# Patient Record
Sex: MALE | Race: WHITE | NOT HISPANIC OR LATINO | Employment: OTHER | URBAN - METROPOLITAN AREA
[De-identification: names, ages, dates, MRNs, and addresses within clinical notes are randomized per-mention and may not be internally consistent; named-entity substitution may affect disease eponyms.]

---

## 2017-01-03 ENCOUNTER — TELEPHONE (OUTPATIENT)
Dept: FAMILY MEDICINE | Facility: CLINIC | Age: 62
End: 2017-01-03

## 2017-01-03 NOTE — TELEPHONE ENCOUNTER
He hasn't received novolog yet, we send to to [facility] and it was suppose to go to [facility], they are working on it but he's not happy.  Shyann Courtney, CMA

## 2017-01-03 NOTE — TELEPHONE ENCOUNTER
----- Message from Bart Willis MD sent at 12/19/2016 11:05 AM CST -----  Please call pt to see how his glucoses are doing and send me glucose readings.  Thanks.

## 2017-01-16 ENCOUNTER — TELEPHONE (OUTPATIENT)
Dept: FAMILY MEDICINE | Facility: CLINIC | Age: 62
End: 2017-01-16

## 2017-01-16 DIAGNOSIS — Z79.4 TYPE 2 DIABETES MELLITUS WITH HYPEROSMOLAR COMA, WITH LONG-TERM CURRENT USE OF INSULIN (H): Primary | ICD-10-CM

## 2017-01-16 DIAGNOSIS — E11.01 TYPE 2 DIABETES MELLITUS WITH HYPEROSMOLAR COMA, WITH LONG-TERM CURRENT USE OF INSULIN (H): Primary | ICD-10-CM

## 2017-01-16 NOTE — TELEPHONE ENCOUNTER
Spoke with patient. States never received Novolog prescribed in Dec 2016. Patient looking to start Novolog sent to [facility]. Patient states checks Blood sugar occasionaly and results are around 160's-170's. Also needing refill of test strips      Dr. Willis, Please advise  PHARMACY/PATIENT NEEDS CLEAR SIG        insulin aspart (NOVOLOG FLEXPEN) 100 UNIT/ML injection  ; blood glucose monitoring (JOHN CONTOUR NEXT) test strip       Last Written Prescription Date: 12/11/2016  Last Fill Quantity: 3, # refills: 3  Last Office Visit with G, P or [facility] prescribing provider:  12/19/2016        BP Readings from Last 3 Encounters:   12/19/16 125/74   12/05/16 138/87   04/21/16 130/80     MICROL      130   12/5/2016  No results found for this basename: microalbumin  CREATININE   Date Value Ref Range Status   12/05/2016 0.97 0.66 - 1.25 mg/dL Final   ]  GFR ESTIMATE   Date Value Ref Range Status   12/05/2016 78 >60 mL/min/1.7m2 Final     Comment:     Non  GFR Calc   04/21/2016 84 >60 mL/min/1.7m2 Final     Comment:     Non  GFR Calc   09/09/2015 63 >60 mL/min/1.7m2 Final     Comment:     Non  GFR Calc     GFR ESTIMATE IF BLACK   Date Value Ref Range Status   12/05/2016 >90   GFR Calc   >60 mL/min/1.7m2 Final   04/21/2016 >90   GFR Calc   >60 mL/min/1.7m2 Final   09/09/2015 77 >60 mL/min/1.7m2 Final     Comment:      GFR Calc     CHOL      161   12/5/2016  HDL       44   12/5/2016  LDL       72   12/5/2016  TRIG      225   12/5/2016  CHOLHDLRATIO      3.2   8/20/2015  AST       15   12/5/2016  ALT       30   12/5/2016  A1C      8.8   12/5/2016  A1C     10.8   4/21/2016  A1C      7.6   8/20/2015  A1C      8.5   1/14/2015  A1C      9.5   7/25/2014  POTASSIUM   Date Value Ref Range Status   12/05/2016 5.1 3.4 - 5.3 mmol/L Final

## 2017-01-16 NOTE — TELEPHONE ENCOUNTER
Reason for Call:  Medication or medication refill:    Do you use a [facility]?  Name of the pharmacy and phone number for the current request:         [facility] [place] [place] [facility] [address] [facility] [address]      Name of the medication requested: Novolog    Other request: pt wants to start this medciation    Can we leave a detailed message on this number? YES    Phone number patient can be reached at: Home number on file [phone] (home)    Best Time: dary      Call taken on 1/16/2017 at 9:19 AM by Vanesa Hernandez

## 2017-01-25 ENCOUNTER — TELEPHONE (OUTPATIENT)
Dept: FAMILY MEDICINE | Facility: CLINIC | Age: 62
End: 2017-01-25

## 2017-01-25 NOTE — TELEPHONE ENCOUNTER
Patient called back for Triage, Patient reports he does not have his Novalog yet, [facility], will discuss blood sugars with Triage Nurse when they call him .

## 2017-01-25 NOTE — TELEPHONE ENCOUNTER
The script was send 9 days ago for novolog to his mail order pharmacy.  Please clarify they are getting the insulin to him.  Thanks

## 2017-01-25 NOTE — TELEPHONE ENCOUNTER
Call to pharmacy.  They had been trying to get ahold of patient with regards to cost of the insulin.  Patient did not answer or call them back, they are holding the Rx until hearing from patient.  Called patient, left detailed message informing to call pharmacy to discuss Rx.  FYI to provider.  Renuka Kumar RN

## 2017-01-25 NOTE — TELEPHONE ENCOUNTER
Call back to patient.  No answer, left message to call back.  Forwarding message to provider that patient does NOT have Novolog yet.  Renuka Kumar RN

## 2017-01-25 NOTE — TELEPHONE ENCOUNTER
----- Message from Bart Willis MD sent at 1/6/2017  9:12 AM CST -----  Please call pt to see how his blood sugars are doing and task me back thanks.

## 2017-02-15 ENCOUNTER — OFFICE VISIT (OUTPATIENT)
Dept: FAMILY MEDICINE | Facility: CLINIC | Age: 62
End: 2017-02-15
Payer: COMMERCIAL

## 2017-02-15 ENCOUNTER — TELEPHONE (OUTPATIENT)
Dept: FAMILY MEDICINE | Facility: CLINIC | Age: 62
End: 2017-02-15

## 2017-02-15 ENCOUNTER — RADIANT APPOINTMENT (OUTPATIENT)
Dept: GENERAL RADIOLOGY | Facility: CLINIC | Age: 62
End: 2017-02-15
Attending: NURSE PRACTITIONER
Payer: COMMERCIAL

## 2017-02-15 VITALS
HEART RATE: 87 BPM | OXYGEN SATURATION: 98 % | HEIGHT: 70 IN | WEIGHT: 256 LBS | DIASTOLIC BLOOD PRESSURE: 83 MMHG | TEMPERATURE: 98 F | RESPIRATION RATE: 20 BRPM | SYSTOLIC BLOOD PRESSURE: 132 MMHG | BODY MASS INDEX: 36.65 KG/M2

## 2017-02-15 DIAGNOSIS — R07.81 RIB PAIN ON LEFT SIDE: Primary | ICD-10-CM

## 2017-02-15 DIAGNOSIS — S22.42XA CLOSED FRACTURE OF MULTIPLE RIBS OF LEFT SIDE, INITIAL ENCOUNTER: ICD-10-CM

## 2017-02-15 DIAGNOSIS — R07.81 RIB PAIN ON LEFT SIDE: ICD-10-CM

## 2017-02-15 PROCEDURE — 71101 X-RAY EXAM UNILAT RIBS/CHEST: CPT | Mod: LT

## 2017-02-15 PROCEDURE — 99213 OFFICE O/P EST LOW 20 MIN: CPT | Performed by: NURSE PRACTITIONER

## 2017-02-15 RX ORDER — HYDROCODONE BITARTRATE AND ACETAMINOPHEN 5; 325 MG/1; MG/1
1-2 TABLET ORAL EVERY 6 HOURS PRN
Qty: 20 TABLET | Refills: 0 | Status: SHIPPED | OUTPATIENT
Start: 2017-02-15 | End: 2017-07-28

## 2017-02-15 NOTE — MR AVS SNAPSHOT
"              After Visit Summary   2/15/2017    [address]    MRN: [id number]           Patient Information     Date Of Birth          1955        Visit Information        Provider Department      2/15/2017 9:00 AM Alejandro Ponce APRN CNP [facility]        Today's Diagnoses     Rib pain on left side    -  1    Closed fracture of multiple ribs of left side, initial encounter           Follow-ups after your visit        Who to contact     If you have questions or need follow up information about today's clinic visit or your schedule please contact [facility] directly at [phone].  Normal or non-critical lab and imaging results will be communicated to you by [employer], letter or phone within 4 business days after the clinic has received the results. If you do not hear from us within 7 days, please contact the clinic through [employer] or phone. If you have a critical or abnormal lab result, we will notify you by phone as soon as possible.  Submit refill requests through [employer] or call your pharmacy and they will forward the refill request to us. Please allow 3 business days for your refill to be completed.          Additional Information About Your Visit        MyChart Information     [employer] lets you send messages to your doctor, view your test results, renew your prescriptions, schedule appointments and more. To sign up, go to [email or web address] . Click on \"Log in\" on the left side of the screen, which will take you to the Welcome page. Then click on \"Sign up Now\" on the right side of the page.     You will be asked to enter the access code listed below, as well as some personal information. Please follow the directions to create your username and password.     Your access code is: [email or web address]  Expires: 3/5/2017 10:04 AM     Your access code will  in 90 days. If you need help or a new code, please call your [facility] or [phone].        Care EveryWhere ID " "    This is your Care EveryWhere ID. This could be used by other organizations to access your [facility] medical records  [id number]        Your Vitals Were     Pulse Temperature Respirations Height Pulse Oximetry BMI (Body Mass Index)    87 98  F (36.7  C) (Tympanic) 20 5' 10\" (1.778 m) 98% 36.73 kg/m2       Blood Pressure from Last 3 Encounters:   02/15/17 132/83   12/19/16 125/74   12/05/16 138/87    Weight from Last 3 Encounters:   02/15/17 256 lb (116.1 kg)   12/19/16 261 lb 6.4 oz (118.6 kg)   12/05/16 261 lb (118.4 kg)                 Today's Medication Changes          These changes are accurate as of: 2/15/17 12:38 PM.  If you have any questions, ask your nurse or doctor.               Start taking these medicines.        Dose/Directions    HYDROcodone-acetaminophen 5-325 MG per tablet   Commonly known as:  NORCO   Used for:  Closed fracture of multiple ribs of left side, initial encounter   Started by:  Alejandro Ponce APRN CNP        Dose:  1-2 tablet   Take 1-2 tablets by mouth every 6 hours as needed for moderate to severe pain maximum 6 tablet(s) per day   Quantity:  20 tablet   Refills:  0            Where to get your medicines      Some of these will need a paper prescription and others can be bought over the counter.  Ask your nurse if you have questions.     Bring a paper prescription for each of these medications     HYDROcodone-acetaminophen 5-325 MG per tablet                Primary Care Provider Office Phone # Fax #    Arriaga Tom Willis [phone]       [facility] [address] [facility] [address]  [facility] [address]        Thank you!     Thank you for choosing [facility]  for your care. Our goal is always to provide you with excellent care. Hearing back from our patients is one way we can continue to improve our services. Please take a few minutes to complete the written survey that you may receive in the mail after your visit with us. " Thank you!             Your Updated Medication List - Protect others around you: Learn how to safely use, store and throw away your medicines at www.disposemymeds.org.          This list is accurate as of: 2/15/17 12:38 PM.  Always use your most recent med list.                   Brand Name Dispense Instructions for use    aspirin 81 MG tablet     100    1 tab po QD (Once per day)       blood glucose monitoring lancets     100 Box    Use to test blood sugars daily as directed.       blood glucose monitoring test strip    JOHN CONTOUR NEXT    3 Box    Use to test blood sugar 1 times daily or as directed.       diltiazem 240 MG 24 hr capsule    CARDIZEM CD    90 capsule    Take 1 capsule (240 mg) by mouth daily       escitalopram 20 MG tablet    LEXAPRO    90 tablet    Take 1 tablet (20 mg) by mouth daily       FISH OIL CONCENTRATE 1000 MG capsule   Generic drug:  fish oil-omega-3 fatty acids     30 capsule    Take 3 capsules by mouth daily.       hydrochlorothiazide 12.5 MG capsule    MICROZIDE    90 capsule    Take 1 capsule (12.5 mg) by mouth daily       HYDROcodone-acetaminophen 5-325 MG per tablet    NORCO    20 tablet    Take 1-2 tablets by mouth every 6 hours as needed for moderate to severe pain maximum 6 tablet(s) per day       insulin aspart 100 UNIT/ML injection    NovoLOG FLEXPEN    21 mL    8 units plus sliding scale before breakfast, 8 units plus sliding scale before lunch, 8 units plus sliding scale before dinner Sliding scale - Blood Glucose (BG) 150-200 = +3 units, 201-250 = +6 units, 251-300 = +9 units, 301-350 = +12 units, 351-400 = +15 units, >400 = +18 units and call PCP       insulin glargine 100 UNIT/ML injection    MUNIR SILVER    3 Month    53 units at bedtime       insulin pen needle 32G X 4 MM    BD MARIE U/F    100 each    Use twice daily as directed       lisinopril 40 MG tablet    PRINIVIL/ZESTRIL    90 tablet    Take 1 tablet (40 mg) by mouth daily       metFORMIN 500 MG 24 hr tablet     GLUCOPHAGE-XR    360 tablet    Take 4 tablets (2,000 mg) by mouth daily (with breakfast)       order for DME     1 Device    Auto-CPAP: Max 12 cm H2O Min 10 cm H2O Continuous Lifetime need and heated humidity.       rosuvastatin 40 MG tablet    CRESTOR    90 tablet    Take 1 tablet (40 mg) by mouth daily

## 2017-02-15 NOTE — TELEPHONE ENCOUNTER
Reason for call:  Patient reporting a symptom    Symptom or request: fell on the ice a week and half ago.  Ribs hurt on the left side.    Duration (how long have symptoms been present): 1.5 weeks    Have you been treated for this before? No    Additional comments: would like to see someone today.    Phone Number patient can be reached at:  Home number on file [phone] (home)    Best Time:  anytime    Can we leave a detailed message on this number:  YES    Call taken on 2/15/2017 at 7:29 AM by Vanesa Hernandez
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
## 2017-02-15 NOTE — NURSING NOTE
"Chief Complaint   Patient presents with     Fall       Initial /83 (BP Location: Right arm, Patient Position: Right side, Cuff Size: Adult Large)  Pulse 87  Temp 98  F (36.7  C) (Tympanic)  Resp 20  Ht 5' 10\" (1.778 m)  Wt 256 lb (116.1 kg)  SpO2 98%  BMI 36.73 kg/m2 Estimated body mass index is 36.73 kg/(m^2) as calculated from the following:    Height as of this encounter: 5' 10\" (1.778 m).    Weight as of this encounter: 256 lb (116.1 kg).  Medication Reconciliation: complete   Torrie Hutton CMA (AAMA)      "

## 2017-02-24 ENCOUNTER — TELEPHONE (OUTPATIENT)
Dept: FAMILY MEDICINE | Facility: CLINIC | Age: 62
End: 2017-02-24
Payer: COMMERCIAL

## 2017-02-24 DIAGNOSIS — Z79.4 TYPE 2 DIABETES MELLITUS WITH HYPEROSMOLAR COMA, WITH LONG-TERM CURRENT USE OF INSULIN (H): ICD-10-CM

## 2017-02-24 DIAGNOSIS — E11.01 TYPE 2 DIABETES MELLITUS WITH HYPEROSMOLAR COMA, WITH LONG-TERM CURRENT USE OF INSULIN (H): ICD-10-CM

## 2017-02-24 NOTE — TELEPHONE ENCOUNTER
Reason for Call:  Medication or medication refill:    Do you use a [facility]?  Name of the pharmacy and phone number for the current request:     [facility]  [facility] [place] - [address]  [facility] - [place], [place] - [address] [facility] [address] - [place], [place] - [address] AT [facility] [address]      Name of the medication requested: NOVOLOG     Other request: pt has feedback regarding NOVOLOG and it's effectiveness . Please advise     Can we leave a detailed message on this number? YES    Phone number patient can be reached at: Home number on file [phone] (home)    Best Time: any    Call taken on 2/24/2017 at 1:13 PM by Herman Mercado

## 2017-03-07 NOTE — TELEPHONE ENCOUNTER
Routing to MTM coordinators to call to schedule.    Tania Gagnon PharmD, [facility]  Medication Therapy Management Provider  Pager: [phone]

## 2017-03-07 NOTE — TELEPHONE ENCOUNTER
Please let pt know I advise he work with Tania Gagnon, our clinic pharmacist on insulin doses- I have sent this message to her.    Tania- can you help with this? Thanks a lot. Kale

## 2017-03-08 ENCOUNTER — TELEPHONE (OUTPATIENT)
Dept: PHARMACY | Facility: OTHER | Age: 62
End: 2017-03-08

## 2017-03-08 NOTE — TELEPHONE ENCOUNTER
MTM referral from: Follow up apt request    MTM referral outreach attempt #1 on March 8, 2017 at 10:13 AM      Outcome: Left Message    Mei Trent, MTM Coordinator

## 2017-03-08 NOTE — LETTER
[facility] PHARM D PROJECT  [address] [facility] [address] [facility] [address]          March 13, 2017    [address]                                                                                                                     [address] [facility] [address]            Dear Huseyin,    Our records show that you are due for a Medication Therapy Management (MTM) appointment. This is an appointment to meet with a pharmacist in the clinic to make sure you get the most out of your medications.  Your medications play an important role in your health.  We would like to meet with you to review how your medications are working for you and to answer any questions you may have.       We are available in the clinic on Mondays, Wednesdays and Fridays from 8am to 4pm.  To make an appointment, please call the clinic at [phone].    We hope to see you soon!       Sincerely,        Tania Gagnon PharmD, [facility]  Medication Therapy Management Pharmacist  Pager: [phone]     Gena Dickson PharmD  Medication Therapy Management Pharmacy Resident  Pager: [phone]

## 2017-03-09 NOTE — TELEPHONE ENCOUNTER
Called patient and he asked that we call him back Monday    Mei Trent [facility] Coordinator

## 2017-03-13 NOTE — TELEPHONE ENCOUNTER
Sent letter to ptHasmukh AvilezD, [facility]  Medication Therapy Management Provider  Pager: [phone]

## 2017-03-13 NOTE — TELEPHONE ENCOUNTER
MTM referral from: Follow up apt     MTM referral outreach attempt #3 on March 13, 2017 at 4:24 PM      Outcome: Patient not reachable after several attempts, will route to MTM Pharmacist    Mei Trent, MTM Coordinator

## 2017-03-24 ENCOUNTER — OFFICE VISIT (OUTPATIENT)
Dept: PHARMACY | Facility: CLINIC | Age: 62
End: 2017-03-24
Payer: COMMERCIAL

## 2017-03-24 VITALS — WEIGHT: 250.7 LBS | BODY MASS INDEX: 35.97 KG/M2 | SYSTOLIC BLOOD PRESSURE: 122 MMHG | DIASTOLIC BLOOD PRESSURE: 81 MMHG

## 2017-03-24 DIAGNOSIS — Z79.4 TYPE 2 DIABETES MELLITUS WITH DIABETIC NEPHROPATHY, WITH LONG-TERM CURRENT USE OF INSULIN (H): ICD-10-CM

## 2017-03-24 DIAGNOSIS — E11.00 TYPE 2 DIABETES MELLITUS WITH HYPEROSMOLARITY WITHOUT COMA, WITHOUT LONG-TERM CURRENT USE OF INSULIN (H): ICD-10-CM

## 2017-03-24 DIAGNOSIS — E78.5 HYPERLIPIDEMIA LDL GOAL <100: ICD-10-CM

## 2017-03-24 DIAGNOSIS — E11.9 TYPE 2 DIABETES MELLITUS WITHOUT COMPLICATION, WITHOUT LONG-TERM CURRENT USE OF INSULIN (H): Primary | ICD-10-CM

## 2017-03-24 DIAGNOSIS — I10 ESSENTIAL HYPERTENSION, BENIGN: ICD-10-CM

## 2017-03-24 DIAGNOSIS — E11.21 TYPE 2 DIABETES MELLITUS WITH DIABETIC NEPHROPATHY, WITH LONG-TERM CURRENT USE OF INSULIN (H): ICD-10-CM

## 2017-03-24 DIAGNOSIS — F41.8 MIXED ANXIETY DEPRESSIVE DISORDER: ICD-10-CM

## 2017-03-24 DIAGNOSIS — R52 PAIN: ICD-10-CM

## 2017-03-24 LAB — HBA1C MFR BLD: 9.5 % (ref 4.3–6)

## 2017-03-24 PROCEDURE — 99607 MTMS BY PHARM ADDL 15 MIN: CPT | Performed by: PHARMACIST

## 2017-03-24 PROCEDURE — 99605 MTMS BY PHARM NP 15 MIN: CPT | Performed by: PHARMACIST

## 2017-03-24 PROCEDURE — 36415 COLL VENOUS BLD VENIPUNCTURE: CPT | Performed by: PREVENTIVE MEDICINE

## 2017-03-24 PROCEDURE — 83036 HEMOGLOBIN GLYCOSYLATED A1C: CPT | Performed by: PREVENTIVE MEDICINE

## 2017-03-24 RX ORDER — METFORMIN HCL 500 MG
2000 TABLET, EXTENDED RELEASE 24 HR ORAL
Qty: 360 TABLET | Refills: 3 | Status: SHIPPED | OUTPATIENT
Start: 2017-03-24 | End: 2017-07-28

## 2017-03-24 NOTE — PROGRESS NOTES
"SUBJECTIVE/OBJECTIVE:                                                    Huseyin St is a 61 year old male coming in for a follow-up visit for Medication Therapy Management.  He was referred to me from Lazaro Alonzo.     Chief Complaint: Follow up from visit with Tania Gagnon PharmD on 2/4/15.  Patient is still painful from fall and broken ribs. Requests metformin refill.     Personal Healthcare Goals: Feel well like he used to    Tobacco: No tobacco use   Alcohol: 1-3 beverages / week  Social History: patient is retired. Works part time at [employer].    Medication Adherence: no issues reported    Diabetes:  Pt currently taking metformin XR 2,000mg daily, Lantus 55 units QHS (prescribed 53 units, he increased the dose), Novolog 8 units plus sliding scale with meals - typically 11 to 14 units/meal   Pt is not experiencing side effects.  Patient is frustrated with insulin therapy complaining it is not working as well as glipizide. Now his SBMG  mg/dL above \"normal\". Normal means 100-120mg/dL for him.   SMB-3 times daily.   Ranges (patient reported): 200s; this  mg/dL. Patient did not bring glucometer today.  Symptoms of low blood sugar? none. Frequency of hypoglycemia? never.  Recent symptoms of high blood sugar? fatigue  Eye exam: up to date  Foot exam: up to date  Microalbumin is not < 30 mg/g. Pt is taking an ACEi/ARB.  Aspirin: Taking 81mg daily and denies side effects  Diet/Exercise: Lots of walking Tues, Wed, Thurs at work. Not much activity otherwise. He and his wife are in the habit of having an 8pm snack nightly. Reports feeling too tired for exercise on work days. Patient has taken Invokana previously and tolerated this well. He is willing to re-start this. Patient uses CPAP every night and takes it with him is he will be away from home more than 2 nights.     Hypertension: Current medications include HCTZ 12.5 mg daily, diltiazem 240 mg daily, lisinopril 40mg daily.  Patient does not " self-monitor BP.  Patient reports no current medication side effects.    Hyperlipidemia: Current therapy includes rosuvastatin 40mg once daily.  Pt reports no significant myalgias or other side effects.     Depression:  Current medications include: Escitalopram 20mg once daily. Pt reports that depression symptoms are well controlled.  PHQ-9 SCORE 11/9/2015 4/21/2016 12/5/2016   Total Score - - -   Total Score 0 0 1     Pain: Current therapy ibuprofen 400mg PRN. Rarely uses. Patient reports this works well when needed.    Current labs include:  BP Readings from Last 3 Encounters:   02/15/17 132/83   12/19/16 125/74   12/05/16 138/87     Today's Vitals: /81  Wt 250 lb 11.2 oz (113.7 kg)  BMI 35.97 kg/m2     Lab Results   Component Value Date    A1C 8.8 12/05/2016   .  Lab Results   Component Value Date    CHOL 161 12/05/2016     Lab Results   Component Value Date    TRIG 225 12/05/2016     Lab Results   Component Value Date    HDL 44 12/05/2016     Lab Results   Component Value Date    LDL 72 12/05/2016       Liver Function Studies -   Recent Labs   Lab Test  12/05/16   1058   PROTTOTAL  8.1   ALBUMIN  3.9   BILITOTAL  0.4   ALKPHOS  81   AST  15   ALT  30       Lab Results   Component Value Date    UCRR 78 12/05/2016    MICROL 130 12/05/2016    UMALCR 167.74 (H) 12/05/2016       Last Basic Metabolic Panel:  Lab Results   Component Value Date     12/05/2016      Lab Results   Component Value Date    POTASSIUM 5.1 12/05/2016     Lab Results   Component Value Date    CHLORIDE 103 12/05/2016     Lab Results   Component Value Date    BUN 14 12/05/2016     Lab Results   Component Value Date    CR 0.97 12/05/2016     GFR Estimate   Date Value Ref Range Status   12/05/2016 78 >60 mL/min/1.7m2 Final     Comment:     Non  GFR Calc   04/21/2016 84 >60 mL/min/1.7m2 Final     Comment:     Non  GFR Calc   09/09/2015 63 >60 mL/min/1.7m2 Final     Comment:     Non  GFR  Calc       TSH   Date Value Ref Range Status   12/05/2016 1.94 0.40 - 4.00 mU/L Final   ]  Most Recent Immunizations   Administered Date(s) Administered     Hepatitis B 08/20/2015     Influenza (IIV3) 09/24/2012     Influenza Vaccine IM 3yrs+ 4 Valent IIV4 12/05/2016     Pneumococcal 23 valent 09/24/2012     TD (ADULT, 7+) 07/14/1998     TDAP Vaccine (Adacel) 07/17/2013     Zoster vaccine, live 11/09/2015   Deferred Date(s) Deferred     Zoster vaccine, live 08/20/2015     ASSESSMENT:                                                    Current medications were reviewed today as discussed above.      Medication Adherence: no issues identified    Diabetes: Needs Improvement. Patient is not meeting A1c goal of < 7%. Need updated A1c. Pt would benefit from restarting Invokana or increasing insulin dose. Prefer to start Invokana because patient tolerated this well previously and can get prescription at no cost with  co-pay card over increased insulin cost and likely increased weight gain. Will plan a follow up BMP in 4 weeks and dose titration if starting dose is well tolerated. I typically prefer a repeat BMP in 2 weeks, however, he has had no kidney problems with past use.    Hypertension: Stable. Patient is meeting BP goal of < 140/90mmHg.     Hyperlipidemia: Stable. Pt is on high intensity statin which is indicated based on 2013 ACC/AHA guidelines for lipid management.      Depression: Stable    Pain: Stable      PLAN:                                                      1. Labs today: A1c  2. Start Invokana 100mg daily.  3. PharmD sent prescriptions for metformin and Invokana 100mg daily.   4. Encouraged patient to increase exercise and reduce carbs.     I spent 30 minutes with this patient today.  All changes were made via verbal approval with Bart Willis. A copy of the visit note was provided to the patient's primary care provider.     Will follow up in one month to assess Invokana  start and BMP.    The patient was given a summary of these recommendations as an after visit summary.    Gena Dickson PharmD  [facility] Resident  [phone]    The patient was seen independently by Dr. Dickson.  I have read the note and agree with the assessment and plan.  Tania Gagnon PharmD, [facility]  Medication Therapy Management Provider  Pager: [phone]

## 2017-03-24 NOTE — PATIENT INSTRUCTIONS
Recommendations from today's MTM visit:                                                    MTM (medication therapy management) is a service provided by a clinical pharmacist designed to help you get the most of out of your medicines.   Today we reviewed what your medicines are for, how to know if they are working, that your medicines are safe and how to make your medicine regimen as easy as possible.      1. Labs today: A1c    2. Start Invokana 100mg daily for 30 days. Then, return to clinic for labs.     3. Go to [email or web address] for the co-pay card.    Next MTM visit: 30 days    To schedule another MTM appointment, please call the clinic directly or you may call the MTM scheduling line at [phone] or toll-free at [phone].     My Clinical Pharmacist's contact information:                                                      It was a pleasure seeing you today!  Please feel free to contact me with any questions or concerns you have.      Gena Dickson, PharmD  [facility] MTM Resident  [phone]      You may receive a survey about the MTM services you received.  I would appreciate your feedback to help me serve you better in the future. Please fill it out and return it when you can. Your comments will be anonymous.

## 2017-03-24 NOTE — MR AVS SNAPSHOT
After Visit Summary   3/24/2017    Huseyin St    MRN: [id number]           Patient Information     Date Of Birth          1955        Visit Information        Provider Department      3/24/2017 11:00 AM Tania Gagnon, [facility] MTM        Today's Diagnoses     Type 2 diabetes mellitus with hyperosmolarity without coma, without long-term current use of insulin (H)    -  1    Type 2 diabetes mellitus without complication, without long-term current use of insulin (H)          Care Instructions    Recommendations from today's MTM visit:                                                    MTM (medication therapy management) is a service provided by a clinical pharmacist designed to help you get the most of out of your medicines.   Today we reviewed what your medicines are for, how to know if they are working, that your medicines are safe and how to make your medicine regimen as easy as possible.      1. Labs today: A1c    2. Start Invokana 100mg daily for 30 days. Then, return to clinic for labs.     3. Go to [email or web address] for the co-pay card.    Next MTM visit: **    To schedule another MTM appointment, please call the clinic directly or you may call the MTM scheduling line at [phone] or toll-free at [phone].     My Clinical Pharmacist's contact information:                                                      It was a pleasure seeing you today!  Please feel free to contact me with any questions or concerns you have.      Gena Dickson, PharmD  [facility] Resident  [phone]      You may receive a survey about the MTM services you received.  I would appreciate your feedback to help me serve you better in the future. Please fill it out and return it when you can. Your comments will be anonymous.                  Follow-ups after your visit        Future tests that were ordered for you today     Open Future Orders        Priority Expected Expires Ordered     "Hemoglobin A1c Routine  3/24/2018 3/24/2017            Who to contact     If you have questions or need follow up information about today's clinic visit or your schedule please contact [facility] directly at [phone].  Normal or non-critical lab and imaging results will be communicated to you by MyChart, letter or phone within 4 business days after the clinic has received the results. If you do not hear from us within 7 days, please contact the clinic through MyChart or phone. If you have a critical or abnormal lab result, we will notify you by phone as soon as possible.  Submit refill requests through [employer] or call your pharmacy and they will forward the refill request to us. Please allow 3 business days for your refill to be completed.          Additional Information About Your Visit        MyChart Information     [employer] lets you send messages to your doctor, view your test results, renew your prescriptions, schedule appointments and more. To sign up, go to [email or web address] . Click on \"Log in\" on the left side of the screen, which will take you to the Welcome page. Then click on \"Sign up Now\" on the right side of the page.     You will be asked to enter the access code listed below, as well as some personal information. Please follow the directions to create your username and password.     Your access code is: [email or web address]  Expires: 2017 11:30 AM     Your access code will  in 90 days. If you need help or a new code, please call your [place] clinic or [phone].        Care EveryWhere ID     This is your Care EveryWhere ID. This could be used by other organizations to access your [place] medical records  [id number]         Blood Pressure from Last 3 Encounters:   02/15/17 132/83   16 125/74   16 138/87    Weight from Last 3 Encounters:   02/15/17 256 lb (116.1 kg)   16 261 lb 6.4 oz (118.6 kg)   16 261 lb (118.4 kg)                 Today's " Medication Changes          These changes are accurate as of: 3/24/17 11:32 AM.  If you have any questions, ask your nurse or doctor.               Start taking these medicines.        Dose/Directions    canagliflozin 100 MG tablet   Commonly known as:  INVOKANA   Used for:  Type 2 diabetes mellitus with hyperosmolarity without coma, without long-term current use of insulin (H)   Started by:  Tania Gagnon [facility]        Dose:  100 mg   Take 1 tablet (100 mg) by mouth every morning (before breakfast)   Quantity:  30 tablet   Refills:  0            Where to get your medicines      These medications were sent to [facility] - [place], [place] - [address]  [address], [place] [address]     Phone:  [phone]     metFORMIN 500 MG 24 hr tablet         These medications were sent to [id number] - [place], [place] - [address] AT [facility] [address] [place] [address]  [address], [place] [address]    Hours:  24-hours Phone:  [phone]     canagliflozin 100 MG tablet                Primary Care Provider Office Phone # Fax #    Arriaga Tom Willis [phone]       [facility] [address] [facility] [address]  [facility] [address]        Thank you!     Thank you for choosing [facility]  for your care. Our goal is always to provide you with excellent care. Hearing back from our patients is one way we can continue to improve our services. Please take a few minutes to complete the written survey that you may receive in the mail after your visit with us. Thank you!             Your Updated Medication List - Protect others around you: Learn how to safely use, store and throw away your medicines at www.disposemymeds.org.          This list is accurate as of: 3/24/17 11:32 AM.  Always use your most recent med list.                   Brand Name Dispense Instructions for use    aspirin 81 MG tablet     100    1 tab po QD (Once  per day)       blood glucose monitoring lancets     100 Box    Use to test blood sugars daily as directed.       blood glucose monitoring test strip    JOHN CONTOUR NEXT    3 Box    Use to test blood sugar 1 times daily or as directed.       canagliflozin 100 MG tablet    INVOKANA    30 tablet    Take 1 tablet (100 mg) by mouth every morning (before breakfast)       diltiazem 240 MG 24 hr capsule    CARDIZEM CD    90 capsule    Take 1 capsule (240 mg) by mouth daily       escitalopram 20 MG tablet    LEXAPRO    90 tablet    Take 1 tablet (20 mg) by mouth daily       hydrochlorothiazide 12.5 MG capsule    MICROZIDE    90 capsule    Take 1 capsule (12.5 mg) by mouth daily       HYDROcodone-acetaminophen 5-325 MG per tablet    NORCO    20 tablet    Take 1-2 tablets by mouth every 6 hours as needed for moderate to severe pain maximum 6 tablet(s) per day       insulin aspart 100 UNIT/ML injection    NovoLOG FLEXPEN    21 mL    8 units plus sliding scale before breakfast, 8 units plus sliding scale before lunch, 8 units plus sliding scale before dinner Sliding scale - Blood Glucose (BG) 150-200 = +3 units, 201-250 = +6 units, 251-300 = +9 units, 301-350 = +12 units, 351-400 = +15 units, >400 = +18 units and call PCP       insulin glargine 100 UNIT/ML injection    LANASIAUS SOLOSTAR    3 Month    53 units at bedtime       insulin pen needle 32G X 4 MM    BD MARIE U/F    100 each    Use twice daily as directed       lisinopril 40 MG tablet    PRINIVIL/ZESTRIL    90 tablet    Take 1 tablet (40 mg) by mouth daily       metFORMIN 500 MG 24 hr tablet    GLUCOPHAGE-XR    360 tablet    Take 4 tablets (2,000 mg) by mouth daily (with breakfast)       order for DME     1 Device    Auto-CPAP: Max 12 cm H2O Min 10 cm H2O Continuous Lifetime need and heated humidity.       rosuvastatin 40 MG tablet    CRESTOR    90 tablet    Take 1 tablet (40 mg) by mouth daily

## 2017-04-07 ENCOUNTER — TRANSFERRED RECORDS (OUTPATIENT)
Dept: HEALTH INFORMATION MANAGEMENT | Facility: CLINIC | Age: 62
End: 2017-04-07

## 2017-04-07 ENCOUNTER — TELEPHONE (OUTPATIENT)
Dept: FAMILY MEDICINE | Facility: CLINIC | Age: 62
End: 2017-04-07

## 2017-04-07 NOTE — TELEPHONE ENCOUNTER
Reason for Call:  Other [employer] to discuss gaps in care     Detailed comments: Patient last office visit, med list, and clinical note discussion with nurse.     Phone Number Patient can be reached at: Xuan Springer RN Case Manager [facility]  [phone]    Best Time: anytime     Can we leave a detailed message on this number? YES    Call taken on 4/7/2017 at 10:53 AM by Kale Worthy

## 2017-04-10 ENCOUNTER — TELEPHONE (OUTPATIENT)
Dept: FAMILY MEDICINE | Facility: CLINIC | Age: 62
End: 2017-04-10

## 2017-04-10 NOTE — TELEPHONE ENCOUNTER
Dr. Willis can you fit this Pt in on 4/17? He has the day off and prefers not to  Miss work on Thursday

## 2017-04-10 NOTE — TELEPHONE ENCOUNTER
Reason for Call:  Other appointment    Detailed comments: pre op tubes in ear 4/18/17 no appt available with Np please fit in to schedule    Phone Number Patient can be reached at: Home number on file [phone] (home)    Best Time: any    Can we leave a detailed message on this number? YES    Call taken on 4/10/2017 at 10:25 AM by Betty Carrion

## 2017-04-17 ENCOUNTER — OFFICE VISIT (OUTPATIENT)
Dept: FAMILY MEDICINE | Facility: CLINIC | Age: 62
End: 2017-04-17
Payer: COMMERCIAL

## 2017-04-17 VITALS
OXYGEN SATURATION: 97 % | HEIGHT: 70 IN | TEMPERATURE: 97.4 F | BODY MASS INDEX: 35.93 KG/M2 | WEIGHT: 251 LBS | SYSTOLIC BLOOD PRESSURE: 142 MMHG | DIASTOLIC BLOOD PRESSURE: 90 MMHG | HEART RATE: 80 BPM

## 2017-04-17 DIAGNOSIS — Z01.818 PREOP GENERAL PHYSICAL EXAM: Primary | ICD-10-CM

## 2017-04-17 DIAGNOSIS — H93.91 EAR PROBLEM, RIGHT: ICD-10-CM

## 2017-04-17 PROBLEM — I51.7 LVH (LEFT VENTRICULAR HYPERTROPHY): Status: ACTIVE | Noted: 2017-04-17

## 2017-04-17 LAB
ANION GAP SERPL CALCULATED.3IONS-SCNC: 7 MMOL/L (ref 3–14)
BASOPHILS # BLD AUTO: 0 10E9/L (ref 0–0.2)
BASOPHILS NFR BLD AUTO: 0.2 %
BUN SERPL-MCNC: 12 MG/DL (ref 7–30)
CALCIUM SERPL-MCNC: 9.2 MG/DL (ref 8.5–10.1)
CHLORIDE SERPL-SCNC: 103 MMOL/L (ref 94–109)
CO2 SERPL-SCNC: 28 MMOL/L (ref 20–32)
CREAT SERPL-MCNC: 0.94 MG/DL (ref 0.66–1.25)
DIFFERENTIAL METHOD BLD: NORMAL
EOSINOPHIL # BLD AUTO: 0.1 10E9/L (ref 0–0.7)
EOSINOPHIL NFR BLD AUTO: 1.4 %
ERYTHROCYTE [DISTWIDTH] IN BLOOD BY AUTOMATED COUNT: 12.8 % (ref 10–15)
GFR SERPL CREATININE-BSD FRML MDRD: 81 ML/MIN/1.7M2
GLUCOSE SERPL-MCNC: 224 MG/DL (ref 70–99)
HCT VFR BLD AUTO: 43.7 % (ref 40–53)
HGB BLD-MCNC: 14.4 G/DL (ref 13.3–17.7)
LYMPHOCYTES # BLD AUTO: 2.2 10E9/L (ref 0.8–5.3)
LYMPHOCYTES NFR BLD AUTO: 24.2 %
MCH RBC QN AUTO: 29 PG (ref 26.5–33)
MCHC RBC AUTO-ENTMCNC: 33 G/DL (ref 31.5–36.5)
MCV RBC AUTO: 88 FL (ref 78–100)
MONOCYTES # BLD AUTO: 1 10E9/L (ref 0–1.3)
MONOCYTES NFR BLD AUTO: 11.1 %
NEUTROPHILS # BLD AUTO: 5.8 10E9/L (ref 1.6–8.3)
NEUTROPHILS NFR BLD AUTO: 63.1 %
PLATELET # BLD AUTO: 366 10E9/L (ref 150–450)
POTASSIUM SERPL-SCNC: 4.7 MMOL/L (ref 3.4–5.3)
RBC # BLD AUTO: 4.97 10E12/L (ref 4.4–5.9)
SODIUM SERPL-SCNC: 138 MMOL/L (ref 133–144)
WBC # BLD AUTO: 9.2 10E9/L (ref 4–11)

## 2017-04-17 PROCEDURE — 85025 COMPLETE CBC W/AUTO DIFF WBC: CPT | Performed by: PREVENTIVE MEDICINE

## 2017-04-17 PROCEDURE — 36415 COLL VENOUS BLD VENIPUNCTURE: CPT | Performed by: PREVENTIVE MEDICINE

## 2017-04-17 PROCEDURE — 80048 BASIC METABOLIC PNL TOTAL CA: CPT | Performed by: PREVENTIVE MEDICINE

## 2017-04-17 PROCEDURE — 93000 ELECTROCARDIOGRAM COMPLETE: CPT | Performed by: PREVENTIVE MEDICINE

## 2017-04-17 PROCEDURE — 99214 OFFICE O/P EST MOD 30 MIN: CPT | Performed by: PREVENTIVE MEDICINE

## 2017-04-17 NOTE — PROGRESS NOTES
[place] [address] [facility] [address]  [phone]  Dept: [phone]    PRE-OP EVALUATION:  Today's date: 2017    Huseyin [address] (: 1955) presents for pre-operative evaluation assessment as requested by Dr. Krishnamurthy.  He requires evaluation and anesthesia risk assessment prior to undergoing surgery/procedure for treatment of ears .  Proposed procedure: Tube in R ear    Date of Surgery/ Procedure: 17  Time of Surgery/ Procedure: 7:30 a.m.  Hospital/Surgical Facility: [facility]  Fax number for surgical facility:   Primary Physician: Bart Willis  Type of Anesthesia Anticipated: General    Patient has a Health Care Directive or Living Will:  NO    1. NO - Do you have a history of heart attack, stroke, stent, bypass or surgery on an artery in the head, neck, heart or legs?  2. YES - DO YOU EVER HAVE ANY PAIN OR DISCOMFORT IN YOUR CHEST?   3. NO - Do you have a history of  Heart Failure?  4. YES - ARE YOUR TROUBLED BY SHORTNESS OF BREATH WHEN WALKING ON THE LEVEL, UP A SLIGHT HILL OR AT NIGHT?   5. YES - DO YOU CURRENTLY HAVE A COLD, BRONCHITIS OR OTHER RESPIRATORY INFECTION?   6. NO - Do you have a cough, shortness of breath or wheezing?  7. NO - Do you sometimes get pains in the calves of your legs when you walk?  8. NO - Do you or anyone in your family have previous history of blood clots?  9. NO - Do you or does anyone in your family have a serious bleeding problem such as prolonged bleeding following surgeries or cuts?  10. NO - Have you ever had problems with anemia or been told to take iron pills?  11. NO - Have you had any abnormal blood loss such as black, tarry or bloody stools, or abnormal vaginal bleeding?  12. NO - Have you ever had a blood transfusion?  13. NO - Have you or any of your relatives ever had problems with anesthesia?  14. NO - Do you have sleep apnea, excessive snoring or daytime drowsiness?  15. YES - DO YOU HAVE  ANY PROSTHETIC HEART VALVES?   16. NO - Do you have prosthetic joints?  17. NO - Is there any chance that you may be pregnant?      HPI:                                                      Brief HPI related to upcoming procedure: need for r ear tube      See problem list for active medical problems.  Problems all longstanding and stable, except as noted/documented.  See ROS for pertinent symptoms related to these conditions.                                                                                                  .    MEDICAL HISTORY:                                                      Patient Active Problem List    Diagnosis Date Noted     Type 2 diabetes mellitus with hyperosmolarity without coma, without long-term current use of insulin (H) 12/05/2016     Priority: Medium     Overweight BMI 35-40 10/12/2015     Priority: Medium     Type 2 diabetes mellitus with diabetic nephropathy (H) 08/31/2015     Priority: Medium     NA (obstructive sleep apnea) 07/17/2013     Priority: Medium     On cpap       Recurrent cold sores 01/16/2013     Priority: Medium     Microalbuminuria 09/26/2012     Priority: Medium     Advanced directives, counseling/discussion 08/07/2012     Priority: Medium     Discussed advance care planning with patient; information given to patient to review. 8/7/2012   Christy Ray, CMA           Mixed anxiety depressive disorder 04/26/2012     Priority: Medium     (Problem list name updated by automated process. Provider to review and confirm.)       HYPERLIPIDEMIA LDL GOAL <100 10/31/2010     Priority: Medium     Rhinitis, allergic seasonal 06/19/2008     Priority: Medium     Hypertrophy of prostate without urinary obstruction 04/30/2006     Priority: Medium     Problem list name updated by automated process. Provider to review       Tietze's disease 04/30/2006     Priority: Medium     Essential hypertension, benign 04/26/2003     Priority: Medium      Past Medical History:   Diagnosis  Date     Essential hypertension, benign      No past surgical history on file.  Current Outpatient Prescriptions   Medication Sig Dispense Refill     insulin glargine (LANTUS SOLOSTAR) 100 UNIT/ML injection 55 units at bedtime       metFORMIN (GLUCOPHAGE-XR) 500 MG 24 hr tablet Take 4 tablets (2,000 mg) by mouth daily (with breakfast) 360 tablet 3     canagliflozin (INVOKANA) 100 MG tablet Take 1 tablet (100 mg) by mouth every morning (before breakfast) 30 tablet 0     HYDROcodone-acetaminophen (NORCO) 5-325 MG per tablet Take 1-2 tablets by mouth every 6 hours as needed for moderate to severe pain maximum 6 tablet(s) per day 20 tablet 0     insulin aspart (NOVOLOG FLEXPEN) 100 UNIT/ML injection 8 units plus sliding scale before breakfast, 8 units plus sliding scale before lunch, 8 units plus sliding scale before dinner  Sliding scale - Blood Glucose (BG) 150-200 = +3 units, 201-250 = +6 units, 251-300 = +9 units, 301-350 = +12 units, 351-400 = +15 units, >400 = +18 units and call PCP 21 mL 3     blood glucose monitoring (JOHN CONTOUR NEXT) test strip Use to test blood sugar 1 times daily or as directed. 3 Box 3     insulin pen needle (BD MARIE U/F) 32G X 4 MM Use twice daily as directed 100 each prn     diltiazem (CARDIZEM CD) 240 MG 24 hr CD capsule Take 1 capsule (240 mg) by mouth daily 90 capsule 2     rosuvastatin (CRESTOR) 40 MG tablet Take 1 tablet (40 mg) by mouth daily 90 tablet 3     escitalopram (LEXAPRO) 20 MG tablet Take 1 tablet (20 mg) by mouth daily 90 tablet 3     hydrochlorothiazide (MICROZIDE) 12.5 MG capsule Take 1 capsule (12.5 mg) by mouth daily 90 capsule 3     lisinopril (PRINIVIL,ZESTRIL) 40 MG tablet Take 1 tablet (40 mg) by mouth daily 90 tablet 3     blood glucose monitoring (MICROLET) lancets Use to test blood sugars daily as directed. 100 Box prn     ORDER FOR DME Auto-CPAP: Max 12 cm H2O Min 10 cm H2O  Continuous Lifetime need and heated humidity.    1 Device 0     ASPIRIN 81 MG OR  TABS 1 tab po QD (Once per day) 100 3     OTC products: None, except as noted above    Allergies   Allergen Reactions     Augmentin Diarrhea     Sulfa Drugs      Unknown - reaction occurred as a child     Victoza      Skin rash     Xanax      Difficult to wean off      Latex Allergy: NO    Social History   Substance Use Topics     Smoking status: Former Smoker     Types: Cigars     Smokeless tobacco: Never Used      Comment: used to smoke occ cigar, never cigarettes     Alcohol use 0.0 oz/week     0 Standard drinks or equivalent per week      Comment: 3-6 drinks on occ weekend night, occ drink on weekday     History   Drug Use No       REVIEW OF SYSTEMS:                                                    Constitutional, neuro, ENT, endocrine, pulmonary, cardiac, gastrointestinal, genitourinary, musculoskeletal, integument and psychiatric systems are negative, except as otherwise noted.    EXAM:                                                    There were no vitals taken for this visit.    GENERAL APPEARANCE: healthy, alert and no distress     EYES: EOMI,  PERRL     HENT: ear canals and TM's normal and nose and mouth without ulcers or lesions     NECK: no adenopathy, no asymmetry, masses, or scars and thyroid normal to palpation     RESP: lungs clear to auscultation - no rales, rhonchi or wheezes     CV: regular rates and rhythm, normal S1 S2, no S3 or S4 and no murmur, click or rub     ABDOMEN:  soft, nontender, no HSM or masses and bowel sounds normal     MS: extremities normal- no gross deformities noted, no evidence of inflammation in joints, FROM in all extremities.     SKIN: no suspicious lesions or rashes     NEURO: Normal strength and tone, sensory exam grossly normal, mentation intact and speech normal     PSYCH: mentation appears normal. and affect normal/bright     LYMPHATICS: No axillary, cervical, or supraclavicular nodes    DIAGNOSTICS:                                                    EKG, CBC, BMP  pending    Recent Labs   Lab Test  03/24/17   1143  12/05/16   1058  04/21/16   0906   HGB   --   14.9  15.2   PLT   --   357  367   NA   --   138  137   POTASSIUM   --   5.1  5.2   CR   --   0.97  0.92   A1C  9.5*  8.8*  10.8*        IMPRESSION:                                                    Diagnosis/reason for consult: Medical Preop    The proposed surgical procedure is considered INTERMEDIATE risk.    REVISED CARDIAC RISK INDEX  The patient has the following serious cardiovascular risks for perioperative complications such as (MI, PE, VFib and 3  AV Block):  Diabetes Mellitus (on Insulin)  INTERPRETATION: 1 risks: Class II (low risk - 0.9% complication rate)    The patient has the following additional risks for perioperative complications:  No identified additional risks    No diagnosis found.    RECOMMENDATIONS:                                                          --Patient is to take all scheduled medications on the day of surgery EXCEPT for modifications listed below.    Diabetes Medication Use    -----Hold usual  oral diabetic meds (e.g. Metformin, Actos, Glipizide) while NPO.   -----Take 80% of long acting insulin (e.g. Lantus, NPH) while NPO (fasting)      Anticoagulant or Antiplatelet Medication Use  ASPIRIN: Discontinue ASA 7-10 days prior to procedure to reduce bleeding risk.  It should be resumed post-operatively.        ACE Inhibitor or Angiotensin Receptor Blocker (ARB) Use  Ace inhibitor or Angiotensin Receptor Blocker (ARB) and should HOLD this medication for the 24 hours prior to surgery.  Hold hctz morning of procedure as well    APPROVAL GIVEN to proceed with proposed procedure, without further diagnostic evaluation       Signed Electronically by: Bart Willis MD, MD    Copy of this evaluation report is provided to requesting physician.    [place] Preop Guidelines

## 2017-04-17 NOTE — NURSING NOTE
"Chief Complaint   Patient presents with     Pre-Op Exam       Initial /90 (BP Location: Right arm, Cuff Size: Adult Large)  Pulse 80  Temp 97.4  F (36.3  C) (Tympanic)  Ht 5' 10\" (1.778 m)  Wt 251 lb (113.9 kg)  SpO2 97%  BMI 36.01 kg/m2 Estimated body mass index is 36.01 kg/(m^2) as calculated from the following:    Height as of this encounter: 5' 10\" (1.778 m).    Weight as of this encounter: 251 lb (113.9 kg).  Medication Reconciliation: complete     Janna Galvan MA    "

## 2017-04-17 NOTE — LETTER
[address]  [place] [address]  [phone]                                                                                               Date: 4/17/2017    [address]                                                                               [address] [facility] [address]              Dear Huseyin,    Your labs look fine.   Enclosed is a copy of your results.      It was a pleasure to see you at your last appointment. If you have any questions, please feel free to call myself or my nurse at [phone].          Sincerely,    Kale Willis MD/ Ema COWAN CMA  Results for orders placed or performed in visit on 04/17/17   CBC with platelets and differential   Result Value Ref Range    WBC 9.2 4.0 - 11.0 10e9/L    RBC Count 4.97 4.4 - 5.9 10e12/L    Hemoglobin 14.4 13.3 - 17.7 g/dL    Hematocrit 43.7 40.0 - 53.0 %    MCV 88 78 - 100 fl    MCH 29.0 26.5 - 33.0 pg    MCHC 33.0 31.5 - 36.5 g/dL    RDW 12.8 10.0 - 15.0 %    Platelet Count 366 150 - 450 10e9/L    Diff Method Automated Method     % Neutrophils 63.1 %    % Lymphocytes 24.2 %    % Monocytes 11.1 %    % Eosinophils 1.4 %    % Basophils 0.2 %    Absolute Neutrophil 5.8 1.6 - 8.3 10e9/L    Absolute Lymphocytes 2.2 0.8 - 5.3 10e9/L    Absolute Monocytes 1.0 0.0 - 1.3 10e9/L    Absolute Eosinophils 0.1 0.0 - 0.7 10e9/L    Absolute Basophils 0.0 0.0 - 0.2 10e9/L   Basic metabolic panel   Result Value Ref Range    Sodium 138 133 - 144 mmol/L    Potassium 4.7 3.4 - 5.3 mmol/L    Chloride 103 94 - 109 mmol/L    Carbon Dioxide 28 20 - 32 mmol/L    Anion Gap 7 3 - 14 mmol/L    Glucose 224 (H) 70 - 99 mg/dL    Urea Nitrogen 12 7 - 30 mg/dL    Creatinine 0.94 0.66 - 1.25 mg/dL    GFR Estimate 81 >60 mL/min/1.7m2    GFR Estimate If Black >90   GFR Calc   >60 mL/min/1.7m2    Calcium 9.2 8.5 - 10.1 mg/dL

## 2017-04-17 NOTE — MR AVS SNAPSHOT
After Visit Summary   4/17/2017    [address]    MRN: [id number]           Patient Information     Date Of Birth          1955        Visit Information        Provider Department      4/17/2017 8:30 AM Bart Willis MD [facility]        Today's Diagnoses     Preop general physical exam    -  1      Care Instructions      Before Your Surgery      Call your surgeon if there is any change in your health. This includes signs of a cold or flu (such as a sore throat, runny nose, cough, rash or fever).    Do not smoke, drink alcohol or take over the counter medicine (unless your surgeon or primary care doctor tells you to) for the 24 hours before and after surgery.    If you take prescribed drugs: Follow your doctor s orders about which medicines to take and which to stop until after surgery.    Eating and drinking prior to surgery: follow the instructions from your surgeon    Take a shower or bath the night before surgery. Use the soap your surgeon gave you to gently clean your skin. If you do not have soap from your surgeon, use your regular soap. Do not shave or scrub the surgery site.  Wear clean pajamas and have clean sheets on your bed.         Follow-ups after your visit        Who to contact     If you have questions or need follow up information about today's clinic visit or your schedule please contact [facility] directly at [phone].  Normal or non-critical lab and imaging results will be communicated to you by MyChart, letter or phone within 4 business days after the clinic has received the results. If you do not hear from us within 7 days, please contact the clinic through MyChart or phone. If you have a critical or abnormal lab result, we will notify you by phone as soon as possible.  Submit refill requests through [employer] or call your pharmacy and they will forward the refill request to us. Please allow 3 business days for your refill to  "be completed.          Additional Information About Your Visit        [employer] Information     [employer] lets you send messages to your doctor, view your test results, renew your prescriptions, schedule appointments and more. To sign up, go to [email or web address] . Click on \"Log in\" on the left side of the screen, which will take you to the Welcome page. Then click on \"Sign up Now\" on the right side of the page.     You will be asked to enter the access code listed below, as well as some personal information. Please follow the directions to create your username and password.     Your access code is: [email or web address]  Expires: 2017 11:30 AM     Your access code will  in 90 days. If you need help or a new code, please call your [facility] clinic or [phone].        Care EveryWhere ID     This is your Care EveryWhere ID. This could be used by other organizations to access your [facility] medical records  [id number]        Your Vitals Were     Pulse Temperature Height Pulse Oximetry BMI (Body Mass Index)       80 97.4  F (36.3  C) (Tympanic) 5' 10\" (1.778 m) 97% 36.01 kg/m2        Blood Pressure from Last 3 Encounters:   17 142/90   17 122/81   02/15/17 132/83    Weight from Last 3 Encounters:   17 251 lb (113.9 kg)   17 250 lb 11.2 oz (113.7 kg)   02/15/17 256 lb (116.1 kg)              Today, you had the following     No orders found for display       Primary Care Provider Office Phone # Fax #    Batr Tom Willis [phone]       [facility] [address]  [place] [address]        Thank you!     Thank you for choosing [facility]  for your care. Our goal is always to provide you with excellent care. Hearing back from our patients is one way we can continue to improve our services. Please take a few minutes to complete the written survey that you may receive in the mail after your visit with us. Thank you!           "   Your Updated Medication List - Protect others around you: Learn how to safely use, store and throw away your medicines at www.disposemymeds.org.          This list is accurate as of: 4/17/17  8:31 AM.  Always use your most recent med list.                   Brand Name Dispense Instructions for use    aspirin 81 MG tablet     100    1 tab po QD (Once per day)       blood glucose monitoring lancets     100 Box    Use to test blood sugars daily as directed.       blood glucose monitoring test strip    JOHN CONTOUR NEXT    3 Box    Use to test blood sugar 1 times daily or as directed.       canagliflozin 100 MG tablet    INVOKANA    30 tablet    Take 1 tablet (100 mg) by mouth every morning (before breakfast)       diltiazem 240 MG 24 hr capsule    CARDIZEM CD    90 capsule    Take 1 capsule (240 mg) by mouth daily       escitalopram 20 MG tablet    LEXAPRO    90 tablet    Take 1 tablet (20 mg) by mouth daily       hydrochlorothiazide 12.5 MG capsule    MICROZIDE    90 capsule    Take 1 capsule (12.5 mg) by mouth daily       HYDROcodone-acetaminophen 5-325 MG per tablet    NORCO    20 tablet    Take 1-2 tablets by mouth every 6 hours as needed for moderate to severe pain maximum 6 tablet(s) per day       insulin aspart 100 UNIT/ML injection    NovoLOG FLEXPEN    21 mL    8 units plus sliding scale before breakfast, 8 units plus sliding scale before lunch, 8 units plus sliding scale before dinner Sliding scale - Blood Glucose (BG) 150-200 = +3 units, 201-250 = +6 units, 251-300 = +9 units, 301-350 = +12 units, 351-400 = +15 units, >400 = +18 units and call PCP       insulin glargine 100 UNIT/ML injection    LANTUS SOLOSTAR     55 units at bedtime       insulin pen needle 32G X 4 MM    BD MARIE U/F    100 each    Use twice daily as directed       lisinopril 40 MG tablet    PRINIVIL/ZESTRIL    90 tablet    Take 1 tablet (40 mg) by mouth daily       metFORMIN 500 MG 24 hr tablet    GLUCOPHAGE-XR    360 tablet    Take 4  tablets (2,000 mg) by mouth daily (with breakfast)       order for DME     1 Device    Auto-CPAP: Max 12 cm H2O Min 10 cm H2O Continuous Lifetime need and heated humidity.       rosuvastatin 40 MG tablet    CRESTOR    90 tablet    Take 1 tablet (40 mg) by mouth daily

## 2017-04-18 ENCOUNTER — HOSPITAL PATHOLOGY (OUTPATIENT)
Dept: OTHER | Facility: CLINIC | Age: 62
End: 2017-04-18

## 2017-04-19 LAB — COPATH REPORT: NORMAL

## 2017-04-20 LAB — COPATH REPORT: NORMAL

## 2017-04-21 DIAGNOSIS — H10.45 CHRONIC ALLERGIC CONJUNCTIVITIS: ICD-10-CM

## 2017-04-21 LAB
COPATH REPORT: NORMAL
COPATH REPORT: NORMAL

## 2017-04-21 NOTE — TELEPHONE ENCOUNTER
Med requested is not on current med list.  Please send Rx, if appropriate, to [facility]    Flonase      Last Written Prescription Date: unk  Last Fill Quantity: 0,  # refills: 0   Last Office Visit with [facility], [facility] or [facility] prescribing provider: 04/17/17                                                 Janna Galvan MA

## 2017-04-21 NOTE — TELEPHONE ENCOUNTER
Reason for Call:  Medication or medication refill:    Do you use a [facility]?  Name of the pharmacy and phone number for the current request:      [id number] [place] [facility] [place] - [address] AT [facility] [address]      Name of the medication requested: Fluticasone Nasal Spray    Other request: Please call once this sent to the Pharmacy     Can we leave a detailed message on this number? YES    Phone number patient can be reached at: Home number on file [phone] (home)    Best Time: anytime    Call taken on 4/21/2017 at 3:10 PM by Momo Anguiano

## 2017-04-22 RX ORDER — FLUTICASONE PROPIONATE 50 MCG
2 SPRAY, SUSPENSION (ML) NASAL DAILY
Qty: 1 BOTTLE | Refills: 3 | Status: SHIPPED | OUTPATIENT
Start: 2017-04-22 | End: 2018-03-12

## 2017-04-24 ENCOUNTER — TRANSFERRED RECORDS (OUTPATIENT)
Dept: HEALTH INFORMATION MANAGEMENT | Facility: CLINIC | Age: 62
End: 2017-04-24

## 2017-05-08 ENCOUNTER — TELEPHONE (OUTPATIENT)
Dept: FAMILY MEDICINE | Facility: CLINIC | Age: 62
End: 2017-05-08

## 2017-05-08 DIAGNOSIS — E78.5 HYPERLIPIDEMIA LDL GOAL <100: ICD-10-CM

## 2017-05-08 DIAGNOSIS — R80.9 MICROALBUMINURIA: ICD-10-CM

## 2017-05-08 DIAGNOSIS — F41.8 MIXED ANXIETY DEPRESSIVE DISORDER: ICD-10-CM

## 2017-05-08 DIAGNOSIS — E11.21 TYPE 2 DIABETES MELLITUS WITH DIABETIC NEPHROPATHY (H): ICD-10-CM

## 2017-05-08 NOTE — TELEPHONE ENCOUNTER
Reason for Call:  Medication or medication refill:    Do you use a [facility]?  Name of the pharmacy and phone number for the current request:       [facility] [place] [address] - [address] [facility]    Name of the medication requested: 1.  lisinopril (PRINIVIL,ZESTRIL) 40 MG tablet  2.  rosuvastatin (CRESTOR) 40 MG tablet   3. escitalopram (LEXAPRO) 20 MG tablet  4.  hydrochlorothiazide (MICROZIDE) 12.5 MG capsule   5.  diltiazem (CARDIZEM CD) 240 MG 24 hr CD capsule       Can we leave a detailed message on this number? YES    Phone number patient can be reached at: Home number on file [phone] (home)    Best Time: any time    Call taken on 5/8/2017 at 12:50 PM by Britany Ye  .

## 2017-05-08 NOTE — TELEPHONE ENCOUNTER
Pending Prescriptions:                       Disp   Refills    lisinopril (PRINIVIL/ZESTRIL) 40 MG [id number] tab*3            Sig: Take 1 tablet (40 mg) by mouth daily    hydrochlorothiazide (MICROZIDE) 12.5 MG c*90 cap*3            Sig: Take 1 capsule (12.5 mg) by mouth daily          Last Written Prescription Date: 4/21/16  Last Fill Quantity: 90, # refills: 3  Last Office Visit with [facility], [facility] or [facility] prescribing provider: 4/17/17       Potassium   Date Value Ref Range Status   04/17/2017 4.7 3.4 - 5.3 mmol/L Final     Creatinine   Date Value Ref Range Status   04/17/2017 0.94 0.66 - 1.25 mg/dL Final     BP Readings from Last 3 Encounters:   04/17/17 142/90   03/24/17 122/81   02/15/17 132/83     Pending Prescriptions:                       Disp   Refills    lisinopril (PRINIVIL/ZESTRIL) 40 MG [id number] tab*3            Sig: Take 1 tablet (40 mg) by mouth daily    hydrochlorothiazide (MICROZIDE) 12.5 MG c*90 cap*3            Sig: Take 1 capsule (12.5 mg) by mouth daily    rosuvastatin (CRESTOR) 40 MG tablet       90 tab*3            Sig: Take 1 tablet (40 mg) by mouth daily         Last Written Prescription Date: 5/6/16  Last Fill Quantity: 90, # refills: 3  Last Office Visit with [facility], [facility] or [facility] prescribing provider: 4/17/17       Lab Results   Component Value Date    CHOL 161 12/05/2016     Lab Results   Component Value Date    HDL 44 12/05/2016     Lab Results   Component Value Date    LDL 72 12/05/2016     Lab Results   Component Value Date    TRIG 225 12/05/2016     Lab Results   Component Value Date    CHOLHDLRATIO 3.2 08/20/2015     Pending Prescriptions:                       Disp   Refills    lisinopril (PRINIVIL/ZESTRIL) 40 MG [id number] tab*3            Sig: Take 1 tablet (40 mg) by mouth daily    hydrochlorothiazide (MICROZIDE) 12.5 MG c*90 cap*3            Sig: Take 1 capsule (12.5 mg) by mouth daily    rosuvastatin (CRESTOR) 40 MG tablet       90 tab*3            Sig: Take 1 tablet (40 mg) by  mouth daily    escitalopram (LEXAPRO) 20 MG tablet       90 tab*3            Sig: Take 1 tablet (20 mg) by mouth daily         Last Written Prescription Date: 5/6/16  Last Fill Quantity: 90, # refills: 3  Last Office Visit with [facility] primary care provider:  4/17/17        Last PHQ-9 score on record=   PHQ-9 SCORE 12/5/2016   Total Score -   Total Score 1               Pending Prescriptions:                       Disp   Refills    lisinopril (PRINIVIL/ZESTRIL) 40 MG [id number] tab*3            Sig: Take 1 tablet (40 mg) by mouth daily    hydrochlorothiazide (MICROZIDE) 12.5 MG c*90 cap*3            Sig: Take 1 capsule (12.5 mg) by mouth daily    rosuvastatin (CRESTOR) 40 MG tablet       90 tab*3            Sig: Take 1 tablet (40 mg) by mouth daily    escitalopram (LEXAPRO) 20 MG tablet       90 tab*3            Sig: Take 1 tablet (20 mg) by mouth daily    diltiazem (CARDIZEM CD) 240 MG 24 hr caps*90 cap*2            Sig: Take 1 capsule (240 mg) by mouth daily             Last Written Prescription Date: 8/9/16  Last Fill Quantity: 90, # refills: 2    Last Office Visit with [facility], [facility] or [facility] prescribing provider:  4/17/17   Future Office Visit:      BP Readings from Last 3 Encounters:   04/17/17 142/90   03/24/17 122/81   02/15/17 132/83     Lab Results   Component Value Date    ALT 30 12/05/2016     Lab Results   Component Value Date    CHOL 161 12/05/2016     Lab Results   Component Value Date    HDL 44 12/05/2016     Lab Results   Component Value Date    LDL 72 12/05/2016     Lab Results   Component Value Date    TRIG 225 12/05/2016     Lab Results   Component Value Date    CHOLHDLRATIO 3.2 08/20/2015

## 2017-05-08 NOTE — TELEPHONE ENCOUNTER
Reason for Call:  Patient is requesting that Dr. Willis give him an idea which doctor would be the best fit for him.  That would be knowledgeable with is diabetes....and health history.      Phone Number Patient can be reached at: Home number on file [phone] (home)    Best Time: any time    Can we leave a detailed message on this number? YES    Call taken on 5/8/2017 at 12:56 PM by Britany Ye  .

## 2017-05-09 RX ORDER — HYDROCHLOROTHIAZIDE 12.5 MG/1
12.5 CAPSULE ORAL DAILY
Qty: 90 CAPSULE | Refills: 1 | Status: SHIPPED | OUTPATIENT
Start: 2017-05-09 | End: 2017-07-28

## 2017-05-09 RX ORDER — LISINOPRIL 40 MG/1
40 TABLET ORAL DAILY
Qty: 90 TABLET | Refills: 1 | Status: SHIPPED | OUTPATIENT
Start: 2017-05-09 | End: 2017-07-28

## 2017-05-09 RX ORDER — DILTIAZEM HYDROCHLORIDE 240 MG/1
240 CAPSULE, COATED, EXTENDED RELEASE ORAL DAILY
Qty: 90 CAPSULE | Refills: 1 | Status: SHIPPED | OUTPATIENT
Start: 2017-05-09 | End: 2017-08-25

## 2017-05-09 RX ORDER — ESCITALOPRAM OXALATE 20 MG/1
20 TABLET ORAL DAILY
Qty: 90 TABLET | Refills: 1 | Status: SHIPPED | OUTPATIENT
Start: 2017-05-09 | End: 2017-08-25

## 2017-05-09 RX ORDER — ROSUVASTATIN CALCIUM 40 MG/1
40 TABLET, COATED ORAL DAILY
Qty: 90 TABLET | Refills: 1 | Status: SHIPPED | OUTPATIENT
Start: 2017-05-09 | End: 2017-07-28

## 2017-05-16 DIAGNOSIS — E11.00 TYPE 2 DIABETES MELLITUS WITH HYPEROSMOLARITY WITHOUT COMA, WITHOUT LONG-TERM CURRENT USE OF INSULIN (H): Primary | ICD-10-CM

## 2017-05-17 RX ORDER — CANAGLIFLOZIN 100 MG/1
TABLET, FILM COATED ORAL
Qty: 30 TABLET | Refills: 0 | Status: SHIPPED | OUTPATIENT
Start: 2017-05-17 | End: 2017-06-21

## 2017-05-17 NOTE — TELEPHONE ENCOUNTER
Pending Prescriptions:                       Disp   Refills    INVOKANA 100 MG tablet [Pharmacy Med Name*30 tab*0            Sig: TAKE 1 TABLET(100 MG) BY MOUTH EVERY MORNING           BEFORE BREAKFAST             Last Written Prescription Date: 3/24/17  Last Fill Quantity: 30, # refills: 0  Last Office Visit with CARLEY, [facility] or [facility] prescribing provider:  4/17/17 Lazaro        BP Readings from Last 3 Encounters:   04/17/17 142/90   03/24/17 122/81   02/15/17 132/83     Lab Results   Component Value Date    MICROL 130 12/05/2016     Lab Results   Component Value Date    UMALCR 167.74 12/05/2016     Creatinine   Date Value Ref Range Status   04/17/2017 0.94 0.66 - 1.25 mg/dL Final   ]  GFR Estimate   Date Value Ref Range Status   04/17/2017 81 >60 mL/min/1.7m2 Final     Comment:     Non  GFR Calc   12/05/2016 78 >60 mL/min/1.7m2 Final     Comment:     Non  GFR Calc   04/21/2016 84 >60 mL/min/1.7m2 Final     Comment:     Non  GFR Calc     GFR Estimate If Black   Date Value Ref Range Status   04/17/2017 >90   GFR Calc   >60 mL/min/1.7m2 Final   12/05/2016 >90   GFR Calc   >60 mL/min/1.7m2 Final   04/21/2016 >90   GFR Calc   >60 mL/min/1.7m2 Final     Lab Results   Component Value Date    CHOL 161 12/05/2016     Lab Results   Component Value Date    HDL 44 12/05/2016     Lab Results   Component Value Date    LDL 72 12/05/2016     Lab Results   Component Value Date    TRIG 225 12/05/2016     Lab Results   Component Value Date    CHOLHDLRATIO 3.2 08/20/2015     Lab Results   Component Value Date    AST 15 12/05/2016     Lab Results   Component Value Date    ALT 30 12/05/2016     Lab Results   Component Value Date    A1C 9.5 03/24/2017    A1C 8.8 12/05/2016    A1C 10.8 04/21/2016    A1C 7.6 08/20/2015    A1C 8.5 01/14/2015     Potassium   Date Value Ref Range Status   04/17/2017 4.7 3.4 - 5.3 mmol/L Final     Shanda  Cindy, RT(R)

## 2017-05-17 NOTE — TELEPHONE ENCOUNTER
Medication is being filled for 1 time refill only due to:  due for med check (diabetes) and A1c recheck in June 2017   Falguni LACEY RN

## 2017-06-05 DIAGNOSIS — G47.33 OSA (OBSTRUCTIVE SLEEP APNEA): Primary | ICD-10-CM

## 2017-06-16 ENCOUNTER — TELEPHONE (OUTPATIENT)
Dept: FAMILY MEDICINE | Facility: CLINIC | Age: 62
End: 2017-06-16

## 2017-06-16 NOTE — TELEPHONE ENCOUNTER
Reason for Call:  Form, our goal is to have forms completed with 72 hours, however, some forms may require a visit or additional information.    Type of letter, form or note:  Insulin Treated Diabetes Mellitus Report    Who is the form from?: Patient    Where did the form come from: Patient or family brought in       What clinic location was the form placed at?: [facility]    Where the form was placed: 's Box    What number is listed as a contact on the form?: [phone]       Additional comments: pt would like form mailed back to him when done    Call taken on 6/16/2017 at 9:51 AM by Yeny Garcia

## 2017-06-21 DIAGNOSIS — E11.00 TYPE 2 DIABETES MELLITUS WITH HYPEROSMOLARITY WITHOUT COMA, WITHOUT LONG-TERM CURRENT USE OF INSULIN (H): ICD-10-CM

## 2017-06-21 NOTE — TELEPHONE ENCOUNTER
Pending Prescriptions:                       Disp   Refills    INVOKANA 100 MG tablet [Pharmacy Med Name*30 tab*0            Sig: TAKE ONE TABLET BY MOUTH EVERY MORNING BEFORE           BREAKFAST             Last Written Prescription Date: 5/17/17  Last Fill Quantity: 30, # refills: 0  Last Office Visit with CARLEY, [facility] or [facility] prescribing provider:  4/17/17 Lazaro        BP Readings from Last 3 Encounters:   04/17/17 142/90   03/24/17 122/81   02/15/17 132/83     Lab Results   Component Value Date    MICROL 130 12/05/2016     Lab Results   Component Value Date    UMALCR 167.74 12/05/2016     Creatinine   Date Value Ref Range Status   04/17/2017 0.94 0.66 - 1.25 mg/dL Final   ]  GFR Estimate   Date Value Ref Range Status   04/17/2017 81 >60 mL/min/1.7m2 Final     Comment:     Non  GFR Calc   12/05/2016 78 >60 mL/min/1.7m2 Final     Comment:     Non  GFR Calc   04/21/2016 84 >60 mL/min/1.7m2 Final     Comment:     Non  GFR Calc     GFR Estimate If Black   Date Value Ref Range Status   04/17/2017 >90   GFR Calc   >60 mL/min/1.7m2 Final   12/05/2016 >90   GFR Calc   >60 mL/min/1.7m2 Final   04/21/2016 >90   GFR Calc   >60 mL/min/1.7m2 Final     Lab Results   Component Value Date    CHOL 161 12/05/2016     Lab Results   Component Value Date    HDL 44 12/05/2016     Lab Results   Component Value Date    LDL 72 12/05/2016     Lab Results   Component Value Date    TRIG 225 12/05/2016     Lab Results   Component Value Date    CHOLHDLRATIO 3.2 08/20/2015     Lab Results   Component Value Date    AST 15 12/05/2016     Lab Results   Component Value Date    ALT 30 12/05/2016     Lab Results   Component Value Date    A1C 9.5 03/24/2017    A1C 8.8 12/05/2016    A1C 10.8 04/21/2016    A1C 7.6 08/20/2015    A1C 8.5 01/14/2015     Potassium   Date Value Ref Range Status   04/17/2017 4.7 3.4 - 5.3 mmol/L Final     Shanda White  RT(R)

## 2017-06-22 RX ORDER — CANAGLIFLOZIN 100 MG/1
TABLET, FILM COATED ORAL
Qty: 30 TABLET | Refills: 2 | Status: SHIPPED | OUTPATIENT
Start: 2017-06-22 | End: 2017-07-28

## 2017-06-22 NOTE — TELEPHONE ENCOUNTER
Prescription approved per [facility] Refill Protocol.  Pharm D visit 3/2017  Minerva Moore RN- Triage [employer]

## 2017-06-23 ENCOUNTER — TELEPHONE (OUTPATIENT)
Dept: FAMILY MEDICINE | Facility: CLINIC | Age: 62
End: 2017-06-23

## 2017-06-23 NOTE — TELEPHONE ENCOUNTER
Reason for call: Forms   Patient called regarding (reason for call): Patient is calling back regarding the forms he had filled out by Dr. Willis for Insulin Treated Diabetes Mellitus Report to be able to drive. He received them back by mail however they also needed to be faxed to the [place] [employer],  and [employer].   Additional comments: Please FAX to # [phone]    Phone number to reach patient:  Home number on file [phone] (home)    Best Time:  To confirm that it was faxed so patient is aware.     Can we leave a detailed message on this number?  YES

## 2017-06-23 NOTE — TELEPHONE ENCOUNTER
Spoke with patient and confirmed form has been faxed, and I re-faxed it today just incase they didn't receive.  Dotty Bender- CMA

## 2017-07-13 DIAGNOSIS — Z79.4 TYPE 2 DIABETES MELLITUS WITH DIABETIC NEPHROPATHY, WITH LONG-TERM CURRENT USE OF INSULIN (H): ICD-10-CM

## 2017-07-13 DIAGNOSIS — E11.21 TYPE 2 DIABETES MELLITUS WITH DIABETIC NEPHROPATHY, WITH LONG-TERM CURRENT USE OF INSULIN (H): ICD-10-CM

## 2017-07-14 NOTE — TELEPHONE ENCOUNTER
Pending Prescriptions:                       Disp   Refills    insulin glargine (LANTUS SOLOSTAR) 100 UN*                    Si units at bedtime             Last Written Prescription Date: 3/27/17  Last Fill Quantity: -, # refills: -  Last Office Visit with G, P or [facility] prescribing provider:  17 Lazaro        BP Readings from Last 3 Encounters:   17 142/90   17 122/81   02/15/17 132/83     Lab Results   Component Value Date    MICROL 130 2016     Lab Results   Component Value Date    UMALCR 167.74 2016     Creatinine   Date Value Ref Range Status   2017 0.94 0.66 - 1.25 mg/dL Final   ]  GFR Estimate   Date Value Ref Range Status   2017 81 >60 mL/min/1.7m2 Final     Comment:     Non  GFR Calc   2016 78 >60 mL/min/1.7m2 Final     Comment:     Non  GFR Calc   2016 84 >60 mL/min/1.7m2 Final     Comment:     Non  GFR Calc     GFR Estimate If Black   Date Value Ref Range Status   2017 >90   GFR Calc   >60 mL/min/1.7m2 Final   2016 >90   GFR Calc   >60 mL/min/1.7m2 Final   2016 >90   GFR Calc   >60 mL/min/1.7m2 Final     Lab Results   Component Value Date    CHOL 161 2016     Lab Results   Component Value Date    HDL 44 2016     Lab Results   Component Value Date    LDL 72 2016     Lab Results   Component Value Date    TRIG 225 2016     Lab Results   Component Value Date    CHOLHDLRATIO 3.2 2015     Lab Results   Component Value Date    AST 15 2016     Lab Results   Component Value Date    ALT 30 2016     Lab Results   Component Value Date    A1C 9.5 2017    A1C 8.8 2016    A1C 10.8 2016    A1C 7.6 2015    A1C 8.5 2015     Potassium   Date Value Ref Range Status   2017 4.7 3.4 - 5.3 mmol/L Final     Shanda White, RT(R)

## 2017-07-14 NOTE — TELEPHONE ENCOUNTER
Filled script, however Pt need to schedule appointment to establish care with a new provider before any future refills are issued. Thanks.     Camila Augustine RN

## 2017-07-26 NOTE — TELEPHONE ENCOUNTER
[facility] called [facility] left him a VM, they have not received RX and have been calling the clinic but have not heard back, asked patient to call clinic advised patient RX sent and received receipt,   Noted on E-Prescribing Status: Receipt confirmed by pharmacy (7/14/2017  2:56 PM CDT, patient is down to last Pen and is concerned . Please clarify with [facility]

## 2017-07-26 NOTE — TELEPHONE ENCOUNTER
Patient called, [facility] left him a VM, they have been calling the clinic because they have not received RX and asked him to contact clinic, advised patient

## 2017-07-28 ENCOUNTER — OFFICE VISIT (OUTPATIENT)
Dept: FAMILY MEDICINE | Facility: CLINIC | Age: 62
End: 2017-07-28
Payer: COMMERCIAL

## 2017-07-28 VITALS
HEIGHT: 70 IN | TEMPERATURE: 98.1 F | BODY MASS INDEX: 35.5 KG/M2 | WEIGHT: 248 LBS | HEART RATE: 68 BPM | OXYGEN SATURATION: 97 % | SYSTOLIC BLOOD PRESSURE: 115 MMHG | DIASTOLIC BLOOD PRESSURE: 76 MMHG

## 2017-07-28 DIAGNOSIS — E66.09 OBESITY DUE TO EXCESS CALORIES, UNSPECIFIED OBESITY SEVERITY: ICD-10-CM

## 2017-07-28 DIAGNOSIS — R80.9 MICROALBUMINURIA: ICD-10-CM

## 2017-07-28 DIAGNOSIS — I10 ESSENTIAL HYPERTENSION, BENIGN: ICD-10-CM

## 2017-07-28 DIAGNOSIS — E11.21 TYPE 2 DIABETES MELLITUS WITH DIABETIC NEPHROPATHY, WITH LONG-TERM CURRENT USE OF INSULIN (H): Primary | ICD-10-CM

## 2017-07-28 DIAGNOSIS — E78.5 HYPERLIPIDEMIA LDL GOAL <100: ICD-10-CM

## 2017-07-28 DIAGNOSIS — Z79.4 TYPE 2 DIABETES MELLITUS WITH DIABETIC NEPHROPATHY, WITH LONG-TERM CURRENT USE OF INSULIN (H): Primary | ICD-10-CM

## 2017-07-28 DIAGNOSIS — E11.9 TYPE 2 DIABETES MELLITUS WITHOUT COMPLICATION, WITHOUT LONG-TERM CURRENT USE OF INSULIN (H): ICD-10-CM

## 2017-07-28 PROCEDURE — 99214 OFFICE O/P EST MOD 30 MIN: CPT | Performed by: INTERNAL MEDICINE

## 2017-07-28 RX ORDER — METFORMIN HCL 500 MG
2000 TABLET, EXTENDED RELEASE 24 HR ORAL
Qty: 360 TABLET | Refills: 3 | Status: SHIPPED | OUTPATIENT
Start: 2017-07-28 | End: 2018-08-06

## 2017-07-28 RX ORDER — ROSUVASTATIN CALCIUM 40 MG/1
40 TABLET, COATED ORAL DAILY
Qty: 90 TABLET | Refills: 1 | Status: SHIPPED | OUTPATIENT
Start: 2017-07-28 | End: 2018-02-09

## 2017-07-28 RX ORDER — HYDROCHLOROTHIAZIDE 12.5 MG/1
12.5 CAPSULE ORAL DAILY
Qty: 90 CAPSULE | Refills: 3 | Status: SHIPPED | OUTPATIENT
Start: 2017-07-28 | End: 2018-08-06

## 2017-07-28 RX ORDER — LISINOPRIL 40 MG/1
40 TABLET ORAL DAILY
Qty: 90 TABLET | Refills: 3 | Status: SHIPPED | OUTPATIENT
Start: 2017-07-28 | End: 2018-08-06

## 2017-07-28 NOTE — NURSING NOTE
"Chief Complaint   Patient presents with     Diabetes       Initial /76 (BP Location: Right arm, Cuff Size: Adult Large)  Pulse 68  Temp 98.1  F (36.7  C) (Oral)  Ht 5' 10\" (1.778 m)  Wt 248 lb (112.5 kg)  SpO2 97%  BMI 35.58 kg/m2 Estimated body mass index is 35.58 kg/(m^2) as calculated from the following:    Height as of this encounter: 5' 10\" (1.778 m).    Weight as of this encounter: 248 lb (112.5 kg).  Medication Reconciliation: complete     Janna Galvan MA    "

## 2017-07-28 NOTE — MR AVS SNAPSHOT
After Visit Summary   7/28/2017    [address]    MRN: [id number]           Patient Information     Date Of Birth          1955        Visit Information        Provider Department      7/28/2017 8:30 AM Kendal Wong MD [facility]        Today's Diagnoses     Type 2 diabetes mellitus with diabetic nephropathy, with long-term current use of insulin (H)    -  1    Hyperlipidemia LDL goal <100        Type 2 diabetes mellitus without complication, without long-term current use of insulin (H)        Microalbuminuria        Essential hypertension, benign        Obesity due to excess calories, unspecified obesity severity           Follow-ups after your visit        Follow-up notes from your care team     Return in about 2 months (around 9/28/2017).      Future tests that were ordered for you today     Open Future Orders        Priority Expected Expires Ordered    **ALT FUTURE 2mo Routine 9/26/2017 11/25/2017 7/28/2017    **Lipid panel reflex to direct LDL FUTURE anytime Routine 9/28/2017 7/28/2018 7/28/2017            Who to contact     If you have questions or need follow up information about today's clinic visit or your schedule please contact [facility] directly at [phone].  Normal or non-critical lab and imaging results will be communicated to you by MyChart, letter or phone within 4 business days after the clinic has received the results. If you do not hear from us within 7 days, please contact the clinic through [employer] or phone. If you have a critical or abnormal lab result, we will notify you by phone as soon as possible.  Submit refill requests through [employer] or call your pharmacy and they will forward the refill request to us. Please allow 3 business days for your refill to be completed.          Additional Information About Your Visit        MyChart Information     [employer] lets you send messages to your doctor, view your test results, renew your  "prescriptions, schedule appointments and more. To sign up, go to [email or web address] . Click on \"Log in\" on the left side of the screen, which will take you to the Welcome page. Then click on \"Sign up Now\" on the right side of the page.     You will be asked to enter the access code listed below, as well as some personal information. Please follow the directions to create your username and password.     Your access code is: [email or web address]  Expires: 10/26/2017  9:18 AM     Your access code will  in 90 days. If you need help or a new code, please call your [facility] clinic or [phone].        Care EveryWhere ID     This is your Care EveryWhere ID. This could be used by other organizations to access your [facility] medical records  [id number]        Your Vitals Were     Pulse Temperature Height Pulse Oximetry BMI (Body Mass Index)       68 98.1  F (36.7  C) (Oral) 5' 10\" (1.778 m) 97% 35.58 kg/m2        Blood Pressure from Last 3 Encounters:   17 115/76   17 142/90   17 122/81    Weight from Last 3 Encounters:   17 248 lb (112.5 kg)   17 251 lb (113.9 kg)   17 250 lb 11.2 oz (113.7 kg)              We Performed the Following     Hemoglobin A1c          Today's Medication Changes          These changes are accurate as of: 17  9:18 AM.  If you have any questions, ask your nurse or doctor.               Stop taking these medicines if you haven't already. Please contact your care team if you have questions.     HYDROcodone-acetaminophen 5-325 MG per tablet   Commonly known as:  NORCO   Stopped by:  Kendal Wong MD           INVOKANA 100 MG tablet   Generic drug:  canagliflozin   Stopped by:  Kendal Wong MD                Where to get your medicines      These medications were sent to [email or web address] [place] - [address] AT [facility]  [address] [place] [address]     Phone:  [phone]     " hydrochlorothiazide 12.5 MG capsule    insulin glargine 100 UNIT/ML injection    lisinopril 40 MG tablet    metFORMIN 500 MG 24 hr tablet    rosuvastatin 40 MG tablet                Primary Care Provider Office Phone # Fax #    Kendal Charlie Wong [phone]       [facility] [address]  [facility] [address]        Equal Access to Services     PREET WOOTEN : Hadii aad ku hadasho Soomaali, waaxda luqadaha, qaybta kaalmada adeegyada, waxay idiin hayaan adeeg kharash la'aan ah. So [facility] [phone].    ATENCIÓN: Si habla espsalina, tiene a bucio disposición servicios gratuitos de asistencia lingüística. Llame al [phone].    We comply with applicable federal civil rights laws and [place] laws. We do not discriminate on the basis of race, color, national origin, age, disability sex, sexual orientation or gender identity.            Thank you!     Thank you for choosing [facility]  for your care. Our goal is always to provide you with excellent care. Hearing back from our patients is one way we can continue to improve our services. Please take a few minutes to complete the written survey that you may receive in the mail after your visit with us. Thank you!             Your Updated Medication List - Protect others around you: Learn how to safely use, store and throw away your medicines at www.disposemymeds.org.          This list is accurate as of: 7/28/17  9:18 AM.  Always use your most recent med list.                   Brand Name Dispense Instructions for use Diagnosis    aspirin 81 MG tablet     100    1 tab po QD (Once per day)        blood glucose monitoring lancets     100 Box    Use to test blood sugars daily as directed.    Type 2 diabetes, HbA1c goal < 7% (H)       blood glucose monitoring test strip    JOHN CONTOUR NEXT    3 Box    Use to test blood sugar 1 times daily or as directed.    Type 2 diabetes mellitus with hyperosmolar coma, with long-term current use of  insulin (H)       diltiazem 240 MG 24 hr capsule    CARDIZEM CD    90 capsule    Take 1 capsule (240 mg) by mouth daily    Type 2 diabetes mellitus with diabetic nephropathy (H)       escitalopram 20 MG tablet    LEXAPRO    90 tablet    Take 1 tablet (20 mg) by mouth daily    Mixed anxiety depressive disorder       fluticasone 50 MCG/ACT spray    FLONASE    1 Bottle    Spray 2 sprays into both nostrils daily    Chronic allergic conjunctivitis       hydrochlorothiazide 12.5 MG capsule    MICROZIDE    90 capsule    Take 1 capsule (12.5 mg) by mouth daily    Type 2 diabetes mellitus with diabetic nephropathy, with long-term current use of insulin (H)       insulin aspart 100 UNIT/ML injection    NovoLOG FLEXPEN    21 mL    10 units plus sliding scale before breakfast, 10 units plus sliding scale before lunch, 10 units plus sliding scale before dinner Sliding scale - Blood Glucose (BG) 150-200 = +3 units, 201-250 = +6 units, 251-300 = +9 units, 301-350 = +12 units, 351-400 = +15 units, >400 = +18 units and call PCP    Type 2 diabetes mellitus with hyperosmolar coma, with long-term current use of insulin (H)       insulin glargine 100 UNIT/ML injection    LANTUS SOLOSTAR    12 mL    55 units at bedtime    Type 2 diabetes mellitus with diabetic nephropathy, with long-term current use of insulin (H)       insulin pen needle 32G X 4 MM    BD MARIE U/F    100 each    Use twice daily as directed    Type 2 diabetes mellitus without complication, without long-term current use of insulin (H)       lisinopril 40 MG tablet    PRINIVIL/ZESTRIL    90 tablet    Take 1 tablet (40 mg) by mouth daily    Microalbuminuria       metFORMIN 500 MG 24 hr tablet    GLUCOPHAGE-XR    360 tablet    Take 4 tablets (2,000 mg) by mouth daily (with breakfast)    Type 2 diabetes mellitus without complication, without long-term current use of insulin (H)       order for DME     1 Device    Auto-CPAP: Max 12 cm H2O Min 10 cm H2O Continuous Lifetime need  and heated humidity.    Obstructive sleep apnea (adult) (pediatric)       rosuvastatin 40 MG tablet    CRESTOR    90 tablet    Take 1 tablet (40 mg) by mouth daily    Hyperlipidemia LDL goal <100

## 2017-07-29 ASSESSMENT — PATIENT HEALTH QUESTIONNAIRE - PHQ9: SUM OF ALL RESPONSES TO PHQ QUESTIONS 1-9: 0

## 2017-07-31 ENCOUNTER — TELEPHONE (OUTPATIENT)
Dept: FAMILY MEDICINE | Facility: CLINIC | Age: 62
End: 2017-07-31

## 2017-07-31 NOTE — TELEPHONE ENCOUNTER
Reason for Call:  Medication or medication refill:    Do you use a [facility]?  Name of the pharmacy and phone number for the current request:  [facility]  [phone]    Name of the medication requested: insulin pens.     Other request: pt calling wondering what the status of this prescription is. He is on his ;last pen     Can we leave a detailed message on this number? YES    Phone number patient can be reached at: Home number on file [phone] (home)    Best Time: anytime     Call taken on 7/31/2017 at 1:09 PM by Lori Courtney

## 2017-08-25 ENCOUNTER — TELEPHONE (OUTPATIENT)
Dept: FAMILY MEDICINE | Facility: CLINIC | Age: 62
End: 2017-08-25

## 2017-08-25 DIAGNOSIS — E11.21 TYPE 2 DIABETES MELLITUS WITH DIABETIC NEPHROPATHY (H): ICD-10-CM

## 2017-08-25 DIAGNOSIS — F41.8 MIXED ANXIETY DEPRESSIVE DISORDER: ICD-10-CM

## 2017-08-25 RX ORDER — DILTIAZEM HYDROCHLORIDE 240 MG/1
240 CAPSULE, COATED, EXTENDED RELEASE ORAL DAILY
Qty: 90 CAPSULE | Refills: 1 | Status: SHIPPED | OUTPATIENT
Start: 2017-08-25 | End: 2018-02-09

## 2017-08-25 RX ORDER — ESCITALOPRAM OXALATE 20 MG/1
20 TABLET ORAL DAILY
Qty: 90 TABLET | Refills: 1 | Status: SHIPPED | OUTPATIENT
Start: 2017-08-25 | End: 2018-02-09

## 2017-08-25 NOTE — TELEPHONE ENCOUNTER
Patient now uses [facility] service for medications.  Lexapro and Cardizem refills sent to new pharmacy per [facility] protocol.  Amelie Avila RN

## 2017-08-25 NOTE — TELEPHONE ENCOUNTER
Patient should have a refill available.  I called him and he will check and call back if that isn't the case.   Janna Galvan MA

## 2017-08-25 NOTE — TELEPHONE ENCOUNTER
Reason for Call:  Other prescription    Detailed comments: Pt calling and reports that after visit with Dr. Wong on 7/28 many of his prescriptions were refilled except for 2 of them,  escitalopram (LEXAPRO) 20 MG tablet 90 tablet        diltiazem (CARDIZEM CD) 240 MG 24 hr capsule 90 capsule     I did advise him to contact the pharmacy. He said he only had 7 of each left. Please call to advise.     Phone Number Patient can be reached at: Cell number on file:    No relevant phone numbers on file.       Best Time: any    Can we leave a detailed message on this number? Not Applicable    Call taken on 8/25/2017 at 9:25 AM by Wendi Carvalho

## 2017-08-31 ENCOUNTER — TRANSFERRED RECORDS (OUTPATIENT)
Dept: HEALTH INFORMATION MANAGEMENT | Facility: CLINIC | Age: 62
End: 2017-08-31

## 2017-09-20 DIAGNOSIS — E11.01 TYPE 2 DIABETES MELLITUS WITH HYPEROSMOLAR COMA, WITH LONG-TERM CURRENT USE OF INSULIN (H): ICD-10-CM

## 2017-09-20 DIAGNOSIS — Z79.4 TYPE 2 DIABETES MELLITUS WITH HYPEROSMOLAR COMA, WITH LONG-TERM CURRENT USE OF INSULIN (H): ICD-10-CM

## 2017-09-20 NOTE — TELEPHONE ENCOUNTER
Pending Prescriptions:                       Disp   Refills    insulin aspart (NOVOLOG FLEXPEN) 100 UNIT*21 mL  3            Sig: 10 units plus sliding scale before breakfast, 10           units plus sliding scale before lunch, 10 units           plus sliding scale before dinner  Sliding scale -           Blood Glucose (BG) 150-200 = +3 units, 201-250 =           +6 units, 251-300 = +9 units, 301-350 = +12           units, 351-400 = +15 units, >400 = +18 units and           call PCP             Last Written Prescription Date: 4-17-17  Last Fill Quantity: 21mL, # refills: 3  Last Office Visit with [facility], [facility] or [facility] prescribing provider:  7-28-17 Marvin        BP Readings from Last 3 Encounters:   07/28/17 115/76   04/17/17 142/90   03/24/17 122/81     Lab Results   Component Value Date    MICROL 130 12/05/2016     Lab Results   Component Value Date    UMALCR 167.74 12/05/2016     Creatinine   Date Value Ref Range Status   04/17/2017 0.94 0.66 - 1.25 mg/dL Final   ]  GFR Estimate   Date Value Ref Range Status   04/17/2017 81 >60 mL/min/1.7m2 Final     Comment:     Non  GFR Calc   12/05/2016 78 >60 mL/min/1.7m2 Final     Comment:     Non  GFR Calc   04/21/2016 84 >60 mL/min/1.7m2 Final     Comment:     Non  GFR Calc     GFR Estimate If Black   Date Value Ref Range Status   04/17/2017 >90   GFR Calc   >60 mL/min/1.7m2 Final   12/05/2016 >90   GFR Calc   >60 mL/min/1.7m2 Final   04/21/2016 >90   GFR Calc   >60 mL/min/1.7m2 Final     Lab Results   Component Value Date    CHOL 161 12/05/2016     Lab Results   Component Value Date    HDL 44 12/05/2016     Lab Results   Component Value Date    LDL 72 12/05/2016     Lab Results   Component Value Date    TRIG 225 12/05/2016     Lab Results   Component Value Date    CHOLHDLRATIO 3.2 08/20/2015     Lab Results   Component Value Date    AST 15 12/05/2016     Lab Results   Component  Value Date    ALT 30 12/05/2016     Lab Results   Component Value Date    A1C 9.5 03/24/2017    A1C 8.8 12/05/2016    A1C 10.8 04/21/2016    A1C 7.6 08/20/2015    A1C 8.5 01/14/2015     Potassium   Date Value Ref Range Status   04/17/2017 4.7 3.4 - 5.3 mmol/L Final     Sandrita Gallardo RT (R)

## 2017-09-25 NOTE — TELEPHONE ENCOUNTER
The patient called about his Rx and he said that this process is ridiculous, he has been having problems with his scripts for 3 or more months now   He said it is always our faulhe wants us to get this together he is very upset that he has to call us all the time  He said he does have a lot of scripts   Please send this today

## 2017-09-29 DIAGNOSIS — E11.21 TYPE 2 DIABETES MELLITUS WITH DIABETIC NEPHROPATHY, WITH LONG-TERM CURRENT USE OF INSULIN (H): ICD-10-CM

## 2017-09-29 DIAGNOSIS — E78.5 HYPERLIPIDEMIA LDL GOAL <100: ICD-10-CM

## 2017-09-29 DIAGNOSIS — Z79.4 TYPE 2 DIABETES MELLITUS WITH DIABETIC NEPHROPATHY, WITH LONG-TERM CURRENT USE OF INSULIN (H): ICD-10-CM

## 2017-09-29 LAB
ALT SERPL W P-5'-P-CCNC: 36 U/L (ref 0–70)
CHOLEST SERPL-MCNC: 124 MG/DL
HBA1C MFR BLD: 10 % (ref 4.3–6)
HDLC SERPL-MCNC: 37 MG/DL
LDLC SERPL CALC-MCNC: 47 MG/DL
NONHDLC SERPL-MCNC: 87 MG/DL
TRIGL SERPL-MCNC: 200 MG/DL

## 2017-09-29 PROCEDURE — 84460 ALANINE AMINO (ALT) (SGPT): CPT | Performed by: INTERNAL MEDICINE

## 2017-09-29 PROCEDURE — 83036 HEMOGLOBIN GLYCOSYLATED A1C: CPT | Performed by: INTERNAL MEDICINE

## 2017-09-29 PROCEDURE — 36415 COLL VENOUS BLD VENIPUNCTURE: CPT | Performed by: INTERNAL MEDICINE

## 2017-09-29 PROCEDURE — 80061 LIPID PANEL: CPT | Performed by: INTERNAL MEDICINE

## 2017-10-02 DIAGNOSIS — E11.9 TYPE 2 DIABETES MELLITUS WITHOUT COMPLICATION, WITHOUT LONG-TERM CURRENT USE OF INSULIN (H): ICD-10-CM

## 2017-10-02 NOTE — TELEPHONE ENCOUNTER
BD Inez needles      Last Written Prescription Date: 12/05/16  Last Fill Quantity: 100,  # refills: prn   Last Office Visit with G, [facility] or [facility] prescribing provider: 07/28/17    Janna Galvan MA

## 2017-10-02 NOTE — TELEPHONE ENCOUNTER
Pt requesting refill on BD Inez needles.  Send to [facility], [place], [place]  He can be reached [phone]

## 2017-10-13 NOTE — TELEPHONE ENCOUNTER
Spoke to pt today as RX did not go through to pharmacy. Pt was pleasant and calm. Pt has 1 pen left, which will last about 2 weeks at approx 20 units a day.    Pulled up previously faxed rx, verified pharmay with pt. [facility]. Unfortunately very little history working with this particular mailorder.    Did advise due to large companies, sometimes pt's have more issues with mail order pharmacies, but choose to go with them due to cost/insurance Refaxed RX to [facility] mail order with pt on phone.  Verified with pt it was faxed. Gave pt pharmacy phone number.    Plan made with pt: Pt will check with pharmacy early next week.  If they still don't have rx, pt will call back here. Clinic to send RX to local pharmacy to fill.  Jeanne Trujillo RN

## 2017-12-11 DIAGNOSIS — Z79.4 TYPE 2 DIABETES MELLITUS WITH HYPEROSMOLAR COMA, WITH LONG-TERM CURRENT USE OF INSULIN (H): ICD-10-CM

## 2017-12-11 DIAGNOSIS — E11.01 TYPE 2 DIABETES MELLITUS WITH HYPEROSMOLAR COMA, WITH LONG-TERM CURRENT USE OF INSULIN (H): ICD-10-CM

## 2017-12-11 DIAGNOSIS — E11.9 TYPE 2 DIABETES MELLITUS WITHOUT COMPLICATION, WITHOUT LONG-TERM CURRENT USE OF INSULIN (H): ICD-10-CM

## 2017-12-18 NOTE — TELEPHONE ENCOUNTER
To PA:  Possible PA?    Please see below message.  Script for Pen Needles already sent on 12/13  Thank you.  Amelie Avila RN

## 2017-12-18 NOTE — TELEPHONE ENCOUNTER
Reason for Call:  Other prescription    Detailed comments: patient received a letter from [facility] dated 12/5. Stating they were unable to fill following rx:  Contour next test strips.  And that they had attempted to contact the clinic, and would keep the rx on file.   The patient needs rx for Contour Next test strips in addition to rx for pen needles.     [facility] # on letter is [phone]  And order # for test strips is [id number]  Patient's Member ID # [id number]    Phone Number Patient can be reached at: Home number on file [phone] (home)    Best Time: any    Can we leave a detailed message on this number? YES    Call taken on 12/18/2017 at 2:54 PM by Ana Jauregui

## 2017-12-19 NOTE — TELEPHONE ENCOUNTER
I submitted a PA to the patient's insurance for the blood glucose testing supplies.    Gabriel Moreno, CMA

## 2017-12-20 NOTE — TELEPHONE ENCOUNTER
I got PA response from insurance. [facility] Contour Next testing supplies are covered by the insurance and do not require a PA.     I think that when Ana was saying below that [facility] was not able to fill the Contour Next test strips it might be they were referring to the fact that they do not have any refills remaining. Looks like the last refill auth we sent in was on 1/16/17.    So please send Rx for Contour next test strips please.    Gabriel Moreno, CMA

## 2017-12-20 NOTE — TELEPHONE ENCOUNTER
Prescription approved per [facility] [place].  Jeanne Trujillo RN

## 2017-12-29 ENCOUNTER — TELEPHONE (OUTPATIENT)
Dept: PHARMACY | Facility: CLINIC | Age: 62
End: 2017-12-29

## 2017-12-30 NOTE — TELEPHONE ENCOUNTER
This patient is due for MTM follow-up. I called the patient to schedule an appointment. He would like to see MTM again, but isn't sure if it is covered by insurance anymore. Routing to MTM coordinators to verify coverage for MTM.    Cammy Al, PharmD  Pharmaceutical Care Resident  Pager: [phone]

## 2018-01-11 NOTE — TELEPHONE ENCOUNTER
Patient would be covered for MTM, but would be subject to a deductible (see MTM guarantor note). I called the patient to schedule an appointment and left a message for the patient to call back for more information and/or to schedule.    Cammy Al, PharmD  Pharmaceutical Care Resident  Pager: [phone]

## 2018-02-08 ENCOUNTER — TELEPHONE (OUTPATIENT)
Dept: PHARMACY | Facility: CLINIC | Age: 63
End: 2018-02-08

## 2018-02-08 NOTE — TELEPHONE ENCOUNTER
This patient no longer wishes to continue following-up with MTM. We will stop reaching out to the patient at this time. Please let us know if we can assist in this patient's care in the future.    Routing to PCP as GARCIA.    Cammy Al, PharmD  Pharmaceutical Care Resident  Pager: [phone]

## 2018-02-09 DIAGNOSIS — E11.9 TYPE 2 DIABETES MELLITUS WITHOUT COMPLICATION, WITHOUT LONG-TERM CURRENT USE OF INSULIN (H): ICD-10-CM

## 2018-02-09 DIAGNOSIS — I10 ESSENTIAL HYPERTENSION, BENIGN: Primary | ICD-10-CM

## 2018-02-09 DIAGNOSIS — F41.8 MIXED ANXIETY DEPRESSIVE DISORDER: ICD-10-CM

## 2018-02-09 DIAGNOSIS — E78.5 HYPERLIPIDEMIA LDL GOAL <100: ICD-10-CM

## 2018-02-10 NOTE — TELEPHONE ENCOUNTER
"Requested Prescriptions   Pending Prescriptions Disp Refills     diltiazem (CARDIZEM CD) 240 MG 24 hr capsule  Last Written Prescription Date:  8/25/17  Last Fill Quantity: 90 capsule,  # refills: 1   Last office visit: 7/28/2017 with prescribing provider:  Marvin   Future Office Visit:   90 capsule 1     Sig: Take 1 capsule (240 mg) by mouth daily    Calcium Channel Blockers Protocol  Passed    2/9/2018  7:44 PM       Passed - Blood pressure under 140/90    BP Readings from Last 3 Encounters:   07/28/17 115/76   04/17/17 142/90   03/24/17 122/81          Passed - Normal ALT in past 12 months    Recent Labs   Lab Test  09/29/17   0859   ALT  36          Passed - Recent or future visit with authorizing provider    Patient had office visit in the last year or has a visit in the next 30 days with authorizing provider.  See \"Patient Info\" tab in inbasket, or \"Choose Columns\" in Meds & Orders section of the refill encounter.          Passed - Patient is age 18 or older       Passed - Normal serum creatinine on file in past 12 months    Recent Labs   Lab Test  04/17/17   0901   CR  0.94                   escitalopram (LEXAPRO) 20 MG tablet  Last Written Prescription Date:  8/25/17  Last Fill Quantity: 90 tablet,  # refills: 1   Last office visit: 7/28/2017 with prescribing provider:  Marvin   Future Office Visit:   90 tablet 1     Sig: Take 1 tablet (20 mg) by mouth daily    SSRIs Protocol Passed    2/9/2018  7:44 PM       Passed - Recent or future visit with authorizing provider    Patient had office visit in the last year or has a visit in the next 30 days with authorizing provider.  See \"Patient Info\" tab in inbasket, or \"Choose Columns\" in Meds & Orders section of the refill encounter.        Passed - Patient is age 18 or older                insulin pen needle (BD MARIE U/F) 32G X 4 MM  Last Written Prescription Date:  12/13/17  Last Fill Quantity: 100 each,  # refills: 1   Last office visit: 7/28/2017 with prescribing " "provider:  Marvin   Future Office Visit:   100 each 1     Sig: Use twice daily as directed    Diabetic Supplies Protocol Failed    2/9/2018  7:44 PM       Failed - Patient has had appt within past 6 mos    Patient had office visit in the last 6 months or has a visit in the next 30 days with authorizing provider.  See \"Patient Info\" tab in inbasket, or \"Choose Columns\" in Meds & Orders section of the refill encounter.         Passed - Patient is 18 years of age or older                rosuvastatin (CRESTOR) 40 MG tablet  Last Written Prescription Date:  7/28/17  Last Fill Quantity: 90 tablet,  # refills: 1   Last office visit: 7/28/2017 with prescribing provider:  Marvin   Future Office Visit:   90 tablet 1     Sig: Take 1 tablet (40 mg) by mouth daily    Statins Protocol Passed    2/9/2018  7:44 PM       Passed - LDL on file in past 12 months    Recent Labs   Lab Test  09/29/17   0859   LDL  47          Passed - No abnormal creatine kinase in past 12 months    No lab results found.       Passed - Recent or future visit with authorizing provider    Patient had office visit in the last year or has a visit in the next 30 days with authorizing provider.  See \"Patient Info\" tab in inbasket, or \"Choose Columns\" in Meds & Orders section of the refill encounter.        Passed - Patient is age 18 or older          "

## 2018-02-12 RX ORDER — ROSUVASTATIN CALCIUM 40 MG/1
40 TABLET, COATED ORAL DAILY
Qty: 90 TABLET | Refills: 1 | Status: SHIPPED | OUTPATIENT
Start: 2018-02-12 | End: 2018-08-06

## 2018-02-12 RX ORDER — ESCITALOPRAM OXALATE 20 MG/1
20 TABLET ORAL DAILY
Qty: 90 TABLET | Refills: 0 | Status: SHIPPED | OUTPATIENT
Start: 2018-02-12 | End: 2018-05-25

## 2018-02-12 RX ORDER — DILTIAZEM HYDROCHLORIDE 240 MG/1
240 CAPSULE, COATED, EXTENDED RELEASE ORAL DAILY
Qty: 90 CAPSULE | Refills: 1 | Status: SHIPPED | OUTPATIENT
Start: 2018-02-12 | End: 2018-08-07

## 2018-03-12 ENCOUNTER — OFFICE VISIT (OUTPATIENT)
Dept: FAMILY MEDICINE | Facility: CLINIC | Age: 63
End: 2018-03-12
Payer: COMMERCIAL

## 2018-03-12 VITALS
BODY MASS INDEX: 36.16 KG/M2 | OXYGEN SATURATION: 98 % | WEIGHT: 252.6 LBS | SYSTOLIC BLOOD PRESSURE: 158 MMHG | HEART RATE: 67 BPM | DIASTOLIC BLOOD PRESSURE: 91 MMHG | HEIGHT: 70 IN

## 2018-03-12 DIAGNOSIS — I10 ESSENTIAL HYPERTENSION, BENIGN: ICD-10-CM

## 2018-03-12 DIAGNOSIS — E11.9 TYPE 2 DIABETES MELLITUS WITHOUT COMPLICATION, WITHOUT LONG-TERM CURRENT USE OF INSULIN (H): ICD-10-CM

## 2018-03-12 DIAGNOSIS — Z79.4 TYPE 2 DIABETES MELLITUS WITH DIABETIC NEPHROPATHY, WITH LONG-TERM CURRENT USE OF INSULIN (H): Primary | ICD-10-CM

## 2018-03-12 DIAGNOSIS — R80.9 MICROALBUMINURIA: ICD-10-CM

## 2018-03-12 DIAGNOSIS — E11.21 TYPE 2 DIABETES MELLITUS WITH DIABETIC NEPHROPATHY, WITH LONG-TERM CURRENT USE OF INSULIN (H): Primary | ICD-10-CM

## 2018-03-12 DIAGNOSIS — E78.5 HYPERLIPIDEMIA LDL GOAL <100: ICD-10-CM

## 2018-03-12 DIAGNOSIS — Z12.5 SCREENING FOR PROSTATE CANCER: ICD-10-CM

## 2018-03-12 DIAGNOSIS — E66.01 MORBID OBESITY (H): ICD-10-CM

## 2018-03-12 LAB
ANION GAP SERPL CALCULATED.3IONS-SCNC: 7 MMOL/L (ref 3–14)
BUN SERPL-MCNC: 13 MG/DL (ref 7–30)
CALCIUM SERPL-MCNC: 9.6 MG/DL (ref 8.5–10.1)
CHLORIDE SERPL-SCNC: 102 MMOL/L (ref 94–109)
CO2 SERPL-SCNC: 26 MMOL/L (ref 20–32)
CREAT SERPL-MCNC: 0.82 MG/DL (ref 0.66–1.25)
CREAT UR-MCNC: 71 MG/DL
GFR SERPL CREATININE-BSD FRML MDRD: >90 ML/MIN/1.7M2
GLUCOSE SERPL-MCNC: 220 MG/DL (ref 70–99)
HBA1C MFR BLD: 10.4 % (ref 4.3–6)
MICROALBUMIN UR-MCNC: 301 MG/L
MICROALBUMIN/CREAT UR: 425.14 MG/G CR (ref 0–17)
POTASSIUM SERPL-SCNC: 4.3 MMOL/L (ref 3.4–5.3)
PSA SERPL-ACNC: 1.6 UG/L (ref 0–4)
SODIUM SERPL-SCNC: 135 MMOL/L (ref 133–144)

## 2018-03-12 PROCEDURE — 82043 UR ALBUMIN QUANTITATIVE: CPT | Performed by: INTERNAL MEDICINE

## 2018-03-12 PROCEDURE — 99214 OFFICE O/P EST MOD 30 MIN: CPT | Performed by: INTERNAL MEDICINE

## 2018-03-12 PROCEDURE — 80048 BASIC METABOLIC PNL TOTAL CA: CPT | Performed by: PHARMACIST

## 2018-03-12 PROCEDURE — G0103 PSA SCREENING: HCPCS | Performed by: PHARMACIST

## 2018-03-12 PROCEDURE — 36415 COLL VENOUS BLD VENIPUNCTURE: CPT | Performed by: INTERNAL MEDICINE

## 2018-03-12 PROCEDURE — 83036 HEMOGLOBIN GLYCOSYLATED A1C: CPT | Performed by: INTERNAL MEDICINE

## 2018-03-12 NOTE — MR AVS SNAPSHOT
After Visit Summary   3/12/2018    [address]    MRN: [id number]           Patient Information     Date Of Birth          1955        Visit Information        Provider Department      3/12/2018 9:00 AM Kendal Wong MD [facility]        Today's Diagnoses     Type 2 diabetes mellitus with diabetic nephropathy, with long-term current use of insulin (H)    -  1    Hyperlipidemia LDL goal <100        Overweight BMI 35-40        Microalbuminuria        Essential hypertension, benign        Type 2 diabetes mellitus without complication, without long-term current use of insulin (H)        Screening for prostate cancer           Follow-ups after your visit        Additional Services     ENDOCRINOLOGY ADULT REFERRAL       Your provider [place] [facility]  [facility]  [phone]  [email or web address]      Please be aware that coverage of these services is subject to the terms and limitations of your health insurance plan.  Call member services at your health plan with any benefit or coverage questions.      Please bring the following to your appointment:    >>   Any x-rays, CTs or MRIs which have been performed.  Contact the facility where they were done to arrange for  prior to your scheduled appointment.    >>   List of current medications   >>   This referral request   >>   Any documents/labs given to you for this referral                  Follow-up notes from your care team     Return in about 3 months (around 6/12/2018).      Your next 10 appointments already scheduled     May 21, 2018  9:00 AM CDT   [facility] Visit with [facility] MD   [facility]    [address] [place] [address]   [phone]            Jun 11, 2018  9:00 AM CDT   Office Visit with Kendal Wong MD   [facility]     "[address] [facility] [id number] [phone]           Bring a current list of meds and any records pertaining to this visit. For Physicals, please bring immunization records and any forms needing to be filled out. Please arrive 10 minutes early to complete paperwork.              Who to contact     If you have questions or need follow up information about today's clinic visit or your schedule please contact [facility] directly at [phone].  Normal or non-critical lab and imaging results will be communicated to you by MyChart, letter or phone within 4 business days after the clinic has received the results. If you do not hear from us within 7 days, please contact the clinic through [employer] or phone. If you have a critical or abnormal lab result, we will notify you by phone as soon as possible.  Submit refill requests through [employer] or call your pharmacy and they will forward the refill request to us. Please allow 3 business days for your refill to be completed.          Additional Information About Your Visit        [facility] Information     [employer] lets you send messages to your doctor, view your test results, renew your prescriptions, schedule appointments and more. To sign up, go to [email or web address] . Click on \"Log in\" on the left side of the screen, which will take you to the Welcome page. Then click on \"Sign up Now\" on the right side of the page.     You will be asked to enter the access code listed below, as well as some personal information. Please follow the directions to create your username and password.     Your access code is: [email or web address]  Expires: 6/10/2018  1:03 PM     Your access code will  in 90 days. If you need help or a new code, please call your [facility] or [phone].        Care EveryWhere ID     This is your Care EveryWhere ID. This could be used by other organizations to access your [facility] medical records  [id number]        Your Vitals " "Were     Pulse Height Pulse Oximetry BMI (Body Mass Index)          67 5' 10\" (1.778 m) 98% 36.24 kg/m2         Blood Pressure from Last 3 Encounters:   03/12/18 (!) 158/91   07/28/17 115/76   04/17/17 142/90    Weight from Last 3 Encounters:   03/12/18 252 lb 9.6 oz (114.6 kg)   07/28/17 248 lb (112.5 kg)   04/17/17 251 lb (113.9 kg)              We Performed the Following     Albumin Random Urine Quantitative with Creat Ratio     Basic metabolic panel     ENDOCRINOLOGY ADULT REFERRAL     Hemoglobin A1c     PSA, screen          Today's Medication Changes          These changes are accurate as of 3/12/18  1:03 PM.  If you have any questions, ask your nurse or doctor.               Start taking these medicines.        Dose/Directions    sitagliptin 50 MG tablet   Commonly known as:  JANUVIA   Used for:  Type 2 diabetes mellitus with diabetic nephropathy, with long-term current use of insulin (H)   Started by:  Kendal Wong MD        Dose:  50 mg   Take 1 tablet (50 mg) by mouth daily   Quantity:  90 tablet   Refills:  3            Where to get your medicines      These medications were sent to [facility] - [place], [place] - [address] AT [facility]  [address], [facility] [address]     Phone:  [phone]     sitagliptin 50 MG tablet                Primary Care Provider Office Phone # Fax #    Kendal Wong [phone] [address] [address]        Equal Access to Services     [facility]: Hadii bryn wesleyo Sotoni, waaxda luqadaha, qaybta kaalmada heather, jj idiin hayaan adeeg kharash la'aan . So [facility] [phone].    ATENCIÓN: Si habla español, tiene a bucio disposición servicios gratuitos de asistencia lingüística. Llame al [phone].    We comply with applicable federal civil rights laws and [place] laws. We do not discriminate on the basis of race, color, national origin, age, disability, sex, sexual " orientation, or gender identity.            Thank you!     Thank you for choosing [facility]  for your care. Our goal is always to provide you with excellent care. Hearing back from our patients is one way we can continue to improve our services. Please take a few minutes to complete the written survey that you may receive in the mail after your visit with us. Thank you!             Your Updated Medication List - Protect others around you: Learn how to safely use, store and throw away your medicines at www.disposemymeds.org.          This list is accurate as of 3/12/18  1:03 PM.  Always use your most recent med list.                   Brand Name Dispense Instructions for use Diagnosis    aspirin 81 MG tablet     100    1 tab po QD (Once per day)        blood glucose monitoring lancets     100 Box    Use to test blood sugars daily as directed.    Type 2 diabetes, HbA1c goal < 7% (H)       blood glucose monitoring test strip    JOHN CONTOUR NEXT    100 each    Use to test blood sugar 1 times daily or as directed.    Type 2 diabetes mellitus with hyperosmolar coma, with long-term current use of insulin (H)       diltiazem 240 MG 24 hr capsule    CARDIZEM CD    90 capsule    Take 1 capsule (240 mg) by mouth daily    Essential hypertension, benign       escitalopram 20 MG tablet    LEXAPRO    90 tablet    Take 1 tablet (20 mg) by mouth daily    Mixed anxiety depressive disorder       hydrochlorothiazide 12.5 MG capsule    MICROZIDE    90 capsule    Take 1 capsule (12.5 mg) by mouth daily    Type 2 diabetes mellitus with diabetic nephropathy, with long-term current use of insulin (H)       insulin aspart 100 UNIT/ML injection    NovoLOG FLEXPEN    21 mL    10 units plus sliding scale before breakfast, 10 units plus sliding scale before lunch, 10 units plus sliding scale before dinner Sliding scale - Blood Glucose (BG) 150-200 = +3 units, 201-250 = +6 units, 251-300 = +9 units, 301-350 = +12 units, 351-400 =  +15 units, >400 = +18 units and call PCP    Type 2 diabetes mellitus with hyperosmolar coma, with long-term current use of insulin (H)       insulin glargine 100 UNIT/ML injection    LANTUS SOLOSTAR    12 mL    55 units at bedtime    Type 2 diabetes mellitus with diabetic nephropathy, with long-term current use of insulin (H)       insulin pen needle 32G X 4 MM    BD MARIE U/F    100 each    Use twice daily as directed    Type 2 diabetes mellitus without complication, without long-term current use of insulin (H)       lisinopril 40 MG tablet    PRINIVIL/ZESTRIL    90 tablet    Take 1 tablet (40 mg) by mouth daily    Microalbuminuria       metFORMIN 500 MG 24 hr tablet    GLUCOPHAGE-XR    360 tablet    Take 4 tablets (2,000 mg) by mouth daily (with breakfast)    Type 2 diabetes mellitus without complication, without long-term current use of insulin (H)       order for DME     1 Device    Auto-CPAP: Max 12 cm H2O Min 10 cm H2O Continuous Lifetime need and heated humidity.    Obstructive sleep apnea (adult) (pediatric)       rosuvastatin 40 MG tablet    CRESTOR    90 tablet    Take 1 tablet (40 mg) by mouth daily    Hyperlipidemia LDL goal <100       sitagliptin 50 MG tablet    JANUVIA    90 tablet    Take 1 tablet (50 mg) by mouth daily    Type 2 diabetes mellitus with diabetic nephropathy, with long-term current use of insulin (H)

## 2018-03-12 NOTE — ASSESSMENT & PLAN NOTE
Advance Care Planning 3/12/2018: ACP Review of Chart / Resources Provided:  Reviewed chart for advance care plan.  [address] has no plan or code status on file. Discussed available resources and provided with information.   Added by Radha Hurst

## 2018-03-12 NOTE — NURSING NOTE
"Chief Complaint   Patient presents with     RECHECK       Initial BP (!) 158/91 (BP Location: Right arm, Patient Position: Chair, Cuff Size: Adult Large)  Pulse 67  Ht 5' 10\" (1.778 m)  Wt 252 lb 9.6 oz (114.6 kg)  SpO2 98%  BMI 36.24 kg/m2 Estimated body mass index is 36.24 kg/(m^2) as calculated from the following:    Height as of this encounter: 5' 10\" (1.778 m).    Weight as of this encounter: 252 lb 9.6 oz (114.6 kg).  Medication Reconciliation: complete     Radha Hurst MA     "

## 2018-03-12 NOTE — PROGRESS NOTES
SUBJECTIVE:   [address] is a 62 year old male who presents to clinic today for the following health issues:    Patient concern about high blood sugar noticed more so in the morning     Diabetes Follow-up      Patient is checking blood sugars: 2-3 times daily     Diabetic concerns: None     Symptoms of hypoglycemia (low blood sugar): none     Paresthesias (numbness or burning in feet) or sores: No     Date of last diabetic eye exam: Last 2017 results were normal     BP Readings from Last 2 Encounters:   07/28/17 115/76   04/17/17 142/90     Hemoglobin A1C (%)   Date Value   09/29/2017 10.0 (H)   03/24/2017 9.5 (H)     LDL Cholesterol Calculated (mg/dL)   Date Value   09/29/2017 47   12/05/2016 72       Amount of exercise or physical activity: 4-5 days/week for an average of greater than 60 minutes    Problems taking medications regularly: No    Medication side effects: none    Diet: regular (no restrictions)      Current Medications:     Current Outpatient Prescriptions   Medication Sig Dispense Refill     sitagliptin (JANUVIA) 50 MG tablet Take 1 tablet (50 mg) by mouth daily 90 tablet 3     diltiazem (CARDIZEM CD) 240 MG 24 hr capsule Take 1 capsule (240 mg) by mouth daily 90 capsule 1     escitalopram (LEXAPRO) 20 MG tablet Take 1 tablet (20 mg) by mouth daily 90 tablet 0     insulin pen needle (BD MARIE U/F) 32G X 4 MM Use twice daily as directed 100 each 1     rosuvastatin (CRESTOR) 40 MG tablet Take 1 tablet (40 mg) by mouth daily 90 tablet 1     blood glucose monitoring (JOHN CONTOUR NEXT) test strip Use to test blood sugar 1 times daily or as directed. 100 each 3     insulin aspart (NOVOLOG FLEXPEN) 100 UNIT/ML injection 10 units plus sliding scale before breakfast, 10 units plus sliding scale before lunch, 10 units plus sliding scale before dinner  Sliding scale - Blood Glucose (BG) 150-200 = +3 units, 201-250 = +6 units, 251-300 = +9 units, 301-350 = +12 units, 351-400 = +15 units, >400 = +18 units  and call PCP 21 mL 3     insulin glargine (LANTUS SOLOSTAR) 100 UNIT/ML injection 55 units at bedtime 12 mL 3     metFORMIN (GLUCOPHAGE-XR) 500 MG 24 hr tablet Take 4 tablets (2,000 mg) by mouth daily (with breakfast) 360 tablet 3     lisinopril (PRINIVIL/ZESTRIL) 40 MG tablet Take 1 tablet (40 mg) by mouth daily 90 tablet 3     hydrochlorothiazide (MICROZIDE) 12.5 MG capsule Take 1 capsule (12.5 mg) by mouth daily 90 capsule 3     blood glucose monitoring (MICROLET) lancets Use to test blood sugars daily as directed. 100 Box prn     ORDER FOR DME Auto-CPAP: Max 12 cm H2O Min 10 cm H2O  Continuous Lifetime need and heated humidity.    1 Device 0     ASPIRIN 81 MG OR TABS 1 tab po QD (Once per day) 100 3         Allergies:      Allergies   Allergen Reactions     Augmentin Diarrhea     Sulfa Drugs      Unknown - reaction occurred as a child     Victoza      Skin rash     Xanax      Difficult to wean off            Past Medical History:     Past Medical History:   Diagnosis Date     Essential hypertension, benign      T2DM (type 2 diabetes mellitus) (H)          Past Surgical History:   History reviewed. No pertinent surgical history.      Family Medical History:     Family History   Problem Relation Age of Onset     Adopted: Yes     DIABETES Mother      Respiratory Mother      asthma     Lipids Mother      Arthritis Mother      knee replacement     Alzheimer Disease Mother      CEREBROVASCULAR DISEASE Brother      C.A.D. No family hx of      Cancer - colorectal No family hx of      Prostate Cancer No family hx of          Social History:     Social History     Social History     Marital status:      Spouse name: Estefania Laura     Number of children: 1     Years of education: N/A     Occupational History     Not on file.     Social History Main Topics     Smoking status: Former Smoker     Types: Cigars     Smokeless tobacco: Never Used      Comment: used to smoke occ cigar, never cigarettes     Alcohol use 0.0  "oz/week     0 Standard drinks or equivalent per week      Comment: 3-6 drinks on occ weekend night, occ drink on weekday     Drug use: No     Sexual activity: Yes     Partners: Female     Other Topics Concern      Service No     Blood Transfusions No     Social History Narrative           Review of System:     Constitutional: Negative for fever or chills. Positive for chronic obesity  Skin: Negative for rashes  Ears/Nose/Throat: Negative for nasal congestion, sore throat  Respiratory: No shortness of breath, dyspnea on exertion, cough, or hemoptysis  Cardiovascular: Negative for chest pain  Gastrointestinal: Negative for nausea, vomiting  Genitourinary: Negative for dysuria, hematuria  Musculoskeletal: Negative for myalgias  Neurologic: Negative for headaches  Psychiatric: Negative for depression, anxiety  Hematologic/Lymphatic/Immunologic: Negative  Endocrine: Negative for recent hypoglycemia events  Behavioral: Negative for tobacco use       Physical Exam:   BP (!) 158/91 (BP Location: Right arm, Patient Position: Chair, Cuff Size: Adult Large)  Pulse 67  Ht 5' 10\" (1.778 m)  Wt 252 lb 9.6 oz (114.6 kg)  SpO2 98%  BMI 36.24 kg/m2    GENERAL: obese, alert and no distress  EYES: eyes grossly normal to inspection, and conjunctivae and sclerae normal  HENT: Normocephalic atraumatic. Nose and mouth without ulcers or lesions  NECK: supple  RESP: lungs clear to auscultation   CV: regular rate and rhythm, normal S1 S2  LYMPH: no peripheral edema   ABDOMEN: obese  MS: no gross musculoskeletal defects noted  SKIN: no suspicious lesions or rashes  NEURO: Alert & Oriented x 3.   PSYCH: mentation appears normal, affect normal        Diagnostic Test Results:     Hgb a1c, BMP, Urine microalbumin, PSA labs are ordered and pending at this time.    ASSESSMENT/PLAN:   (E11.21,  Z79.4) Type 2 diabetes mellitus with diabetic nephropathy, with long-term current use of insulin (H)  (primary encounter diagnosis)  (R80.9) " Microalbuminuria  Comment: patient's type 2 diabetes is not well controlled.  Plan: I have prescribed new diabetes medication with sitagliptin (JANUVIA) 50 MG tablet, and ordered ENDOCRINOLOGY ADULT REFERRAL. I have also ordered labs for Hemoglobin A1c, Albumin Random Urine Quantitative with Creat Ratio for monitoring of diabetes and microalbuminuria control.      (E78.5) Hyperlipidemia LDL goal <100    Comment: Stable, patient is compliant with his Lipitor medication  Plan: I have advised the patient to continue his Lipitor medication for treatment of hyperlipidemia.      (E66.01) Overweight BMI 35-40  Comment: Patient is still snacking late at night causing weight gain  Plan: I have advised the patient to start a new weight loss program through diet and exercise going forward and especially try to eliminate the late night snacks.      (I10) Essential hypertension, benign  Comment: Patient is compliant with his BP medication.  Plan: Continue current BP medication regimen. Patient is advised to lose weight through diet and exercise going forward which should also improve his BP control.      (Z12.5) Screening for prostate cancer  Comment: Patient is due for PSA lab for prostate cancer screening.  Plan: I have ordered lab for PSA, screen        Follow Up Plan:     Patient is instructed to return to Internal Medicine clinic for follow-up visit in 3 months.        Kendal Wong MD  Internal Medicine  [facility]

## 2018-03-13 ASSESSMENT — PATIENT HEALTH QUESTIONNAIRE - PHQ9: SUM OF ALL RESPONSES TO PHQ QUESTIONS 1-9: 0

## 2018-05-25 DIAGNOSIS — F41.8 MIXED ANXIETY DEPRESSIVE DISORDER: ICD-10-CM

## 2018-05-25 RX ORDER — ESCITALOPRAM OXALATE 20 MG/1
20 TABLET ORAL DAILY
Qty: 90 TABLET | Refills: 0 | Status: SHIPPED | OUTPATIENT
Start: 2018-05-25 | End: 2018-08-06

## 2018-05-25 NOTE — TELEPHONE ENCOUNTER
Prescription approved per [facility] Refill Protocol.  Rosenda COWAN RN

## 2018-05-25 NOTE — TELEPHONE ENCOUNTER
Requested Prescriptions   Pending Prescriptions Disp Refills     escitalopram (LEXAPRO) 20 MG tablet  Last Written Prescription Date:  02/12/2018  Last Fill Quantity: 90 tablet,  # refills: 0   Last Office Visit: 3/12/2018   Future Office Visit:    Next 5 appointments (look out 90 days)     Jun 11, 2018  9:00 AM CDT   [facility]    [address] [facility] [address]   [phone]                  90 tablet 0     Sig: Take 1 tablet (20 mg) by mouth daily    There is no refill protocol information for this order

## 2018-05-25 NOTE — TELEPHONE ENCOUNTER
"Requested Prescriptions   Pending Prescriptions Disp Refills     escitalopram (LEXAPRO) 20 MG tablet 90 tablet 0     Sig: Take 1 tablet (20 mg) by mouth daily    SSRIs Protocol Passed    5/25/2018 12:32 PM       Passed - Recent (12 mo) or future (30 days) visit within the authorizing provider's specialty    Patient had office visit in the last 12 months or has a visit in the next 30 days with authorizing provider or within the authorizing provider's specialty.  See \"Patient Info\" tab in inbasket, or \"Choose Columns\" in Meds & Orders section of the refill encounter.           Passed - Patient is age 18 or older          "

## 2018-06-05 DIAGNOSIS — E11.9 TYPE 2 DIABETES MELLITUS WITHOUT COMPLICATION, WITHOUT LONG-TERM CURRENT USE OF INSULIN (H): ICD-10-CM

## 2018-06-06 NOTE — TELEPHONE ENCOUNTER
"insulin pen needle (BD MARIE U/F) 32G X 4  each 1 2/12/2018         Last Written Prescription Date:  02/12/2018  Last Fill Quantity: 100,  # refills: 1   Last office visit: 3/12/2018 with prescribing provider:     Future Office Visit: 06/18/2018  Next 5 appointments (look out 90 days)     Jun 18, 2018  8:40 AM CDT   Office Visit with Kendal Wong MD   [facility] ([facility]    [address] [facility] [address]   [phone]                 Requested Prescriptions   Pending Prescriptions Disp Refills     insulin pen needle (BD MARIE U/F) 32G X 4  each 1     Sig: Use twice daily as directed    Diabetic Supplies Protocol Passed    6/5/2018  4:09 PM       Passed - Patient is 18 years of age or older       Passed - Recent (6 mo) or future (30 days) visit within the authorizing provider's specialty    Patient had office visit in the last 6 months or has a visit in the next 30 days with authorizing provider.  See \"Patient Info\" tab in inbasket, or \"Choose Columns\" in Meds & Orders section of the refill encounter.              "

## 2018-07-19 NOTE — PROGRESS NOTES
SUBJECTIVE:   Huseyin St is a 62 year old male who presents to clinic today for the following health issues:      Diabetes Follow-up      Patient is checking blood sugars: regularly    Diabetic concerns: None and blood sugar frequently over 200     Symptoms of hypoglycemia (low blood sugar): none     Paresthesias (numbness or burning in feet) or sores: No      BP Readings from Last 2 Encounters:   03/12/18 (!) 158/91   07/28/17 115/76     Hemoglobin A1C (%)   Date Value   03/12/2018 10.4 (H)   09/29/2017 10.0 (H)     LDL Cholesterol Calculated (mg/dL)   Date Value   09/29/2017 47   12/05/2016 72       Diabetes Management Resources    Amount of exercise or physical activity: occasional    Problems taking medications regularly: No    Medication side effects: none    Diet: diabetic        Current Medications:     Current Outpatient Prescriptions   Medication Sig Dispense Refill     ASPIRIN 81 MG OR TABS 1 tab po QD (Once per day) 100 3     blood glucose monitoring (JOHN CONTOUR NEXT) test strip Use to test blood sugar 1 times daily or as directed. 100 each 3     blood glucose monitoring (MICROLET) lancets Use to test blood sugars daily as directed. 100 Box prn     diltiazem (CARDIZEM CD) 240 MG 24 hr capsule Take 1 capsule (240 mg) by mouth daily 90 capsule 1     escitalopram (LEXAPRO) 20 MG tablet Take 1 tablet (20 mg) by mouth daily 90 tablet 0     hydrochlorothiazide (MICROZIDE) 12.5 MG capsule Take 1 capsule (12.5 mg) by mouth daily 90 capsule 3     insulin aspart (NOVOLOG FLEXPEN) 100 UNIT/ML injection 10 units plus sliding scale before breakfast, 10 units plus sliding scale before lunch, 10 units plus sliding scale before dinner  Sliding scale - Blood Glucose (BG) 150-200 = +3 units, 201-250 = +6 units, 251-300 = +9 units, 301-350 = +12 units, 351-400 = +15 units, >400 = +18 units and call PCP 21 mL 3     insulin glargine (LANTUS SOLOSTAR) 100 UNIT/ML injection 55 units at bedtime 12 mL 3     insulin pen  needle (BD MARIE U/F) 32G X 4 MM Use twice daily as directed 200 each 0     lisinopril (PRINIVIL/ZESTRIL) 40 MG tablet Take 1 tablet (40 mg) by mouth daily 90 tablet 3     metFORMIN (GLUCOPHAGE-XR) 500 MG 24 hr tablet Take 4 tablets (2,000 mg) by mouth daily (with breakfast) 360 tablet 3     ORDER FOR DME Auto-CPAP: Max 12 cm H2O Min 10 cm H2O  Continuous Lifetime need and heated humidity.    1 Device 0     rosuvastatin (CRESTOR) 40 MG tablet Take 1 tablet (40 mg) by mouth daily 90 tablet 1     sitagliptin (JANUVIA) 50 MG tablet Take 1 tablet (50 mg) by mouth daily 90 tablet 3         Allergies:      Allergies   Allergen Reactions     Augmentin Diarrhea     Sulfa Drugs      Unknown - reaction occurred as a child     Victoza      Skin rash     Xanax      Difficult to wean off            Past Medical History:     Past Medical History:   Diagnosis Date     Essential hypertension, benign      T2DM (type 2 diabetes mellitus) (H)          Past Surgical History:   No past surgical history on file.      Family Medical History:     Family History   Problem Relation Age of Onset     Adopted: Yes     Diabetes Mother      Respiratory Mother      asthma     Lipids Mother      Arthritis Mother      knee replacement     Alzheimer Disease Mother      Cerebrovascular Disease Brother      C.A.D. No family hx of      Cancer - colorectal No family hx of      Prostate Cancer No family hx of          Social History:     Social History     Social History     Marital status:      Spouse name: Estefania Laura     Number of children: 1     Years of education: N/A     Occupational History     Not on file.     Social History Main Topics     Smoking status: Former Smoker     Types: Cigars     Smokeless tobacco: Never Used      Comment: used to smoke occ cigar, never cigarettes     Alcohol use 0.0 oz/week     0 Standard drinks or equivalent per week      Comment: 3-6 drinks on occ weekend night, occ drink on weekday     Drug use: No      "Sexual activity: Yes     Partners: Female     Other Topics Concern      Service No     Blood Transfusions No     Social History Narrative           Review of System:     Constitutional: Negative for fever or chills, positive for chronic obesity with difficulties losing weight  Skin: Negative for rashes  Ears/Nose/Throat: Negative for nasal congestion, sore throat  Respiratory: No shortness of breath, dyspnea on exertion, cough, or hemoptysis  Cardiovascular: Negative for chest pain  Gastrointestinal: Negative for nausea, vomiting  Genitourinary: Negative for dysuria, hematuria  Musculoskeletal: Negative for myalgias  Neurologic: Negative for headaches  Psychiatric: Negative for depression, anxiety  Hematologic/Lymphatic/Immunologic: Negative  Endocrine: Negative for recent hypoglycemia events  Behavioral: Negative for tobacco use       Physical Exam:   /86 (Cuff Size: Adult Large)  Pulse 67  Temp 97.4  F (36.3  C) (Oral)  Ht 5' 10\" (1.778 m)  Wt 250 lb (113.4 kg)  SpO2 95%  BMI 35.87 kg/m2    GENERAL: obese, alert and no distress  EYES: eyes grossly normal to inspection, and conjunctivae and sclerae normal  HENT: Normocephalic atraumatic. Nose and mouth without ulcers or lesions  NECK: supple  RESP: lungs clear to auscultation   CV: regular rate and rhythm, normal S1 S2  LYMPH: no peripheral edema   ABDOMEN: obese  MS: no gross musculoskeletal defects noted  SKIN: no suspicious lesions or rashes  NEURO: Alert & Oriented x 3.   PSYCH: mentation appears normal, affect normal  Diabetic foot exam: intact sensations to monofilament neurological exam, no signs of diabetic foot ulcers/wounds        Diagnostic Test Results:     Diagnostic Test Results:  Results for orders placed or performed in visit on 03/12/18   Hemoglobin A1c   Result Value Ref Range    Hemoglobin A1C 10.4 (H) 4.3 - 6.0 %   Albumin Random Urine Quantitative with Creat Ratio   Result Value Ref Range    Creatinine Urine 71 mg/dL    " Albumin Urine mg/L 301 mg/L    Albumin Urine mg/g Cr 425.14 (H) 0 - 17 mg/g Cr   Basic metabolic panel   Result Value Ref Range    Sodium 135 133 - 144 mmol/L    Potassium 4.3 3.4 - 5.3 mmol/L    Chloride 102 94 - 109 mmol/L    Carbon Dioxide 26 20 - 32 mmol/L    Anion Gap 7 3 - 14 mmol/L    Glucose 220 (H) 70 - 99 mg/dL    Urea Nitrogen 13 7 - 30 mg/dL    Creatinine 0.82 0.66 - 1.25 mg/dL    GFR Estimate >90 >60 mL/min/1.7m2    GFR Estimate If Black >90 >60 mL/min/1.7m2    Calcium 9.6 8.5 - 10.1 mg/dL   PSA, screen   Result Value Ref Range    PSA 1.60 0 - 4 ug/L       ASSESSMENT/PLAN:       (E11.21,  Z79.4) Type 2 diabetes mellitus with diabetic nephropathy, with long-term current use of insulin (H)  (primary encounter diagnosis)  (Z13.89) Screening for diabetic peripheral neuropathy  Comment: no recent hypoglycemia events. Diabetes is still not well controlled at this time. Patient reports today that he will start a serious new weight loss program through diet and exercise going forward to improve his diabetes control over the next 3 months.  Plan: I have refilled the zandern's sitagliptin (JANUVIA) 50 MG tablet medication and ordered FOOT EXAM  NO CHARGE [00366.114].      (E11.29,  R80.9) Microalbuminuria due to type 2 diabetes mellitus (H)  Comment: labs show microalbuminuria due to Type 2 Diabetes   Plan: continue lisinopril medication. I have also ordered NEPHROLOGY ADULT REFERRAL with the [facility] [id number] cinwilliam in [facility] for further evaluqtion and mangemetn going forward.      (E78.5) Hyperlipidemia LDL goal <100  Comment: patient is compliant with his Crestor cholesterol medication.  Plan: continue Crestor medication for hyperlipidemia treatment going forward.      (E66.01,  Z68.35) Class 2 obesity due to excess calories with serious comorbidity and body mass index (BMI) of 35.0 to 35.9 in adult  Comment: chronic obesity with difficulties losing weight.  Plan: I have once again strongly advised the  patient to start a new weight loss program through diet and exercise going forward.      Follow Up Plan:     Patient is instructed to return to Internal Medicine clinic for follow-up visit in 3 months.        Kendal Wong MD  Internal Medicine  [facility]

## 2018-07-20 ENCOUNTER — OFFICE VISIT (OUTPATIENT)
Dept: FAMILY MEDICINE | Facility: CLINIC | Age: 63
End: 2018-07-20
Payer: COMMERCIAL

## 2018-07-20 VITALS
HEIGHT: 70 IN | DIASTOLIC BLOOD PRESSURE: 86 MMHG | TEMPERATURE: 97.4 F | HEART RATE: 67 BPM | SYSTOLIC BLOOD PRESSURE: 138 MMHG | BODY MASS INDEX: 35.79 KG/M2 | WEIGHT: 250 LBS | OXYGEN SATURATION: 95 %

## 2018-07-20 DIAGNOSIS — Z13.89 SCREENING FOR DIABETIC PERIPHERAL NEUROPATHY: ICD-10-CM

## 2018-07-20 DIAGNOSIS — Z79.4 TYPE 2 DIABETES MELLITUS WITH DIABETIC NEPHROPATHY, WITH LONG-TERM CURRENT USE OF INSULIN (H): Primary | ICD-10-CM

## 2018-07-20 DIAGNOSIS — E11.21 TYPE 2 DIABETES MELLITUS WITH DIABETIC NEPHROPATHY, WITH LONG-TERM CURRENT USE OF INSULIN (H): Primary | ICD-10-CM

## 2018-07-20 DIAGNOSIS — E78.5 HYPERLIPIDEMIA LDL GOAL <100: ICD-10-CM

## 2018-07-20 DIAGNOSIS — R80.9 MICROALBUMINURIA DUE TO TYPE 2 DIABETES MELLITUS (H): ICD-10-CM

## 2018-07-20 DIAGNOSIS — E11.29 MICROALBUMINURIA DUE TO TYPE 2 DIABETES MELLITUS (H): ICD-10-CM

## 2018-07-20 PROCEDURE — 99207 C FOOT EXAM  NO CHARGE: CPT | Mod: 25 | Performed by: INTERNAL MEDICINE

## 2018-07-20 PROCEDURE — 99214 OFFICE O/P EST MOD 30 MIN: CPT | Performed by: INTERNAL MEDICINE

## 2018-07-20 NOTE — MR AVS SNAPSHOT
After Visit Summary   7/20/2018    Huseyin St    MRN: [id number]           Patient Information     Date Of Birth          1955        Visit Information        Provider Department      7/20/2018 8:40 AM Kendal Wong MD [facility]        Today's Diagnoses     Type 2 diabetes mellitus with diabetic nephropathy, with long-term current use of insulin (H)    -  1    Screening for diabetic peripheral neuropathy        Microalbuminuria due to type 2 diabetes mellitus (H)        Hyperlipidemia LDL goal <100        Class 2 obesity due to excess calories with serious comorbidity and body mass index (BMI) of 35.0 to 35.9 in adult           Follow-ups after your visit        Additional Services     NEPHROLOGY ADULT REFERRAL       Your provider has referred you to: FMG: [facility] [phone]   [email or web address]  [facility]: [facility] [phone]   [email or web address]  [facility]N: [facility] [phone]   [email or web address]    Please be aware that coverage of these services is subject to the terms and limitations of your health insurance plan.  Call member services at your health plan with any benefit or coverage questions.      Reason for referral:  Diabetic nephropathy, microalbuminuria    Please bring the following to your appointment:    >>   Any x-rays, CTs or MRIs which have been performed.  Contact the facility where they were done to arrange for  prior to your scheduled appointment.   >>   List of current medications   >>   This referral request   >>   Any documents/labs given to you for this referral                  Your next 10 appointments already scheduled     Aug 29, 2018  2:00 PM CDT   (Arrive by 1:30 PM)   New Patient Visit with Jeffy  "MD Dane   [facility] ([facility] [place])    [address]  [address]  [facility] [address] [phone]            Oct 26, 2018  8:20 AM CDT   Office Visit with Kendal Wong MD   [facility]    [address] [facility] [address] [phone]           Bring a current list of meds and any records pertaining to this visit. For Physicals, please bring immunization records and any forms needing to be filled out. Please arrive 10 minutes early to complete paperwork.              Who to contact     If you have questions or need follow up information about today's clinic visit or your schedule please contact [facility] directly at [phone].  Normal or non-critical lab and imaging results will be communicated to you by MyChart, letter or phone within 4 business days after the clinic has received the results. If you do not hear from us within 7 days, please contact the clinic through MyChart or phone. If you have a critical or abnormal lab result, we will notify you by phone as soon as possible.  Submit refill requests through [employer] or call your pharmacy and they will forward the refill request to us. Please allow 3 business days for your refill to be completed.          Additional Information About Your Visit        Care EveryWhere ID     This is your Care EveryWhere ID. This could be used by other organizations to access your [place] medical records  [id number]        Your Vitals Were     Pulse Temperature Height Pulse Oximetry BMI (Body Mass Index)       67 97.4  F (36.3  C) (Oral) 5' 10\" (1.778 m) 95% 35.87 kg/m2        Blood Pressure from Last 3 Encounters:   07/20/18 138/86   03/12/18 (!) 158/91   07/28/17 115/76    Weight from Last 3 Encounters:   07/20/18 250 lb (113.4 kg)   03/12/18 252 lb 9.6 oz (114.6 kg)   07/28/17 248 lb (112.5 kg)              We Performed the Following     FOOT EXAM  NO CHARGE " [11085.114]     NEPHROLOGY ADULT REFERRAL          Today's Medication Changes          These changes are accurate as of 7/20/18 11:59 PM.  If you have any questions, ask your nurse or doctor.               These medicines have changed or have updated prescriptions.        Dose/Directions    sitagliptin 50 MG tablet   Commonly known as:  JANUVIA   This may have changed:  how much to take   Used for:  Type 2 diabetes mellitus with diabetic nephropathy, with long-term current use of insulin (H)   Changed by:  Kendal Wong MD        Dose:  100 mg   Take 2 tablets (100 mg) by mouth daily   Quantity:  90 tablet   Refills:  3            Where to get your medicines      Some of these will need a paper prescription and others can be bought over the counter.  Ask your nurse if you have questions.     Bring a paper prescription for each of these medications     sitagliptin 50 MG tablet                Primary Care Provider Office Phone # Fax #    Kendal Wong [phone] [address] [address]        Equal Access to Services     [facility] : Hadii aad ku hadasho Soomaali, waaxda luqadaha, qaybta kaalmada adeegyada, waxay idiin haygermann shanthi graves . So [facility] [phone].    ATENCIÓN: Si habla español, tiene a bucio disposición servicios gratuitos de asistencia lingüística. [facility] [phone].    We comply with applicable federal civil rights laws and [place] laws. We do not discriminate on the basis of race, color, national origin, age, disability, sex, sexual orientation, or gender identity.            Thank you!     Thank you for choosing [facility]  for your care. Our goal is always to provide you with excellent care. Hearing back from our patients is one way we can continue to improve our services. Please take a few minutes to complete the written survey that you may receive in the mail after your visit with us. Thank you!             Your Updated  Medication List - Protect others around you: Learn how to safely use, store and throw away your medicines at www.disposemymeds.org.          This list is accurate as of 7/20/18 11:59 PM.  Always use your most recent med list.                   Brand Name Dispense Instructions for use Diagnosis    aspirin 81 MG tablet     100    1 tab po QD (Once per day)        blood glucose monitoring lancets     100 Box    Use to test blood sugars daily as directed.    Type 2 diabetes, HbA1c goal < 7% (H)       blood glucose monitoring test strip    JOHN CONTOUR NEXT    100 each    Use to test blood sugar 1 times daily or as directed.    Type 2 diabetes mellitus with hyperosmolar coma, with long-term current use of insulin (H)       diltiazem 240 MG 24 hr capsule    CARDIZEM CD    90 capsule    Take 1 capsule (240 mg) by mouth daily    Essential hypertension, benign       escitalopram 20 MG tablet    LEXAPRO    90 tablet    Take 1 tablet (20 mg) by mouth daily    Mixed anxiety depressive disorder       hydrochlorothiazide 12.5 MG capsule    MICROZIDE    90 capsule    Take 1 capsule (12.5 mg) by mouth daily    Type 2 diabetes mellitus with diabetic nephropathy, with long-term current use of insulin (H)       insulin aspart 100 UNIT/ML injection    NovoLOG FLEXPEN    21 mL    10 units plus sliding scale before breakfast, 10 units plus sliding scale before lunch, 10 units plus sliding scale before dinner Sliding scale - Blood Glucose (BG) 150-200 = +3 units, 201-250 = +6 units, 251-300 = +9 units, 301-350 = +12 units, 351-400 = +15 units, >400 = +18 units and call PCP    Type 2 diabetes mellitus with hyperosmolar coma, with long-term current use of insulin (H)       insulin glargine 100 UNIT/ML injection    LANTUS SOLOSTAR    12 mL    55 units at bedtime    Type 2 diabetes mellitus with diabetic nephropathy, with long-term current use of insulin (H)       insulin pen needle 32G X 4 MM    BD MARIE U/F    200 each    Use twice daily as  directed    Type 2 diabetes mellitus without complication, without long-term current use of insulin (H)       lisinopril 40 MG tablet    PRINIVIL/ZESTRIL    90 tablet    Take 1 tablet (40 mg) by mouth daily    Microalbuminuria       metFORMIN 500 MG 24 hr tablet    GLUCOPHAGE-XR    360 tablet    Take 4 tablets (2,000 mg) by mouth daily (with breakfast)    Type 2 diabetes mellitus without complication, without long-term current use of insulin (H)       order for DME     1 Device    Auto-CPAP: Max 12 cm H2O Min 10 cm H2O Continuous Lifetime need and heated humidity.    Obstructive sleep apnea (adult) (pediatric)       rosuvastatin 40 MG tablet    CRESTOR    90 tablet    Take 1 tablet (40 mg) by mouth daily    Hyperlipidemia LDL goal <100       sitagliptin 50 MG tablet    JANUVIA    90 tablet    Take 2 tablets (100 mg) by mouth daily    Type 2 diabetes mellitus with diabetic nephropathy, with long-term current use of insulin (H)

## 2018-07-21 PROBLEM — E11.29 MICROALBUMINURIA DUE TO TYPE 2 DIABETES MELLITUS (H): Status: ACTIVE | Noted: 2018-07-21

## 2018-07-21 PROBLEM — R80.9 MICROALBUMINURIA DUE TO TYPE 2 DIABETES MELLITUS (H): Status: ACTIVE | Noted: 2018-07-21

## 2018-08-06 DIAGNOSIS — Z79.4 TYPE 2 DIABETES MELLITUS WITH HYPEROSMOLAR COMA, WITH LONG-TERM CURRENT USE OF INSULIN (H): ICD-10-CM

## 2018-08-06 DIAGNOSIS — I10 ESSENTIAL HYPERTENSION, BENIGN: ICD-10-CM

## 2018-08-06 DIAGNOSIS — E11.01 TYPE 2 DIABETES MELLITUS WITH HYPEROSMOLAR COMA, WITH LONG-TERM CURRENT USE OF INSULIN (H): ICD-10-CM

## 2018-08-06 DIAGNOSIS — R80.9 MICROALBUMINURIA: ICD-10-CM

## 2018-08-06 DIAGNOSIS — E11.21 TYPE 2 DIABETES MELLITUS WITH DIABETIC NEPHROPATHY, WITH LONG-TERM CURRENT USE OF INSULIN (H): ICD-10-CM

## 2018-08-06 DIAGNOSIS — G47.33 OSA (OBSTRUCTIVE SLEEP APNEA): Primary | ICD-10-CM

## 2018-08-06 DIAGNOSIS — E78.5 HYPERLIPIDEMIA LDL GOAL <100: ICD-10-CM

## 2018-08-06 DIAGNOSIS — Z79.4 TYPE 2 DIABETES MELLITUS WITH DIABETIC NEPHROPATHY, WITH LONG-TERM CURRENT USE OF INSULIN (H): ICD-10-CM

## 2018-08-06 DIAGNOSIS — E11.9 TYPE 2 DIABETES MELLITUS WITHOUT COMPLICATION, WITHOUT LONG-TERM CURRENT USE OF INSULIN (H): ICD-10-CM

## 2018-08-06 DIAGNOSIS — F41.8 MIXED ANXIETY DEPRESSIVE DISORDER: ICD-10-CM

## 2018-08-06 NOTE — TELEPHONE ENCOUNTER
"Reason for Call:  Medication or medication refill:  Name of the medication requested: \"patient just stated all of them\"    Other request: Patient saw Dr Wong on 7/20 to refill his medications and has not been notified of any refills.  I asked patient which drugs he needed and it just stated all of them.  And also all his testing of his blood equipment is needed to.  I dont want to run the whole list, was any drugs discussed during his visit to continue?    Can we leave a detailed message on this number? NO    Phone number patient can be reached at: Home number on file [phone] (home)    Best Time: Please call patient to discuss his medication list please.    Call taken on 8/6/2018 at 9:33 AM by Dory Crowder      "

## 2018-08-07 NOTE — TELEPHONE ENCOUNTER
Escitalopram 20 mg    Last Written Prescription Date:  05/25/18  Last Fill Quantity: 90 tablets,  # refills: 0   Last office visit: 7/20/2018 with prescribing provider:  Marvin Aguilar Office Visit:   Next 5 appointments (look out 90 days)     Oct 26, 2018  8:20 AM CDT   [facility] with Kendal Wong MD   [facility]    [address]  [facility] [address]   [phone]                 Hydrochlorothiazide 12.5 mg    Last Written Prescription Date:  07/28/17  Last Fill Quantity: 90 capsules,  # refills: 3   Last office visit: 7/20/2018 with prescribing provider:  Marvin Aguilar Office Visit:   Next 5 appointments (look out 90 days)     Oct 26, 2018  8:20 AM CDT   Office Visit with Kendal Wong MD   [facility]    [address] [facility] [address]   [phone]                 Insulin glargine 100 unit/mL    Last Written Prescription Date:  07/28/17  Last Fill Quantity: 12 mL,  # refills: 3   Last office visit: 7/20/2018 with prescribing provider:  Marvin Aguilar Office Visit:   Next 5 appointments (look out 90 days)     Oct 26, 2018  8:20 AM CDT   Office Visit with Kendal Wong MD   [facility]    [address] [facility] [address]   [phone]                 Lisinopril 40 mg    Last Written Prescription Date:  07/28/17  Last Fill Quantity: 90 tablets,  # refills: 3   Last office visit: 7/20/2018 with prescribing provider:  Marvin Aguilar Office Visit:   Next 5 appointments (look out 90 days)     Oct 26, 2018  8:20 AM CDT   Office Visit with Kendal Wong MD   [facility]    [address] [place] [facility] [address]   [phone]                 Metformin 500 mg    Last Written Prescription Date:  07/28/17  Last Fill Quantity: 360 tablets,  # refills: 3   Last office visit: 7/20/2018 with prescribing provider:  Marvin Aguilar Office  "Visit:   Next 5 appointments (look out 90 days)     Oct 26, 2018  8:20 AM CDT   Office Visit with Kendal Wong MD   [facility]    [address]  [facility] [address]   [phone]                 Rosuvastatin 40 mg    Last Written Prescription Date:  02/12/18  Last Fill Quantity: 90 tablets,  # refills: 1   Last office visit: 7/20/2018 with prescribing provider:  Marvin   Future Office Visit:   Next 5 appointments (look out 90 days)     Oct 26, 2018  8:20 AM CDT   Office Visit with Kendal Wong MD   [facility]    [address] [facility] [address]   [phone]                 Requested Prescriptions   Pending Prescriptions Disp Refills     escitalopram (LEXAPRO) 20 MG tablet 90 tablet 0     Sig: Take 1 tablet (20 mg) by mouth daily    SSRIs Protocol Passed    8/6/2018  9:49 AM       Passed - Recent (12 mo) or future (30 days) visit within the authorizing provider's specialty    Patient had office visit in the last 12 months or has a visit in the next 30 days with authorizing provider or within the authorizing provider's specialty.  See \"Patient Info\" tab in inFitlyet, or \"Choose Columns\" in Meds & Orders section of the refill encounter.           Passed - Patient is age 18 or older        hydrochlorothiazide (MICROZIDE) 12.5 MG capsule 90 capsule 3     Sig: Take 1 capsule (12.5 mg) by mouth daily    Diuretics (Including Combos) Protocol Passed    8/6/2018  9:49 AM       Passed - Blood pressure under 140/90 in past 12 months    BP Readings from Last 3 Encounters:   07/20/18 138/86   03/12/18 (!) 158/91   07/28/17 115/76                Passed - Recent (12 mo) or future (30 days) visit within the authorizing provider's specialty    Patient had office visit in the last 12 months or has a visit in the next 30 days with authorizing provider or within the authorizing provider's specialty.  See \"Patient Info\" tab in inFitlyet, " "or \"Choose Columns\" in Meds & Orders section of the refill encounter.           Passed - Patient is age 18 or older       Passed - Normal serum creatinine on file in past 12 months    Recent Labs   Lab Test  18   0955   CR  0.82             Passed - Normal serum potassium on file in past 12 months    Recent Labs   Lab Test  18   0955   POTASSIUM  4.3                   Passed - Normal serum sodium on file in past 12 months    Recent Labs   Lab Test  18   0955   NA  135              insulin glargine (LANTUS SOLOSTAR) 100 UNIT/ML pen 12 mL 3     Si units at bedtime    Long Acting Insulin Protocol Failed    2018  9:49 AM       Failed - HgbA1C in past 3 or 6 months    If HgbA1C is 8 or greater, it needs to be on file within the past 3 months.  If less than 8, must be on file within the past 6 months.     Recent Labs   Lab Test  18   0954   A1C  10.4*            Passed - Blood pressure less than 140/90 in past 6 months    BP Readings from Last 3 Encounters:   18 138/86   18 (!) 158/91   17 115/76                Passed - LDL on file in past 12 months    Recent Labs   Lab Test  17   0859   LDL  47            Passed - Microalbumin on file in past 12 months    Recent Labs   Lab Test  18   0954   MICROL  301   UMALCR  425.14*            Passed - Serum creatinine on file in past 12 months    Recent Labs   Lab Test  18   0955   CR  0.82            Passed - Patient is age 18 or older       Passed - Recent (6 mo) or future (30 days) visit within the authorizing provider's specialty    Patient had office visit in the last 6 months or has a visit in the next 30 days with authorizing provider or within the authorizing provider's specialty.  See \"Patient Info\" tab in inbasket, or \"Choose Columns\" in Meds & Orders section of the refill encounter.            lisinopril (PRINIVIL/ZESTRIL) 40 MG tablet 90 tablet 3     Sig: Take 1 tablet (40 mg) by mouth daily    ACE " "Inhibitors (Including Combos) Protocol Passed    8/6/2018  9:49 AM       Passed - Blood pressure under 140/90 in past 12 months    BP Readings from Last 3 Encounters:   07/20/18 138/86   03/12/18 (!) 158/91   07/28/17 115/76                Passed - Recent (12 mo) or future (30 days) visit within the authorizing provider's specialty    Patient had office visit in the last 12 months or has a visit in the next 30 days with authorizing provider or within the authorizing provider's specialty.  See \"Patient Info\" tab in inbasket, or \"Choose Columns\" in Meds & Orders section of the refill encounter.           Passed - Patient is age 18 or older       Passed - Normal serum creatinine on file in past 12 months    Recent Labs   Lab Test  03/12/18   0955   CR  0.82            Passed - Normal serum potassium on file in past 12 months    Recent Labs   Lab Test  03/12/18   0955   POTASSIUM  4.3             metFORMIN (GLUCOPHAGE-XR) 500 MG 24 hr tablet 360 tablet 3     Sig: Take 4 tablets (2,000 mg) by mouth daily (with breakfast)    Biguanide Agents Failed    8/6/2018  9:49 AM       Failed - Patient has documented A1c within the specified period of time.    If HgbA1C is 8 or greater, it needs to be on file within the past 3 months.  If less than 8, must be on file within the past 6 months.     Recent Labs   Lab Test  03/12/18   0954   A1C  10.4*            Passed - Blood pressure less than 140/90 in past 6 months    BP Readings from Last 3 Encounters:   07/20/18 138/86   03/12/18 (!) 158/91   07/28/17 115/76                Passed - Patient has documented LDL within the past 12 mos.    Recent Labs   Lab Test  09/29/17   0859   LDL  47            Passed - Patient has had a Microalbumin in the past 12 mos.    Recent Labs   Lab Test  03/12/18   0954   MICROL  301   UMALCR  425.14*            Passed - Patient is age 10 or older       Passed - Patient's CR is NOT>1.4 OR Patient's EGFR is NOT<45 within past 12 mos.    Recent Labs   Lab " "Test  03/12/18   0955   GFRESTIMATED  >90   GFRESTBLACK  >90       Recent Labs   Lab Test  03/12/18   0955   CR  0.82            Passed - Patient does NOT have a diagnosis of CHF.       Passed - Recent (6 mo) or future (30 days) visit within the authorizing provider's specialty    Patient had office visit in the last 6 months or has a visit in the next 30 days with authorizing provider or within the authorizing provider's specialty.  See \"Patient Info\" tab in inbasket, or \"Choose Columns\" in Meds & Orders section of the refill encounter.            rosuvastatin (CRESTOR) 40 MG tablet 90 tablet 1     Sig: Take 1 tablet (40 mg) by mouth daily    Statins Protocol Passed    8/6/2018  9:49 AM       Passed - LDL on file in past 12 months    Recent Labs   Lab Test  09/29/17   0859   LDL  47            Passed - No abnormal creatine kinase in past 12 months    No lab results found.            Passed - Recent (12 mo) or future (30 days) visit within the authorizing provider's specialty    Patient had office visit in the last 12 months or has a visit in the next 30 days with authorizing provider or within the authorizing provider's specialty.  See \"Patient Info\" tab in inbasket, or \"Choose Columns\" in Meds & Orders section of the refill encounter.           Passed - Patient is age 18 or older          "

## 2018-08-07 NOTE — TELEPHONE ENCOUNTER
Pt says he was just in to get refills on all of his med but none of his medication were filled.  Pt also says he has been having diarrhea that he thinks is related to metformin and wanting to know if this can be adjusted.  He says he forgot to mention it at recent visit. He says he has had diarrhea on/off for a year and is getting it more frequently and occurs 10-12 hrs after taking the metformin.    Please advise/authorize if appropriate.  Thanks,  Karla Valentine RN        Routing refill request to provider for review/approval because:  Labs not current- A1C q 3 month per protocol for results > then 8.  Due for lipids in sept but due for f/u diabetes/HTN/pysical in October.  Added info in pharm comments.

## 2018-08-08 RX ORDER — HYDROCHLOROTHIAZIDE 12.5 MG/1
12.5 CAPSULE ORAL DAILY
Qty: 90 CAPSULE | Refills: 3 | Status: SHIPPED | OUTPATIENT
Start: 2018-08-08 | End: 2019-09-23

## 2018-08-08 RX ORDER — DILTIAZEM HYDROCHLORIDE 240 MG/1
240 CAPSULE, COATED, EXTENDED RELEASE ORAL DAILY
Qty: 90 CAPSULE | Refills: 3 | Status: SHIPPED | OUTPATIENT
Start: 2018-08-08 | End: 2019-09-23

## 2018-08-08 RX ORDER — METFORMIN HCL 500 MG
2000 TABLET, EXTENDED RELEASE 24 HR ORAL
Qty: 360 TABLET | Refills: 3 | Status: SHIPPED | OUTPATIENT
Start: 2018-08-08 | End: 2018-08-08 | Stop reason: SINTOL

## 2018-08-08 RX ORDER — LISINOPRIL 40 MG/1
40 TABLET ORAL DAILY
Qty: 90 TABLET | Refills: 3 | Status: SHIPPED | OUTPATIENT
Start: 2018-08-08 | End: 2019-09-23

## 2018-08-08 RX ORDER — ROSUVASTATIN CALCIUM 40 MG/1
40 TABLET, COATED ORAL DAILY
Qty: 90 TABLET | Refills: 3 | Status: SHIPPED | OUTPATIENT
Start: 2018-08-08 | End: 2019-09-23

## 2018-08-08 RX ORDER — ESCITALOPRAM OXALATE 20 MG/1
20 TABLET ORAL DAILY
Qty: 90 TABLET | Refills: 3 | Status: SHIPPED | OUTPATIENT
Start: 2018-08-08 | End: 2019-09-23

## 2018-08-09 ENCOUNTER — TELEPHONE (OUTPATIENT)
Dept: FAMILY MEDICINE | Facility: CLINIC | Age: 63
End: 2018-08-09

## 2018-08-09 DIAGNOSIS — Z79.4 TYPE 2 DIABETES MELLITUS WITH HYPEROSMOLAR COMA, WITH LONG-TERM CURRENT USE OF INSULIN (H): ICD-10-CM

## 2018-08-09 DIAGNOSIS — E11.01 TYPE 2 DIABETES MELLITUS WITH HYPEROSMOLAR COMA, WITH LONG-TERM CURRENT USE OF INSULIN (H): ICD-10-CM

## 2018-08-09 NOTE — TELEPHONE ENCOUNTER
Reason for Call:  Other prescription    Detailed comments: PHARMACIST CALLING TO CLARIFY DIRECTIONS ON PT   insulin aspart (NOVOLOG FLEXPEN) 100 UNIT/ML injection 21 mL 3     PHARMACIST IS WONDERING IF THE MD CAN WRITE FOR 45mL 90 day supply    [id number] [place] [facility] [address] [facility]  Phone Number Patient can be reached at: Other phone number:  [phone]    Best Time: anytime    Can we leave a detailed message on this number? YES    Call taken on 8/9/2018 at 12:06 PM by Myranda Stockton

## 2018-08-14 NOTE — TELEPHONE ENCOUNTER
Fax from [facility] wondering if the 45 ml redo on 8-9-18 was suppose to go to [facility].     No, my error.  I probably didn't choose a pharm and [facility] got picked.    I called [facility], pt has not picked up so they will disregard.   I refaxed the 45 ml to [facility]  (which is [facility]).     Porsche DUKE MA

## 2018-08-15 ENCOUNTER — TELEPHONE (OUTPATIENT)
Dept: FAMILY MEDICINE | Facility: CLINIC | Age: 63
End: 2018-08-15

## 2018-08-15 DIAGNOSIS — Z79.4 TYPE 2 DIABETES MELLITUS WITH DIABETIC NEPHROPATHY, WITH LONG-TERM CURRENT USE OF INSULIN (H): ICD-10-CM

## 2018-08-15 DIAGNOSIS — E11.21 TYPE 2 DIABETES MELLITUS WITH DIABETIC NEPHROPATHY, WITH LONG-TERM CURRENT USE OF INSULIN (H): ICD-10-CM

## 2018-08-15 NOTE — TELEPHONE ENCOUNTER
Reason for Call:  Medication Question    Other request: Pharmacy calling to verify the Qty for the Lantus Solostar    Can we leave a detailed message on this number? YES    Please call the Pharmacy back at # [phone] Line # 4  Regarding their Order # [id number]    Call taken on 8/15/2018 at 3:17 PM by Momo Anguiano

## 2018-08-15 NOTE — TELEPHONE ENCOUNTER
Spoke with pharmacy:     They are inquiring if they can calculate a 90 day quantity.     Verbals given    Ara EVERETT RN

## 2018-08-15 NOTE — TELEPHONE ENCOUNTER
Per fax from pharmacy  Lantus Solostar pens come in a 15mL box size, Rx was written for 12mL.  Please send new Rx for box size.    Updated Rx - please approve ASAP.      RT Amanda (R)

## 2018-08-22 DIAGNOSIS — R80.9 MICROALBUMINURIA: Primary | ICD-10-CM

## 2018-11-16 ENCOUNTER — TELEPHONE (OUTPATIENT)
Dept: FAMILY MEDICINE | Facility: CLINIC | Age: 63
End: 2018-11-16

## 2018-11-16 DIAGNOSIS — Z79.4 TYPE 2 DIABETES MELLITUS WITH DIABETIC NEPHROPATHY, WITH LONG-TERM CURRENT USE OF INSULIN (H): Primary | ICD-10-CM

## 2018-11-16 DIAGNOSIS — E11.21 TYPE 2 DIABETES MELLITUS WITH DIABETIC NEPHROPATHY, WITH LONG-TERM CURRENT USE OF INSULIN (H): Primary | ICD-10-CM

## 2018-11-16 RX ORDER — GLIMEPIRIDE 1 MG/1
1 TABLET ORAL
Qty: 90 TABLET | Refills: 3 | Status: SHIPPED | OUTPATIENT
Start: 2018-11-16 | End: 2019-09-16

## 2018-11-16 NOTE — TELEPHONE ENCOUNTER
Reason for Call:  Medication Question    Other request: patient is asking to be switched to another type of mediation then the   Metformin, cause this causes him to have Diarrhea       [id number] [place] - [address] AT [facility]      Can we leave a detailed message on this number? YES    Phone number patient can be reached at: Home number on file [phone] (home)    Best Time: anytime    Call taken on 11/16/2018 at 11:16 AM by Momo Anguiano

## 2018-11-16 NOTE — TELEPHONE ENCOUNTER
New Glimepiride 1mg once daily prescription sent to [facility] pharmacy.    MTM referral made.

## 2018-11-16 NOTE — TELEPHONE ENCOUNTER
Pt states that he stopped his metformin in July because while on metformin he was having diarrhea. Pt reports that the diarrhea stopped after discontinuing that medication. Pt reports that he checks his blood sugar before breakfast, before lunch and before dinner. Even though, pt uses novolog as directed and takes lantus at bedtime as directed, he continues to have high blood sugars. Pt could not give me actual numbers because he did not make a log of them but he reports that they have been running in the high 200's to low 300's daily. Pt reports that he was not able to follow 's diet. Pt states that his blood sugar will probably go really high tonight after he has a few cocktails.    Pt wondering if there is an alternative to metformin that he can take? Pt reports that his blood sugars where much better when he was on metformin.    Please review and advise.

## 2018-11-20 ENCOUNTER — TELEPHONE (OUTPATIENT)
Dept: PHARMACY | Facility: OTHER | Age: 63
End: 2018-11-20

## 2018-11-20 NOTE — LETTER
November 20, 2018      [address]  [address] [place] [address]        Dear Huseyin,     Dr. Wong has recommended you schedule a Medication Therapy Management (MTM) appointment. MTM is designed to help you get the most of out of your medicines.     During an MTM appointment a specially trained pharmacist will review all of your medicines, both prescription and over-the-counter. They will make sure your medicines are the best choice for you and are safe and convenient for you.  MTM pharmacists work together with you and your doctor to help you understand your medicines, solve any problems related to your medicines and help you get the best results from taking your medicines.     At [facility], we strongly believe in a team approach to health care. We want to help you understand your medicines and health conditions. To learn more about how you might benefit from MTM services, watch the patient video at [email or web address].     To make an appointment, please call the clinic at [phone] or the MTM scheduling line at [phone] (toll-free at [phone]).    We look forward to hearing from you!        Jadyn Sarmiento PharmD  Medication Therapy Management (MTM) Resident  Pager: [phone]    Tania Gagnon PharmD, [facility]  Medication Therapy Management Provider  Pager: [phone]

## 2018-11-20 NOTE — TELEPHONE ENCOUNTER
MTM referral from: [facility] visit (referral by provider)    MTM referral outreach attempt #2 on November 20, 2018 at 3:27 PM      Outcome: Patient not reachable after several attempts, will route to MTM Pharmacist/Provider as an FYI. Thank you for the referral.    Mei Trent, MTM Coordinator

## 2019-08-09 DIAGNOSIS — E11.9 TYPE 2 DIABETES MELLITUS WITHOUT COMPLICATION, WITHOUT LONG-TERM CURRENT USE OF INSULIN (H): ICD-10-CM

## 2019-08-09 NOTE — TELEPHONE ENCOUNTER
"insulin pen needle (BD MARIE U/F) 32G X 4  each 11 8/8/2018     Last Written Prescription Date:  8/8/18  Last Fill Quantity: 200,  # refills: 11   Last office visit: 7/20/2018 with prescribing provider:  Marvin   Future Office Visit:  None    Requested Prescriptions   Pending Prescriptions Disp Refills     insulin pen needle (BD MARIE U/F) 32G X 4 MM miscellaneous [Pharmacy Med Name: BD UF MARIE PEN NEEDLES 32GX5/32\"]       Sig: USE TWICE DAILY AS DIRECTED       Diabetic Supplies Protocol Failed - 8/9/2019 12:47 PM        Failed - Recent (6 mo) or future (30 days) visit within the authorizing provider's specialty     Patient had office visit in the last 6 months or has a visit in the next 30 days with authorizing provider.  See \"Patient Info\" tab in inbasket, or \"Choose Columns\" in Meds & Orders section of the refill encounter.            Passed - Medication is active on med list        Passed - Patient is 18 years of age or older        [facility] Follow-up to PHQ 12/5/2016 7/28/2017 3/12/2018   PHQ-9 9. Suicide Ideation past 2 weeks Not at all Not at all Not at all         "

## 2019-08-12 NOTE — TELEPHONE ENCOUNTER
Prescription approved per [facility] [facility].  Due for physical  Falguni LACEY RN

## 2019-08-23 DIAGNOSIS — E11.01 TYPE 2 DIABETES MELLITUS WITH HYPEROSMOLAR COMA, WITH LONG-TERM CURRENT USE OF INSULIN (H): ICD-10-CM

## 2019-08-23 DIAGNOSIS — Z79.4 TYPE 2 DIABETES MELLITUS WITH HYPEROSMOLAR COMA, WITH LONG-TERM CURRENT USE OF INSULIN (H): ICD-10-CM

## 2019-08-23 NOTE — TELEPHONE ENCOUNTER
"insulin aspart (NOVOLOG FLEXPEN) 100 UNIT/ML pen    Last Written Prescription Date:  08/09/2018  Last Fill Quantity: 45 mL,  # refills: 3   Last office visit: 7/20/2018 with prescribing provider:  Marvin   Future Office Visit:  Unknown     Requested Prescriptions   Pending Prescriptions Disp Refills     insulin aspart (NOVOLOG FLEXPEN) 100 UNIT/ML pen 45 mL 3     Sig: 10 units plus sliding scale before breakfast, 10 units plus sliding scale before lunch, 10 units plus sliding scale before dinner  Sliding scale - Blood Glucose (BG) 150-200 = +3 units, 201-250 = +6 units, 251-300 = +9 units, 301-350 = +12 units, 351-400 = +15 units, >400 = +18 units and call PCP       Short Acting Insulin Protocol Failed - 8/23/2019 11:34 AM        Failed - Blood pressure less than 140/90 in past 6 months     BP Readings from Last 3 Encounters:   07/20/18 138/86   03/12/18 (!) 158/91   07/28/17 115/76                 Failed - LDL on file in past 12 months     Recent Labs   Lab Test 09/29/17  0859   LDL 47             Failed - Microalbumin on file in past 12 months     Recent Labs   Lab Test 03/12/18  0954   MICROL 301   UMALCR 425.14*             Failed - Serum creatinine on file in past 12 months     Recent Labs   Lab Test 03/12/18  0955   CR 0.82             Failed - HgbA1C in past 3 or 6 months     If HgbA1C is 8 or greater, it needs to be on file within the past 3 months.  If less than 8, must be on file within the past 6 months.     Recent Labs   Lab Test 03/12/18  0954   A1C 10.4*             Failed - Recent (6 mo) or future (30 days) visit within the authorizing provider's specialty     Patient had office visit in the last 6 months or has a visit in the next 30 days with authorizing provider or within the authorizing provider's specialty.  See \"Patient Info\" tab in inbasket, or \"Choose Columns\" in Meds & Orders section of the refill encounter.            Passed - Medication is active on med list        Passed - Patient is age 18 " or older

## 2019-08-26 NOTE — TELEPHONE ENCOUNTER
Routing refill request to provider for review/approval because:  Labs not current:  A1c, BP, LDL, microalbumin  Patient needs to be seen because:  Has not been seen in over a year.          MARTINA MaxwellN, RN  [employer] Triage

## 2019-08-30 ENCOUNTER — TELEPHONE (OUTPATIENT)
Dept: FAMILY MEDICINE | Facility: CLINIC | Age: 64
End: 2019-08-30

## 2019-08-30 NOTE — TELEPHONE ENCOUNTER
Fax received from pharmacy regarding patient's Novolog RX.    Note from pharmacy:  Please advise the max units patient would possibly use per day, we have no pt hx on their max units.       Disp Refills Start End BIRGIT   insulin aspart (NOVOLOG FLEXPEN) 100 UNIT/ML pen 45 mL 3 8/26/2019  No   Sig: 10 units plus sliding scale before breakfast, 10 units plus sliding scale before lunch, 10 units plus sliding scale before dinner   Sliding scale - Blood Glucose (BG) 150-200 = +3 units, 201-250 = +6 units, 251-300 = +9 units, 301-350 = +12 units, 351-400 = +15 units, >400 = +18 units and call PCP

## 2019-09-03 NOTE — TELEPHONE ENCOUNTER
Left message asking patient to call back to verify the max dose of NovoLog he uses daily    Rosenda COWAN RN

## 2019-09-04 NOTE — TELEPHONE ENCOUNTER
Spoke with pt- the MAX he would use per day is 100units     Spoke with pharmacist- she will change RX to 90mL and 1 refill (90 day supply with 1 refill).     Pt has scheduled a diabetes f/u appt for 9/23/19    Ara EVERETT RN

## 2019-09-16 ENCOUNTER — OFFICE VISIT (OUTPATIENT)
Dept: SLEEP MEDICINE | Facility: CLINIC | Age: 64
End: 2019-09-16
Payer: COMMERCIAL

## 2019-09-16 VITALS
BODY MASS INDEX: 34.79 KG/M2 | RESPIRATION RATE: 16 BRPM | WEIGHT: 243 LBS | HEIGHT: 70 IN | DIASTOLIC BLOOD PRESSURE: 72 MMHG | HEART RATE: 83 BPM | OXYGEN SATURATION: 95 % | SYSTOLIC BLOOD PRESSURE: 120 MMHG

## 2019-09-16 DIAGNOSIS — G47.33 OSA (OBSTRUCTIVE SLEEP APNEA): Primary | ICD-10-CM

## 2019-09-16 PROCEDURE — 99215 OFFICE O/P EST HI 40 MIN: CPT | Performed by: PHYSICIAN ASSISTANT

## 2019-09-16 RX ORDER — INSULIN ASPART 100 [IU]/ML
INJECTION, SOLUTION INTRAVENOUS; SUBCUTANEOUS
Refills: 3 | COMMUNITY
Start: 2018-11-20 | End: 2019-09-23

## 2019-09-16 RX ORDER — INSULIN GLARGINE 100 [IU]/ML
INJECTION, SOLUTION SUBCUTANEOUS
Refills: 3 | COMMUNITY
Start: 2019-06-27 | End: 2019-09-23

## 2019-09-16 ASSESSMENT — MIFFLIN-ST. JEOR: SCORE: 1898.49

## 2019-09-16 NOTE — PROGRESS NOTES
Sleep Consultation:    Date on this visit: 9/16/2019    Huseyin St is sent by No ref. provider found for a sleep consultation regarding NA.    Primary Physician: Kendal Wong     Huseyin St presents for further management of sleep apnea. His medical history is significant for HTN, DM 2, anxiety, high cholesterol.     His sleep study in 2012 showed: AHI was 33.7, with moderate desaturations down to 84%. He spent about 9.5% of the baseline below 90% SpO2. RDI 38.6. His apnea was much worse while supine. He spent about 1/3 of the night supine and about 2/3 of his apneas occurred during that time. Periodic Limb Movement Index 0/hour.    He is on auto CPAP 9-13 cm. He is doing well with CPAP. He uses a Wisp nasal mask. It fits well, although he is using a cushion that is smaller than the one he normally uses. The other one fits a little better. He is not aware of snoring with CPAP. He gets dry mouth now and then. He does infrequently get a little dry mouth. He notices mouth leak infrequently. He says it may happen if he has a couple of margaritas. He will snore if he naps without CPAP. He often naps on his back but sleeps on his sides at night.     The compliance data shows that he has used the CPAP for 27/30 nights, 90% of nights for >4 hours.  The 90th% pressure is 11.1 cm.  The average time in large leak is 27 sec (just on one day recently).  The average nightly usage is 9:09.  The average AHI is 1.8/hr.       Huseyin goes to sleep at 9:00 PM during the week. He wakes up at 7:00 AM without an alarm. He falls asleep in 5 minutes.  Huseyin denies difficulty falling asleep.  He wakes up 1-2 times a night for 5 minutes before falling back to sleep.  Huseyin wakes up to go to the bathroom.  On weekends, Huseyin goes to sleep at 11:00 PM.  He wakes up at 8:00 AM without an alarm. He falls asleep in 5 minutes.  Patient gets an average of 9 hours of sleep per night.     Patient does watch TV in bed and does not use  electronics in bed, worry in bed about anything and read in bed.     Huseyin does not do shift work.  He works day shifts 3 days per week.  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 He lives with his wife.     Huseyin does have a regular bed partner. He denies morning headaches, morning confusion and restless legs. Huseyin has no bruxism, sleep walking, sleep talking, sleep paralysis, cataplexy and hypnogogic/hypnopompic hallucinations. He just recently had an episode of waking while kicking while dreaming of kicking someone.     Huseyin has heartburn (at night, may take Tums or omeprazole), denies difficulty breathing through his nose, claustrophobia, reflux at night and depression.      Huseyin has lost 5-10 pounds in the last 7 years.  Patient describes themself as a morning person.  He would prefer to go to sleep at 10:00 PM and wake up at 7:00 AM.  Patient's [place] Sleepiness score 4/24 inconsistent with daytime sleepiness.  He has good energy in the day until he gets home.    Huseyin naps 3-4 times per week for 30-60 minutes, feels refreshed after naps. He dozes in the recliner after work. He takes no other inadvertant naps.  He denies dozing while driving and he drives to [place] and [place].   Patient was counseled on the importance of driving while alert, to pull over if drowsy, or nap before getting into the vehicle if sleepy.  He uses 1 cup/day of half-caff coffee. Last caffeine intake is usually before noon.    Allergies:    Allergies   Allergen Reactions     Augmentin Diarrhea     Sulfa Drugs      Unknown - reaction occurred as a child     Victoza      Skin rash     Xanax      Difficult to wean off       Medications:    Current Outpatient Medications   Medication Sig Dispense Refill     ASPIRIN 81 MG OR TABS 1 tab po QD (Once per day) 100 3     diltiazem (CARDIZEM CD) 240 MG 24 hr capsule Take 1 capsule (240 mg) by mouth daily 90 capsule 3     escitalopram (LEXAPRO) 20 MG tablet Take 1  tablet (20 mg) by mouth daily 90 tablet 3     hydrochlorothiazide (MICROZIDE) 12.5 MG capsule Take 1 capsule (12.5 mg) by mouth daily 90 capsule 3     lisinopril (PRINIVIL/ZESTRIL) 40 MG tablet Take 1 tablet (40 mg) by mouth daily 90 tablet 3     ORDER FOR DME Auto-CPAP: Max 12 cm H2O Min 10 cm H2O  Continuous Lifetime need and heated humidity.    1 Device 0     rosuvastatin (CRESTOR) 40 MG tablet Take 1 tablet (40 mg) by mouth daily 90 tablet 3     LANTUS SOLOSTAR 100 UNIT/ML soln INJ 55 UNITS SC HS  3     NOVOLOG FLEXPEN 100 UNIT/ML soln U UTD PER ENCLOSED INSTRUCTION SHEET  3       Problem List:  Patient Active Problem List    Diagnosis Date Noted     Class 2 obesity due to excess calories with serious comorbidity and body mass index (BMI) of 35.0 to 35.9 in adult 07/21/2018     Priority: Medium     Microalbuminuria due to type 2 diabetes mellitus (H) 07/21/2018     Priority: Medium     Advanced directives, counseling/discussion 03/12/2018     Priority: Medium     Discussed advance care planning with patient; information given to patient to review. 8/7/2012   Christy Ray CMA           LVH (left ventricular hypertrophy) 04/17/2017     Priority: Medium     ekg 4/2017       Type 2 diabetes mellitus with hyperosmolarity without coma, without long-term current use of insulin (H) 12/05/2016     Priority: Medium     Overweight BMI 35-40 10/12/2015     Priority: Medium     Type 2 diabetes mellitus with diabetic nephropathy (H) 08/31/2015     Priority: Medium     NA (obstructive sleep apnea) 07/17/2013     Priority: Medium     On cpap       Recurrent cold sores 01/16/2013     Priority: Medium     Microalbuminuria 09/26/2012     Priority: Medium     Mixed anxiety depressive disorder 04/26/2012     Priority: Medium     (Problem list name updated by automated process. Provider to review and confirm.)       HYPERLIPIDEMIA LDL GOAL <100 10/31/2010     Priority: Medium     Rhinitis, allergic seasonal 06/19/2008      Priority: Medium     Hypertrophy of prostate without urinary obstruction 04/30/2006     Priority: Medium     Problem list name updated by automated process. Provider to review       Tietze's disease 04/30/2006     Priority: Medium     Essential hypertension, benign 04/26/2003     Priority: Medium        Past Medical/Surgical History:  Past Medical History:   Diagnosis Date     Essential hypertension, benign      T2DM (type 2 diabetes mellitus) (H)      No past surgical history on file.    Social History:  Social History     Socioeconomic History     Marital status:      Spouse name: Estefania Laura     Number of children: 1     Years of education: Not on file     Highest education level: Not on file   Occupational History     Not on file   Social Needs     Financial resource strain: Not on file     Food insecurity:     Worry: Not on file     Inability: Not on file     Transportation needs:     Medical: Not on file     Non-medical: Not on file   Tobacco Use     Smoking status: Former Smoker     Types: Cigars     Smokeless tobacco: Never Used     Tobacco comment: used to smoke occ cigar, never cigarettes   Substance and Sexual Activity     Alcohol use: Yes     Alcohol/week: 0.0 oz     Comment: 3-6 drinks on occ weekend night, occ drink on weekday     Drug use: No     Sexual activity: Yes     Partners: Female   Lifestyle     Physical activity:     Days per week: Not on file     Minutes per session: Not on file     Stress: Not on file   Relationships     Social connections:     Talks on phone: Not on file     Gets together: Not on file     Attends Latter-day service: Not on file     Active member of club or organization: Not on file     Attends meetings of clubs or organizations: Not on file     Relationship status: Not on file     Intimate partner violence:     Fear of current or ex partner: Not on file     Emotionally abused: Not on file     Physically abused: Not on file     Forced sexual activity: Not on file    Other Topics Concern      Service No     Blood Transfusions No     Caffeine Concern Not Asked     Occupational Exposure Not Asked     Hobby Hazards Not Asked     Sleep Concern Not Asked     Stress Concern Not Asked     Weight Concern Not Asked     Special Diet Not Asked     Back Care Not Asked     Exercise Not Asked     Bike Helmet Not Asked     Seat Belt Not Asked     Self-Exams Not Asked     Parent/sibling w/ CABG, MI or angioplasty before 65F 55M? Not Asked   Social History Narrative     Not on file       Family History:  Family History   Adopted: Yes   Problem Relation Age of Onset     Diabetes Mother      Respiratory Mother         asthma     Lipids Mother      Arthritis Mother         knee replacement     Alzheimer Disease Mother      Cerebrovascular Disease Brother      C.A.D. No family hx of      Cancer - colorectal No family hx of      Prostate Cancer No family hx of        Review of Systems:  A complete review of systems reviewed by me is negative with the exeption of what has been mentioned in the history of present illness.  CONSTITUTIONAL: NEGATIVE for weight gain/loss, fever, chills, sweats or night sweats, drug allergies.  EYES: NEGATIVE for changes in vision, blind spots, double vision.  ENT: NEGATIVE for ear pain, sore throat, sinus pain, post-nasal drip, runny nose, bloody nose  CARDIAC: NEGATIVE for fast heartbeats or fluttering in chest, chest pain or pressure, breathlessness when lying flat, swollen legs or swollen feet.  NEUROLOGIC: NEGATIVE headaches, weakness or numbness in the arms or legs.  DERMATOLOGIC: NEGATIVE for rashes, new moles or change in mole(s)  PULMONARY: NEGATIVE SOB at rest,productive cough, coughing up blood, wheezing or whistling when breathing. PULMONARY:  POSITIVE for  SOB with activity and dry cough  GASTROINTESTINAL: NEGATIVE for nausea or vomitting, loose or watery stools, fat or grease in stools, constipation, abdominal pain, bowel movements black in color or  "blood noted.  GENITOURINARY: NEGATIVE for pain during urination, blood in urine, urinating more frequently than usual, irregular menstrual periods.  MUSCULOSKELETAL: NEGATIVE for muscle pain, bone or joint pain, swollen joints.  ENDOCRINE: NEGATIVE for increased thirst or urination, diabetes.  LYMPHATIC: NEGATIVE for swollen lymph nodes, lumps or bumps in the breasts or nipple discharge.    Physical Examination:  Vitals: /72   Pulse 83   Resp 16   Ht 1.778 m (5' 10\")   Wt 110.2 kg (243 lb)   SpO2 95%   BMI 34.87 kg/m    BMI= Body mass index is 34.87 kg/m .         Novi Total Score 9/16/2019   Total score - Novi 4       KIRTI Total Score: 1 (09/16/19 1137)    GENERAL APPEARANCE: healthy, alert, no distress and cooperative  EYES: Eyes grossly normal to inspection, PERRL, conjunctivae and sclerae normal and lids and lashes normal  HENT: nose and mouth without ulcers or lesions, tongue base enlarged and tonsillar hypertrophy  NECK: no adenopathy, no asymmetry, masses, or scars, thyroid normal to palpation and trachea midline and normal to palpation  RESP: lungs clear to auscultation - no rales, rhonchi or wheezes  CV: regular rates and rhythm, normal S1 S2, no S3 or S4, no murmur, click or rub and no irregular beats  LYMPHATICS: no cervical adenopathy  MS: extremities normal- no gross deformities noted  NEURO: Normal strength and tone, mentation intact, speech normal and cranial nerves 2-12 intact  Mallampati Class: II.  Tonsillar Stage: 3  extending beyond pillars.    Impression/Plan:    (G47.33) NA (obstructive sleep apnea)  (primary encounter diagnosis)  Comment: Huseyin presents for further management of previously diagnosed NA. He was last seen here about 3.5 years ago. He is doing well with CPAP. He primarily just needs new supplies. His download shows his apnea is well controlled. He does occasionally get some mouth leak, but it does not bother him. He is getting 9-10 hours of sleep on many nights. " He appears to be a long sleeper, as it has not lead to increased sleep fragmentation. His weight is down about 8 pounds since his last visit.   Plan: Comprehensive DME        Continue auto CPAP 9-13 cm. A prescription was written for new supplies. We reviewed recommendations for cleaning and replacing supplies. I also answered questions about Inspire.      He will follow up with me in approximately 1 years.       Polysomnography reviewed.  Obstructive sleep apnea reviewed.  Complications of untreated sleep apnea were reviewed.  45 minutes was spent during this visit, over 50% in counseling and coordination of care.   Bennett Goltz, PA-C    CC: No ref. provider found

## 2019-09-16 NOTE — NURSING NOTE
"Chief Complaint   Patient presents with     Sleep Problem     NA, needs Rx for new cpap machine        Initial /72   Pulse 83   Resp 16   Ht 1.778 m (5' 10\")   Wt 110.2 kg (243 lb)   SpO2 95%   BMI 34.87 kg/m   Estimated body mass index is 34.87 kg/m  as calculated from the following:    Height as of this encounter: 1.778 m (5' 10\").    Weight as of this encounter: 110.2 kg (243 lb).    Medication Reconciliation: complete    ESS 4    Britany Carmona MA    "

## 2019-09-23 ENCOUNTER — TELEPHONE (OUTPATIENT)
Dept: FAMILY MEDICINE | Facility: CLINIC | Age: 64
End: 2019-09-23

## 2019-09-23 ENCOUNTER — OFFICE VISIT (OUTPATIENT)
Dept: FAMILY MEDICINE | Facility: CLINIC | Age: 64
End: 2019-09-23
Payer: COMMERCIAL

## 2019-09-23 VITALS
BODY MASS INDEX: 34.63 KG/M2 | HEART RATE: 90 BPM | SYSTOLIC BLOOD PRESSURE: 120 MMHG | HEIGHT: 70 IN | TEMPERATURE: 97.2 F | DIASTOLIC BLOOD PRESSURE: 74 MMHG | OXYGEN SATURATION: 97 % | WEIGHT: 241.9 LBS

## 2019-09-23 DIAGNOSIS — R80.9 MICROALBUMINURIA: ICD-10-CM

## 2019-09-23 DIAGNOSIS — Z12.5 SCREENING FOR PROSTATE CANCER: ICD-10-CM

## 2019-09-23 DIAGNOSIS — Z23 NEED FOR PROPHYLACTIC VACCINATION AND INOCULATION AGAINST INFLUENZA: ICD-10-CM

## 2019-09-23 DIAGNOSIS — E11.29 MICROALBUMINURIA DUE TO TYPE 2 DIABETES MELLITUS (H): Primary | ICD-10-CM

## 2019-09-23 DIAGNOSIS — Z12.11 SPECIAL SCREENING FOR MALIGNANT NEOPLASMS, COLON: ICD-10-CM

## 2019-09-23 DIAGNOSIS — N52.9 ERECTILE DYSFUNCTION, UNSPECIFIED ERECTILE DYSFUNCTION TYPE: ICD-10-CM

## 2019-09-23 DIAGNOSIS — I10 ESSENTIAL HYPERTENSION, BENIGN: ICD-10-CM

## 2019-09-23 DIAGNOSIS — Z79.4 TYPE 2 DIABETES MELLITUS WITH DIABETIC NEPHROPATHY, WITH LONG-TERM CURRENT USE OF INSULIN (H): ICD-10-CM

## 2019-09-23 DIAGNOSIS — F41.8 MIXED ANXIETY DEPRESSIVE DISORDER: ICD-10-CM

## 2019-09-23 DIAGNOSIS — G47.33 OSA (OBSTRUCTIVE SLEEP APNEA): ICD-10-CM

## 2019-09-23 DIAGNOSIS — E11.21 TYPE 2 DIABETES MELLITUS WITH DIABETIC NEPHROPATHY, WITH LONG-TERM CURRENT USE OF INSULIN (H): ICD-10-CM

## 2019-09-23 DIAGNOSIS — R80.9 MICROALBUMINURIA DUE TO TYPE 2 DIABETES MELLITUS (H): Primary | ICD-10-CM

## 2019-09-23 DIAGNOSIS — E78.5 HYPERLIPIDEMIA LDL GOAL <100: ICD-10-CM

## 2019-09-23 LAB
CHOLEST SERPL-MCNC: 223 MG/DL
CREAT SERPL-MCNC: 1.1 MG/DL (ref 0.66–1.25)
CREAT UR-MCNC: 86 MG/DL
GFR SERPL CREATININE-BSD FRML MDRD: 70 ML/MIN/{1.73_M2}
HBA1C MFR BLD: 12.3 % (ref 0–5.6)
HDLC SERPL-MCNC: 37 MG/DL
LDLC SERPL CALC-MCNC: ABNORMAL MG/DL
LDLC SERPL DIRECT ASSAY-MCNC: 127 MG/DL
MICROALBUMIN UR-MCNC: 184 MG/L
MICROALBUMIN/CREAT UR: 214.95 MG/G CR (ref 0–17)
NONHDLC SERPL-MCNC: 186 MG/DL
PSA SERPL-ACNC: 1.9 UG/L (ref 0–4)
TRIGL SERPL-MCNC: 422 MG/DL

## 2019-09-23 PROCEDURE — 90471 IMMUNIZATION ADMIN: CPT | Performed by: INTERNAL MEDICINE

## 2019-09-23 PROCEDURE — 83036 HEMOGLOBIN GLYCOSYLATED A1C: CPT | Performed by: INTERNAL MEDICINE

## 2019-09-23 PROCEDURE — G0103 PSA SCREENING: HCPCS | Performed by: INTERNAL MEDICINE

## 2019-09-23 PROCEDURE — 82043 UR ALBUMIN QUANTITATIVE: CPT | Performed by: INTERNAL MEDICINE

## 2019-09-23 PROCEDURE — 99207 C FOOT EXAM  NO CHARGE: CPT | Mod: 25 | Performed by: INTERNAL MEDICINE

## 2019-09-23 PROCEDURE — 36415 COLL VENOUS BLD VENIPUNCTURE: CPT | Performed by: INTERNAL MEDICINE

## 2019-09-23 PROCEDURE — 83721 ASSAY OF BLOOD LIPOPROTEIN: CPT | Performed by: INTERNAL MEDICINE

## 2019-09-23 PROCEDURE — 80061 LIPID PANEL: CPT | Performed by: INTERNAL MEDICINE

## 2019-09-23 PROCEDURE — 90682 RIV4 VACC RECOMBINANT DNA IM: CPT | Performed by: INTERNAL MEDICINE

## 2019-09-23 PROCEDURE — 99214 OFFICE O/P EST MOD 30 MIN: CPT | Performed by: INTERNAL MEDICINE

## 2019-09-23 PROCEDURE — 82565 ASSAY OF CREATININE: CPT | Performed by: INTERNAL MEDICINE

## 2019-09-23 RX ORDER — HYDROCHLOROTHIAZIDE 12.5 MG/1
12.5 CAPSULE ORAL DAILY
Qty: 90 CAPSULE | Refills: 3 | Status: SHIPPED | OUTPATIENT
Start: 2019-09-23 | End: 2020-09-28

## 2019-09-23 RX ORDER — ESCITALOPRAM OXALATE 20 MG/1
20 TABLET ORAL DAILY
Qty: 90 TABLET | Refills: 3 | Status: SHIPPED | OUTPATIENT
Start: 2019-09-23 | End: 2019-09-23

## 2019-09-23 RX ORDER — INSULIN ASPART 100 [IU]/ML
INJECTION, SOLUTION INTRAVENOUS; SUBCUTANEOUS
Qty: 10 ML | Refills: 11 | Status: SHIPPED | OUTPATIENT
Start: 2019-09-23 | End: 2019-09-25

## 2019-09-23 RX ORDER — INSULIN ASPART 100 [IU]/ML
INJECTION, SOLUTION INTRAVENOUS; SUBCUTANEOUS
Qty: 10 ML | Refills: 11 | Status: SHIPPED | OUTPATIENT
Start: 2019-09-23 | End: 2019-09-23

## 2019-09-23 RX ORDER — SILDENAFIL CITRATE 20 MG/1
20 TABLET ORAL 3 TIMES DAILY
Qty: 30 TABLET | Refills: 11 | Status: SHIPPED | OUTPATIENT
Start: 2019-09-23 | End: 2019-09-23

## 2019-09-23 RX ORDER — DILTIAZEM HYDROCHLORIDE 240 MG/1
240 CAPSULE, COATED, EXTENDED RELEASE ORAL DAILY
Qty: 90 CAPSULE | Refills: 3 | Status: SHIPPED | OUTPATIENT
Start: 2019-09-23 | End: 2020-09-28

## 2019-09-23 RX ORDER — METFORMIN HCL 500 MG
1000 TABLET, EXTENDED RELEASE 24 HR ORAL
Qty: 180 TABLET | Refills: 3 | Status: ON HOLD | OUTPATIENT
Start: 2019-09-23 | End: 2020-10-26

## 2019-09-23 RX ORDER — METFORMIN HCL 500 MG
1000 TABLET, EXTENDED RELEASE 24 HR ORAL
Qty: 180 TABLET | Refills: 3 | Status: SHIPPED | OUTPATIENT
Start: 2019-09-23 | End: 2019-09-23

## 2019-09-23 RX ORDER — ROSUVASTATIN CALCIUM 40 MG/1
40 TABLET, COATED ORAL DAILY
Qty: 90 TABLET | Refills: 3 | Status: SHIPPED | OUTPATIENT
Start: 2019-09-23 | End: 2019-09-23

## 2019-09-23 RX ORDER — LISINOPRIL 40 MG/1
40 TABLET ORAL DAILY
Qty: 90 TABLET | Refills: 3 | Status: SHIPPED | OUTPATIENT
Start: 2019-09-23 | End: 2020-09-28

## 2019-09-23 RX ORDER — ESCITALOPRAM OXALATE 20 MG/1
20 TABLET ORAL DAILY
Qty: 90 TABLET | Refills: 3 | Status: SHIPPED | OUTPATIENT
Start: 2019-09-23 | End: 2020-09-28

## 2019-09-23 RX ORDER — LISINOPRIL 40 MG/1
40 TABLET ORAL DAILY
Qty: 90 TABLET | Refills: 3 | Status: SHIPPED | OUTPATIENT
Start: 2019-09-23 | End: 2019-09-23

## 2019-09-23 RX ORDER — HYDROCHLOROTHIAZIDE 12.5 MG/1
12.5 CAPSULE ORAL DAILY
Qty: 90 CAPSULE | Refills: 3 | Status: SHIPPED | OUTPATIENT
Start: 2019-09-23 | End: 2019-09-23

## 2019-09-23 RX ORDER — ROSUVASTATIN CALCIUM 40 MG/1
40 TABLET, COATED ORAL DAILY
Qty: 90 TABLET | Refills: 3 | Status: SHIPPED | OUTPATIENT
Start: 2019-09-23 | End: 2020-09-28

## 2019-09-23 RX ORDER — SILDENAFIL CITRATE 20 MG/1
20 TABLET ORAL 3 TIMES DAILY
Qty: 30 TABLET | Refills: 11 | Status: SHIPPED | OUTPATIENT
Start: 2019-09-23 | End: 2020-09-28

## 2019-09-23 RX ORDER — DILTIAZEM HYDROCHLORIDE 240 MG/1
240 CAPSULE, COATED, EXTENDED RELEASE ORAL DAILY
Qty: 90 CAPSULE | Refills: 3 | Status: SHIPPED | OUTPATIENT
Start: 2019-09-23 | End: 2019-09-23

## 2019-09-23 ASSESSMENT — PATIENT HEALTH QUESTIONNAIRE - PHQ9: SUM OF ALL RESPONSES TO PHQ QUESTIONS 1-9: 3

## 2019-09-23 ASSESSMENT — MIFFLIN-ST. JEOR: SCORE: 1893.5

## 2019-09-23 NOTE — TELEPHONE ENCOUNTER
Sig on NovoLog: U UTD PER ENCLOSED INSTRUCTION SHEET    Prior Rxs for this were for sliding scale    Called patient to verify if still using same sliding scale, and how many units on average and max does pt use daily    Prior sliding scale:     10 units plus sliding scale before: breakfast, lunch, and dinner  -200: +3 units  201-250: +6 units  251-300: +9 units  301-350: +12 units   351-400: +15 units   >400: + 18 units and call PCP     PCP - is patient using same sliding scale? Pharmacy needs Rx with exact directions, pended but note sig too long to e-scribe and will local print     Rosenda COWAN RN

## 2019-09-23 NOTE — TELEPHONE ENCOUNTER
[facility] mail order needing specific instructions for this medication the current directions are not specific     Direct pharmacist phone number: [phone]

## 2019-09-23 NOTE — TELEPHONE ENCOUNTER
Reason for Call:  Medication or medication refill: NOVOLOG FLEXPEN 100 UNIT/ML soln    Do you use a [facility]?  Name of the pharmacy and phone number for the current request:      [facility] [id number] [place] [address]      Name of the medication requested: NOVOLOG FLEXPEN 100 UNIT/ML soln    Other request: Pt called stating that clinic needs to receive call from Doctors office for NOVOLOG FLEXPEN 100 UNIT/ML soln to be filled. Please call pt to discuss.     Can we leave a detailed message on this number? YES    Phone number patient can be reached at: Home number on file [phone] (home)    Best Time: Anytime     Call taken on 9/23/2019 at 12:43 PM by Aurelio Juarez

## 2019-09-23 NOTE — PROGRESS NOTES
Chief Complaint:       Huseyin St is a 63 year old male who presents to clinic today for the following health issues:    Diabetes Follow-up      How often are you checking your blood sugar? Three times daily    What time of day are you checking your blood sugars (select all that apply)?  Before meals    Have you had any blood sugars above 200?  Yes     Have you had any blood sugars below 70?  No    What symptoms do you notice when your blood sugar is low?  None    What concerns do you have today about your diabetes? None and Blood sugar is often over 200     Do you have any of these symptoms? (Select all that apply)  No numbness or tingling in feet.  No redness, sores or blisters on feet.  No complaints of excessive thirst.  No reports of blurry vision.  No significant changes to weight.     Have you had a diabetic eye exam in the last 12 months? No    BP Readings from Last 2 Encounters:   09/16/19 120/72   07/20/18 138/86     Hemoglobin A1C (%)   Date Value   03/12/2018 10.4 (H)   09/29/2017 10.0 (H)     LDL Cholesterol Calculated (mg/dL)   Date Value   09/29/2017 47   12/05/2016 72       Diabetes Management Resources      How many servings of fruits and vegetables do you eat daily?  2-3    On average, how many sweetened beverages do you drink each day (soda, juice, sweet tea, etc)?   2  How many days per week do you miss taking your medication? 1, occas at lunch    What makes it hard for you to take your medications?  remembering to take        Current Medications:     Current Outpatient Medications   Medication Sig Dispense Refill     ASPIRIN 81 MG OR TABS 1 tab po QD (Once per day) 100 3     diltiazem (CARDIZEM CD) 240 MG 24 hr capsule Take 1 capsule (240 mg) by mouth daily 90 capsule 3     escitalopram (LEXAPRO) 20 MG tablet Take 1 tablet (20 mg) by mouth daily 90 tablet 3     hydrochlorothiazide (MICROZIDE) 12.5 MG capsule Take 1 capsule (12.5 mg) by mouth daily 90 capsule 3     LANTUS SOLOSTAR 100  UNIT/ML soln INJ 55 UNITS SC HS  3     lisinopril (PRINIVIL/ZESTRIL) 40 MG tablet Take 1 tablet (40 mg) by mouth daily 90 tablet 3     NOVOLOG FLEXPEN 100 UNIT/ML soln U UTD PER ENCLOSED INSTRUCTION SHEET  3     ORDER FOR DME Auto-CPAP: Max 12 cm H2O Min 10 cm H2O  Continuous Lifetime need and heated humidity.    1 Device 0     rosuvastatin (CRESTOR) 40 MG tablet Take 1 tablet (40 mg) by mouth daily 90 tablet 3         Allergies:      Allergies   Allergen Reactions     Augmentin Diarrhea     Sulfa Drugs      Unknown - reaction occurred as a child     Victoza      Skin rash     Xanax      Difficult to wean off            Past Medical History:     Past Medical History:   Diagnosis Date     Essential hypertension, benign      T2DM (type 2 diabetes mellitus) (H)          Past Surgical History:   No past surgical history on file.      Family Medical History:     Family History   Adopted: Yes   Problem Relation Age of Onset     Diabetes Mother      Respiratory Mother         asthma     Lipids Mother      Arthritis Mother         knee replacement     Alzheimer Disease Mother      Cerebrovascular Disease Brother      C.A.D. No family hx of      Cancer - colorectal No family hx of      Prostate Cancer No family hx of          Social History:     Social History     Socioeconomic History     Marital status:      Spouse name: Estefania Laura     Number of children: 1     Years of education: Not on file     Highest education level: Not on file   Occupational History     Not on file   Social Needs     Financial resource strain: Not on file     Food insecurity:     Worry: Not on file     Inability: Not on file     Transportation needs:     Medical: Not on file     Non-medical: Not on file   Tobacco Use     Smoking status: Former Smoker     Types: Cigars     Smokeless tobacco: Never Used     Tobacco comment: used to smoke occ cigar, never cigarettes   Substance and Sexual Activity     Alcohol use: Yes     Alcohol/week: 0.0  standard drinks     Comment: 3-6 drinks on occ weekend night, occ drink on weekday     Drug use: No     Sexual activity: Yes     Partners: Female   Lifestyle     Physical activity:     Days per week: Not on file     Minutes per session: Not on file     Stress: Not on file   Relationships     Social connections:     Talks on phone: Not on file     Gets together: Not on file     Attends Taoism service: Not on file     Active member of club or organization: Not on file     Attends meetings of clubs or organizations: Not on file     Relationship status: Not on file     Intimate partner violence:     Fear of current or ex partner: Not on file     Emotionally abused: Not on file     Physically abused: Not on file     Forced sexual activity: Not on file   Other Topics Concern      Service No     Blood Transfusions No     Caffeine Concern Not Asked     Occupational Exposure Not Asked     Hobby Hazards Not Asked     Sleep Concern Not Asked     Stress Concern Not Asked     Weight Concern Not Asked     Special Diet Not Asked     Back Care Not Asked     Exercise Not Asked     Bike Helmet Not Asked     Seat Belt Not Asked     Self-Exams Not Asked     Parent/sibling w/ CABG, MI or angioplasty before 65F 55M? Not Asked   Social History Narrative     Not on file           Review of System:     Constitutional: Negative for fever or chills  Skin: Negative for rashes  Ears/Nose/Throat: Negative for nasal congestion, sore throat  Respiratory: No shortness of breath, dyspnea on exertion, cough, or hemoptysis  Cardiovascular: Negative for chest pain  Gastrointestinal: Negative for nausea, vomiting  Genitourinary: Negative for dysuria, hematuria  Musculoskeletal: Negative for myalgias  Neurologic: Negative for headaches  Psychiatric: Negative for depression, anxiety  Hematologic/Lymphatic/Immunologic: Negative  Endocrine: Negative for recent hypoglycemia events  Behavioral: Negative for tobacco use       Physical Exam:   BP  "120/74 (BP Location: Left arm, Cuff Size: Adult Large)   Pulse 90   Temp 97.2  F (36.2  C) (Oral)   Ht 1.778 m (5' 10\")   Wt 109.7 kg (241 lb 14.4 oz)   SpO2 97%   BMI 34.71 kg/m      GENERAL: alert and no distress  EYES: eyes grossly normal to inspection, and conjunctivae and sclerae normal  HENT: Normocephalic atraumatic. Nose and mouth without ulcers or lesions  NECK: supple  RESP: lungs clear to auscultation   CV: regular rate and rhythm, normal S1 S2  LYMPH: no peripheral edema   ABDOMEN: nondistended  MS: no gross musculoskeletal defects noted  SKIN: no suspicious lesions or rashes  NEURO: Alert & Oriented x 3.   PSYCH: mentation appears normal, affect normal  diabetic foot exam: intact sensations to monofilament neurological exam, with no foot ulcers present.      Diagnostic Test Results:     Diagnostic Test Results:  Results for orders placed or performed in visit on 03/12/18   Hemoglobin A1c   Result Value Ref Range    Hemoglobin A1C 10.4 (H) 4.3 - 6.0 %   Albumin Random Urine Quantitative with Creat Ratio   Result Value Ref Range    Creatinine Urine 71 mg/dL    Albumin Urine mg/L 301 mg/L    Albumin Urine mg/g Cr 425.14 (H) 0 - 17 mg/g Cr   Basic metabolic panel   Result Value Ref Range    Sodium 135 133 - 144 mmol/L    Potassium 4.3 3.4 - 5.3 mmol/L    Chloride 102 94 - 109 mmol/L    Carbon Dioxide 26 20 - 32 mmol/L    Anion Gap 7 3 - 14 mmol/L    Glucose 220 (H) 70 - 99 mg/dL    Urea Nitrogen 13 7 - 30 mg/dL    Creatinine 0.82 0.66 - 1.25 mg/dL    GFR Estimate >90 >60 mL/min/1.7m2    GFR Estimate If Black >90 >60 mL/min/1.7m2    Calcium 9.6 8.5 - 10.1 mg/dL   PSA, screen   Result Value Ref Range    PSA 1.60 0 - 4 ug/L       ASSESSMENT/PLAN:       (E11.29,  R80.9) Microalbuminuria due to type 2 diabetes mellitus (H)  (primary encounter diagnosis)  Comment: no recent hypoglycemia events  Plan: Hemoglobin A1c, Creatinine, Albumin Random         Urine Quantitative with Creat Ratio, metFORMIN         " (GLUCOPHAGE-XR) 500 MG 24 hr tablet      (Z12.5) Screening for prostate cancer  Comment: patient is due for PSA lab  Plan: PSA, screen      (I10) Essential hypertension, benign  Comment: BP is well controlled  Plan: continue current BP medication regimen.      (G47.33) NA (obstructive sleep apnea)  Comment: patient is compliant with and benefiting from his CPAP therapy  Plan: continue current CPAP therapy      (E78.5) Hyperlipidemia LDL goal <100  Comment: patient is due for lipid screening  Plan: Lipid panel reflex to direct LDL Fasting      (Z12.11) Special screening for malignant neoplasms, colon  Comment: patient is due for colonoscopy  Plan: GASTROENTEROLOGY ADULT REF PROCEDURE ONLY         Nilam Gillette [phone]; No Provider        Preference        Follow Up Plan:     Patient is instructed to return to Internal Medicine clinic for follow-up visit in 6 months.        Kendal Wong MD  Internal Medicine  [facility]

## 2019-09-25 RX ORDER — INSULIN ASPART 100 [IU]/ML
INJECTION, SOLUTION INTRAVENOUS; SUBCUTANEOUS
Qty: 10 ML | Refills: 11 | Status: SHIPPED | OUTPATIENT
Start: 2019-09-25 | End: 2019-10-31

## 2019-09-25 NOTE — TELEPHONE ENCOUNTER
Please call Maria Del Carmen to confirm the amount of this rx the patient is to take a day. Maria Del Carmen [phone]

## 2019-09-25 NOTE — TELEPHONE ENCOUNTER
PCP,    Spoke with Pharm- they need to know max amount of units of novolog per day.   Please advise    Thank you,  Thai EDUARDO RN

## 2019-10-25 ENCOUNTER — TELEPHONE (OUTPATIENT)
Dept: FAMILY MEDICINE | Facility: CLINIC | Age: 64
End: 2019-10-25

## 2019-10-25 DIAGNOSIS — Z79.4 TYPE 2 DIABETES MELLITUS WITH DIABETIC NEPHROPATHY, WITH LONG-TERM CURRENT USE OF INSULIN (H): ICD-10-CM

## 2019-10-25 DIAGNOSIS — E11.21 TYPE 2 DIABETES MELLITUS WITH DIABETIC NEPHROPATHY, WITH LONG-TERM CURRENT USE OF INSULIN (H): ICD-10-CM

## 2019-10-25 NOTE — TELEPHONE ENCOUNTER
Fax from [facility] #[id number] asking for clarification of instructions of Novolog Flexpen.    This issue was addressed on 9-23-19 TE.  Both [facility] and  pharmacies were mentioned in that telephone encounter.  Appears Rx was written and faxed to [facility], however again [facility] is asking for clarification.    Need to find out from patient which pharmacy he wants.  If [facility], resend Rx to them.  If it's [facility], then call to [facility] to disregard Rx.    LOV 9-23-19 Marvin Gallardo,  (R)

## 2019-10-29 NOTE — TELEPHONE ENCOUNTER
Recalled:     No answer, left VM to return call to clinic.     Will call  pharmacy when open tomorrow 10/30 to determine if pt picked up Novolog.     Ara EVERETT RN

## 2019-10-30 NOTE — TELEPHONE ENCOUNTER
Called [facility] but they did not answer- went to .  Called pt- went straight to - left  to call back    Thai EDUARDO RN

## 2019-10-31 RX ORDER — INSULIN ASPART 100 [IU]/ML
INJECTION, SOLUTION INTRAVENOUS; SUBCUTANEOUS
Qty: 10 ML | Refills: 11 | Status: SHIPPED | OUTPATIENT
Start: 2019-10-31 | End: 2021-01-11

## 2019-10-31 NOTE — TELEPHONE ENCOUNTER
IF you look at outside med rec you can see pt is getting novolog at [facility], last 9/28/19  Has not gotten through [facility], so should go to [facility] with the additional note of the 70 units madx a day.    Pended for PCP to sign as long sig, needs actual fajoeg  Jeanne Jones RN

## 2020-01-06 DIAGNOSIS — E11.9 TYPE 2 DIABETES MELLITUS WITHOUT COMPLICATION, WITHOUT LONG-TERM CURRENT USE OF INSULIN (H): ICD-10-CM

## 2020-01-06 DIAGNOSIS — E11.21 TYPE 2 DIABETES MELLITUS WITH DIABETIC NEPHROPATHY, WITH LONG-TERM CURRENT USE OF INSULIN (H): Primary | ICD-10-CM

## 2020-01-06 DIAGNOSIS — Z79.4 TYPE 2 DIABETES MELLITUS WITH DIABETIC NEPHROPATHY, WITH LONG-TERM CURRENT USE OF INSULIN (H): Primary | ICD-10-CM

## 2020-01-06 RX ORDER — GLUCOSAMINE HCL/CHONDROITIN SU 500-400 MG
CAPSULE ORAL
Qty: 100 EACH | Refills: 3 | Status: SHIPPED | OUTPATIENT
Start: 2020-01-06

## 2020-01-06 RX ORDER — LANCETS
EACH MISCELLANEOUS
Qty: 200 EACH | Refills: 6 | Status: SHIPPED | OUTPATIENT
Start: 2020-01-06

## 2020-01-06 NOTE — TELEPHONE ENCOUNTER
Patient forgot to have diabetic testing supplies refilled at last appointment on 09/23/19. Panel of testing supplies pended for signature.    Michael Tinajero CMA on 1/6/2020 at 9:32 AM

## 2020-01-06 NOTE — TELEPHONE ENCOUNTER
Reason for Call:  Medication or medication refill:    Do you use a [facility]?  Name of the pharmacy and phone number for the current request:       [facility] [id number] [place] [address]      Name of the medication requested: magdalena needles and blood test strips    Other request: pt needs these sent to his new pharmacy. Pt says they were missed on an earlier refill when he transferred all his Rxs    Can we leave a detailed message on this number? YES    Phone number patient can be reached at: Home number on file [phone] (home)    Best Time: any    Call taken on 1/6/2020 at 9:13 AM by Sudheer Ruiz

## 2020-01-06 NOTE — TELEPHONE ENCOUNTER
Reason for Call:  Medication or medication refill:    Do you use a [facility]?  Name of the pharmacy and phone number for the current request:       [facility] [id number] [place] [address]      Name of the medication requested: BD UF Inez pen needles 90 units     Other request: patient says he needs the needles as soon as possible to continue his medication     Can we leave a detailed message on this number? YES    Phone number patient can be reached at: Cell number on file:    Telephone Information:   Mobile [phone]       Best Time: any    Call taken on 1/6/2020 at 2:40 PM by Radha Elizabeth

## 2020-01-06 NOTE — TELEPHONE ENCOUNTER
Routing refill request to provider for review/approval because:  Not active on patient's medication list    Rosenda COWAN RN

## 2020-04-21 ENCOUNTER — TELEPHONE (OUTPATIENT)
Dept: FAMILY MEDICINE | Facility: CLINIC | Age: 65
End: 2020-04-21

## 2020-04-21 NOTE — TELEPHONE ENCOUNTER
Panel Management Review            Patient has the following on his problem list:     Diabetes    ASA: Passed    Last A1C  Lab Results   Component Value Date    A1C 12.3 09/23/2019    A1C 10.4 03/12/2018    A1C 10.0 09/29/2017    A1C 9.5 03/24/2017    A1C 8.8 12/05/2016     A1C tested: Passed for being done but not within range    Last LDL:    Lab Results   Component Value Date    CHOL 223 09/23/2019     Lab Results   Component Value Date    HDL 37 09/23/2019     Lab Results   Component Value Date    LDL  09/23/2019     Cannot estimate LDL when triglyceride exceeds 400 mg/dL     09/23/2019     Lab Results   Component Value Date    TRIG 422 09/23/2019     Lab Results   Component Value Date    CHOLHDLRATIO 3.2 08/20/2015     Lab Results   Component Value Date    NHDL 186 09/23/2019       Is the patient on a Statin? YES             Is the patient on Aspirin? YES    Medications     HMG CoA Reductase Inhibitors     rosuvastatin (CRESTOR) 40 MG tablet       Salicylates     ASPIRIN 81 MG OR TABS             Last three blood pressure readings:  BP Readings from Last 3 Encounters:   09/23/19 120/74   09/16/19 120/72   07/20/18 138/86       Date of last diabetes office visit: 09/23/2019     Tobacco History:     History   Smoking Status     Former Smoker     Types: Cigars   Smokeless Tobacco     Never Used     Comment: used to smoke occ cigar, never cigarettes         Hypertension   Last three blood pressure readings:  BP Readings from Last 3 Encounters:   09/23/19 120/74   09/16/19 120/72   07/20/18 138/86     Blood pressure: Passed    HTN Guidelines:  Less than 140/90      Composite cancer screening  Chart review shows that this patient is due/due soon for the following None  Summary:    Patient is due/failing the following:   EYE exam    Action needed:   Await to hear form pt     Type of outreach:    Phone, left message for patient to call back.   I called [facility] and the last exam they have on  file is what we have in chart 08/31/2017.  See if pt had elsewhere since.     Questions for provider review:    None                                                                                                                                    Porsche DUKE MA      Chart routed to no one  .

## 2020-05-05 DIAGNOSIS — E11.9 TYPE 2 DIABETES MELLITUS WITHOUT COMPLICATION, WITHOUT LONG-TERM CURRENT USE OF INSULIN (H): ICD-10-CM

## 2020-05-05 NOTE — TELEPHONE ENCOUNTER
Patient requests 90 day supply, only has 30 day supply left on original Rx.  Please send new Rx.    Sandrita Gallardo RT (R)

## 2020-05-06 RX ORDER — PEN NEEDLE, DIABETIC 32GX 5/32"
NEEDLE, DISPOSABLE MISCELLANEOUS
Qty: 100 EACH | Refills: 1 | Status: SHIPPED | OUTPATIENT
Start: 2020-05-06 | End: 2020-06-01

## 2020-06-01 DIAGNOSIS — E11.9 TYPE 2 DIABETES MELLITUS WITHOUT COMPLICATION, WITHOUT LONG-TERM CURRENT USE OF INSULIN (H): ICD-10-CM

## 2020-06-01 RX ORDER — PEN NEEDLE, DIABETIC 32GX 5/32"
NEEDLE, DISPOSABLE MISCELLANEOUS
Qty: 300 EACH | Refills: 1 | Status: SHIPPED | OUTPATIENT
Start: 2020-06-01 | End: 2021-04-22

## 2020-06-01 NOTE — TELEPHONE ENCOUNTER
Routing refill request to provider for review/approval because:  Directions on pharmacy request different from last Rx    Requesting for #300 - 3 pen needles daily     Rx/pharmacy pended     Thank you    Rosenda COWAN RN

## 2020-09-23 ENCOUNTER — TELEPHONE (OUTPATIENT)
Dept: FAMILY MEDICINE | Facility: CLINIC | Age: 65
End: 2020-09-23

## 2020-09-23 ENCOUNTER — TRANSFERRED RECORDS (OUTPATIENT)
Dept: MULTI SPECIALTY CLINIC | Facility: CLINIC | Age: 65
End: 2020-09-23

## 2020-09-23 LAB
RETINOPATHY: NEGATIVE
RETINOPATHY: NORMAL

## 2020-09-23 NOTE — TELEPHONE ENCOUNTER
Please abstract the following data from this visit with this patient into the appropriate field in Epic:    Tests that can be patient reported without a hard copy:    Eye exam with ophthalmology on this date: 09/23/2020 at [facility]    LORRIE Pollock

## 2020-09-28 ENCOUNTER — OFFICE VISIT (OUTPATIENT)
Dept: FAMILY MEDICINE | Facility: CLINIC | Age: 65
End: 2020-09-28
Payer: COMMERCIAL

## 2020-09-28 VITALS
HEIGHT: 70 IN | HEART RATE: 78 BPM | TEMPERATURE: 97.4 F | SYSTOLIC BLOOD PRESSURE: 132 MMHG | WEIGHT: 234 LBS | DIASTOLIC BLOOD PRESSURE: 85 MMHG | BODY MASS INDEX: 33.5 KG/M2 | OXYGEN SATURATION: 97 %

## 2020-09-28 DIAGNOSIS — E78.5 HYPERLIPIDEMIA LDL GOAL <100: ICD-10-CM

## 2020-09-28 DIAGNOSIS — Z76.0 ENCOUNTER FOR MEDICATION REFILL: ICD-10-CM

## 2020-09-28 DIAGNOSIS — R80.9 MICROALBUMINURIA: ICD-10-CM

## 2020-09-28 DIAGNOSIS — F41.8 MIXED ANXIETY DEPRESSIVE DISORDER: ICD-10-CM

## 2020-09-28 DIAGNOSIS — I10 ESSENTIAL HYPERTENSION, BENIGN: ICD-10-CM

## 2020-09-28 DIAGNOSIS — Z79.4 TYPE 2 DIABETES MELLITUS WITH DIABETIC NEPHROPATHY, WITH LONG-TERM CURRENT USE OF INSULIN (H): Primary | ICD-10-CM

## 2020-09-28 DIAGNOSIS — Z12.5 SCREENING FOR PROSTATE CANCER: ICD-10-CM

## 2020-09-28 DIAGNOSIS — E11.21 TYPE 2 DIABETES MELLITUS WITH DIABETIC NEPHROPATHY, WITH LONG-TERM CURRENT USE OF INSULIN (H): Primary | ICD-10-CM

## 2020-09-28 DIAGNOSIS — Z23 NEED FOR PROPHYLACTIC VACCINATION AND INOCULATION AGAINST INFLUENZA: ICD-10-CM

## 2020-09-28 DIAGNOSIS — N52.9 ERECTILE DYSFUNCTION, UNSPECIFIED ERECTILE DYSFUNCTION TYPE: ICD-10-CM

## 2020-09-28 DIAGNOSIS — Z12.11 SPECIAL SCREENING FOR MALIGNANT NEOPLASMS, COLON: ICD-10-CM

## 2020-09-28 LAB
CHOLEST SERPL-MCNC: 214 MG/DL
CREAT SERPL-MCNC: 1.03 MG/DL (ref 0.66–1.25)
CREAT UR-MCNC: 213 MG/DL
GFR SERPL CREATININE-BSD FRML MDRD: 75 ML/MIN/{1.73_M2}
HBA1C MFR BLD: 13.2 % (ref 0–5.6)
HDLC SERPL-MCNC: 42 MG/DL
LDLC SERPL CALC-MCNC: 96 MG/DL
MICROALBUMIN UR-MCNC: 715 MG/L
MICROALBUMIN/CREAT UR: 335.68 MG/G CR (ref 0–17)
NONHDLC SERPL-MCNC: 172 MG/DL
PSA SERPL-ACNC: 2.04 UG/L (ref 0–4)
TRIGL SERPL-MCNC: 380 MG/DL

## 2020-09-28 PROCEDURE — 99397 PER PM REEVAL EST PAT 65+ YR: CPT | Mod: 25 | Performed by: INTERNAL MEDICINE

## 2020-09-28 PROCEDURE — 90471 IMMUNIZATION ADMIN: CPT | Performed by: INTERNAL MEDICINE

## 2020-09-28 PROCEDURE — G0103 PSA SCREENING: HCPCS | Performed by: INTERNAL MEDICINE

## 2020-09-28 PROCEDURE — 36415 COLL VENOUS BLD VENIPUNCTURE: CPT | Performed by: INTERNAL MEDICINE

## 2020-09-28 PROCEDURE — 83036 HEMOGLOBIN GLYCOSYLATED A1C: CPT | Performed by: INTERNAL MEDICINE

## 2020-09-28 PROCEDURE — 82565 ASSAY OF CREATININE: CPT | Performed by: INTERNAL MEDICINE

## 2020-09-28 PROCEDURE — 80061 LIPID PANEL: CPT | Performed by: INTERNAL MEDICINE

## 2020-09-28 PROCEDURE — 82043 UR ALBUMIN QUANTITATIVE: CPT | Performed by: INTERNAL MEDICINE

## 2020-09-28 PROCEDURE — 90662 IIV NO PRSV INCREASED AG IM: CPT | Performed by: INTERNAL MEDICINE

## 2020-09-28 RX ORDER — HYDROCHLOROTHIAZIDE 12.5 MG/1
12.5 CAPSULE ORAL DAILY
Qty: 90 CAPSULE | Refills: 3 | Status: SHIPPED | OUTPATIENT
Start: 2020-09-28 | End: 2021-10-11

## 2020-09-28 RX ORDER — LISINOPRIL 40 MG/1
40 TABLET ORAL DAILY
Qty: 90 TABLET | Refills: 3 | Status: SHIPPED | OUTPATIENT
Start: 2020-09-28 | End: 2021-10-11

## 2020-09-28 RX ORDER — ROSUVASTATIN CALCIUM 40 MG/1
40 TABLET, COATED ORAL DAILY
Qty: 90 TABLET | Refills: 3 | Status: SHIPPED | OUTPATIENT
Start: 2020-09-28 | End: 2021-10-11

## 2020-09-28 RX ORDER — ESCITALOPRAM OXALATE 20 MG/1
20 TABLET ORAL DAILY
Qty: 90 TABLET | Refills: 3 | Status: SHIPPED | OUTPATIENT
Start: 2020-09-28 | End: 2021-10-11

## 2020-09-28 RX ORDER — DILTIAZEM HYDROCHLORIDE 240 MG/1
240 CAPSULE, COATED, EXTENDED RELEASE ORAL DAILY
Qty: 90 CAPSULE | Refills: 3 | Status: SHIPPED | OUTPATIENT
Start: 2020-09-28 | End: 2021-10-11

## 2020-09-28 RX ORDER — SILDENAFIL CITRATE 20 MG/1
20 TABLET ORAL 3 TIMES DAILY
Qty: 30 TABLET | Refills: 11 | Status: SHIPPED | OUTPATIENT
Start: 2020-09-28 | End: 2021-10-11

## 2020-09-28 ASSESSMENT — MIFFLIN-ST. JEOR: SCORE: 1852.67

## 2020-09-28 NOTE — PROGRESS NOTES
Prior to immunization administration, verified patients identity using patient s name and date of birth. Please see Immunization Activity for additional information.     Screening Questionnaire for Adult Immunization    Are you sick today?   No   Do you have allergies to medications, food, a vaccine component or latex?   Yes, augmentin   Have you ever had a serious reaction after receiving a vaccination?   Yes, flu in the past   Do you have a long-term health problem with heart, lung, kidney, or metabolic disease (e.g., diabetes), asthma, a blood disorder, no spleen, complement component deficiency, a cochlear implant, or a spinal fluid leak?  Are you on long-term aspirin therapy?   Yes   Do you have cancer, leukemia, HIV/AIDS, or any other immune system problem?   No   Do you have a parent, brother, or sister with an immune system problem?   No   In the past 3 months, have you taken medications that affect  your immune system, such as prednisone, other steroids, or anticancer drugs; drugs for the treatment of rheumatoid arthritis, Crohn s disease, or psoriasis; or have you had radiation treatments?   No   Have you had a seizure, or a brain or other nervous system problem?   No   During the past year, have you received a transfusion of blood or blood    products, or been given immune (gamma) globulin or antiviral drug?   No   For women: Are you pregnant or is there a chance you could become       pregnant during the next month?   N/A   Have you received any vaccinations in the past 4 weeks?   No     Immunization questionnaire was positive for at least one answer.        Per orders of Dr. Wong, injection of flu vaccine given by Jo Ann Pearce MA. Patient instructed to remain in clinic for 15 minutes afterwards, and to report any adverse reaction to me immediately.       Screening performed by Jo Ann Pearce MA on 9/28/2020 at 9:24 AM.

## 2020-09-28 NOTE — PROGRESS NOTES
Subjective     Huseyin St is a 64 year old male who presents to clinic today for the following health issues:    HPI       Diabetes Follow-up    How often are you checking your blood sugar? Two times daily  Blood sugar testing frequency justification:  Uncontrolled diabetes  What time of day are you checking your blood sugars (select all that apply)?  Before meals  Have you had any blood sugars above 200?  Yes   Have you had any blood sugars below 70?  No    What symptoms do you notice when your blood sugar is low?  None    What concerns do you have today about your diabetes? Elevated BS     Do you have any of these symptoms? (Select all that apply)  Thirsty, weight loss ( 7lbs)      BP Readings from Last 2 Encounters:   09/28/20 132/85   09/23/19 120/74     Hemoglobin A1C (%)   Date Value   09/23/2019 12.3 (H)   03/12/2018 10.4 (H)     LDL Cholesterol Calculated (mg/dL)   Date Value   09/23/2019     Cannot estimate LDL when triglyceride exceeds 400 mg/dL   09/29/2017 47     LDL Cholesterol Direct (mg/dL)   Date Value   09/23/2019 127 (H)                 Current Medications:     Current Outpatient Medications   Medication Sig Dispense Refill     alcohol swab prep pads Use to swab area of injection/ian as directed 100 each 3     ASPIRIN 81 MG OR TABS 1 tab po QD (Once per day) 100 3     blood glucose (NO BRAND SPECIFIED) test strip Use to test blood sugar 3 times daily or as directed. To accompany: Blood Glucose Monitor Brands: per insurance. 200 strip 6     blood glucose calibration (NO BRAND SPECIFIED) solution Use to calibrate blood glucose monitor as needed as directed. To accompany: Blood Glucose Monitor Brands: per insurance. 1 Bottle 3     diltiazem ER COATED BEADS (CARDIZEM CD) 240 MG 24 hr capsule Take 1 capsule (240 mg) by mouth daily 90 capsule 3     escitalopram (LEXAPRO) 20 MG tablet Take 1 tablet (20 mg) by mouth daily 90 tablet 3     hydrochlorothiazide (MICROZIDE) 12.5 MG capsule Take 1 capsule  (12.5 mg) by mouth daily 90 capsule 3     insulin glargine (LANTUS SOLOSTAR) 100 UNIT/ML pen INJ 55 UNITS SC HS 10 mL 11     insulin pen needle (BD MARIE U/F) 32G X 4 MM miscellaneous USE THREE PEN NEEDLES DAILY AS DIRECTED 300 each 1     lisinopril (ZESTRIL) 40 MG tablet Take 1 tablet (40 mg) by mouth daily 90 tablet 3     NOVOLOG FLEXPEN 100 UNIT/ML soln 10 units plus sliding scale before: breakfast, lunch, and dinner. -200: +3 units. 201-250: +6 units. 251-300: +9 units. 301-350: +12 units. 351-400: +15 units. >400: + 18 units and call PCP.  MAX 70 units a day 10 mL 11     ORDER FOR DME Auto-CPAP: Max 12 cm H2O Min 10 cm H2O  Continuous Lifetime need and heated humidity.    1 Device 0     rosuvastatin (CRESTOR) 40 MG tablet Take 1 tablet (40 mg) by mouth daily 90 tablet 3     sildenafil (REVATIO) 20 MG tablet Take 1 tablet (20 mg) by mouth 3 times daily 30 tablet 11     thin (NO BRAND SPECIFIED) lancets Use to test blood sugar 3 times daily or as directed. To accompany: Blood Glucose Monitor Brands: per insurance. 200 each 6     metFORMIN (GLUCOPHAGE-XR) 500 MG 24 hr tablet Take 2 tablets (1,000 mg) by mouth daily (with dinner) (Patient not taking: Reported on 9/28/2020) 180 tablet 3         Allergies:      Allergies   Allergen Reactions     Augmentin Diarrhea     Sulfa Drugs      Unknown - reaction occurred as a child     Victoza      Skin rash     Xanax      Difficult to wean off            Past Medical History:     Past Medical History:   Diagnosis Date     Essential hypertension, benign      T2DM (type 2 diabetes mellitus) (H)          Past Surgical History:   History reviewed. No pertinent surgical history.      Family Medical History:     Family History   Adopted: Yes   Problem Relation Age of Onset     Diabetes Mother      Respiratory Mother         asthma     Lipids Mother      Arthritis Mother         knee replacement     Alzheimer Disease Mother      Cerebrovascular Disease Brother      C.A.D. No  family hx of      Cancer - colorectal No family hx of      Prostate Cancer No family hx of          Social History:     Social History     Socioeconomic History     Marital status:      Spouse name: Estefania Laura     Number of children: 1     Years of education: Not on file     Highest education level: Not on file   Occupational History     Not on file   Social Needs     Financial resource strain: Not on file     Food insecurity     Worry: Not on file     Inability: Not on file     Transportation needs     Medical: Not on file     Non-medical: Not on file   Tobacco Use     Smoking status: Former Smoker     Types: Cigars     Smokeless tobacco: Never Used     Tobacco comment: used to smoke occ cigar, never cigarettes   Substance and Sexual Activity     Alcohol use: Yes     Alcohol/week: 0.0 standard drinks     Comment: 3-6 drinks on occ weekend night, occ drink on weekday     Drug use: No     Sexual activity: Yes     Partners: Female   Lifestyle     Physical activity     Days per week: Not on file     Minutes per session: Not on file     Stress: Not on file   Relationships     Social connections     Talks on phone: Not on file     Gets together: Not on file     Attends Yazdanism service: Not on file     Active member of club or organization: Not on file     Attends meetings of clubs or organizations: Not on file     Relationship status: Not on file     Intimate partner violence     Fear of current or ex partner: Not on file     Emotionally abused: Not on file     Physically abused: Not on file     Forced sexual activity: Not on file   Other Topics Concern      Service No     Blood Transfusions No     Caffeine Concern Not Asked     Occupational Exposure Not Asked     Hobby Hazards Not Asked     Sleep Concern Not Asked     Stress Concern Not Asked     Weight Concern Not Asked     Special Diet Not Asked     Back Care Not Asked     Exercise Not Asked     Bike Helmet Not Asked     Seat Belt Not Asked      "Self-Exams Not Asked     Parent/sibling w/ CABG, MI or angioplasty before 65F 55M? Not Asked   Social History Narrative     Not on file           Review of System:     Constitutional: Negative for fever or chills  Skin: Negative for rashes  Ears/Nose/Throat: Negative for nasal congestion, sore throat  Respiratory: No shortness of breath, dyspnea on exertion, cough, or hemoptysis  Cardiovascular: Negative for chest pain  Gastrointestinal: Negative for nausea, vomiting  Genitourinary: Negative for dysuria, hematuria  Musculoskeletal: Negative for myalgias  Neurologic: Negative for headaches  Psychiatric: Negative for depression, anxiety  Hematologic/Lymphatic/Immunologic: Negative  Endocrine: Negative  Behavioral: Negative for tobacco use       Physical Exam:   /85 (BP Location: Right arm, Patient Position: Sitting, Cuff Size: Adult Large)   Pulse 78   Temp 97.4  F (36.3  C) (Skin)   Ht 1.778 m (5' 10\")   Wt 106.1 kg (234 lb)   SpO2 97%   BMI 33.58 kg/m      GENERAL: alert and no distress  EYES: eyes grossly normal to inspection, and conjunctivae and sclerae normal  HENT: Normocephalic atraumatic. Nose and mouth without ulcers or lesions  NECK: supple  RESP: lungs clear to auscultation   CV: regular rate and rhythm, normal S1 S2  LYMPH: no peripheral edema   ABDOMEN: nondistended  MS: no gross musculoskeletal defects noted  SKIN: no suspicious lesions or rashes  NEURO: Alert & Oriented x 3.   PSYCH: mentation appears normal, affect normal        Diagnostic Test Results:     Diagnostic Test Results:  Labs reviewed in Epic    ASSESSMENT/PLAN:     (E11.21,  Z79.4) Type 2 diabetes mellitus with diabetic nephropathy, with long-term current use of insulin (H) (primary encounter diagnosis)  Comment: no hypoglycemia events recently  Plan: insulin glargine (LANTUS SOLOSTAR) 100 UNIT/ML         pen, hydrochlorothiazide (MICROZIDE) 12.5 MG         capsule      (Z76.0) Encounter for medication refill    (N52.9) " Erectile dysfunction, unspecified erectile dysfunction type  Comment: stable. Patient is due for a refill of Sildenafil  Plan: sildenafil (REVATIO) 20 MG tablet      (E78.5) Hyperlipidemia LDL goal <100  Comment: stable. Patient is due for a refill of Crestor.  Plan: rosuvastatin (CRESTOR) 40 MG tablet      (R80.9) Microalbuminuria  Comment: stable. Patient is due for a refill of Lisinopril.  Plan: lisinopril (ZESTRIL) 40 MG tablet      (F41.8) Mixed anxiety depressive disorder  Comment: stable.  Plan: escitalopram (LEXAPRO) 20 MG tablet      (I10) Essential hypertension, benign  Comment: stable  Plan: diltiazem ER COATED BEADS (CARDIZEM CD) 240 MG         24 hr capsule      Follow Up Plan:     Patient is instructed to return to Internal Medicine clinic for follow-up visit in 1 month.        Kendal Wong MD  Internal Medicine  [facility]

## 2020-10-05 DIAGNOSIS — Z11.59 ENCOUNTER FOR SCREENING FOR OTHER VIRAL DISEASES: Primary | ICD-10-CM

## 2020-10-13 VITALS — WEIGHT: 230 LBS | BODY MASS INDEX: 32.93 KG/M2 | HEIGHT: 70 IN

## 2020-10-13 ASSESSMENT — MIFFLIN-ST. JEOR: SCORE: 1834.52

## 2020-10-13 NOTE — PATIENT INSTRUCTIONS
Your BMI is Body mass index is 33 kg/m .  Weight management is a personal decision.  If you are interested in exploring weight loss strategies, the following discussion covers the approaches that may be successful. Body mass index (BMI) is one way to tell whether you are at a healthy weight, overweight, or obese. It measures your weight in relation to your height.  A BMI of 18.5 to 24.9 is in the healthy range. A person with a BMI of 25 to 29.9 is considered overweight, and someone with a BMI of 30 or greater is considered obese. More than two-thirds of American adults are considered overweight or obese.  Being overweight or obese increases the risk for further weight gain. Excess weight may lead to heart disease and diabetes.  Creating and following plans for healthy eating and physical activity may help you improve your health.  Weight control is part of healthy lifestyle and includes exercise, emotional health, and healthy eating habits. Careful eating habits lifelong are the mainstay of weight control. Though there are significant health benefits from weight loss, long-term weight loss with diet alone may be very difficult to achieve- studies show long-term success with dietary management in less than 10% of people. Attaining a healthy weight may be especially difficult to achieve in those with severe obesity. In some cases, medications, devices and surgical management might be considered.  What can you do?  If you are overweight or obese and are interested in methods for weight loss, you should discuss this with your provider.     Consider reducing daily calorie intake by 500 calories.     Keep a food journal.     Avoiding skipping meals, consider cutting portions instead.    Diet combined with exercise helps maintain muscle while optimizing fat loss. Strength training is particularly important for building and maintaining muscle mass. Exercise helps reduce stress, increase energy, and improves fitness.  Increasing exercise without diet control, however, may not burn enough calories to loose weight.       Start walking three days a week 10-20 minutes at a time    Work towards walking thirty minutes five days a week     Eventually, increase the speed of your walking for 1-2 minutes at time    In addition, we recommend that you review healthy lifestyles and methods for weight loss available through the National Institutes of Health patient information sites:  http://win.niddk.nih.gov/publications/index.htm    And look into health and wellness programs that may be available through your health insurance provider, employer, local community center, or amanda club.    Weight management plan: Patient was referred to their PCP to discuss a diet and exercise plan.

## 2020-10-13 NOTE — PROGRESS NOTES
"Huseyin St is a 64 year old male who is being evaluated via a billable telephone visit.      The patient has been notified of following:     \"This telephone visit will be conducted via a call between you and your physician/provider. We have found that certain health care needs can be provided without the need for a physical exam.  This service lets us provide the care you need with a short phone conversation.  If a prescription is necessary we can send it directly to your pharmacy.  If lab work is needed we can place an order for that and you can then stop by our lab to have the test done at a later time.    Telephone visits are billed at different rates depending on your insurance coverage. During this emergency period, for some insurers they may be billed the same as an in-person visit.  Please reach out to your insurance provider with any questions.    If during the course of the call the physician/provider feels a telephone visit is not appropriate, you will not be charged for this service.\"    Patient has given verbal consent for Telephone visit?  Yes    What phone number would you like to be contacted at? [phone]    How would you like to obtain your AVS? Mail a copy      CPAP Follow-Up Visit:    Date on this visit: 10/15/2020    Huseyin St has a further management of sleep apnea. His medical history is significant for HTN, DM 2, anxiety, high cholesterol.      His sleep study in 2012 showed: AHI was 33.7, with moderate desaturations down to 84%. He spent about 9.5% of the baseline below 90% SpO2. RDI 38.6. His apnea was much worse while supine. He spent about 1/3 of the night supine and about 2/3 of his apneas occurred during that time. Periodic Limb Movement Index 0/hour.    His only concern is that he get his new supplies. He has not used his CPAP in 2 weeks. His mask broke so he can't use it. Prior to that, it was working great. His current machine does not have a heated hose.  He has not really " noticed much difference in hissleep or energy with or without the CPAP.  He can't sleep on his back.     PAP machine: [employer] (obtained on 7/5/2012). Pressure settings: 9-13 cm     Interface:  Mask: Wisp nasal mask  Chin strap: No  Leak: Not prior to the headgear breaking  Using Humidifier: Yes  Condensation in hose or mask: No     Difficulties with therapy:    [-] Snoring with CPAP: he went hunting recently without CPAP and was not told that he snored, even without the mask. He even had 4-5 beers that day. His wife notices less snoring too.   [-] Difficulty tolerating the pressure:  [-] Epistaxis/dry nose:   [-] Nasal congestion:  [-] Dry mouth:  [-] Mouth breathing:   [-] Pain/skin breakdown:      Improvements noted with CPAP:   [-] waking up more refreshed. Not really a problem even without CPAP  [-] sleeping better with less arousals  [-] nocturia improved - 2-3 times with or without CPAP.  [-] improved energy level during the day.     Weight change since sleep study: 230 recently. Was 245 pounds at his study in 2012    Bedtime: 9-10 PM. Wake time: 6-7 AM on T-Th for work. On other days, he sleeps until 8:30-9 AM. He naps 2/3 of days for 1-1.5 hours. Some days he does not fall asleep during the nap. He denies feeling sleepy at times he wants to stay awake. He wakes feeling refreshed. His wife has expressed concern about his sleep duration, asking if he is depressed. He does not feel depressed. His long sleeping does not bother him. He says he has always been a sleeper.    He denies sleepiness while driving.     Past medical/surgical history, family history, social history, medications and allergies were reviewed.      Problem List:  Patient Active Problem List    Diagnosis Date Noted     Class 2 obesity due to excess calories with serious comorbidity and body mass index (BMI) of 35.0 to 35.9 in adult 07/21/2018     Priority: Medium     Microalbuminuria due to type 2 diabetes mellitus (H) 07/21/2018      Priority: Medium     Advanced directives, counseling/discussion 03/12/2018     Priority: Medium     Discussed advance care planning with patient; information given to patient to review. 8/7/2012   Christy Ray CMA           LVH (left ventricular hypertrophy) 04/17/2017     Priority: Medium     ekg 4/2017       Type 2 diabetes mellitus with hyperosmolarity without coma, without long-term current use of insulin (H) 12/05/2016     Priority: Medium     Overweight BMI 35-40 10/12/2015     Priority: Medium     Type 2 diabetes mellitus with diabetic nephropathy (H) 08/31/2015     Priority: Medium     NA (obstructive sleep apnea) 07/17/2013     Priority: Medium     On cpap       Recurrent cold sores 01/16/2013     Priority: Medium     Microalbuminuria 09/26/2012     Priority: Medium     Mixed anxiety depressive disorder 04/26/2012     Priority: Medium     (Problem list name updated by automated process. Provider to review and confirm.)       HYPERLIPIDEMIA LDL GOAL <100 10/31/2010     Priority: Medium     Rhinitis, allergic seasonal 06/19/2008     Priority: Medium     Hypertrophy of prostate without urinary obstruction 04/30/2006     Priority: Medium     Problem list name updated by automated process. Provider to review       Tietze's disease 04/30/2006     Priority: Medium     Essential hypertension, benign 04/26/2003     Priority: Medium        Impression/Plan:    (G47.33) NA (obstructive sleep apnea)  (primary encounter diagnosis)  Comment: Huseyin is doing well with CPAP, although has not used it recently due to his mask headgear being broken. He is not observed to snore much when sleeping laterally, even without CPAP. He has lost about 15 pounds since his study in 2012. He does well with CPAP and has no tolerance issues, despite not noticing much benefit to using it. His prior study showed mild apnea when lateral. He states he can't sleep on his back at home. He seems to have an excessive sleep need. It does not  bother him and he has good energy in the day. I do not think there is anything that can or should be done about that.   Plan: Comprehensive DME        An order was placed for a replacement auto CPAP 9-13 cm and new supplies. He was advised to try the AirFit N20 mask as it has a sturdier headgear. We talked about repeating a sleep study to see if he has any significant apnea when lateral. He plans to lose more weight, so we will defer the test until his weight is closer to target.      He will follow up with me in about 1 year unless he needs to return in 2 months to document compliance with the new machine.     30 minutes (3:58 PM-4:28 PM) spent with patient, all of which were spent face-to-face counseling, consulting, coordinating plan of care.      Bennett Goltz, PA-C    CC: No ref. provider found

## 2020-10-15 ENCOUNTER — VIRTUAL VISIT (OUTPATIENT)
Dept: SLEEP MEDICINE | Facility: CLINIC | Age: 65
End: 2020-10-15
Payer: COMMERCIAL

## 2020-10-15 DIAGNOSIS — G47.33 OSA (OBSTRUCTIVE SLEEP APNEA): Primary | ICD-10-CM

## 2020-10-15 PROCEDURE — 99214 OFFICE O/P EST MOD 30 MIN: CPT | Mod: TEL | Performed by: PHYSICIAN ASSISTANT

## 2020-10-19 ENCOUNTER — TELEPHONE (OUTPATIENT)
Dept: SLEEP MEDICINE | Facility: CLINIC | Age: 65
End: 2020-10-19

## 2020-10-23 DIAGNOSIS — Z11.59 ENCOUNTER FOR SCREENING FOR OTHER VIRAL DISEASES: ICD-10-CM

## 2020-10-23 LAB
SARS-COV-2 RNA SPEC QL NAA+PROBE: NOT DETECTED
SPECIMEN SOURCE: NORMAL

## 2020-10-23 PROCEDURE — U0003 INFECTIOUS AGENT DETECTION BY NUCLEIC ACID (DNA OR RNA); SEVERE ACUTE RESPIRATORY SYNDROME CORONAVIRUS 2 (SARS-COV-2) (CORONAVIRUS DISEASE [COVID-19]), AMPLIFIED PROBE TECHNIQUE, MAKING USE OF HIGH THROUGHPUT TECHNOLOGIES AS DESCRIBED BY CMS-2020-01-R: HCPCS | Performed by: INTERNAL MEDICINE

## 2020-10-26 ENCOUNTER — HOSPITAL ENCOUNTER (OUTPATIENT)
Facility: CLINIC | Age: 65
Discharge: HOME | End: 2020-10-26
Attending: INTERNAL MEDICINE | Admitting: INTERNAL MEDICINE
Payer: COMMERCIAL

## 2020-10-26 VITALS
OXYGEN SATURATION: 93 % | HEART RATE: 84 BPM | SYSTOLIC BLOOD PRESSURE: 134 MMHG | TEMPERATURE: 98.2 F | HEIGHT: 70 IN | WEIGHT: 230 LBS | DIASTOLIC BLOOD PRESSURE: 92 MMHG | RESPIRATION RATE: 14 BRPM | BODY MASS INDEX: 32.93 KG/M2

## 2020-10-26 LAB
COLONOSCOPY: NORMAL
GLUCOSE BLDC GLUCOMTR-MCNC: 347 MG/DL (ref 70–99)

## 2020-10-26 PROCEDURE — 999N001017 HC STATISTIC GLUCOSE BY METER IP

## 2020-10-26 PROCEDURE — 250N000011 HC RX IP 250 OP 636: Performed by: INTERNAL MEDICINE

## 2020-10-26 PROCEDURE — G0500 MOD SEDAT ENDO SERVICE >5YRS: HCPCS | Performed by: INTERNAL MEDICINE

## 2020-10-26 PROCEDURE — G0105 COLORECTAL SCRN; HI RISK IND: HCPCS | Performed by: INTERNAL MEDICINE

## 2020-10-26 PROCEDURE — 45378 DIAGNOSTIC COLONOSCOPY: CPT | Performed by: INTERNAL MEDICINE

## 2020-10-26 PROCEDURE — 258N000003 HC RX IP 258 OP 636: Performed by: INTERNAL MEDICINE

## 2020-10-26 RX ORDER — PROCHLORPERAZINE MALEATE 5 MG
5 TABLET ORAL EVERY 6 HOURS PRN
Status: DISCONTINUED | OUTPATIENT
Start: 2020-10-26 | End: 2020-10-26 | Stop reason: HOSPADM

## 2020-10-26 RX ORDER — LIDOCAINE 40 MG/G
CREAM TOPICAL
Status: DISCONTINUED | OUTPATIENT
Start: 2020-10-26 | End: 2020-10-26 | Stop reason: HOSPADM

## 2020-10-26 RX ORDER — ONDANSETRON 4 MG/1
4 TABLET, ORALLY DISINTEGRATING ORAL EVERY 6 HOURS PRN
Status: DISCONTINUED | OUTPATIENT
Start: 2020-10-26 | End: 2020-10-26 | Stop reason: HOSPADM

## 2020-10-26 RX ORDER — FENTANYL CITRATE 50 UG/ML
INJECTION, SOLUTION INTRAMUSCULAR; INTRAVENOUS PRN
Status: DISCONTINUED | OUTPATIENT
Start: 2020-10-26 | End: 2020-10-26 | Stop reason: HOSPADM

## 2020-10-26 RX ORDER — ONDANSETRON 2 MG/ML
4 INJECTION INTRAMUSCULAR; INTRAVENOUS EVERY 6 HOURS PRN
Status: DISCONTINUED | OUTPATIENT
Start: 2020-10-26 | End: 2020-10-26 | Stop reason: HOSPADM

## 2020-10-26 RX ORDER — NALOXONE HYDROCHLORIDE 0.4 MG/ML
.1-.4 INJECTION, SOLUTION INTRAMUSCULAR; INTRAVENOUS; SUBCUTANEOUS
Status: DISCONTINUED | OUTPATIENT
Start: 2020-10-26 | End: 2020-10-26 | Stop reason: HOSPADM

## 2020-10-26 RX ORDER — ONDANSETRON 2 MG/ML
4 INJECTION INTRAMUSCULAR; INTRAVENOUS
Status: DISCONTINUED | OUTPATIENT
Start: 2020-10-26 | End: 2020-10-26 | Stop reason: HOSPADM

## 2020-10-26 ASSESSMENT — MIFFLIN-ST. JEOR: SCORE: 1834.52

## 2020-11-09 ENCOUNTER — DOCUMENTATION ONLY (OUTPATIENT)
Dept: SLEEP MEDICINE | Facility: CLINIC | Age: 65
End: 2020-11-09

## 2020-11-09 NOTE — PROGRESS NOTES
Patient was offered choice of vendor and chose [facility].  Patient Huseyin RUSSELL St was set up at [facility]  on November 9, 2020. Patient received a [employer] DreamStation Auto. Pressures were set at 9-13 cm H2O.   Patient s ramp is 5 cm H2O for Auto and FLEX/EPR is A Flex.  Patient received a [employer] Mask name: WISP  Nasal mask size X-Large, STANDARD tubing and heated humidifier.  Patient does not need to meet compliance. Patient has a follow up on WILL CALL TO SCHEDULE with Bennett Goltz, PA-C. Andrew Philip Kraemer

## 2020-11-12 ENCOUNTER — DOCUMENTATION ONLY (OUTPATIENT)
Dept: SLEEP MEDICINE | Facility: CLINIC | Age: 65
End: 2020-11-12
Payer: COMMERCIAL

## 2020-11-12 NOTE — PROGRESS NOTES
3 DAY STM VISIT    Diagnostic AHI: 33.7  PSG    Patient contacted for 3 day STM visit.    Confirmed with patient at time of call- Yes Patient is still interested in STM service     Subjective measures:  Patient used his new machine last night for the first time. Patient woke up with a very dry mouth.     Replacement device: Yes  STM ordered by provider: Yes     Device type: Auto-CPAP  PAP settings from order::  CPAP min 9 cm  H20       CPAP max 13 cm  H20  Mask type:    Nasal Mask     Device settings from machine      Min CPAP 9            Max CPAP 13      Assessment: No usage reporting but has a profile in [employer].  Action plan: Patient to have 14 day STM visit. Patient has a follow up visit scheduled:   no.     Total time spent on accessing and  interpreting remote patient PAP therapy data  10 minutes  Total time spent counseling, coaching  and reviewing PAP therapy data with patient  3 minutes  [id number] no

## 2020-11-25 ENCOUNTER — DOCUMENTATION ONLY (OUTPATIENT)
Dept: SLEEP MEDICINE | Facility: CLINIC | Age: 65
End: 2020-11-25

## 2020-11-25 NOTE — PROGRESS NOTES
14 DAY STM VISIT           Data only recheck     Assessment: Pt meeting objective benchmarks.       Action plan: pt to have 30 day STM visit.    Device type: Auto-CPAP    PAP settings: CPAP min 9  cm  H20       CPAP max 13 cm  H20          90th % pressure 11.1  cm  H20    Mask type:  Nasal Mask    Objective measures: 14 day rolling measures         Compliance  100 %     % of night spent in large leak  0 % last  upload      AHI 2.8   last  upload      Average number of minutes 566          Objective measure goal  Compliance   Goal >70%  Leak   Goal < 10%  AHI  Goal < 5  Usage  Goal >240      Total time spent on accessing and interpreting remote patient PAP therapy data  10 minutes      Total time spent counseling, coaching  and reviewing PAP therapy data with patient  0 minutes      [id number]  [id number] no (3 day STM)

## 2020-12-10 ENCOUNTER — DOCUMENTATION ONLY (OUTPATIENT)
Dept: SLEEP MEDICINE | Facility: CLINIC | Age: 65
End: 2020-12-10

## 2020-12-10 NOTE — PROGRESS NOTES
30 DAY [facility] VISIT    D     Data only recheck  Mailbox is full     Assessment: Pt meeting objective benchmarks.     Action plan:   Patient has not scheduled a follow up visit    Device type: Auto-CPAP  PAP settings: CPAP min 9 cm  H20     CPAP max 13 cm  H20       Mask type:  Nasal Mask  Objective measures: 14 day rolling measures data in [place]       Compliance  100 %      Leak  8.8 lpm  last  upload      AHI 3.3   last  upload            Objective measure goal  Compliance   Goal >70%  Leak   Goal < 24 lpm  AHI  Goal < 5  Usage  Goal >240        Total time spent on accessing and interpreting remote patient PAP therapy data  11 minutes    Total time spent counseling, coaching  and reviewing PAP therapy data with patient  0 minutes     [id number] this call  [id number] no  at 3 or 14 day [facility]

## 2020-12-11 ENCOUNTER — OFFICE VISIT (OUTPATIENT)
Dept: FAMILY MEDICINE | Facility: CLINIC | Age: 65
End: 2020-12-11
Payer: COMMERCIAL

## 2020-12-11 VITALS
SYSTOLIC BLOOD PRESSURE: 140 MMHG | HEART RATE: 89 BPM | WEIGHT: 239.2 LBS | BODY MASS INDEX: 34.24 KG/M2 | TEMPERATURE: 95.3 F | OXYGEN SATURATION: 98 % | DIASTOLIC BLOOD PRESSURE: 87 MMHG | HEIGHT: 70 IN

## 2020-12-11 DIAGNOSIS — E78.5 HYPERLIPIDEMIA LDL GOAL <100: ICD-10-CM

## 2020-12-11 DIAGNOSIS — Z79.4 TYPE 2 DIABETES MELLITUS WITH DIABETIC NEPHROPATHY, WITH LONG-TERM CURRENT USE OF INSULIN (H): ICD-10-CM

## 2020-12-11 DIAGNOSIS — B37.42 CANDIDAL BALANITIS: Primary | ICD-10-CM

## 2020-12-11 DIAGNOSIS — E11.21 TYPE 2 DIABETES MELLITUS WITH DIABETIC NEPHROPATHY, WITH LONG-TERM CURRENT USE OF INSULIN (H): ICD-10-CM

## 2020-12-11 PROCEDURE — 99214 OFFICE O/P EST MOD 30 MIN: CPT | Performed by: INTERNAL MEDICINE

## 2020-12-11 RX ORDER — CLOTRIMAZOLE 1 %
CREAM (GRAM) TOPICAL 2 TIMES DAILY
Qty: 30 G | Refills: 11 | Status: SHIPPED | OUTPATIENT
Start: 2020-12-11

## 2020-12-11 ASSESSMENT — MIFFLIN-ST. JEOR: SCORE: 1871.25

## 2020-12-11 NOTE — PROGRESS NOTES
Subjective     Huseyin St is a 65 year old male who presents to clinic today for the following health issues:    HPI         Chief Complaint   Patient presents with     RECHECK     Chief Complaint:     balanitis    HPI:   Patient Huseyin St is a very pleasant 65 year old male with history of Type 2 Diabetes, hyperlipidemia, obesity who presents to Internal Medicine clinic today for evaluation of recent recurrent balanitis symptoms due to candidal balanitis in the setting of chronic poorly controlled Type 2 Diabetes. Regarding the patient's Type 2 Diabetes, the patient denies any recent hypoglycemia events. Patient denies any chest pain, headache, fever or chills at this time.        Current Medications:     Current Outpatient Medications   Medication Sig Dispense Refill     alcohol swab prep pads Use to swab area of injection/ian as directed 100 each 3     ASPIRIN 81 MG OR TABS 1 tab po QD (Once per day) 100 3     blood glucose (NO BRAND SPECIFIED) test strip Use to test blood sugar 3 times daily or as directed. To accompany: Blood Glucose Monitor Brands: per insurance. 200 strip 6     blood glucose calibration (NO BRAND SPECIFIED) solution Use to calibrate blood glucose monitor as needed as directed. To accompany: Blood Glucose Monitor Brands: per insurance. 1 Bottle 3     clotrimazole (LOTRIMIN) 1 % external cream Apply topically 2 times daily 30 g 11     diltiazem ER COATED BEADS (CARDIZEM CD) 240 MG 24 hr capsule Take 1 capsule (240 mg) by mouth daily 90 capsule 3     escitalopram (LEXAPRO) 20 MG tablet Take 1 tablet (20 mg) by mouth daily 90 tablet 3     hydrochlorothiazide (MICROZIDE) 12.5 MG capsule Take 1 capsule (12.5 mg) by mouth daily 90 capsule 3     insulin glargine (LANTUS SOLOSTAR) 100 UNIT/ML pen INJ 55 UNITS SC HS 10 mL 11     insulin pen needle (BD MARIE U/F) 32G X 4 MM miscellaneous USE THREE PEN NEEDLES DAILY AS DIRECTED 300 each 1     lisinopril (ZESTRIL) 40 MG tablet Take 1 tablet (40  "mg) by mouth daily 90 tablet 3     NOVOLOG FLEXPEN 100 UNIT/ML soln 10 units plus sliding scale before: breakfast, lunch, and dinner. -200: +3 units. 201-250: +6 units. 251-300: +9 units. 301-350: +12 units. 351-400: +15 units. >400: + 18 units and call PCP.  MAX 70 units a day 10 mL 11     ORDER FOR DME Auto-CPAP: Max 12 cm H2O Min 10 cm H2O  Continuous Lifetime need and heated humidity.    1 Device 0     rosuvastatin (CRESTOR) 40 MG tablet Take 1 tablet (40 mg) by mouth daily 90 tablet 3     sildenafil (REVATIO) 20 MG tablet Take 1 tablet (20 mg) by mouth 3 times daily 30 tablet 11     thin (NO BRAND SPECIFIED) lancets Use to test blood sugar 3 times daily or as directed. To accompany: Blood Glucose Monitor Brands: per insurance. 200 each 6         Allergies:      Allergies   Allergen Reactions     Augmentin Diarrhea     Sulfa Drugs      Unknown - reaction occurred as a child     Victoza      Skin rash     Xanax      Difficult to wean off            Past Medical History:     Past Medical History:   Diagnosis Date     Anxiety      Essential hypertension, benign      Hypercholesteremia      Sleep apnea      T2DM (type 2 diabetes mellitus) (H)          Past Surgical History:     Past Surgical History:   Procedure Laterality Date     COLONOSCOPY       COLONOSCOPY N/A 10/26/2020    Procedure: COLONOSCOPY;  Surgeon: Randy Gonzalez MD;  Location:  GI     ENT SURGERY      R) ear \"procedure\"         Family Medical History:     Family History   Adopted: Yes   Problem Relation Age of Onset     Diabetes Mother      Respiratory Mother         asthma     Lipids Mother      Arthritis Mother         knee replacement     Alzheimer Disease Mother      Cerebrovascular Disease Brother      C.A.D. No family hx of      Cancer - colorectal No family hx of      Prostate Cancer No family hx of          Social History:     Social History     Socioeconomic History     Marital status:      Spouse name: Estefania Laura "     Number of children: 1     Years of education: Not on file     Highest education level: Not on file   Occupational History     Not on file   Social Needs     Financial resource strain: Not on file     Food insecurity     Worry: Not on file     Inability: Not on file     Transportation needs     Medical: Not on file     Non-medical: Not on file   Tobacco Use     Smoking status: Never Smoker     Smokeless tobacco: Never Used   Substance and Sexual Activity     Alcohol use: Yes     Alcohol/week: 0.0 standard drinks     Comment: 3-6 drinks on occ weekend night, occ drink on weekday     Drug use: No     Sexual activity: Yes     Partners: Female   Lifestyle     Physical activity     Days per week: Not on file     Minutes per session: Not on file     Stress: Not on file   Relationships     Social connections     Talks on phone: Not on file     Gets together: Not on file     Attends Yazdanism service: Not on file     Active member of club or organization: Not on file     Attends meetings of clubs or organizations: Not on file     Relationship status: Not on file     Intimate partner violence     Fear of current or ex partner: Not on file     Emotionally abused: Not on file     Physically abused: Not on file     Forced sexual activity: Not on file   Other Topics Concern      Service No     Blood Transfusions No     Caffeine Concern Not Asked     Occupational Exposure Not Asked     Hobby Hazards Not Asked     Sleep Concern Not Asked     Stress Concern Not Asked     Weight Concern Not Asked     Special Diet Not Asked     Back Care Not Asked     Exercise Not Asked     Bike Helmet Not Asked     Seat Belt Not Asked     Self-Exams Not Asked     Parent/sibling w/ CABG, MI or angioplasty before 65F 55M? Not Asked   Social History Narrative     Not on file           Review of System:     Constitutional: Negative for fever or chills  Skin: Negative for rashes  Ears/Nose/Throat: Negative for nasal congestion, sore  "throat  Respiratory: No shortness of breath, dyspnea on exertion, cough, or hemoptysis  Cardiovascular: Negative for chest pain  Gastrointestinal: Negative for nausea, vomiting  Genitourinary: Negative for dysuria, hematuria, positive for recent candidal balanitis symptoms  Musculoskeletal: Negative for myalgias  Neurologic: Negative for headaches  Psychiatric: Negative for depression, anxiety  Hematologic/Lymphatic/Immunologic: Negative  Endocrine: positive for poorly controlled chronic Type 2 Diabetes   Behavioral: Negative for tobacco use       Physical Exam:   BP (!) 140/87 (BP Location: Right arm, Patient Position: Sitting, Cuff Size: Adult Regular)   Pulse 89   Temp 95.3  F (35.2  C) (Temporal)   Ht 1.778 m (5' 10\")   Wt 108.5 kg (239 lb 3.2 oz)   SpO2 98%   BMI 34.32 kg/m      GENERAL: obese, alert and no distress  EYES: eyes grossly normal to inspection, and conjunctivae and sclerae normal  HENT: Normocephalic atraumatic. Nose and mouth without ulcers or lesions  NECK: supple  RESP: lungs clear to auscultation   CV: regular rate and rhythm, normal S1 S2  LYMPH: no peripheral edema   ABDOMEN: nondistended  : candidal balanitis symptoms noted  MS: no gross musculoskeletal defects noted  SKIN: no suspicious lesions or rashes  NEURO: Alert & Oriented x 3.   PSYCH: mentation appears normal, affect normal        Diagnostic Test Results:     Diagnostic Test Results:  Labs reviewed in Epic  Lab Results   Component Value Date    A1C 13.2 09/28/2020    A1C 12.3 09/23/2019    A1C 10.4 03/12/2018    A1C 10.0 09/29/2017    A1C 9.5 03/24/2017         ASSESSMENT/PLAN:       (B37.42) Candidal balanitis  (primary encounter diagnosis)  Comment: recent candidal balanitis symptoms in the setting of chronic Type 2 Diabetes   Plan: clotrimazole (LOTRIMIN) 1 % external cream,         UROLOGY ADULT REFERRAL      (E11.21,  Z79.4) Type 2 diabetes mellitus with diabetic nephropathy, with long-term current use of insulin " (H)  Comment: no recent hypoglycemia events  Plan: continue current therapy    (E78.5) Hyperlipidemia LDL goal <100  Comment: stable  Plan:continue current therapy      Follow Up Plan:     Patient is instructed to return to Internal Medicine clinic for follow-up visit in 1 week.        Kendal Wong MD  Internal Medicine  [facility]

## 2021-01-07 DIAGNOSIS — E11.21 TYPE 2 DIABETES MELLITUS WITH DIABETIC NEPHROPATHY, WITH LONG-TERM CURRENT USE OF INSULIN (H): ICD-10-CM

## 2021-01-07 DIAGNOSIS — Z79.4 TYPE 2 DIABETES MELLITUS WITH DIABETIC NEPHROPATHY, WITH LONG-TERM CURRENT USE OF INSULIN (H): ICD-10-CM

## 2021-01-08 NOTE — TELEPHONE ENCOUNTER
Routing refill request to provider for review/approval because:  Labs out of range:  A1c    MARTINA MalhotraN, RN  [employer] Triage

## 2021-01-11 RX ORDER — INSULIN ASPART 100 [IU]/ML
INJECTION, SOLUTION INTRAVENOUS; SUBCUTANEOUS
Qty: 10 ML | Refills: 4 | Status: SHIPPED | OUTPATIENT
Start: 2021-01-11 | End: 2021-04-22

## 2021-03-12 ENCOUNTER — IMMUNIZATION (OUTPATIENT)
Dept: NURSING | Facility: CLINIC | Age: 66
End: 2021-03-12
Payer: COMMERCIAL

## 2021-03-12 PROCEDURE — 0001A PR COVID VAC PFIZER DIL RECON 30 MCG/0.3 ML IM: CPT

## 2021-03-12 PROCEDURE — 91300 PR COVID VAC PFIZER DIL RECON 30 MCG/0.3 ML IM: CPT

## 2021-04-02 ENCOUNTER — IMMUNIZATION (OUTPATIENT)
Dept: NURSING | Facility: CLINIC | Age: 66
End: 2021-04-02
Attending: FAMILY MEDICINE
Payer: COMMERCIAL

## 2021-04-02 PROCEDURE — 0002A PR COVID VAC PFIZER DIL RECON 30 MCG/0.3 ML IM: CPT

## 2021-04-02 PROCEDURE — 91300 PR COVID VAC PFIZER DIL RECON 30 MCG/0.3 ML IM: CPT

## 2021-04-21 DIAGNOSIS — Z79.4 TYPE 2 DIABETES MELLITUS WITH DIABETIC NEPHROPATHY, WITH LONG-TERM CURRENT USE OF INSULIN (H): ICD-10-CM

## 2021-04-21 DIAGNOSIS — E11.21 TYPE 2 DIABETES MELLITUS WITH DIABETIC NEPHROPATHY, WITH LONG-TERM CURRENT USE OF INSULIN (H): ICD-10-CM

## 2021-04-22 DIAGNOSIS — E11.9 TYPE 2 DIABETES MELLITUS WITHOUT COMPLICATION, WITHOUT LONG-TERM CURRENT USE OF INSULIN (H): ICD-10-CM

## 2021-04-22 RX ORDER — INSULIN ASPART 100 [IU]/ML
INJECTION, SOLUTION INTRAVENOUS; SUBCUTANEOUS
Qty: 10 ML | Refills: 0 | Status: SHIPPED | OUTPATIENT
Start: 2021-04-22 | End: 2021-07-29

## 2021-04-22 RX ORDER — PEN NEEDLE, DIABETIC 32GX 5/32"
NEEDLE, DISPOSABLE MISCELLANEOUS
Qty: 300 EACH | Refills: 1 | Status: SHIPPED | OUTPATIENT
Start: 2021-04-22 | End: 2021-10-11

## 2021-04-22 NOTE — TELEPHONE ENCOUNTER
Medication is being filled for 1 time refill only due to:  due for f/u DM appt.      Patient notified.     Sandhya MERINO, RN      April 22, 2021  11:47 AM

## 2021-05-25 ENCOUNTER — DOCUMENTATION ONLY (OUTPATIENT)
Dept: SLEEP MEDICINE | Facility: CLINIC | Age: 66
End: 2021-05-25

## 2021-05-25 NOTE — PROGRESS NOTES
6 Month STM visit    Diagnostic AHI: 33.7  PSG (2012)    Data only recheck     Assessment: Pt not meeting objective benchmarks for compliance     Action plan:   pt to follow up per provider request    Device type: Auto-CPAP  PAP settings: CPAP min 9.0 cm  H20     CPAP max 13.0 cm  H20     90th % pressure 10  cm  H20    Objective measures: 14 day rolling measures         Compliance  53.3 %     % of night spent in large leak  2 min 26 sec % last  upload      AHI 4.9   last  upload              Objective measure goal  Compliance   Goal >70%  Leak   Goal < 10%  AHI  Goal < 5  Usage  Goal >240    Total time spent on accessing, reviewing and interpreting remote patient PAP therapy data:   5 minutes    Total time spent with direct patient communication :   0 minutes

## 2021-07-27 DIAGNOSIS — E11.21 TYPE 2 DIABETES MELLITUS WITH DIABETIC NEPHROPATHY, WITH LONG-TERM CURRENT USE OF INSULIN (H): ICD-10-CM

## 2021-07-27 DIAGNOSIS — Z79.4 TYPE 2 DIABETES MELLITUS WITH DIABETIC NEPHROPATHY, WITH LONG-TERM CURRENT USE OF INSULIN (H): ICD-10-CM

## 2021-07-29 NOTE — TELEPHONE ENCOUNTER
Routing refill request to provider for review/approval because:  Labs not current:     Lab Results   Component Value Date    A1C 13.2 09/28/2020    A1C 12.3 09/23/2019    A1C 10.4 03/12/2018    A1C 10.0 09/29/2017    A1C 9.5 03/24/2017         Last office vist: 12/11/2020    Pended 90 day supply & 0 refills. Please Review.    Next office visit:   Notifying pharmacy   Routing to TC to schedule- patient not active mychart user.       Lurdes Corcoran RN  [facility]

## 2021-07-30 NOTE — TELEPHONE ENCOUNTER
Routing refill request to provider for review/approval because:  Labs out of range:    Hemoglobin A1C   Date Value Ref Range Status   09/28/2020 13.2 (H) 0 - 5.6 % Final     Comment:     Normal <5.7% Prediabetes 5.7-6.4%  Diabetes 6.5% or higher - adopted from ADA   consensus guidelines.  Reviewed: OK with previous           Fang Cao RN  [facility] Triage

## 2021-08-02 ENCOUNTER — VIRTUAL VISIT (OUTPATIENT)
Dept: FAMILY MEDICINE | Facility: CLINIC | Age: 66
End: 2021-08-02
Payer: COMMERCIAL

## 2021-08-02 DIAGNOSIS — I10 ESSENTIAL HYPERTENSION: ICD-10-CM

## 2021-08-02 DIAGNOSIS — Z76.0 ENCOUNTER FOR MEDICATION REFILL: ICD-10-CM

## 2021-08-02 DIAGNOSIS — E11.21 TYPE 2 DIABETES MELLITUS WITH DIABETIC NEPHROPATHY, WITH LONG-TERM CURRENT USE OF INSULIN (H): Primary | ICD-10-CM

## 2021-08-02 DIAGNOSIS — N52.9 ERECTILE DYSFUNCTION, UNSPECIFIED ERECTILE DYSFUNCTION TYPE: ICD-10-CM

## 2021-08-02 DIAGNOSIS — R80.9 MICROALBUMINURIA: ICD-10-CM

## 2021-08-02 DIAGNOSIS — E78.5 HYPERLIPIDEMIA LDL GOAL <100: ICD-10-CM

## 2021-08-02 DIAGNOSIS — Z79.4 TYPE 2 DIABETES MELLITUS WITH DIABETIC NEPHROPATHY, WITH LONG-TERM CURRENT USE OF INSULIN (H): Primary | ICD-10-CM

## 2021-08-02 PROCEDURE — 99214 OFFICE O/P EST MOD 30 MIN: CPT | Mod: 95 | Performed by: INTERNAL MEDICINE

## 2021-08-02 RX ORDER — INSULIN ASPART 100 [IU]/ML
INJECTION, SOLUTION INTRAVENOUS; SUBCUTANEOUS
Qty: 15 ML | Refills: 0 | Status: SHIPPED | OUTPATIENT
Start: 2021-08-02 | End: 2021-10-11

## 2021-08-02 RX ORDER — INSULIN ASPART 100 [IU]/ML
INJECTION, SOLUTION INTRAVENOUS; SUBCUTANEOUS
Qty: 15 ML | Refills: 0 | Status: SHIPPED | OUTPATIENT
Start: 2021-08-02 | End: 2021-08-02

## 2021-08-02 NOTE — PROGRESS NOTES
Huseyin is a 65 year old who is being evaluated via a billable telephone visit.      What phone number would you like to be contacted at? [phone]  How would you like to obtain your AVS? Mail a copy    Subjective     Huseyin St is a 64 year old male who presents to clinic today for the following health issues:    HPI       Diabetes Follow-up    How often are you checking your blood sugar? Two times daily  Blood sugar testing frequency justification:  Uncontrolled diabetes  What time of day are you checking your blood sugars (select all that apply)?  Before meals  Have you had any blood sugars above 200?  Yes   Have you had any blood sugars below 70?  No    What symptoms do you notice when your blood sugar is low?  None    What concerns do you have today about your diabetes? Elevated BS     Do you have any of these symptoms? (Select all that apply)  Thirsty, weight loss ( 7lbs)      BP Readings from Last 2 Encounters:   12/11/20 (!) 140/87   10/26/20 (!) 134/92     Hemoglobin A1C (%)   Date Value   09/28/2020 13.2 (H)   09/23/2019 12.3 (H)     LDL Cholesterol Calculated (mg/dL)   Date Value   09/28/2020 96   09/23/2019     Cannot estimate LDL when triglyceride exceeds 400 mg/dL     LDL Cholesterol Direct (mg/dL)   Date Value   09/23/2019 127 (H)         Current Medications:     Current Outpatient Medications   Medication Sig Dispense Refill     alcohol swab prep pads Use to swab area of injection/ian as directed 100 each 3     ASPIRIN 81 MG OR TABS 1 tab po QD (Once per day) 100 3     blood glucose (NO BRAND SPECIFIED) test strip Use to test blood sugar 3 times daily or as directed. To accompany: Blood Glucose Monitor Brands: per insurance. 200 strip 6     blood glucose calibration (NO BRAND SPECIFIED) solution Use to calibrate blood glucose monitor as needed as directed. To accompany: Blood Glucose Monitor Brands: per insurance. 1 Bottle 3     clotrimazole (LOTRIMIN) 1 % external cream Apply topically 2 times  "daily 30 g 11     diltiazem ER COATED BEADS (CARDIZEM CD) 240 MG 24 hr capsule Take 1 capsule (240 mg) by mouth daily 90 capsule 3     escitalopram (LEXAPRO) 20 MG tablet Take 1 tablet (20 mg) by mouth daily 90 tablet 3     hydrochlorothiazide (MICROZIDE) 12.5 MG capsule Take 1 capsule (12.5 mg) by mouth daily 90 capsule 3     insulin aspart (NOVOLOG FLEXPEN) 100 UNIT/ML pen INJECT 10 UNITS BEFORE BREAKFAST LUNCH AND DINNER MAX OF 70 UNITS PER DAY. DUE FOR APPOINTMENT. 15 mL 0     insulin glargine (LANTUS SOLOSTAR) 100 UNIT/ML pen INJ 55 UNITS SC HS 10 mL 11     insulin pen needle (BD MARIE U/F) 32G X 4 MM miscellaneous USE THREE PEN NEEDLES DAILY AS DIRECTED 300 each 1     lisinopril (ZESTRIL) 40 MG tablet Take 1 tablet (40 mg) by mouth daily 90 tablet 3     ORDER FOR DME Auto-CPAP: Max 12 cm H2O Min 10 cm H2O  Continuous Lifetime need and heated humidity.    1 Device 0     rosuvastatin (CRESTOR) 40 MG tablet Take 1 tablet (40 mg) by mouth daily 90 tablet 3     sildenafil (REVATIO) 20 MG tablet Take 1 tablet (20 mg) by mouth 3 times daily 30 tablet 11     thin (NO BRAND SPECIFIED) lancets Use to test blood sugar 3 times daily or as directed. To accompany: Blood Glucose Monitor Brands: per insurance. 200 each 6         Allergies:      Allergies   Allergen Reactions     Augmentin Diarrhea     Sulfa Drugs      Unknown - reaction occurred as a child     Victoza      Skin rash     Xanax      Difficult to wean off            Past Medical History:     Past Medical History:   Diagnosis Date     Anxiety      Essential hypertension, benign      Hypercholesteremia      Sleep apnea      T2DM (type 2 diabetes mellitus) (H)          Past Surgical History:     Past Surgical History:   Procedure Laterality Date     COLONOSCOPY       COLONOSCOPY N/A 10/26/2020    Procedure: COLONOSCOPY;  Surgeon: Randy Gonzalez MD;  Location:  GI     ENT SURGERY      R) ear \"procedure\"         Family Medical History:     Family History "   Adopted: Yes   Problem Relation Age of Onset     Diabetes Mother      Respiratory Mother         asthma     Lipids Mother      Arthritis Mother         knee replacement     Alzheimer Disease Mother      Cerebrovascular Disease Brother      C.A.D. No family hx of      Cancer - colorectal No family hx of      Prostate Cancer No family hx of          Social History:     Social History     Socioeconomic History     Marital status:      Spouse name: Estefania Laura     Number of children: 1     Years of education: Not on file     Highest education level: Not on file   Occupational History     Not on file   Tobacco Use     Smoking status: Never Smoker     Smokeless tobacco: Never Used   Substance and Sexual Activity     Alcohol use: Yes     Alcohol/week: 0.0 standard drinks     Comment: 3-6 drinks on occ weekend night, occ drink on weekday     Drug use: No     Sexual activity: Yes     Partners: Female   Other Topics Concern      Service No     Blood Transfusions No     Caffeine Concern Not Asked     Occupational Exposure Not Asked     Hobby Hazards Not Asked     Sleep Concern Not Asked     Stress Concern Not Asked     Weight Concern Not Asked     Special Diet Not Asked     Back Care Not Asked     Exercise Not Asked     Bike Helmet Not Asked     Seat Belt Not Asked     Self-Exams Not Asked     Parent/sibling w/ CABG, MI or angioplasty before 65F 55M? Not Asked   Social History Narrative     Not on file     Social Determinants of Health     Financial Resource Strain:      Difficulty of Paying Living Expenses:    Food Insecurity:      Worried About Running Out of Food in the Last Year:      Ran Out of Food in the Last Year:    Transportation Needs:      Lack of Transportation (Medical):      Lack of Transportation (Non-Medical):    Physical Activity:      Days of Exercise per Week:      Minutes of Exercise per Session:    Stress:      Feeling of Stress :    Social Connections:      Frequency of Communication  with Friends and Family:      Frequency of Social Gatherings with Friends and Family:      Attends Zoroastrianism Services:      Active Member of Clubs or Organizations:      Attends Club or Organization Meetings:      Marital Status:    Intimate Partner Violence:      Fear of Current or Ex-Partner:      Emotionally Abused:      Physically Abused:      Sexually Abused:            Review of System:     Constitutional: Negative for fever or chills  Skin: Negative for rashes  Ears/Nose/Throat: Negative for nasal congestion, sore throat  Respiratory: No shortness of breath, dyspnea on exertion, cough, or hemoptysis  Cardiovascular: Negative for chest pain  Gastrointestinal: Negative for nausea, vomiting  Genitourinary: Negative for dysuria, hematuria  Musculoskeletal: Negative for myalgias  Neurologic: Negative for headaches  Psychiatric: Negative for depression, anxiety  Hematologic/Lymphatic/Immunologic: Negative  Endocrine: positive for Type 2 Diabetes   Behavioral: Negative for tobacco use       Physical Exam:   There were no vitals taken for this visit.    RESP: no cough over the phone  NEURO: Alert & Oriented x 3.   PSYCH: mentation appears normal, affect normal        Diagnostic Test Results:     Diagnostic Test Results:  Labs reviewed in Epic    ASSESSMENT/PLAN:   (Z76.0) Encounter for medication refill    (E11.21,  Z79.4) Type 2 diabetes mellitus with diabetic nephropathy, with long-term current use of insulin (H) (primary encounter diagnosis)  Comment: no hypoglycemia events recently. Patient is due for a refill of insulin medication  Plan: insulin glargine (LANTUS SOLOSTAR) 100 UNIT/ML         pen      (N52.9) Erectile dysfunction, unspecified erectile dysfunction type  Comment: stable.   Plan: continue sildenafil (REVATIO) 20 MG tablet      (E78.5) Hyperlipidemia LDL goal <100  Comment: stable.  Plan: continue rosuvastatin (CRESTOR) 40 MG tablet      (R80.9) Microalbuminuria  Comment: stable.   Plan: lisinopril  (ZESTRIL) 40 MG tablet      (F41.8) Mixed anxiety depressive disorder  Comment: stable.  Plan: escitalopram (LEXAPRO) 20 MG tablet      (I10) Essential hypertension, benign  Comment: stable  Plan: diltiazem ER COATED BEADS (CARDIZEM CD) 240 MG         24 hr capsule      Follow Up Plan:     Patient is instructed to return to Internal Medicine clinic for follow-up visit in 1 month.    Phone call duration: 30 minutes    Kendal Wong MD  Internal Medicine  [facility]

## 2021-10-11 ENCOUNTER — OFFICE VISIT (OUTPATIENT)
Dept: FAMILY MEDICINE | Facility: CLINIC | Age: 66
End: 2021-10-11
Payer: COMMERCIAL

## 2021-10-11 VITALS
DIASTOLIC BLOOD PRESSURE: 84 MMHG | SYSTOLIC BLOOD PRESSURE: 146 MMHG | TEMPERATURE: 96.9 F | HEART RATE: 74 BPM | WEIGHT: 234 LBS | RESPIRATION RATE: 16 BRPM | OXYGEN SATURATION: 99 % | BODY MASS INDEX: 33.5 KG/M2 | HEIGHT: 70 IN

## 2021-10-11 DIAGNOSIS — Z12.5 SCREENING FOR PROSTATE CANCER: ICD-10-CM

## 2021-10-11 DIAGNOSIS — E11.21 TYPE 2 DIABETES MELLITUS WITH DIABETIC NEPHROPATHY, WITH LONG-TERM CURRENT USE OF INSULIN (H): Primary | ICD-10-CM

## 2021-10-11 DIAGNOSIS — N52.9 ERECTILE DYSFUNCTION, UNSPECIFIED ERECTILE DYSFUNCTION TYPE: ICD-10-CM

## 2021-10-11 DIAGNOSIS — E78.5 HYPERLIPIDEMIA LDL GOAL <100: ICD-10-CM

## 2021-10-11 DIAGNOSIS — I10 ESSENTIAL HYPERTENSION, BENIGN: ICD-10-CM

## 2021-10-11 DIAGNOSIS — R80.9 MICROALBUMINURIA: ICD-10-CM

## 2021-10-11 DIAGNOSIS — Z79.4 TYPE 2 DIABETES MELLITUS WITH DIABETIC NEPHROPATHY, WITH LONG-TERM CURRENT USE OF INSULIN (H): Primary | ICD-10-CM

## 2021-10-11 DIAGNOSIS — Z23 NEED FOR PROPHYLACTIC VACCINATION AND INOCULATION AGAINST INFLUENZA: ICD-10-CM

## 2021-10-11 DIAGNOSIS — I10 ESSENTIAL HYPERTENSION: ICD-10-CM

## 2021-10-11 DIAGNOSIS — F41.8 MIXED ANXIETY DEPRESSIVE DISORDER: ICD-10-CM

## 2021-10-11 LAB
CHOLEST SERPL-MCNC: 228 MG/DL
CREAT SERPL-MCNC: 1.13 MG/DL (ref 0.66–1.25)
CREAT UR-MCNC: 93 MG/DL
FASTING STATUS PATIENT QL REPORTED: YES
GFR SERPL CREATININE-BSD FRML MDRD: 67 ML/MIN/1.73M2
HBA1C MFR BLD: 11.8 % (ref 0–5.6)
HDLC SERPL-MCNC: 37 MG/DL
LDLC SERPL CALC-MCNC: 116 MG/DL
MICROALBUMIN UR-MCNC: 265 MG/L
MICROALBUMIN/CREAT UR: 284.95 MG/G CR (ref 0–17)
NONHDLC SERPL-MCNC: 191 MG/DL
TRIGL SERPL-MCNC: 377 MG/DL

## 2021-10-11 PROCEDURE — 36415 COLL VENOUS BLD VENIPUNCTURE: CPT | Performed by: INTERNAL MEDICINE

## 2021-10-11 PROCEDURE — 99214 OFFICE O/P EST MOD 30 MIN: CPT | Mod: 25 | Performed by: INTERNAL MEDICINE

## 2021-10-11 PROCEDURE — 80061 LIPID PANEL: CPT | Performed by: INTERNAL MEDICINE

## 2021-10-11 PROCEDURE — 82565 ASSAY OF CREATININE: CPT | Performed by: INTERNAL MEDICINE

## 2021-10-11 PROCEDURE — 90662 IIV NO PRSV INCREASED AG IM: CPT | Performed by: INTERNAL MEDICINE

## 2021-10-11 PROCEDURE — 82043 UR ALBUMIN QUANTITATIVE: CPT | Performed by: INTERNAL MEDICINE

## 2021-10-11 PROCEDURE — 90471 IMMUNIZATION ADMIN: CPT | Performed by: INTERNAL MEDICINE

## 2021-10-11 PROCEDURE — 83036 HEMOGLOBIN GLYCOSYLATED A1C: CPT | Performed by: INTERNAL MEDICINE

## 2021-10-11 RX ORDER — HYDROCHLOROTHIAZIDE 12.5 MG/1
12.5 CAPSULE ORAL DAILY
Qty: 90 CAPSULE | Refills: 3 | Status: SHIPPED | OUTPATIENT
Start: 2021-10-11 | End: 2021-10-12

## 2021-10-11 RX ORDER — INSULIN ASPART 100 [IU]/ML
INJECTION, SOLUTION INTRAVENOUS; SUBCUTANEOUS
Qty: 15 ML | Refills: 3 | Status: SHIPPED | OUTPATIENT
Start: 2021-10-11 | End: 2021-10-12

## 2021-10-11 RX ORDER — SILDENAFIL CITRATE 20 MG/1
20 TABLET ORAL 3 TIMES DAILY
Qty: 30 TABLET | Refills: 11 | Status: SHIPPED | OUTPATIENT
Start: 2021-10-11 | End: 2021-10-12

## 2021-10-11 RX ORDER — LISINOPRIL 40 MG/1
40 TABLET ORAL DAILY
Qty: 90 TABLET | Refills: 3 | Status: SHIPPED | OUTPATIENT
Start: 2021-10-11 | End: 2021-10-12

## 2021-10-11 RX ORDER — ROSUVASTATIN CALCIUM 40 MG/1
40 TABLET, COATED ORAL DAILY
Qty: 90 TABLET | Refills: 3 | Status: SHIPPED | OUTPATIENT
Start: 2021-10-11 | End: 2021-10-12

## 2021-10-11 RX ORDER — ESCITALOPRAM OXALATE 20 MG/1
20 TABLET ORAL DAILY
Qty: 90 TABLET | Refills: 3 | Status: SHIPPED | OUTPATIENT
Start: 2021-10-11 | End: 2021-10-12

## 2021-10-11 RX ORDER — DILTIAZEM HYDROCHLORIDE 240 MG/1
240 CAPSULE, COATED, EXTENDED RELEASE ORAL DAILY
Qty: 90 CAPSULE | Refills: 3 | Status: SHIPPED | OUTPATIENT
Start: 2021-10-11 | End: 2021-10-12

## 2021-10-11 RX ORDER — PEN NEEDLE, DIABETIC 32GX 5/32"
NEEDLE, DISPOSABLE MISCELLANEOUS
Qty: 300 EACH | Refills: 1 | Status: SHIPPED | OUTPATIENT
Start: 2021-10-11 | End: 2021-10-12

## 2021-10-11 ASSESSMENT — MIFFLIN-ST. JEOR: SCORE: 1847.67

## 2021-10-12 ENCOUNTER — TELEPHONE (OUTPATIENT)
Dept: FAMILY MEDICINE | Facility: CLINIC | Age: 66
End: 2021-10-12

## 2021-10-12 DIAGNOSIS — F41.8 MIXED ANXIETY DEPRESSIVE DISORDER: ICD-10-CM

## 2021-10-12 DIAGNOSIS — E78.5 HYPERLIPIDEMIA LDL GOAL <100: ICD-10-CM

## 2021-10-12 DIAGNOSIS — E11.21 TYPE 2 DIABETES MELLITUS WITH DIABETIC NEPHROPATHY, WITH LONG-TERM CURRENT USE OF INSULIN (H): ICD-10-CM

## 2021-10-12 DIAGNOSIS — I10 ESSENTIAL HYPERTENSION, BENIGN: ICD-10-CM

## 2021-10-12 DIAGNOSIS — R80.9 MICROALBUMINURIA: ICD-10-CM

## 2021-10-12 DIAGNOSIS — Z79.4 TYPE 2 DIABETES MELLITUS WITH DIABETIC NEPHROPATHY, WITH LONG-TERM CURRENT USE OF INSULIN (H): ICD-10-CM

## 2021-10-12 DIAGNOSIS — N52.9 ERECTILE DYSFUNCTION, UNSPECIFIED ERECTILE DYSFUNCTION TYPE: ICD-10-CM

## 2021-10-12 RX ORDER — SILDENAFIL CITRATE 20 MG/1
20 TABLET ORAL 3 TIMES DAILY
Qty: 30 TABLET | Refills: 11 | Status: ON HOLD | OUTPATIENT
Start: 2021-10-12 | End: 2022-04-08

## 2021-10-12 RX ORDER — INSULIN ASPART 100 [IU]/ML
INJECTION, SOLUTION INTRAVENOUS; SUBCUTANEOUS
Qty: 15 ML | Refills: 3 | Status: ON HOLD | OUTPATIENT
Start: 2021-10-12 | End: 2022-03-27

## 2021-10-12 RX ORDER — ROSUVASTATIN CALCIUM 40 MG/1
40 TABLET, COATED ORAL DAILY
Qty: 90 TABLET | Refills: 3 | Status: ON HOLD | OUTPATIENT
Start: 2021-10-12 | End: 2022-08-01

## 2021-10-12 RX ORDER — DILTIAZEM HYDROCHLORIDE 240 MG/1
240 CAPSULE, COATED, EXTENDED RELEASE ORAL DAILY
Qty: 90 CAPSULE | Refills: 3 | Status: ON HOLD | OUTPATIENT
Start: 2021-10-12 | End: 2022-03-27

## 2021-10-12 RX ORDER — HYDROCHLOROTHIAZIDE 12.5 MG/1
12.5 CAPSULE ORAL DAILY
Qty: 90 CAPSULE | Refills: 3 | Status: ON HOLD | OUTPATIENT
Start: 2021-10-12 | End: 2022-03-18

## 2021-10-12 RX ORDER — PEN NEEDLE, DIABETIC 32GX 5/32"
NEEDLE, DISPOSABLE MISCELLANEOUS
Qty: 300 EACH | Refills: 1 | Status: SHIPPED | OUTPATIENT
Start: 2021-10-12 | End: 2022-08-02

## 2021-10-12 RX ORDER — ESCITALOPRAM OXALATE 20 MG/1
20 TABLET ORAL DAILY
Qty: 90 TABLET | Refills: 3 | Status: SHIPPED | OUTPATIENT
Start: 2021-10-12 | End: 2022-11-23

## 2021-10-12 RX ORDER — LISINOPRIL 40 MG/1
40 TABLET ORAL DAILY
Qty: 90 TABLET | Refills: 3 | Status: ON HOLD | OUTPATIENT
Start: 2021-10-12 | End: 2022-03-27

## 2021-10-12 NOTE — TELEPHONE ENCOUNTER
Sent referral letter.    Hasmukh SanchezD, [facility]  Medication Therapy Management Provider  Pager: [phone]      Other (Free Text): Treated with Efudex bid x 6 weeks. 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 Pt needs mohs. Will schedule Other (Free Text): Treated with Efudex bid x 4- 6 weeks Other (Free Text): Treated with Efudex bid \\nRec re biopsy today- r/o residual bcc Other (Free Text): Treated with ed&c in 4/2021 Other (Free Text): Treated with Mohs in 10/2020 Other (Free Text): tx w efudex bid x 6 weeks, rebx today to rule out residual Other (Free Text): rec pdT

## 2021-10-12 NOTE — PROGRESS NOTES
Subjective     Huseyin St is a 65 year old male who presents to clinic today for the following health issues:    HPI       Diabetes Follow-up    How often are you checking your blood sugar? Two times daily  Blood sugar testing frequency justification:  Uncontrolled diabetes  What time of day are you checking your blood sugars (select all that apply)?  Before meals  Have you had any blood sugars above 200?  Yes   Have you had any blood sugars below 70?  No    What symptoms do you notice when your blood sugar is low?  None    What concerns do you have today about your diabetes? Elevated BS     Do you have any of these symptoms? (Select all that apply)  Thirsty, weight loss ( 7lbs)      BP Readings from Last 2 Encounters:   10/11/21 (!) 146/84   12/11/20 (!) 140/87     Hemoglobin A1C (%)   Date Value   10/11/2021 11.8 (H)   09/28/2020 13.2 (H)   09/23/2019 12.3 (H)     LDL Cholesterol Calculated (mg/dL)   Date Value   10/11/2021 116 (H)   09/28/2020 96   09/23/2019     Cannot estimate LDL when triglyceride exceeds 400 mg/dL     LDL Cholesterol Direct (mg/dL)   Date Value   09/23/2019 127 (H)       Current Medications:     Current Outpatient Medications   Medication Sig Dispense Refill     alcohol swab prep pads Use to swab area of injection/ian as directed 100 each 3     ASPIRIN 81 MG OR TABS 1 tab po QD (Once per day) 100 3     blood glucose (NO BRAND SPECIFIED) test strip Use to test blood sugar 3 times daily or as directed. To accompany: Blood Glucose Monitor Brands: per insurance. 200 strip 6     blood glucose calibration (NO BRAND SPECIFIED) solution Use to calibrate blood glucose monitor as needed as directed. To accompany: Blood Glucose Monitor Brands: per insurance. 1 Bottle 3     clotrimazole (LOTRIMIN) 1 % external cream Apply topically 2 times daily 30 g 11     diltiazem ER COATED BEADS (CARDIZEM CD) 240 MG 24 hr capsule Take 1 capsule (240 mg) by mouth daily 90 capsule 3     escitalopram (LEXAPRO) 20  "MG tablet Take 1 tablet (20 mg) by mouth daily 90 tablet 3     hydrochlorothiazide (MICROZIDE) 12.5 MG capsule Take 1 capsule (12.5 mg) by mouth daily 90 capsule 3     insulin aspart (NOVOLOG FLEXPEN) 100 UNIT/ML pen INJECT 10 UNITS BEFORE BREAKFAST LUNCH AND DINNER MAX OF 70 UNITS PER DAY. DUE FOR APPOINTMENT. 15 mL 3     insulin glargine (LANTUS SOLOSTAR) 100 UNIT/ML pen INJ 55 UNITS SC HS 10 mL 11     insulin pen needle (BD MARIE U/F) 32G X 4 MM miscellaneous USE THREE PEN NEEDLES DAILY AS DIRECTED 300 each 1     lisinopril (ZESTRIL) 40 MG tablet Take 1 tablet (40 mg) by mouth daily 90 tablet 3     ORDER FOR DME Auto-CPAP: Max 12 cm H2O Min 10 cm H2O  Continuous Lifetime need and heated humidity.    1 Device 0     rosuvastatin (CRESTOR) 40 MG tablet Take 1 tablet (40 mg) by mouth daily 90 tablet 3     sildenafil (REVATIO) 20 MG tablet Take 1 tablet (20 mg) by mouth 3 times daily 30 tablet 11     thin (NO BRAND SPECIFIED) lancets Use to test blood sugar 3 times daily or as directed. To accompany: Blood Glucose Monitor Brands: per insurance. 200 each 6         Allergies:      Allergies   Allergen Reactions     Augmentin Diarrhea     Sulfa Drugs      Unknown - reaction occurred as a child     Victoza      Skin rash     Xanax      Difficult to wean off            Past Medical History:     Past Medical History:   Diagnosis Date     Anxiety      Essential hypertension, benign      Hypercholesteremia      Sleep apnea      T2DM (type 2 diabetes mellitus) (H)          Past Surgical History:     Past Surgical History:   Procedure Laterality Date     COLONOSCOPY       COLONOSCOPY N/A 10/26/2020    Procedure: COLONOSCOPY;  Surgeon: Randy Gonzalez MD;  Location:  GI     ENT SURGERY      R) ear \"procedure\"         Family Medical History:     Family History   Adopted: Yes   Problem Relation Age of Onset     Diabetes Mother      Respiratory Mother         asthma     Lipids Mother      Arthritis Mother         knee " replacement     Alzheimer Disease Mother      Cerebrovascular Disease Brother      C.A.D. No family hx of      Cancer - colorectal No family hx of      Prostate Cancer No family hx of          Social History:     Social History     Socioeconomic History     Marital status:      Spouse name: Estefania Laura     Number of children: 1     Years of education: Not on file     Highest education level: Not on file   Occupational History     Not on file   Tobacco Use     Smoking status: Never Smoker     Smokeless tobacco: Never Used   Substance and Sexual Activity     Alcohol use: Yes     Alcohol/week: 0.0 standard drinks     Comment: 3-6 drinks on occ weekend night, occ drink on weekday     Drug use: No     Sexual activity: Yes     Partners: Female   Other Topics Concern      Service No     Blood Transfusions No     Caffeine Concern Not Asked     Occupational Exposure Not Asked     Hobby Hazards Not Asked     Sleep Concern Not Asked     Stress Concern Not Asked     Weight Concern Not Asked     Special Diet Not Asked     Back Care Not Asked     Exercise Not Asked     Bike Helmet Not Asked     Seat Belt Not Asked     Self-Exams Not Asked     Parent/sibling w/ CABG, MI or angioplasty before 65F 55M? Not Asked   Social History Narrative     Not on file     Social Determinants of Health     Financial Resource Strain:      Difficulty of Paying Living Expenses:    Food Insecurity:      Worried About Running Out of Food in the Last Year:      Ran Out of Food in the Last Year:    Transportation Needs:      Lack of Transportation (Medical):      Lack of Transportation (Non-Medical):    Physical Activity:      Days of Exercise per Week:      Minutes of Exercise per Session:    Stress:      Feeling of Stress :    Social Connections:      Frequency of Communication with Friends and Family:      Frequency of Social Gatherings with Friends and Family:      Attends Confucianism Services:      Active Member of Clubs or  "Organizations:      Attends Club or Organization Meetings:      Marital Status:    Intimate Partner Violence:      Fear of Current or Ex-Partner:      Emotionally Abused:      Physically Abused:      Sexually Abused:            Review of System:     Constitutional: Negative for fever or chills  Skin: Negative for rashes  Ears/Nose/Throat: Negative for nasal congestion, sore throat  Respiratory: No shortness of breath, dyspnea on exertion, cough, or hemoptysis  Cardiovascular: Negative for chest pain  Gastrointestinal: Negative for nausea, vomiting  Genitourinary: Negative for dysuria, hematuria  Musculoskeletal: Negative for myalgias  Neurologic: Negative for headaches  Psychiatric: Negative for depression, anxiety  Hematologic/Lymphatic/Immunologic: Negative  Endocrine: Negative  Behavioral: Negative for tobacco use       Physical Exam:   BP (!) 146/84 (BP Location: Right arm, Patient Position: Sitting, Cuff Size: Adult Large)   Pulse 74   Temp 96.9  F (36.1  C) (Temporal)   Resp 16   Ht 1.778 m (5' 10\")   Wt 106.1 kg (234 lb)   SpO2 99%   BMI 33.58 kg/m      GENERAL: alert and no distress  EYES: eyes grossly normal to inspection, and conjunctivae and sclerae normal  HENT: Normocephalic atraumatic. Nose and mouth without ulcers or lesions  NECK: supple  RESP: lungs clear to auscultation   CV: regular rate and rhythm, normal S1 S2  LYMPH: no peripheral edema   ABDOMEN: nondistended  MS: no gross musculoskeletal defects noted  SKIN: no suspicious lesions or rashes  NEURO: Alert & Oriented x 3.   PSYCH: mentation appears normal, affect normal        Diagnostic Test Results:     Diagnostic Test Results:  Labs reviewed in Epic    Hemoglobin A1C   Date Value Ref Range Status   10/11/2021 11.8 (H) 0.0 - 5.6 % Final     Comment:     Normal <5.7%   Prediabetes 5.7-6.4%    Diabetes 6.5% or higher     Note: Adopted from ADA consensus guidelines.   09/28/2020 13.2 (H) 0 - 5.6 % Final     Comment:     Normal <5.7% " Prediabetes 5.7-6.4%  Diabetes 6.5% or higher - adopted from ADA   consensus guidelines.  Reviewed: OK with previous         ASSESSMENT/PLAN:     (E11.21,  Z79.4) Type 2 diabetes mellitus with diabetic nephropathy, with long-term current use of insulin (H) (primary encounter diagnosis)  Comment: no hypoglycemia events recently  Plan: hydrochlorothiazide (MICROZIDE) 12.5 MG         Capsule, Hgb A1c  insulin aspart (NOVOLOG FLEXPEN) 100         UNIT/ML pen      (E78.5) Hyperlipidemia LDL goal <100  Comment: stable.   Plan: rosuvastatin (CRESTOR) 40 MG tablet      (R80.9) Microalbuminuria  Comment: stable.  Plan: lisinopril (ZESTRIL) 40 MG tablet      (I10) Essential hypertension, benign  Comment: stable  Plan: diltiazem ER COATED BEADS (CARDIZEM CD) 240 MG         24 hr capsule      (F41.8) Mixed anxiety depressive disorder  Comment: stable.  Plan: escitalopram (LEXAPRO) 20 MG tablet      (N52.9) Erectile dysfunction, unspecified erectile dysfunction type  Comment: stable  Plan: sildenafil (REVATIO) 20 MG tablet      (Z23) Need for prophylactic vaccination and inoculation against influenza  Comment: patient is due for flu vaccine  Plan: INFLUENZA, QUAD, HIGH DOSE, PF, 65YR + (FLUZONE        HD)      (Z12.5) Screening for prostate cancer  Comment: patient is due for PSA lab  Plan: PSA, screen    Follow Up Plan:     Patient is instructed to return to Internal Medicine clinic for follow-up visit in 3 to 6 months.        Kendal Wong MD  Internal Medicine  [facility]

## 2022-01-14 ENCOUNTER — IMMUNIZATION (OUTPATIENT)
Dept: NURSING | Facility: CLINIC | Age: 67
End: 2022-01-14
Payer: COMMERCIAL

## 2022-01-14 PROCEDURE — 0054A COVID-19,PF,PFIZER (12+ YRS): CPT

## 2022-01-14 PROCEDURE — 91305 COVID-19,PF,PFIZER (12+ YRS): CPT

## 2022-02-17 PROBLEM — Z71.89 ADVANCED DIRECTIVES, COUNSELING/DISCUSSION: Status: ACTIVE | Noted: 2018-03-12

## 2022-03-16 ENCOUNTER — APPOINTMENT (OUTPATIENT)
Dept: MRI IMAGING | Facility: CLINIC | Age: 67
DRG: 065 | End: 2022-03-16
Attending: EMERGENCY MEDICINE
Payer: COMMERCIAL

## 2022-03-16 ENCOUNTER — HOSPITAL ENCOUNTER (INPATIENT)
Facility: CLINIC | Age: 67
LOS: 2 days | Discharge: HOME | DRG: 065 | End: 2022-03-18
Attending: EMERGENCY MEDICINE | Admitting: HOSPITALIST
Payer: COMMERCIAL

## 2022-03-16 DIAGNOSIS — Z79.4 TYPE 2 DIABETES MELLITUS WITH DIABETIC NEPHROPATHY, WITH LONG-TERM CURRENT USE OF INSULIN (H): ICD-10-CM

## 2022-03-16 DIAGNOSIS — E11.21 TYPE 2 DIABETES MELLITUS WITH DIABETIC NEPHROPATHY, WITH LONG-TERM CURRENT USE OF INSULIN (H): ICD-10-CM

## 2022-03-16 DIAGNOSIS — I63.9 CEREBROVASCULAR ACCIDENT (CVA), UNSPECIFIED MECHANISM (H): ICD-10-CM

## 2022-03-16 LAB
ALBUMIN SERPL-MCNC: 3.3 G/DL (ref 3.4–5)
ALP SERPL-CCNC: 70 U/L (ref 40–150)
ALT SERPL W P-5'-P-CCNC: 17 U/L (ref 0–70)
ANION GAP SERPL CALCULATED.3IONS-SCNC: 7 MMOL/L (ref 3–14)
APTT PPP: 27 SECONDS (ref 22–38)
AST SERPL W P-5'-P-CCNC: 11 U/L (ref 0–45)
ATRIAL RATE - MUSE: 85 BPM
BASOPHILS # BLD AUTO: 0.1 10E3/UL (ref 0–0.2)
BASOPHILS NFR BLD AUTO: 1 %
BILIRUB SERPL-MCNC: 0.4 MG/DL (ref 0.2–1.3)
BUN SERPL-MCNC: 27 MG/DL (ref 7–30)
CALCIUM SERPL-MCNC: 9.1 MG/DL (ref 8.5–10.1)
CHLORIDE BLD-SCNC: 97 MMOL/L (ref 94–109)
CO2 SERPL-SCNC: 26 MMOL/L (ref 20–32)
CREAT SERPL-MCNC: 1.36 MG/DL (ref 0.66–1.25)
DIASTOLIC BLOOD PRESSURE - MUSE: NORMAL MMHG
EOSINOPHIL # BLD AUTO: 0.1 10E3/UL (ref 0–0.7)
EOSINOPHIL NFR BLD AUTO: 1 %
ERYTHROCYTE [DISTWIDTH] IN BLOOD BY AUTOMATED COUNT: 11.9 % (ref 10–15)
GFR SERPL CREATININE-BSD FRML MDRD: 57 ML/MIN/1.73M2
GLUCOSE BLD-MCNC: 338 MG/DL (ref 70–99)
GLUCOSE BLDC GLUCOMTR-MCNC: 322 MG/DL (ref 70–99)
HCT VFR BLD AUTO: 44.4 % (ref 40–53)
HGB BLD-MCNC: 14.6 G/DL (ref 13.3–17.7)
HOLD SPECIMEN: NORMAL
HOLD SPECIMEN: NORMAL
IMM GRANULOCYTES # BLD: 0.1 10E3/UL
IMM GRANULOCYTES NFR BLD: 1 %
INR PPP: 1.02 (ref 0.85–1.15)
INTERPRETATION ECG - MUSE: NORMAL
LYMPHOCYTES # BLD AUTO: 3.2 10E3/UL (ref 0.8–5.3)
LYMPHOCYTES NFR BLD AUTO: 29 %
MCH RBC QN AUTO: 28.7 PG (ref 26.5–33)
MCHC RBC AUTO-ENTMCNC: 32.9 G/DL (ref 31.5–36.5)
MCV RBC AUTO: 87 FL (ref 78–100)
MONOCYTES # BLD AUTO: 0.9 10E3/UL (ref 0–1.3)
MONOCYTES NFR BLD AUTO: 8 %
NEUTROPHILS # BLD AUTO: 6.8 10E3/UL (ref 1.6–8.3)
NEUTROPHILS NFR BLD AUTO: 60 %
NRBC # BLD AUTO: 0 10E3/UL
NRBC BLD AUTO-RTO: 0 /100
P AXIS - MUSE: 52 DEGREES
PLATELET # BLD AUTO: 450 10E3/UL (ref 150–450)
POTASSIUM BLD-SCNC: 4.2 MMOL/L (ref 3.4–5.3)
PR INTERVAL - MUSE: 152 MS
PROT SERPL-MCNC: 7.7 G/DL (ref 6.8–8.8)
QRS DURATION - MUSE: 98 MS
QT - MUSE: 368 MS
QTC - MUSE: 437 MS
R AXIS - MUSE: -26 DEGREES
RBC # BLD AUTO: 5.08 10E6/UL (ref 4.4–5.9)
SARS-COV-2 RNA RESP QL NAA+PROBE: NEGATIVE
SODIUM SERPL-SCNC: 130 MMOL/L (ref 133–144)
SYSTOLIC BLOOD PRESSURE - MUSE: NORMAL MMHG
T AXIS - MUSE: 46 DEGREES
VENTRICULAR RATE- MUSE: 85 BPM
WBC # BLD AUTO: 11.1 10E3/UL (ref 4–11)

## 2022-03-16 PROCEDURE — 80053 COMPREHEN METABOLIC PANEL: CPT | Performed by: EMERGENCY MEDICINE

## 2022-03-16 PROCEDURE — 83036 HEMOGLOBIN GLYCOSYLATED A1C: CPT | Performed by: HOSPITALIST

## 2022-03-16 PROCEDURE — 84484 ASSAY OF TROPONIN QUANT: CPT | Performed by: HOSPITALIST

## 2022-03-16 PROCEDURE — U0005 INFEC AGEN DETEC AMPLI PROBE: HCPCS | Performed by: EMERGENCY MEDICINE

## 2022-03-16 PROCEDURE — 99285 EMERGENCY DEPT VISIT HI MDM: CPT | Mod: 25

## 2022-03-16 PROCEDURE — 85610 PROTHROMBIN TIME: CPT | Performed by: EMERGENCY MEDICINE

## 2022-03-16 PROCEDURE — 250N000013 HC RX MED GY IP 250 OP 250 PS 637: Performed by: EMERGENCY MEDICINE

## 2022-03-16 PROCEDURE — 70544 MR ANGIOGRAPHY HEAD W/O DYE: CPT

## 2022-03-16 PROCEDURE — 70547 MR ANGIOGRAPHY NECK W/O DYE: CPT

## 2022-03-16 PROCEDURE — 99223 1ST HOSP IP/OBS HIGH 75: CPT | Mod: AI | Performed by: HOSPITALIST

## 2022-03-16 PROCEDURE — 36415 COLL VENOUS BLD VENIPUNCTURE: CPT | Performed by: EMERGENCY MEDICINE

## 2022-03-16 PROCEDURE — 80061 LIPID PANEL: CPT | Performed by: HOSPITALIST

## 2022-03-16 PROCEDURE — 85730 THROMBOPLASTIN TIME PARTIAL: CPT | Performed by: EMERGENCY MEDICINE

## 2022-03-16 PROCEDURE — 120N000001 HC R&B MED SURG/OB

## 2022-03-16 PROCEDURE — 93005 ELECTROCARDIOGRAM TRACING: CPT

## 2022-03-16 PROCEDURE — C9803 HOPD COVID-19 SPEC COLLECT: HCPCS

## 2022-03-16 PROCEDURE — 82040 ASSAY OF SERUM ALBUMIN: CPT | Performed by: EMERGENCY MEDICINE

## 2022-03-16 PROCEDURE — 85025 COMPLETE CBC W/AUTO DIFF WBC: CPT | Performed by: EMERGENCY MEDICINE

## 2022-03-16 PROCEDURE — 70551 MRI BRAIN STEM W/O DYE: CPT

## 2022-03-16 RX ORDER — CLOPIDOGREL BISULFATE 75 MG/1
300 TABLET ORAL ONCE
Status: DISCONTINUED | OUTPATIENT
Start: 2022-03-16 | End: 2022-03-17

## 2022-03-16 RX ORDER — GADOBUTROL 604.72 MG/ML
10 INJECTION INTRAVENOUS ONCE
Status: DISCONTINUED | OUTPATIENT
Start: 2022-03-16 | End: 2022-03-18 | Stop reason: HOSPADM

## 2022-03-16 RX ORDER — ASPIRIN 300 MG/1
300 SUPPOSITORY RECTAL ONCE
Status: COMPLETED | OUTPATIENT
Start: 2022-03-16 | End: 2022-03-16

## 2022-03-16 RX ORDER — ASPIRIN 81 MG/1
324 TABLET, CHEWABLE ORAL ONCE
Status: DISCONTINUED | OUTPATIENT
Start: 2022-03-16 | End: 2022-03-16

## 2022-03-16 RX ADMIN — ASPIRIN 300 MG: 300 SUPPOSITORY RECTAL at 21:17

## 2022-03-16 ASSESSMENT — ENCOUNTER SYMPTOMS
DIZZINESS: 0
PALPITATIONS: 0
SHORTNESS OF BREATH: 0
SPEECH DIFFICULTY: 1
LIGHT-HEADEDNESS: 0

## 2022-03-16 ASSESSMENT — ACTIVITIES OF DAILY LIVING (ADL)
ADLS_ACUITY_SCORE: 3
ADLS_ACUITY_SCORE: 3

## 2022-03-16 NOTE — ED PROVIDER NOTES
History   Chief Complaint:  Aphasia     HPI   Huseyin St is a 66 year old male with history of hypertension and type 2 diabetes who presents with aphasia. At 1300 yesterday, the patient spoke with his wife who noted he was having word finding difficulty and garbled speech. Since then, his speech difficulties have been waxing and waning, but he didn't want to present to the ER until today. He has been able to walk as normal throughout the day today and was able to drive to [place] and back. Today, his wife was able to convince him to present here due to his continued trouble with word fining. Of note, his wife states he used a large amount of cocaine recently. His most recent use was two days ago. He has not consumed any alcohol in the past week. He has no CP, SOB, palpitations, lightheadedness or dizziness.     Review of Systems   Respiratory: Negative for shortness of breath.    Cardiovascular: Negative for chest pain and palpitations.   Neurological: Positive for speech difficulty. Negative for dizziness and light-headedness.        No abnormal gait   All other systems reviewed and are negative.    Allergies:  Augmentin  Sulfa Drugs  Victoza  Xanax    Medications:  Cardizem  Lexapro  Macrozide  Novolog  Zestril  Crestor  Revatio    Past Medical History:    Anxiety  Essential hypertension, benign  Hyperlipidemia  Type 2 diabetes  Tietze's disease  Hypertrophy of prostate without urinary obstruction   Depressive disorder  Recurrent cold sores  NA  Left ventricular hypertrophy    Past Surgical History:    Colonoscopy  Right ear procedure    Family History:    Mother: Diabetes, Asthma, Lipids, Arthritis, Alzheimer's disease  Brother: Cerebrovascular disease    Social History:  The patient was accompanied to the ED by his wife.  The patient is a current cocaine user.  The patient has not drank alcohol in the past week.    Physical Exam     Patient Vitals for the past 24 hrs:   BP Temp Temp src Pulse Resp SpO2  "Height Weight   03/16/22 2045 (!) 145/86 -- -- 75 -- 97 % -- --   03/16/22 1900 (!) 140/86 -- -- 75 20 -- -- --   03/16/22 1858 -- -- -- 77 18 -- -- --   03/16/22 1857 -- -- -- 76 18 -- -- --   03/16/22 1856 (!) 140/86 -- -- 76 16 -- -- --   03/16/22 1704 -- -- -- 85 12 97 % -- --   03/16/22 1703 (!) 136/90 -- -- 84 17 97 % -- --   03/16/22 1516 (!) 140/75 97  F (36.1  C) Temporal 85 18 98 % 1.778 m (5' 10\") 104.3 kg (230 lb)       Physical Exam  General/Appearance: appears stated age, well-groomed, appears comfortable  Eyes: EOMI, no scleral injection, no icterus  ENT: MMM  Neck: supple, nl ROM, no stiffness  Cardiovascular: RRR, nl S1S2, no m/r/g, 2+ pulses in all 4 extremities, cap refill <2sec  Respiratory: CTAB, good air movement throughout, no wheezes/rhonchi/rales, no increased WOB, no retractions  GI: abd soft, non-distended, nttp,  no HSM, no rebound, no guarding, nl BS  MSK: ZHANG, good tone, no bony abnormality  Skin: warm and well-perfused, no rash, no edema, no ecchymosis, nl turgor  Neuro: alert, receptive aphasia and unable to name multiple common items, limited ability to follow commands (didn't know how to squeeze my hands when asked) but doesn't appear to have focal deficits (ZHANG) but limited exam 2/2 difficulty following commands  Psych: deferred  Heme: no petechia, no purpura, no active bleeding    Emergency Department Course   ECG:  ECG taken at 1710, ECG read at 1731  Normal sinus rhythm  Incomplete right bundle branch block  Anterior infarct, age undetermined  Abnormal ECG  Rate 85 bpm. CO interval 152 ms. QRS duration 98 ms. QT/QTc 368/437 ms. P-R-T axes 52 -26 46.    Imaging:  MR Brain w/o Contrast   Final Result   IMPRESSION:   HEAD MRI:    1.  Moderately sized acute/early subacute infarction at the anterior aspect of left temporal lobe.   2.  No definite associated hemorrhage or significant global mass effect.   3.  Mild chronic small vessel ischemia.   4.  Mild atrophy.      HEAD MRA:    1. "  Mild to moderate narrowing distal left petrous ICA.   2.  Mild to moderate narrowing distal left cavernous and proximal left supraclinoid ICA.   3.  Short segment moderate to marked narrowing distal left M1.   4.  Moderate areas of narrowings throughout smaller branches of MCA distributions bilaterally.   5.  Short segment moderate narrowing distal left P2 and proximal left P3.   6.  Moderate areas of narrowings distal branches of right posterior circulation.   7.  Mildly attenuated distal branches of right posterior circulation.      NECK MRA:   1.  Normal neck MRA.      MRA Angiogram Neck w/o Contrast   Final Result   IMPRESSION:   HEAD MRI:    1.  Moderately sized acute/early subacute infarction at the anterior aspect of left temporal lobe.   2.  No definite associated hemorrhage or significant global mass effect.   3.  Mild chronic small vessel ischemia.   4.  Mild atrophy.      HEAD MRA:    1.  Mild to moderate narrowing distal left petrous ICA.   2.  Mild to moderate narrowing distal left cavernous and proximal left supraclinoid ICA.   3.  Short segment moderate to marked narrowing distal left M1.   4.  Moderate areas of narrowings throughout smaller branches of MCA distributions bilaterally.   5.  Short segment moderate narrowing distal left P2 and proximal left P3.   6.  Moderate areas of narrowings distal branches of right posterior circulation.   7.  Mildly attenuated distal branches of right posterior circulation.      NECK MRA:   1.  Normal neck MRA.      MRA Brain ([place]) wo Contrast   Final Result   IMPRESSION:   HEAD MRI:    1.  Moderately sized acute/early subacute infarction at the anterior aspect of left temporal lobe.   2.  No definite associated hemorrhage or significant global mass effect.   3.  Mild chronic small vessel ischemia.   4.  Mild atrophy.      HEAD MRA:    1.  Mild to moderate narrowing distal left petrous ICA.   2.  Mild to moderate narrowing distal left cavernous and  proximal left supraclinoid ICA.   3.  Short segment moderate to marked narrowing distal left M1.   4.  Moderate areas of narrowings throughout smaller branches of MCA distributions bilaterally.   5.  Short segment moderate narrowing distal left P2 and proximal left P3.   6.  Moderate areas of narrowings distal branches of right posterior circulation.   7.  Mildly attenuated distal branches of right posterior circulation.      NECK MRA:   1.  Normal neck MRA.          Laboratory:  Labs Ordered and Resulted from Time of ED Arrival to Time of ED Departure   GLUCOSE BY METER - Abnormal       Result Value    GLUCOSE BY METER POCT 322 (*)    COMPREHENSIVE METABOLIC PANEL - Abnormal    Sodium 130 (*)     Potassium 4.2      Chloride 97      Carbon Dioxide (CO2) 26      Anion Gap 7      Urea Nitrogen 27      Creatinine 1.36 (*)     Calcium 9.1      Glucose 338 (*)     Alkaline Phosphatase 70      AST 11      ALT 17      Protein Total 7.7      Albumin 3.3 (*)     Bilirubin Total 0.4      GFR Estimate 57 (*)    CBC WITH PLATELETS AND DIFFERENTIAL - Abnormal    WBC Count 11.1 (*)     RBC Count 5.08      Hemoglobin 14.6      Hematocrit 44.4      MCV 87      MCH 28.7      MCHC 32.9      RDW 11.9      Platelet Count 450      % Neutrophils 60      % Lymphocytes 29      % Monocytes 8      % Eosinophils 1      % Basophils 1      % Immature Granulocytes 1      NRBCs per 100 WBC 0      Absolute Neutrophils 6.8      Absolute Lymphocytes 3.2      Absolute Monocytes 0.9      Absolute Eosinophils 0.1      Absolute Basophils 0.1      Absolute Immature Granulocytes 0.1      Absolute NRBCs 0.0     PARTIAL THROMBOPLASTIN TIME - Normal    aPTT 27     INR - Normal    INR 1.02     COVID-19 VIRUS (CORONAVIRUS) BY PCR      Emergency Department Course:     Reviewed:  I reviewed nursing notes, vitals, past medical history and care everywhere    Assessments:  1717 I obtained history and examined the patient as noted above.     2046 I rechecked and  updated the patient regarding the imaging results, laboratory results and the plan for care.    Consults:   2042 I spoke with Stroke/Neurology regarding the patient.    2108 I spoke with Stroke/Neurology who recommended rectal aspirin and waiting to administer Plavix.     2123 I spoke with the hospitalist, Dr. Ashley, regarding placement for the patient.        Disposition:  The patient was admitted to the hospital under the care of Dr. Ashley.     Impression & Plan        Medical Decision Making:  This patient is a 66-year-old male with history of hypertension, type 2 diabetes, cocaine abuse who presents today with aphasia.  For more than 24 hours he has had receptive aphasia with difficulty following commands.  Because of the length of time an MRI was obtained instead of calling a tier 1 or tier 2 stroke.  He unfortunately was unable to get all of the MRIs ordered but he did get enough imaging to show that he does have an acute stroke.  I have touched base with stroke neurology who also noticed multifocal stenosis of his vessels.  They are requesting that we keep his blood pressure permissive hypertension with ideal blood pressures greater than 140 but they do not want me to start a pressor as right now his blood pressure is approximately 140 systolic.  They wish to do Plavix and aspirin load but unfortunately we are unable to get him to follow commands enough to do a bedside swallow study.  I discussed this with them and they are recommending at this point then to hold off on the Plavix but to do rectal aspirin instead.  We will admit him to the hospitalist service for further neurologic work-up.    Diagnosis:  No diagnosis found.    Scribe Disclosure:  I, Michele Lopez, am serving as a scribe at 5:16 PM on 3/16/2022 to document services personally performed by Kaela Ye MD based on my observations and the provider's statements to me.      Kaela Ye MD  03/16/22 [id number]

## 2022-03-16 NOTE — ED TRIAGE NOTES
[facility]  ED Arrival Note    Arrives through triage. ABC's intact. . Pt arrives with c/o confusion, unable to follow certain commands, and having word finding difficulty and intermittent garbled speech. Per wife, the patient was noted to have these symptoms yesterday at 1 pm over the phone. Wife reports he also consumed cocaine on Monday.       Visitors during triage: Spouse      Triage Interventions: N/A    Ambulatory: Yes    Meets Stroke Criteria?: No, outside of the stroke code window    Meets Trauma Criteria?: No    Shock Index: <0.8, for provider reference    Directed to: Triage Lobby    Pronouns: he/him

## 2022-03-16 NOTE — ED NOTES
RN Across the Room Assessment in Triage      Means of arrival: EMS    Airway: Alert in the AVPU scale, Patent airway and No secretions draining from the mouth    Breathing: Spontaneous breathing and symmetrical rise and fall of chest    Circulation: No external bleeding, No chest deformity or penetrating injuries to the chest, neck or abdomen noted and No skin discoloration noted    Disability: Ambulatory, wife reports stroke concern      Across the Room Intervention: Fast triage, ED in surge capacity, triage room occupied, will prioritize this able as able.

## 2022-03-17 ENCOUNTER — APPOINTMENT (OUTPATIENT)
Dept: SPEECH THERAPY | Facility: CLINIC | Age: 67
DRG: 065 | End: 2022-03-17
Payer: COMMERCIAL

## 2022-03-17 ENCOUNTER — APPOINTMENT (OUTPATIENT)
Dept: CT IMAGING | Facility: CLINIC | Age: 67
DRG: 065 | End: 2022-03-17
Payer: COMMERCIAL

## 2022-03-17 ENCOUNTER — APPOINTMENT (OUTPATIENT)
Dept: PHYSICAL THERAPY | Facility: CLINIC | Age: 67
DRG: 065 | End: 2022-03-17
Attending: HOSPITALIST
Payer: COMMERCIAL

## 2022-03-17 ENCOUNTER — APPOINTMENT (OUTPATIENT)
Dept: SPEECH THERAPY | Facility: CLINIC | Age: 67
DRG: 065 | End: 2022-03-17
Attending: HOSPITALIST
Payer: COMMERCIAL

## 2022-03-17 ENCOUNTER — APPOINTMENT (OUTPATIENT)
Dept: OCCUPATIONAL THERAPY | Facility: CLINIC | Age: 67
DRG: 065 | End: 2022-03-17
Attending: HOSPITALIST
Payer: COMMERCIAL

## 2022-03-17 LAB
AMPHETAMINES UR QL SCN: ABNORMAL
ANION GAP SERPL CALCULATED.3IONS-SCNC: 7 MMOL/L (ref 3–14)
BARBITURATES UR QL: ABNORMAL
BENZODIAZ UR QL: ABNORMAL
BUN SERPL-MCNC: 18 MG/DL (ref 7–30)
CALCIUM SERPL-MCNC: 9 MG/DL (ref 8.5–10.1)
CANNABINOIDS UR QL SCN: ABNORMAL
CHLORIDE BLD-SCNC: 99 MMOL/L (ref 94–109)
CHOLEST SERPL-MCNC: 224 MG/DL
CO2 SERPL-SCNC: 25 MMOL/L (ref 20–32)
COCAINE UR QL: ABNORMAL
CREAT SERPL-MCNC: 0.97 MG/DL (ref 0.66–1.25)
ERYTHROCYTE [DISTWIDTH] IN BLOOD BY AUTOMATED COUNT: 11.8 % (ref 10–15)
GFR SERPL CREATININE-BSD FRML MDRD: 86 ML/MIN/1.73M2
GLUCOSE BLD-MCNC: 216 MG/DL (ref 70–99)
GLUCOSE BLDC GLUCOMTR-MCNC: 197 MG/DL (ref 70–99)
GLUCOSE BLDC GLUCOMTR-MCNC: 219 MG/DL (ref 70–99)
GLUCOSE BLDC GLUCOMTR-MCNC: 233 MG/DL (ref 70–99)
GLUCOSE BLDC GLUCOMTR-MCNC: 241 MG/DL (ref 70–99)
GLUCOSE BLDC GLUCOMTR-MCNC: 248 MG/DL (ref 70–99)
GLUCOSE BLDC GLUCOMTR-MCNC: 272 MG/DL (ref 70–99)
HBA1C MFR BLD: 11.8 % (ref 0–5.6)
HCT VFR BLD AUTO: 45 % (ref 40–53)
HDLC SERPL-MCNC: 33 MG/DL
HGB BLD-MCNC: 15.1 G/DL (ref 13.3–17.7)
LDLC SERPL CALC-MCNC: 136 MG/DL
MCH RBC QN AUTO: 28.9 PG (ref 26.5–33)
MCHC RBC AUTO-ENTMCNC: 33.6 G/DL (ref 31.5–36.5)
MCV RBC AUTO: 86 FL (ref 78–100)
NONHDLC SERPL-MCNC: 191 MG/DL
OPIATES UR QL SCN: ABNORMAL
PCP UR QL SCN: ABNORMAL
PLATELET # BLD AUTO: 457 10E3/UL (ref 150–450)
POTASSIUM BLD-SCNC: 3.7 MMOL/L (ref 3.4–5.3)
RBC # BLD AUTO: 5.22 10E6/UL (ref 4.4–5.9)
SODIUM SERPL-SCNC: 131 MMOL/L (ref 133–144)
TRIGL SERPL-MCNC: 275 MG/DL
TROPONIN I SERPL HS-MCNC: 4 NG/L
WBC # BLD AUTO: 10.4 10E3/UL (ref 4–11)

## 2022-03-17 PROCEDURE — 258N000003 HC RX IP 258 OP 636: Performed by: INTERNAL MEDICINE

## 2022-03-17 PROCEDURE — 97535 SELF CARE MNGMENT TRAINING: CPT | Mod: GO

## 2022-03-17 PROCEDURE — 250N000009 HC RX 250: Performed by: HOSPITALIST

## 2022-03-17 PROCEDURE — 99222 1ST HOSP IP/OBS MODERATE 55: CPT | Mod: GC | Performed by: PSYCHIATRY & NEUROLOGY

## 2022-03-17 PROCEDURE — 70496 CT ANGIOGRAPHY HEAD: CPT

## 2022-03-17 PROCEDURE — 250N000013 HC RX MED GY IP 250 OP 250 PS 637

## 2022-03-17 PROCEDURE — 250N000013 HC RX MED GY IP 250 OP 250 PS 637: Performed by: HOSPITALIST

## 2022-03-17 PROCEDURE — 82310 ASSAY OF CALCIUM: CPT | Performed by: HOSPITALIST

## 2022-03-17 PROCEDURE — 97530 THERAPEUTIC ACTIVITIES: CPT | Mod: GO

## 2022-03-17 PROCEDURE — 80307 DRUG TEST PRSMV CHEM ANLYZR: CPT | Performed by: EMERGENCY MEDICINE

## 2022-03-17 PROCEDURE — 92507 TX SP LANG VOICE COMM INDIV: CPT | Mod: GN | Performed by: SPEECH-LANGUAGE PATHOLOGIST

## 2022-03-17 PROCEDURE — 120N000001 HC R&B MED SURG/OB

## 2022-03-17 PROCEDURE — 97161 PT EVAL LOW COMPLEX 20 MIN: CPT | Mod: GP | Performed by: PHYSICAL THERAPIST

## 2022-03-17 PROCEDURE — 250N000013 HC RX MED GY IP 250 OP 250 PS 637: Performed by: STUDENT IN AN ORGANIZED HEALTH CARE EDUCATION/TRAINING PROGRAM

## 2022-03-17 PROCEDURE — 250N000011 HC RX IP 250 OP 636: Performed by: HOSPITALIST

## 2022-03-17 PROCEDURE — 258N000003 HC RX IP 258 OP 636: Performed by: HOSPITALIST

## 2022-03-17 PROCEDURE — 92523 SPEECH SOUND LANG COMPREHEN: CPT | Mod: GN | Performed by: SPEECH-LANGUAGE PATHOLOGIST

## 2022-03-17 PROCEDURE — 85027 COMPLETE CBC AUTOMATED: CPT | Performed by: HOSPITALIST

## 2022-03-17 PROCEDURE — 92526 ORAL FUNCTION THERAPY: CPT | Mod: GN | Performed by: SPEECH-LANGUAGE PATHOLOGIST

## 2022-03-17 PROCEDURE — 250N000012 HC RX MED GY IP 250 OP 636 PS 637: Performed by: HOSPITALIST

## 2022-03-17 PROCEDURE — 36415 COLL VENOUS BLD VENIPUNCTURE: CPT | Performed by: HOSPITALIST

## 2022-03-17 PROCEDURE — 70450 CT HEAD/BRAIN W/O DYE: CPT

## 2022-03-17 PROCEDURE — 99233 SBSQ HOSP IP/OBS HIGH 50: CPT | Performed by: INTERNAL MEDICINE

## 2022-03-17 PROCEDURE — 97165 OT EVAL LOW COMPLEX 30 MIN: CPT | Mod: GO

## 2022-03-17 PROCEDURE — 92610 EVALUATE SWALLOWING FUNCTION: CPT | Mod: GN | Performed by: SPEECH-LANGUAGE PATHOLOGIST

## 2022-03-17 RX ORDER — IOPAMIDOL 755 MG/ML
70 INJECTION, SOLUTION INTRAVASCULAR ONCE
Status: COMPLETED | OUTPATIENT
Start: 2022-03-17 | End: 2022-03-17

## 2022-03-17 RX ORDER — SODIUM CHLORIDE 9 MG/ML
INJECTION, SOLUTION INTRAVENOUS CONTINUOUS
Status: DISCONTINUED | OUTPATIENT
Start: 2022-03-17 | End: 2022-03-17

## 2022-03-17 RX ORDER — ONDANSETRON 4 MG/1
4 TABLET, ORALLY DISINTEGRATING ORAL EVERY 6 HOURS PRN
Status: DISCONTINUED | OUTPATIENT
Start: 2022-03-17 | End: 2022-03-18 | Stop reason: HOSPADM

## 2022-03-17 RX ORDER — ROSUVASTATIN CALCIUM 20 MG/1
40 TABLET, COATED ORAL DAILY
Status: DISCONTINUED | OUTPATIENT
Start: 2022-03-17 | End: 2022-03-18 | Stop reason: HOSPADM

## 2022-03-17 RX ORDER — SODIUM CHLORIDE 9 MG/ML
INJECTION, SOLUTION INTRAVENOUS CONTINUOUS
Status: DISCONTINUED | OUTPATIENT
Start: 2022-03-17 | End: 2022-03-18 | Stop reason: HOSPADM

## 2022-03-17 RX ORDER — ASPIRIN 81 MG/1
81 TABLET ORAL DAILY
Status: DISCONTINUED | OUTPATIENT
Start: 2022-03-17 | End: 2022-03-17

## 2022-03-17 RX ORDER — ESCITALOPRAM OXALATE 20 MG/1
20 TABLET ORAL DAILY
Status: DISCONTINUED | OUTPATIENT
Start: 2022-03-17 | End: 2022-03-18 | Stop reason: HOSPADM

## 2022-03-17 RX ORDER — CLOPIDOGREL BISULFATE 75 MG/1
75 TABLET ORAL DAILY
Status: DISCONTINUED | OUTPATIENT
Start: 2022-03-18 | End: 2022-03-18 | Stop reason: HOSPADM

## 2022-03-17 RX ORDER — ONDANSETRON 2 MG/ML
4 INJECTION INTRAMUSCULAR; INTRAVENOUS EVERY 6 HOURS PRN
Status: DISCONTINUED | OUTPATIENT
Start: 2022-03-17 | End: 2022-03-18 | Stop reason: HOSPADM

## 2022-03-17 RX ORDER — DEXTROSE MONOHYDRATE 25 G/50ML
25-50 INJECTION, SOLUTION INTRAVENOUS
Status: DISCONTINUED | OUTPATIENT
Start: 2022-03-17 | End: 2022-03-18 | Stop reason: HOSPADM

## 2022-03-17 RX ORDER — LIDOCAINE 40 MG/G
CREAM TOPICAL
Status: DISCONTINUED | OUTPATIENT
Start: 2022-03-17 | End: 2022-03-18 | Stop reason: HOSPADM

## 2022-03-17 RX ORDER — ASPIRIN 81 MG/1
81 TABLET, CHEWABLE ORAL DAILY
Status: DISCONTINUED | OUTPATIENT
Start: 2022-03-17 | End: 2022-03-17

## 2022-03-17 RX ORDER — CLOPIDOGREL BISULFATE 75 MG/1
300 TABLET ORAL ONCE
Status: COMPLETED | OUTPATIENT
Start: 2022-03-17 | End: 2022-03-17

## 2022-03-17 RX ORDER — NICOTINE POLACRILEX 4 MG
15-30 LOZENGE BUCCAL
Status: DISCONTINUED | OUTPATIENT
Start: 2022-03-17 | End: 2022-03-18 | Stop reason: HOSPADM

## 2022-03-17 RX ADMIN — INSULIN GLARGINE 25 UNITS: 100 INJECTION, SOLUTION SUBCUTANEOUS at 01:16

## 2022-03-17 RX ADMIN — INSULIN ASPART 2 UNITS: 100 INJECTION, SOLUTION INTRAVENOUS; SUBCUTANEOUS at 05:00

## 2022-03-17 RX ADMIN — ESCITALOPRAM OXALATE 20 MG: 20 TABLET ORAL at 09:58

## 2022-03-17 RX ADMIN — INSULIN ASPART 2 UNITS: 100 INJECTION, SOLUTION INTRAVENOUS; SUBCUTANEOUS at 09:59

## 2022-03-17 RX ADMIN — IOPAMIDOL 70 ML: 755 INJECTION, SOLUTION INTRAVENOUS at 12:33

## 2022-03-17 RX ADMIN — SODIUM CHLORIDE: 9 INJECTION, SOLUTION INTRAVENOUS at 01:15

## 2022-03-17 RX ADMIN — SODIUM CHLORIDE: 9 INJECTION, SOLUTION INTRAVENOUS at 10:57

## 2022-03-17 RX ADMIN — ROSUVASTATIN CALCIUM 40 MG: 20 TABLET, FILM COATED ORAL at 09:57

## 2022-03-17 RX ADMIN — CLOPIDOGREL BISULFATE 300 MG: 75 TABLET ORAL at 18:12

## 2022-03-17 RX ADMIN — ASPIRIN 81 MG: 81 TABLET, COATED ORAL at 09:57

## 2022-03-17 RX ADMIN — SODIUM CHLORIDE 90 ML: 900 INJECTION INTRAVENOUS at 12:33

## 2022-03-17 RX ADMIN — INSULIN ASPART 3 UNITS: 100 INJECTION, SOLUTION INTRAVENOUS; SUBCUTANEOUS at 01:15

## 2022-03-17 ASSESSMENT — ACTIVITIES OF DAILY LIVING (ADL)
ADLS_ACUITY_SCORE: 5
IADL_COMMENTS: (I) IN IADLS
ADLS_ACUITY_SCORE: 5
ADLS_ACUITY_SCORE: 3
ADLS_ACUITY_SCORE: 5
ADLS_ACUITY_SCORE: 3
ADLS_ACUITY_SCORE: 5
ADLS_ACUITY_SCORE: 5
PREVIOUS_RESPONSIBILITIES: MEAL PREP;HOUSEKEEPING;LAUNDRY;SHOPPING;YARDWORK;MEDICATION MANAGEMENT;FINANCES;DRIVING;WORK

## 2022-03-17 NOTE — PROGRESS NOTES
"   03/17/22 1510   General Information   Onset of Illness/Injury or Date of Surgery 03/16/22   Referring Physician Lor Ashley MD   Patient/Family Therapy Goal Statement (SLP) \"home\" repeatedly   Pertinent History of Current Problem Huseyin St is a 66 year old male with a past medical history of htn, Dm2 presents to the hospital with a stroke. Acute ischemic stroke of Left MCA due to undetermined etiology Likely related Cocaine induced Vasospasm vs. Severe intracranial athero. MRI, \"Moderately sized acute/early subacute infarction at the anterior aspect of left temporal lobe.\"   General Observations Pt was asleep but easily awoken. Attempts at verbal output much more effortful this PM; pt became notably frustrated with word-finding difficulties during speech-language evaluation   Type of Evaluation   Type of Evaluation Speech, Language, Cognition   Western Aphasia Battery- Revised Bedside Record From   Spontaneous Speech Content Score (out of 10) 1   Spontaneous Speech Fluency Score (out of 10) 2   Auditory Verbal Comprehension Score (out of 10) 0   Sequential Commands Score (out of 10) 0   Repetition Score (out of 10) 2   Object Naming Score (out of 10) 7   Bedside Aphasia Sum 12   WAB-R Bedside Aphasia Score 20   Reading Score (out of 10) 0   Writing Score (out of 10) 0   Bedside Language Sum 12   Bedside Language Score 15   Apraxia Score - optional (out of 10) 0   Bedside Aphasia Classification Global Aphasia   Aphasia Severity Level Very Severe Aphasia   General Therapy Interventions   Planned Therapy Interventions Language   Language Auditory comprehension;Verbal expression   Clinical Impression   Criteria for Skilled Therapeutic Interventions Met (SLP Eval) Yes, treatment indicated   SLP Diagnosis Severe receptive and expressive aphasia; characteristic of Global Aphasia   Risks & Benefits of therapy have been explained evaluation/treatment results reviewed;care plan/treatment goals " reviewed;risks/benefits reviewed;participants voiced agreement with care plan;current/potential barriers reviewed;participants included   Clinical Impression Comments SLP: Patient presents with severe receptive and expressive aphasia per findings from today's language evaluation. Pt displayed significant comprehension and word-finding difficulties even when provided with maximum cues. Pt unable to answer any biographical or orientation questions correctly, including name, state, and year. Unable to respond to any yes/no questions. Speech fluency characterized by single words, often paraphasias, effortful and hesitant. Pt unable to follow 1-step body commands in imitation. Throughout evaluation pt was observed to demonstrate increasing frustration with word-finding difficulties; often shrugged or shook his head when unable to find words. Plan for speech therapy treatment to target receptive and expressive language difficulties.    SLP Discharge Planning   SLP Discharge Recommendation home with assist;home with outpatient speech therapy  (home with 24/7 supervision and assist)   SLP Rationale for DC Rec Pt is severely below baseline for speech/language function   SLP Brief overview of current status  Severe receptive and expressive aphasia    Total Evaluation Time   Total Evaluation Time (Minutes) 20   SLP Goals   Therapy Frequency (SLP Eval) daily   SLP Predicated Duration/Target Date for Goal Attainment 03/23/22   SLP Goals Language Comprehension;Communication;SLP Goal 1;SLP Goal 2   SLP: Improve language comprehension for interaction with caregivers/environment auditory and written;with visual and/or verbal support;moderate assist   SLP: Communicate basic wants and needs verbal and written;using gestures;minimal assist   Psychosocial Support   Trust Relationship/Rapport care explained;questions answered

## 2022-03-17 NOTE — CONSULTS
"      [facility]    Stroke Consult Note    Reason for Consult:  Stroke    Chief Complaint: Aphasia       HPI  Huseyin St is a 66 year old male  has a past medical history of Anxiety, Essential hypertension, benign, Hypercholesteremia, Sleep apnea, and T2DM (type 2 diabetes mellitus) (H).   Patient presented 3/16/22 complaining of word finding difficulty. last known well was 3/15 at ~1pm and presentation delayed until his wife insisted he present to the ED. In the ED no motor deficits. Wife states he uses cocaine regularly and this weekend he used \"a lot of cocaine.\"    Stroke Evaluation Summarized    MRI/Head CT HEAD MRI:   1.  Moderately sized acute/early subacute infarction at the anterior aspect of left temporal lobe.  2.  No definite associated hemorrhage or significant global mass effect.  3.  Mild chronic small vessel ischemia.  4.  Mild atrophy.   Intracranial Vasculature   HEAD MRA:   1.  Mild to moderate narrowing distal left petrous ICA.  2.  Mild to moderate narrowing distal left cavernous and proximal left supraclinoid ICA.  3.  Short segment moderate to marked narrowing distal left M1.  4.  Moderate areas of narrowings throughout smaller branches of MCA distributions bilaterally.  5.  Short segment moderate narrowing distal left P2 and proximal left P3.  6.  Moderate areas of narrowings distal branches of right posterior circulation.  7.  Mildly attenuated distal branches of right posterior circulation.    CTA head 3/17  1. Intracranial atherosclerotic disease involving the bilateral  intracranial distal internal carotid arteries, left middle cerebral  artery and left posterior cerebral artery as detailed below.  There is calcified atherosclerotic plaque of the  intracranial distal internal carotid arteries on both sides. There is  moderate atherosclerotic narrowing of the supraclinoid segment of the  intracranial distal left internal carotid artery. There is mild  narrowing " of the supraclinoid intracranial distal right internal carotid artery. There is focal moderate narrowing at the distal [id number] of the left posterior cerebral artery. There is moderate narrowing at the junction between the M1 segment and the M2 branches of the left middle cerebral artery.    Cervical Vasculature NECK MRA:  1.  Normal neck MRA    CTA neck 3/17  Otherwise, normal neck and head CTA.     Echocardiogram Pending   EKG/Telemetry Sinus    Other Testing Not Applicable     LDL  3/16/2022: 136 mg/dL   A1C  3/16/2022: 11.8 %   Troponin 3/16/2022: 4 ng/L       Impression  Acute ischemic stroke of L MCA due to other etiology (cocaine vasospasm vs severe ICAD)  In order to assess cocaine vasospasm vs ICAD, will repeat CTA head and neck--if improvement in narrowing, will treat with Aspirin monotherapy. If similar appearance of narrowing, will consider [facility]spirin and [facility]av[facility] for 90 days followed by Aspirin monotherapy). If indeterminate, will likely do Aspirin and plavix and repeat CTA in 2-3 months prior to appointment to assess as long as patient abstains from cocaine.     Given CTA, likely ICAD - should repeat CTA in 2-3 months and follow up in clinic.       Recommendations  - Neurochecks and Vital Signs every 4 hours   -  goal SBP < 160 mmHg    - Repeat CTA head and neck, Completed  - Repeat CTA head and neck in 2-3 months (ordered)  - Daily aspirin 325 mg for secondary stroke prevention    Due to results of CTA:  - Plavix Load with 300mg  - Plavix (clopidogrel) 75 mg PO Daily   - Plavix + Aspirin for 90 days   - After 90 days, Aspirin alone    - Statin: Atorvastatin 80mg    - Telemetry, EKG  - Bedside Glucose Monitoring  - A1c, Lipid Panel, Troponin x 3  - PT/OT/SLP  - Stroke Education  - Euthermia, Euglycemia  - Heart monitor on discharge (ordered)    Patient Follow-up    - in 12 weeks with stroke clinic LUNA (any stroke LUNA) at virtual stroke clinic. Patient will be contacted by [facility]  "stroke clinic team after discharge.    Thank you for this consult. We will follow peripherally for the results of CTA and echocardiogram and update recommendations as indicated. Please page with any further questions and to re-engage.        The Stroke Staff is Dr. Raji Jon MD  Vascular Neurology Fellow  To page me or covering stroke neurology team member, click here: AMCOM   Choose \"On Call\" tab at top, then search dropdown box for \"Neurology Adult\", select location, press Enter, then look for stroke/neuro ICU/telestroke.    _____________________________________________________    Clinically Significant Risk Factors Present on Admission         # Hyponatremia: Na = 130 mmol/L (Ref range: 133 - 144 mmol/L) on admission, will monitor as appropriate      # Platelet Defect: home medication list includes an antiplatelet medication       Past Medical History   Past Medical History:   Diagnosis Date     Anxiety      Essential hypertension, benign      Hypercholesteremia      Sleep apnea      T2DM (type 2 diabetes mellitus) (H)      Past Surgical History   Past Surgical History:   Procedure Laterality Date     COLONOSCOPY       COLONOSCOPY N/A 10/26/2020    Procedure: COLONOSCOPY;  Surgeon: Randy Gonzalez MD;  Location:  GI     ENT SURGERY      R) ear \"procedure\"     Medications   Home Meds  Prior to Admission medications    Medication Sig Start Date End Date Taking? Authorizing Provider   ASPIRIN 81 MG OR TABS 1 tab po QD (Once per day) 6/25/04  Yes    clotrimazole (LOTRIMIN) 1 % external cream Apply topically 2 times daily  Patient taking differently: Apply topically 2 times daily as needed  12/11/20  Yes Kendal Wong MD   diltiazem ER COATED BEADS (CARDIZEM CD) 240 MG 24 hr capsule Take 1 capsule (240 mg) by mouth daily 10/12/21  Yes Kendal Wong MD   escitalopram (LEXAPRO) 20 MG tablet Take 1 tablet (20 mg) by mouth daily 10/12/21  Yes Kendal Wong MD "   hydrochlorothiazide (MICROZIDE) 12.5 MG capsule Take 1 capsule (12.5 mg) by mouth daily 10/12/21  Yes Kendal Wong MD   insulin aspart (NOVOLOG FLEXPEN) 100 UNIT/ML pen INJECT 10 UNITS BEFORE BREAKFAST LUNCH AND DINNER MAX OF 70 UNITS PER DAY. DUE FOR APPOINTMENT.  Patient taking differently: INJECT 10 UNITS BEFORE BREAKFAST LUNCH AND DINNER PLUS SLIDING SCALE, MAX OF 70 UNITS PER DAY. DUE FOR APPOINTMENT. 10/12/21  Yes Kendal Wong MD   insulin glargine (LANTUS SOLOSTAR) 100 UNIT/ML pen INJ 55 UNITS SC HS 10/12/21  Yes Kendal Wong MD   lisinopril (ZESTRIL) 40 MG tablet Take 1 tablet (40 mg) by mouth daily 10/12/21  Yes Kendal Wong MD   alcohol swab prep pads Use to swab area of injection/ian as directed 1/6/20   Kendal Wong MD   blood glucose (NO BRAND SPECIFIED) test strip Use to test blood sugar 3 times daily or as directed. To accompany: Blood Glucose Monitor Brands: per insurance. 10/12/21   Kendal Wong MD   blood glucose calibration (NO BRAND SPECIFIED) solution Use to calibrate blood glucose monitor as needed as directed. To accompany: Blood Glucose Monitor Brands: per insurance. 1/6/20   Kendal Wong MD   insulin pen needle (BD MARIE U/F) 32G X 4 MM miscellaneous USE THREE PEN NEEDLES DAILY AS DIRECTED 10/12/21   Kendal Wong MD   ORDER FOR DME Auto-CPAP: Max 12 cm H2O Min 10 cm H2O  Continuous Lifetime need and heated humidity.    7/5/12   Goltz, Bennett Ezra, PA-C   rosuvastatin (CRESTOR) 40 MG tablet Take 1 tablet (40 mg) by mouth daily  Patient not taking: Reported on 3/16/2022 10/12/21   Kendal Wong MD   sildenafil (REVATIO) 20 MG tablet Take 1 tablet (20 mg) by mouth 3 times daily  Patient not taking: Reported on 3/16/2022 10/12/21   Kendal Wong MD   thin (NO BRAND SPECIFIED) lancets Use to test blood sugar 3 times daily or as directed. To accompany: Blood Glucose Monitor Brands: per insurance. 1/6/20   Kendal Wong,  MD       Scheduled Meds    aspirin  81 mg Oral Daily    Or     aspirin  81 mg Per Feeding Tube Daily     escitalopram  20 mg Oral or Feeding Tube Daily     gadobutrol  10 mL Intravenous Once     insulin aspart  1-6 Units Subcutaneous Q4H     insulin glargine  15 Units Subcutaneous QAM AC     insulin glargine  25 Units Subcutaneous At Bedtime     rosuvastatin  40 mg Oral or Feeding Tube Daily     sodium chloride (PF)  100 mL Intravenous Once     sodium chloride (PF)  3 mL Intracatheter Q8H       Infusion Meds    - MEDICATION INSTRUCTIONS -       - MEDICATION INSTRUCTIONS -       sodium chloride 100 mL/hr at 03/17/22 0115       PRN Meds  glucose **OR** dextrose **OR** glucagon, lidocaine 4%, lidocaine (buffered or not buffered), - MEDICATION INSTRUCTIONS -, - MEDICATION INSTRUCTIONS -, ondansetron **OR** ondansetron, sodium chloride (PF)    Allergies   Allergies   Allergen Reactions     Augmentin Diarrhea     Sulfa Drugs      Unknown - reaction occurred as a child     Victoza      Skin rash     Xanax      Difficult to wean off     Family History   Family History   Adopted: Yes   Problem Relation Age of Onset     Diabetes Mother      Respiratory Mother         asthma     Lipids Mother      Arthritis Mother         knee replacement     Alzheimer Disease Mother      Cerebrovascular Disease Brother      C.A.D. No family hx of      Cancer - colorectal No family hx of      Prostate Cancer No family hx of      Social History   Social History     Tobacco Use     Smoking status: Never Smoker     Smokeless tobacco: Never Used   Substance Use Topics     Alcohol use: Yes     Alcohol/week: 0.0 standard drinks     Comment: 3-6 drinks on occ weekend night, occ drink on weekday     Drug use: No       Review of Systems   The 10 point Review of Systems is negative other than noted in the HPI or here.        PHYSICAL EXAMINATION   Temp:  [97  F (36.1  C)-98.2  F (36.8  C)] 98.2  F (36.8  C)  Pulse:  [70-85] 81  Resp:  [12-20] 18  BP:  (119-145)/(69-92) 139/85  SpO2:  [95 %-98 %] 97 %    General Exam  General:  patient lying in bed without any acute distress    HEENT:  normocephalic/atraumatic  Cardio:  RRR  Pulmonary:  no respiratory distress  Abdomen:  non-distended  Extremities:  no edema  Skin:  intact     Neuro Exam  Mental Status:  Pt not following commands, non fluent statements, anomia cannot repeat  Cranial Nerves:  visual fields intact, PERRL, EOMI with normal smooth pursuit, facial sensation intact and symmetric, facial movements symmetric, hearing not formally tested but intact to conversation, no dysarthria  Motor:  normal muscle tone and bulk, no abnormal movements, able to move all limbs spontaneously, strength 5/5 throughout upper and lower extremities, no pronator drift    Sensory:  difficult to assess given aphasia - winced with pp in L face  Coordination:  normal finger-to-nose and heel-to-shin bilaterally without dysmetria  Station/Gait:  deferred    Dysphagia Screen  Per Nursing    Stroke Scales    NIHSS  1a. Level of Consciousness 0-->Alert, keenly responsive   1b. LOC Questions 2-->Answers neither question correctly   1c. LOC Commands 2-->Performs neither task correctly   2.   Best Gaze 0-->Normal   3.   Visual 0-->No visual loss   4.   Facial Palsy 0-->Normal symmetrical movements   5a. Motor Arm, Left 0-->No drift, limb holds 90 (or 45) degrees for full 10 secs   5b. Motor Arm, Right 0-->No drift, limb holds 90 (or 45) degrees for full 10 secs   6a. Motor Leg, Left 0-->No drift, leg holds 30 degree position for full 5 secs   6b. Motor Leg, right 0-->No drift, leg holds 30 degree position for full 5 secs   7.   Limb Ataxia 0-->Absent   8.   Sensory 1-->Mild-to-moderate sensory loss, patient feels pinprick is less sharp or is dull on the affected side, or there is a loss of superficial pain with pinprick, but patient is aware of being touched   9.   Best Language 2-->Severe aphasia, all communication is through fragmentary  expression, great need for inference, questioning, and guessing by the listener. Range of information that can be exchanged is limited, listener carries burden of. . . (see row details)   10. Dysarthria 0-->Normal   11. Extinction and Inattention  0-->No abnormality   Total 7 (03/17/22 1030)       Modified McKinley Score (Pre-morbid)   0-No deficits     Discharge mRS  3-Moderate disability; requiring some help, but able to walk without assistance     Imaging  I personally reviewed all imaging; relevant findings per HPI.    Labs Data   CBC  Recent Labs   Lab 03/16/22  1704   WBC 11.1*   RBC 5.08   HGB 14.6   HCT 44.4        Basic Metabolic Panel   Recent Labs   Lab 03/17/22  0437 03/17/22  0118 03/16/22  1704   NA  --   --  130*   POTASSIUM  --   --  4.2   CHLORIDE  --   --  97   CO2  --   --  26   BUN  --   --  27   CR  --   --  1.36*   * 272* 338*   EVER  --   --  9.1     Liver Panel  Recent Labs   Lab 03/16/22  1704   PROTTOTAL 7.7   ALBUMIN 3.3*   BILITOTAL 0.4   ALKPHOS 70   AST 11   ALT 17     INR    Recent Labs   Lab Test 03/16/22  1704   INR 1.02      Lipid Profile    Recent Labs   Lab Test 03/16/22  1704 10/11/21  0942 09/28/20  0925 04/21/16  0906 08/20/15  0830 01/14/15  0847 04/21/14  0815   CHOL 224* 228* 214*   < > 136 316* 155   HDL 33* 37* 42   < > 42 41 37*   * 116* 96   < > 59 219* 80   TRIG 275* 377* 380*   < > 176* 282* 189*   CHOLHDLRATIO  --   --   --   --  3.2 7.7* 4.2    < > = values in this interval not displayed.     A1C    Recent Labs   Lab Test 03/16/22  1704 10/11/21  0943 09/28/20  0925   A1C 11.8* 11.8* 13.2*     Troponin I  No results for input(s): TROPONIN, GHTROP in the last 168 hours.       Stroke Consult Data Data   This was a non-emergent, non-telestroke consult.

## 2022-03-17 NOTE — PROGRESS NOTES
"   03/17/22 0932   Quick Adds   Type of Visit Initial PT Evaluation   Living Environment   People in Home spouse   Current Living Arrangements house   Home Accessibility stairs to enter home;stairs within home  (1)   Number of Stairs, Main Entrance 1   Number of Stairs, Within Home, Primary   (flight to basement)   Living Environment Comments Can do main level living. Lives in [facility]. Laundry in basement but his wife can do. 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    Self-Care   Usual Activity Tolerance good   Regular Exercise No   General Information   Onset of Illness/Injury or Date of Surgery 03/16/22   Referring Physician Lor Ashley MD   Patient/Family Therapy Goals Statement (PT) To go home   Pertinent History of Current Problem (include personal factors and/or comorbidities that impact the POC) Huseyin St is a 66 year old male with a past medical history of htn, Dm2 presents to the hospital with a stroke. Sigificant receptive and expressive aphasia.    General Observations Patient initially appears impulsive but this appears just to be due to him normally being independent. Needs manual cues and demonstration to understand most tasks.    Cognition   Cognitive Status Comments Difficult to assess due to receptive aphasia. Able to follow most automatic commands like \"sit\", \"stand\" but needed manual cues and demonstration for more complicated tasks.    Pain Assessment   Patient Currently in Pain No   Integumentary/Edema   Integumentary/Edema no deficits were identifed   Posture    Posture Not impaired   Range of Motion (ROM)   Range of Motion ROM is WNL   Strength (Manual Muscle Testing)   Strength Comments No deficits noted.    Bed Mobility   Comment, (Bed Mobility) Independent   Transfers   Comment, (Transfers) Independent   Gait/Stairs (Locomotion)   Comment, (Gait/Stairs) Independent   Balance   Balance Comments Good sitting balance. Good " standing balance. Rhomberg EO and EC -. Able to retrieve objects from the floor without difficulty. Able to go up and down 4 steps without LOB. Did needed manual cues to get him to use just one rail.    Coordination   Coordination Comments Appears intact based on function performed. Difficult to formally assess due to receptive aphasia.    Muscle Tone   Muscle Tone no deficits were identified   Clinical Impression   Criteria for Skilled Therapeutic Intervention No problems identified which require skilled intervention   Clinical Presentation (PT Evaluation Complexity) Stable/Uncomplicated   Clinical Decision Making (Complexity) low complexity   Risk & Benefits of therapy have been explained evaluation/treatment results reviewed;patient;spouse/significant other   Clinical Impression Comments Spouse and pateint educated no PT indicated and both receptive to this.    PT Discharge Planning   PT Discharge Recommendation (DC Rec) home   PT Rationale for DC Rec Patient able to demonstrate independence with all funtional mobility and good balance noted. No further PT indicated.    PT Brief overview of current status Safe to be up with supervision only.    Total Evaluation Time   Total Evaluation Time (Minutes) 24

## 2022-03-17 NOTE — PHARMACY-ADMISSION MEDICATION HISTORY
Pharmacy Medication History  Admission medication history interview status for the 3/16/2022  admission is complete. See EPIC admission navigator for prior to admission medications     Location of Interview: Phone  Medication history sources: Patient's family/friend (spouse) and Surescripts    Significant changes made to the medication list:  Marked for deletion as spouse doesn't think he takes these anymore: crestor, revatio    In the past week, patient estimated taking medication this percent of the time: 50-90% due to other. Spouse unsure if he takes aspirin every day or maybe misses once in a while. Has prescription for crestor, but she doesn't think he's taking it because it was way in the back of the drawer.    Additional medication history information:   Pt not able to communicate well enough to review list    Medication reconciliation completed by provider prior to medication history? No    Time spent in this activity: 15 minutes    Prior to Admission medications    Medication Sig Last Dose Taking? Auth Provider   ASPIRIN 81 MG OR TABS 1 tab po QD (Once per day) 3/16/2022 at Unknown time Yes    clotrimazole (LOTRIMIN) 1 % external cream Apply topically 2 times daily  Patient taking differently: Apply topically 2 times daily as needed   Yes Kendal Wong MD   diltiazem ER COATED BEADS (CARDIZEM CD) 240 MG 24 hr capsule Take 1 capsule (240 mg) by mouth daily 3/16/2022 at am Yes Kendal Wong MD   escitalopram (LEXAPRO) 20 MG tablet Take 1 tablet (20 mg) by mouth daily 3/16/2022 at am Yes Kendal Wong MD   hydrochlorothiazide (MICROZIDE) 12.5 MG capsule Take 1 capsule (12.5 mg) by mouth daily 3/16/2022 at am Yes Kendal Wong MD   insulin aspart (NOVOLOG FLEXPEN) 100 UNIT/ML pen INJECT 10 UNITS BEFORE BREAKFAST LUNCH AND DINNER MAX OF 70 UNITS PER DAY. DUE FOR APPOINTMENT.  Patient taking differently: INJECT 10 UNITS BEFORE BREAKFAST LUNCH AND DINNER PLUS SLIDING SCALE, MAX OF 70 UNITS  PER DAY. DUE FOR APPOINTMENT. 3/16/2022 at Unknown time Yes Kendal Wong MD   insulin glargine (LANTUS SOLOSTAR) 100 UNIT/ML pen INJ 55 UNITS SC HS 3/15/2022 at pm Yes Kendal Wong MD   lisinopril (ZESTRIL) 40 MG tablet Take 1 tablet (40 mg) by mouth daily 3/16/2022 at am Yes Kendal Wong MD   alcohol swab prep pads Use to swab area of injection/ian as directed   Kendal Wong MD   blood glucose (NO BRAND SPECIFIED) test strip Use to test blood sugar 3 times daily or as directed. To accompany: Blood Glucose Monitor Brands: per insurance.   Kendal Wong MD   blood glucose calibration (NO BRAND SPECIFIED) solution Use to calibrate blood glucose monitor as needed as directed. To accompany: Blood Glucose Monitor Brands: per insurance.   Kendal Wong MD   insulin pen needle (BD MARIE U/F) 32G X 4 MM miscellaneous USE THREE PEN NEEDLES DAILY AS DIRECTED   Kendal Wong MD   ORDER FOR DME Auto-CPAP: Max 12 cm H2O Min 10 cm H2O  Continuous Lifetime need and heated humidity.      Goltz, Bennett Ezra, PA-C   rosuvastatin (CRESTOR) 40 MG tablet Take 1 tablet (40 mg) by mouth daily  Patient not taking: Reported on 3/16/2022 Not Taking at Unknown time  Kendal Wong MD   sildenafil (REVATIO) 20 MG tablet Take 1 tablet (20 mg) by mouth 3 times daily  Patient not taking: Reported on 3/16/2022 Not Taking at Unknown time  Kendal Wong MD   thin (NO BRAND SPECIFIED) lancets Use to test blood sugar 3 times daily or as directed. To accompany: Blood Glucose Monitor Brands: per insurance.   Kendal Wong MD       The information provided in this note is only as accurate as the sources available at the time of update(s)

## 2022-03-17 NOTE — ED NOTES
[facility]  ED Nurse Handoff Report    ED Chief complaint: Aphasia      ED Diagnosis:   Final diagnoses:   Cerebrovascular accident (CVA), unspecified mechanism (H)       Code Status: Full Code    Allergies:   Allergies   Allergen Reactions     Augmentin Diarrhea     Sulfa Drugs      Unknown - reaction occurred as a child     Victoza      Skin rash     Xanax      Difficult to wean off       Patient Story: Yesterday at 1300 wife reports talking on the phone with patient and noticing he had word finding difficulty, garbled speech, and was confused. Unable to follow certain commands as well.    Focused Assessment:  A&Ox4. Aphasia with word finding difficulties, illogical, and inappropriate words. Unable to do swallow study d/t aphasia. MRI showed L temporal lobe infarct. Unable to follow some commands. VSS, .     MR Brain w/o Contrast   Final Result   IMPRESSION:   HEAD MRI:    1.  Moderately sized acute/early subacute infarction at the anterior aspect of left temporal lobe.   2.  No definite associated hemorrhage or significant global mass effect.   3.  Mild chronic small vessel ischemia.   4.  Mild atrophy.      HEAD MRA:    1.  Mild to moderate narrowing distal left petrous ICA.   2.  Mild to moderate narrowing distal left cavernous and proximal left supraclinoid ICA.   3.  Short segment moderate to marked narrowing distal left M1.   4.  Moderate areas of narrowings throughout smaller branches of MCA distributions bilaterally.   5.  Short segment moderate narrowing distal left P2 and proximal left P3.   6.  Moderate areas of narrowings distal branches of right posterior circulation.   7.  Mildly attenuated distal branches of right posterior circulation.      NECK MRA:   1.  Normal neck MRA.      MRA Angiogram Neck w/o Contrast   Final Result   IMPRESSION:   HEAD MRI:    1.  Moderately sized acute/early subacute infarction at the anterior aspect of left temporal lobe.   2.  No definite  associated hemorrhage or significant global mass effect.   3.  Mild chronic small vessel ischemia.   4.  Mild atrophy.      HEAD MRA:    1.  Mild to moderate narrowing distal left petrous ICA.   2.  Mild to moderate narrowing distal left cavernous and proximal left supraclinoid ICA.   3.  Short segment moderate to marked narrowing distal left M1.   4.  Moderate areas of narrowings throughout smaller branches of MCA distributions bilaterally.   5.  Short segment moderate narrowing distal left P2 and proximal left P3.   6.  Moderate areas of narrowings distal branches of right posterior circulation.   7.  Mildly attenuated distal branches of right posterior circulation.      NECK MRA:   1.  Normal neck MRA.      MRA Brain ([place]) wo Contrast   Final Result   IMPRESSION:   HEAD MRI:    1.  Moderately sized acute/early subacute infarction at the anterior aspect of left temporal lobe.   2.  No definite associated hemorrhage or significant global mass effect.   3.  Mild chronic small vessel ischemia.   4.  Mild atrophy.      HEAD MRA:    1.  Mild to moderate narrowing distal left petrous ICA.   2.  Mild to moderate narrowing distal left cavernous and proximal left supraclinoid ICA.   3.  Short segment moderate to marked narrowing distal left M1.   4.  Moderate areas of narrowings throughout smaller branches of MCA distributions bilaterally.   5.  Short segment moderate narrowing distal left P2 and proximal left P3.   6.  Moderate areas of narrowings distal branches of right posterior circulation.   7.  Mildly attenuated distal branches of right posterior circulation.      NECK MRA:   1.  Normal neck MRA.        Labs Ordered and Resulted from Time of ED Arrival to Time of ED Departure   GLUCOSE BY METER - Abnormal       Result Value    GLUCOSE BY METER POCT 322 (*)    COMPREHENSIVE METABOLIC PANEL - Abnormal    Sodium 130 (*)     Potassium 4.2      Chloride 97      Carbon Dioxide (CO2) 26      Anion Gap 7       Urea Nitrogen 27      Creatinine 1.36 (*)     Calcium 9.1      Glucose 338 (*)     Alkaline Phosphatase 70      AST 11      ALT 17      Protein Total 7.7      Albumin 3.3 (*)     Bilirubin Total 0.4      GFR Estimate 57 (*)    CBC WITH PLATELETS AND DIFFERENTIAL - Abnormal    WBC Count 11.1 (*)     RBC Count 5.08      Hemoglobin 14.6      Hematocrit 44.4      MCV 87      MCH 28.7      MCHC 32.9      RDW 11.9      Platelet Count 450      % Neutrophils 60      % Lymphocytes 29      % Monocytes 8      % Eosinophils 1      % Basophils 1      % Immature Granulocytes 1      NRBCs per 100 WBC 0      Absolute Neutrophils 6.8      Absolute Lymphocytes 3.2      Absolute Monocytes 0.9      Absolute Eosinophils 0.1      Absolute Basophils 0.1      Absolute Immature Granulocytes 0.1      Absolute NRBCs 0.0     PARTIAL THROMBOPLASTIN TIME - Normal    aPTT 27     INR - Normal    INR 1.02     COVID-19 VIRUS (CORONAVIRUS) BY PCR - Normal    SARS CoV2 PCR Negative         Treatments and/or interventions provided: MRI, labs, rectal aspirin. NPO.    Medications   gadobutrol (GADAVIST) injection 10 mL ( Intravenous Canceled Entry 3/16/22 2001)   sodium chloride (PF) 0.9% PF flush 100 mL ( Intravenous Canceled Entry 3/16/22 2001)   clopidogrel (PLAVIX) tablet 300 mg (300 mg Oral Not Given 3/16/22 2125)   aspirin (ASA) Suppository 300 mg (300 mg Rectal Given 3/16/22 2117)     Patient's response to treatments and/or interventions: resting on cart    To be done/followed up on inpatient unit:  Continue with plan of care per admitting MD.    Does this patient have any cognitive concerns?: Forgetful    Activity level - Baseline/Home:  Independent  Activity Level - Current:   Unknown    Patient's Preferred language: English   Needed?: No    Isolation: None  Infection: Not Applicable  Patient tested for COVID 19 prior to admission: YES  Bariatric?: No    Vital Signs:   Vitals:    03/16/22 1900 03/16/22 2045 03/16/22 2130 03/16/22 2200    BP: (!) 140/86 (!) 145/86 (!) 137/92 124/81   Pulse: 75 75 74 70   Resp: 20  17 13   Temp:       TempSrc:       SpO2:  97% 95%    Weight:       Height:           Cardiac Rhythm:Cardiac Rhythm: Normal sinus rhythm    Was the PSS-3 completed:   Yes  What interventions are required if any?               Family Comments: wife at bedside in ED  OBS brochure/video discussed/provided to patient/family: N/A               For the majority of the shift this patient's behavior was Green.   Behavioral interventions performed were na.    ED NURSE PHONE NUMBER: *[phone]

## 2022-03-17 NOTE — PROGRESS NOTES
"   03/17/22 1018   General Information   Onset of Illness/Injury or Date of Surgery 03/16/22   Referring Physician Lor Ashley MD   Patient/Family Therapy Goal Statement (SLP) \"home\" repeatedly   Pertinent History of Current Problem [address] is a 66 year old male with a past medical history of htn, Dm2 presents to the hospital with a stroke. Acute ischemic stroke of Left MCA due to undetermined etiology Likely related Cocaine induced Vasospasm vs. Severe intracranial athero. MRI, \"Moderately sized acute/early subacute infarction at the anterior aspect of left temporal lobe.\"   Past History of Dysphagia no reported or documented history   Type of Evaluation   Type of Evaluation Swallow Evaluation   Oral Motor   Oral Musculature anomalies present   Dentition (Oral Motor)   Dentition (Oral Motor) natural dentition   Facial Symmetry (Oral Motor)   Right Side Facial Asymmetry minimal impairment;moderate impairment   Bilateral Facial Asymmetry right side   Tongue Function (Oral Motor)   Comment, Tongue Function (Oral Motor) slight deviation; good ROM and strength   Vocal Quality/Secretion Management (Oral Motor)   Vocal Quality (Oral Motor) WNL   General Swallowing Observations   Current Diet/Method of Nutritional Intake (General Swallowing Observations, NIS) NPO   Respiratory Support (General Swallowing Observations) none   Swallowing Evaluation Clinical swallow evaluation   Clinical Swallow Evaluation   Clinical Swallow Evaluation Textures Trialed thin liquids;pureed;solid foods   Clinical Swallow Eval: Thin Liquid Texture Trial   Mode of Presentation, Thin Liquids straw;fed by clinician   Oral Phase of Swallow WFL   Pharyngeal Phase of Swallow intact   Diagnostic Statement no overt s/sx of aspiration   Clinical Swallow Evaluation: Puree Solid Texture Trial   Mode of Presentation, Puree spoon;self-fed   Oral Phase, Puree WFL   Pharyngeal Phase, Puree intact   Diagnostic Statement WFL   Clinical Swallow " Evaluation: Solid Food Texture Trial   Mode of Presentation self-fed   Oral Phase delayed AP movement   Pharyngeal Phase intact   Diagnostic Statement Large bites and prolonged mastication; WNL    Swallowing Recommendations   Diet Consistency Recommendations regular diet;thin liquids (level 0)   Supervision Level for Intake close supervision needed   Mode of Delivery Recommendations bolus size, small;slow rate of intake   Swallowing Maneuver Recommendations alternate food and liquid intake   Monitoring/Assistance Required (Eating/Swallowing) check mouth frequently for oral residue/pocketing;cue for finger/lingual sweep if oral pocketing present;stop eating activities when fatigue is present   Recommended Feeding/Eating Techniques (Swallow Eval) maintain upright sitting position for eating;maintain upright posture during/after eating for 30 minutes;minimize distractions during oral intake   Medication Administration Recommendations, Swallowing (SLP) per pt preference   General Therapy Interventions   Planned Therapy Interventions Dysphagia Treatment   Dysphagia treatment Instruction of safe swallow strategies   Clinical Impression   Criteria for Skilled Therapeutic Interventions Met (SLP Eval) Yes, treatment indicated   SLP Diagnosis Dysphagia   Risks & Benefits of therapy have been explained evaluation/treatment results reviewed;care plan/treatment goals reviewed;risks/benefits reviewed;current/potential barriers reviewed;participants voiced agreement with care plan;participants included;patient;spouse/significant other   Clinical Impression Comments SLP: Pt presents with minimal oral dysphagia on clinical swallow evaluation. Pt alert with his wife present. Pt able to follow one step commands for oral motor exam. Mild right sided droop. Pt followed all yes/no questions and asked appropriate questions to his wife on his dog. Unable to respond to open ended questions. Ice chips, thin water by straw, puree solids and  regular textures trialed. Pt mildly impulsive and taking large bites. Suspect  baseline behavior with new difficulty with attention to task/poor insight. Despite behaviors, pt able to clear oral cavity with extended time and liquid wash with no overt s/sx of aspiration throughout. Plan 1x follow up for swallow. Will need formal speech/language evaluation.    SLP Discharge Planning   SLP Discharge Recommendation [facility] patient will benefit from intensive, interdisciplinary therapy.  Anticipate will be able to tolerate 3 hours of therapy per day   SLP Rationale for DC Rec Pt will meet swallow goals during hospitalization; anticipate need for speech/language tx   SLP Brief overview of current status  Regular diet and thin liquids (straws ok) with close supervision for small bites and sips, slow rate, fully upright    Total Evaluation Time   Total Evaluation Time (Minutes) 20   SLP Goals   Therapy Frequency (SLP Eval) daily   SLP Goals Swallow   SLP: Safely tolerate diet without signs/symptoms of aspiration Regular diet;Thin liquids   Psychosocial Support   Trust Relationship/Rapport care explained;questions answered

## 2022-03-17 NOTE — PROGRESS NOTES
[facility]    Medicine Progress Note - Hospitalist Service       Date of Admission:  3/16/2022    Assessment & Plan   Huseyin RUSSELL St is a 66 year old male with hx of DM2, HTN, HL, NA, and medical non-compliance who was admitted on 3/16/2022 for an acute CVA    Acute ischemic CVA, left temporal lobe  * Last known well on 3/15. Presented on 3/16 with receptive/expressive aphasia. Brain MRI on arrival showed acute/early subacute infarction at the anterior aspect of the left temporal lobe. Head/neck MRA showed mild to moderate narrowings throughout of the left ICA, MCA, and PCA vessels. In the ED, he was loaded with aspirin  * Stroke work-up initiated on admission: A1c 11.8, lipid panel: Tchol 224, HDL 33,   - Continues on ASA 81 mg daily  - Continues on PTA rosuvastatin 40 mg daily (patient was non-compliant PTA)  - TTE ordered  - SLP recommending ARU; PT/OT assessments pending  - Stroke Neuro following    DM2 without complications, long-term insulin use  * Uncontrolled, A1c 11.8 on this admission  * PTA: Lantus 55 units qHS, aspart 10 units TID AC  - Resume PTA insulin regimen today, 3/17  - High SSI available  - Will follow blood sugars and adjust insulin accordingly  Recent Labs   Lab 03/17/22  0917 03/17/22  0803 03/17/22  0437 03/17/22  0118 03/16/22  1704 03/16/22  1521   * 216* 233* 272* 338* 322*       Essential hypertension  * PTA: HCTZ 12.5 mg daily, lisinopril 40 mg daily  - Holding PTA meds for permissive hypertension and due to hyponatremia    Hyponatremia  Na 130 on arrival  - Na 131 today, will follow  - Continues on gentle IV fluids  - Holding PTA HCTZ and lisinopril    CRUZITO stage I, Resolved  Creatinine 1.36 on arrival; improved with IV fluids  - Cr 0.97 today    NA  - CPAP ordered per home settings     Diet: Regular Diet Adult    DVT Prophylaxis: Pneumatic Compression Devices  Rao Catheter: Not present  Code Status: Full Code         Disposition: Expected  discharge once cleared by Neuro, PT/OT/SLP, 1-2 more days; may be a candidate for ARU    The patient's care was discussed with the Patient and Patient's Family.    Natasha Juarez MD  Hospitalist Service  [facility]  Contact information available via [facility] Paging/Directory    ______________________________________________________________________    Interval History   Admitted last night. Persistent receptive and expressive aphasia, does not follow commands consistently. Patient's wife at bedside - reports that aphasia seems to improve in upright position. Expectations and plan of care reviewed    Time Spent on this Encounter   I spent 35 minutes on the unit/floor managing the care of Huseyin St. Over 50% of my time was spent on the following:   - Counseling the patient and/or family regarding: diagnostic results and prognosis  - Coordination of care with the: consultant(s)    Natasha Juarez MD    Data reviewed today: I reviewed all medications, new labs and imaging results over the last 24 hours. I personally reviewed no images or EKG's today.    Physical Exam   Vital Signs: Temp: 98.2  F (36.8  C) Temp src: Oral BP: 139/85 Pulse: 81   Resp: 18 SpO2: 97 % O2 Device: None (Room air)    Weight: 230 lbs 0 oz  Constitutional: Appears comfortable  HEENT: Sclera white, MMM  Respiratory: Breathing non-labored. Lungs CTAB - no wheezes, crackles, or rhonchi  Cardiovascular: Heart RRR, no m/r/g. No pedal edema  GI: +BS, abd soft/NT  Skin/Integument: No rash  Musculoskeletal: Normal muscle bulk and tone  Neuro: Sleeping, rouses to voice. +Receptive and expressive aphasia. Does not follow commands consistently. ZHANG  Psych: Calm and cooperative    Data   Recent Labs   Lab 03/17/22  0917 03/17/22  0803 03/17/22  0437 03/17/22  0118 03/16/22  1704   WBC  --  10.4  --   --  11.1*   HGB  --  15.1  --   --  14.6   MCV  --  86  --   --  87   PLT  --  457*  --   --  450   INR  --   --   --   --   1.02   NA  --  131*  --   --  130*   POTASSIUM  --  3.7  --   --  4.2   CHLORIDE  --  99  --   --  97   CO2  --  25  --   --  26   BUN  --  18  --   --  27   CR  --  0.97  --   --  1.36*   ANIONGAP  --  7  --   --  7   EVER  --  9.0  --   --  9.1   * 216* 233*   < > 338*   ALBUMIN  --   --   --   --  3.3*   PROTTOTAL  --   --   --   --  7.7   BILITOTAL  --   --   --   --  0.4   ALKPHOS  --   --   --   --  70   ALT  --   --   --   --  17   AST  --   --   --   --  11    < > = values in this interval not displayed.         Recent Results (from the past 24 hour(s))   MRA Brain ([place]) wo Contrast    Narrative    EXAM: MRA BRAIN ([place]) WO CONTRAST, MRA NECK (CAROT[place]) WO CONTRAST, MR BRAIN W/O CONTRAST  LOCATION: [facility]  DATE/TIME: 3/16/2022 7:27 PM    INDICATION: Receptive aphasia  COMPARISON: None.  TECHNIQUE:   1) Routine multiplanar multisequence head MRI without intravenous contrast.  2) 3D time-of-flight head MRA without intravenous contrast.  3) Neck MRA without IV contrast. Stenosis measurements made according to NASCET criteria unless otherwise specified.      FINDINGS:  HEAD MRI:  INTRACRANIAL CONTENTS: Moderately sized acute/early subacute infarction at the anterior aspect of left temporal lobe. No definite associated hemorrhage or significant global mass effect. No mass, acute hemorrhage, or extra-axial fluid collections.   Scattered nonspecific T2/FLAIR hyperintensities within the cerebral white matter most consistent with mild chronic microvascular ischemic change. Mild generalized cerebral atrophy. No hydrocephalus. Normal position of the cerebellar tonsils.     SELLA: No abnormality accounting for technique.    OSSEOUS STRUCTURES/SOFT TISSUES: Normal marrow signal. The major intracranial vascular flow voids are maintained.     ORBITS: No abnormality accounting for technique.     SINUSES/MASTOIDS: No paranasal sinus mucosal disease. No middle ear  or mastoid effusion.     HEAD MRA:   ANTERIOR CIRCULATION: Mild to moderate narrowing distal left petrous ICA. Mild to moderate narrowing distal left cavernous and proximal left supraclinoid ICA. Short segment moderate to marked narrowing distal left M1. Moderate areas of narrowings   throughout smaller branches of MCA distributions bilaterally. Standard [place] anatomy.    POSTERIOR CIRCULATION: Short segment moderate narrowing distal left P2 and proximal left P3. Moderate areas of narrowings distal branches of right posterior circulation. Mildly attenuated distal branches of right posterior circulation. Dominant left and   smaller right vertebral artery contribute to a normal basilar artery.     NECK MRA:   RIGHT CAROTID: No measurable stenosis or dissection.    LEFT CAROTID: No measurable stenosis or dissection.    VERTEBRAL ARTERIES: No focal stenosis or dissection. Dominant left and smaller right vertebral arteries.    AORTIC ARCH: Classic aortic arch anatomy with no significant stenosis at the origin of the great vessels.      Impression    IMPRESSION:  HEAD MRI:   1.  Moderately sized acute/early subacute infarction at the anterior aspect of left temporal lobe.  2.  No definite associated hemorrhage or significant global mass effect.  3.  Mild chronic small vessel ischemia.  4.  Mild atrophy.    HEAD MRA:   1.  Mild to moderate narrowing distal left petrous ICA.  2.  Mild to moderate narrowing distal left cavernous and proximal left supraclinoid ICA.  3.  Short segment moderate to marked narrowing distal left M1.  4.  Moderate areas of narrowings throughout smaller branches of MCA distributions bilaterally.  5.  Short segment moderate narrowing distal left P2 and proximal left P3.  6.  Moderate areas of narrowings distal branches of right posterior circulation.  7.  Mildly attenuated distal branches of right posterior circulation.    NECK MRA:  1.  Normal neck MRA.   MRA Angiogram Neck w/o Contrast     Narrative    EXAM: MRA BRAIN ([place]) WO CONTRAST, MRA NECK (CAROT[place]) WO CONTRAST, MR BRAIN W/O CONTRAST  LOCATION: [facility]  DATE/TIME: 3/16/2022 7:27 PM    INDICATION: Receptive aphasia  COMPARISON: None.  TECHNIQUE:   1) Routine multiplanar multisequence head MRI without intravenous contrast.  2) 3D time-of-flight head MRA without intravenous contrast.  3) Neck MRA without IV contrast. Stenosis measurements made according to NASCET criteria unless otherwise specified.      FINDINGS:  HEAD MRI:  INTRACRANIAL CONTENTS: Moderately sized acute/early subacute infarction at the anterior aspect of left temporal lobe. No definite associated hemorrhage or significant global mass effect. No mass, acute hemorrhage, or extra-axial fluid collections.   Scattered nonspecific T2/FLAIR hyperintensities within the cerebral white matter most consistent with mild chronic microvascular ischemic change. Mild generalized cerebral atrophy. No hydrocephalus. Normal position of the cerebellar tonsils.     SELLA: No abnormality accounting for technique.    OSSEOUS STRUCTURES/SOFT TISSUES: Normal marrow signal. The major intracranial vascular flow voids are maintained.     ORBITS: No abnormality accounting for technique.     SINUSES/MASTOIDS: No paranasal sinus mucosal disease. No middle ear or mastoid effusion.     HEAD MRA:   ANTERIOR CIRCULATION: Mild to moderate narrowing distal left petrous ICA. Mild to moderate narrowing distal left cavernous and proximal left supraclinoid ICA. Short segment moderate to marked narrowing distal left M1. Moderate areas of narrowings   throughout smaller branches of MCA distributions bilaterally. Standard [place] anatomy.    POSTERIOR CIRCULATION: Short segment moderate narrowing distal left P2 and proximal left P3. Moderate areas of narrowings distal branches of right posterior circulation. Mildly attenuated distal branches of right posterior  circulation. Dominant left and   smaller right vertebral artery contribute to a normal basilar artery.     NECK MRA:   RIGHT CAROTID: No measurable stenosis or dissection.    LEFT CAROTID: No measurable stenosis or dissection.    VERTEBRAL ARTERIES: No focal stenosis or dissection. Dominant left and smaller right vertebral arteries.    AORTIC ARCH: Classic aortic arch anatomy with no significant stenosis at the origin of the great vessels.      Impression    IMPRESSION:  HEAD MRI:   1.  Moderately sized acute/early subacute infarction at the anterior aspect of left temporal lobe.  2.  No definite associated hemorrhage or significant global mass effect.  3.  Mild chronic small vessel ischemia.  4.  Mild atrophy.    HEAD MRA:   1.  Mild to moderate narrowing distal left petrous ICA.  2.  Mild to moderate narrowing distal left cavernous and proximal left supraclinoid ICA.  3.  Short segment moderate to marked narrowing distal left M1.  4.  Moderate areas of narrowings throughout smaller branches of MCA distributions bilaterally.  5.  Short segment moderate narrowing distal left P2 and proximal left P3.  6.  Moderate areas of narrowings distal branches of right posterior circulation.  7.  Mildly attenuated distal branches of right posterior circulation.    NECK MRA:  1.  Normal neck MRA.   MR Brain w/o Contrast    Narrative    EXAM: MRA BRAIN ([place]) WO CONTRAST, MRA NECK ([place]) WO CONTRAST, MR BRAIN W/O CONTRAST  LOCATION: [facility]  DATE/TIME: 3/16/2022 7:27 PM    INDICATION: Receptive aphasia  COMPARISON: None.  TECHNIQUE:   1) Routine multiplanar multisequence head MRI without intravenous contrast.  2) 3D time-of-flight head MRA without intravenous contrast.  3) Neck MRA without IV contrast. Stenosis measurements made according to NASCET criteria unless otherwise specified.      FINDINGS:  HEAD MRI:  INTRACRANIAL CONTENTS: Moderately sized acute/early subacute infarction at  the anterior aspect of left temporal lobe. No definite associated hemorrhage or significant global mass effect. No mass, acute hemorrhage, or extra-axial fluid collections.   Scattered nonspecific T2/FLAIR hyperintensities within the cerebral white matter most consistent with mild chronic microvascular ischemic change. Mild generalized cerebral atrophy. No hydrocephalus. Normal position of the cerebellar tonsils.     SELLA: No abnormality accounting for technique.    OSSEOUS STRUCTURES/SOFT TISSUES: Normal marrow signal. The major intracranial vascular flow voids are maintained.     ORBITS: No abnormality accounting for technique.     SINUSES/MASTOIDS: No paranasal sinus mucosal disease. No middle ear or mastoid effusion.     HEAD MRA:   ANTERIOR CIRCULATION: Mild to moderate narrowing distal left petrous ICA. Mild to moderate narrowing distal left cavernous and proximal left supraclinoid ICA. Short segment moderate to marked narrowing distal left M1. Moderate areas of narrowings   throughout smaller branches of MCA distributions bilaterally. Standard [place] anatomy.    POSTERIOR CIRCULATION: Short segment moderate narrowing distal left P2 and proximal left P3. Moderate areas of narrowings distal branches of right posterior circulation. Mildly attenuated distal branches of right posterior circulation. Dominant left and   smaller right vertebral artery contribute to a normal basilar artery.     NECK MRA:   RIGHT CAROTID: No measurable stenosis or dissection.    LEFT CAROTID: No measurable stenosis or dissection.    VERTEBRAL ARTERIES: No focal stenosis or dissection. Dominant left and smaller right vertebral arteries.    AORTIC ARCH: Classic aortic arch anatomy with no significant stenosis at the origin of the great vessels.      Impression    IMPRESSION:  HEAD MRI:   1.  Moderately sized acute/early subacute infarction at the anterior aspect of left temporal lobe.  2.  No definite associated hemorrhage or  significant global mass effect.  3.  Mild chronic small vessel ischemia.  4.  Mild atrophy.    HEAD MRA:   1.  Mild to moderate narrowing distal left petrous ICA.  2.  Mild to moderate narrowing distal left cavernous and proximal left supraclinoid ICA.  3.  Short segment moderate to marked narrowing distal left M1.  4.  Moderate areas of narrowings throughout smaller branches of MCA distributions bilaterally.  5.  Short segment moderate narrowing distal left P2 and proximal left P3.  6.  Moderate areas of narrowings distal branches of right posterior circulation.  7.  Mildly attenuated distal branches of right posterior circulation.    NECK MRA:  1.  Normal neck MRA.       Medications     - MEDICATION INSTRUCTIONS -       - MEDICATION INSTRUCTIONS -       sodium chloride 100 mL/hr at 03/17/22 0115       aspirin  81 mg Oral Daily    Or     aspirin  81 mg Per Feeding Tube Daily     escitalopram  20 mg Oral or Feeding Tube Daily     gadobutrol  10 mL Intravenous Once     insulin aspart  1-6 Units Subcutaneous Q4H     insulin glargine  25 Units Subcutaneous At Bedtime     rosuvastatin  40 mg Oral or Feeding Tube Daily     sodium chloride (PF)  100 mL Intravenous Once     sodium chloride (PF)  3 mL Intracatheter Q8H

## 2022-03-17 NOTE — PROGRESS NOTES
Pt here with left CVA. A&O 2-3 improving as the day went on. Neuros receptive and expressive aphasia, following commands intermittently- demonstration works well for Pt. VSS. Tele NSR. Regular diet, thin liquids. Takes pills whole with water. Up with SBA. Denies pain. Pt had home C-pap for night time. Pt scoring green on the Aggression Stop Light Tool. Plan for Echo tomorrow. Discharge home after work up.

## 2022-03-17 NOTE — PROGRESS NOTES
Pt arrived on the floor @0000. Pt here with L temporal infarct. Unable to assess neuro d/t extreme receptive and expressive aphasia. VSS on RA. Tele NSR. NPO diet until speech consult. Bedrest until PT consult. Denies pain. Pt scoring green on the Aggression Stop Light Tool. Plan workup. Discharge pending.

## 2022-03-17 NOTE — CONSULTS
"    [facility]    Stroke Telephone Note    I was called by Kaela Ye on 03/16/22 regarding patient Huseyin St. 66M HTN, DM2 presenting with aphasia. Patient was LSN 1pm yesterday. He was noted to have word finding difficulty, wife wanted to bring patient to ER but patient reportedly refused. He did not at the time have any focal motor weakness. When he continued to have speech difficulty today, wife was able to convince patient to come to ER. Wife reports that patient is a regular cocaine user and did a \"lot of cocaine\" this weekend.     Imaging Findings   Results for orders placed or performed during the hospital encounter of 03/16/22   MRA Brain ([place]) wo Contrast    Impression    IMPRESSION:  HEAD MRI:   1.  Moderately sized acute/early subacute infarction at the anterior aspect of left temporal lobe.  2.  No definite associated hemorrhage or significant global mass effect.  3.  Mild chronic small vessel ischemia.  4.  Mild atrophy.    HEAD MRA:   1.  Mild to moderate narrowing distal left petrous ICA.  2.  Mild to moderate narrowing distal left cavernous and proximal left supraclinoid ICA.  3.  Short segment moderate to marked narrowing distal left M1.  4.  Moderate areas of narrowings throughout smaller branches of MCA distributions bilaterally.  5.  Short segment moderate narrowing distal left P2 and proximal left P3.  6.  Moderate areas of narrowings distal branches of right posterior circulation.  7.  Mildly attenuated distal branches of right posterior circulation.    NECK MRA:  1.  Normal neck MRA.   MRA Angiogram Neck w/o Contrast    Impression    IMPRESSION:  HEAD MRI:   1.  Moderately sized acute/early subacute infarction at the anterior aspect of left temporal lobe.  2.  No definite associated hemorrhage or significant global mass effect.  3.  Mild chronic small vessel ischemia.  4.  Mild atrophy.    HEAD MRA:   1.  Mild to moderate narrowing " "distal left petrous ICA.  2.  Mild to moderate narrowing distal left cavernous and proximal left supraclinoid ICA.  3.  Short segment moderate to marked narrowing distal left M1.  4.  Moderate areas of narrowings throughout smaller branches of MCA distributions bilaterally.  5.  Short segment moderate narrowing distal left P2 and proximal left P3.  6.  Moderate areas of narrowings distal branches of right posterior circulation.  7.  Mildly attenuated distal branches of right posterior circulation.    NECK MRA:  1.  Normal neck MRA.   MR Brain w/o Contrast    Impression    IMPRESSION:  HEAD MRI:   1.  Moderately sized acute/early subacute infarction at the anterior aspect of left temporal lobe.  2.  No definite associated hemorrhage or significant global mass effect.  3.  Mild chronic small vessel ischemia.  4.  Mild atrophy.    HEAD MRA:   1.  Mild to moderate narrowing distal left petrous ICA.  2.  Mild to moderate narrowing distal left cavernous and proximal left supraclinoid ICA.  3.  Short segment moderate to marked narrowing distal left M1.  4.  Moderate areas of narrowings throughout smaller branches of MCA distributions bilaterally.  5.  Short segment moderate narrowing distal left P2 and proximal left P3.  6.  Moderate areas of narrowings distal branches of right posterior circulation.  7.  Mildly attenuated distal branches of right posterior circulation.    NECK MRA:  1.  Normal neck MRA.     Impression  Acute ischemic stroke of Left MCA due to undetermined etiology   Likely related Cocaine induced Vasospasm vs. Severe intracranial athero    Recommendations   - Use orderset: \"Ischemic Stroke Routine Admission\" or \"Ischemic Stroke No Thrombolytics/No Thrombectomy ICU Admission\"  - Neurochecks and Vital Signs every Q4H   - Permissive HTN; goal SBP < 220 mmHg  - Daily aspirin 325 mg for secondary stroke prevention  - Plavix (clopidogrel) 300 mg PO loading dose x 1  - Plavix (clopidogrel) 75 mg PO Daily  - " "Statin: Home dose, if not on then Lipitor 40mg daily  - MRI Brain with and without contrast  - TTE (with Bubble Study if age 60 yrs or less)  - Telemetry, EKG  - Bedside Glucose Monitoring  - A1c, Lipid Panel, Troponin x 3  - PT/OT/SLP  - Stroke Education  - Euthermia, Euglycemia    My recommendations are based on the information provided over the phone by Huseyin St's in-person providers. They are not intended to replace the clinical judgment of his in-person providers. I was not requested to personally see or examine the patient at this time.    The Stroke Staff is Dr. Babb.    Brody Marcus MD  Vascular Neurology Fellow  To page me or covering stroke neurology team member, click here: [facility]COM   Choose \"On Call\" tab at top, then search dropdown box for \"Neurology Adult\", select location, press Enter, then look for stroke/neuro ICU/telestroke.           "

## 2022-03-17 NOTE — PROGRESS NOTES
03/17/22 1335   Quick Adds   Type of Visit Initial Occupational Therapy Evaluation   Living Environment   People in Home spouse   Current Living Arrangements house   Home Accessibility stairs to enter home;stairs within home   Number of Stairs, Main Entrance 1   Number of Stairs, Within Home, Primary   ( flight to basement)   Transportation Anticipated car, drives self;family or friend will provide   Living Environment Comments Rambler style home, all needs met on first floor and doesn't have to go into the basement. Works as a  and was driving during his stroke yesterday.    Self-Care   Usual Activity Tolerance good   Current Activity Tolerance moderate   Equipment Currently Used at Home none   Fall history within last six months no   Activity/Exercise/Self-Care Comment (I) in ADLs   Instrumental Activities of Daily Living (IADL)   Previous Responsibilities meal prep;housekeeping;laundry;shopping;yardwork;medication management;finances;driving;work   IADL Comments (I) in IADLs   General Information   Onset of Illness/Injury or Date of Surgery 03/16/22   Referring Physician Lor Ashley MD   Patient/Family Therapy Goal Statement (OT) Home with wife for A   Additional Occupational Profile Info/Pertinent History of Current Problem [address] is a 66 year old male with hx of DM2, HTN, HL, NA, and medical non-compliance who was admitted on 3/16/2022 for an acute CVA   Existing Precautions/Restrictions fall   Cognitive Status Examination   Cognitive Status Comments Unable to completely assess cognition due to what appears to be both receptive and expressive aphasia. Able to state it was [place] and 3/17/2022, unable to understand question on location. May have mild impairment in safety awareness. Able to follow 50% of commands verbally, other 50% needing verbal and physical cues.    Visual Perception   Visual Impairment/Limitations WFL   Range of Motion Comprehensive   Comment,  General Range of Motion BUE ROM WNL   Strength Comprehensive (MMT)   Comment, General Manual Muscle Testing (MMT) Assessment BUE strength WNL   Bed Mobility   Bed Mobility supine-sit;sit-supine   Supine-Sit [place] (Bed Mobility) independent   Sit-Supine [place] (Bed Mobility) independent   Transfers   Transfers bed-chair transfer;sit-stand transfer;toilet transfer   Transfer Skill: Bed to Chair/Chair to Bed   Bed-Chair [place] (Transfers) supervision   Sit-Stand Transfer   Sit-Stand [place] (Transfers) supervision   Toilet Transfer   [place] Level (Toilet Transfer) supervision   Activities of Daily Living   BADL Assessment/Intervention lower body dressing;toileting   Lower Body Dressing Assessment/Training   [place] Level (Lower Body Dressing) supervision   Toileting   [place] Level (Toileting) supervision   Clinical Impression   Criteria for Skilled Therapeutic Interventions Met (OT) Yes, treatment indicated   OT Diagnosis Impaired (I) in ADLs and IADLs   OT Problem List-Impairments impacting ADL problems related to;activity tolerance impaired;cognition;communication   Assessment of Occupational Performance 3-5 Performance Deficits   Identified Performance Deficits Impaired (I) in toileting, dressing, bathing, and all IADLs   Planned Therapy Interventions (OT) ADL retraining;IADL retraining;cognition   Clinical Decision Making Complexity (OT) low complexity   Risk & Benefits of therapy have been explained evaluation/treatment results reviewed;care plan/treatment goals reviewed;risks/benefits reviewed;current/potential barriers reviewed;participants voiced agreement with care plan;participants included;patient;spouse/significant other   OT Discharge Planning   OT Discharge Recommendation (DC Rec) home with assist;home with home care occupational therapy   OT Rationale for DC Rec Pt functioning below- baseline due to activity tolerance, expressive and receptive aphasia and possibly  cognition. Functional mobility requiring at least distanced supervision due to current mild lack of insight into safety awareness. At this time, recommend home with 24/7 supervision and A from wife for all functional mobility and I/ADLs (ie showering, toileting, meal prep, cleaning, driving). Per wife, pt's aphasia has made great improvements today.    OT Brief overview of current status apparently improving global aphasia, functional mobility supervision for safety, dressing (I) w/ set up, unable to fully assess orientation   Total Evaluation Time (Minutes)   Total Evaluation Time (Minutes) 10   OT Goals   Therapy Frequency (OT) Daily   OT Predicated Duration/Target Date for Goal Attainment 03/21/22   OT Goals Hygiene/Grooming;Upper Body Dressing;Lower Body Dressing;Toilet Transfer/Toileting;Cognition   OT: Hygiene/Grooming modified independent   OT: Upper Body Dressing Modified independent   OT: Lower Body Dressing Modified independent   OT: Toilet Transfer/Toileting Modified independent   OT: Cognitive Patient/caregiver will verbalize understanding of cognitive assessment results/recommendations as needed for safe discharge planning        03/17/22 1335   Quick Adds   Type of Visit Initial Occupational Therapy Evaluation   Living Environment   People in Home spouse   Current Living Arrangements house   Home Accessibility stairs to enter home;stairs within home   Number of Stairs, Main Entrance 1   Number of Stairs, Within Home, Primary   ( flight to basement)   Transportation Anticipated car, drives self;family or friend will provide   Living Environment Comments Rambler style home, all needs met on first floor and doesn't have to go into the basement. Works as a  and was driving during his stroke yesterday.    Self-Care   Usual Activity Tolerance good   Current Activity Tolerance moderate   Equipment Currently Used at Home none   Fall history within last six months no   Activity/Exercise/Self-Care  Comment (I) in ADLs   Instrumental Activities of Daily Living (IADL)   Previous Responsibilities meal prep;housekeeping;laundry;shopping;yardwork;medication management;finances;driving;work   IADL Comments (I) in IADLs   General Information   Onset of Illness/Injury or Date of Surgery 03/16/22   Referring Physician Lor Ashley MD   Patient/Family Therapy Goal Statement (OT) Home with wife for A   Additional Occupational Profile Info/Pertinent History of Current Problem [address] is a 66 year old male with hx of DM2, HTN, HL, NA, and medical non-compliance who was admitted on 3/16/2022 for an acute CVA   Existing Precautions/Restrictions fall   Cognitive Status Examination   Cognitive Status Comments Unable to completely assess cognition due to what appears to be both receptive and expressive aphasia. Able to state it was [employer] [place] and 3/17/2022, unable to understand question on location. May have mild impairment in safety awareness. Able to follow 50% of commands verbally, other 50% needing verbal and physical cues.    Visual Perception   Visual Impairment/Limitations WFL   Range of Motion Comprehensive   Comment, General Range of Motion BUE ROM WNL   Strength Comprehensive (MMT)   Comment, General Manual Muscle Testing (MMT) Assessment BUE strength WNL   Bed Mobility   Bed Mobility supine-sit;sit-supine   Supine-Sit [place] (Bed Mobility) independent   Sit-Supine [place] (Bed Mobility) independent   Transfers   Transfers bed-chair transfer;sit-stand transfer;toilet transfer   Transfer Skill: Bed to Chair/Chair to Bed   Bed-Chair [place] (Transfers) supervision   Sit-Stand Transfer   Sit-Stand [place] (Transfers) supervision   Toilet Transfer   [place] Level (Toilet Transfer) supervision   Activities of Daily Living   BADL Assessment/Intervention lower body dressing;toileting   Lower Body Dressing Assessment/Training   [place] Level (Lower Body Dressing)  supervision   Toileting   [place] Level (Toileting) supervision   Clinical Impression   Criteria for Skilled Therapeutic Interventions Met (OT) Yes, treatment indicated   OT Diagnosis Impaired (I) in ADLs and IADLs   OT Problem List-Impairments impacting ADL problems related to;activity tolerance impaired;cognition;communication   Assessment of Occupational Performance 3-5 Performance Deficits   Identified Performance Deficits Impaired (I) in toileting, dressing, bathing, and all IADLs   Planned Therapy Interventions (OT) ADL retraining;IADL retraining;cognition   Clinical Decision Making Complexity (OT) low complexity   Risk & Benefits of therapy have been explained evaluation/treatment results reviewed;care plan/treatment goals reviewed;risks/benefits reviewed;current/potential barriers reviewed;participants voiced agreement with care plan;participants included;patient;spouse/significant other   OT Discharge Planning   OT Discharge Recommendation (DC Rec) home with assist;home with home care occupational therapy   OT Rationale for DC Rec Pt functioning below- baseline due to activity tolerance, expressive and receptive aphasia and possibly cognition. Functional mobility requiring at least distanced supervision due to current mild lack of insight into safety awareness. At this time, recommend home with 24/7 supervision and A from wife for all functional mobility and I/ADLs (ie showering, toileting, meal prep, cleaning, driving). Per wife, pt's aphasia has made great improvements today.    OT Brief overview of current status apparently improving global aphasia, functional mobility supervision for safety, dressing (I) w/ set up, unable to fully assess orientation   Total Evaluation Time (Minutes)   Total Evaluation Time (Minutes) 10   OT Goals   Therapy Frequency (OT) Daily   OT Predicated Duration/Target Date for Goal Attainment 03/21/22   OT Goals Hygiene/Grooming;Upper Body Dressing;Lower Body Dressing;Toilet  Transfer/Toileting;Cognition   OT: Hygiene/Grooming modified independent   OT: Upper Body Dressing Modified independent   OT: Lower Body Dressing Modified independent   OT: Toilet Transfer/Toileting Modified independent   OT: Cognitive Patient/caregiver will verbalize understanding of cognitive assessment results/recommendations as needed for safe discharge planning

## 2022-03-17 NOTE — PROGRESS NOTES
RECEIVING UNIT ED HANDOFF REVIEW    ED Nurse Handoff Report was reviewed by: Naga Richardson RN on March 16, 2022 at 11:38 PM

## 2022-03-18 ENCOUNTER — APPOINTMENT (OUTPATIENT)
Dept: CARDIOLOGY | Facility: CLINIC | Age: 67
DRG: 065 | End: 2022-03-18
Payer: COMMERCIAL

## 2022-03-18 ENCOUNTER — APPOINTMENT (OUTPATIENT)
Dept: SPEECH THERAPY | Facility: CLINIC | Age: 67
DRG: 065 | End: 2022-03-18
Payer: COMMERCIAL

## 2022-03-18 ENCOUNTER — TELEPHONE (OUTPATIENT)
Dept: NEUROLOGY | Facility: CLINIC | Age: 67
End: 2022-03-18

## 2022-03-18 ENCOUNTER — APPOINTMENT (OUTPATIENT)
Dept: CARDIOLOGY | Facility: CLINIC | Age: 67
DRG: 065 | End: 2022-03-18
Attending: HOSPITALIST
Payer: COMMERCIAL

## 2022-03-18 VITALS
BODY MASS INDEX: 32.93 KG/M2 | OXYGEN SATURATION: 97 % | WEIGHT: 230 LBS | DIASTOLIC BLOOD PRESSURE: 82 MMHG | TEMPERATURE: 98.8 F | HEART RATE: 80 BPM | SYSTOLIC BLOOD PRESSURE: 134 MMHG | HEIGHT: 70 IN | RESPIRATION RATE: 20 BRPM

## 2022-03-18 LAB
ANION GAP SERPL CALCULATED.3IONS-SCNC: 5 MMOL/L (ref 3–14)
BUN SERPL-MCNC: 14 MG/DL (ref 7–30)
CALCIUM SERPL-MCNC: 8.8 MG/DL (ref 8.5–10.1)
CHLORIDE BLD-SCNC: 105 MMOL/L (ref 94–109)
CO2 SERPL-SCNC: 26 MMOL/L (ref 20–32)
CREAT SERPL-MCNC: 0.91 MG/DL (ref 0.66–1.25)
GFR SERPL CREATININE-BSD FRML MDRD: >90 ML/MIN/1.73M2
GLUCOSE BLD-MCNC: 127 MG/DL (ref 70–99)
GLUCOSE BLDC GLUCOMTR-MCNC: 114 MG/DL (ref 70–99)
GLUCOSE BLDC GLUCOMTR-MCNC: 204 MG/DL (ref 70–99)
LVEF ECHO: NORMAL
POTASSIUM BLD-SCNC: 3.8 MMOL/L (ref 3.4–5.3)
SODIUM SERPL-SCNC: 136 MMOL/L (ref 133–144)

## 2022-03-18 PROCEDURE — 999N000208 ECHOCARDIOGRAM COMPLETE

## 2022-03-18 PROCEDURE — 93270 REMOTE 30 DAY ECG REV/REPORT: CPT

## 2022-03-18 PROCEDURE — 258N000003 HC RX IP 258 OP 636: Performed by: INTERNAL MEDICINE

## 2022-03-18 PROCEDURE — 250N000013 HC RX MED GY IP 250 OP 250 PS 637: Performed by: STUDENT IN AN ORGANIZED HEALTH CARE EDUCATION/TRAINING PROGRAM

## 2022-03-18 PROCEDURE — 99231 SBSQ HOSP IP/OBS SF/LOW 25: CPT | Mod: GC | Performed by: PSYCHIATRY & NEUROLOGY

## 2022-03-18 PROCEDURE — 99239 HOSP IP/OBS DSCHRG MGMT >30: CPT | Performed by: INTERNAL MEDICINE

## 2022-03-18 PROCEDURE — 93306 TTE W/DOPPLER COMPLETE: CPT | Mod: 26 | Performed by: INTERNAL MEDICINE

## 2022-03-18 PROCEDURE — 92507 TX SP LANG VOICE COMM INDIV: CPT | Mod: GN | Performed by: SPEECH-LANGUAGE PATHOLOGIST

## 2022-03-18 PROCEDURE — 36415 COLL VENOUS BLD VENIPUNCTURE: CPT | Performed by: INTERNAL MEDICINE

## 2022-03-18 PROCEDURE — 250N000013 HC RX MED GY IP 250 OP 250 PS 637: Performed by: HOSPITALIST

## 2022-03-18 PROCEDURE — 255N000002 HC RX 255 OP 636: Performed by: HOSPITALIST

## 2022-03-18 PROCEDURE — 80048 BASIC METABOLIC PNL TOTAL CA: CPT | Performed by: INTERNAL MEDICINE

## 2022-03-18 PROCEDURE — 93272 ECG/REVIEW INTERPRET ONLY: CPT | Performed by: INTERNAL MEDICINE

## 2022-03-18 RX ORDER — CLOPIDOGREL BISULFATE 75 MG/1
75 TABLET ORAL DAILY
Qty: 90 TABLET | Refills: 0 | Status: SHIPPED | OUTPATIENT
Start: 2022-03-19 | End: 2022-03-24

## 2022-03-18 RX ORDER — HYDROCHLOROTHIAZIDE 12.5 MG/1
12.5 CAPSULE ORAL DAILY
Qty: 90 CAPSULE | Refills: 3 | Status: ON HOLD | OUTPATIENT
Start: 2022-03-18 | End: 2022-03-27

## 2022-03-18 RX ORDER — ATORVASTATIN CALCIUM 80 MG/1
80 TABLET, FILM COATED ORAL EVERY EVENING
Status: DISCONTINUED | OUTPATIENT
Start: 2022-03-18 | End: 2022-03-18

## 2022-03-18 RX ORDER — ASPIRIN 325 MG
325 TABLET, DELAYED RELEASE (ENTERIC COATED) ORAL DAILY
Qty: 90 TABLET | Refills: 0 | Status: ON HOLD | OUTPATIENT
Start: 2022-03-19 | End: 2022-03-27

## 2022-03-18 RX ADMIN — ESCITALOPRAM OXALATE 20 MG: 20 TABLET ORAL at 08:10

## 2022-03-18 RX ADMIN — HUMAN ALBUMIN MICROSPHERES AND PERFLUTREN 9 ML: 10; .22 INJECTION, SOLUTION INTRAVENOUS at 09:28

## 2022-03-18 RX ADMIN — SODIUM CHLORIDE: 9 INJECTION, SOLUTION INTRAVENOUS at 09:05

## 2022-03-18 RX ADMIN — CLOPIDOGREL BISULFATE 75 MG: 75 TABLET ORAL at 08:09

## 2022-03-18 RX ADMIN — ROSUVASTATIN CALCIUM 40 MG: 20 TABLET, FILM COATED ORAL at 08:09

## 2022-03-18 RX ADMIN — ASPIRIN 325 MG: 325 TABLET, COATED ORAL at 08:09

## 2022-03-18 ASSESSMENT — ACTIVITIES OF DAILY LIVING (ADL)
ADLS_ACUITY_SCORE: 5

## 2022-03-18 NOTE — PLAN OF CARE
Goal Outcome Evaluation:    Plan of Care Reviewed with: Patient    Overall Outcome: Patient is improving      Pt here with L CVA. A&OX3, disoriented to sitauation. Neuros are WFD and mild expressive aphasia. Pt perform neuro best by demonstration. VSS. Tele SR. Regular diet, thin liquids. Takes pills whole. Up with SBA. Denies pain. Pt scoring green on the Aggression Stop Light Tool. Plan is to have Echo. Discharge pending possible today.

## 2022-03-18 NOTE — PLAN OF CARE
Occupational Therapy Discharge Summary    Reason for therapy discharge:    Discharged to home with outpatient therapy.    Progress towards therapy goal(s). See goals on Care Plan in [facility] electronic health record for goal details.  Goals partially met.  Barriers to achieving goals:   discharge from facility.    Therapy recommendation(s):    Continued therapy is recommended.  Rationale/Recommendations:  Pt functioning below- baseline due to activity tolerance, expressive and receptive aphasia and possibly cognition. Functional mobility requiring at least distanced supervision due to current mild lack of insight into safety awareness. At this time, recommend home with 24/7 supervision and A from wife for all functional mobility and I/ADLs (ie showering, toileting, meal prep, cleaning, driving). Per wife, pt's aphasia has made great improvements today. Pt would benefit from further skilled therapy to promote ind in ADLs and monitor aphasia .

## 2022-03-18 NOTE — DISCHARGE SUMMARY
[facility]  Hospitalist Discharge Summary      Date of Admission:  3/16/2022  Date of Discharge:  3/18/2022  Discharging Provider: Natasha Juarez MD    Discharge Diagnoses   1. Acute ischemic CVA  2. Hyperlipidemia  3. DM2 without complications, long-term insulin use  4. Essential hypertension  5. Hyponatremia  6. CRUZITO stage I  7. NA    Follow-ups Needed After Discharge   Follow-up Appointments     Follow-up and recommended labs and tests       Follow up with primary care provider, Kendal Wong, within 10 days for   hospital follow up, and to discuss ongoing risk factor management   (diabetes, high blood pressure, high cholesterol)    Follow up with virtual stroke clinic in 3 months for stroke follow-up           Discharge Disposition   Discharged to home  Condition at discharge: Stable    Hospital Course   [address] is a 66 year old male with hx of DM2, HTN, HL, NA, and medical non-compliance who was admitted on 3/16/2022 for an acute CVA    Acute ischemic CVA, left temporal lobe  * Last known well on 3/15. Presented on 3/16 with receptive/expressive aphasia. Brain MRI on arrival showed acute/early subacute infarction at the anterior aspect of the left temporal lobe. Head/neck MRA showed mild to moderate narrowings throughout of the left ICA, MCA, and PCA vessels. In the ED, he was loaded with aspirin  * Stroke work-up initiated on admission: A1c 11.8, lipid panel: Tchol 224, HDL 33, . TTE was unremarkable  - Aphasia is resolving at the time of discharge  - He will be discharged on DAPT with  mg and Plavix x3 months then  mg daily alone thereafter  - He has been advised to continue PTA rosuvastatin 40 mg daily (was non-compliant PTA)  - He will continue OP OT and SLP  - He will follow up with virtual stroke clinic in 3 months    DM2 without complications, long-term insulin use  * Uncontrolled, A1c 11.8 on this admission. Patient admitted to [facility]  non-compliance with insulin  * PTA: Lantus 55 units qHS, aspart 10 units TID AC  - Continues on PTA insulin regimen at discharge    Essential hypertension  * PTA: HCTZ 12.5 mg daily, lisinopril 40 mg daily  - Home meds were initially held for permissive hypertension  - BP 110s-140s systolic at discharge  - Holding HCTZ at discharge due to recent hyponatremia; defer resumption to PCP  - Patient will be advised to resume PTA lisinopril     Hyponatremia, Resolved  Na 130 on arrival. Improved with IV fluids, holding HCTZ  - Na 136 at the time of discharge  - Continue holding HCTZ until cleared by PCP to resume    CRUZITO stage I, Resolved  Creatinine 1.36 on arrival; normalized with IV fluids    NA  Chronic and stable on CPAP    Consultations This Hospital Stay    1. NEUROLOGY IP CONSULT  2. PATIENT LEARNING CENTER IP CONSULT  3. SPEECH LANGUAGE PATH ADULT IP CONSULT  4. PHYSICAL THERAPY ADULT IP CONSULT  5. OCCUPATIONAL THERAPY ADULT IP CONSULT  6. CARE MANAGEMENT / SOCIAL WORK IP CONSULT    Code Status   Full Code    Time Spent on this Encounter   I, Natasha Juarez MD, personally saw the patient today and spent greater than 30 minutes discharging this patient.       Natasha Juarez MD  [facility] NEUROSCIENCE UNIT  [address] [place] [address]  Phone: [phone]  ______________________________________________________________________    Physical Exam   Vital Signs: Temp: 98.8  F (37.1  C) Temp src: Oral BP: 134/82 Pulse: 80   Resp: 20 SpO2: 97 % O2 Device: None (Room air)    Weight: 230 lbs 0 oz    Constitutional: Appears comfortable  HEENT: Sclera white, conjunctiva clear, EOMI, MMM  Respiratory: Breathing non-labored. Lungs CTAB - no wheezes/crackles/rhonchi  Cardiovascular: Heart RRR, no m/r/g. No pedal edema.   GI: +BS. Abd soft/NT  Skin: Warm and dry. No rash.  Musculoskeletal: Normal muscle bulk and tone  Neurologic: Alert and appropriate, oriented x4. ZHANG  Psychiatric: Calm  and cooperative    Primary Care Physician   Kendal Wong    Discharge Orders      CTA Head Neck with Contrast     Speech Therapy Referral      Occupational Therapy Referral      Reason for your hospital stay    Speech deficits due to stroke which have improved     Follow-up and recommended labs and tests     Follow up with primary care provider, Kendal Wong, within 10 days for hospital follow up, and to discuss ongoing risk factor management (diabetes, high blood pressure, high cholesterol)    Follow up with virtual stroke clinic in 3 months for stroke follow-up     Activity    Your activity upon discharge: activity as tolerated     Diet    Follow this diet upon discharge: Regular diet       Significant Results and Procedures   Most Recent 3 BMP's:Recent Labs   Lab Test 03/18/22  1214 03/18/22  0743 03/18/22  0733 03/17/22  0917 03/17/22  0803 03/17/22  0118 03/16/22  1704   NA  --  136  --   --  131*  --  130*   POTASSIUM  --  3.8  --   --  3.7  --  4.2   CHLORIDE  --  105  --   --  99  --  97   CO2  --  26  --   --  25  --  26   BUN  --  14  --   --  18  --  27   CR  --  0.91  --   --  0.97  --  1.36*   ANIONGAP  --  5  --   --  7  --  7   EVER  --  8.8  --   --  9.0  --  9.1   * 127* 114*   < > 216*   < > 338*    < > = values in this interval not displayed.   ,   Results for orders placed or performed during the hospital encounter of 03/16/22   MRA Brain ([place] of [place]) wo Contrast    Narrative    EXAM: MRA BRAIN ([place]) WO CONTRAST, MRA NECK (CAROT[place]) WO CONTRAST, MR BRAIN W/O CONTRAST  LOCATION: [facility]  DATE/TIME: 3/16/2022 7:27 PM    INDICATION: Receptive aphasia  COMPARISON: None.  TECHNIQUE:   1) Routine multiplanar multisequence head MRI without intravenous contrast.  2) 3D time-of-flight head MRA without intravenous contrast.  3) Neck MRA without IV contrast. Stenosis measurements made according to NASCET criteria unless otherwise  specified.      FINDINGS:  HEAD MRI:  INTRACRANIAL CONTENTS: Moderately sized acute/early subacute infarction at the anterior aspect of left temporal lobe. No definite associated hemorrhage or significant global mass effect. No mass, acute hemorrhage, or extra-axial fluid collections.   Scattered nonspecific T2/FLAIR hyperintensities within the cerebral white matter most consistent with mild chronic microvascular ischemic change. Mild generalized cerebral atrophy. No hydrocephalus. Normal position of the cerebellar tonsils.     SELLA: No abnormality accounting for technique.    OSSEOUS STRUCTURES/SOFT TISSUES: Normal marrow signal. The major intracranial vascular flow voids are maintained.     ORBITS: No abnormality accounting for technique.     SINUSES/MASTOIDS: No paranasal sinus mucosal disease. No middle ear or mastoid effusion.     HEAD MRA:   ANTERIOR CIRCULATION: Mild to moderate narrowing distal left petrous ICA. Mild to moderate narrowing distal left cavernous and proximal left supraclinoid ICA. Short segment moderate to marked narrowing distal left M1. Moderate areas of narrowings   throughout smaller branches of MCA distributions bilaterally. Standard [place] anatomy.    POSTERIOR CIRCULATION: Short segment moderate narrowing distal left P2 and proximal left P3. Moderate areas of narrowings distal branches of right posterior circulation. Mildly attenuated distal branches of right posterior circulation. Dominant left and   smaller right vertebral artery contribute to a normal basilar artery.     NECK MRA:   RIGHT CAROTID: No measurable stenosis or dissection.    LEFT CAROTID: No measurable stenosis or dissection.    VERTEBRAL ARTERIES: No focal stenosis or dissection. Dominant left and smaller right vertebral arteries.    AORTIC ARCH: Classic aortic arch anatomy with no significant stenosis at the origin of the great vessels.      Impression    IMPRESSION:  HEAD MRI:   1.  Moderately sized  acute/early subacute infarction at the anterior aspect of left temporal lobe.  2.  No definite associated hemorrhage or significant global mass effect.  3.  Mild chronic small vessel ischemia.  4.  Mild atrophy.    HEAD MRA:   1.  Mild to moderate narrowing distal left petrous ICA.  2.  Mild to moderate narrowing distal left cavernous and proximal left supraclinoid ICA.  3.  Short segment moderate to marked narrowing distal left M1.  4.  Moderate areas of narrowings throughout smaller branches of MCA distributions bilaterally.  5.  Short segment moderate narrowing distal left P2 and proximal left P3.  6.  Moderate areas of narrowings distal branches of right posterior circulation.  7.  Mildly attenuated distal branches of right posterior circulation.    NECK MRA:  1.  Normal neck MRA.   MRA Angiogram Neck w/o Contrast    Narrative    EXAM: MRA BRAIN ([place]) WO CONTRAST, MRA NECK (CAROTIDS) WO CONTRAST, MR BRAIN W/O CONTRAST  LOCATION: [facility]  DATE/TIME: 3/16/2022 7:27 PM    INDICATION: Receptive aphasia  COMPARISON: None.  TECHNIQUE:   1) Routine multiplanar multisequence head MRI without intravenous contrast.  2) 3D time-of-flight head MRA without intravenous contrast.  3) Neck MRA without IV contrast. Stenosis measurements made according to NASCET criteria unless otherwise specified.      FINDINGS:  HEAD MRI:  INTRACRANIAL CONTENTS: Moderately sized acute/early subacute infarction at the anterior aspect of left temporal lobe. No definite associated hemorrhage or significant global mass effect. No mass, acute hemorrhage, or extra-axial fluid collections.   Scattered nonspecific T2/FLAIR hyperintensities within the cerebral white matter most consistent with mild chronic microvascular ischemic change. Mild generalized cerebral atrophy. No hydrocephalus. Normal position of the cerebellar tonsils.     SELLA: No abnormality accounting for technique.    OSSEOUS STRUCTURES/SOFT  TISSUES: Normal marrow signal. The major intracranial vascular flow voids are maintained.     ORBITS: No abnormality accounting for technique.     SINUSES/MASTOIDS: No paranasal sinus mucosal disease. No middle ear or mastoid effusion.     HEAD MRA:   ANTERIOR CIRCULATION: Mild to moderate narrowing distal left petrous ICA. Mild to moderate narrowing distal left cavernous and proximal left supraclinoid ICA. Short segment moderate to marked narrowing distal left M1. Moderate areas of narrowings   throughout smaller branches of MCA distributions bilaterally. Standard [place] of [place] anatomy.    POSTERIOR CIRCULATION: Short segment moderate narrowing distal left P2 and proximal left P3. Moderate areas of narrowings distal branches of right posterior circulation. Mildly attenuated distal branches of right posterior circulation. Dominant left and   smaller right vertebral artery contribute to a normal basilar artery.     NECK MRA:   RIGHT CAROTID: No measurable stenosis or dissection.    LEFT CAROTID: No measurable stenosis or dissection.    VERTEBRAL ARTERIES: No focal stenosis or dissection. Dominant left and smaller right vertebral arteries.    AORTIC ARCH: Classic aortic arch anatomy with no significant stenosis at the origin of the great vessels.      Impression    IMPRESSION:  HEAD MRI:   1.  Moderately sized acute/early subacute infarction at the anterior aspect of left temporal lobe.  2.  No definite associated hemorrhage or significant global mass effect.  3.  Mild chronic small vessel ischemia.  4.  Mild atrophy.    HEAD MRA:   1.  Mild to moderate narrowing distal left petrous ICA.  2.  Mild to moderate narrowing distal left cavernous and proximal left supraclinoid ICA.  3.  Short segment moderate to marked narrowing distal left M1.  4.  Moderate areas of narrowings throughout smaller branches of MCA distributions bilaterally.  5.  Short segment moderate narrowing distal left P2 and proximal left P3.  6.   Moderate areas of narrowings distal branches of right posterior circulation.  7.  Mildly attenuated distal branches of right posterior circulation.    NECK MRA:  1.  Normal neck MRA.   MR Brain w/o Contrast    Narrative    EXAM: MRA BRAIN ([place]) WO CONTRAST, MRA NECK (CAROT[place]) WO CONTRAST, MR BRAIN W/O CONTRAST  LOCATION: [facility]  DATE/TIME: 3/16/2022 7:27 PM    INDICATION: Receptive aphasia  COMPARISON: None.  TECHNIQUE:   1) Routine multiplanar multisequence head MRI without intravenous contrast.  2) 3D time-of-flight head MRA without intravenous contrast.  3) Neck MRA without IV contrast. Stenosis measurements made according to NASCET criteria unless otherwise specified.      FINDINGS:  HEAD MRI:  INTRACRANIAL CONTENTS: Moderately sized acute/early subacute infarction at the anterior aspect of left temporal lobe. No definite associated hemorrhage or significant global mass effect. No mass, acute hemorrhage, or extra-axial fluid collections.   Scattered nonspecific T2/FLAIR hyperintensities within the cerebral white matter most consistent with mild chronic microvascular ischemic change. Mild generalized cerebral atrophy. No hydrocephalus. Normal position of the cerebellar tonsils.     SELLA: No abnormality accounting for technique.    OSSEOUS STRUCTURES/SOFT TISSUES: Normal marrow signal. The major intracranial vascular flow voids are maintained.     ORBITS: No abnormality accounting for technique.     SINUSES/MASTOIDS: No paranasal sinus mucosal disease. No middle ear or mastoid effusion.     HEAD MRA:   ANTERIOR CIRCULATION: Mild to moderate narrowing distal left petrous ICA. Mild to moderate narrowing distal left cavernous and proximal left supraclinoid ICA. Short segment moderate to marked narrowing distal left M1. Moderate areas of narrowings   throughout smaller branches of MCA distributions bilaterally. Standard [place] anatomy.    POSTERIOR CIRCULATION:  Short segment moderate narrowing distal left P2 and proximal left P3. Moderate areas of narrowings distal branches of right posterior circulation. Mildly attenuated distal branches of right posterior circulation. Dominant left and   smaller right vertebral artery contribute to a normal basilar artery.     NECK MRA:   RIGHT CAROTID: No measurable stenosis or dissection.    LEFT CAROTID: No measurable stenosis or dissection.    VERTEBRAL ARTERIES: No focal stenosis or dissection. Dominant left and smaller right vertebral arteries.    AORTIC ARCH: Classic aortic arch anatomy with no significant stenosis at the origin of the great vessels.      Impression    IMPRESSION:  HEAD MRI:   1.  Moderately sized acute/early subacute infarction at the anterior aspect of left temporal lobe.  2.  No definite associated hemorrhage or significant global mass effect.  3.  Mild chronic small vessel ischemia.  4.  Mild atrophy.    HEAD MRA:   1.  Mild to moderate narrowing distal left petrous ICA.  2.  Mild to moderate narrowing distal left cavernous and proximal left supraclinoid ICA.  3.  Short segment moderate to marked narrowing distal left M1.  4.  Moderate areas of narrowings throughout smaller branches of MCA distributions bilaterally.  5.  Short segment moderate narrowing distal left P2 and proximal left P3.  6.  Moderate areas of narrowings distal branches of right posterior circulation.  7.  Mildly attenuated distal branches of right posterior circulation.    NECK MRA:  1.  Normal neck MRA.   CT Head w/o Contrast    Narrative    CT OF THE HEAD WITHOUT CONTRAST 3/17/2022 12:49 PM     COMPARISON: Brain MRI 3/16/2022.    HISTORY:  Stroke, follow up.    TECHNIQUE: Axial CT images of the head from the skull base to the  vertex were acquired without IV contrast.    FINDINGS: Evolving subacute ischemic infarct in the midportion of the  left temporal lobe again noted. No definite new infarcts. The  ventricles and basal cisterns are  within normal limits in  configuration. There is no midline shift. There are no extra-axial  fluid collections.    No intracranial mass or hemorrhage.    The visualized paranasal sinuses are well-aerated. There is no  mastoiditis. There are no fractures of the visualized bones.      Impression    IMPRESSION:  1. Evolving ischemic infarct in the left temporal lobe again noted. No  definite new infarct.  2. Otherwise, normal head CT.    Radiation dose for this scan was reduced using automated exposure  control, adjustment of the mA and/or kV according to patient size, or  iterative reconstruction technique.     JAMIL PENA MD         SYSTEM ID:  [id number]   CTA Head Neck with Contrast    Narrative    CT ANGIOGRAM OF THE HEAD AND NECK WITHOUT AND WITH CONTRAST  3/17/2022  12:49 PM     COMPARISON: None.    HISTORY: Stroke/TIA, assess intracranial arteries. Stroke/TIA, assess  extracranial arteries.    TECHNIQUE:  Precontrast localizing scans were followed by CT  angiography with an injection of 71 mL [id number]-370 nonionic intravenous  contrast material with scans through the head and neck.  Images were  transferred to a separate 3-D workstation where multiplanar  reformations and 3-D images were created.  Estimates of carotid  stenoses are made relative to the distal internal carotid artery  diameters except as noted.      FINDINGS:   Neck CTA: The bilateral common carotid, bilateral cervical internal  carotid and bilateral vertebral arteries are patent without stenosis.     Head CTA: There is calcified atherosclerotic plaque of the  intracranial distal internal carotid arteries on both sides. There is  moderate atherosclerotic narrowing of the supraclinoid segment of the  intracranial distal left internal carotid artery. There is mild  narrowing of the supraclinoid intracranial distal right internal  carotid artery. There is focal moderate narrowing at the distal P2  segment of the left posterior cerebral artery. There  is moderate  narrowing at the junction between the M1 segment and the M2 branches  of the left middle cerebral artery. The basilar, bilateral anterior  cerebral, right middle cerebral and right posterior cerebral arteries  are patent and unremarkable.      Impression    IMPRESSION:  1. Intracranial atherosclerotic disease involving the bilateral  intracranial distal internal carotid arteries, left middle cerebral  artery and left posterior cerebral artery as detailed above.  2. Otherwise, normal neck and head CTA.    Radiation dose for this scan was reduced using automated exposure  control, adjustment of the mA and/or kV according to patient size, or  iterative reconstruction technique.     JAMIL PENA MD         SYSTEM ID:  [id number]   Echocardiogram Complete - For age > 60 yrs     Value    LVEF  60-65%    Narrative    [id number]  [id number]  [id number]  [id number]^LEE^RATNA^NATALIA     [facility]  Echocardiography Laboratory  [address] [place] [address]     Name: CHRISTA HOGUE  MRN: [id number]  : 1955  Study Date: 2022 09:12 AM  Age: 66 yrs  Gender: Male  Patient Location: [facility]  Reason For Study: Cerebrovascular Incident  Ordering Physician: RATNA HOWARD  Referring Physician: Kendal Wong  Performed By: Tatiana Willis     BSA: 2.2 m2  Height: 70 in  Weight: 230 lb  HR: 76  BP: 136/74 mmHg  ______________________________________________________________________________  Procedure  Complete Portable Echo Adult. Optison (NDC #[id number]) given intravenously.  ______________________________________________________________________________  Interpretation Summary     Technically challenging study due to patient body habitus, even with the use  of contrast imaging.     Left ventricular size, global systolic function are normal, estimated LVEF 60-  65%.  Endocardial borders are not optimally visualized, however no clear wall motion  abnormalities are seen.  Right  ventricular global function is normal.  No significant valvular abnormalities.  There is no color Doppler evidence of an atrial shunt.     There are no prior studies available for comparison.  ______________________________________________________________________________  Left Ventricle  The left ventricle is normal in size. There is concentric remodeling present.  Left ventricular systolic function is normal. The visual ejection fraction is  60-65%. Left ventricular diastolic function is normal. Regional wall motion  abnormalities cannot be excluded due to limited visualization. Endocardial  borders are not optimally visualized, however no clear wall motion  abnormalities are seen.     Right Ventricle  The right ventricle is normal in structure, function and size.     Atria  Normal left atrial size. Right atrial size is normal. There is no color  Doppler evidence of an atrial shunt.     Mitral Valve  The mitral valve is normal in structure and function.     Tricuspid Valve  The tricuspid valve is not well visualized, but is grossly normal. There is  trace tricuspid regurgitation. Right ventricular systolic pressure could not  be approximated due to inadequate tricuspid regurgitation.     Aortic Valve  The aortic valve is trileaflet with aortic valve sclerosis.     Pulmonic Valve  The pulmonic valve is not well seen, but is grossly normal.     Vessels  Borderline aortic root dilatation. The ascending aorta is Borderline dilated.  IVC diameter and respiratory changes fall into an intermediate range  suggesting an RA pressure of 8 mmHg.     Pericardium  There is no pericardial effusion.     ______________________________________________________________________________  MMode/2D Measurements & Calculations  IVSd: 1.0 cm  LVIDd: 4.7 cm  LVIDs: 3.1 cm  LVPWd: 1.2 cm  FS: 33.0 %  LV mass(C)d: 191.5 grams  LV mass(C)dI: 86.4 grams/m2     Ao root diam: 3.9 cm  LA dimension: 4.2 cm  asc Aorta Diam: 3.8 cm  LA/Ao: 1.1  LVOT  diam: 2.2 cm  LVOT area: 4.0 cm2  LA Volume (BP): 57.2 ml  LA Volume Index (BP): 25.8 ml/m2  RWT: 0.51     Doppler Measurements & Calculations  MV E max adis: 60.6 cm/sec  MV A max adis: 82.5 cm/sec  MV E/A: 0.73  MV dec slope: 236.7 cm/sec2     PA acc time: 0.08 sec  E/E' av.3  Lateral E/e': 6.0  Medial E/e': 12.7     ______________________________________________________________________________  Report approved by: Peri Yepez 2022 11:36 AM               Discharge Medications   Current Discharge Medication List      START taking these medications    Details   aspirin (ASA) 325 MG EC tablet Take 1 tablet (325 mg) by mouth daily  Qty: 90 tablet, Refills: 0    Associated Diagnoses: Cerebrovascular accident (CVA), unspecified mechanism (H)      clopidogrel (PLAVIX) 75 MG tablet Take 1 tablet (75 mg) by mouth daily  Qty: 90 tablet, Refills: 0    Associated Diagnoses: Cerebrovascular accident (CVA), unspecified mechanism (H)         CONTINUE these medications which have NOT CHANGED    Details   clotrimazole (LOTRIMIN) 1 % external cream Apply topically 2 times daily  Qty: 30 g, Refills: 11    Associated Diagnoses: Candidal balanitis      diltiazem ER COATED BEADS (CARDIZEM CD) 240 MG 24 hr capsule Take 1 capsule (240 mg) by mouth daily  Qty: 90 capsule, Refills: 3    Associated Diagnoses: Essential hypertension, benign      escitalopram (LEXAPRO) 20 MG tablet Take 1 tablet (20 mg) by mouth daily  Qty: 90 tablet, Refills: 3    Associated Diagnoses: Mixed anxiety depressive disorder      hydrochlorothiazide (MICROZIDE) 12.5 MG capsule Take 1 capsule (12.5 mg) by mouth daily  Qty: 90 capsule, Refills: 3    Associated Diagnoses: Type 2 diabetes mellitus with diabetic nephropathy, with long-term current use of insulin (H)      insulin aspart (NOVOLOG FLEXPEN) 100 UNIT/ML pen INJECT 10 UNITS BEFORE BREAKFAST LUNCH AND DINNER MAX OF 70 UNITS PER DAY. DUE FOR APPOINTMENT.  Qty: 15 mL, Refills: 3    Associated  Diagnoses: Type 2 diabetes mellitus with diabetic nephropathy, with long-term current use of insulin (H)      insulin glargine (LANTUS SOLOSTAR) 100 UNIT/ML pen INJ 55 UNITS SC HS  Qty: 10 mL, Refills: 9    Comments: If Lantus is not covered by insurance, may substitute Basaglar at same dose and frequency.    Associated Diagnoses: Type 2 diabetes mellitus with diabetic nephropathy, with long-term current use of insulin (H)      lisinopril (ZESTRIL) 40 MG tablet Take 1 tablet (40 mg) by mouth daily  Qty: 90 tablet, Refills: 3    Associated Diagnoses: Microalbuminuria      alcohol swab prep pads Use to swab area of injection/ian as directed  Qty: 100 each, Refills: 3    Associated Diagnoses: Type 2 diabetes mellitus with diabetic nephropathy, with long-term current use of insulin (H)      blood glucose (NO BRAND SPECIFIED) test strip Use to test blood sugar 3 times daily or as directed. To accompany: Blood Glucose Monitor Brands: per insurance.  Qty: 300 strip, Refills: 1    Associated Diagnoses: Type 2 diabetes mellitus with diabetic nephropathy, with long-term current use of insulin (H)      blood glucose calibration (NO BRAND SPECIFIED) solution Use to calibrate blood glucose monitor as needed as directed. To accompany: Blood Glucose Monitor Brands: per insurance.  Qty: 1 Bottle, Refills: 3    Associated Diagnoses: Type 2 diabetes mellitus with diabetic nephropathy, with long-term current use of insulin (H)      insulin pen needle (BD MARIE U/F) 32G X 4 MM miscellaneous USE THREE PEN NEEDLES DAILY AS DIRECTED  Qty: 300 each, Refills: 1    Associated Diagnoses: Type 2 diabetes mellitus with diabetic nephropathy, with long-term current use of insulin (H)      ORDER FOR DME Auto-CPAP: Max 12 cm H2O Min 10 cm H2O  Continuous Lifetime need and heated humidity.     Qty: 1 Device, Refills: 0    Associated Diagnoses: Obstructive sleep apnea (adult) (pediatric)      rosuvastatin (CRESTOR) 40 MG tablet Take 1 tablet (40 mg)  by mouth daily  Qty: 90 tablet, Refills: 3    Associated Diagnoses: Hyperlipidemia LDL goal <100      sildenafil (REVATIO) 20 MG tablet Take 1 tablet (20 mg) by mouth 3 times daily  Qty: 30 tablet, Refills: 11    Associated Diagnoses: Erectile dysfunction, unspecified erectile dysfunction type      thin (NO BRAND SPECIFIED) lancets Use to test blood sugar 3 times daily or as directed. To accompany: Blood Glucose Monitor Brands: per insurance.  Qty: 200 each, Refills: 6    Associated Diagnoses: Type 2 diabetes mellitus with diabetic nephropathy, with long-term current use of insulin (H)         STOP taking these medications       ASPIRIN 81 MG OR TABS Comments:   Reason for Stopping:             Allergies   Allergies   Allergen Reactions     Augmentin Diarrhea     Sulfa Drugs      Unknown - reaction occurred as a child     Victoza      Skin rash     Xanax      Difficult to wean off

## 2022-03-18 NOTE — CONSULTS
Care Management Initial Consult    General Information  Assessment completed with: Patient, Spouse or significant other,    Type of CM/SW Visit: Offer D/C Planning    Primary Care Provider verified and updated as needed: Yes   Readmission within the last 30 days: no previous admission in last 30 days      Reason for Consult: discharge planning  Advance Care Planning:            Communication Assessment  Patient's communication style: spoken language (English or Bilingual)    Hearing Difficulty or Deaf: no   Wear Glasses or Blind: no    Cognitive  Cognitive/Neuro/Behavioral: .WDL except, speech  Level of Consciousness: alert  Arousal Level: opens eyes spontaneously  Orientation: oriented x 4  Mood/Behavior: calm, cooperative  Best Language: 1 - Mild to moderate  Speech: word-finding difficulty (very slight)    Living Environment:   People in home: spouse     Current living Arrangements:        Able to return to prior arrangements: yes       Family/Social Support:  Care provided by: self  Provides care for:    Marital Status:              Description of Support System: supportive    Current Resources:   Patient receiving home care services: No     Community Resources: None  Equipment currently used at home: none  Supplies currently used at home: Diabetic Supplies    Employment/Financial:  Employment Status: employed part-time        Financial Concerns: No concerns identified      Lifestyle & Psychosocial Needs:  Social Determinants of Health     Tobacco Use: Low Risk      Smoking Tobacco Use: Never Smoker     Smokeless Tobacco Use: Never Used   Alcohol Use: Not on file   Financial Resource Strain: Not on file   Food Insecurity: Not on file   Transportation Needs: Not on file   Physical Activity: Not on file   Stress: Not on file   Social Connections: Not on file   Intimate Partner Violence: Not on file   Depression: Not at risk     PHQ-2 Score: 0   Housing Stability: Not on file       Functional Status:  Prior  to admission patient needed assistance:  no    Mental Health Status:          Chemical Dependency Status:          Values/Beliefs:  Spiritual, Cultural Beliefs, Church Practices, Values that affect care:                 Additional Information:  CM consulted for discharge planning.  Patient presented with word finding difficulty and found to have acute ischemic stroke.  He is recommended to discharge to home with outpatient ST and OT.  Met with patient.  He was able to answer questions but uncertain to determine if he fully comprehended the information.  He verified his PCP and stated he lives with his wife who works full time but mostly working from home.  Also let him know it is recommended he not drive until he had a driving assessment by [employer].  He smiled.  Also reviewed recommended follow up.    Called patient's spouse Estefania.  She verified she mostly works at home and will be able to provide the support he needs.  She will provide transportation until he is able to drive.      Care Management Discharge Note    Discharge Date: 03/19/2022       Discharge Disposition: Outpatient Rehab (PT, OT, SLP, Cardiac or Pulmonary)    Discharge Services: None    Discharge DME: None    Discharge Transportation: car, drives self, family or friend will provide    Private pay costs discussed: Not applicable    Education Provided on the Discharge Plan:  Yes regarding follow up  Persons Notified of Discharge Plans: nursing  Patient/Family in Agreement with the Plan: yes    Handoff Referral Completed: Yes    Additional Information:  Follow up appointments scheduled:    Mar 24, 2022  7:30 AM   (Arrive by 7:15 AM)   ED/Hospital Follow Up with Kendal oWng MD   [facility] ([facility] )    May 18, 2022 10:20 AM   (Arrive by 10:05 AM)   CTA  HEAD NECK WITH CONTRAST with SHCT1   [facility] ([facility] )    Jadyn Auguste RN, BSN, PHN   Inpatient Care Coordination  [facility]  Phone: [phone]

## 2022-03-18 NOTE — CONSULTS
"      [facility]    Stroke Consult Note    Interval events  No acute events overnight  Improved aphasia       HPI  Huseyin St is a 66 year old male  has a past medical history of Anxiety, Essential hypertension, benign, Hypercholesteremia, Sleep apnea, and T2DM (type 2 diabetes mellitus) (H).   Patient presented 3/16/22 complaining of word finding difficulty. last known well was 3/15 at ~1pm and presentation delayed until his wife insisted he present to the ED. In the ED no motor deficits. Wife states he uses cocaine regularly and this weekend he used \"a lot of cocaine.\"    Stroke Evaluation Summarized    MRI/Head CT HEAD MRI:   1.  Moderately sized acute/early subacute infarction at the anterior aspect of left temporal lobe.  2.  No definite associated hemorrhage or significant global mass effect.  3.  Mild chronic small vessel ischemia.  4.  Mild atrophy.   Intracranial Vasculature   HEAD MRA:   1.  Mild to moderate narrowing distal left petrous ICA.  2.  Mild to moderate narrowing distal left cavernous and proximal left supraclinoid ICA.  3.  Short segment moderate to marked narrowing distal left M1.  4.  Moderate areas of narrowings throughout smaller branches of MCA distributions bilaterally.  5.  Short segment moderate narrowing distal left P2 and proximal left P3.  6.  Moderate areas of narrowings distal branches of right posterior circulation.  7.  Mildly attenuated distal branches of right posterior circulation.    CTA head 3/17  1. Intracranial atherosclerotic disease involving the bilateral  intracranial distal internal carotid arteries, left middle cerebral  artery and left posterior cerebral artery as detailed below.  There is calcified atherosclerotic plaque of the  intracranial distal internal carotid arteries on both sides. There is  moderate atherosclerotic narrowing of the supraclinoid segment of the  intracranial distal left internal carotid artery. There is " mild  narrowing of the supraclinoid intracranial distal right internal carotid artery. There is focal moderate narrowing at the distal [id number] of the left posterior cerebral artery. There is moderate narrowing at the junction between the M1 segment and the M2 branches of the left middle cerebral artery.    Cervical Vasculature NECK MRA:  1.  Normal neck MRA    CTA neck 3/17  Otherwise, normal neck and head CTA.     Echocardiogram Left ventricular size, global systolic function are normal, estimated LVEF 60-  65%.  Endocardial borders are not optimally visualized, however no clear wall motion  abnormalities are seen.  Right ventricular global function is normal.  No significant valvular abnormalities.  There is no color Doppler evidence of an atrial shunt.   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 If similar appearance of narrowing, will consider ICAD ([facility]--Aspirin and plavix for 90 days followed by Aspirin monotherapy). If indeterminate, will likely do Aspirin and plavix and repeat CTA in 2-3 months prior to appointment to assess as long as patient abstains from cocaine.     Given CTA, likely ICAD - should repeat CTA in 2-3 months and follow up in clinic.     Patient instructed to take medication, eat healthier (Mediterranean diet), stop using cocaine, and exercise.      Recommendations  - Neurochecks and Vital Signs every 4 hours   -  goal SBP < 160 mmHg    - Repeat CTA head and neck, Completed  - Repeat CTA head and neck in 2-3 months (ordered)    - Daily aspirin 325 mg for secondary stroke prevention  - Plavix (clopidogrel) 75 mg PO Daily   - Plavix + Aspirin  "for 90 days   - After 90 days, Aspirin alone    - Statin: Atorvastatin 80mg    - Telemetry, EKG  - Bedside Glucose Monitoring  - A1c, Lipid Panel, Troponin x 3  - PT/OT/SLP  - Stroke Education  - Euthermia, Euglycemia  - Heart monitor on discharge (ordered)    Patient Follow-up    - in 12 weeks with stroke clinic LUNA (any stroke LUNA) at [facility] stroke clinic. Patient will be contacted by [facility] stroke clinic team after discharge.    Thank you for this consult. We will follow peripherally for the results of CTA and echocardiogram and update recommendations as indicated. Please page with any further questions and to re-engage.        The Stroke Staff is Dr. Raji Jon MD  Vascular Neurology Fellow  To page me or covering stroke neurology team member, click here: AMCOM   Choose \"On Call\" tab at top, then search dropdown box for \"Neurology Adult\", select location, press Enter, then look for stroke/neuro ICU/telestroke.    _____________________________________________________    Clinically Significant Risk Factors Present on Admission                 Past Medical History   Past Medical History:   Diagnosis Date     Anxiety      Essential hypertension, benign      Hypercholesteremia      Sleep apnea      T2DM (type 2 diabetes mellitus) (H)      Past Surgical History   Past Surgical History:   Procedure Laterality Date     COLONOSCOPY       COLONOSCOPY N/A 10/26/2020    Procedure: COLONOSCOPY;  Surgeon: Randy Gonzalez MD;  Location:  GI     ENT SURGERY      R) ear \"procedure\"     Medications   Home Meds  Prior to Admission medications    Medication Sig Start Date End Date Taking? Authorizing Provider   ASPIRIN 81 MG OR TABS 1 tab po QD (Once per day) 6/25/04  Yes    clotrimazole (LOTRIMIN) 1 % external cream Apply topically 2 times daily  Patient taking differently: Apply topically 2 times daily as needed  12/11/20  Yes Kendal Wong MD   diltiazem ER COATED BEADS (CARDIZEM CD) 240 MG 24 " hr capsule Take 1 capsule (240 mg) by mouth daily 10/12/21  Yes Kendal Wong MD   escitalopram (LEXAPRO) 20 MG tablet Take 1 tablet (20 mg) by mouth daily 10/12/21  Yes Kendal Wong MD   hydrochlorothiazide (MICROZIDE) 12.5 MG capsule Take 1 capsule (12.5 mg) by mouth daily 10/12/21  Yes Kendal Wong MD   insulin aspart (NOVOLOG FLEXPEN) 100 UNIT/ML pen INJECT 10 UNITS BEFORE BREAKFAST LUNCH AND DINNER MAX OF 70 UNITS PER DAY. DUE FOR APPOINTMENT.  Patient taking differently: INJECT 10 UNITS BEFORE BREAKFAST LUNCH AND DINNER PLUS SLIDING SCALE, MAX OF 70 UNITS PER DAY. DUE FOR APPOINTMENT. 10/12/21  Yes Kendal Wong MD   insulin glargine (LANTUS SOLOSTAR) 100 UNIT/ML pen INJ 55 UNITS SC HS 10/12/21  Yes Kendal Wong MD   lisinopril (ZESTRIL) 40 MG tablet Take 1 tablet (40 mg) by mouth daily 10/12/21  Yes Kendal Wong MD   alcohol swab prep pads Use to swab area of injection/ian as directed 1/6/20   Kendal Wong MD   blood glucose (NO BRAND SPECIFIED) test strip Use to test blood sugar 3 times daily or as directed. To accompany: Blood Glucose Monitor Brands: per insurance. 10/12/21   Kendal Wong MD   blood glucose calibration (NO BRAND SPECIFIED) solution Use to calibrate blood glucose monitor as needed as directed. To accompany: Blood Glucose Monitor Brands: per insurance. 1/6/20   Kendal Wong MD   insulin pen needle (BD MARIE U/F) 32G X 4 MM miscellaneous USE THREE PEN NEEDLES DAILY AS DIRECTED 10/12/21   Kendal Wong MD   ORDER FOR DME Auto-CPAP: Max 12 cm H2O Min 10 cm H2O  Continuous Lifetime need and heated humidity.    7/5/12   Goltz, Bennett Ezra, PA-C   rosuvastatin (CRESTOR) 40 MG tablet Take 1 tablet (40 mg) by mouth daily  Patient not taking: Reported on 3/16/2022 10/12/21   Kendal Wong MD   sildenafil (REVATIO) 20 MG tablet Take 1 tablet (20 mg) by mouth 3 times daily  Patient not taking: Reported on 3/16/2022 10/12/21    Kendal Wong MD   thin (NO BRAND SPECIFIED) lancets Use to test blood sugar 3 times daily or as directed. To accompany: Blood Glucose Monitor Brands: per insurance. 1/6/20   Kendal Wong MD       Scheduled Meds    aspirin  325 mg Oral Daily     clopidogrel  75 mg Oral Daily     escitalopram  20 mg Oral or Feeding Tube Daily     gadobutrol  10 mL Intravenous Once     insulin aspart  10 Units Subcutaneous TID w/meals     insulin aspart  1-10 Units Subcutaneous TID AC     insulin aspart  1-7 Units Subcutaneous At Bedtime     insulin glargine  55 Units Subcutaneous At Bedtime     rosuvastatin  40 mg Oral or Feeding Tube Daily     sodium chloride (PF)  100 mL Intravenous Once     sodium chloride (PF)  3 mL Intracatheter Q8H       Infusion Meds    - MEDICATION INSTRUCTIONS -       - MEDICATION INSTRUCTIONS -       sodium chloride 75 mL/hr at 03/18/22 0905       PRN Meds  glucose **OR** dextrose **OR** glucagon, lidocaine 4%, lidocaine (buffered or not buffered), - MEDICATION INSTRUCTIONS -, - MEDICATION INSTRUCTIONS -, ondansetron **OR** ondansetron, sodium chloride (PF)    Allergies   Allergies   Allergen Reactions     Augmentin Diarrhea     Sulfa Drugs      Unknown - reaction occurred as a child     Victoza      Skin rash     Xanax      Difficult to wean off     Family History   Family History   Adopted: Yes   Problem Relation Age of Onset     Diabetes Mother      Respiratory Mother         asthma     Lipids Mother      Arthritis Mother         knee replacement     Alzheimer Disease Mother      Cerebrovascular Disease Brother      C.A.D. No family hx of      Cancer - colorectal No family hx of      Prostate Cancer No family hx of      Social History   Social History     Tobacco Use     Smoking status: Never Smoker     Smokeless tobacco: Never Used   Substance Use Topics     Alcohol use: Yes     Alcohol/week: 0.0 standard drinks     Comment: 3-6 drinks on occ weekend night, occ drink on weekday     Drug  use: No       Review of Systems   The 10 point Review of Systems is negative other than noted in the HPI or here.        PHYSICAL EXAMINATION   Temp:  [98.6  F (37  C)-99.2  F (37.3  C)] 98.8  F (37.1  C)  Pulse:  [63-92] 80  Resp:  [16-20] 20  BP: (111-146)/(65-86) 134/82  SpO2:  [95 %-98 %] 97 %    General Exam  General:  patient lying in bed without any acute distress    HEENT:  normocephalic/atraumatic  Cardio:  RRR  Pulmonary:  no respiratory distress  Abdomen:  non-distended  Extremities:  no edema  Skin:  intact     Neuro Exam  Mental Status: Patient following commands, can read can repeat short phrases.  Patient able to name mild impaired fluency  Cranial Nerves:  visual fields intact, PERRL, EOMI with normal smooth pursuit, facial sensation intact and symmetric, facial movements symmetric, hearing not formally tested but intact to conversation, no dysarthria  Motor:  normal muscle tone and bulk, no abnormal movements, able to move all limbs spontaneously, strength 5/5 throughout upper and lower extremities, no pronator drift    Sensory:  difficult to assess given aphasia - winced with pp in L face  Coordination:  normal finger-to-nose and heel-to-shin bilaterally without dysmetria  Station/Gait:  deferred    Dysphagia Screen  Per Nursing    Stroke Scales    NIHSS  1a. Level of Consciousness 0-->Alert, keenly responsive   1b. LOC Questions 0-->Answers both questions correctly   1c. LOC Commands 0-->Performs both tasks correctly   2.   Best Gaze 0-->Normal   3.   Visual 0-->No visual loss   4.   Facial Palsy 0-->Normal symmetrical movements   5a. Motor Arm, Left 0-->No drift, limb holds 90 (or 45) degrees for full 10 secs   5b. Motor Arm, Right 0-->No drift, limb holds 90 (or 45) degrees for full 10 secs   6a. Motor Leg, Left 0-->No drift, leg holds 30 degree position for full 5 secs   6b. Motor Leg, right 0-->No drift, leg holds 30 degree position for full 5 secs   7.   Limb Ataxia 0-->Absent   8.   Sensory  0-->Normal, no sensory loss   9.   Best Language 1-->Mild-to-moderate aphasia, some obvious loss of fluency or facility of comprehension, without significant limitation on ideas expressed or form of expression. Reduction of speech and/or comprehension, however, makes conversation. . . (see row details)   10. Dysarthria 0-->Normal   11. Extinction and Inattention  0-->No abnormality   Total 1 (03/18/22 1000)       Modified [place] Score (Pre-morbid)   0-No deficits     Discharge mRS  2     Imaging  I personally reviewed all imaging; relevant findings per HPI.    Labs Data   CBC  Recent Labs   Lab 03/17/22  0803 03/16/22  1704   WBC 10.4 11.1*   RBC 5.22 5.08   HGB 15.1 14.6   HCT 45.0 44.4   * 450     Basic Metabolic Panel   Recent Labs   Lab 03/18/22  1214 03/18/22  0743 03/18/22  0733 03/17/22  0917 03/17/22  0803 03/17/22  0118 03/16/22  1704   NA  --  136  --   --  131*  --  130*   POTASSIUM  --  3.8  --   --  3.7  --  4.2   CHLORIDE  --  105  --   --  99  --  97   CO2  --  26  --   --  25  --  26   BUN  --  14  --   --  18  --  27   CR  --  0.91  --   --  0.97  --  1.36*   * 127* 114*   < > 216*   < > 338*   EVER  --  8.8  --   --  9.0  --  9.1    < > = values in this interval not displayed.     Liver Panel  Recent Labs   Lab 03/16/22  1704   PROTTOTAL 7.7   ALBUMIN 3.3*   BILITOTAL 0.4   ALKPHOS 70   AST 11   ALT 17     INR    Recent Labs   Lab Test 03/16/22  1704   INR 1.02      Lipid Profile    Recent Labs   Lab Test 03/16/22  1704 10/11/21  0942 09/28/20  0925 04/21/16  0906 08/20/15  0830 01/14/15  0847 04/21/14  0815   CHOL 224* 228* 214*   < > 136 316* 155   HDL 33* 37* 42   < > 42 41 37*   * 116* 96   < > 59 219* 80   TRIG 275* 377* 380*   < > 176* 282* 189*   CHOLHDLRATIO  --   --   --   --  3.2 7.7* 4.2    < > = values in this interval not displayed.     A1C    Recent Labs   Lab Test 03/16/22  1704 10/11/21  0943 09/28/20  0925   A1C 11.8* 11.8* 13.2*     Troponin I  No results for  input(s): TROPONIN, GHTROP in the last 168 hours.       Stroke Consult Data Data   This was a non-emergent, non-telestroke consult.

## 2022-03-18 NOTE — PROGRESS NOTES
Pt here with L-sided CVA. A&Ox3-4 throughout shift. Intermittently disoriented to situation, possibly due to WFD. Slight WFD, otherwise neuros intact. Pt appears withdrawn and irritated when RN attempting to provide discharge information and education about CVA. VSS. Sliding scale insulin provided per order for elevated blood glucose. Tele NSR. Echo completed; no new orders from neuroCVA, signed off. LS clear throughout. Denies SOB. BS active. Up with SBA. Tolerating regular textured diet.     PIV removed, discharge instructions provided to patient and wife. Discharge medications given to wife. 30 day event monitor placed. Discharged home at approx. 2:45pm, assisted via w/c to door by nursing assistant. Wife to transport home.

## 2022-03-18 NOTE — DISCHARGE INSTRUCTIONS
DO NOT DRIVE until instructed to by primary care provider at follow-up appointment.      Take aspirin 325 mg and Plavix daily for 3 months. After 3 months, stop taking Plavix and continue with aspirin 325 mg daily alone.    Plan to follow up with stroke clinic LUNA (any stroke LUNA) at virtual stroke clinic in 12 weeks. Patient will be contacted by virtual stroke clinic team after discharge.        Your risk factors for stroke or TIA (transient ischemic attack):    Your Risk Factors Your Results Normal Ranges   High blood pressure BP Readings from Last 1 Encounters:   03/18/22 (!) 146/84    Less than 120/80   Cholesterol              Total Lab Results   Component Value Date    CHOL 224 03/16/2022    CHOL 214 09/28/2020      Less than 150    Triglycerides   Lab Results   Component Value Date    TRIG 275 03/16/2022    TRIG 380 09/28/2020    Less than 150   LDL Lab Results   Component Value Date     03/16/2022    LDL 96 09/28/2020       Less than 70   HDL Lab Results   Component Value Date    HDL 33 03/16/2022    HDL 42 09/28/2020            Greater than 40 (men)  Greater than 50 (women)   Diabetes Recent Labs   Lab 03/18/22  0743   *    Fasting blood glucose    Smoking/tobacco use  Quit smoking and tobacco   Overweight  Lose 1-2 pounds a week   Lack of exercise  30 minutes moderate activity each day   Other risk factors include carotid (neck) artery disease, atrial fibrillation and stress. You may be on new medicine to treat high blood pressure, cholesterol, diabetes or atrial fibrillation.    Understanding Stroke Booklet given to patient. Please refer to booklet for further information.    Stroke warning signs and symptoms - CALL 911 right away for:  - Sudden numbness or weakness in the face, arm or leg (often on one side of the body).  - Sudden confusion or trouble understanding what is going on.  - Sudden blurred or decreased vision in one or both eyes.  - Sudden trouble speaking, loss of balance,  dizziness or problems with coordination.  - Sudden, severe headache for no reason.  - Fainting or seizures.  - Symptoms may go away then come back suddenly.

## 2022-03-18 NOTE — TELEPHONE ENCOUNTER
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     For any questions, or if this time frame does not work, please write to P STROKE LUNA    Thank you    Luis Alberto Jon MD  12:08 PM 03/17/2022

## 2022-03-18 NOTE — PHARMACY
Pharmacy Consult to evaluate for medication related stroke core measures    Huseyin St, 66 year old male admitted for CVA on 3/16/2022.    Thrombolytic was not given because of time    VTE Prophylaxis SCDs /PCDs placed on 2, as appropriate prior to end of hospital day 2.    Antithrombotic: aspirin/plavix started on 2, as appropriate by end of hospital day 2. Continue antithrombotic therapy on discharge to meet quality measures, unless contraindicated.    Anticoagulation if history of A-fib/flutter: Patient does not have history of A-fib/flutter - anticoagulation not required for medication related stroke core measures.     LDL Cholesterol Calculated   Date Value Ref Range Status   03/16/2022 136 (H) <=100 mg/dL Final   09/28/2020 96 <100 mg/dL Final     Comment:     Desirable:       <100 mg/dl       Patient currently receiving Crestor (rosuvastatin) continue statin on discharge to meet quality measures, unless contraindicated.    Recommendations: None at this time    Thank you for the consult.    Gely [facility] 3/18/2022 8:50 AM

## 2022-03-18 NOTE — PLAN OF CARE
Speech Language Therapy Discharge Summary    Reason for therapy discharge:    Discharged to home with outpatient therapy.    Progress towards therapy goal(s). See goals on Care Plan in [facility] electronic health record for goal details.  Goals not met.  Barriers to achieving goals:   discharge from facility.    Therapy recommendation(s):    Continued therapy is recommended.  Rationale/Recommendations:  Noted severe receptive and expressive aphasia on WAB-R screen on 3/17. Reviewed with pt/wife and stressed importance of following up with SLP. Orders placed for OP SLP evaluation.

## 2022-03-19 ENCOUNTER — HOSPITAL ENCOUNTER (OUTPATIENT)
Dept: EDUCATION SERVICES | Facility: CLINIC | Age: 67
Discharge: HOME | End: 2022-03-19
Attending: HOSPITALIST
Payer: COMMERCIAL

## 2022-03-19 NOTE — CONSULTS
03/19/22 1032 Klisch, Christine M, RN     Stroke Education Note     The following information has been reviewed with the patient and family:     1. Warning signs of stroke     2. Calling 911 if having warning signs of stroke     3. All modifiable risk factors: hypertension, CAD, atrial fib, diabetes, hypercholesterolemia, smoking, substance abuse, diet, physical inactivity, obesity, sleep apnea.     4. Patient's risk factors for stroke which include: DM,HTN,HLD,diet     5. Follow-up plan for after discharge     6. Discharge medications which include: insulin,lisinopril,aspirin,plavix,crestor     In addition, the above information was given to the patient and family in writing as a part of the [facility] Stroke Class Handout.     Learner's response to risk factors / lifestyle modification education: Taking steps      Christine M Klisch, RN

## 2022-03-21 ENCOUNTER — PATIENT OUTREACH (OUTPATIENT)
Dept: CARE COORDINATION | Facility: CLINIC | Age: 67
End: 2022-03-21
Payer: COMMERCIAL

## 2022-03-21 NOTE — PROGRESS NOTES
Clinic Care Coordination Contact  RUST/Voicemail    Referral Source: IP Report  Clinical Data: Care Coordinator Outreach  Outreach attempted x 1.  Left message on patient's voicemail with call back information and requested return call.    Plan: Care Coordinator will try to reach patient again in 1-2 business days.     Araceli Freire RN, BSN, PHN  Primary Care / Care Coordinator   [facility]  E-mail [email or web address]   [phone]

## 2022-03-22 ENCOUNTER — PATIENT OUTREACH (OUTPATIENT)
Dept: CARE COORDINATION | Facility: CLINIC | Age: 67
End: 2022-03-22
Payer: COMMERCIAL

## 2022-03-22 NOTE — LETTER
[facility] CARE COORDINATION  [address]  [facility] [address]    March 22, 2022    [address]useyin [address]  [address] [place] [address]      Dear Huseyin,    I am a clinic care coordinator who works with Kendal Wong MD at [facility] . I recently tried to call and was unable to reach you. Below is a description of clinic care coordination and how I can further assist you.      The clinic care coordination team is made up of a registered nurse,  and community health worker who understand the health care system. The goal of clinic care coordination is to help you manage your health and improve access to the health care system in the most efficient manner. The team can assist you in meeting your health care goals by providing education, coordinating services, strengthening the communication among your providers and supporting you with any resource needs.    Please feel free to contact me with any questions or concerns. We are focused on providing you with the highest-quality healthcare experience possible and that all starts with you.     Sincerely,      Araceli Freire RN, BSN, PHN  Primary Care / Care Coordinator   [facility]  E-mail [email or web address]   [phone]

## 2022-03-23 ENCOUNTER — HOSPITAL ENCOUNTER (OUTPATIENT)
Dept: OCCUPATIONAL THERAPY | Facility: CLINIC | Age: 67
Discharge: HOME | End: 2022-03-23
Attending: INTERNAL MEDICINE
Payer: COMMERCIAL

## 2022-03-23 DIAGNOSIS — I63.9 CEREBROVASCULAR ACCIDENT (CVA), UNSPECIFIED MECHANISM (H): ICD-10-CM

## 2022-03-23 PROCEDURE — 97537 COMMUNITY/WORK REINTEGRATION: CPT | Mod: GO | Performed by: OCCUPATIONAL THERAPIST

## 2022-03-23 PROCEDURE — 97165 OT EVAL LOW COMPLEX 30 MIN: CPT | Mod: GO | Performed by: OCCUPATIONAL THERAPIST

## 2022-03-23 ASSESSMENT — ACTIVITIES OF DAILY LIVING (ADL): IADL_QUICK_ADDS: MEAL PLANNING/PREPARATION;HOME/FINANCIAL/MANAGEMENT;COMMUNITY MOBILITY

## 2022-03-23 NOTE — PROGRESS NOTES
03/23/22 1000   Quick Adds   Type of Visit Initial Outpatient Occupational Therapy Evaluation       Present No   General Information   Start Of Care Date 03/23/22   Referring Physician Natasha Juarez   Orders Evaluate and treat as indicated   Other Orders SLP    Orders Date 03/18/22   Medical Diagnosis Cerebrovascular accident (CVA), unspecified mechanism (H)   Onset of Illness/Injury or Date of Surgery 03/16/22   Precautions/Limitations No known precautions/limitations   Surgical/Medical History Reviewed Yes   Additional Occupational Profile Info/Pertinent History of Current Problem Huseyin St is a 66 year old male with PMHx of DM2, HTN, HL, NA, and medical non-compliance who was admitted on 3/16/2022 to [facility] for an acute CVA. Patient presented on 3/16 with receptive/expressive aphasia. His brain MRI on arrival showed acute/early subacute infarction at the anterior aspect of the left temporal lobe and his head/neck MRA showed mild to moderate narrowings throughout the left ICA, MCA, and PCA vessels. At discharge, his aphasia was resolving. Admitted from 3/16-3/18 in the hospital and discharged home with OP OT and SLP orders. Patient reporting that he started having symptoms on Tuesday (slurred speech), but patient refused to get help on Tuesday. He then drove to [employer] to work () the day after his stroke.    Role/Living Environment   Current Community Support Family/friend caregiver   Patient role/Employment history Employed  ( for [employer] )   Current Living Environment House   Number of Stairs to Enter Home one step    Number of Stairs Within Home 14 steps, railing descending right side   Primary Bathroom Set Up/Equipment Tub/Shower combo   Prior Level - Transfers Independent   Prior Level - Ambulation Independent   Prior Level - ADLS Independent   Prior Responsibilities - IADL Meal Preparation;Laundry;Shopping;Medication  management;Finances;Driving;Work   Patient/family Goals Statement Main goal is to return to driving safely, improve cognition, and improve speech following stroke.    Pain   Patient currently in pain No   Fall Risk Screen   Fall screen completed by OT   Have you fallen 2 or more times in the past year? No   Have you fallen and had an injury in the past year? No   Is patient a fall risk? Department fall risk interventions implemented   Fall screen comments Patient recently had a CVA that may impact his ability to ambulate safely.    Abuse Screen (yes response referral indicated)   Feels Unsafe at Home or Work/School no   Feels Threatened by Someone no   Does Anyone Try to Keep You From Having Contact with Others or Doing Things Outside Your Home? no   Physical Signs of Abuse Present no   Patient needs abuse support services and resources No   Cognitive Status Examination   Orientation Orientation to person, place and time   Level of Consciousness Alert   Follows Commands and Answers Questions 50% of the time;Unable to follow multi-step instructions   Personal Safety and Judgment Intact   Memory Impaired   Attention Distractible during evaluation;Sustained attention impaired;Selective attention impaired, difficulty ignoring irrelevant stimuli   Organization/Problem Solving Reports problems with problem solving during evaluation   Cognitive Comment Completed [place] Cognitive Assessment (MoCA) which was designed as a rapid screening instrument for mild cognitive dysfunction. It assesses different cognitive domains: attention and concentration, executive functions, memory, language, visuoconstructional skills, conceptual thinking, calculations, and orientation. The total possible score is 30 points; a score of 26 or above is considered within normal range. Patient scored 16/30 with deficits observed in visual spatial (clock drawn without all the numbers), attention (letter A with tapping, reading numbers in  forward/backward order), immediate memory (able to immediately recall 2/5 words in first trial, 4/5 in second trial), language fluency (4 words stated in 1 minute), abstraction (0/2), and delayed recall (0/5 words without cueing, 1/5 words with category cues, and 0/5 requiring multiple choice cueing). Patient s aphasia may have impacted scoring results today as this assessment is heavily language based. Further assessment as indicated. Significant difficulty following instructions throughout evaluation assessment tasks. Increased difficulty attending noted throughout evaluation with patient consistently missing instructions and requesting repeated instructions. Decreased insight into deficits noted with cognition, attention, and memory. However, patient reported that he is having difficulty attending. Wife reporting that he had difficulty attending prior to the stroke and that he would balantly ignore directions prior to the stroke.    Visual Perception   Visual Perception No deficits were identified   Visual Field Peripheral vision: 86 degrees R side, 75 degrees L side. Continuous visual field without deficits noted in nasal area.   Visual Perception Comments Further assessment is indicated. Patient reports no concerns at this time.    Sensation   Upper Extremity Sensory Examination No deficits were identified   Sensation Comments Reports no concerns with sensation changes on affected side following stroke.    Posture   Posture Not impaired   Posture Comments No concerns reported at this time.    Range of Motion (ROM)   ROM Quick Adds No deficits identified   ROM Comments No deficits observed with screening ROM.    Strength   Strength No deficits were identified   Hand Strength   Hand Dominance Right   Left Hand  (pounds) 81 pounds   Right Hand  (pounds) 76 pounds   Hand Strength Comments No concerns reported or identified during evaluation with strength decline or loss following CVA.    Muscle Tone    Muscle Tone No deficits were identified   Coordination   Fine Motor Coordination Nine hole peg   Right Hand, Nine Hole Peg Test (seconds) 25 seconds   Coordination Comments Patient reporting that he has no difficulty coordinating movements, with handwriting, or dropping items since returning home.    Balance   Balance Quick Add No deficits were identified   Functional Mobility   Ambulation Independent without concerns observed by wife or reported by patient.    Bathing   Level of [place] - Bathing independent   Upper Body Dressing   Level of [place]: Dress Upper Body independent   Lower Body Dressing   Level of [place]: Dress Lower Body independent   Toileting   Level of [place]: Toilet independent   Grooming   Level of [place]: Grooming independent   Eating/Self-Feeding   Level of [place]: Eating independent   Eating/Self Feeding Comments Patient reporting that he is able to prepare simple meals himself and has been completing meal preparation with his wife since returning home.    Activity Tolerance   Activity Tolerance Patient reporting that he naps frequently, but did so before his stroke. Reports that it has not changed from previous status pre-stroke.    Instrumental Activities of Daily Living Assessment   IADL Quick Adds Meal Planning/Preparation;Home/Financial/Management;Community Mobility   Home/Financial Management Writes checks to pay bills and mails. Reports that he paid a bill since returning home and reports no concerns with completing check as instructed from bill.    Community Mobility Currently is not driving due to restrictions placed from hospital.    Planned Therapy Interventions   Planned Therapy Interventions Cognitive skills;Community/work reintegration;Neuromuscular re-education;Therapeutic activities   Adult OT Eval Goals   OT Eval Goals (Adult) 1;2;3    OT Goal 1   Goal Identifier IADLs   Goal Description Patient will demonstrate improved functional  independence with IADLs and community mobility by completing clinic tasks, Dynavision visuomotor program, and visual assessment tasks as measured by completion with at least 90% accuracy.    Target Date 06/20/22    OT Goal 2   Goal Identifier Check Writing/Bill Paying   Goal Description Patient will demonstrate the completion of moderate to complex money management tasks with 90% accuracy for increased attention to detail and problem-solving skills to resume finances at home and manage money in the community.   Target Date 06/20/22    OT Goal 3   Goal Identifier Memory/Attention   Goal Description Patient will demonstrate improved memory skills by completing short-term memory tasks in occupational therapy with 90% accuracy using compensatory strategies PRN for increased safety and independence with IADLs (remembering to take medications, completing work tasks, communicating with a cell phone/computer, tracking appointments/meetings, etc.).   Target Date 06/20/22   Clinical Impression   Criteria for Skilled Therapeutic Interventions Met Yes, treatment indicated   OT Diagnosis Impaired IADLs   Influenced by the following impairments Decreased insight into condition, safety awareness deficits, memory/attention impairments, aphasia, difficulty following commands   Assessment of Occupational Performance 3-5 Performance Deficits   Identified Performance Deficits Driving, financial management, social participation, leisure participation   Clinical Decision Making (Complexity) Low complexity   Therapy Frequency 1x/week   Predicted Duration of Therapy Intervention (days/wks) up to 12 weeks tapering as needed   Risks and Benefits of Treatment have been explained. Yes   Patient, Family & other staff in agreement with plan of care Yes   Clinical Impression Comments Recommend skilled OT intervention to address safe return to community mobility, memory/attention deficits, and resuming financial management accurately for increased  independence with IADL tasks following CVA.     Education Assessment   Barriers To Learning Cognitive   Preferred Learning Style Reading;Demonstration;Pictures/video   Total Evaluation Time   OT Eval, Low Complexity Minutes (41289) 40

## 2022-03-24 ENCOUNTER — APPOINTMENT (OUTPATIENT)
Dept: CT IMAGING | Facility: CLINIC | Age: 67
DRG: 065 | End: 2022-03-24
Attending: EMERGENCY MEDICINE
Payer: COMMERCIAL

## 2022-03-24 ENCOUNTER — HOSPITAL ENCOUNTER (INPATIENT)
Facility: CLINIC | Age: 67
LOS: 2 days | Discharge: HOME | DRG: 065 | End: 2022-03-27
Attending: EMERGENCY MEDICINE | Admitting: HOSPITALIST
Payer: COMMERCIAL

## 2022-03-24 ENCOUNTER — OFFICE VISIT (OUTPATIENT)
Dept: FAMILY MEDICINE | Facility: CLINIC | Age: 67
End: 2022-03-24
Payer: COMMERCIAL

## 2022-03-24 ENCOUNTER — APPOINTMENT (OUTPATIENT)
Dept: MRI IMAGING | Facility: CLINIC | Age: 67
DRG: 065 | End: 2022-03-24
Attending: EMERGENCY MEDICINE
Payer: COMMERCIAL

## 2022-03-24 VITALS
DIASTOLIC BLOOD PRESSURE: 79 MMHG | RESPIRATION RATE: 16 BRPM | OXYGEN SATURATION: 100 % | SYSTOLIC BLOOD PRESSURE: 124 MMHG | WEIGHT: 233 LBS | BODY MASS INDEX: 33.36 KG/M2 | HEIGHT: 70 IN | HEART RATE: 63 BPM | TEMPERATURE: 97.7 F

## 2022-03-24 DIAGNOSIS — E11.21 TYPE 2 DIABETES MELLITUS WITH DIABETIC NEPHROPATHY, WITH LONG-TERM CURRENT USE OF INSULIN (H): ICD-10-CM

## 2022-03-24 DIAGNOSIS — N18.1 CHRONIC KIDNEY DISEASE, STAGE 1: ICD-10-CM

## 2022-03-24 DIAGNOSIS — E66.01 MORBID OBESITY (H): ICD-10-CM

## 2022-03-24 DIAGNOSIS — R80.9 MICROALBUMINURIA: ICD-10-CM

## 2022-03-24 DIAGNOSIS — I63.9 CEREBROVASCULAR ACCIDENT (CVA), UNSPECIFIED MECHANISM (H): Primary | ICD-10-CM

## 2022-03-24 DIAGNOSIS — Z79.4 TYPE 2 DIABETES MELLITUS WITH DIABETIC NEPHROPATHY, WITH LONG-TERM CURRENT USE OF INSULIN (H): ICD-10-CM

## 2022-03-24 DIAGNOSIS — E78.5 HYPERLIPIDEMIA LDL GOAL <100: ICD-10-CM

## 2022-03-24 DIAGNOSIS — I69.322 DYSARTHRIA DUE TO RECENT STROKE: ICD-10-CM

## 2022-03-24 DIAGNOSIS — G45.9 TIA (TRANSIENT ISCHEMIC ATTACK): ICD-10-CM

## 2022-03-24 DIAGNOSIS — F41.8 MIXED ANXIETY DEPRESSIVE DISORDER: ICD-10-CM

## 2022-03-24 DIAGNOSIS — I10 ESSENTIAL HYPERTENSION: ICD-10-CM

## 2022-03-24 LAB
ALBUMIN SERPL-MCNC: 3.4 G/DL (ref 3.4–5)
ALP SERPL-CCNC: 99 U/L (ref 40–150)
ALT SERPL W P-5'-P-CCNC: 17 U/L (ref 0–70)
AMPHETAMINES UR QL SCN: NORMAL
ANION GAP SERPL CALCULATED.3IONS-SCNC: 6 MMOL/L (ref 3–14)
AST SERPL W P-5'-P-CCNC: 10 U/L (ref 0–45)
BARBITURATES UR QL: NORMAL
BASOPHILS # BLD AUTO: 0.1 10E3/UL (ref 0–0.2)
BASOPHILS NFR BLD AUTO: 1 %
BENZODIAZ UR QL: NORMAL
BILIRUB SERPL-MCNC: 0.2 MG/DL (ref 0.2–1.3)
BUN SERPL-MCNC: 19 MG/DL (ref 7–30)
CALCIUM SERPL-MCNC: 9.3 MG/DL (ref 8.5–10.1)
CANNABINOIDS UR QL SCN: NORMAL
CHLORIDE BLD-SCNC: 99 MMOL/L (ref 94–109)
CO2 SERPL-SCNC: 25 MMOL/L (ref 20–32)
COCAINE UR QL: NORMAL
CREAT SERPL-MCNC: 1.12 MG/DL (ref 0.66–1.25)
EOSINOPHIL # BLD AUTO: 0.2 10E3/UL (ref 0–0.7)
EOSINOPHIL NFR BLD AUTO: 2 %
ERYTHROCYTE [DISTWIDTH] IN BLOOD BY AUTOMATED COUNT: 12.1 % (ref 10–15)
GFR SERPL CREATININE-BSD FRML MDRD: 72 ML/MIN/1.73M2
GLUCOSE BLD-MCNC: 413 MG/DL (ref 70–99)
GLUCOSE BLDC GLUCOMTR-MCNC: 268 MG/DL (ref 70–99)
GLUCOSE BLDC GLUCOMTR-MCNC: 315 MG/DL (ref 70–99)
GLUCOSE BLDC GLUCOMTR-MCNC: 397 MG/DL (ref 70–99)
HCT VFR BLD AUTO: 46.7 % (ref 40–53)
HGB BLD-MCNC: 15.5 G/DL (ref 13.3–17.7)
HOLD SPECIMEN: NORMAL
IMM GRANULOCYTES # BLD: 0 10E3/UL
IMM GRANULOCYTES NFR BLD: 0 %
LYMPHOCYTES # BLD AUTO: 3 10E3/UL (ref 0.8–5.3)
LYMPHOCYTES NFR BLD AUTO: 30 %
MCH RBC QN AUTO: 28.9 PG (ref 26.5–33)
MCHC RBC AUTO-ENTMCNC: 33.2 G/DL (ref 31.5–36.5)
MCV RBC AUTO: 87 FL (ref 78–100)
MONOCYTES # BLD AUTO: 0.8 10E3/UL (ref 0–1.3)
MONOCYTES NFR BLD AUTO: 8 %
NEUTROPHILS # BLD AUTO: 6.1 10E3/UL (ref 1.6–8.3)
NEUTROPHILS NFR BLD AUTO: 59 %
NRBC # BLD AUTO: 0 10E3/UL
NRBC BLD AUTO-RTO: 0 /100
OPIATES UR QL SCN: NORMAL
PA AA BLD-ACNC: 350 ARU
PA ADP BLD-ACNC: 86 PRU
PCP UR QL SCN: NORMAL
PLATELET # BLD AUTO: 508 10E3/UL (ref 150–450)
POTASSIUM BLD-SCNC: 4.4 MMOL/L (ref 3.4–5.3)
PROT SERPL-MCNC: 8.2 G/DL (ref 6.8–8.8)
RBC # BLD AUTO: 5.37 10E6/UL (ref 4.4–5.9)
SARS-COV-2 RNA RESP QL NAA+PROBE: NEGATIVE
SODIUM SERPL-SCNC: 130 MMOL/L (ref 133–144)
WBC # BLD AUTO: 10.2 10E3/UL (ref 4–11)

## 2022-03-24 PROCEDURE — 70553 MRI BRAIN STEM W/O & W/DYE: CPT

## 2022-03-24 PROCEDURE — U0005 INFEC AGEN DETEC AMPLI PROBE: HCPCS | Performed by: EMERGENCY MEDICINE

## 2022-03-24 PROCEDURE — 255N000002 HC RX 255 OP 636: Performed by: EMERGENCY MEDICINE

## 2022-03-24 PROCEDURE — 36415 COLL VENOUS BLD VENIPUNCTURE: CPT | Performed by: EMERGENCY MEDICINE

## 2022-03-24 PROCEDURE — G0378 HOSPITAL OBSERVATION PER HR: HCPCS

## 2022-03-24 PROCEDURE — 85576 BLOOD PLATELET AGGREGATION: CPT

## 2022-03-24 PROCEDURE — 82962 GLUCOSE BLOOD TEST: CPT

## 2022-03-24 PROCEDURE — 93005 ELECTROCARDIOGRAM TRACING: CPT

## 2022-03-24 PROCEDURE — 250N000011 HC RX IP 250 OP 636: Performed by: EMERGENCY MEDICINE

## 2022-03-24 PROCEDURE — 99495 TRANSJ CARE MGMT MOD F2F 14D: CPT | Performed by: INTERNAL MEDICINE

## 2022-03-24 PROCEDURE — 250N000012 HC RX MED GY IP 250 OP 636 PS 637: Performed by: PHYSICIAN ASSISTANT

## 2022-03-24 PROCEDURE — B3181ZZ FLUOROSCOPY OF BILATERAL INTERNAL CAROTID ARTERIES USING LOW OSMOLAR CONTRAST: ICD-10-PCS | Performed by: RADIOLOGY

## 2022-03-24 PROCEDURE — 258N000003 HC RX IP 258 OP 636: Performed by: PHYSICIAN ASSISTANT

## 2022-03-24 PROCEDURE — 250N000009 HC RX 250: Performed by: EMERGENCY MEDICINE

## 2022-03-24 PROCEDURE — A9585 GADOBUTROL INJECTION: HCPCS | Performed by: EMERGENCY MEDICINE

## 2022-03-24 PROCEDURE — 96372 THER/PROPH/DIAG INJ SC/IM: CPT | Performed by: PHYSICIAN ASSISTANT

## 2022-03-24 PROCEDURE — 36415 COLL VENOUS BLD VENIPUNCTURE: CPT | Performed by: PSYCHIATRY & NEUROLOGY

## 2022-03-24 PROCEDURE — 85004 AUTOMATED DIFF WBC COUNT: CPT | Performed by: PHYSICIAN ASSISTANT

## 2022-03-24 PROCEDURE — 70450 CT HEAD/BRAIN W/O DYE: CPT

## 2022-03-24 PROCEDURE — C9803 HOPD COVID-19 SPEC COLLECT: HCPCS

## 2022-03-24 PROCEDURE — 96372 THER/PROPH/DIAG INJ SC/IM: CPT | Mod: XU

## 2022-03-24 PROCEDURE — 80053 COMPREHEN METABOLIC PANEL: CPT | Performed by: PHYSICIAN ASSISTANT

## 2022-03-24 PROCEDURE — 70496 CT ANGIOGRAPHY HEAD: CPT

## 2022-03-24 PROCEDURE — 99220 PR INITIAL OBSERVATION CARE,LEVEL III: CPT | Performed by: PHYSICIAN ASSISTANT

## 2022-03-24 PROCEDURE — 99207 PR SC NO CHARGE VISIT: CPT | Performed by: PSYCHIATRY & NEUROLOGY

## 2022-03-24 PROCEDURE — 99291 CRITICAL CARE FIRST HOUR: CPT | Mod: 25

## 2022-03-24 PROCEDURE — 96374 THER/PROPH/DIAG INJ IV PUSH: CPT | Mod: XU

## 2022-03-24 PROCEDURE — 99222 1ST HOSP IP/OBS MODERATE 55: CPT | Performed by: PSYCHIATRY & NEUROLOGY

## 2022-03-24 PROCEDURE — 80307 DRUG TEST PRSMV CHEM ANLYZR: CPT | Performed by: EMERGENCY MEDICINE

## 2022-03-24 RX ORDER — AMOXICILLIN 250 MG
1-2 CAPSULE ORAL 2 TIMES DAILY PRN
Status: DISCONTINUED | OUTPATIENT
Start: 2022-03-24 | End: 2022-03-27 | Stop reason: HOSPADM

## 2022-03-24 RX ORDER — ESCITALOPRAM OXALATE 20 MG/1
20 TABLET ORAL DAILY
Status: DISCONTINUED | OUTPATIENT
Start: 2022-03-25 | End: 2022-03-27 | Stop reason: HOSPADM

## 2022-03-24 RX ORDER — HYDRALAZINE HYDROCHLORIDE 20 MG/ML
10-20 INJECTION INTRAMUSCULAR; INTRAVENOUS
Status: DISCONTINUED | OUTPATIENT
Start: 2022-03-24 | End: 2022-03-27 | Stop reason: HOSPADM

## 2022-03-24 RX ORDER — NICOTINE POLACRILEX 4 MG
15-30 LOZENGE BUCCAL
Status: DISCONTINUED | OUTPATIENT
Start: 2022-03-24 | End: 2022-03-27 | Stop reason: HOSPADM

## 2022-03-24 RX ORDER — ACETAMINOPHEN 650 MG/1
650 SUPPOSITORY RECTAL EVERY 4 HOURS PRN
Status: DISCONTINUED | OUTPATIENT
Start: 2022-03-24 | End: 2022-03-27 | Stop reason: HOSPADM

## 2022-03-24 RX ORDER — POLYETHYLENE GLYCOL 3350 17 G/17G
17 POWDER, FOR SOLUTION ORAL DAILY
Status: DISCONTINUED | OUTPATIENT
Start: 2022-03-24 | End: 2022-03-27 | Stop reason: HOSPADM

## 2022-03-24 RX ORDER — IOPAMIDOL 755 MG/ML
70 INJECTION, SOLUTION INTRAVASCULAR ONCE
Status: COMPLETED | OUTPATIENT
Start: 2022-03-24 | End: 2022-03-24

## 2022-03-24 RX ORDER — ROSUVASTATIN CALCIUM 20 MG/1
40 TABLET, COATED ORAL DAILY
Status: DISCONTINUED | OUTPATIENT
Start: 2022-03-25 | End: 2022-03-27 | Stop reason: HOSPADM

## 2022-03-24 RX ORDER — LABETALOL HYDROCHLORIDE 5 MG/ML
10-40 INJECTION, SOLUTION INTRAVENOUS EVERY 10 MIN PRN
Status: DISCONTINUED | OUTPATIENT
Start: 2022-03-24 | End: 2022-03-27 | Stop reason: HOSPADM

## 2022-03-24 RX ORDER — CLOPIDOGREL BISULFATE 75 MG/1
75 TABLET ORAL DAILY
Qty: 90 TABLET | Refills: 0 | Status: SHIPPED | OUTPATIENT
Start: 2022-03-24 | End: 2022-03-24

## 2022-03-24 RX ORDER — DEXTROSE MONOHYDRATE 25 G/50ML
25-50 INJECTION, SOLUTION INTRAVENOUS
Status: DISCONTINUED | OUTPATIENT
Start: 2022-03-24 | End: 2022-03-27 | Stop reason: HOSPADM

## 2022-03-24 RX ORDER — ACETAMINOPHEN 325 MG/1
650 TABLET ORAL EVERY 4 HOURS PRN
Status: DISCONTINUED | OUTPATIENT
Start: 2022-03-24 | End: 2022-03-27 | Stop reason: HOSPADM

## 2022-03-24 RX ORDER — CLOPIDOGREL BISULFATE 75 MG/1
75 TABLET ORAL DAILY
Qty: 90 TABLET | Refills: 1 | Status: ON HOLD | OUTPATIENT
Start: 2022-03-24 | End: 2022-03-27

## 2022-03-24 RX ORDER — SODIUM CHLORIDE 9 MG/ML
INJECTION, SOLUTION INTRAVENOUS CONTINUOUS
Status: DISCONTINUED | OUTPATIENT
Start: 2022-03-24 | End: 2022-03-26

## 2022-03-24 RX ORDER — CLOPIDOGREL BISULFATE 75 MG/1
75 TABLET ORAL DAILY
Status: DISCONTINUED | OUTPATIENT
Start: 2022-03-25 | End: 2022-03-27 | Stop reason: HOSPADM

## 2022-03-24 RX ORDER — LORAZEPAM 2 MG/ML
1 INJECTION INTRAMUSCULAR ONCE
Status: COMPLETED | OUTPATIENT
Start: 2022-03-24 | End: 2022-03-24

## 2022-03-24 RX ORDER — GADOBUTROL 604.72 MG/ML
10 INJECTION INTRAVENOUS ONCE
Status: COMPLETED | OUTPATIENT
Start: 2022-03-24 | End: 2022-03-24

## 2022-03-24 RX ORDER — DILTIAZEM HYDROCHLORIDE 240 MG/1
240 CAPSULE, COATED, EXTENDED RELEASE ORAL DAILY
Status: DISCONTINUED | OUTPATIENT
Start: 2022-03-25 | End: 2022-03-26

## 2022-03-24 RX ORDER — ACETAMINOPHEN 325 MG/10.15ML
650 LIQUID ORAL EVERY 4 HOURS PRN
Status: DISCONTINUED | OUTPATIENT
Start: 2022-03-24 | End: 2022-03-27 | Stop reason: HOSPADM

## 2022-03-24 RX ADMIN — INSULIN GLARGINE 30 UNITS: 100 INJECTION, SOLUTION SUBCUTANEOUS at 22:33

## 2022-03-24 RX ADMIN — INSULIN ASPART 3 UNITS: 100 INJECTION, SOLUTION INTRAVENOUS; SUBCUTANEOUS at 18:59

## 2022-03-24 RX ADMIN — GADOBUTROL 10 ML: 604.72 INJECTION INTRAVENOUS at 16:50

## 2022-03-24 RX ADMIN — LORAZEPAM 1 MG: 2 INJECTION INTRAMUSCULAR; INTRAVENOUS at 15:52

## 2022-03-24 RX ADMIN — IOPAMIDOL 70 ML: 755 INJECTION, SOLUTION INTRAVENOUS at 14:50

## 2022-03-24 RX ADMIN — SODIUM CHLORIDE: 9 INJECTION, SOLUTION INTRAVENOUS at 18:59

## 2022-03-24 RX ADMIN — SODIUM CHLORIDE 90 ML: 900 INJECTION INTRAVENOUS at 14:51

## 2022-03-24 ASSESSMENT — PAIN SCALES - GENERAL: PAINLEVEL: NO PAIN (0)

## 2022-03-24 ASSESSMENT — ENCOUNTER SYMPTOMS
SPEECH DIFFICULTY: 1
SEIZURES: 0
WEAKNESS: 0
ARTHRALGIAS: 0

## 2022-03-24 NOTE — PROVIDER NOTIFICATION
MD Notification    Notified Person: MD    Notified Person Name: Dr. Bryant (stroke team)    Notification Date/Time: 3/24 @ 3796    Notification Interaction: web page    Purpose of Notification: MRI results are in. Please advise.    Orders Received:    Comments:

## 2022-03-24 NOTE — H&P
[facility]    History and Physical - Hospitalist Service       Date of Admission:  3/24/2022  PRIMARY CARE PROVIDER:    Kendal Wong    Assessment & Plan   Huseyin RUSSE[address] St is a 66 year old male admitted on 3/24/2022.    Past medical history significant for Recent ischemic CVA, HTN, HLP, DM2, Obesity, NA, BPH, MDD with anxiety, MDD with anxiety, Substance use D/O, ED who was registered to short-stay/obs due to TIA.      Patient presented to [facility] ED due to stroke symptoms (intermittent garbled/jumbled speech).  Patient was seen by PCP earlier today for hospital follow-up and confusion and speech changes started ~ 13:30.  Notably, patient was hospitalized here from 3/16-3/18/2022 due to acute ischemic CVA when he presented with receptive/expressive aphasia.  MRI confirmed acute/early subacute infarction at the anterior aspect of the left temporal lobe.  Head/Neck MRA revealed mild-moderate narrowings throughout the left ICA, MCA and PCA vessels.  Patient was discharged home with DAPT.      Work-up in the ED included a glucose check with a reading of 397.  Head CT w/o contrast with well-defined infarct in the left temporal lobe that appears similar to recent head CT 3/17/22 and without evidence of hemorrhagic transformation of infarct and without acute intracranial hemorrhage.  Head/Neck CTA with persistent severe focal stenosis/near occlusion a the left MCA bifurcation (similar to previous study).      TIA  Recent ischemic CVA  - Brain MRI with and w/o contrast.   - Neurology consult requested.    - Resumed on PTA DAPT ( mg/d and Plavix 75 mg/d).    - NPO until ASAP dysphagia screen completed.   --If passes dysphagia screen will advance diet.    - PT/OT/SLP.    - IV Fluids at 100 ml/hr.    - Antihypertensive management and statin medication as below.      ADDENDUM 2000: MRI with evolving subacute ischemic infarct in the left temporal lobe again noted without change. 2  new tiny recent white matter infarcts in the periatrial white matter of the left cerebral hemisphere. This was communicated to pt. Stroke Neuro paged to review imaging, see note by Dr. Alegria.     Hyponatremia  Sodium of 130.  - IV Fluids as above.    - Monitor.      Mild CRUZITO  Creatinine of 1.12 with GFR of 72.    - IV Fluids as above.    - Monitor.    HTN  - Hold PTA Diltiazem 240 mg/d, hydrochlorothiazide 12.5 mg/d and lisinopril 40 mg/d until MRI back    HLP  LIPIDS (3/16/22): 224/33/136/275.  - Resumed on PTA Crestor 40 mg/d.      DM2, insulin dependent  PTA management with Lantus 55 units at bedtime and prandial Novolog 10 units before each meal plus sliding scale.  A1c of 11.8% (3/16/22).    - Reduce Lantus to 30 units at bedtime.    - Medium intensity sliding scale insulin.    - Hold prandial insulin while NPO.    - Glucose checks.    - Hypoglycemic protocol.      Obesity with associated NA  There is no height or weight on file to calculate BMI.  Patient is compliant/non-compliant with CPAP.  Increase in all-cause morbidity and mortality.   - Encourage weight loss.  - Follow up with PCP regarding ongoing management.    - Continue CPAP with home settings.      BPH  Noted per EMR, but not currently on any medications.  Monitor.      MDD with anxiety  - Resumed on PTA Lexapro 20 mg/d.      Substance use D/O  Patient with noted use of cocaine with last use occurring Monday 3/21/22.  He denies this during my interview.     ED  - Hold PTA sildenafil and resume at discharge.         Diet: Moderate Consistent Carb (60 g CHO per Meal) Diet  DVT Prophylaxis: Pneumatic Compression Devices  Rao Catheter: Not present  Code Status: Full Code       Disposition Plan  Expected Discharge: 1-2 days pending Neurology recommendations.     Entered: Maira Fink PA-C 03/24/2022, 8:48 PM     The patient's care was discussed with the Attending Physician, Dr. Krause, Bedside Nurse and Patient.    Maira  Lauren Fink PA-C  [facility]  Securely message with the [employer] Web Console (learn more here)  Text page via [facility] Paging/Directory    ______________________________________________________________________    Chief Complaint   Confusion and speech changes.      History is obtained from the patient and EMR.      History of Present Illness   Huseyin St is a 66 year old male with a past medical history significant for Recent ischemic CVA, HTN, HLP, DM2, Obesity, NA, BPH, MDD with anxiety, MDD with anxiety, Substance use D/O, ED who was registered to short-stay/obs due to TIA.      Patient presented to [facility] ED due to stroke symptoms (intermittent garbled/jumbled speech).  Patient was seen by PCP earlier today for hospital follow-up and confusion and speech changes started ~ 13:30.  Notably, patient was hospitalized here from 3/16-3/18/2022 due to acute ischemic CVA when he presented with receptive/expressive aphasia.  MRI confirmed acute/early subacute infarction at the anterior aspect of the left temporal lobe.  Head/Neck MRA revealed mild-moderate narrowings throughout the left ICA, MCA and PCA vessels.  Patient was discharged home with DAPT.      Work-up in the ED included a glucose check with a reading of 397.  Head CT w/o contrast with well-defined infarct in the left temporal lobe that appears similar to recent head CT 3/17/22 and without evidence of hemorrhagic transformation of infarct and without acute intracranial hemorrhage.  Head/Neck CTA with persistent severe focal stenosis/near occlusion a the left MCA bifurcation (similar to previous study).  COVID-19 PCR negative.      Review of Systems    The 10 point Review of Systems is negative other than noted in the HPI.      Past Medical History    I have reviewed this patient's medical history and updated it with pertinent information if needed.   Past Medical History:   Diagnosis Date     Anxiety       "Essential hypertension, benign      Hypercholesteremia      Sleep apnea      T2DM (type 2 diabetes mellitus) (H)    Recent ischemic CVA, HTN, HLP, DM2, Obesity, NA, BPH, MDD with anxiety, MDD with anxiety, Substance use D/O, E    Past Surgical History   I have reviewed this patient's surgical history and updated it with pertinent information if needed.  Past Surgical History:   Procedure Laterality Date     COLONOSCOPY       COLONOSCOPY N/A 10/26/2020    Procedure: COLONOSCOPY;  Surgeon: Randy Gonzalez MD;  Location:  GI     ENT SURGERY      R) ear \"procedure\"     Social History   I have reviewed this patient's social history and updated it with pertinent information if needed. He has never smoked and does not use tobacco products.  He does consume alcohol occasionally; mainly on weekends but sometimes on weekdays.  He does use cocaine, last use on Monday.    Social History     Tobacco Use     Smoking status: Never Smoker     Smokeless tobacco: Never Used   Substance Use Topics     Alcohol use: Yes     Alcohol/week: 0.0 standard drinks     Comment: 3-6 drinks on occ weekend night, occ drink on weekday     Drug use: Yes     Types: Cocaine        Family History   I have reviewed this patient's family history and updated it with pertinent information if needed.   Family History   Adopted: Yes   Problem Relation Age of Onset     Diabetes Mother      Respiratory Mother         asthma     Lipids Mother      Arthritis Mother         knee replacement     Alzheimer Disease Mother      Cerebrovascular Disease Brother      C.A.D. No family hx of      Cancer - colorectal No family hx of      Prostate Cancer No family hx of      Prior to Admission Medications   Prior to Admission Medications   Prescriptions Last Dose Informant Patient Reported? Taking?   ORDER FOR DME   No No   Sig: Auto-CPAP: Max 12 cm H2O Min 10 cm H2O  Continuous Lifetime need and heated humidity.      alcohol swab prep pads   No No   Sig: Use to " swab area of injection/ian as directed   aspirin (ASA) 325 MG EC tablet 3/24/2022 at am  No Yes   Sig: Take 1 tablet (325 mg) by mouth daily   blood glucose (NO BRAND SPECIFIED) test strip   No No   Sig: Use to test blood sugar 3 times daily or as directed. To accompany: Blood Glucose Monitor Brands: per insurance.   blood glucose calibration (NO BRAND SPECIFIED) solution   No No   Sig: Use to calibrate blood glucose monitor as needed as directed. To accompany: Blood Glucose Monitor Brands: per insurance.   clopidogrel (PLAVIX) 75 MG tablet 3/24/2022 at am  No Yes   Sig: Take 1 tablet (75 mg) by mouth daily   clotrimazole (LOTRIMIN) 1 % external cream  at prn Spouse/Significant Other No No   Sig: Apply topically 2 times daily   Patient taking differently: Apply topically 2 times daily as needed    diltiazem ER COATED BEADS (CARDIZEM CD) 240 MG 24 hr capsule 3/24/2022 at am Spouse/Significant Other No Yes   Sig: Take 1 capsule (240 mg) by mouth daily   escitalopram (LEXAPRO) 20 MG tablet 3/24/2022 at am Spouse/Significant Other No Yes   Sig: Take 1 tablet (20 mg) by mouth daily   hydrochlorothiazide (MICROZIDE) 12.5 MG capsule 3/24/2022 at am  No Yes   Sig: Take 1 capsule (12.5 mg) by mouth daily . Hold until you have been cleared by your primary care provider to resume   insulin aspart (NOVOLOG FLEXPEN) 100 UNIT/ML pen 3/23/2022 at pm Spouse/Significant Other No Yes   Sig: INJECT 10 UNITS BEFORE BREAKFAST LUNCH AND DINNER MAX OF 70 UNITS PER DAY. DUE FOR APPOINTMENT.   Patient taking differently: INJECT 10 UNITS BEFORE BREAKFAST LUNCH AND DINNER PLUS SLIDING SCALE, MAX OF 70 UNITS PER DAY. DUE FOR APPOINTMENT.   insulin glargine (LANTUS SOLOSTAR) 100 UNIT/ML pen 3/23/2022 at hs Spouse/Significant Other No Yes   Sig: INJ 55 UNITS SC HS   insulin pen needle (BD MARIE U/F) 32G X 4 MM miscellaneous  Spouse/Significant Other No No   Sig: USE THREE PEN NEEDLES DAILY AS DIRECTED   lisinopril (ZESTRIL) 40 MG tablet  3/24/2022 at am Spouse/Significant Other No Yes   Sig: Take 1 tablet (40 mg) by mouth daily   rosuvastatin (CRESTOR) 40 MG tablet 3/24/2022 at am Spouse/Significant Other No Yes   Sig: Take 1 tablet (40 mg) by mouth daily   sildenafil (REVATIO) 20 MG tablet  Spouse/Significant Other No No   Sig: Take 1 tablet (20 mg) by mouth 3 times daily   thin (NO BRAND SPECIFIED) lancets   No No   Sig: Use to test blood sugar 3 times daily or as directed. To accompany: Blood Glucose Monitor Brands: per insurance.      Facility-Administered Medications: None     Allergies   Allergies   Allergen Reactions     Augmentin Diarrhea     Sulfa Drugs      Unknown - reaction occurred as a child     Victoza      Skin rash     Xanax      Difficult to wean off       Physical Exam   Vital Signs: Temp: 98.6  F (37  C) Temp src: Oral BP: 132/63 Pulse: 76   Resp: 16 SpO2: 98 % O2 Device: None (Room air)    Weight: 0 lbs 0 oz    CONSTITUTIONAL: Pt laying in bed, dressed in hospital garb. Appears comfortable. Cooperative with interview. Mild receptive and expressive aphasia noted.   HEENT: Normocephalic, atraumatic. Pupils equal, round, and reactive to light. EOMI, bilat. No nystagmus. Negative for conjunctival redness or scleral icterus.    CARDIOVASCULAR: RRR, no murmurs, rubs, or extra heart sounds appreciated. Pulses +2/4 and regular in upper and lower extremities, bilaterally.   RESPIRATORY: No increased work of breathing.  CTA, bilat; no wheezes, rales, or rhonchi appreciated.  GASTROINTESTINAL:  Abdomen soft, non-distended. BS auscultated in all four quadrants. Negative for tenderness to palpation.  No masses or organomegaly noted.  MUSCULOSKELETAL: Strength +5/5 in upper and lower extremities, bilaterally. No gross deformities noted. Normal muscle tone.   HEMATOLOGIC/LYMPHATIC/IMMUNOLOGIC: Negative for lower extremity edema, bilaterally.  NEUROLOGIC: Alert and oriented to person, place, and time.  strength intact. CN's II-XII grossly  intact. Cerebellar function intact per finger to nose testing intact in upper extremities. Sensation equal and intact. Mild receptive and expressive aphasia noted.   SKIN: Warm, dry, intact. No jaundice noted. Negative for suspicious lesions, rashes, bruising, open sores or abrasions.     Data   Data reviewed today: I reviewed all medications, new labs and imaging results over the last 24 hours. I personally reviewed all labs and imaging to date.     Recent Labs   Lab 03/24/22  1858 03/24/22  1439 03/24/22  1438 03/18/22  1214 03/18/22  0743   WBC  --   --  10.2  --   --    HGB  --   --  15.5  --   --    MCV  --   --  87  --   --    PLT  --   --  508*  --   --    NA  --   --  130*  --  136   POTASSIUM  --   --  4.4  --  3.8   CHLORIDE  --   --  99  --  105   CO2  --   --  25  --  26   BUN  --   --  19  --  14   CR  --   --  1.12  --  0.91   ANIONGAP  --   --  6  --  5   EVER  --   --  9.3  --  8.8   * 397* 413*   < > 127*   ALBUMIN  --   --  3.4  --   --    PROTTOTAL  --   --  8.2  --   --    BILITOTAL  --   --  0.2  --   --    ALKPHOS  --   --  99  --   --    ALT  --   --  17  --   --    AST  --   --  10  --   --     < > = values in this interval not displayed.     Recent Results (from the past 24 hour(s))   CT Head w/o Contrast    Narrative    CT SCAN OF THE HEAD WITHOUT CONTRAST   3/24/2022 2:47 PM     HISTORY: Neurological deficit, acute, stroke suspected. Code Stroke.  Altered mental status.    TECHNIQUE: Axial images of the head and coronal reformations without  IV contrast material. Radiation dose for this scan was reduced using  automated exposure control, adjustment of the mA and/or kV according  to patient size, or iterative reconstruction technique.    COMPARISON: Head CT 3/17/2022.    FINDINGS: Well-defined infarct in the left posterior temporal lobe  with loss of gray-white matter differentiation in that area. This is  similar to prior head CT 3/17/2022. No evidence of acute  intracranial  hemorrhage. Mild regional edema in the area of infarct. No significant  midline shift or herniation. Ventricular size is within normal limits  without evidence of hydrocephalus.    The visualized portions of the sinuses and mastoids appear normal. The  bony calvarium and bones of the skull base appear intact.       Impression    IMPRESSION:     1. Well-defined infarct in the left temporal lobe that appears similar  to recent head CT 3/17/2022. No evidence of hemorrhagic transformation  of infarct.  2. No other acute intracranial hemorrhage. No midline shift or  herniation.  3. CT angiogram to follow.      VENKAT DEE MD         SYSTEM ID:  [id number]   CTA Head Neck with Contrast    Narrative    CT ANGIOGRAM OF THE HEAD AND NECK WITH CONTRAST  3/24/2022 3:02 PM     HISTORY: Code stroke. Altered mental status.    TECHNIQUE:  CT angiography with an injection of 71mL [id number] IV  with scans through the head and neck. Images were transferred to a  separate 3-D workstation where multiplanar reformations and 3-D images  were created. Estimates of carotid stenoses are made relative to the  distal internal carotid artery diameters except as noted. Radiation  dose for this scan was reduced using automated exposure control,  adjustment of the mA and/or kV according to patient size, or iterative  reconstruction technique.    COMPARISON: CTA 3/17/2022.     CT HEAD FINDINGS: No contrast enhancing lesions. Cerebral blood flow  is grossly normal.     CT ANGIOGRAM HEAD FINDINGS:  The major intracranial arteries including  the proximal branches of the anterior cerebral, middle cerebral, and  posterior cerebral arteries appear patent without vascular cutoff. No  aneurysm identified. Atherosclerotic calcifications of the cavernous  and supraclinoid carotid arteries left more so than right. There is at  least moderate stenosis of the left supraclinoid internal carotid  artery.    Severe focal stenosis/near occlusion at  the left MCA bifurcation.  Appearance is similar to prior CTA 3/17/2022.    CT ANGIOGRAM NECK FINDINGS:   Normal origin of the great vessels from the aortic arch.     Right carotid artery: The right common and internal carotid arteries  are patent. No significant stenosis or atherosclerotic disease in the  carotid artery.     Left carotid artery: The left common and internal carotid arteries are  patent. No significant stenosis or atherosclerotic disease in the  carotid artery.     Vertebral arteries: Vertebral arteries are patent without evidence of  dissection. No significant stenosis.     Other findings: There are degenerative changes in the spine.       Impression    IMPRESSION:   1. Persistent severe focal stenosis/near occlusion at the left MCA  bifurcation. Appearance is similar to prior CTA 3/17/2022.  2. Known definite new vascular cutoff of the proximal major  intracranial arteries.  3. Atherosclerotic disease of the cavernous and supraclinoid internal  carotid arteries left greater than right. Again, appearance is similar  to 3/17/2022.  4. Patent arteries in the neck without evidence of dissection. No  significant stenosis or atherosclerotic disease of the arteries in the  neck.    Results discussed with Jones Fournier at 3:09 PM on 3/24/2022.     VENKAT DEE MD         SYSTEM ID:  [id number]   MR Brain w/o & w Contrast    Narrative    MRI OF THE BRAIN WITHOUT AND WITH CONTRAST 3/24/2022 4:51 PM     COMPARISON: Brain MR 3/16/2022, head CT 3/24/2022    HISTORY:  Stroke, follow up     TECHNIQUE: Multi-sequence, multi-planar MRI images of the brain were  acquired before and after the administration of IV gadolinium (10mL  Gadavist).    FINDINGS: Ischemic infarct in the anterior aspect of the left temporal  lobe is again noted without change. There has been interval  development of 2 tiny recent ischemic infarcts in the periatrial white  matter of the left cerebral hemisphere. No other new infarcts  noted.    The ventricles and basal cisterns are normal in configuration. There  is no midline shift. There are no extra-axial fluid collections.  Gray-white differentiation is well maintained. There is no evidence  for acute intracranial hemorrhage.     There has been interval development of contrast enhancement in the  brain parenchyma the site of the left temporal lobe infarct which is  normal for a subacute infarct. There is no other abnormal contrast  enhancement in the brain or its coverings.    There is no sinusitis or mastoiditis.      Impression    IMPRESSION:  1. Evolving subacute ischemic infarct in the left temporal lobe again  noted without change.  2. 2 new tiny recent white matter infarcts in the periatrial white  matter of the left cerebral hemisphere.  3. No other new infarcts.  4. Otherwise, normal brain MRI.    JAMIL PENA MD         SYSTEM ID:  [id number]

## 2022-03-24 NOTE — PHARMACY-ADMISSION MEDICATION HISTORY
Pharmacy Medication History  Admission medication history interview status for the 3/24/2022  admission is complete. See EPIC admission navigator for prior to admission medications     Location of Interview: Patient room  Medication history sources: Patient's family/friend (wife) and Surescripts    Significant changes made to the medication list:  None.  Of note: Huseyin doesn't take sildenafil, but he still has them around if he ever would want to take one.    In the past week, patient estimated taking medication this percent of the time: 50-90% due to other   Of note, Huseyin has a history of medication compliance issues. His wife reminds him to take them now.  He told her he took his medications this morning, but she didn't actually observe him taking them.      Medication reconciliation completed by provider prior to medication history? No    Time spent in this activity: 15 minutes    Prior to Admission medications    Medication Sig Last Dose Taking? Auth Provider   aspirin (ASA) 325 MG EC tablet Take 1 tablet (325 mg) by mouth daily 3/24/2022 at am Yes Natasha Juarez MD   clopidogrel (PLAVIX) 75 MG tablet Take 1 tablet (75 mg) by mouth daily 3/24/2022 at am Yes Kendal Wong MD   diltiazem ER COATED BEADS (CARDIZEM CD) 240 MG 24 hr capsule Take 1 capsule (240 mg) by mouth daily 3/24/2022 at am Yes Kendal Wong MD   escitalopram (LEXAPRO) 20 MG tablet Take 1 tablet (20 mg) by mouth daily 3/24/2022 at am Yes Kendal Wong MD   hydrochlorothiazide (MICROZIDE) 12.5 MG capsule Take 1 capsule (12.5 mg) by mouth daily . Hold until you have been cleared by your primary care provider to resume 3/24/2022 at am Yes Natasha Juarez MD   insulin aspart (NOVOLOG FLEXPEN) 100 UNIT/ML pen INJECT 10 UNITS BEFORE BREAKFAST LUNCH AND DINNER MAX OF 70 UNITS PER DAY. DUE FOR APPOINTMENT.  Patient taking differently: INJECT 10 UNITS BEFORE BREAKFAST LUNCH AND DINNER PLUS SLIDING SCALE, MAX OF 70 UNITS PER  DAY. DUE FOR APPOINTMENT. 3/23/2022 at pm Yes Kendal Wong MD   insulin glargine (LANTUS SOLOSTAR) 100 UNIT/ML pen INJ 55 UNITS SC HS 3/23/2022 at hs Yes Kendal Wong MD   lisinopril (ZESTRIL) 40 MG tablet Take 1 tablet (40 mg) by mouth daily 3/24/2022 at am Yes Kendal Wong MD   rosuvastatin (CRESTOR) 40 MG tablet Take 1 tablet (40 mg) by mouth daily 3/24/2022 at am Yes Kendal Wong MD   alcohol swab prep pads Use to swab area of injection/ian as directed   Kendal Wong MD   blood glucose (NO BRAND SPECIFIED) test strip Use to test blood sugar 3 times daily or as directed. To accompany: Blood Glucose Monitor Brands: per insurance.   Kendal Wong MD   blood glucose calibration (NO BRAND SPECIFIED) solution Use to calibrate blood glucose monitor as needed as directed. To accompany: Blood Glucose Monitor Brands: per insurance.   Kendal Wong MD   clotrimazole (LOTRIMIN) 1 % external cream Apply topically 2 times daily  Patient taking differently: Apply topically 2 times daily as needed   at prn  Kendal Wong MD   insulin pen needle (BD MARIE U/F) 32G X 4 MM miscellaneous USE THREE PEN NEEDLES DAILY AS DIRECTED   Kendal Wong MD   ORDER FOR DME Auto-CPAP: Max 12 cm H2O Min 10 cm H2O  Continuous Lifetime need and heated humidity.      Goltz, Bennett Ezra, PA-C   sildenafil (REVATIO) 20 MG tablet Take 1 tablet (20 mg) by mouth 3 times daily   Kendal Wong MD   thin (NO BRAND SPECIFIED) lancets Use to test blood sugar 3 times daily or as directed. To accompany: Blood Glucose Monitor Brands: per insurance.   Kendal Wong MD       The information provided in this note is only as accurate as the sources available at the time of update(s)

## 2022-03-24 NOTE — CONSULTS
"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 His wife reports that he woke up this morning from a nap at what has been his new and improved baseline for the past week since his recent stroke. Per wife he has been independent with his ADLs and overall improving. While his speech has returned, at a recent OT appointment he was having difficulty with comprehension and following instructions. This morning, after he ate a meal, his wife heard him leave a voicemail for his father that was \"gibberish\" and became concerned at this acute worsening of his speech. She reports that this was an acute return of symptoms similar to his first stroke. She denies seeing any motor changes like weakness/limb shaking or loss of continence. She did not note any new focal neurologic deficits.     Imaging Findings  CT head: Well-defined infarct in the left temporal lobe that appears similar  to recent head CT 3/17/2022. No evidence of hemorrhagic transformation  of infarct.    CTA:   Persistent severe focal stenosis/near occlusion at the left MCA  bifurcation. Appearance is similar to prior CTA 3/17/2022.  2. Known definite new vascular cutoff of the proximal major  intracranial arteries.  3. Atherosclerotic disease of the cavernous and supraclinoid internal  carotid arteries left greater than right. Again, appearance is similar  to 3/17/2022.    Thrombolytic Treatment   Not given due to:   - established infarct on imaging  - head trauma/stroke within the past 3 months    Endovascular Treatment  Not initiated due to absence of proximal vessel occlusion    Impression   Recurrent aphasia and confusion, consistent with deficits from recent left temporal " "stroke. Differential diagnosis includes seizures, new stroke, and recrudescence.     Recommendations  - Admit with Q4 hour neurochecks   - Continue PTA antiplatelet medication and statin  - MRI brain to evaluate for new infarcts  ---- If MRI negative, consider EEG   - Permissive HTN; goal SBP < 220 mmHg  - Telemetry, EKG  - Bedside Glucose Monitoring  - PT/OT/SLP  - Euthermia, Euglycemia    Patient Follow-up    - final recommendation pending work-up    Thank you for this consult. We will continue to follow.     CHAPO Loomis, CNP  Neurology  To page me or covering stroke neurology team member, click here: [facility]COM   Choose \"On Call\" tab at top, then search dropdown box for \"Neurology Adult\", select location, press Enter, then look for stroke/neuro ICU/telestroke.    ______________________________________________________    Clinically Significant Risk Factors Present on Admission         # Hyponatremia: Na = 130 mmol/L (Ref range: 133 - 144 mmol/L) on admission, will monitor as appropriate       # Platelet Defect: home medication list includes an antiplatelet medication       Past Medical History   Past Medical History:   Diagnosis Date     Anxiety      Essential hypertension, benign      Hypercholesteremia      Sleep apnea      T2DM (type 2 diabetes mellitus) (H)      Past Surgical History   Past Surgical History:   Procedure Laterality Date     COLONOSCOPY       COLONOSCOPY N/A 10/26/2020    Procedure: COLONOSCOPY;  Surgeon: Randy Gonzalez MD;  Location:  GI     ENT SURGERY      R) ear \"procedure\"     Medications   Home Meds  Prior to Admission medications    Medication Sig Start Date End Date Taking? Authorizing Provider   aspirin (ASA) 325 MG EC tablet Take 1 tablet (325 mg) by mouth daily 3/19/22  Yes Natasha Juarez MD   clopidogrel (PLAVIX) 75 MG tablet Take 1 tablet (75 mg) by mouth daily 3/24/22  Yes Kendal Wong MD   diltiazem ER COATED BEADS (CARDIZEM CD) 240 MG 24 hr capsule Take " 1 capsule (240 mg) by mouth daily 10/12/21  Yes Kendal Wong MD   escitalopram (LEXAPRO) 20 MG tablet Take 1 tablet (20 mg) by mouth daily 10/12/21  Yes Kendal Wong MD   hydrochlorothiazide (MICROZIDE) 12.5 MG capsule Take 1 capsule (12.5 mg) by mouth daily . Hold until you have been cleared by your primary care provider to resume 3/18/22  Yes Natasha Juarez MD   insulin aspart (NOVOLOG FLEXPEN) 100 UNIT/ML pen INJECT 10 UNITS BEFORE BREAKFAST LUNCH AND DINNER MAX OF 70 UNITS PER DAY. DUE FOR APPOINTMENT.  Patient taking differently: INJECT 10 UNITS BEFORE BREAKFAST LUNCH AND DINNER PLUS SLIDING SCALE, MAX OF 70 UNITS PER DAY. DUE FOR APPOINTMENT. 10/12/21  Yes Kendal Wong MD   insulin glargine (LANTUS SOLOSTAR) 100 UNIT/ML pen INJ 55 UNITS SC HS 10/12/21  Yes Kendal Wong MD   lisinopril (ZESTRIL) 40 MG tablet Take 1 tablet (40 mg) by mouth daily 10/12/21  Yes Kendal Wong MD   rosuvastatin (CRESTOR) 40 MG tablet Take 1 tablet (40 mg) by mouth daily 10/12/21  Yes Kendal Wong MD   alcohol swab prep pads Use to swab area of injection/ian as directed 1/6/20   Kendal Wong MD   blood glucose (NO BRAND SPECIFIED) test strip Use to test blood sugar 3 times daily or as directed. To accompany: Blood Glucose Monitor Brands: per insurance. 10/12/21   Kendal Wong MD   blood glucose calibration (NO BRAND SPECIFIED) solution Use to calibrate blood glucose monitor as needed as directed. To accompany: Blood Glucose Monitor Brands: per insurance. 1/6/20   Kendal Wong MD   clotrimazole (LOTRIMIN) 1 % external cream Apply topically 2 times daily  Patient taking differently: Apply topically 2 times daily as needed  12/11/20   Kendal Wong MD   insulin pen needle (BD MARIE U/F) 32G X 4 MM miscellaneous USE THREE PEN NEEDLES DAILY AS DIRECTED 10/12/21   Kendal Wong MD   ORDER FOR DME Auto-CPAP: Max 12 cm H2O Min 10 cm H2O  Continuous Lifetime  need and heated humidity.    7/5/12   Goltz, Bennett Ezra, PA-C   sildenafil (REVATIO) 20 MG tablet Take 1 tablet (20 mg) by mouth 3 times daily 10/12/21   Kendal Wong MD   thin (NO BRAND SPECIFIED) lancets Use to test blood sugar 3 times daily or as directed. To accompany: Blood Glucose Monitor Brands: per insurance. 1/6/20   Kendal Wong MD       Scheduled Meds    LORazepam  1 mg Intravenous Once       Infusion Meds      PRN Meds      Allergies   Allergies   Allergen Reactions     Augmentin Diarrhea     Sulfa Drugs      Unknown - reaction occurred as a child     Victoza      Skin rash     Xanax      Difficult to wean off     Family History   Family History   Adopted: Yes   Problem Relation Age of Onset     Diabetes Mother      Respiratory Mother         asthma     Lipids Mother      Arthritis Mother         knee replacement     Alzheimer Disease Mother      Cerebrovascular Disease Brother      C.A.D. No family hx of      Cancer - colorectal No family hx of      Prostate Cancer No family hx of      Social History   Social History     Tobacco Use     Smoking status: Never Smoker     Smokeless tobacco: Never Used   Substance Use Topics     Alcohol use: Yes     Alcohol/week: 0.0 standard drinks     Comment: 3-6 drinks on occ weekend night, occ drink on weekday     Drug use: Yes     Types: Cocaine       Review of Systems   Review of systems not obtained due to patient factors - confusion and inattention       PHYSICAL EXAMINATION  Temp:  [97  F (36.1  C)-97.7  F (36.5  C)] 97  F (36.1  C)  Pulse:  [62-69] 62  Resp:  [14-22] 22  BP: (124-146)/(73-88) 142/86  SpO2:  [96 %-100 %] 96 %     Neurologic  Mental Status:  alert, correctly states month but not age, follows some simple commands, inattentive, elements of expressive and receptive aphasia  Cranial Nerves:  PERRL, EOMI with normal smooth pursuit, facial sensation intact and symmetric, hearing not formally tested but intact to conversation, no  dysarthria, shoulder shrug strong bilaterally, tongue protrusion midline, R NLF flattening, blinks to threat bilaterally  Motor:  normal muscle tone and bulk, no abnormal movements, able to move all limbs spontaneously, mild drift in both upper extremities, no drift in LEs  Reflexes:  deferred  Sensory:  light touch sensation intact and symmetric throughout upper and lower extremities  Coordination:  could not follow for formal testing  Station/Gait:  deferred    Dysphagia Screen  Per Nursing    Stroke Scales    NIHSS  1a. Level of Consciousness 0-->Alert, keenly responsive   1b. LOC Questions 1-->Answers one question correctly   1c. LOC Commands 1-->Performs one task correctly   2.   Best Gaze 0-->Normal   3.   Visual 0-->No visual loss   4.   Facial Palsy 1-->Minor paralysis (flattened nasolabial fold, asymmetry on smiling)   5a. Motor Arm, Left 1-->Drift, limb holds 90 (or 45) degrees, but drifts down before full 10 seconds, does not hit bed or other support   5b. Motor Arm, Right 1-->Drift, limb holds 90 (or 45) degrees, but drifts down before full 10 secs, does not hit bed or other support   6a. Motor Leg, Left 0-->No drift, leg holds 30 degree position for full 5 secs   6b. Motor Leg, right 0-->No drift, leg holds 30 degree position for full 5 secs   7.   Limb Ataxia 0-->Absent   8.   Sensory 0-->Normal, no sensory loss   9.   Best Language 1-->Mild-to-moderate aphasia, some obvious loss of fluency or facility of comprehension, without significant limitation on ideas expressed or form of expression. Reduction of speech and/or comprehension, however, makes conversation. . . (see row details)   10. Dysarthria 0-->Normal   11. Extinction and Inattention  0-->No abnormality   Total 6 (03/24/22 9121)     Imaging  I personally reviewed all imaging; relevant findings per HPI.     Lab Results Data   CBC  Recent Labs   Lab 03/24/22  1438   WBC 10.2   RBC 5.37   HGB 15.5   HCT 46.7   *     Basic Metabolic Panel     Recent Labs   Lab 03/24/22  1439 03/24/22  1438 03/18/22  1214 03/18/22  0743   NA  --  130*  --  136   POTASSIUM  --  4.4  --  3.8   CHLORIDE  --  99  --  105   CO2  --   --   --  26   BUN  --   --   --  14   CR  --   --   --  0.91   *  --  204* 127*   EVER  --   --   --  8.8     Liver Panel  No results for input(s): PROTTOTAL, ALBUMIN, BILITOTAL, ALKPHOS, AST, ALT, BILIDIRECT in the last 168 hours.  INR    Recent Labs   Lab Test 03/16/22  1704   INR 1.02      Lipid Profile    Recent Labs   Lab Test 03/16/22  1704 10/11/21  0942 09/28/20  0925 04/21/16  0906 08/20/15  0830 01/14/15  0847 04/21/14  0815   CHOL 224* 228* 214*   < > 136 316* 155   HDL 33* 37* 42   < > 42 41 37*   * 116* 96   < > 59 219* 80   TRIG 275* 377* 380*   < > 176* 282* 189*   CHOLHDLRATIO  --   --   --   --  3.2 7.7* 4.2    < > = values in this interval not displayed.     A1C    Recent Labs   Lab Test 03/16/22  1704 10/11/21  0943 09/28/20  0925   A1C 11.8* 11.8* 13.2*     Troponin I  No results for input(s): TROPONIN, GHTROP in the last 168 hours.       Stroke Code Data Data   Stroke Code Data  (for stroke code without tele)  Stroke code activated 03/24/22   1439   First stroke provider response 03/24/22   1441   Last known normal 03/24/22   1300   Time of discovery   (or onset of symptoms) 03/24/22   1330   Head CT read by Stroke Neuro Dr/Provider 03/24/22   1448   Was stroke code de-escalated? Yes 03/24/22 1511            I have personally spent a total of 50 minutes providing critical care services supervising this patient's stroke code activation.  I personally reviewed all lab values and radiology images.  I evaluated and directed care for the patient's critical condition.

## 2022-03-24 NOTE — ED NOTES
"[facility]  ED Nurse Handoff Report    ED Chief complaint: Stroke Symptoms      ED Diagnosis:   Final diagnoses:   TIA (transient ischemic attack)       Code Status: Full Code    Allergies:   Allergies   Allergen Reactions     Augmentin Diarrhea     Sulfa Drugs      Unknown - reaction occurred as a child     Victoza      Skin rash     Xanax      Difficult to wean off       Patient Story: patient's wife reports that he experienced a 20-minute episode of \"gibberish\" speech about 1.5 hours ago, after returning from his stroke follow-up appointment this morning. She states that his speech is now \"pretty normal\" in the ED. His difficulty understanding others is endorsed as residual since the stroke, which occurred on 03/16/22, about 8 days ago. His wife notes that his speech symptoms today are similar to with the recent stroke. Patient admits to recent cocaine use  Focused Assessment:  Slow to respond to verbal questions in ED (basline from previous stroke), no new focal deficits  Labs Ordered and Resulted from Time of ED Arrival to Time of ED Departure   GLUCOSE BY METER - Abnormal       Result Value    GLUCOSE BY METER POCT 397 (*)    COMPREHENSIVE METABOLIC PANEL - Abnormal    Sodium 130 (*)     Potassium 4.4      Chloride 99      Carbon Dioxide (CO2) 25      Anion Gap 6      Urea Nitrogen 19      Creatinine 1.12      Calcium 9.3      Glucose 413 (*)     Alkaline Phosphatase 99      AST 10      ALT 17      Protein Total 8.2      Albumin 3.4      Bilirubin Total 0.2      GFR Estimate 72     CBC WITH PLATELETS AND DIFFERENTIAL - Abnormal    WBC Count 10.2      RBC Count 5.37      Hemoglobin 15.5      Hematocrit 46.7      MCV 87      MCH 28.9      MCHC 33.2      RDW 12.1      Platelet Count 508 (*)     % Neutrophils 59      % Lymphocytes 30      % Monocytes 8      % Eosinophils 2      % Basophils 1      % Immature Granulocytes 0      NRBCs per 100 WBC 0      Absolute Neutrophils 6.1      Absolute " Lymphocytes 3.0      Absolute Monocytes 0.8      Absolute Eosinophils 0.2      Absolute Basophils 0.1      Absolute Immature Granulocytes 0.0      Absolute NRBCs 0.0     COVID-19 VIRUS (CORONAVIRUS) BY PCR - Normal    SARS CoV2 PCR Negative     DRUG ABUSE SCREEN 77 URINE (FL, RH, SH) - Normal    Amphetamines Urine Screen Negative      Barbiturates Urine Screen Negative      Benzodiazepines Urine Screen Negative      Cannabinoids Urine Screen Negative      Cocaine Urine Screen Negative      Opiates Urine Screen Negative      PCP Urine Screen Negative     COVID-19 VIRUS (CORONAVIRUS) BY PCR     CTA Head Neck with Contrast   Final Result   IMPRESSION:    1. Persistent severe focal stenosis/near occlusion at the left MCA   bifurcation. Appearance is similar to prior CTA 3/17/2022.   2. Known definite new vascular cutoff of the proximal major   intracranial arteries.   3. Atherosclerotic disease of the cavernous and supraclinoid internal   carotid arteries left greater than right. Again, appearance is similar   to 3/17/2022.   4. Patent arteries in the neck without evidence of dissection. No   significant stenosis or atherosclerotic disease of the arteries in the   neck.      Results discussed with Jones Fournier at 3:09 PM on 3/24/2022.       VENKAT DEE MD            SYSTEM ID:  [id number]      CT Head w/o Contrast   Final Result   IMPRESSION:      1. Well-defined infarct in the left temporal lobe that appears similar   to recent head CT 3/17/2022. No evidence of hemorrhagic transformation   of infarct.   2. No other acute intracranial hemorrhage. No midline shift or   herniation.   3. CT angiogram to follow.         VENKAT DEE MD            SYSTEM ID:  [id number]      MR Brain w/o & w Contrast    (Results Pending)       Treatments and/or interventions provided: ativan for MRI  Patient's response to treatments and/or interventions: VSS    To be done/followed up on inpatient unit:  per admitting    Does this patient have  any cognitive concerns?: NA but slow to respond    Activity level - Baseline/Home:  Independent  Activity Level - Current:   Stand with Assist    Patient's Preferred language: English   Needed?: No    Isolation: None  Infection: Not Applicable  Patient tested for COVID 19 prior to admission: YES  Bariatric?: No    Vital Signs:   Vitals:    03/24/22 1429 03/24/22 1500 03/24/22 1515   BP: (!) 140/73 (!) 146/88 (!) 142/86   Pulse: 69 65 62   Resp: 18 14 22   Temp: 97  F (36.1  C)     TempSrc: Temporal     SpO2: 98% 97% 96%       Cardiac Rhythm:     Was the PSS-3 completed:   Yes  What interventions are required if any?               Family Comments: wife at bedside in ED  OBS brochure/video discussed/provided to patient/family: N/A  For the majority of the shift this patient's behavior was Green.   Behavioral interventions performed were care and rounding.    ED NURSE PHONE NUMBER: *[phone]

## 2022-03-24 NOTE — PROGRESS NOTES
Subjective   Huseyin is a 66 year old who presents for the following health issues  accompanied by his spouse.    [facility]       Hospital Follow-up Visit:    Hospital/Nursing Home/IP Rehab Facility: [facility]  Date of Admission: 03/16/2022  Date of Discharge: 03/18/2022  Reason(s) for Admission:     1. Acute ischemic CVA  2. Hyperlipidemia  3. DM2 without complications, long-term insulin use  4. Essential hypertension  5. Hyponatremia  6. CRUZITO stage I  7. NA          Was your hospitalization related to COVID-19? No   Problems taking medications regularly:  None  Medication changes since discharge: None  Problems adhering to non-medication therapy:  None    Summary of hospitalization:  [facility] discharge summary reviewed  Diagnostic Tests/Treatments reviewed.  Follow up needed: neurology  Other Healthcare Providers Involved in Patient s Care:         speech therapy, occupational therapy  Update since discharge: stable.  Post Discharge Medication Reconciliation: discharge medications reconciled and changed, per note/orders.  Plan of care communicated with patient and family          Post hospital discharge follow up of recent CVA    No headaches, No chest pain, headaches, fever or chills at this time    Patient needs neurology referral to establish outpatient follow up of recent CVA    Patient needs ongoing speech therapy, occupation therapy for dysarthria symptoms since CVA      Diabetes Follow-up    How often are you checking your blood sugar? Two times daily  Blood sugar testing frequency justification:  Uncontrolled diabetes  What time of day are you checking your blood sugars (select all that apply)?  Before meals  Have you had any blood sugars above 200?  Yes   Have you had any blood sugars below 70?  No    What symptoms do you notice when your blood sugar is low?  None    What concerns do you have today about your diabetes? Elevated BS     Do you have any of these  "symptoms? (Select all that apply)  Thirsty, weight loss ( 7lbs)      BP Readings from Last 2 Encounters:   03/25/22 132/87   03/24/22 124/79     Hemoglobin A1C POCT (%)   Date Value   09/28/2020 13.2 (H)   09/23/2019 12.3 (H)     Hemoglobin A1C (%)   Date Value   03/16/2022 11.8 (H)   10/11/2021 11.8 (H)     LDL Cholesterol Calculated (mg/dL)   Date Value   03/16/2022 136 (H)   10/11/2021 116 (H)   09/28/2020 96   09/23/2019     Cannot estimate LDL when triglyceride exceeds 400 mg/dL     LDL Cholesterol Direct (mg/dL)   Date Value   09/23/2019 127 (H)       Current Medications:     No current outpatient medications on file.         Allergies:      Allergies   Allergen Reactions     Augmentin Diarrhea     Sulfa Drugs      Unknown - reaction occurred as a child     Victoza      Skin rash     Xanax      Difficult to wean off            Past Medical History:     Past Medical History:   Diagnosis Date     Anxiety      Essential hypertension, benign      Hypercholesteremia      Sleep apnea      T2DM (type 2 diabetes mellitus) (H)          Past Surgical History:     Past Surgical History:   Procedure Laterality Date     COLONOSCOPY       COLONOSCOPY N/A 10/26/2020    Procedure: COLONOSCOPY;  Surgeon: Randy Gonzalez MD;  Location:  GI     ENT SURGERY      R) ear \"procedure\"         Family Medical History:     Family History   Adopted: Yes   Problem Relation Age of Onset     Diabetes Mother      Respiratory Mother         asthma     Lipids Mother      Arthritis Mother         knee replacement     Alzheimer Disease Mother      Cerebrovascular Disease Brother      C.A.D. No family hx of      Cancer - colorectal No family hx of      Prostate Cancer No family hx of          Social History:     Social History     Socioeconomic History     Marital status:      Spouse name: Estefania Laura     Number of children: 1     Years of education: Not on file     Highest education level: Not on file   Occupational History "     Not on file   Tobacco Use     Smoking status: Never Smoker     Smokeless tobacco: Never Used   Substance and Sexual Activity     Alcohol use: Yes     Alcohol/week: 0.0 standard drinks     Comment: 3-6 drinks on occ weekend night, occ drink on weekday     Drug use: Yes     Types: Cocaine     Sexual activity: Yes     Partners: Female   Other Topics Concern      Service No     Blood Transfusions No     Caffeine Concern Not Asked     Occupational Exposure Not Asked     Hobby Hazards Not Asked     Sleep Concern Not Asked     Stress Concern Not Asked     Weight Concern Not Asked     Special Diet Not Asked     Back Care Not Asked     Exercise Not Asked     Bike Helmet Not Asked     Seat Belt Not Asked     Self-Exams Not Asked     Parent/sibling w/ CABG, MI or angioplasty before 65F 55M? Not Asked   Social History Narrative     Not on file     Social Determinants of Health     Financial Resource Strain: Not on file   Food Insecurity: Not on file   Transportation Needs: Not on file   Physical Activity: Not on file   Stress: Not on file   Social Connections: Not on file   Intimate Partner Violence: Not on file   Housing Stability: Not on file           Review of System:     Constitutional: Negative for fever or chills  Skin: Negative for rashes  Ears/Nose/Throat: Negative for nasal congestion, sore throat  Respiratory: No shortness of breath, dyspnea on exertion, cough, or hemoptysis  Cardiovascular: Negative for chest pain  Gastrointestinal: Negative for nausea, vomiting  Genitourinary: Negative for dysuria, hematuria  Musculoskeletal: Negative for myalgias  Neurologic: Negative for headaches  Psychiatric: Negative for depression, anxiety  Hematologic/Lymphatic/Immunologic: Negative  Endocrine: Negative  Behavioral: Negative for tobacco use       Physical Exam:   /79 (BP Location: Left arm, Patient Position: Sitting, Cuff Size: Adult Large)   Pulse 63   Temp 97.7  F (36.5  C) (Temporal)   Resp 16   Ht  "1.778 m (5' 10\")   Wt 105.7 kg (233 lb)   SpO2 100%   BMI 33.43 kg/m      GENERAL: alert and no distress  EYES: eyes grossly normal to inspection, and conjunctivae and sclerae normal  HENT: Normocephalic atraumatic. Nose and mouth without ulcers or lesions  NECK: supple  RESP: lungs clear to auscultation   CV: regular rate and rhythm, normal S1 S2  LYMPH: no peripheral edema   ABDOMEN: nondistended  MS: no gross musculoskeletal defects noted  SKIN: no suspicious lesions or rashes  NEURO: Alert & Oriented x 3.   PSYCH: mentation appears normal, affect normal        Diagnostic Test Results:     Diagnostic Test Results:  Labs reviewed in Epic    Hemoglobin A1C POCT   Date Value Ref Range Status   09/28/2020 13.2 (H) 0 - 5.6 % Final     Comment:     Normal <5.7% Prediabetes 5.7-6.4%  Diabetes 6.5% or higher - adopted from ADA   consensus guidelines.  Reviewed: OK with previous       Hemoglobin A1C   Date Value Ref Range Status   03/16/2022 11.8 (H) 0.0 - 5.6 % Final     Comment:     Normal <5.7%   Prediabetes 5.7-6.4%    Diabetes 6.5% or higher     Note: Adopted from ADA consensus guidelines.       ASSESSMENT/PLAN:     (I69.322) Dysarthria due to recent stroke  (I63.9) Cerebrovascular accident (CVA), unspecified mechanism (H)  (primary encounter diagnosis)  Comment: post hospital discharge follow up of recent CVA with dysarthria symptoms, no acute headaches at this time.   Plan: clopidogrel (PLAVIX) 75 MG tablet, Adult         Neurology  Referral, DISCONTINUED:         clopidogrel (PLAVIX) 75 MG tablet      (E66.01) Morbid obesity (H)  (E11.21,  Z79.4) Type 2 diabetes mellitus with diabetic nephropathy, with long-term current use of insulin (H) (primary encounter diagnosis)  Comment: no hypoglycemia events recently  Plan: continue current insulin therapy  Med Therapy Management Referral  Diet, exercise    (E78.5) Hyperlipidemia LDL goal <100  Comment: stable.   Plan: rosuvastatin (CRESTOR) 40 MG " tablet    (N18.1) Chronic kidney disease, stage 1  (R80.9) Microalbuminuria  (I10) Essential hypertension, benign  Comment: BP is at goal  Plan: diltiazem ER COATED BEADS (CARDIZEM CD) 240 MG         24 hr capsule  lisinopril (ZESTRIL) 40 MG tablet      (F41.8) Mixed anxiety depressive disorder  Comment: stable.  Plan: escitalopram (LEXAPRO) 20 MG tablet      Follow Up Plan:     Patient is instructed to return to Internal Medicine clinic for follow-up visit in 1 month.        Kendal Wong MD  Internal Medicine  [facility]

## 2022-03-24 NOTE — ED PROVIDER NOTES
"  History   Chief Complaint:  Stroke Symptoms       The history is provided by the patient and the spouse.      Huseyin St is a 66 year old male on Plavix with history of CVA, anxiety, hypertension, and type 2 diabetes who presents with stroke symptoms. The patient's wife reports that he experienced a 20-minute episode of \"gibberish\" speech about 1.5 hours ago, after returning from his stroke follow-up appointment this morning. She states that his speech is now \"pretty normal\" in the ED. His difficulty understanding others is endorsed as residual since the stroke, which occurred on 03/16/22, about 8 days ago. His wife notes that his speech symptoms today are similar to his recent stroke. Of note, the patient was reportedly discharged from the hospital on Plavix and regular aspirin 6 days ago. His wife denies recent weakness or seizure activity. The patient also endorses no pain.     Review of Systems   Musculoskeletal: Negative for arthralgias.   Neurological: Positive for speech difficulty. Negative for seizures and weakness.   All other systems reviewed and are negative.      Allergies:  Augmentin  Sulfa Drugs  Victoza  Xanax    Medications:  Aspirin  Cardizem  Hydrochlorothiazide   Insulin aspart  Insulin glargine  Lexapro  Lisinopril   Plavix  Rosuvastatin  Revatio     Past Medical History:     Anxiety  Chronic kidney disease   CVA  Hypercholesterolemia  Hypertension  Microalbuminuria   Mixed anxiety depressive disorder  NA   Prostate hypertrophy   Recurrent cold sores   Sleep apnea  Tietze's disease  Type 2 diabetes      Past Surgical History:    Colonoscopy  Right ear surgery    Family History:    Mother: Diabetes, asthma, lipid issue, arthritis, Alzheimer's disease  Brother: Cerebrovascular disease    Social History:  Presents to the emergency department with his wife   Arrives via car   Works as a     Physical Exam     Patient Vitals for the past 24 hrs:   BP Temp Temp src Pulse Resp SpO2 "   03/24/22 1545 (!) 161/87 -- -- 69 19 100 %   03/24/22 1530 (!) 159/90 -- -- 62 11 99 %   03/24/22 1515 (!) 142/86 -- -- 62 22 96 %   03/24/22 1500 (!) 146/88 -- -- 65 14 97 %   03/24/22 1429 (!) 140/73 97  F (36.1  C) Temporal 69 18 98 %       Physical Exam  GENERAL: well developed, pleasant  HEAD: atraumatic  EYES: pupils reactive, extraocular muscles intact, conjunctivae normal  ENT:  mucus membranes moist  NECK:  trachea midline, normal range of motion  RESPIRATORY: no tachypnea, breath sounds clear to auscultation   CVS: normal S1/S2, no murmurs, intact distal pulses  ABDOMEN: soft, nontender, nondistention  MUSCULOSKELETAL: no deformities  SKIN: warm and dry, no acute rashes or ulceration  NEURO: GCS 15, cranial nerves intact, alert and oriented x3. Difficulty following commands, moves all extremities. Having a hard time answering my questions, although his speech is clear.   PSYCH:  Mood/affect normal      Emergency Department Course   ECG  ECG obtained at 1457, ECG read at 1500  Normal sinus rhythm  Left axis deviation  Incomplete right bundle branch block  Minimal voltage criteria for LVH, may be normal variant   Rate 68 bpm. RI interval 140 ms. QRS duration 104 ms. QT/QTc 400/425 ms. P-R-T axes -28 -30 20.     Imaging:  CTA Head Neck with Contrast   Preliminary Result   IMPRESSION:    1. Persistent severe focal stenosis/near occlusion at the left MCA   bifurcation. Appearance is similar to prior CTA 3/17/2022.   2. Known definite new vascular cutoff of the proximal major   intracranial arteries.   3. Atherosclerotic disease of the cavernous and supraclinoid internal   carotid arteries left greater than right. Again, appearance is similar   to 3/17/2022.   4. Patent arteries in the neck without evidence of dissection. No   significant stenosis or atherosclerotic disease of the arteries in the   neck.      Results discussed with Jones Fournier at 3:09 PM on 3/24/2022.       CT Head w/o Contrast   Final Result    IMPRESSION:      1. Well-defined infarct in the left temporal lobe that appears similar   to recent head CT 3/17/2022. No evidence of hemorrhagic transformation   of infarct.   2. No other acute intracranial hemorrhage. No midline shift or   herniation.   3. CT angiogram to follow.         VENKAT DEE MD            SYSTEM ID:  [id number]        Report per radiology    Laboratory:  Labs Ordered and Resulted from Time of ED Arrival to Time of ED Departure   GLUCOSE BY METER - Abnormal       Result Value    GLUCOSE BY METER POCT 397 (*)    COMPREHENSIVE METABOLIC PANEL - Abnormal    Sodium 130 (*)     Potassium 4.4      Chloride 99      Carbon Dioxide (CO2) 25      Anion Gap 6      Urea Nitrogen 19      Creatinine 1.12      Calcium 9.3      Glucose 413 (*)     Alkaline Phosphatase 99      AST 10      ALT 17      Protein Total 8.2      Albumin 3.4      Bilirubin Total 0.2      GFR Estimate 72     CBC WITH PLATELETS AND DIFFERENTIAL - Abnormal    WBC Count 10.2      RBC Count 5.37      Hemoglobin 15.5      Hematocrit 46.7      MCV 87      MCH 28.9      MCHC 33.2      RDW 12.1      Platelet Count 508 (*)     % Neutrophils 59      % Lymphocytes 30      % Monocytes 8      % Eosinophils 2      % Basophils 1      % Immature Granulocytes 0      NRBCs per 100 WBC 0      Absolute Neutrophils 6.1      Absolute Lymphocytes 3.0      Absolute Monocytes 0.8      Absolute Eosinophils 0.2      Absolute Basophils 0.1      Absolute Immature Granulocytes 0.0      Absolute NRBCs 0.0     COVID-19 VIRUS (CORONAVIRUS) BY PCR - Normal    SARS CoV2 PCR Negative     DRUG ABUSE SCREEN 77 URINE (FL, RH, SH) - Normal    Amphetamines Urine Screen Negative      Barbiturates Urine Screen Negative      Benzodiazepines Urine Screen Negative      Cannabinoids Urine Screen Negative      Cocaine Urine Screen Negative      Opiates Urine Screen Negative      PCP Urine Screen Negative     COVID-19 VIRUS (CORONAVIRUS) BY PCR        Emergency Department  Course:     1444 I spoke with the stroke neurology team in the ED regarding the patient.   1437 I called tier 1 stroke code  1438 I obtained patient history and examined the patient as noted above.  1440 Blood sugar levels of 397 per RN  1508 I spoke to radiology via phone regarding the patient  1509 I again spoke with the stroke neurology team in the ED regarding the patient  1515 I rechecked the patient  1517 I spoke with Dr. Octavia Krause of the hospitalist service, who agreed to admit the patient.   1519 I again rechecked the patient.  1527 I spoke with Dr. Krause, hospitalist, again regarding the patient.  1547 I rechecked the patient and updated him on plans.     Reviewed:  I reviewed nursing notes, vitals and past medical history    Interventions:  1552 Ativan 1 mg IV   Gadavist 10 mL IV    Disposition:  The patient was admitted to the hospital under the care of Dr. Krause.     Impression & Plan     CMS Diagnoses: None    Medical Decision Making:  Patient presents with a recent stroke and now similar word salad presentation.  He has had some confusion since his stroke but noted recurrence of his speech difficulties just like he had when he was admitted.  Code stroke was obtained and showed similar findings as before.  Speech is waxed and waned throughout the ED visit.  Patient is not a TPA candidate.  Stroke neurology has been consulted and suggested admission and MRI, EEG.    Critical Care Time: was 35 minutes for this patient excluding procedures    Diagnosis:    ICD-10-CM    1. TIA (transient ischemic attack)  G45.9        Scribe Disclosure:  I, Homer Reyes, am serving as a scribe at 2:36 PM on 3/24/2022 to document services personally performed by Jones Fournier MD based on my observations and the provider's statements to me.            Jones Fournier MD  03/24/22 4051

## 2022-03-25 ENCOUNTER — TELEPHONE (OUTPATIENT)
Dept: NEUROLOGY | Facility: CLINIC | Age: 67
End: 2022-03-25

## 2022-03-25 PROBLEM — I69.322 DYSARTHRIA DUE TO RECENT STROKE: Status: ACTIVE | Noted: 2022-03-25

## 2022-03-25 LAB
ANION GAP SERPL CALCULATED.3IONS-SCNC: 5 MMOL/L (ref 3–14)
BASOPHILS # BLD AUTO: 0.1 10E3/UL (ref 0–0.2)
BASOPHILS NFR BLD AUTO: 1 %
BUN SERPL-MCNC: 16 MG/DL (ref 7–30)
CALCIUM SERPL-MCNC: 8.8 MG/DL (ref 8.5–10.1)
CHLORIDE BLD-SCNC: 105 MMOL/L (ref 94–109)
CO2 SERPL-SCNC: 24 MMOL/L (ref 20–32)
CREAT SERPL-MCNC: 0.91 MG/DL (ref 0.66–1.25)
EOSINOPHIL # BLD AUTO: 0.2 10E3/UL (ref 0–0.7)
EOSINOPHIL NFR BLD AUTO: 2 %
ERYTHROCYTE [DISTWIDTH] IN BLOOD BY AUTOMATED COUNT: 12 % (ref 10–15)
GFR SERPL CREATININE-BSD FRML MDRD: >90 ML/MIN/1.73M2
GLUCOSE BLD-MCNC: 201 MG/DL (ref 70–99)
GLUCOSE BLDC GLUCOMTR-MCNC: 159 MG/DL (ref 70–99)
GLUCOSE BLDC GLUCOMTR-MCNC: 211 MG/DL (ref 70–99)
GLUCOSE BLDC GLUCOMTR-MCNC: 252 MG/DL (ref 70–99)
GLUCOSE BLDC GLUCOMTR-MCNC: 277 MG/DL (ref 70–99)
GLUCOSE BLDC GLUCOMTR-MCNC: 284 MG/DL (ref 70–99)
HCT VFR BLD AUTO: 42.6 % (ref 40–53)
HGB BLD-MCNC: 14.3 G/DL (ref 13.3–17.7)
IMM GRANULOCYTES # BLD: 0 10E3/UL
IMM GRANULOCYTES NFR BLD: 0 %
LYMPHOCYTES # BLD AUTO: 3.4 10E3/UL (ref 0.8–5.3)
LYMPHOCYTES NFR BLD AUTO: 35 %
MCH RBC QN AUTO: 28.3 PG (ref 26.5–33)
MCHC RBC AUTO-ENTMCNC: 33.6 G/DL (ref 31.5–36.5)
MCV RBC AUTO: 84 FL (ref 78–100)
MONOCYTES # BLD AUTO: 0.8 10E3/UL (ref 0–1.3)
MONOCYTES NFR BLD AUTO: 9 %
NEUTROPHILS # BLD AUTO: 5.1 10E3/UL (ref 1.6–8.3)
NEUTROPHILS NFR BLD AUTO: 53 %
NRBC # BLD AUTO: 0 10E3/UL
NRBC BLD AUTO-RTO: 0 /100
PA ADP BLD-ACNC: 149 PRU
PLATELET # BLD AUTO: 448 10E3/UL (ref 150–450)
POTASSIUM BLD-SCNC: 4.2 MMOL/L (ref 3.4–5.3)
RBC # BLD AUTO: 5.05 10E6/UL (ref 4.4–5.9)
SODIUM SERPL-SCNC: 134 MMOL/L (ref 133–144)
WBC # BLD AUTO: 9.7 10E3/UL (ref 4–11)

## 2022-03-25 PROCEDURE — 258N000003 HC RX IP 258 OP 636: Performed by: PSYCHIATRY & NEUROLOGY

## 2022-03-25 PROCEDURE — 99207 PR SC NO CHARGE VISIT: CPT | Performed by: PSYCHIATRY & NEUROLOGY

## 2022-03-25 PROCEDURE — 120N000001 HC R&B MED SURG/OB

## 2022-03-25 PROCEDURE — 99223 1ST HOSP IP/OBS HIGH 75: CPT | Performed by: PSYCHIATRY & NEUROLOGY

## 2022-03-25 PROCEDURE — 85576 BLOOD PLATELET AGGREGATION: CPT

## 2022-03-25 PROCEDURE — 36415 COLL VENOUS BLD VENIPUNCTURE: CPT | Performed by: PHYSICIAN ASSISTANT

## 2022-03-25 PROCEDURE — G0378 HOSPITAL OBSERVATION PER HR: HCPCS

## 2022-03-25 PROCEDURE — 85025 COMPLETE CBC W/AUTO DIFF WBC: CPT | Performed by: PHYSICIAN ASSISTANT

## 2022-03-25 PROCEDURE — 96372 THER/PROPH/DIAG INJ SC/IM: CPT

## 2022-03-25 PROCEDURE — 82962 GLUCOSE BLOOD TEST: CPT

## 2022-03-25 PROCEDURE — 99233 SBSQ HOSP IP/OBS HIGH 50: CPT | Mod: FS | Performed by: PHYSICIAN ASSISTANT

## 2022-03-25 PROCEDURE — 82310 ASSAY OF CALCIUM: CPT | Performed by: PHYSICIAN ASSISTANT

## 2022-03-25 PROCEDURE — 250N000013 HC RX MED GY IP 250 OP 250 PS 637: Performed by: PHYSICIAN ASSISTANT

## 2022-03-25 PROCEDURE — 36415 COLL VENOUS BLD VENIPUNCTURE: CPT | Performed by: STUDENT IN AN ORGANIZED HEALTH CARE EDUCATION/TRAINING PROGRAM

## 2022-03-25 RX ADMIN — ROSUVASTATIN CALCIUM 40 MG: 20 TABLET, FILM COATED ORAL at 08:20

## 2022-03-25 RX ADMIN — ESCITALOPRAM OXALATE 20 MG: 20 TABLET ORAL at 08:21

## 2022-03-25 RX ADMIN — SODIUM CHLORIDE: 9 INJECTION, SOLUTION INTRAVENOUS at 11:05

## 2022-03-25 RX ADMIN — INSULIN ASPART 3 UNITS: 100 INJECTION, SOLUTION INTRAVENOUS; SUBCUTANEOUS at 17:38

## 2022-03-25 RX ADMIN — SODIUM CHLORIDE: 9 INJECTION, SOLUTION INTRAVENOUS at 20:21

## 2022-03-25 RX ADMIN — ASPIRIN 325 MG: 325 TABLET, COATED ORAL at 11:03

## 2022-03-25 RX ADMIN — CLOPIDOGREL BISULFATE 75 MG: 75 TABLET ORAL at 11:03

## 2022-03-25 ASSESSMENT — ACTIVITIES OF DAILY LIVING (ADL)
ADLS_ACUITY_SCORE: 5

## 2022-03-25 NOTE — PROGRESS NOTES
"Vascular neurology note     RN called. MRI is done and shows 2 new small infarcts in the L MCA territory.     -Continue aspirin and plavix.   -Check P2Y12. If patient is not responding well to plavix, will switch to ticagrelor.   -Increase NS to 100/h.   -BP goal > 130/80.     Venancio Alegria MD, Msc, SKIP, FAAN   of Neurology  [facility]     03/24/2022 7:37 PM  To page me or covering stroke neurology team member, click here: AMCOM  Choose \"On Call\" tab at top, then search dropdown box for \"Neurology Adult\" & press Enter, look for Neuro ICU/Stroke     "

## 2022-03-25 NOTE — PROGRESS NOTES
[facility]    Hospitalist Progress Note    Assessment & Plan   Huseyin [address] is a 66 year old male admitted on 3/24/2022.    Past medical history significant for Recent ischemic CVA, HTN, HLP, DM2, Obesity, NA, BPH, MDD with anxiety, MDD with anxiety, Substance use D/O, ED who was registered to short-stay/obs due to TIA.      Patient presented to [facility] ED due to stroke symptoms (intermittent garbled/jumbled speech).  Patient was seen by PCP earlier today for hospital follow-up and confusion and speech changes started ~ 13:30.  Notably, patient was hospitalized here from 3/16-3/18/2022 due to acute ischemic CVA when he presented with receptive/expressive aphasia.  MRI confirmed acute/early subacute infarction at the anterior aspect of the left temporal lobe.  Head/Neck MRA revealed mild-moderate narrowings throughout the left ICA, MCA and PCA vessels.  Patient was discharged home with DAPT.      Work-up in the ED included a glucose check with a reading of 397.  Head CT w/o contrast with well-defined infarct in the left temporal lobe that appears similar to recent head CT 3/17/22 and without evidence of hemorrhagic transformation of infarct and without acute intracranial hemorrhage.  Head/Neck CTA with persistent severe focal stenosis/near occlusion a the left MCA bifurcation (similar to previous study).      Acute 2 left MCA CVA (tiny)   Evolving subacute ischemic CVA of left temporal lobe  *P2Y12 indicative of patient responding to both Plavix and ASA.    - Brain MRI with and w/o contrast.   - Neurology consult appreciated:   --SBP Goal > 130.    - Resumed on PTA DAPT ( mg/d and Plavix 75 mg/d).    - PT/OT/SLP.    - IV Fluids at 100 ml/hr.    - Antihypertensive management and statin medication as below.   - Neuro checks every 4 hours.   ADDENDUM: Neurology discussed with neuro endovascular team and will not proceed with diagnostic cerebral angiogram.  Continue with   mg/d and Plavix 75 mg/d.  Cotninue to maintain SBP > 130 and continue close monitoring.  Will need strict glycemic control with goal A1c < 7%.    - Diet advanced.      Pseudohyponatremia   Sodium of 130 with glucose level of 413.  When corrected for the hyperglycemia is normal at 138.    *Recheck of sodium normalized at 134 with improved glucose (201).    - IV Fluids as above.    - Diabetes management as below.    - Monitor.      Mild CRUZITO (Resolved)  Creatinine of 1.12 with GFR of 72.    *BMP today (3/25) with creatinine of 0.91 with GFR > 90.    - IV Fluids as above.    - Monitor.    HTN  - Hold PTA Diltiazem 240 mg/d, hydrochlorothiazide 12.5 mg/d and lisinopril 40 mg/d to allow for permissive HTN.      HLP  LIPIDS (3/16/22): 224/33/136/275.  - Resumed on PTA Crestor 40 mg/d.      DM2, insulin dependent  PTA management with Lantus 55 units at bedtime and prandial Novolog 10 units before each meal plus sliding scale.  A1c of 11.8% (3/16/22).    - Lantus 30 units at bedtime.  - Hold prandial novolog and prandial sliding scale.    - Start medium intensity sliding scale every 4 hours while NPO.    - Glucose checks.    - Hypoglycemic protocol.    ADDENDUM:  - Diet advanced to mod carb.    - Lantus increased to 55 units at bedtime.    - Resumed on Prandial insulin 5 units with each meal.    - Continue medium sliding scale.      Obesity with associated NA  Body mass index is 34.01 kg/m .  Patient is compliant with CPAP.  Increase in all-cause morbidity and mortality.   - Encourage weight loss.  - Follow up with PCP regarding ongoing management.    - Continue CPAP with home settings.      BPH  Noted per EMR, but not currently on any medications.  Monitor.      MDD with anxiety  - Resumed on PTA Lexapro 20 mg/d.      Substance use D/O  Patient with noted use of cocaine with last use occurring Monday 3/21/22 per report from patient's wife.    *Urine drug screen negative 3/24 but was positive for cocaine on 3/17.      ED  -  Hold PTA sildenafil and resume at discharge.      Diet: Moderate Consistent Carb (60 g CHO per Meal) Diet     DVT Prophylaxis: Pneumatic Compression Devices   Rao Catheter: Not present  Code Status: Full Code     Disposition Plan    Expected Discharge: Unclear at this time  Anticipated discharge location:  Awaiting care coordination huddle   Delays: Neurology assessment completed and IR imaging/procedure completed and tolerated, safe dispo plan in place.       Entered: Brandon Mcelroy PA-C 03/25/2022, 9:30 AM        The patient's care was discussed with the Bedside Nurse, Patient and Patient's Family.      The patient has been discussed with Dr. Krause, who agrees with the assessment and plan at this time.    Brandon Mcelroy PA-C  [facility]  Securely message with the Vocera Web Console (learn more here)  Text page via [employer] Paging/Directory      Interval History   Patient was resting in bed upon with his wife present in the room.  He denied fever, chills, chest pain, shortness of breath or abdominal pain.  He stated his speech seems normal and the wife agreed.      Reviewed MRI findings with 2 new strokes with them.  Reviewed overall plan for the day.      -Data reviewed today: I reviewed all new labs and imaging results over the last 24 hours. I personally reviewed no images or EKG's today.    Physical Exam   /87 (BP Location: Right arm)   Pulse 67   Temp 97.6  F (36.4  C) (Oral)   Resp 16   Wt 107.5 kg (236 lb 15.9 oz)   SpO2 98%   BMI 34.01 kg/m      Vital Signs with Ranges  Temp:  [97  F (36.1  C)-98.6  F (37  C)] 98.1  F (36.7  C)  Pulse:  [62-76] 62  Resp:  [11-22] 16  BP: (114-161)/(63-90) 114/63  SpO2:  [95 %-100 %] 95 %  No intake/output data recorded.      Constitutional: Awake, alert, cooperative, no apparent distress.    ENT: Normocephalic, without obvious abnormality, atraumatic, oral pharynx with moist mucus membranes, tonsils without erythema  or exudates.  Neck: Supple, symmetrical, trachea midline, no adenopathy.  Pulmonary: No increased work of breathing, good air exchange, clear to auscultation bilaterally, no crackles or wheezing.  Cardiovascular: Regular rate and rhythm, normal S1 and S2, no S3 or S4, and no murmur noted.  GI: Normal bowel sounds, soft, non-distended, non-tender. Obese.    Skin/Integumen: Visualized skin appeared clear.  Neuro: CN II-XII intact.  Upper and lower extremity strength, sensation and coordination intact and even bilaterally.  Finger to nose assessment of the upper extremities bilaterally without ataxia.  Heel-shin slide intact bilaterally.      Psych:  Alert and oriented x 3. Normal affect.  Extremities: No lower extremity edema noted, and calves are non-TTP bilaterally.       Medications     sodium chloride 100 mL/hr at 03/24/22 2000       aspirin  325 mg Oral Daily     clopidogrel  75 mg Oral Daily     [Held by provider] diltiazem ER COATED BEADS  240 mg Oral Daily     escitalopram  20 mg Oral Daily     insulin aspart  1-6 Units Subcutaneous Q4H     [Held by provider] insulin aspart  1-7 Units Subcutaneous TID AC     [Held by provider] insulin aspart  1-5 Units Subcutaneous At Bedtime     [Held by provider] insulin aspart  5 Units Subcutaneous TID w/meals     insulin glargine  30 Units Subcutaneous At Bedtime     polyethylene glycol  17 g Oral or NG Tube Daily     rosuvastatin  40 mg Oral Daily     sodium chloride (PF)  10 mL Intracatheter Q8H       Data   Recent Labs   Lab 03/25/22  0711 03/25/22  0550 03/25/22  0207 03/24/22  1439 03/24/22  1438   WBC 9.7  --   --   --  10.2   HGB 14.3  --   --   --  15.5   MCV 84  --   --   --  87     --   --   --  508*     --   --   --  130*   POTASSIUM 4.2  --   --   --  4.4   CHLORIDE 105  --   --   --  99   CO2 24  --   --   --  25   BUN 16  --   --   --  19   CR 0.91  --   --   --  1.12   ANIONGAP 5  --   --   --  6   EVER 8.8  --   --   --  9.3   * 211* 252*    < > 413*   ALBUMIN  --   --   --   --  3.4   PROTTOTAL  --   --   --   --  8.2   BILITOTAL  --   --   --   --  0.2   ALKPHOS  --   --   --   --  99   ALT  --   --   --   --  17   AST  --   --   --   --  10    < > = values in this interval not displayed.       Recent Results (from the past 24 hour(s))   CT Head w/o Contrast    Narrative    CT SCAN OF THE HEAD WITHOUT CONTRAST   3/24/2022 2:47 PM     HISTORY: Neurological deficit, acute, stroke suspected. Code Stroke.  Altered mental status.    TECHNIQUE: Axial images of the head and coronal reformations without  IV contrast material. Radiation dose for this scan was reduced using  automated exposure control, adjustment of the mA and/or kV according  to patient size, or iterative reconstruction technique.    COMPARISON: Head CT 3/17/2022.    FINDINGS: Well-defined infarct in the left posterior temporal lobe  with loss of gray-white matter differentiation in that area. This is  similar to prior head CT 3/17/2022. No evidence of acute intracranial  hemorrhage. Mild regional edema in the area of infarct. No significant  midline shift or herniation. Ventricular size is within normal limits  without evidence of hydrocephalus.    The visualized portions of the sinuses and mastoids appear normal. The  bony calvarium and bones of the skull base appear intact.       Impression    IMPRESSION:     1. Well-defined infarct in the left temporal lobe that appears similar  to recent head CT 3/17/2022. No evidence of hemorrhagic transformation  of infarct.  2. No other acute intracranial hemorrhage. No midline shift or  herniation.  3. CT angiogram to follow.      VENKAT DEE MD         SYSTEM ID:  [id number]   CTA Head Neck with Contrast    Narrative    CT ANGIOGRAM OF THE HEAD AND NECK WITH CONTRAST  3/24/2022 3:02 PM     HISTORY: Code stroke. Altered mental status.    TECHNIQUE:  CT angiography with an injection of 71mL [id number] IV  with scans through the head and neck. Images  were transferred to a  separate 3-D workstation where multiplanar reformations and 3-D images  were created. Estimates of carotid stenoses are made relative to the  distal internal carotid artery diameters except as noted. Radiation  dose for this scan was reduced using automated exposure control,  adjustment of the mA and/or kV according to patient size, or iterative  reconstruction technique.    COMPARISON: CTA 3/17/2022.     CT HEAD FINDINGS: No contrast enhancing lesions. Cerebral blood flow  is grossly normal.     CT ANGIOGRAM HEAD FINDINGS:  The major intracranial arteries including  the proximal branches of the anterior cerebral, middle cerebral, and  posterior cerebral arteries appear patent without vascular cutoff. No  aneurysm identified. Atherosclerotic calcifications of the cavernous  and supraclinoid carotid arteries left more so than right. There is at  least moderate stenosis of the left supraclinoid internal carotid  artery.    Severe focal stenosis/near occlusion at the left MCA bifurcation.  Appearance is similar to prior CTA 3/17/2022.    CT ANGIOGRAM NECK FINDINGS:   Normal origin of the great vessels from the aortic arch.     Right carotid artery: The right common and internal carotid arteries  are patent. No significant stenosis or atherosclerotic disease in the  carotid artery.     Left carotid artery: The left common and internal carotid arteries are  patent. No significant stenosis or atherosclerotic disease in the  carotid artery.     Vertebral arteries: Vertebral arteries are patent without evidence of  dissection. No significant stenosis.     Other findings: There are degenerative changes in the spine.       Impression    IMPRESSION:   1. Persistent severe focal stenosis/near occlusion at the left MCA  bifurcation. Appearance is similar to prior CTA 3/17/2022.  2. Known definite new vascular cutoff of the proximal major  intracranial arteries.  3. Atherosclerotic disease of the cavernous  and supraclinoid internal  carotid arteries left greater than right. Again, appearance is similar  to 3/17/2022.  4. Patent arteries in the neck without evidence of dissection. No  significant stenosis or atherosclerotic disease of the arteries in the  neck.    Results discussed with Jones Fournier at 3:09 PM on 3/24/2022.     VENKAT DEE MD         SYSTEM ID:  [id number]   MR Brain w/o & w Contrast    Narrative    MRI OF THE BRAIN WITHOUT AND WITH CONTRAST 3/24/2022 4:51 PM     COMPARISON: Brain MR 3/16/2022, head CT 3/24/2022    HISTORY:  Stroke, follow up     TECHNIQUE: Multi-sequence, multi-planar MRI images of the brain were  acquired before and after the administration of IV gadolinium (10mL  Gadavist).    FINDINGS: Ischemic infarct in the anterior aspect of the left temporal  lobe is again noted without change. There has been interval  development of 2 tiny recent ischemic infarcts in the periatrial white  matter of the left cerebral hemisphere. No other new infarcts noted.    The ventricles and basal cisterns are normal in configuration. There  is no midline shift. There are no extra-axial fluid collections.  Gray-white differentiation is well maintained. There is no evidence  for acute intracranial hemorrhage.     There has been interval development of contrast enhancement in the  brain parenchyma the site of the left temporal lobe infarct which is  normal for a subacute infarct. There is no other abnormal contrast  enhancement in the brain or its coverings.    There is no sinusitis or mastoiditis.      Impression    IMPRESSION:  1. Evolving subacute ischemic infarct in the left temporal lobe again  noted without change.  2. 2 new tiny recent white matter infarcts in the periatrial white  matter of the left cerebral hemisphere.  3. No other new infarcts.  4. Otherwise, normal brain MRI.    JAMIL PENA MD         SYSTEM ID:  [id number]

## 2022-03-25 NOTE — PLAN OF CARE
Goal Outcome Evaluation:    Free from ACUTE neuro deficits (partially met)  Testing complete (partially met)    Nurse to notify provider when observation goals have been met and patient is ready for discharge.

## 2022-03-25 NOTE — PROVIDER NOTIFICATION
MD Notification    Notified Person: MD    Notified Person Name: Dr. Lazaro Alegria (stroke/neurology team)    Notification Date/Time: 03/24/2022 @ 19:32    Notification Interaction:web page    Purpose of Notification:paged earlier Dr. Bryant unfortunately have not heard back.  MRI results are in. Please advise    Orders Received:pt on bedrest, IV fluids increase to 100ml/hr, most important not to have pt hypotensive.    Comments:

## 2022-03-25 NOTE — PROVIDER NOTIFICATION
MD Notification    Notified Person: MD    Notified Person Name: Maira Fink PA    Notification Date/Time:03/24/2022 @ 19:45    Notification Interaction: AMCOM    Purpose of Notification: just fyi, stroke/neurology team called back. Pt on bedrest, IV fluids increased to 100ml/hr, and most important not to have pt go hypotensive    Orders Received:    Comments:

## 2022-03-25 NOTE — CONSULTS
"      [facility]    Stroke Consult Note    Reason for Consult:  Stroke    Chief Complaint: Stroke Symptoms     HPI  Huseyin St is a 66 year old male with pertinent past medical history of HTN, HLD, poorly controlled DMT2 (A1c 11.8%), NA, anxiety, prior substance abuse quit 1 year ago, and recent admission 3/16/22-3/18/22 at [facility] for L temporal ischemic stroke with severe L MCA stenosis/near occlusion. He was discharged on  mg daily and Plavix 75 mg daily and had mild ongoing receptive/expressive aphasia.    He presented to [facility] ED again on 3/24/22 due to acute onset of worsening expressive aphasia (wife heard him leave a voicemail to his father and non-sensical \"gibberish\" words were all that came out. No slurring of speech though.) No other new deficits. He was found to have 2 new tiny white matter L cerebral hemispheric ischemic infarcts with persistent severe L MCA stenosis/near occlusion.    Plavix and ASA platelet function assays both showed therapeutic response. He was kept NPO and discussed with neuro IR regarding possibility of diagnostic cerebral angiogram. Neuro endovascular team did not recommend DSA. Instead recommend continuing medical management with ASA and plavix and repeat MRI/MRA in 1 month then follow-up with both stroke and neuro-endovascular teams following.    Discussed plan with patient and his wife for discussion with neuro IR to consider DSA today. Patient was also not monitoring blood pressures at home and wife has been having a difficult time with directing patient activities due to his receptive aphasia. Encouraged wife to obtain extra support at home.    Stroke Evaluation Summarized    MRI/Head CT MRI: evolving L temporal subacute ischemic infarct, 2 new tiny L cerebral hemisphere ischemic infarcts  CTH: stable infarct L temporal lobe, no evidence of hemorrhagic conversion   Intracranial/cervical Vasculature CTA: persistent severe focal " stenosis/near occlusion L MCA bifurcation unchanged, atherosclerosis L >R ICAs without significant stenosis     Echocardiogram TTE 3/17/22: technically difficult, EF 60-65%, no wma, no evidence of atrial shunt, normal bilateral atria size, borderline aortic root dilation, ascending aorta borderline dilated    EKG/Telemetry 3/16/22: SR, incomplete RBBB, anterior infarct age undetermined   Other Testing 30 day CardioNet monitor: pending     LDL  3/16/2022: 136 mg/dL   A1C  3/16/2022: 11.8 %   Troponin No lab value available in past 48 hrs       Impression   2 recurrent L MCA territory infarcts in setting of L MCA stenosis despite antiplatelet therapy with  mg and Plavix 75 mg daily (platelet function assays therapeutic response)    Plan  -discussed with neuro endovascular team, no diagnostic cerebral angiogram at this time, recommend continuing  mg daily and plavix 75 mg daily for now, repeat MRI w/wo contrast in 1 month and then follow-up with neuro-endovascular team following  -no need for NPO  -continue to keep blood pressure >130/80 to avoid hypotension/hypoperfusion of L MCA  - from last admission 3/16/22, continue PTA Crestor 40 mg daily, f/u with PCP for titration to goal LDL 40-70, <40 increases risk of intracranial hemorrhage  -euglycemia, strict blood glucose control, A1c 11.8%, goal <7%, f/u with PCP  -30 day CardioNet monitor at discharge (was still pending from recent admission)--order placed  -Neuro checks and vitals every 4 hours  -continue treatment of NA  -Euthermia, eunatremia   -Stroke Education    Patient Follow-up    - in the next 1-2 week(s) with PCP  -in 4 weeks with repeat MRI brain w/wo contrast and MRA brain wo contrast (ordered)  - in 5-6 weeks with stroke clinic LUNA (any stroke LUNA) at [facility] stroke clinic. Patient will be contacted by virtual stroke clinic team after discharge. (ordered)  - in 7-8 weeks with endovascular surgical neuroradiology at [facility]  "(referral sent)    We will continue to follow and monitor overnight for symptoms of hypoperfusion from L MCA. Please call stroke team right away if any sudden neuro change.      Minerva Nieves PA-C  Vascular Neurology  To page me or covering stroke neurology team member, click here: AMCOM   Choose \"On Call\" tab at top, then search dropdown box for \"Neurology Adult\", select location, press Enter, then look for stroke/neuro ICU/telestroke.    _____________________________________________________    Clinically Significant Risk Factors Present on Admission               # Platelet Defect: home medication list includes an antiplatelet medication       Past Medical History   Past Medical History:   Diagnosis Date     Anxiety      Essential hypertension, benign      Hypercholesteremia      Sleep apnea      T2DM (type 2 diabetes mellitus) (H)      Past Surgical History   Past Surgical History:   Procedure Laterality Date     COLONOSCOPY       COLONOSCOPY N/A 10/26/2020    Procedure: COLONOSCOPY;  Surgeon: Randy Gonzalez MD;  Location:  GI     ENT SURGERY      R) ear \"procedure\"     Medications   Home Meds  Prior to Admission medications    Medication Sig Start Date End Date Taking? Authorizing Provider   aspirin (ASA) 325 MG EC tablet Take 1 tablet (325 mg) by mouth daily 3/19/22  Yes Natasha Juarez MD   clopidogrel (PLAVIX) 75 MG tablet Take 1 tablet (75 mg) by mouth daily 3/24/22  Yes Kendal Wong MD   diltiazem ER COATED BEADS (CARDIZEM CD) 240 MG 24 hr capsule Take 1 capsule (240 mg) by mouth daily 10/12/21  Yes Kendal Wong MD   escitalopram (LEXAPRO) 20 MG tablet Take 1 tablet (20 mg) by mouth daily 10/12/21  Yes Kendal Wong MD   hydrochlorothiazide (MICROZIDE) 12.5 MG capsule Take 1 capsule (12.5 mg) by mouth daily . Hold until you have been cleared by your primary care provider to resume 3/18/22  Yes Natasha Juarez MD   insulin aspart (NOVOLOG FLEXPEN) 100 UNIT/ML pen " INJECT 10 UNITS BEFORE BREAKFAST LUNCH AND DINNER MAX OF 70 UNITS PER DAY. DUE FOR APPOINTMENT.  Patient taking differently: INJECT 10 UNITS BEFORE BREAKFAST LUNCH AND DINNER PLUS SLIDING SCALE, MAX OF 70 UNITS PER DAY. DUE FOR APPOINTMENT. 10/12/21  Yes Kendal Wong MD   insulin glargine (LANTUS SOLOSTAR) 100 UNIT/ML pen INJ 55 UNITS SC HS 10/12/21  Yes Kendal Wong MD   lisinopril (ZESTRIL) 40 MG tablet Take 1 tablet (40 mg) by mouth daily 10/12/21  Yes Kendal Wong MD   rosuvastatin (CRESTOR) 40 MG tablet Take 1 tablet (40 mg) by mouth daily 10/12/21  Yes Kendal Wong MD   alcohol swab prep pads Use to swab area of injection/ian as directed 1/6/20   Kendal Wong MD   blood glucose (NO BRAND SPECIFIED) test strip Use to test blood sugar 3 times daily or as directed. To accompany: Blood Glucose Monitor Brands: per insurance. 10/12/21   Kendal Wong MD   blood glucose calibration (NO BRAND SPECIFIED) solution Use to calibrate blood glucose monitor as needed as directed. To accompany: Blood Glucose Monitor Brands: per insurance. 1/6/20   Kendal Wong MD   clotrimazole (LOTRIMIN) 1 % external cream Apply topically 2 times daily  Patient taking differently: Apply topically 2 times daily as needed  12/11/20   Kendal Wong MD   insulin pen needle (BD MARIE U/F) 32G X 4 MM miscellaneous USE THREE PEN NEEDLES DAILY AS DIRECTED 10/12/21   Kendal Wong MD   ORDER FOR DME Auto-CPAP: Max 12 cm H2O Min 10 cm H2O  Continuous Lifetime need and heated humidity.    7/5/12   Goltz, Bennett Ezra, PA-C   sildenafil (REVATIO) 20 MG tablet Take 1 tablet (20 mg) by mouth 3 times daily 10/12/21   Kendal Wong MD   thin (NO BRAND SPECIFIED) lancets Use to test blood sugar 3 times daily or as directed. To accompany: Blood Glucose Monitor Brands: per insurance. 1/6/20   Kendal Wong MD       Scheduled Meds    aspirin  325 mg Oral Daily     clopidogrel  75 mg Oral  Daily     [Held by provider] diltiazem ER COATED BEADS  240 mg Oral Daily     escitalopram  20 mg Oral Daily     insulin aspart  1-7 Units Subcutaneous TID AC     insulin aspart  1-5 Units Subcutaneous At Bedtime     insulin aspart  5 Units Subcutaneous TID w/meals     insulin glargine  50 Units Subcutaneous At Bedtime     polyethylene glycol  17 g Oral or NG Tube Daily     rosuvastatin  40 mg Oral Daily     sodium chloride (PF)  10 mL Intracatheter Q8H       Infusion Meds    sodium chloride 100 mL/hr at 03/25/22 1241       PRN Meds  acetaminophen **OR** acetaminophen **OR** acetaminophen, glucose **OR** dextrose **OR** glucagon, hydrALAZINE, labetalol, senna-docusate    Allergies   Allergies   Allergen Reactions     Augmentin Diarrhea     Sulfa Drugs      Unknown - reaction occurred as a child     Victoza      Skin rash     Xanax      Difficult to wean off     Family History   Family History   Adopted: Yes   Problem Relation Age of Onset     Diabetes Mother      Respiratory Mother         asthma     Lipids Mother      Arthritis Mother         knee replacement     Alzheimer Disease Mother      Cerebrovascular Disease Brother      C.A.D. No family hx of      Cancer - colorectal No family hx of      Prostate Cancer No family hx of      Social History   Social History     Tobacco Use     Smoking status: Never Smoker     Smokeless tobacco: Never Used   Substance Use Topics     Alcohol use: Yes     Alcohol/week: 0.0 standard drinks     Comment: 3-6 drinks on occ weekend night, occ drink on weekday     Drug use: Yes     Types: Cocaine       Review of Systems   The 10 point Review of Systems is negative other than noted in the HPI or here.        PHYSICAL EXAMINATION   Temp:  [97.5  F (36.4  C)-98.6  F (37  C)] 97.7  F (36.5  C)  Pulse:  [62-76] 69  Resp:  [11-22] 18  BP: (114-161)/(63-90) 118/68  SpO2:  [95 %-100 %] 98 %  General Exam  General:  patient lying in bed without any acute distress    HEENT:   Normocephalic/atraumatic  Pulmonary:  No respiratory distress, on Room air     Neuro Exam  Mental Status:  Alert, Oriented x 2,  Difficulty following simple and complex commands, speech clear with some loss of fluency single word description of pictures, naming of objects intact  Cranial Nerves:  visual fields intact, PERRL, EOMI with normal smooth pursuit, facial sensation intact and symmetric, facial movements symmetric, hearing not formally tested but intact to conversation,  no dysarthri, tongue protrusion midline  Motor:  normal muscle tone and bulk , no abnormal movements, motor strength intact, no drift  Reflexes:  , toes down-going   Sensory:  light touch sensation intact and symmetric throughout upper and lower extremities, pinprick sensation intact and symmetric, no extinction on double simultaneous stimulation  Coordination:  normal finger-to-nose and heel-to-shin bilaterally without dysmetria, rapid alternating movements symmetric   Station/Gait:  Deferred to PT    Dysphagia Screen  Per Nursing    Stroke Scales    NIHSS  1a. Level of Consciousness 0-->Alert, keenly responsive   1b. LOC Questions 1-->Answers one question correctly   1c. LOC Commands 0-->Performs both tasks correctly   2.   Best Gaze 0-->Normal   3.   Visual 0-->No visual loss   4.   Facial Palsy 0-->Normal symmetrical movements   5a. Motor Arm, Left 0-->No drift, limb holds 90 (or 45) degrees for full 10 secs   5b. Motor Arm, Right 0-->No drift, limb holds 90 (or 45) degrees for full 10 secs   6a. Motor Leg, Left 0-->No drift, leg holds 30 degree position for full 5 secs   6b. Motor Leg, right 0-->No drift, leg holds 30 degree position for full 5 secs   7.   Limb Ataxia 0-->Absent   8.   Sensory 0-->Normal, no sensory loss   9.   Best Language (S) 1-->Mild-to-moderate aphasia, some obvious loss of fluency or facility of comprehension, without significant limitation on ideas expressed or form of expression. Reduction of speech and/or  comprehension, however, makes conversation. . . (see row details) (moderate receptive and expressive)   10. Dysarthria 0-->Normal   11. Extinction and Inattention  0-->No abnormality   Total 2 (03/25/22 1428)       Modified [place] Score (Pre-morbid)  2 - Slight disability.  Able to look after own affairs without assistance, but unable to carry out all previous activities.  Post-stroke mRS 3    Imaging  I personally reviewed all imaging; relevant findings per HPI.    Labs Data   CBC  Recent Labs   Lab 03/25/22  0711 03/24/22  1438   WBC 9.7 10.2   RBC 5.05 5.37   HGB 14.3 15.5   HCT 42.6 46.7    508*     Basic Metabolic Panel   Recent Labs   Lab 03/25/22  1121 03/25/22  0711 03/25/22  0550 03/24/22  1439 03/24/22  1438   NA  --  134  --   --  130*   POTASSIUM  --  4.2  --   --  4.4   CHLORIDE  --  105  --   --  99   CO2  --  24  --   --  25   BUN  --  16  --   --  19   CR  --  0.91  --   --  1.12   * 201* 211*   < > 413*   EVER  --  8.8  --   --  9.3    < > = values in this interval not displayed.     Liver Panel  Recent Labs   Lab 03/24/22  1438   PROTTOTAL 8.2   ALBUMIN 3.4   BILITOTAL 0.2   ALKPHOS 99   AST 10   ALT 17     INR    Recent Labs   Lab Test 03/16/22  1704   INR 1.02      Lipid Profile    Recent Labs   Lab Test 03/16/22  1704 10/11/21  0942 09/28/20  0925 04/21/16  0906 08/20/15  0830 01/14/15  0847 04/21/14  0815   CHOL 224* 228* 214*   < > 136 316* 155   HDL 33* 37* 42   < > 42 41 37*   * 116* 96   < > 59 219* 80   TRIG 275* 377* 380*   < > 176* 282* 189*   CHOLHDLRATIO  --   --   --   --  3.2 7.7* 4.2    < > = values in this interval not displayed.     A1C    Recent Labs   Lab Test 03/16/22  1704 10/11/21  0943 09/28/20  0925   A1C 11.8* 11.8* 13.2*     Troponin I  No results for input(s): TROPONIN, GHTROP in the last 168 hours.       Stroke Consult Data Data   This was a non-emergent, non-telestroke consult.       I spent 30 minute(s) face-to-face or coordinating care of Huseyin WELLS  Maciel. Over 50% of our time on the unit was spent counseling the patient and/or coordinating care.

## 2022-03-25 NOTE — PLAN OF CARE
Orientation/Cognitive: A/OX4, slow for some neuro checks  Observation Goals (Met/ Not Met): not met  Mobility Level/Assist Equipment: SBA  Fall Risk (Y/N): Y  Behavior Concerns: Aphagia  Pain Management: no pain  Tele/VS/O2: VSS, tele NSR, room air  ABNL Lab/BG: Hyperglycemic  Diet: Mod carb  Bowel/Bladder: continent  Skin Concerns: none  Drains/Devices: none  Tests/Procedures for next shift: EEG possible  Anticipated DC date & active delays: 3/26/22    PRIMARY DIAGNOSIS: SYNCOPE/TIA  OUTPATIENT/OBSERVATION GOALS TO BE MET BEFORE DISCHARGE:  1. Orthostatic performed: N/A    2. Diagnostic testing complete & at baseline neurologic testing: No    3. Cleared by consultants (if involved): No    4. Interpretation of cardiac rhythm per telemetry tech: NSR    5. Tolerating adequate PO diet and medications: Yes    6. Return to near baseline physical activity or neurologic status: No    Discharge Planner Nurse   Safe discharge environment identified: Yes  Barriers to discharge: Yes       Entered by: Julissa Pandya 03/24/2022 7:42 PM     Please review provider order for any additional goals.   Nurse to notify provider when observation goals have been met and patient is ready for discharge.

## 2022-03-25 NOTE — PLAN OF CARE
/68 (BP Location: Left arm)   Pulse 69   Temp 97.7  F (36.5  C) (Oral)   Resp 18   Wt 107.5 kg (236 lb 15.9 oz)   SpO2 98%   BMI 34.01 kg/m      Patient is currently here with an evolving subacute ischemic cerebrovascular accident of the left temporal lobe. Patient is alert and oriented, neurologically intact aside from expressive aphasia, word-finding difficulties, generalized weakness and forgetfulness. VSS on RA. Telemetry readings reveal a normal sinus rhythm. Patient is currently on a moderate carbohydrate diet with thin liquids. Takes pills whole or crushed in applesauce. Patient is capable of transferring and ambulating independently with the utilization of a gait belt and standby assist as needed. Patient is continent of both bowel and bladder, currently utilizing the urinal and commode at the bedside with adequate urinary output. Patient's skin is intact and in good condition aside from scattered bruising as well as areas of dryness. Patient currently has Normal Saline infusing through one of his peripheral intravenous lines at a rate of 100 mL/hr. Denies pain. Patient is currently scoring green on the Aggression Stop Light Tool. Plan to continue to assess for any potential neurological changes. Discharge plans pending care coordination huddle.

## 2022-03-25 NOTE — PLAN OF CARE
Orientation/Cognitive: A&Ox3, disoriented to situation  Observation Goals (Met/ Not Met): Not met  Mobility Level/Assist Equipment: SBA with GB  Fall Risk (Y/N): Y  Behavior Concerns: none  Pain Management: no pain  Tele/VS/O2: tele: NSR, VSS, on RA  ABNL Lab/BG: , sodium 130,   Diet: mod cho diet  Bowel/Bladder: continent  Skin Concerns: none  Drains/Devices: IV fluids @ 100ml/hr - NS  Tests/Procedures for next shift: PT/OT, and speech, neurology   Anticipated DC date & active delays: 1-2 days  Patient Stated Goal for Today: would like to sleep

## 2022-03-25 NOTE — TELEPHONE ENCOUNTER
Please call Julissa at [facility] regarding patient  MRN [id number] - Room [facility]  [phone]    [facility] [phone]- call came to clinic.

## 2022-03-25 NOTE — PROGRESS NOTES
Hospitalist Cross Cover  3/25/2022  6:50 PM    Patient admitted with TIA. Paged regarding positive orthostatics. BP dropped > 20%, however asymptomatic. Neuro would like SBP>130, he is currently receiving fluids and anti HTN meds are on hold.     /84 (BP Location: Left arm)   Pulse 88   Temp 98.7  F (37.1  C) (Oral)   Resp 18   Wt 107.5 kg (236 lb 15.9 oz)   SpO2 97%   BMI 34.01 kg/m      Plan:  --continue fluids, recheck orthostatics in AM  --fall precautions  --call if persistently hypotensive or symptomatic       CHAPO Grayson [facility]  Hospitalist Service  House Officer  Pager: [phone] (3h - 6p)

## 2022-03-25 NOTE — PROGRESS NOTES
PRIMARY DIAGNOSIS: Stroke rule out  OUTPATIENT/OBSERVATION GOALS TO BE MET BEFORE DISCHARGE:  1. Orthostatic performed: N/A    2. Diagnostic testing complete & at baseline neurologic testing: No    3. Cleared by consultants (if involved): No    4. Interpretation of cardiac rhythm per telemetry tech: NSR    5. Tolerating adequate PO diet and medications: Yes    6. Return to near baseline physical activity or neurologic status: partially met,     Discharge Planner Nurse   Safe discharge environment identified: No  Barriers to discharge: Yes       Entered by: Marleni Orlando 03/24/2022 10:28 PM     Please review provider order for any additional goals.   Nurse to notify provider when observation goals have been met and patient is ready for discharge.

## 2022-03-25 NOTE — PLAN OF CARE
Goal Outcome Evaluation:    Patient is currently sleeping in bed. Easily awake and responds to questions appropriately. Alert and oriented to self, place and time. Disoriented to situation. Besides mild Aphasia, Neuros are intact and NIH is score is 1 as a result. NS at 100ml/hr. Plan for Neuro interventions PT/OT/ST consults. Awaiting recommendations.       Free from ACUTE neuro deficits (partially met)  Testing complete (partially met)    Nurse to notify provider when observation goals have been met and patient is ready for discharge.

## 2022-03-25 NOTE — PLAN OF CARE
Goal Outcome Evaluation:    Plan of Care Reviewed With: patient     Here with additional stroke (left cerebral) after recent discharge, left MCA stenosis. Neuros stable. Slightly forgetful, word-finding difficulty. Tele NSR. Denies pain. Sliding scale insulin for . Up with SBA/independent. Discharge home when ready.

## 2022-03-25 NOTE — UTILIZATION REVIEW
"  Admission Status; Secondary Review Determination         Under the authority of the Utilization Management Committee, the utilization review process indicated a secondary review on the above patient.  The review outcome is based on review of the medical records, discussions with staff, and applying clinical experience noted on the date of the review.        (xxx)      Inpatient Status Appropriate - This patient's medical care is consistent with medical management for inpatient care and reasonable inpatient medical practice.      () Observation Status Appropriate - This patient does not meet hospital inpatient criteria and is placed in observation status. If this patient's primary payer is Medicare and was admitted as an inpatient, Condition Code 44 should be used and patient status changed to \"observation\".   () Admission Status NOT Appropriate - This patient's medical care is not consistent with medical management for Inpatient or Observation Status.          RATIONALE FOR DETERMINATION     Huseyin St is a 66 year old male with a recent ischemic CVA (3/16/2022), HTN, HLP, DM2, Obesity, NA, BPH, MDD with anxiety who was admitted on 3/24/2022 due to acute onset of intermittent garbled/jumbled speech.          Work-up in the ED included a head CT w/o contrast with well-defined infarct in the left temporal lobe that appears similar to recent head CT 3/17/22 and without evidence of hemorrhagic transformation of infarct and without acute intracranial hemorrhage.  Head/Neck CTA with persistent severe focal stenosis/near occlusion a the left MCA bifurcation (similar to previous study). MRI, however, revealed 2 new small infarcts in the L MCA territory despite being on dual therapy with Plavix and ASA.  Plan is to check P2Y12 to determine if responding to Plavix or if switch in therapy indicate.  Patient is NPO for possible DSA and possible stenting of left MCA.  He is receiving IVF and close clinical monitoring.  IP " status is appropriate.  I spoke with hospitalist SISI Mcelroy.          The severity of illness, intensity of service provided, expected LOS and risk for adverse outcome make the care complex, high risk and appropriate for hospital admission.        The information on this document is developed by the utilization review team in order for the business office to ensure compliance.  This only denotes the appropriateness of proper admission status and does not reflect the quality of care rendered.         The definitions of Inpatient Status and Observation Status used in making the determination above are those provided in the CMS Coverage Manual, Chapter 1 and Chapter 6, section 70.4.      Sincerely,     Luann Christensen MD  Physician Advisor   Utilization Review/ Case Management  [facility].

## 2022-03-25 NOTE — PLAN OF CARE
SLP: Pt is NPO for procedure this date. Will re-schedule evaluation for tomorrow, 3/26/2022. Please Vocera if change in POC and pt is appropriate for swallow evaluation.

## 2022-03-26 ENCOUNTER — APPOINTMENT (OUTPATIENT)
Dept: OCCUPATIONAL THERAPY | Facility: CLINIC | Age: 67
DRG: 065 | End: 2022-03-26
Attending: PHYSICIAN ASSISTANT
Payer: COMMERCIAL

## 2022-03-26 LAB
ANION GAP SERPL CALCULATED.3IONS-SCNC: 4 MMOL/L (ref 3–14)
BUN SERPL-MCNC: 11 MG/DL (ref 7–30)
CALCIUM SERPL-MCNC: 8.6 MG/DL (ref 8.5–10.1)
CHLORIDE BLD-SCNC: 106 MMOL/L (ref 94–109)
CO2 SERPL-SCNC: 26 MMOL/L (ref 20–32)
CREAT SERPL-MCNC: 0.93 MG/DL (ref 0.66–1.25)
GFR SERPL CREATININE-BSD FRML MDRD: 90 ML/MIN/1.73M2
GLUCOSE BLD-MCNC: 129 MG/DL (ref 70–99)
GLUCOSE BLDC GLUCOMTR-MCNC: 112 MG/DL (ref 70–99)
GLUCOSE BLDC GLUCOMTR-MCNC: 169 MG/DL (ref 70–99)
GLUCOSE BLDC GLUCOMTR-MCNC: 215 MG/DL (ref 70–99)
GLUCOSE BLDC GLUCOMTR-MCNC: 228 MG/DL (ref 70–99)
GLUCOSE BLDC GLUCOMTR-MCNC: 302 MG/DL (ref 70–99)
POTASSIUM BLD-SCNC: 3.9 MMOL/L (ref 3.4–5.3)
SODIUM SERPL-SCNC: 136 MMOL/L (ref 133–144)

## 2022-03-26 PROCEDURE — 99233 SBSQ HOSP IP/OBS HIGH 50: CPT | Performed by: HOSPITALIST

## 2022-03-26 PROCEDURE — 36415 COLL VENOUS BLD VENIPUNCTURE: CPT | Performed by: PHYSICIAN ASSISTANT

## 2022-03-26 PROCEDURE — 80048 BASIC METABOLIC PNL TOTAL CA: CPT | Performed by: PHYSICIAN ASSISTANT

## 2022-03-26 PROCEDURE — 250N000013 HC RX MED GY IP 250 OP 250 PS 637: Performed by: PHYSICIAN ASSISTANT

## 2022-03-26 PROCEDURE — 97165 OT EVAL LOW COMPLEX 30 MIN: CPT | Mod: GO | Performed by: OCCUPATIONAL THERAPIST

## 2022-03-26 PROCEDURE — 258N000003 HC RX IP 258 OP 636: Performed by: PSYCHIATRY & NEUROLOGY

## 2022-03-26 PROCEDURE — 250N000013 HC RX MED GY IP 250 OP 250 PS 637: Performed by: HOSPITALIST

## 2022-03-26 PROCEDURE — 120N000001 HC R&B MED SURG/OB

## 2022-03-26 RX ORDER — LISINOPRIL 20 MG/1
20 TABLET ORAL ONCE
Status: DISCONTINUED | OUTPATIENT
Start: 2022-03-26 | End: 2022-03-26

## 2022-03-26 RX ORDER — LISINOPRIL 20 MG/1
20 TABLET ORAL DAILY
Status: DISCONTINUED | OUTPATIENT
Start: 2022-03-27 | End: 2022-03-27 | Stop reason: HOSPADM

## 2022-03-26 RX ORDER — LISINOPRIL 40 MG/1
40 TABLET ORAL DAILY
Status: DISCONTINUED | OUTPATIENT
Start: 2022-03-27 | End: 2022-03-26

## 2022-03-26 RX ORDER — LISINOPRIL 20 MG/1
20 TABLET ORAL DAILY
Status: DISCONTINUED | OUTPATIENT
Start: 2022-03-26 | End: 2022-03-26

## 2022-03-26 RX ADMIN — CLOPIDOGREL BISULFATE 75 MG: 75 TABLET ORAL at 09:18

## 2022-03-26 RX ADMIN — ASPIRIN 325 MG: 325 TABLET, COATED ORAL at 09:18

## 2022-03-26 RX ADMIN — ROSUVASTATIN CALCIUM 40 MG: 20 TABLET, FILM COATED ORAL at 09:18

## 2022-03-26 RX ADMIN — INSULIN ASPART 2 UNITS: 100 INJECTION, SOLUTION INTRAVENOUS; SUBCUTANEOUS at 17:50

## 2022-03-26 RX ADMIN — LISINOPRIL 20 MG: 20 TABLET ORAL at 12:29

## 2022-03-26 RX ADMIN — ESCITALOPRAM OXALATE 20 MG: 20 TABLET ORAL at 09:18

## 2022-03-26 RX ADMIN — SODIUM CHLORIDE: 9 INJECTION, SOLUTION INTRAVENOUS at 06:20

## 2022-03-26 ASSESSMENT — ACTIVITIES OF DAILY LIVING (ADL)
ADLS_ACUITY_SCORE: 5
PREVIOUS_RESPONSIBILITIES: MEAL PREP;HOUSEKEEPING;LAUNDRY;SHOPPING;YARDWORK;MEDICATION MANAGEMENT;FINANCES;DRIVING;WORK
ADLS_ACUITY_SCORE: 5

## 2022-03-26 NOTE — PLAN OF CARE
Huseyin is here for aphasia related to additional strokes and L MCA stenosis.  A&Ox3-4 (disorientated to situation at times) Neuros intact ex for WFD and slightly forgetful. VSS on RA. Tele: NSR. Denies pain.  BGM: 284,169.  Up with SBA.  IV  ml/hr.  Discharge home when medically stable.

## 2022-03-26 NOTE — PROGRESS NOTES
"        [facility]    Stroke Progress Note    Interval EventsToday patient is significantly improved. Able to read and carry on full conversation with comprehension and follow simple commands. Still difficulty with complex commands. Discussed at length the need for blood pressure and blood glucose control and close monitor to bring to PCP follow-up for adjustments of medications.    HPI  Huseyin St is a 66 year old male with pertinent past medical history of HTN, HLD, poorly controlled DMT2 (A1c 11.8%), NA, anxiety, prior substance abuse quit 1 year ago, and recent admission 3/16/22-3/18/22 at [facility] for L temporal ischemic stroke with severe L MCA stenosis/near occlusion. He was discharged on  mg daily and Plavix 75 mg daily and had mild ongoing receptive/expressive aphasia.    He presented to [facility] ED again on 3/24/22 due to acute onset of worsening expressive aphasia (wife heard him leave a voicemail to his father and non-sensical \"gibberish\" words were all that came out. No slurring of speech though.) No other new deficits. He was found to have 2 new tiny white matter L cerebral hemispheric ischemic infarcts with persistent severe L MCA stenosis/near occlusion. Notably blood glucose >400 on admission. Blood pressures were kept >130 with Normal saline to allow perfusion of L MCA territory in acute stroke setting.    Plavix and ASA platelet function assays both showed therapeutic response. He was kept NPO and discussed with neuro IR regarding possibility of diagnostic cerebral angiogram. Neuro endovascular team did not recommend DSA at this time. Instead recommend continuing medical management with ASA and plavix and repeat MRI/MRA in 1 month then follow-up with both stroke and neuro-endovascular teams following. Dr. Alegria recommended CTA over MRA in 1 month instead.    Stroke Evaluation Summarized    MRI/Head CT MRI: evolving L temporal subacute ischemic infarct, 2 " new tiny L cerebral hemisphere ischemic infarcts  CTH: stable infarct L temporal lobe, no evidence of hemorrhagic conversion   Intracranial/cervical Vasculature CTA: persistent severe focal stenosis/near occlusion L MCA bifurcation unchanged, atherosclerosis L >R ICAs without significant stenosis     Echocardiogram TTE 3/17/22: technically difficult, EF 60-65%, no wma, no evidence of atrial shunt, normal bilateral atria size, borderline aortic root dilation, ascending aorta borderline dilated    EKG/Telemetry 3/16/22: SR, incomplete RBBB, anterior infarct age undetermined   Other Testing 30 day CardioNet monitor: pending     LDL  3/16/2022: 136 mg/dL   A1C  3/16/2022: 11.8 %   Troponin No lab value available in past 48 hrs       Impression   2 recurrent L MCA territory infarcts in setting of L MCA stenosis despite antiplatelet therapy with  mg and Plavix 75 mg daily (platelet function assays therapeutic response)    At risk for future seizures given temporal stroke    Plan  -discussed with neuro endovascular team, no diagnostic cerebral angiogram at this time, recommend continuing  mg daily and plavix 75 mg daily for now, repeat MRI w/wo contrast in 1 month and then follow-up with neuro-endovascular team following  -blood pressure goal <140/90 outpatient (as having some issues with orthostatic hypotension despite holding medications--we would recommend discontinuing hydrochlorothiazide and continue Lisinopril and Cardizem with titration as needed), will need to be monitored closely twice daily first thing when wake up in the morning and then again later in the evening. Keep log and bring to PCP follow-up for strict control/titration of antihypertensives  - from last admission 3/16/22, continue PTA Crestor 40 mg daily, f/u with PCP for titration to goal LDL 40-70, <40 increases risk of intracranial hemorrhage  -euglycemia, dietician consult for diabetic diet, recommended close monitoring of blood  "glucose and keep log and bring to PCP follow-up for strict blood glucose control, A1c 11.8%, goal <7%  -30 day CardioNet monitor at discharge (was still pending from recent admission)--order placed  -Neuro checks and vitals every 4 hours  -continue nightly use of CPAP for NA  -Euthermia, eunatremia   -Stroke Education    Patient Follow-up    - in the next 1-2 week(s) with PCP  -in 4 weeks with repeat MRI brain w/wo contrast and CTA head and neck (ordered)  - in 5-6 weeks with stroke clinic LUNA (any stroke LUNA) at [facility] stroke clinic. Patient will be contacted by virtual stroke clinic team after discharge. (ordered)  - in 7-8 weeks with endovascular surgical neuroradiology at [facility] (referral sent)    No further stroke evaluation is recommended, so we will sign off. Please contact us with any additional questions.    Minerva Nieves PA-C  Vascular Neurology  To page me or covering stroke neurology team member, click here: AMCOM   Choose \"On Call\" tab at top, then search dropdown box for \"Neurology Adult\", select location, press Enter, then look for stroke/neuro ICU/telestroke.    ______________________________________________________    Clinically Significant Risk Factors Present on Admission               # Platelet Defect: home medication list includes an antiplatelet medication       Medications   Scheduled Meds    aspirin  325 mg Oral Daily     clopidogrel  75 mg Oral Daily     [Held by provider] diltiazem ER COATED BEADS  240 mg Oral Daily     escitalopram  20 mg Oral Daily     insulin aspart  1-7 Units Subcutaneous TID AC     insulin aspart  1-5 Units Subcutaneous At Bedtime     insulin aspart  5 Units Subcutaneous TID w/meals     insulin glargine  50 Units Subcutaneous At Bedtime     polyethylene glycol  17 g Oral or NG Tube Daily     rosuvastatin  40 mg Oral Daily     sodium chloride (PF)  10 mL Intracatheter Q8H       Infusion Meds    sodium chloride 100 mL/hr at 03/26/22 0620       PRN " Meds  acetaminophen **OR** acetaminophen **OR** acetaminophen, glucose **OR** dextrose **OR** glucagon, hydrALAZINE, labetalol, senna-docusate       PHYSICAL EXAMINATION  Temp:  [97.6  F (36.4  C)-98.7  F (37.1  C)] 97.9  F (36.6  C)  Pulse:  [66-91] 68  Resp:  [16-18] 18  BP: (118-159)/(68-87) 134/70  SpO2:  [96 %-98 %] 96 %      Neurologic  Mental Status:  alert, oriented x 3, speech clear and fluent, naming and repetition normal, Able to read and carry on full conversation with comprehension and follow simple commands., difficulty with multi-step commands  Cranial Nerves:  visual fields intact, PERRL, EOMI with normal smooth pursuit, facial sensation intact and symmetric, facial movements symmetric, hearing not formally tested but intact to conversation, no dysarthria, tongue protrusion midline  Motor:  normal muscle tone and bulk, no abnormal movements, able to move all limbs spontaneously, strength 5/5 throughout upper and lower extremities, no pronator drift  Reflexes:  toes down-going  Sensory:  light touch sensation intact and symmetric throughout upper and lower extremities, no extinction on double simultaneous stimulation   Coordination:  rapid alternating movements symmetric, possible mild LUE dysmetria  Station/Gait:  deferred    Stroke Scales    NIHSS  1a. Level of Consciousness 0-->Alert, keenly responsive   1b. LOC Questions 0-->Answers both questions correctly   1c. LOC Commands 0-->Performs both tasks correctly   2.   Best Gaze 0-->Normal   3.   Visual 0-->No visual loss   4.   Facial Palsy 0-->Normal symmetrical movements   5a. Motor Arm, Left 0-->No drift, limb holds 90 (or 45) degrees for full 10 secs   5b. Motor Arm, Right 0-->No drift, limb holds 90 (or 45) degrees for full 10 secs   6a. Motor Leg, Left 0-->No drift, leg holds 30 degree position for full 5 secs   6b. Motor Leg, right 0-->No drift, leg holds 30 degree position for full 5 secs   7.   Limb Ataxia (S) 1-->Present in one limb (mild  L arm dysmetria)   8.   Sensory 0-->Normal, no sensory loss   9.   Best Language 1-->Mild-to-moderate aphasia, some obvious loss of fluency or facility of comprehension, without significant limitation on ideas expressed or form of expression. Reduction of speech and/or comprehension, however, makes conversation. . . (see row details)   10. Dysarthria 0-->Normal   11. Extinction and Inattention  0-->No abnormality   Total 2 (03/26/22 1150)       Modified Jack Score (Pre-morbid)  2 - Slight disability.  Able to look after own affairs without assistance, but unable to carry out all previous activities.  Modified Jack Score (Discharge)  3 - Moderate disability.  Requires some help, but able to walk unassisted.    Imaging  I personally reviewed all imaging; relevant findings per HPI.     Lab Results Data   CBC  Recent Labs   Lab 03/25/22  0711 03/24/22  1438   WBC 9.7 10.2   RBC 5.05 5.37   HGB 14.3 15.5   HCT 42.6 46.7    508*     Basic Metabolic Panel    Recent Labs   Lab 03/26/22  0215 03/25/22  2115 03/25/22  1645 03/25/22  1121 03/25/22  0711 03/24/22  1439 03/24/22  1438   NA  --   --   --   --  134  --  130*   POTASSIUM  --   --   --   --  4.2  --  4.4   CHLORIDE  --   --   --   --  105  --  99   CO2  --   --   --   --  24  --  25   BUN  --   --   --   --  16  --  19   CR  --   --   --   --  0.91  --  1.12   * 284* 277*   < > 201*   < > 413*   EVER  --   --   --   --  8.8  --  9.3    < > = values in this interval not displayed.     Liver Panel  Recent Labs   Lab 03/24/22  1438   PROTTOTAL 8.2   ALBUMIN 3.4   BILITOTAL 0.2   ALKPHOS 99   AST 10   ALT 17     INR    Recent Labs   Lab Test 03/16/22  1704   INR 1.02      Lipid Profile    Recent Labs   Lab Test 03/16/22  1704 10/11/21  0942 09/28/20  0925 04/21/16  0906 08/20/15  0830 01/14/15  0847 04/21/14  0815   CHOL 224* 228* 214*   < > 136 316* 155   HDL 33* 37* 42   < > 42 41 37*   * 116* 96   < > 59 219* 80   TRIG 275* 377* 380*   < >  176* 282* 189*   CHOLHDLRATIO  --   --   --   --  3.2 7.7* 4.2    < > = values in this interval not displayed.     A1C    Recent Labs   Lab Test 03/16/22  1704 10/11/21  0943 09/28/20  0925   A1C 11.8* 11.8* 13.2*     Troponin I  No results for input(s): TROPONIN, GHTROP in the last 168 hours.            I spent 20 minute(s) face-to-face or coordinating care of Huseyin St. Over 50% of our time on the unit was spent counseling the patient and/or coordinating care.

## 2022-03-26 NOTE — PLAN OF CARE
Goal Outcome Evaluation:    Plan of Care Reviewed With: patient     Overall Patient Progress: improving    Patient here with strokes, L ICA stenosis. Neuros stable. Word finding difficulty, receptive aphasia. Tele NSR. Denies pain. Blood sugars covered with sliding scale insulin. Did not call glucose check before lunch, reminded him again, 228 before dinner. Up with SBA to bathroom and halls. Discharge home when ready.

## 2022-03-26 NOTE — PLAN OF CARE
SLP: Orders received, chart reviewed. Pt tolerating regular diet and denies any issues with swallowing. Discussed pt's communication status with pt/spouse. Spouse reports pt is 98% back re: his expressive language abilities, occasional minimal word finding difficulties still present though ongoing receptive language deficits (which she thinks some is from baseline function). Educated on SLP communication evaluation from 3/17/22 s/p prior CVA indicating severe expressive and receptive aphasia with recommendations for SLP follow up. Pt discharged home, followed up with HH OT only at that time. Per spouse, no concerns re: expressive language and would like to continue with OT for comprehension skills - I did educate her on difference of OT and SLP re: comprehension (OT more cognitive based re: ADLS and SLP more language based). I did highly recommend SLP services at discharge (likely tomorrow) based on prior evaluation and informal perceptions of pt's current function today during conversation. They are in agreement to SLP at discharge (paged hospitalist for HH SLP Orders) and aware can be cancelled if desired.

## 2022-03-26 NOTE — PLAN OF CARE
OT: Order received for smoking cessation consult. Per EMR, pt is a never smoker. Will complete order.

## 2022-03-26 NOTE — PROGRESS NOTES
[facility]    Hospitalist Progress Note    Assessment & Plan   Huseyin RUSSELL St is a 66 year old male admitted on 3/24/2022.    Past medical history significant for recent ischemic CVA, HTN, HLP, DM2, Obesity, NA, BPH, MDD with anxiety, substance use D/O, ED who was registered to short-stay/obs due to TIA.      Patient presented to [facility] ED due to stroke symptoms (intermittent garbled/jumbled speech).  Patient was seen by PCP earlier in the day for hospital follow-up and confusion and speech changes started ~ 13:30.  Notably, patient was hospitalized here from 3/16-3/18/2022 due to acute ischemic CVA when he presented with receptive/expressive aphasia.  MRI confirmed acute/early subacute infarction at the anterior aspect of the left temporal lobe.  Head/Neck MRA revealed mild-moderate narrowings throughout the left ICA, MCA and PCA vessels.  Patient was discharged home with DAPT.      Work-up in the ED included a glucose check with a reading of 397.  Head CT w/o contrast with well-defined infarct in the left temporal lobe that appears similar to recent head CT 3/17/22 and without evidence of hemorrhagic transformation of infarct and without acute intracranial hemorrhage.  Head/Neck CTA with persistent severe focal stenosis/near occlusion a the left MCA bifurcation (similar to previous study).      Acute 2 left MCA CVA (tiny)   Evolving subacute ischemic CVA of left temporal lobe  *P2Y12 indicative of patient responding to both Plavix and ASA.    - Brain MRI: evolving L temporal subacute ischemic infarct, 2 new tiny L cerebral hemisphere ischemic infarcts  - Neurology consult appreciated.  They discussed with neuro endovascular team, no diagnostic cerebral angiogram at this time, recommend continuing  mg daily and plavix 75 mg daily for now, repeat MRI w/wo contrast in 1 month and then follow-up with neuro-endovascular team   -continue to keep blood pressure >130/80 to avoid  hypotension/hypoperfusion of L MCA   - PT/OT/SLP.    - IV Fluids at 100 ml/hr. discontinued IVF on 3/26.  - from last admission 3/16/22, continue PTA Crestor 40 mg daily, f/u with PCP for titration to goal LDL 40-70  - Antihypertensive management and statin medication as below.   -30 day CardioNet monitor at discharge  -TTE 3/17/22: technically difficult, EF 60-65%, no wma, no evidence of atrial shunt, normal bilateral atria size, borderline aortic root dilation, ascending aorta borderline dilated   - Diet advanced.    -Follow-up in 4 weeks with repeat MRI brain w/wo contrast and MRA brain wo contrast (ordered)  -Follow-up in 5-6 weeks with stroke clinic LUNA (any stroke LUNA) at [facility] stroke clinic. Patient will be contacted by virtual stroke clinic team after discharge. (ordered)  -Follow-up in 7-8 weeks with endovascular surgical neuroradiology at [facility] (referral sent)    Pseudohyponatremia, resolved  Sodium of 130 with glucose level of 413.  When corrected for the hyperglycemia is normal at 138.    *Recheck of sodium normalized at 134 with improved glucose (201).    - IV Fluids as above.    - Diabetes management as below.    - Monitor.      Mild CRUZITO (Resolved)  Creatinine of 1.12 with GFR of 72.    -Creatinine normalized with IVF.  - IV Fluids as above.    - Monitor.    HTN  Orthostatic hypotension  - Held PTA Diltiazem 240 mg/d, hydrochlorothiazide 12.5 mg/d and lisinopril 40 mg/d to allow for permissive HTN.    -Patient received IVF as above for orthostatic hypotension.  Discontinued IVF on 3/26.  -Resumed PTA lisinopril at half dose [20 mg daily] on 3/26.  Will not plan on resuming diltiazem or HCTZ at this time given patient's issues with orthostasis, known recurrent recent strokes with emphasis on avoiding hypotension/hypoperfusion of brain.  Will monitor patient overnight and depending on how his BP is, could potentially increase lisinopril up to 40 mg daily per PTA dosing tomorrow.  Will  need close follow-up with BP management.    [facility]  LIPIDS (3/16/22): 224/33/136/275.  - Resumed on PTA Crestor 40 mg/d.      DM2, insulin dependent, uncontrolled with hyperglycemia  PTA management with Lantus 55 units at bedtime and prandial Novolog 10 units before each meal plus sliding scale.  A1c of 11.8% (3/16/22).     - Diet advanced to mod carb.    - Lantus increased to 55 units at bedtime.    - Resumed on Prandial insulin 5 units with each meal.    - Continue medium sliding scale.    -Ordered consult to nutrition to discuss diabetes diet/diabetes education.    Obesity with associated NA  Excessive daytime somnolence  Body mass index is 34.01 kg/m .  Patient is compliant with CPAP.  Increase in all-cause morbidity and mortality.  Patient's wife notes that patient has been excessively somnolent during the daytime for the last few years.  Discussed with her that it is important he wears his CPAP even during naps during the day and this might decrease his daytime somnolence.  - Encourage weight loss.  - Follow up with PCP regarding ongoing management.    - Continue CPAP with home settings.  Encourage use during daytime naps as well.    BPH  Noted per EMR, but not currently on any medications.  Monitor.      MDD with anxiety  - Resumed on PTA Lexapro 20 mg/d.      Substance use D/O  Patient with noted use of cocaine with last use occurring Monday 3/21/22 per report from patient's wife.    *Urine drug screen negative 3/24 but was positive for cocaine on 3/17.    - regarding abstinence from substance abuse in setting of recurrent strokes.    ED  - Hold PTA sildenafil and resume at discharge.      Diet: Moderate Consistent Carb (60 g CHO per Meal) Diet     DVT Prophylaxis: Pneumatic Compression Devices   Rao Catheter: Not present  Code Status: Full Code     Disposition Plan    Expected Discharge: Anticipate discharge home tomorrow with home OT, home ST.  Anticipated discharge location:  Awaiting care  coordination huddle   Delays: Therapy evaluations     Entered: Octavia Krause MD 03/26/2022, 9:56 AM        The patient's care was discussed with the Bedside Nurse, Patient and Patient's Family.      The patient has been discussed with Dr. Krause, who agrees with the assessment and plan at this time.    Octavia Krause MD  [facility]  Securely message with the Vocera Web Console (learn more here)  Text page via iRewind Paging/Directory      Interval History   Patient was sleeping when seen.  He did briefly awaken but did not really participate in the encounter.  Wife was at bedside and stated that patient has always been excessively sleepy during the daytime, seems to be always sleeping.  States that this has been going on over the last few years.  He is compliant with his CPAP at night but does not wear his CPAP during naps during the daytime.  His speech seems to be improving.  Wife expresses frustration regarding patient not complying with all the treatment recommendations.    -Data reviewed today: I reviewed all new labs and imaging results over the last 24 hours. I personally reviewed no images or EKG's today.    Physical Exam   BP (!) 153/91 (BP Location: Left arm)   Pulse 73   Temp 97.6  F (36.4  C) (Oral)   Resp 18   Wt 107.5 kg (236 lb 15.9 oz)   SpO2 98%   BMI 34.01 kg/m      Vital Signs with Ranges  Temp:  [97.6  F (36.4  C)-98.7  F (37.1  C)] 97.6  F (36.4  C)  Pulse:  [66-91] 73  Resp:  [16-18] 18  BP: (118-159)/(68-91) 153/91  SpO2:  [96 %-98 %] 98 %  I/O last 3 completed shifts:  In: 600 [P.O.:600]  Out: -       Constitutional: Appears quite sleepy, easily arousable but goes back to sleep quickly, does not appear in any distress  Pulmonary: No increased work of breathing, good air exchange, clear to auscultation bilaterally, no crackles or wheezing.  Cardiovascular: Regular rate and rhythm, normal S1 and S2,  GI: Normal bowel sounds, soft, non-distended, non-tender.  Obese.    Skin/Integumen: Visualized skin appeared clear.  Neuro: No facial asymmetry, moving all extremities equally, was able to respond to simple questions and answer with short replies  Psych: Calm  Extremities: No lower extremity edema noted      Medications     sodium chloride 100 mL/hr at 03/26/22 0620       aspirin  325 mg Oral Daily     clopidogrel  75 mg Oral Daily     [Held by provider] diltiazem ER COATED BEADS  240 mg Oral Daily     escitalopram  20 mg Oral Daily     insulin aspart  1-7 Units Subcutaneous TID AC     insulin aspart  1-5 Units Subcutaneous At Bedtime     insulin aspart  5 Units Subcutaneous TID w/meals     insulin glargine  50 Units Subcutaneous At Bedtime     polyethylene glycol  17 g Oral or NG Tube Daily     rosuvastatin  40 mg Oral Daily     sodium chloride (PF)  10 mL Intracatheter Q8H       Data   Recent Labs   Lab 03/26/22  0829 03/26/22  0649 03/26/22  0215 03/25/22  1121 03/25/22  0711 03/24/22  1439 03/24/22  1438   WBC  --   --   --   --  9.7  --  10.2   HGB  --   --   --   --  14.3  --  15.5   MCV  --   --   --   --  84  --  87   PLT  --   --   --   --  448  --  508*   NA  --  136  --   --  134  --  130*   POTASSIUM  --  3.9  --   --  4.2  --  4.4   CHLORIDE  --  106  --   --  105  --  99   CO2  --  26  --   --  24  --  25   BUN  --  11  --   --  16  --  19   CR  --  0.93  --   --  0.91  --  1.12   ANIONGAP  --  4  --   --  5  --  6   EVER  --  8.6  --   --  8.8  --  9.3   * 129* 169*   < > 201*   < > 413*   ALBUMIN  --   --   --   --   --   --  3.4   PROTTOTAL  --   --   --   --   --   --  8.2   BILITOTAL  --   --   --   --   --   --  0.2   ALKPHOS  --   --   --   --   --   --  99   ALT  --   --   --   --   --   --  17   AST  --   --   --   --   --   --  10    < > = values in this interval not displayed.       No results found for this or any previous visit (from the past 24 hour(s)).

## 2022-03-26 NOTE — PROGRESS NOTES
Brief NeuroIR note     I was consulted earlier today by stroke service, briefly this is a 66 yoM with poorly controlled diabetes, hyperlipidemia and hypertension admitted 3/16/22 with left frontal temporal lobe infarct and found to have diffuse intracranial atherosclerosis including left M1/2 junction and bilateral terminal ICA and PCA. Given that he had symptomatic l mca stenosis he was started on asa and plavix and lipitor and placed on best medical management. Discharged with right sided weakness and some aphasia. After DAPT 1 week later, he developed a 20 min period of confusion 3/24/22, readmitted and MRI shows 2 new punctate infarcts in left MCA territory. ESN service asked to consider intracranial stenting. Pts admitting glucose was in 400s, clinically has remained essentially similar to discharge. Difficult to say that pt has failed medical management at this time. We do think that patient is at high risk for restenosis if he were to undergo an intracranial stent given his blood glucose and at high risk for intimal hyperplasia which would put his entire left mca territory at risk. At this time we think continuing with ASA and plavix and optimization of his sugars and blood pressure  And cholesterol is best course of action. Please let us know if we can be of further assistance. Discussed with Dr Conrad.

## 2022-03-26 NOTE — PLAN OF CARE
PT: orders received, chart reviewed. Discussed with OT that pt currently demonstrating functional mobility with supervision-modified independent. No noted balance difficulties with mobility per discussion with OT. No inpatient PT needs indicated. Pt is at baseline from a mobility standpoint per discussion with OT. Will complete PT orders. Defer discharge recommendations to OT and medical team.

## 2022-03-26 NOTE — PROGRESS NOTES
"CLINICAL NUTRITION SERVICES BRIEF NOTE  Nutrition Admission Risk Screen Received -   Have you recently lost weight without trying in the last 6 months ? - \"unsure\"  Have you been eating poorly due to a decreased appetite ?- \"no\"    New Findings  - Chart reviewed. Pt is tolerating 100% of adequate meals.   - Weight trending as follows:   Wt Readings from Last 10 Encounters:   03/25/22 107.5 kg (236 lb 15.9 oz)   03/24/22 105.7 kg (233 lb)   03/16/22 104.3 kg (230 lb)   10/11/21 106.1 kg (234 lb)   12/11/20 108.5 kg (239 lb 3.2 oz)   10/26/20 104.3 kg (230 lb)   10/13/20 104.3 kg (230 lb)   09/28/20 106.1 kg (234 lb)   09/23/19 109.7 kg (241 lb 14.4 oz)   09/16/19 110.2 kg (243 lb)     No acute weight losses noted.     Intervention  - Patient does not meet criteria for RD assessment/intervention at this time. Please consult should issues arise.     Gale Clay RD, LD  [facility] [facility], [place]   Pager: [phone]  Weekend Pager: [phone]    "

## 2022-03-26 NOTE — CONSULTS
"NUTRITION EDUCATION      REASON FOR ASSESSMENT:  Provider Order - \"Diabetes diet Ed\"    NUTRITION HISTORY:  Information obtained from patient, spouse.   - They note their biggest two issues with a diabetic diet are breakfast and after dinner snacking.   - They like to eat carb-heavy meals in the morning such as waffles, pancakes, toast, cereal. Pt dislikes yogurt.   - They often snack in the evening on popcorn or ice cream. Sometimes candy. Pt's spouse admits she often grazes on candy all day (the patient eats less).   - They do eat protein + non-starchy veg meals.   - Pt drinks diet soda, sugar-free kip-aid, and sugar-free powerade.     - Have not really attempted a diabetic diet in the past.     CURRENT DIET:  Moderate Consistent CHO (60 g per meal)    NUTRITION DIAGNOSIS:  Food- and nutrition-related knowledge deficit R/t diabetic diet exposure AEB patient and spouse admit to little knowledge of high CHO foods and how to balance meals and snacks for long-term health.     INTERVENTIONS:    Nutrition Prescription:  60-75 g CHO per meal  15-30 g CHO per snack    Implementation:      *  Nutrition Education (Content):   A)  Provided handout    Carbohydrate Counting for people with diabetes    Label reading for diabetes   B)  Discussed     Balancing all meals and snacks w/ protein + healthy fat    Encouraged fiber intake    Reviewed CHO-dense foods    Reviewed low-carb foods    MyPlate recommendations    Higher protein breakfast options.       *  Nutrition Education (Application):   A)  Discussed current eating habits and recommended alternative food choices      *  Anticipate good compliance      *  Diet Education - refer to Education Flowsheet    Goals:      *  Patient will verbalize understanding of diet      *  All of the above goals met during the education session    Follow Up/Monitoring:      *  Provided RD contact information for future questions      *  Recommended Out-Patient Nutrition Referral, if further " diet instructions are needed

## 2022-03-26 NOTE — PROGRESS NOTES
03/26/22 1135   Quick Adds   Type of Visit Initial Occupational Therapy Evaluation   Living Environment   People in Home spouse   Current Living Arrangements house   Home Accessibility stairs to enter home;stairs within home   Number of Stairs, Main Entrance 1   Transportation Anticipated car, drives self;family or friend will provide   Living Environment Comments Can do main level living, lives in [facility].  Pt was indep and working full time a month ago.    Self-Care   Usual Activity Tolerance good   Current Activity Tolerance moderate   Equipment Currently Used at Home none   Fall history within last six months no   Activity/Exercise/Self-Care Comment Indep with ADLs   Instrumental Activities of Daily Living (IADL)   Previous Responsibilities meal prep;housekeeping;laundry;shopping;yardwork;medication management;finances;driving;work   IADL Comments wife can assist; pt in OP OT for IADLs since previous admission   General Information   Onset of Illness/Injury or Date of Surgery 03/25/22   Referring Physician Brandon Mcelroy   Patient/Family Therapy Goal Statement (OT) home   Additional Occupational Profile Info/Pertinent History of Current Problem Patient presented to [facility] due to stroke symptoms (intermittent garbled/jumbled speech).  Patient was seen by PCP earlier in the day for hospital follow-up and confusion and speech changes started ~ 13:30.  Notably, patient was hospitalized here from 3/16-3/18/2022 due to acute ischemic CVA when he presented with receptive/expressive aphasia.  MRI confirmed acute/early subacute infarction at the anterior aspect of the left temporal lobe.   Existing Precautions/Restrictions fall   Limitations/Impairments aphasia   Left Upper Extremity (Weight-bearing Status) full weight-bearing (FWB)  (all)   General Observations and Info wife present for session   Cognitive Status Examination   Orientation Status orientation to person, place and time   Affect/Mental  Status (Cognitive) WFL   Follows Commands follows one-step commands;50-74% accuracy   Memory Deficit moderate deficit   Cognitive Status Comments Pt needing repetition of commands ~50%; per wife, this is partially due to stroke, and partially due to personality   Visual Perception   Visual Impairment/Limitations WFL   Pain Assessment   Patient Currently in Pain No   Posture   Posture protracted shoulders   Range of Motion Comprehensive   Comment, General Range of Motion BUE/BLE WFL   Strength Comprehensive (MMT)   Comment, General Manual Muscle Testing (MMT) Assessment R shd 4/5, otherwise BUE 5/5; R  80#, L  88#; 5/5 B hip flex, B knee flex, B dorsi/plantar.    Coordination   Upper Extremity Coordination No deficits were identified   Bed Mobility   Supine-Sit [place] (Bed Mobility) independent   Sit-Supine [place] (Bed Mobility) independent   Transfers   Transfers bed-chair transfer;sit-stand transfer;toilet transfer   Transfer Skill: Bed to Chair/Chair to Bed   Bed-Chair [place] (Transfers) supervision   Sit-Stand Transfer   Sit-Stand [place] (Transfers) independent   Toilet Transfer   Type (Toilet Transfer) sit-stand;stand-sit   [place] Level (Toilet Transfer) supervision   Balance   Balance Comments good seated; good ambulating in hallway with balance challenges looking up/down/L/R, good on stairs x4, mild LOB standing on LLE on foam, good standing on RLE on foam   Activities of Daily Living   BADL Assessment/Intervention lower body dressing;toileting   Lower Body Dressing Assessment/Training   [place] Level (Lower Body Dressing) independent   Toileting   [place] Level (Toileting) independent   Clinical Impression   Criteria for Skilled Therapeutic Interventions Met (OT) Evaluation only   OT Diagnosis impaired IADLs   OT Problem List-Impairments impacting ADL problems related to;balance;cognition;strength   Assessment of Occupational Performance 1-3 Performance Deficits    Identified Performance Deficits IADLs incl financial management, medication management, driving   Clinical Decision Making Complexity (OT) low complexity   Risk & Benefits of therapy have been explained evaluation/treatment results reviewed;participants included;patient;spouse/significant other   OT Discharge Planning   OT Discharge Recommendation (DC Rec) home with assist;home with home care occupational therapy   OT Rationale for DC Rec Pt functioning below baseline due to activity tolerance, expressive and receptive aphasia and cognition. Functional mobility requiring distanced supervision due to current mild lack of insight into safety awareness. At this time, recommend home with 24/7 supervision and A from wife for all functional mobility and I/ADLs (ie showering, toileting, meal prep, cleaning, driving), as well as resumed OP OT for cognitive evaluation/treatment.    OT Brief overview of current status Indep/supervision for ADLs/functional transfers, following commands 50-75%, expressive/receptive aphasia   Total Evaluation Time (Minutes)   Total Evaluation Time (Minutes) 15

## 2022-03-27 ENCOUNTER — TELEPHONE (OUTPATIENT)
Dept: FAMILY MEDICINE | Facility: CLINIC | Age: 67
End: 2022-03-27
Payer: COMMERCIAL

## 2022-03-27 VITALS
DIASTOLIC BLOOD PRESSURE: 76 MMHG | TEMPERATURE: 98 F | OXYGEN SATURATION: 94 % | WEIGHT: 236.99 LBS | SYSTOLIC BLOOD PRESSURE: 119 MMHG | HEART RATE: 63 BPM | RESPIRATION RATE: 16 BRPM | BODY MASS INDEX: 34.01 KG/M2

## 2022-03-27 LAB
GLUCOSE BLDC GLUCOMTR-MCNC: 128 MG/DL (ref 70–99)
GLUCOSE BLDC GLUCOMTR-MCNC: 206 MG/DL (ref 70–99)
GLUCOSE BLDC GLUCOMTR-MCNC: 245 MG/DL (ref 70–99)

## 2022-03-27 PROCEDURE — 250N000013 HC RX MED GY IP 250 OP 250 PS 637: Performed by: HOSPITALIST

## 2022-03-27 PROCEDURE — 250N000013 HC RX MED GY IP 250 OP 250 PS 637: Performed by: PHYSICIAN ASSISTANT

## 2022-03-27 PROCEDURE — 99239 HOSP IP/OBS DSCHRG MGMT >30: CPT | Performed by: HOSPITALIST

## 2022-03-27 RX ORDER — ASPIRIN 325 MG
325 TABLET, DELAYED RELEASE (ENTERIC COATED) ORAL DAILY
Qty: 90 TABLET | Refills: 0 | Status: SHIPPED | OUTPATIENT
Start: 2022-03-27 | End: 2023-02-01

## 2022-03-27 RX ORDER — CLOPIDOGREL BISULFATE 75 MG/1
75 TABLET ORAL DAILY
Qty: 90 TABLET | Refills: 1 | Status: CANCELLED | OUTPATIENT
Start: 2022-03-27

## 2022-03-27 RX ORDER — INSULIN ASPART 100 [IU]/ML
15 INJECTION, SOLUTION INTRAVENOUS; SUBCUTANEOUS
Qty: 15 ML | Refills: 3 | Status: ON HOLD | COMMUNITY
Start: 2022-03-27 | End: 2022-04-08

## 2022-03-27 RX ORDER — CLOPIDOGREL BISULFATE 75 MG/1
75 TABLET ORAL DAILY
Qty: 90 TABLET | Refills: 1 | Status: SHIPPED | OUTPATIENT
Start: 2022-03-27 | End: 2022-07-31

## 2022-03-27 RX ORDER — LISINOPRIL 40 MG/1
20 TABLET ORAL DAILY
Qty: 15 TABLET | Refills: 1 | Status: ON HOLD | OUTPATIENT
Start: 2022-03-27 | End: 2022-04-08

## 2022-03-27 RX ADMIN — ESCITALOPRAM OXALATE 20 MG: 20 TABLET ORAL at 08:20

## 2022-03-27 RX ADMIN — ROSUVASTATIN CALCIUM 40 MG: 20 TABLET, FILM COATED ORAL at 08:20

## 2022-03-27 RX ADMIN — ASPIRIN 325 MG: 325 TABLET, COATED ORAL at 08:20

## 2022-03-27 RX ADMIN — INSULIN ASPART 3 UNITS: 100 INJECTION, SOLUTION INTRAVENOUS; SUBCUTANEOUS at 12:05

## 2022-03-27 RX ADMIN — LISINOPRIL 20 MG: 20 TABLET ORAL at 08:20

## 2022-03-27 RX ADMIN — CLOPIDOGREL BISULFATE 75 MG: 75 TABLET ORAL at 08:20

## 2022-03-27 ASSESSMENT — ACTIVITIES OF DAILY LIVING (ADL)
ADLS_ACUITY_SCORE: 5

## 2022-03-27 NOTE — PLAN OF CARE
Huseyin is here for aphasia related to additional strokes and L MCA stenosis.  A&Ox4 Neuros intact ex for WFD and slightly forgetful. VSS on RA. Tele: NSR. Denies pain.  BGM: 302, 206. Lantus increased to 55 U  Up with SBA.  Restarted on BP meds today. Discharge home when medically stable.

## 2022-03-27 NOTE — PROVIDER NOTIFICATION
MD Notification    Notified Person: MD    Notified Person Name: ALESHA Saini    Notification Date/Time: 3/27/22 13:06    Notification Interaction:FYI page unable to secure home healthcare for the patient, he was previously receiving outpatient therapy can we get a new order for outpatient therapy to include SLP/OT if appropriate.     Purpose of Notification:page regarding homecare and continued outpatient therapies with SLP/OT    Orders Received: No call back needed unless further requested.     Comments:

## 2022-03-27 NOTE — PLAN OF CARE
PT: New PT consult received, see PT deferral care plan note dated 3/26/22 for details.   Discussed with patient, patient's spouse and RN bedside regarding any questions/concerns from a mobility standpoint. Patient reports no concerns. Will complete PT order.

## 2022-03-27 NOTE — DISCHARGE INSTRUCTIONS
Your risk factors for stroke or TIA (transient ischemic attack):    Your Risk Factors Your Results Normal Ranges   High blood pressure BP Readings from Last 1 Encounters:   03/27/22 119/76    Less than 120/80   Cholesterol              Total Lab Results   Component Value Date    CHOL 224 03/16/2022    CHOL 214 09/28/2020      Less than 150    Triglycerides   Lab Results   Component Value Date    TRIG 275 03/16/2022    TRIG 380 09/28/2020    Less than 150   LDL Lab Results   Component Value Date     03/16/2022    LDL 96 09/28/2020       Less than 70   HDL Lab Results   Component Value Date    HDL 33 03/16/2022    HDL 42 09/28/2020            Greater than 40 (men)  Greater than 50 (women)   Diabetes Recent Labs   Lab 03/27/22  1153   *    Fasting blood glucose    Smoking/tobacco use  Quit smoking and tobacco   Overweight  Lose 1-2 pounds a week   Lack of exercise  30 minutes moderate activity each day   Other risk factors include carotid (neck) artery disease, atrial fibrillation and stress. You may be on new medicine to treat high blood pressure, cholesterol, diabetes or atrial fibrillation.    Understanding Stroke Booklet given to patient. Please refer to booklet for further information.    Stroke warning signs and symptoms - CALL 911 right away for:  - Sudden numbness or weakness in the face, arm or leg (often on one side of the body).  - Sudden confusion or trouble understanding what is going on.  - Sudden blurred or decreased vision in one or both eyes.  - Sudden trouble speaking, loss of balance, dizziness or problems with coordination.  - Sudden, severe headache for no reason.  - Fainting or seizures.  - Symptoms may go away then come back suddenly.

## 2022-03-27 NOTE — PLAN OF CARE
Goal Outcome Evaluation:    Plan of Care Reviewed With: patient     Overall Patient Progress: improving    Neuros stable. Word finding difficulty, receptive aphasia. Tele NSR. Denies pain. Up independently in room. Blood glucose covered with sliding scale insulin. Discharge instructions reviewed with patient and spouse, questions answered. Pt discharged home.

## 2022-03-27 NOTE — CONSULTS
Care Management Discharge Note    Discharge Date: 03/27/2022 possible awaiting discharge MD to round and make final discharge recommendations.       Discharge Disposition: Home, Home Care    Discharge Services: None    Discharge DME: None    Discharge Transportation: family or friend will provide    Private pay costs discussed: Not applicable    PAS Confirmation Code:  N/A  Patient/family educated on Medicare website which has current facility and service quality ratings: NO    Education Provided on the Discharge Plan:  yes  Persons Notified of Discharge Plans: patient, spouse at bedside, bedside RN and discharging provider  Patient/Family in Agreement with the Plan: yes    Handoff Referral Completed: Yes PCP    Additional Information:  Writer met with both patient and spouse at the bedside. Patient is A+Ox4.  Patient is most likely cleared for discharge to home with continued follow up recommendations.  Writer explained role and scope of safe discharge planning.  Per patient he would like to pursue home health care even though he is currently attending outpatient therapy for for acute ischemic CVA (per spouse only able to do the first assessment and appointment and has other appointments scheduled).  Writer explained homebound status and issues currently with homecare either not being able to obtain an agency or agency not being able to start until 1-1.5 weeks out.  Patient and spouse aware that if we are not able to secure homecare at discharge that we would recommend continued outpatient therapies and PCP follow up for close monitoring and continued recommendations.  Writer contacted the following homecare providers for referrals  1. [facility] unable to take due to capacity  2. [facility] Unable to take due to capacity  3. [facility] unable to take due to capacity.    Writer will update discharging provider and bedside that recommendation is for the patient to continue outpatient therapy plus add on SLP if not  "already receiving. Patients spouse is working from home and will coordinate appointments and transportation.    Patient and spouse would like the patient to have scheduled follow up with PCP working on an appointment for next week with dr. Wong later in the a.m. and not conflicting with outpatient therapy schedule.  ED/Hospital Follow Up with Kendal Wong MD  Friday Apr 1, 2022 9:45 AM  Please plan to arrive at the clinic at your \"Arrive By\" time for your appointment. Our late policy will be enforced based on this time.    Per notes from Neurology they have ordered repeat imaging and follow up with their teams and should be contacted for further details.     No further needs identified.         Iveth Ovalle RN        "

## 2022-03-27 NOTE — TELEPHONE ENCOUNTER
Reason for Call:  Other appointment    Detailed comments: patient needs an hospital follow for this week with PCP, please follow up with patient.     Phone Number Patient can be reached at: Home number on file [phone] (home)    Best Time: any    Can we leave a detailed message on this number? YES    Call taken on 3/27/2022 at 1:14 PM by Marta Marshall

## 2022-03-27 NOTE — DISCHARGE SUMMARY
[facility]  Hospitalist Discharge Summary      Date of Admission:  3/24/2022  Date of Discharge:  3/27/2022  Discharging Provider: Lisbet Saini MD  Discharge Service: Hospitalist Service    Discharge Diagnoses     Recurrent stroke    Follow-ups Needed After Discharge   Follow-up Appointments     Follow-up and recommended labs and tests       -Follow-up in 4 weeks with repeat MRI brain w/wo contrast and MRA brain   wo contrast (ordered)  -Follow-up in 5-6 weeks with stroke clinic LUNA (any stroke LUNA) at virtual   stroke clinic. Patient will be contacted by virtual stroke clinic team   after discharge. (ordered)  -Follow-up in 7-8 weeks with endovascular surgical neuroradiology at [facility]   Neurosurgery (referral sent)  -follow with PCP in 3-4 days         {Additional follow-up instructions/to-do's for PCP        Unresulted Labs Ordered in the Past 30 Days of this Admission     No orders found from 2/22/2022 to 3/25/2022.      These results will be followed up by     Discharge Disposition   Discharged to home  Condition at discharge: Stable        Hospital Course      This is a 66-year-old male with medical history which includes Recent ischemic CVA, HTN, HLP, DM2, Obesity, NA, BPH, MDD with anxiety, MDD with anxiety, Substance use who was recently admitted to the hospital on  3/16-3/18/2022 due to acute ischemic CVA when he presented with receptive/expressive aphasia.  MRI confirmed acute/early subacute infarction at the anterior aspect of the left temporal lobe.  Head/Neck MRA revealed mild-moderate narrowings throughout the left ICA, MCA and PCA vessels.  Patient was discharged home with DAPT.      He was being seen by his PCP and patient developed confusion and speech changes and in the ED his blood sugar was found to be significantly elevated at 397 Head CT w/o contrast with well-defined infarct in the left temporal lobe that appears similar to recent head CT 3/17/22 and without evidence  of hemorrhagic transformation of infarct and without acute intracranial hemorrhage.  Head/Neck CTA with persistent severe focal stenosis/near occlusion a the left MCA bifurcation (similar to previous study). Neurology was again consulted and MRI was done which showed MRI with evolving subacute ischemic infarct in the left temporal lobe again noted without change. Two new tiny recent white matter infarcts in the periatrial white matter of the left cerebral hemisphere.  He had mild elevation in creatinine and his sodium was low and he was started on IV fluids with improvement.  His insulin regimen was changed.  He was seen by speech therapy, physical therapy and Occupational Therapy and neurology recommended that patient be continued on aspirin and Plavix. Per neurology If in the future, there is evidence of recurrent ischemia within the L MCA territory despite adequate medical management (including well controlled glucose), then we will revisit the stenting option.     I did change his insulin regimen to 55 units lantus and 12 units aspart TID and he will need to follow with PCP closely . He does need further work as above with follow with neurology.      Consultations This Hospital Stay   SPEECH LANGUAGE PATH ADULT IP CONSULT  SMOKING CESSATION PROGRAM IP CONSULT  NEUROLOGY IP CONSULT  PHYSICAL THERAPY ADULT IP CONSULT  OCCUPATIONAL THERAPY ADULT IP CONSULT  DIABETES EDUCATION IP CONSULT  NUTRITION SERVICES ADULT IP CONSULT  PHYSICAL THERAPY ADULT IP CONSULT  CARE MANAGEMENT / SOCIAL WORK IP CONSULT    Code Status   Full Code    Time Spent on this Encounter   I, Lisbet Saini MD, personally saw the patient today and spent greater than 30 minutes discharging this patient.       Lisbet Saini MD  [facility] NEUROSCIENCE UNIT  [address] [place] [address]  Phone: [phone]  ______________________________________________________________________    Physical Exam   Vital Signs: Temp: 98  F  (36.7  C) Temp src: Oral BP: 119/76 Pulse: 63   Resp: 16 SpO2: 94 % O2 Device: None (Room air)    Weight: 236 lbs 15.91 oz        General: Patient appears comfortable and in no acute distress.  HEENT: Head is atraumatic, normocephalic.  Pupils are equal, round and reactive to light.  No scleral icterus. Oral mucosa is moist   Neck: Neck is supple and Lymphadenopathy   Respiratory: Lungs are clear to auscultation bilaterally with wheeze or crackles   Cardiovascular: Regular rate , S1 and S2 normal with no murmer or rubs or gallops  Abdomen:   soft , non tender , non distended and bowel sound present   Skin: No skin rashes or lesions to inspection or palpation.  Neurologic: Higher functions are within normal limits. No obvious defects in speech, language and memory. No facial droop. Difficulty with word finding  Musculoskeletal: Normal Range of motion over upper and lower extremities bilaterally   Psychiatric: cooperative        Primary Care Physician   Kendal Wong    Discharge Orders      MR Brain w/o & w Contrast    Call Estefania Laura (Spouse) [phone] for scheduling due to patient's receptive aphasia     CTA Head Neck with Contrast    Call Estefania Laura (Spouse) [phone] for scheduling due to patient's receptive aphasia     Neurosurgery Referral      Primary Care - Care Coordination Referral      Physical Therapy Referral      Occupational Therapy Referral      Speech Therapy Referral      Discharge Instructions    Please see your scheduled follow up with Dr. Wong on April 1st.    We were unable to secure homecare for discharge.  Please continue to attend your prior to admission outpatient therapy appointments and we will add on an additional services Speech Language and Occupation therapy as needed.    The Neurology team will call you with follow up imaging and appointments.  A message has been left to make sure that they call Estefania to confirm and schedule these appointments.     Reason for your  hospital stay    stroke     Follow-up and recommended labs and tests     -Follow-up in 4 weeks with repeat MRI brain w/wo contrast and MRA brain wo contrast (ordered)  -Follow-up in 5-6 weeks with stroke clinic LUNA (any stroke LUNA) at virtual stroke clinic. Patient will be contacted by virtual stroke clinic team after discharge. (ordered)  -Follow-up in 7-8 weeks with endovascular surgical neuroradiology at [facility] Neurosurgery (referral sent)  -follow with PCP in 3-4 days     Activity    Your activity upon discharge: activity as tolerated     Diet    Follow this diet upon discharge: Orders Placed This Encounter      Moderate Consistent Carb (60 g CHO per Meal) Diet       Significant Results and Procedures   Most Recent 3 CBC's:Recent Labs   Lab Test 03/25/22  0711 03/24/22  1438 03/17/22  0803   WBC 9.7 10.2 10.4   HGB 14.3 15.5 15.1   MCV 84 87 86    508* 457*     Most Recent 3 BMP's:Recent Labs   Lab Test 03/27/22  1153 03/27/22  0742 03/27/22  0155 03/26/22  0829 03/26/22  0649 03/25/22  1121 03/25/22  0711 03/24/22  1439 03/24/22  1438   NA  --   --   --   --  136  --  134  --  130*   POTASSIUM  --   --   --   --  3.9  --  4.2  --  4.4   CHLORIDE  --   --   --   --  106  --  105  --  99   CO2  --   --   --   --  26  --  24  --  25   BUN  --   --   --   --  11  --  16  --  19   CR  --   --   --   --  0.93  --  0.91  --  1.12   ANIONGAP  --   --   --   --  4  --  5  --  6   EVER  --   --   --   --  8.6  --  8.8  --  9.3   * 128* 206*   < > 129*   < > 201*   < > 413*    < > = values in this interval not displayed.   ,   Results for orders placed or performed during the hospital encounter of 03/24/22   CT Head w/o Contrast    Narrative    CT SCAN OF THE HEAD WITHOUT CONTRAST   3/24/2022 2:47 PM     HISTORY: Neurological deficit, acute, stroke suspected. Code Stroke.  Altered mental status.    TECHNIQUE: Axial images of the head and coronal reformations without  IV contrast material. Radiation dose for this  scan was reduced using  automated exposure control, adjustment of the mA and/or kV according  to patient size, or iterative reconstruction technique.    COMPARISON: Head CT 3/17/2022.    FINDINGS: Well-defined infarct in the left posterior temporal lobe  with loss of gray-white matter differentiation in that area. This is  similar to prior head CT 3/17/2022. No evidence of acute intracranial  hemorrhage. Mild regional edema in the area of infarct. No significant  midline shift or herniation. Ventricular size is within normal limits  without evidence of hydrocephalus.    The visualized portions of the sinuses and mastoids appear normal. The  bony calvarium and bones of the skull base appear intact.       Impression    IMPRESSION:     1. Well-defined infarct in the left temporal lobe that appears similar  to recent head CT 3/17/2022. No evidence of hemorrhagic transformation  of infarct.  2. No other acute intracranial hemorrhage. No midline shift or  herniation.  3. CT angiogram to follow.      VENKAT DEE MD         SYSTEM ID:  [id number]   CTA Head Neck with Contrast    Narrative    CT ANGIOGRAM OF THE HEAD AND NECK WITH CONTRAST  3/24/2022 3:02 PM     HISTORY: Code stroke. Altered mental status.    TECHNIQUE:  CT angiography with an injection of 71mL [id number]-[id number] IV  with scans through the head and neck. Images were transferred to a  separate 3-D workstation where multiplanar reformations and 3-D images  were created. Estimates of carotid stenoses are made relative to the  distal internal carotid artery diameters except as noted. Radiation  dose for this scan was reduced using automated exposure control,  adjustment of the mA and/or kV according to patient size, or iterative  reconstruction technique.    COMPARISON: CTA 3/17/2022.     CT HEAD FINDINGS: No contrast enhancing lesions. Cerebral blood flow  is grossly normal.     CT ANGIOGRAM HEAD FINDINGS:  The major intracranial arteries including  the proximal  branches of the anterior cerebral, middle cerebral, and  posterior cerebral arteries appear patent without vascular cutoff. No  aneurysm identified. Atherosclerotic calcifications of the cavernous  and supraclinoid carotid arteries left more so than right. There is at  least moderate stenosis of the left supraclinoid internal carotid  artery.    Severe focal stenosis/near occlusion at the left MCA bifurcation.  Appearance is similar to prior CTA 3/17/2022.    CT ANGIOGRAM NECK FINDINGS:   Normal origin of the great vessels from the aortic arch.     Right carotid artery: The right common and internal carotid arteries  are patent. No significant stenosis or atherosclerotic disease in the  carotid artery.     Left carotid artery: The left common and internal carotid arteries are  patent. No significant stenosis or atherosclerotic disease in the  carotid artery.     Vertebral arteries: Vertebral arteries are patent without evidence of  dissection. No significant stenosis.     Other findings: There are degenerative changes in the spine.       Impression    IMPRESSION:   1. Persistent severe focal stenosis/near occlusion at the left MCA  bifurcation. Appearance is similar to prior CTA 3/17/2022.  2. Known definite new vascular cutoff of the proximal major  intracranial arteries.  3. Atherosclerotic disease of the cavernous and supraclinoid internal  carotid arteries left greater than right. Again, appearance is similar  to 3/17/2022.  4. Patent arteries in the neck without evidence of dissection. No  significant stenosis or atherosclerotic disease of the arteries in the  neck.    Results discussed with Jones Fournier at 3:09 PM on 3/24/2022.     VENKAT DEE MD         SYSTEM ID:  [id number]   MR Brain w/o & w Contrast    Narrative    MRI OF THE BRAIN WITHOUT AND WITH CONTRAST 3/24/2022 4:51 PM     COMPARISON: Brain MR 3/16/2022, head CT 3/24/2022    HISTORY:  Stroke, follow up     TECHNIQUE: Multi-sequence, multi-planar MRI  images of the brain were  acquired before and after the administration of IV gadolinium (10mL  Gadavist).    FINDINGS: Ischemic infarct in the anterior aspect of the left temporal  lobe is again noted without change. There has been interval  development of 2 tiny recent ischemic infarcts in the periatrial white  matter of the left cerebral hemisphere. No other new infarcts noted.    The ventricles and basal cisterns are normal in configuration. There  is no midline shift. There are no extra-axial fluid collections.  Gray-white differentiation is well maintained. There is no evidence  for acute intracranial hemorrhage.     There has been interval development of contrast enhancement in the  brain parenchyma the site of the left temporal lobe infarct which is  normal for a subacute infarct. There is no other abnormal contrast  enhancement in the brain or its coverings.    There is no sinusitis or mastoiditis.      Impression    IMPRESSION:  1. Evolving subacute ischemic infarct in the left temporal lobe again  noted without change.  2. 2 new tiny recent white matter infarcts in the periatrial white  matter of the left cerebral hemisphere.  3. No other new infarcts.  4. Otherwise, normal brain MRI.    JAMIL PENA MD         SYSTEM ID:  [id number]       Discharge Medications   Current Discharge Medication List      CONTINUE these medications which have CHANGED    Details   aspirin (ASA) 325 MG EC tablet Take 1 tablet (325 mg) by mouth daily  Qty: 90 tablet, Refills: 0    Associated Diagnoses: Cerebrovascular accident (CVA), unspecified mechanism (H)      clopidogrel (PLAVIX) 75 MG tablet Take 1 tablet (75 mg) by mouth daily  Qty: 90 tablet, Refills: 1    Associated Diagnoses: Cerebrovascular accident (CVA), unspecified mechanism (H)      insulin aspart (NOVOLOG FLEXPEN) 100 UNIT/ML pen Inject 12 Units Subcutaneous 3 times daily (with meals)  Qty: 15 mL, Refills: 3    Associated Diagnoses: Type 2 diabetes mellitus with  diabetic nephropathy, with long-term current use of insulin (H)      lisinopril (ZESTRIL) 40 MG tablet Take 0.5 tablets (20 mg) by mouth daily  Qty: 15 tablet, Refills: 1    Associated Diagnoses: Microalbuminuria         CONTINUE these medications which have NOT CHANGED    Details   escitalopram (LEXAPRO) 20 MG tablet Take 1 tablet (20 mg) by mouth daily  Qty: 90 tablet, Refills: 3    Associated Diagnoses: Mixed anxiety depressive disorder      insulin glargine (LANTUS SOLOSTAR) 100 UNIT/ML pen INJ 55 UNITS SC HS  Qty: 10 mL, Refills: 9    Comments: If Lantus is not covered by insurance, may substitute Basaglar at same dose and frequency.    Associated Diagnoses: Type 2 diabetes mellitus with diabetic nephropathy, with long-term current use of insulin (H)      rosuvastatin (CRESTOR) 40 MG tablet Take 1 tablet (40 mg) by mouth daily  Qty: 90 tablet, Refills: 3    Associated Diagnoses: Hyperlipidemia LDL goal <100      alcohol swab prep pads Use to swab area of injection/ian as directed  Qty: 100 each, Refills: 3    Associated Diagnoses: Type 2 diabetes mellitus with diabetic nephropathy, with long-term current use of insulin (H)      blood glucose (NO BRAND SPECIFIED) test strip Use to test blood sugar 3 times daily or as directed. To accompany: Blood Glucose Monitor Brands: per insurance.  Qty: 300 strip, Refills: 1    Associated Diagnoses: Type 2 diabetes mellitus with diabetic nephropathy, with long-term current use of insulin (H)      blood glucose calibration (NO BRAND SPECIFIED) solution Use to calibrate blood glucose monitor as needed as directed. To accompany: Blood Glucose Monitor Brands: per insurance.  Qty: 1 Bottle, Refills: 3    Associated Diagnoses: Type 2 diabetes mellitus with diabetic nephropathy, with long-term current use of insulin (H)      clotrimazole (LOTRIMIN) 1 % external cream Apply topically 2 times daily  Qty: 30 g, Refills: 11    Associated Diagnoses: Candidal balanitis      insulin pen  needle (BD MARIE U/F) 32G X 4 MM miscellaneous USE THREE PEN NEEDLES DAILY AS DIRECTED  Qty: 300 each, Refills: 1    Associated Diagnoses: Type 2 diabetes mellitus with diabetic nephropathy, with long-term current use of insulin (H)      ORDER FOR DME Auto-CPAP: Max 12 cm H2O Min 10 cm H2O  Continuous Lifetime need and heated humidity.     Qty: 1 Device, Refills: 0    Associated Diagnoses: Obstructive sleep apnea (adult) (pediatric)      sildenafil (REVATIO) 20 MG tablet Take 1 tablet (20 mg) by mouth 3 times daily  Qty: 30 tablet, Refills: 11    Associated Diagnoses: Erectile dysfunction, unspecified erectile dysfunction type      thin (NO BRAND SPECIFIED) lancets Use to test blood sugar 3 times daily or as directed. To accompany: Blood Glucose Monitor Brands: per insurance.  Qty: 200 each, Refills: 6    Associated Diagnoses: Type 2 diabetes mellitus with diabetic nephropathy, with long-term current use of insulin (H)         STOP taking these medications       diltiazem ER COATED BEADS (CARDIZEM CD) 240 MG 24 hr capsule Comments:   Reason for Stopping:         hydrochlorothiazide (MICROZIDE) 12.5 MG capsule Comments:   Reason for Stopping:             Allergies   Allergies   Allergen Reactions     Augmentin Diarrhea     Sulfa Drugs      Unknown - reaction occurred as a child     Victoza      Skin rash     Xanax      Difficult to wean off

## 2022-03-28 ENCOUNTER — PATIENT OUTREACH (OUTPATIENT)
Dept: NURSING | Facility: CLINIC | Age: 67
End: 2022-03-28
Payer: COMMERCIAL

## 2022-03-28 ENCOUNTER — TELEPHONE (OUTPATIENT)
Dept: NEUROLOGY | Facility: CLINIC | Age: 67
End: 2022-03-28

## 2022-03-28 NOTE — TELEPHONE ENCOUNTER
----- Message from Minerva Nieves PA-C sent at 3/25/2022  2:24 PM CDT -----  Regarding: Hospital Follow-up  Stroke Hospital Follow Up    Please schedule the patient for hospital follow up with: - in 5-6 weeks with stroke clinic LUNA (any stroke LUNA) at [facility] stroke clinic    Diagnosis: Stroke follow-up AFTER repeat MRI/MRA    Contact: family member: patient's wife due to receptive aphasia    For any questions, or if this time frame does not work, please write to P STROKE LUNA    Thank you    Minerva Nieves PA-C

## 2022-03-28 NOTE — TELEPHONE ENCOUNTER
Pt was recommended to complete both MRI and CTA prior to follow up appt. Please either get MRI rescheduled or move LUNA appt to after.     Mary Vital BS, RN, SCRN  RN Stroke Neurology Care Coordinator  [facility] Neuroscience Service Line

## 2022-03-28 NOTE — TELEPHONE ENCOUNTER
Spoke to Estefania, scheduled and confirmed virtual follow up appt, and gave imagine scheduling line.    SEAN CONTRERAS, CMA

## 2022-03-28 NOTE — TELEPHONE ENCOUNTER
Chart reviewed, patient has been scheduled for imaging     MR Brain 4/26/22   Stroke LUNA visit- 5/5/22 (confirmed)  CTA HEAD and Neck 5/18/22 (previously scheduled)    Please review timeline to ensure follow up plan is appropriate or if any changes are needed.     SEAN CONTRERAS, CMA

## 2022-03-28 NOTE — LETTER
[facility] CARE COORDINATION  [address]  [place] [address]      March 28, 2022        [address]  [address] [place] [address]      Dear [place]useyin,    I am a clinic community health worker who works with Kendal Wong MD at [facility]. I wanted to thank you for spending the time to talk with me.  Below is a description of clinic care coordination and how I can further assist you.      The clinic care coordination team is made up of a registered nurse,  and community health worker who understand the health care system. The goal of clinic care coordination is to help you manage your health and improve access to the health care system in the most efficient manner. The team can assist you in meeting your health care goals by providing education, coordinating services, strengthening the communication among your providers and supporting you with any resource needs.    Please feel free to contact me at [phone] with any questions or concerns. We are focused on providing you with the highest-quality healthcare experience possible and that all starts with you.     Sincerely,     PENELOPE Campbell  Clinic Care Coordination  [facility]: [place] [place] [place] [place] [facility]  Phone: [phone]

## 2022-03-28 NOTE — PROGRESS NOTES
Clinic Care Coordination Contact  Community Health Worker Initial Outreach    Spoke with Patient's wife, Estefania.    CHW Initial Information Gathering:  Referral Source: IP Handoff    Reason for Referral:  Care Transition   elevated readmission risk     CHW introduced self, gave contact info, and offered the CC program.    Patient's wife stated they may be interested in CC in a few weeks / month.  Patient's wife stated her  has PT, OT, and Speech therapy.     Patient accepts CC: No, Enrolled in home care. Patient will be sent Care Coordination introduction letter for future reference.     Plan: Care Coordinator will send care coordination introduction letter with care coordinator contact information and explanation of care coordination services via mail. Care Coordinator will do no further outreaches at this time.    PENELOPE Campbell  Clinic Care Coordination  [facility] Clinics: [facility]  Phone: [phone]      [facility]: Post-Discharge Note  SITUATION                                                      Admission:    Admission Date: 03/24/22   Reason for Admission: TIA (Transient Ischemic Attack)  Discharge:   Discharge Date: 03/27/22    BACKGROUND                                                      Per hospital discharge summary and inpatient provider notes:    This is a 66-year-old male with medical history which includes Recent ischemic CVA, HTN, HLP, DM2, Obesity, NA, BPH, MDD with anxiety, MDD with anxiety, Substance use who was recently admitted to the hospital on  3/16-3/18/2022 due to acute ischemic CVA when he presented with receptive/expressive aphasia.  MRI confirmed acute/early subacute infarction at the anterior aspect of the left temporal lobe.  Head/Neck MRA revealed mild-moderate narrowings throughout the left ICA, MCA and PCA vessels.  Patient was discharged home with DAPT.       He was being seen by his PCP and patient developed  confusion and speech changes and in the ED his blood sugar was found to be significantly elevated at 397 Head CT w/o contrast with well-defined infarct in the left temporal lobe that appears similar to recent head CT 3/17/22 and without evidence of hemorrhagic transformation of infarct and without acute intracranial hemorrhage.  Head/Neck CTA with persistent severe focal stenosis/near occlusion a the left MCA bifurcation (similar to previous study). Neurology was again consulted and MRI was done which showed MRI with evolving subacute ischemic infarct in the left temporal lobe again noted without change. Two new tiny recent white matter infarcts in the periatrial white matter of the left cerebral hemisphere.  He had mild elevation in creatinine and his sodium was low and he was started on IV fluids with improvement.  His insulin regimen was changed.  He was seen by speech therapy, physical therapy and Occupational Therapy and neurology recommended that patient be continued on aspirin and Plavix. Per neurology If in the future, there is evidence of recurrent ischemia within the L MCA territory despite adequate medical management (including well controlled glucose), then we will revisit the stenting option.      I did change his insulin regimen to 55 units lantus and 12 units aspart TID and he will need to follow with PCP closely . He does need further work as above with follow with neurology.       ASSESSMENT      Enrollment  Primary Care Care Coordination Status: Declined    Discharge Assessment  How are your symptoms? (Red Flag symptoms escalate to triage hotline per guidelines): Unchanged  Do you feel your condition is stable enough to be safe at home until your provider visit?: Yes  Does the patient have their discharge instructions? : Yes  Does the patient have questions regarding their discharge instructions? : No  Were you started on any new medications or were there changes to any of your previous  medications? : Yes  Does the patient have all of their medications?: Yes  Do you have questions regarding any of your medications? : No  Do you have all of your needed medical supplies or equipment (DME)?  (i.e. oxygen tank, CPAP, cane, etc.): Yes  Discharge follow-up appointment scheduled within 14 calendar days? : Yes  Discharge Follow Up Appointment Date: 04/01/22  Discharge Follow Up Appointment Scheduled with?: Primary Care Provider      PLAN                                                        Future Appointments   Date Time Provider Department Center   4/1/2022 10:00 AM Kendal Wong MD [facility]    4/4/2022  9:30 AM WitCristina brooke, OT [facility] [address]   4/13/2022  8:00 AM Shea Moore, OT [facility] [address]   4/14/2022  9:00 AM Tania Gagnon, PharmD [facility]   4/19/2022  1:30 PM Deborah Peñaloza, OT [facility] [address]   4/26/2022 10:30 AM [facility]   4/26/2022  3:45 PM Witta, Cristina, OT [facility] [address]   5/3/2022  3:45 PM Witta, Cristina, OT [facility] [address]   5/5/2022  1:00 PM  NEUROLOGY [facility]    5/10/2022  3:00 PM Witta, Cristina, OT [facility] [address]   5/17/2022  3:00 PM Witta, Cristina, OT [facility] [address]   5/18/2022 10:20 AM SHCT1 [facility]   5/24/2022 10:15 AM Witta, Cristina, OT [facility] [address]   5/31/2022 10:15 AM Witta, Cristina, OT [facility] [address]   6/7/2022 10:15 AM Witta, Cristina, OT [facility] [address]   6/14/2022 10:00 AM Witta, Cristina, OT [facility] [address]   6/21/2022 10:15 AM Witta, Cristina, OT [facility] [address]   6/28/2022 10:15 AM Witta, Cristina, OT [facility] [address]         For any urgent concerns, please contact our 24 hour nurse triage line: [phone] ([phone])         Ana Loyola MA

## 2022-04-01 ENCOUNTER — OFFICE VISIT (OUTPATIENT)
Dept: FAMILY MEDICINE | Facility: CLINIC | Age: 67
End: 2022-04-01
Payer: COMMERCIAL

## 2022-04-01 VITALS
HEIGHT: 70 IN | RESPIRATION RATE: 16 BRPM | BODY MASS INDEX: 33.5 KG/M2 | WEIGHT: 234 LBS | SYSTOLIC BLOOD PRESSURE: 132 MMHG | HEART RATE: 69 BPM | OXYGEN SATURATION: 98 % | TEMPERATURE: 97.6 F | DIASTOLIC BLOOD PRESSURE: 81 MMHG

## 2022-04-01 DIAGNOSIS — I63.9 CEREBROVASCULAR ACCIDENT (CVA), UNSPECIFIED MECHANISM (H): Primary | ICD-10-CM

## 2022-04-01 DIAGNOSIS — I69.322 DYSARTHRIA DUE TO RECENT STROKE: ICD-10-CM

## 2022-04-01 DIAGNOSIS — I10 ESSENTIAL HYPERTENSION: ICD-10-CM

## 2022-04-01 DIAGNOSIS — E78.5 HYPERLIPIDEMIA LDL GOAL <100: ICD-10-CM

## 2022-04-01 DIAGNOSIS — F40.240 CLAUSTROPHOBIA: ICD-10-CM

## 2022-04-01 DIAGNOSIS — F41.8 MIXED ANXIETY DEPRESSIVE DISORDER: ICD-10-CM

## 2022-04-01 DIAGNOSIS — Z79.4 TYPE 2 DIABETES MELLITUS WITH DIABETIC NEPHROPATHY, WITH LONG-TERM CURRENT USE OF INSULIN (H): ICD-10-CM

## 2022-04-01 DIAGNOSIS — N18.1 CHRONIC KIDNEY DISEASE, STAGE 1: ICD-10-CM

## 2022-04-01 DIAGNOSIS — E11.21 TYPE 2 DIABETES MELLITUS WITH DIABETIC NEPHROPATHY, WITH LONG-TERM CURRENT USE OF INSULIN (H): ICD-10-CM

## 2022-04-01 DIAGNOSIS — E66.01 MORBID OBESITY (H): ICD-10-CM

## 2022-04-01 PROCEDURE — 99495 TRANSJ CARE MGMT MOD F2F 14D: CPT | Performed by: INTERNAL MEDICINE

## 2022-04-01 RX ORDER — LORAZEPAM 0.5 MG/1
0.5 TABLET ORAL
Qty: 3 TABLET | Refills: 1 | Status: SHIPPED | OUTPATIENT
Start: 2022-04-01 | End: 2022-05-31

## 2022-04-01 ASSESSMENT — PAIN SCALES - GENERAL: PAINLEVEL: NO PAIN (0)

## 2022-04-01 NOTE — PROGRESS NOTES
Ashley Fontanez is a 66 year old who presents for the following health issues  accompanied by his spouse.    HPI     Post Discharge Outreach 3/28/2022   Admission Date 3/24/2022   Reason for Admission TIA (Transient Ischemic Attack)   Discharge Date 3/27/2022   How are your symptoms? (Red Flag symptoms escalate to triage hotline per guidelines) Unchanged   Do you feel your condition is stable enough to be safe at home until your provider visit? Yes   Does the patient have their discharge instructions?  Yes   Does the patient have questions regarding their discharge instructions?  No   Were you started on any new medications or were there changes to any of your previous medications?  Yes   Does the patient have all of their medications? Yes   Do you have questions regarding any of your medications?  No   Do you have all of your needed medical supplies or equipment (DME)?  (i.e. oxygen tank, CPAP, cane, etc.) Yes   Discharge follow-up appointment scheduled within 14 calendar days?  Yes   Discharge Follow Up Appointment Date 4/1/2022   Discharge Follow Up Appointment Scheduled with? Primary Care Provider     Hospital Follow-up Visit:    Hospital: [facility]  Date of Admission: 3/24/22  Date of Discharge: 3/27/22  Reason(s) for Admission:      Recurrent stroke      Was your hospitalization related to COVID-19? No   Problems taking medications regularly:  None  Medication changes since discharge: None  Problems adhering to non-medication therapy:  None    Summary of hospitalization:  [facility] discharge summary reviewed  Diagnostic Tests/Treatments reviewed.  Follow up needed: neurology  Other Healthcare Providers Involved in Patient s Care:         Physical Therapy, speech therapy, occupational therapy  Update since discharge: stable.   Post Discharge Medication Reconciliation: discharge medications reconciled and changed, per note/orders.  Plan of care communicated with  patient and family              Subjective   Huseyin is a 66 year old who presents for the following health issues  accompanied by his spouse.    HPI     Post hospital discharge follow up of recent CVA    No headaches, No chest pain, headaches, fever or chills at this time    Patient needs neurology referral to establish outpatient follow up of recent CVA    Patient needs ongoing speech therapy, occupation therapy for dysarthria symptoms since CVA      Diabetes Follow-up    How often are you checking your blood sugar? Two times daily  Blood sugar testing frequency justification:  Uncontrolled diabetes  What time of day are you checking your blood sugars (select all that apply)?  Before meals  Have you had any blood sugars above 200?  Yes   Have you had any blood sugars below 70?  No    What symptoms do you notice when your blood sugar is low?  None    What concerns do you have today about your diabetes? Elevated BS     Do you have any of these symptoms? (Select all that apply)  Thirsty, weight loss ( 7lbs)      BP Readings from Last 2 Encounters:   04/01/22 132/81   03/27/22 119/76     Hemoglobin A1C POCT (%)   Date Value   09/28/2020 13.2 (H)   09/23/2019 12.3 (H)     Hemoglobin A1C (%)   Date Value   03/16/2022 11.8 (H)   10/11/2021 11.8 (H)     LDL Cholesterol Calculated (mg/dL)   Date Value   03/16/2022 136 (H)   10/11/2021 116 (H)   09/28/2020 96   09/23/2019     Cannot estimate LDL when triglyceride exceeds 400 mg/dL     LDL Cholesterol Direct (mg/dL)   Date Value   09/23/2019 127 (H)       Current Medications:     Current Outpatient Medications   Medication Sig Dispense Refill     LORazepam (ATIVAN) 0.5 MG tablet Take 1 tablet (0.5 mg) by mouth once as needed for anxiety 3 tablet 1     alcohol swab prep pads Use to swab area of injection/ian as directed 100 each 3     aspirin (ASA) 325 MG EC tablet Take 1 tablet (325 mg) by mouth daily 90 tablet 0     blood glucose (NO BRAND SPECIFIED) test strip Use to test  blood sugar 3 times daily or as directed. To accompany: Blood Glucose Monitor Brands: per insurance. 300 strip 1     blood glucose calibration (NO BRAND SPECIFIED) solution Use to calibrate blood glucose monitor as needed as directed. To accompany: Blood Glucose Monitor Brands: per insurance. 1 Bottle 3     clopidogrel (PLAVIX) 75 MG tablet Take 1 tablet (75 mg) by mouth daily 90 tablet 1     clotrimazole (LOTRIMIN) 1 % external cream Apply topically 2 times daily (Patient taking differently: Apply topically 2 times daily as needed ) 30 g 11     escitalopram (LEXAPRO) 20 MG tablet Take 1 tablet (20 mg) by mouth daily 90 tablet 3     insulin aspart (NOVOLOG FLEXPEN) 100 UNIT/ML pen Inject 12 Units Subcutaneous 3 times daily (with meals) 15 mL 3     insulin glargine (LANTUS SOLOSTAR) 100 UNIT/ML pen INJ 55 UNITS SC HS 10 mL 9     insulin pen needle (BD MARIE U/F) 32G X 4 MM miscellaneous USE THREE PEN NEEDLES DAILY AS DIRECTED 300 each 1     lisinopril (ZESTRIL) 40 MG tablet Take 0.5 tablets (20 mg) by mouth daily 15 tablet 1     ORDER FOR DME Auto-CPAP: Max 12 cm H2O Min 10 cm H2O  Continuous Lifetime need and heated humidity.    1 Device 0     rosuvastatin (CRESTOR) 40 MG tablet Take 1 tablet (40 mg) by mouth daily 90 tablet 3     sildenafil (REVATIO) 20 MG tablet Take 1 tablet (20 mg) by mouth 3 times daily (Patient not taking: Reported on 4/1/2022) 30 tablet 11     thin (NO BRAND SPECIFIED) lancets Use to test blood sugar 3 times daily or as directed. To accompany: Blood Glucose Monitor Brands: per insurance. 200 each 6         Allergies:      Allergies   Allergen Reactions     Augmentin Diarrhea     Sulfa Drugs      Unknown - reaction occurred as a child     Victoza      Skin rash            Past Medical History:     Past Medical History:   Diagnosis Date     Anxiety      Essential hypertension, benign      Hypercholesteremia      Sleep apnea      T2DM (type 2 diabetes mellitus) (H)          Past Surgical History:  "    Past Surgical History:   Procedure Laterality Date     COLONOSCOPY       COLONOSCOPY N/A 10/26/2020    Procedure: COLONOSCOPY;  Surgeon: Randy Gonzalez MD;  Location:  GI     ENT SURGERY      R) ear \"procedure\"         Family Medical History:     Family History   Adopted: Yes   Problem Relation Age of Onset     Diabetes Mother      Respiratory Mother         asthma     Lipids Mother      Arthritis Mother         knee replacement     Alzheimer Disease Mother      Cerebrovascular Disease Brother      C.A.D. No family hx of      Cancer - colorectal No family hx of      Prostate Cancer No family hx of          Social History:     Social History     Socioeconomic History     Marital status:      Spouse name: Estefania Laura     Number of children: 1     Years of education: Not on file     Highest education level: Not on file   Occupational History     Not on file   Tobacco Use     Smoking status: Never Smoker     Smokeless tobacco: Never Used   Substance and Sexual Activity     Alcohol use: Yes     Alcohol/week: 0.0 standard drinks     Comment: 3-6 drinks on occ weekend night, occ drink on weekday     Drug use: Yes     Types: Cocaine     Sexual activity: Yes     Partners: Female   Other Topics Concern      Service No     Blood Transfusions No     Caffeine Concern Not Asked     Occupational Exposure Not Asked     Hobby Hazards Not Asked     Sleep Concern Not Asked     Stress Concern Not Asked     Weight Concern Not Asked     Special Diet Not Asked     Back Care Not Asked     Exercise Not Asked     Bike Helmet Not Asked     Seat Belt Not Asked     Self-Exams Not Asked     Parent/sibling w/ CABG, MI or angioplasty before 65F 55M? Not Asked   Social History Narrative     Not on file     Social Determinants of Health     Financial Resource Strain: Not on file   Food Insecurity: Not on file   Transportation Needs: Not on file   Physical Activity: Not on file   Stress: Not on file   Social " "Connections: Not on file   Intimate Partner Violence: Not on file   Housing Stability: Not on file           Review of System:     Constitutional: Negative for fever or chills  Skin: Negative for rashes  Ears/Nose/Throat: Negative for nasal congestion, sore throat  Respiratory: No shortness of breath, dyspnea on exertion, cough, or hemoptysis  Cardiovascular: Negative for chest pain  Gastrointestinal: Negative for nausea, vomiting  Genitourinary: Negative for dysuria, hematuria  Musculoskeletal: Negative for myalgias  Neurologic: Negative for headaches, positive for recent CVA with dysarthria symptoms  Psychiatric: positive for anxiety  Hematologic/Lymphatic/Immunologic: Negative  Endocrine: Negative  Behavioral: Negative for tobacco use       Physical Exam:   /81 (BP Location: Right arm, Patient Position: Sitting, Cuff Size: Adult Large)   Pulse 69   Temp 97.6  F (36.4  C) (Temporal)   Resp 16   Ht 1.778 m (5' 10\")   Wt 106.1 kg (234 lb)   SpO2 98%   BMI 33.58 kg/m      GENERAL: alert and no distress  EYES: eyes grossly normal to inspection, and conjunctivae and sclerae normal  HENT: Normocephalic atraumatic. Nose and mouth without ulcers or lesions  NECK: supple  RESP: lungs clear to auscultation   CV: regular rate and rhythm, normal S1 S2  LYMPH: no peripheral edema   ABDOMEN: nondistended  MS: no gross musculoskeletal defects noted  SKIN: no suspicious lesions or rashes  NEURO: Alert & Oriented x 3. Dysarthria symptoms noted  PSYCH: mentation appears normal, affect normal        Diagnostic Test Results:     Diagnostic Test Results:  Labs reviewed in Epic    Hemoglobin A1C POCT   Date Value Ref Range Status   09/28/2020 13.2 (H) 0 - 5.6 % Final     Comment:     Normal <5.7% Prediabetes 5.7-6.4%  Diabetes 6.5% or higher - adopted from ADA   consensus guidelines.  Reviewed: OK with previous       Hemoglobin A1C   Date Value Ref Range Status   03/16/2022 11.8 (H) 0.0 - 5.6 % Final     Comment:     " Normal <5.7%   Prediabetes 5.7-6.4%    Diabetes 6.5% or higher     Note: Adopted from ADA consensus guidelines.       ASSESSMENT/PLAN:     (I69.322) Dysarthria due to recent stroke  (I63.9) Cerebrovascular accident (CVA), unspecified mechanism (H)  (primary encounter diagnosis)  Comment: post hospital discharge follow up of recent CVA with dysarthria symptoms, no acute headaches at this time.   Plan: clopidogrel (PLAVIX) 75 MG tablet, Adult         Neurology  Referral, DISCONTINUED:         clopidogrel (PLAVIX) 75 MG tablet      (E66.01) Morbid obesity (H)  (E11.21,  Z79.4) Type 2 diabetes mellitus with diabetic nephropathy, with long-term current use of insulin (H) (primary encounter diagnosis)  Comment: no hypoglycemia events recently  Plan: continue current insulin therapy  Med Therapy Management Referral  Diet, exercise    (E78.5) Hyperlipidemia LDL goal <100  Comment: stable.   Plan: rosuvastatin (CRESTOR) 40 MG tablet    (N18.1) Chronic kidney disease, stage 1  (R80.9) Microalbuminuria  (I10) Essential hypertension, benign  Comment: BP is at goal  Plan: diltiazem ER COATED BEADS (CARDIZEM CD) 240 MG         24 hr capsule  lisinopril (ZESTRIL) 40 MG tablet      (F41.8) Mixed anxiety depressive disorder  Comment: stable.  Plan: escitalopram (LEXAPRO) 20 MG tablet      (F40.240) Claustrophobia  (F41.8) Mixed anxiety depressive disorder  (primary encounter diagnosis)  Comment: patient needs low dose Ativan for upcoming MRI scan   Plan: LORazepam (ATIVAN) 0.5 MG tablet           Follow Up Plan:     Patient is instructed to return to Internal Medicine clinic for follow-up visit in 1 month.        Kendal Wong MD  Internal Medicine  [facility]

## 2022-04-04 ENCOUNTER — HOSPITAL ENCOUNTER (OUTPATIENT)
Dept: OCCUPATIONAL THERAPY | Facility: CLINIC | Age: 67
Discharge: HOME | End: 2022-04-04
Payer: COMMERCIAL

## 2022-04-04 DIAGNOSIS — Z78.9 IMPAIRED INSTRUMENTAL ACTIVITIES OF DAILY LIVING (IADL): ICD-10-CM

## 2022-04-04 DIAGNOSIS — I63.9 CEREBROVASCULAR ACCIDENT (CVA), UNSPECIFIED MECHANISM (H): Primary | ICD-10-CM

## 2022-04-04 PROCEDURE — 97535 SELF CARE MNGMENT TRAINING: CPT | Mod: GO | Performed by: OCCUPATIONAL THERAPIST

## 2022-04-05 ENCOUNTER — APPOINTMENT (OUTPATIENT)
Dept: MRI IMAGING | Facility: CLINIC | Age: 67
DRG: 065 | End: 2022-04-05
Attending: EMERGENCY MEDICINE
Payer: COMMERCIAL

## 2022-04-05 ENCOUNTER — HOSPITAL ENCOUNTER (OUTPATIENT)
Dept: SPEECH THERAPY | Facility: CLINIC | Age: 67
Discharge: HOME | End: 2022-04-05
Attending: HOSPITALIST
Payer: COMMERCIAL

## 2022-04-05 ENCOUNTER — HOSPITAL ENCOUNTER (INPATIENT)
Facility: CLINIC | Age: 67
LOS: 3 days | Discharge: ANOTHER HOSPITAL | DRG: 065 | End: 2022-04-08
Attending: EMERGENCY MEDICINE | Admitting: INTERNAL MEDICINE
Payer: COMMERCIAL

## 2022-04-05 ENCOUNTER — APPOINTMENT (OUTPATIENT)
Dept: CT IMAGING | Facility: CLINIC | Age: 67
DRG: 065 | End: 2022-04-05
Attending: EMERGENCY MEDICINE
Payer: COMMERCIAL

## 2022-04-05 DIAGNOSIS — Z79.4 TYPE 2 DIABETES MELLITUS WITH DIABETIC NEPHROPATHY, WITH LONG-TERM CURRENT USE OF INSULIN (H): ICD-10-CM

## 2022-04-05 DIAGNOSIS — I63.9 CEREBROVASCULAR ACCIDENT (CVA), UNSPECIFIED MECHANISM (H): ICD-10-CM

## 2022-04-05 DIAGNOSIS — I69.322 DYSARTHRIA DUE TO RECENT STROKE: ICD-10-CM

## 2022-04-05 DIAGNOSIS — E11.21 TYPE 2 DIABETES MELLITUS WITH DIABETIC NEPHROPATHY, WITH LONG-TERM CURRENT USE OF INSULIN (H): ICD-10-CM

## 2022-04-05 LAB
ANION GAP SERPL CALCULATED.3IONS-SCNC: 7 MMOL/L (ref 3–14)
BASOPHILS # BLD AUTO: 0.1 10E3/UL (ref 0–0.2)
BASOPHILS NFR BLD AUTO: 1 %
BUN SERPL-MCNC: 15 MG/DL (ref 7–30)
CALCIUM SERPL-MCNC: 9.7 MG/DL (ref 8.5–10.1)
CHLORIDE BLD-SCNC: 100 MMOL/L (ref 94–109)
CO2 SERPL-SCNC: 26 MMOL/L (ref 20–32)
CREAT SERPL-MCNC: 1.16 MG/DL (ref 0.66–1.25)
EOSINOPHIL # BLD AUTO: 0.3 10E3/UL (ref 0–0.7)
EOSINOPHIL NFR BLD AUTO: 3 %
ERYTHROCYTE [DISTWIDTH] IN BLOOD BY AUTOMATED COUNT: 11.9 % (ref 10–15)
GFR SERPL CREATININE-BSD FRML MDRD: 69 ML/MIN/1.73M2
GLUCOSE BLD-MCNC: 214 MG/DL (ref 70–99)
GLUCOSE BLDC GLUCOMTR-MCNC: 203 MG/DL (ref 70–99)
GLUCOSE BLDC GLUCOMTR-MCNC: 223 MG/DL (ref 70–99)
HCT VFR BLD AUTO: 46.6 % (ref 40–53)
HGB BLD-MCNC: 15.3 G/DL (ref 13.3–17.7)
HOLD SPECIMEN: NORMAL
HOLD SPECIMEN: NORMAL
IMM GRANULOCYTES # BLD: 0 10E3/UL
IMM GRANULOCYTES NFR BLD: 0 %
LYMPHOCYTES # BLD AUTO: 1.2 10E3/UL (ref 0.8–5.3)
LYMPHOCYTES NFR BLD AUTO: 14 %
MCH RBC QN AUTO: 28.4 PG (ref 26.5–33)
MCHC RBC AUTO-ENTMCNC: 32.8 G/DL (ref 31.5–36.5)
MCV RBC AUTO: 87 FL (ref 78–100)
MONOCYTES # BLD AUTO: 0.9 10E3/UL (ref 0–1.3)
MONOCYTES NFR BLD AUTO: 11 %
NEUTROPHILS # BLD AUTO: 6.1 10E3/UL (ref 1.6–8.3)
NEUTROPHILS NFR BLD AUTO: 71 %
NRBC # BLD AUTO: 0 10E3/UL
NRBC BLD AUTO-RTO: 0 /100
PA AA BLD-ACNC: 377 ARU
PA ADP BLD-ACNC: 152 PRU
PLATELET # BLD AUTO: 425 10E3/UL (ref 150–450)
POTASSIUM BLD-SCNC: 4.1 MMOL/L (ref 3.4–5.3)
RBC # BLD AUTO: 5.38 10E6/UL (ref 4.4–5.9)
SARS-COV-2 RNA RESP QL NAA+PROBE: NEGATIVE
SODIUM SERPL-SCNC: 133 MMOL/L (ref 133–144)
TROPONIN I SERPL HS-MCNC: <3 NG/L
WBC # BLD AUTO: 8.5 10E3/UL (ref 4–11)

## 2022-04-05 PROCEDURE — A9585 GADOBUTROL INJECTION: HCPCS | Performed by: EMERGENCY MEDICINE

## 2022-04-05 PROCEDURE — 36415 COLL VENOUS BLD VENIPUNCTURE: CPT | Performed by: STUDENT IN AN ORGANIZED HEALTH CARE EDUCATION/TRAINING PROGRAM

## 2022-04-05 PROCEDURE — C9803 HOPD COVID-19 SPEC COLLECT: HCPCS

## 2022-04-05 PROCEDURE — 70450 CT HEAD/BRAIN W/O DYE: CPT

## 2022-04-05 PROCEDURE — 120N000001 HC R&B MED SURG/OB

## 2022-04-05 PROCEDURE — 70544 MR ANGIOGRAPHY HEAD W/O DYE: CPT

## 2022-04-05 PROCEDURE — 96374 THER/PROPH/DIAG INJ IV PUSH: CPT

## 2022-04-05 PROCEDURE — 255N000002 HC RX 255 OP 636: Performed by: EMERGENCY MEDICINE

## 2022-04-05 PROCEDURE — U0005 INFEC AGEN DETEC AMPLI PROBE: HCPCS | Performed by: EMERGENCY MEDICINE

## 2022-04-05 PROCEDURE — 85025 COMPLETE CBC W/AUTO DIFF WBC: CPT | Performed by: EMERGENCY MEDICINE

## 2022-04-05 PROCEDURE — 85576 BLOOD PLATELET AGGREGATION: CPT

## 2022-04-05 PROCEDURE — 258N000003 HC RX IP 258 OP 636: Performed by: INTERNAL MEDICINE

## 2022-04-05 PROCEDURE — 250N000012 HC RX MED GY IP 250 OP 636 PS 637: Performed by: INTERNAL MEDICINE

## 2022-04-05 PROCEDURE — 36415 COLL VENOUS BLD VENIPUNCTURE: CPT | Performed by: EMERGENCY MEDICINE

## 2022-04-05 PROCEDURE — 70553 MRI BRAIN STEM W/O & W/DYE: CPT

## 2022-04-05 PROCEDURE — 250N000011 HC RX IP 250 OP 636: Performed by: EMERGENCY MEDICINE

## 2022-04-05 PROCEDURE — 99285 EMERGENCY DEPT VISIT HI MDM: CPT | Mod: 25

## 2022-04-05 PROCEDURE — 99222 1ST HOSP IP/OBS MODERATE 55: CPT | Mod: AI | Performed by: INTERNAL MEDICINE

## 2022-04-05 PROCEDURE — 250N000013 HC RX MED GY IP 250 OP 250 PS 637: Performed by: INTERNAL MEDICINE

## 2022-04-05 PROCEDURE — 84484 ASSAY OF TROPONIN QUANT: CPT | Performed by: INTERNAL MEDICINE

## 2022-04-05 PROCEDURE — 80048 BASIC METABOLIC PNL TOTAL CA: CPT | Performed by: EMERGENCY MEDICINE

## 2022-04-05 PROCEDURE — 93005 ELECTROCARDIOGRAM TRACING: CPT

## 2022-04-05 PROCEDURE — 70549 MR ANGIOGRAPH NECK W/O&W/DYE: CPT

## 2022-04-05 RX ORDER — ACETAMINOPHEN 325 MG/1
650 TABLET ORAL EVERY 6 HOURS PRN
Status: DISCONTINUED | OUTPATIENT
Start: 2022-04-05 | End: 2022-04-08 | Stop reason: HOSPADM

## 2022-04-05 RX ORDER — ESCITALOPRAM OXALATE 10 MG/1
20 TABLET ORAL DAILY
Status: DISCONTINUED | OUTPATIENT
Start: 2022-04-05 | End: 2022-04-08 | Stop reason: HOSPADM

## 2022-04-05 RX ORDER — LORAZEPAM 2 MG/ML
1 INJECTION INTRAMUSCULAR
Status: COMPLETED | OUTPATIENT
Start: 2022-04-05 | End: 2022-04-05

## 2022-04-05 RX ORDER — DEXTROSE MONOHYDRATE 25 G/50ML
25-50 INJECTION, SOLUTION INTRAVENOUS
Status: DISCONTINUED | OUTPATIENT
Start: 2022-04-05 | End: 2022-04-08 | Stop reason: HOSPADM

## 2022-04-05 RX ORDER — AMOXICILLIN 250 MG
2 CAPSULE ORAL 2 TIMES DAILY PRN
Status: DISCONTINUED | OUTPATIENT
Start: 2022-04-05 | End: 2022-04-08 | Stop reason: HOSPADM

## 2022-04-05 RX ORDER — CLOPIDOGREL BISULFATE 75 MG/1
75 TABLET ORAL DAILY
Status: DISCONTINUED | OUTPATIENT
Start: 2022-04-05 | End: 2022-04-05

## 2022-04-05 RX ORDER — CLOPIDOGREL BISULFATE 75 MG/1
75 TABLET ORAL DAILY
Status: DISCONTINUED | OUTPATIENT
Start: 2022-04-05 | End: 2022-04-08 | Stop reason: HOSPADM

## 2022-04-05 RX ORDER — FAMOTIDINE 20 MG/1
20 TABLET, FILM COATED ORAL 2 TIMES DAILY
Status: DISCONTINUED | OUTPATIENT
Start: 2022-04-05 | End: 2022-04-08 | Stop reason: HOSPADM

## 2022-04-05 RX ORDER — AMOXICILLIN 250 MG
1 CAPSULE ORAL 2 TIMES DAILY PRN
Status: DISCONTINUED | OUTPATIENT
Start: 2022-04-05 | End: 2022-04-08 | Stop reason: HOSPADM

## 2022-04-05 RX ORDER — SODIUM CHLORIDE 9 MG/ML
INJECTION, SOLUTION INTRAVENOUS CONTINUOUS
Status: DISCONTINUED | OUTPATIENT
Start: 2022-04-05 | End: 2022-04-07

## 2022-04-05 RX ORDER — LIDOCAINE 40 MG/G
CREAM TOPICAL
Status: DISCONTINUED | OUTPATIENT
Start: 2022-04-05 | End: 2022-04-05

## 2022-04-05 RX ORDER — LIDOCAINE 40 MG/G
CREAM TOPICAL
Status: DISCONTINUED | OUTPATIENT
Start: 2022-04-05 | End: 2022-04-08 | Stop reason: HOSPADM

## 2022-04-05 RX ORDER — NICOTINE POLACRILEX 4 MG
15-30 LOZENGE BUCCAL
Status: DISCONTINUED | OUTPATIENT
Start: 2022-04-05 | End: 2022-04-08 | Stop reason: HOSPADM

## 2022-04-05 RX ORDER — LORAZEPAM 0.5 MG/1
0.5 TABLET ORAL
Status: DISCONTINUED | OUTPATIENT
Start: 2022-04-05 | End: 2022-04-08 | Stop reason: HOSPADM

## 2022-04-05 RX ORDER — GADOBUTROL 604.72 MG/ML
15 INJECTION INTRAVENOUS ONCE
Status: COMPLETED | OUTPATIENT
Start: 2022-04-05 | End: 2022-04-05

## 2022-04-05 RX ORDER — ACETAMINOPHEN 650 MG/1
650 SUPPOSITORY RECTAL EVERY 6 HOURS PRN
Status: DISCONTINUED | OUTPATIENT
Start: 2022-04-05 | End: 2022-04-08 | Stop reason: HOSPADM

## 2022-04-05 RX ORDER — ROSUVASTATIN CALCIUM 20 MG/1
40 TABLET, COATED ORAL DAILY
Status: DISCONTINUED | OUTPATIENT
Start: 2022-04-05 | End: 2022-04-08 | Stop reason: HOSPADM

## 2022-04-05 RX ADMIN — SODIUM CHLORIDE, PRESERVATIVE FREE: 5 INJECTION INTRAVENOUS at 18:45

## 2022-04-05 RX ADMIN — LORAZEPAM 1 MG: 2 INJECTION INTRAMUSCULAR; INTRAVENOUS at 13:11

## 2022-04-05 RX ADMIN — INSULIN GLARGINE 50 UNITS: 100 INJECTION, SOLUTION SUBCUTANEOUS at 22:09

## 2022-04-05 RX ADMIN — ESCITALOPRAM OXALATE 20 MG: 10 TABLET ORAL at 18:41

## 2022-04-05 RX ADMIN — ASPIRIN 325 MG: 325 TABLET, COATED ORAL at 18:41

## 2022-04-05 RX ADMIN — FAMOTIDINE 20 MG: 20 TABLET ORAL at 22:15

## 2022-04-05 RX ADMIN — GADOBUTROL 15 ML: 604.72 INJECTION INTRAVENOUS at 14:26

## 2022-04-05 RX ADMIN — ROSUVASTATIN CALCIUM 40 MG: 20 TABLET, FILM COATED ORAL at 18:41

## 2022-04-05 ASSESSMENT — ACTIVITIES OF DAILY LIVING (ADL)
ADLS_ACUITY_SCORE: 6
ADLS_ACUITY_SCORE: 6
ADLS_ACUITY_SCORE: 3
ADLS_ACUITY_SCORE: 3
ADLS_ACUITY_SCORE: 6
ADLS_ACUITY_SCORE: 4
ADLS_ACUITY_SCORE: 4
ADLS_ACUITY_SCORE: 3
ADLS_ACUITY_SCORE: 4
ADLS_ACUITY_SCORE: 6

## 2022-04-05 ASSESSMENT — ENCOUNTER SYMPTOMS: CONFUSION: 1

## 2022-04-05 NOTE — CONSULTS
[facility]    Stroke Telephone Note    I was called by Pham Avelar on 04/05/22 regarding patient Huseyin RUSSELL Danniejarons. The patient is a 66 year old male w hx HTN, HLD, poorly controlled DM2, L MCA infarcts, NA, hx substance abuse quit 1 year ago, presents with recurrent CVA.     3/16/22-3/18/22 admitted with L temporal ischemic infarct, found to have severe LMCA stenosis, discharged on DAPT, 3/24 readmitted due to small new LMCA infarcts, platelet assays for ASA and Plavix showed therapeutic response. BG >400 IR recommended better risk factor control before attempting stenting. Plan to repeat MRI MRA in 1 month. Patient reports improved glycemic control. Here after wife noticed several days of word finding difficulty and worsening R weakness, dragging R foot, using both hands to hold mug.    In ED CTH shows new small L corona radiata infarct, possible L parietal infarct.    Imaging Findings   CTH  1. Two new small acute/subacute infarcts involving the left corona  radiata/centrum semiovale.  2. Findings concerning for subtle cerebral swelling and loss of  cortical gray-white distinction involving left parietal lobe. This  raises concern for an acute left parietal region infarct.  3. Redemonstrated subacute left temporal lobe infarct.  4. No acute intracranial hemorrhage or significant mass  Effect/herniation.    MR brain  HEAD MRI:  1.  Scattered acute infarcts in the left frontal and parietal  parasagittal corona radiata consistent with border zone infarcts.  2.  The previously noted acute infarcts in the left parietal border  zone territory and left temporal lobe demonstrate expected evolution.  3.  Petechial hemorrhage in the left temporal infarcts is slightly  progressed. No gross hemorrhagic transformation. No gross mass effect.  4.  No hydrocephalus.     HEAD MRA:  1.  No change.  2.  Severe distal left M1 stenosis with decreased signal in the left  M2 and M3 branches.  3.  Moderate  "left and mild right cavernous carotid stenoses.  4.  Mild stenosis in the left P2/P3 junction.     NECK MRA:  1.  Normal neck MRA for age.  2.  No significant stenosis as per NASCET criteria.    Impression    LMCA infarcts 2/2 intracranial large vessel stenosis, patient should come in for stabilization, needs platelet function assay repeat, can be considered for stent placement.    Recommendations   - Use orderset: \"Ischemic Stroke Routine Admission\" or \"Ischemic Stroke No Thrombolytics/No Thrombectomy ICU Admission\"  - Neurochecks and Vital Signs every q4h   - Permissive HTN; goal SBP < 180 mmHg  - Daily aspirin 325 mg for secondary stroke prevention  - Continue Plavix - petechial hem on MRI but benefit outweighs risk  - Statin: Rosuvastatin 40mg qhs  - Telemetry, EKG  - Bedside Glucose Monitoring  - A1c, Lipid Panel, Troponin x 3  - PT/OT/SLP  - Stroke Education  - Euthermia, Euglycemia  - Plat function assays    - Will ldiscuss stenting    My recommendations are based on the information provided over the phone by Huseyin St's in-person providers. They are not intended to replace the clinical judgment of his in-person providers. I was not requested to personally see or examine the patient at this time.    The Stroke Staff is Dr. Johnny Reese MD  Vascular Neurology Fellow  To page me or covering stroke neurology team member, click here: AMCOM   Choose \"On Call\" tab at top, then search dropdown box for \"Neurology Adult\", select location, press Enter, then look for stroke/neuro ICU/telestroke.      "

## 2022-04-05 NOTE — ED TRIAGE NOTES
Sent to ED from speech therapy today. Patient was hospitalized for CVA on 3/24/22. Wife reports over last couple days patient has been increasingly more confused. Today patient had an unwitnessed fall on carpeted bedroom floor, wife had to help patient off floor. Patient takes full aspirin and plavix- last dose was yesterday. Wife reports patient's right side has been weaker over the last day, also dragging right leg. Denies right sided deficit from CVA on 3//24. Patient's glucose at home this morning was 215. Patient answers questions inappropriately.

## 2022-04-05 NOTE — PLAN OF CARE
Goal Outcome Evaluation:        Pt arrived on floor at 14:45.     Settled in bed. Vital signs taken. PCD applied. Telemetry applied. Will give report to oncoming nurse.

## 2022-04-05 NOTE — ED NOTES
[facility]  ED Nurse Handoff Report    ED Chief complaint: Altered Mental Status and Fall      ED Diagnosis:   Final diagnoses:   None       Code Status: MD to address    Allergies:   Allergies   Allergen Reactions     Augmentin Diarrhea     Sulfa Drugs      Unknown - reaction occurred as a child     Victoza      Skin rash       Patient Story:  Unknown LKW. Has been past 24 hours  Patient is A&Ox4 and was able to answer questions appropriately. Has illogical speech and aphasia intermittently. Has hx of CVA in 03/22. Was started on plavix and aspirin daily and has been working with speech and OT outpatient. Spouse reports that patient has had increase in confusion for past 2 days with new weakness on (R) side. Reports that patient was slightly dragging RLE. No drag is noted today while in ED. New acute/subacute infarcts noted. See imaging.   Focused Assessment:  See above     Treatments and/or interventions provided: IV, labs, EKG, imaging  Patient's response to treatments and/or interventions: Stable     To be done/followed up on inpatient unit:  All inpatient orders     Does this patient have any cognitive concerns?: Forgetful    Activity level - Baseline/Home:  Independent  Activity Level - Current:   Stand with Assist    Patient's Preferred language: English   Needed?: No    Isolation: None  Infection: Not Applicable  Patient tested for COVID 19 prior to admission: YES  Bariatric?: No    Vital Signs:   Vitals:    04/05/22 1130 04/05/22 1145 04/05/22 1215 04/05/22 1230   BP: 134/85 139/77 131/71 129/69   Pulse: 77 76  74   Resp:  12  23   Temp:       TempSrc:       SpO2:    95%   Weight:       Height:           Cardiac Rhythm:     Was the PSS-3 completed:   Yes  What interventions are required if any?               Family Comments:   OBS brochure/video discussed/provided to patient/family: N/A              Name of person given brochure if not patient:               Relationship to  patient:     For the majority of the shift this patient's behavior was Green.   Behavioral interventions performed were .    ED NURSE PHONE NUMBER: [phone]

## 2022-04-05 NOTE — H&P
[facility]    History and Physical - Hospitalist Service       Date of Admission:  4/5/2022    Assessment & Plan        Huseyin RUSSELL St is a 66 year old male with HTN, HLD, poorly controlled DMT2 (A1c 11.8%), NA, anxiety, prior substance abuse quit 1 year ago who presents on 4/5/2022 with recurrent strokes.     #.  Recurrent acute ischemic stroke  #.  Scattered acute infarcts in the left frontal and parietal parasagittal corona radiator consistent with infarcts  #.  Severe focal stenosis/near occlusion of left MCA bifurcation. CTA 3/2022  #.  Petechial hemorrhage in left wrist embolic infarcts  - 3/16/22-3/18/22 - admitted due to L temporal ischemic stroke with severe L MCA stenosis/near occlusion. Discharged on DAPT -  mg daily and Plavix 75 mg daily.  - readmitted from 3/24/22 - 3/27 due to 2 new tiny white matter L cerebral hemispheric ischemic infarcts with persistent severe L MCA stenosis/near occlusion. Blood glucose >400. Plavix and ASA platelet function assays showed therapeutic response.   - Neuro endovascular team did not recommend stenting due to uncontrolled diabetes.   - Medical management was advised with repeat MRI/MRA in 1 month  - It was felt that in future if there was evidence of recurrent ischemia within the L MCA territory despite adequate medical management (including well controlled glucose), then the issue of stenting would be re-visited.  -Head CT 4/5 -showed 2 new small subacute infarcts involving left corona radiate/centrum ovale  - Head MRI on 4/5 showed - Scattered acute infarcts in the left frontal and parietal parasagittal corona radiata consistent with border zone infarcts, Petechial hemorrhage in the left temporal infarcts. No gross hemorrhagic transformation. No gross mass effect.  - MRA of head - Severe distal left M1 stenosis with decreased signal in the left M2 and M3 branches, Moderate left and mild right cavernous carotid stenoses, Mild stenosis  "in the left P2/P3 junction.  - MRA neck - normal   -He is on a 30-day Zio patch monitor  -Monitor on telemetry  -Neurochecks  -consult stroke neurology  -Continue aspirin and Plavix  -Continue rosuvastatin  -Hydrate with NS at 100 mL/h  -PT/OT/speech pathology evaluation      #.  Essential hypertension  PTA lisinopril 20 mg daily  -Holding lisinopril to allow permissive hypertension in setting of acute stroke      #.  Hyperlipidemia  -Most recent lipid profile showed calculated , HDL 33, triglycerides 275 and total cholesterol 224 (3/16/22)  -On rosuvastatin 40 mg daily, continue      #.  Diabetes mellitus type, poorly controlled, hemoglobin A1c 11.8(3/16/22)  PTA-Lantus 55 units daily, aspart 15 units 3 times daily with meals  -Wife reports improved blood sugars in the past 1 week.  A.m. blood sugars are usually less than 120.  Blood sugars rest of the day are in low 200s.  -Since patient is n.p.o. for now, will administer Lantus 15 units at at bedtime and start on aspart 8 units with breakfast, 10 units each with lunch and supper  -Monitor blood sugars  -NovoLog sliding scale      #.  Obstructive sleep apnea  -Continue CPAP as able    #.  Generalized anxiety disorder  -Continue Lexapro           Diet:  N.p.o. until patient passes nursing swallow screen, then start on  moderate carb diet  DVT Prophylaxis: Pneumatic Compression Devices  Rao Catheter: Not present  Central Lines: None  Cardiac Monitoring: None  Code Status:  DNR/DNI    Clinically Significant Risk Factors Present on Admission                # Platelet Defect: home medication list includes an antiplatelet medication   # Severe Obesity: Estimated body mass index is 43.74 kg/m  as calculated from the following:    Height as of this encounter: 1.554 m (5' 1.2\").    Weight as of this encounter: 105.7 kg (233 lb).      Disposition Plan   Expected Discharge:   Anticipated discharge location:  Awaiting care coordination huddle  Delays:           The " patient's care was discussed with the Patient and Patient's Family.    Pham Avelar MD  Hospitalist Service  [facility]  Securely message with the Delivered Web Console (learn more here)  Text page via [employer] Paging/Directory         ______________________________________________________________________    Chief Complaint   Word finding difficulties, right-sided weakness    History is obtained from the patient's wife    History of Present Illness     Huseyin St is a 66 year old male with HTN, HLD, poorly controlled DMT2 (A1c 11.8%), NA, anxiety, prior substance abuse quit 1 year ago. He was admitted to [facility] from 3/16/22-3/18/22 due to L temporal ischemic stroke with severe L MCA stenosis/near occlusion. He was discharged on DAPT -  mg daily and Plavix 75 mg daily. He had mild ongoing receptive/expressive aphasia.     He was readmitted from 3/24/22 - 3/27 due to acute expressive aphasia). He was found to have 2 new tiny white matter L cerebral hemispheric ischemic infarcts with persistent severe L MCA stenosis/near occlusion. Blood glucose >400.     Plavix and ASA platelet function assays showed therapeutic response. Neuro endovascular team did not recommend stenting due to uncontrolled diabetes. Medical management was advised with repeat MRI/MRA in 1 month then follow-up with stroke and neuro-endovascular teams. It was felt that in future there was evidence of recurrent ischemia within the L MCA territory despite adequate medical management (including well controlled glucose), then the issue of stenting would be re-visited.     Since his last discharge, he has been recovering at home.  Wife reports improved blood sugar control.  She reports a.m. blood sugars are less than 120 and blood sugars at rest of 30 are up to low 200s.    She mentions that during previous 2 strokes he was talking gibberish which did not happen this time.  In the last couple of days she noted that he was  "talking okay but using the wrong words-he would say put salt on food when he wanted sugar.  In the last couple of days patient has also noted some increased right-sided weakness.  He was dragging his right foot while ambulating.  She also noted that he needed both his hands to hold his mug.      They went to their usual speech pathology appointment this morning and mentioned about his Speech deficits and was advised to come to the ER.    Work-up included head CT, MRI of brain, MRA of head and neck.  He was found to have new strokes on left.    Wife also reports that patient fell this morning but did not injure himself.       Review of Systems    The 10 point Review of Systems is negative other than noted in the HPI or here.     Past Medical History    I have reviewed this patient's medical history and updated it with pertinent information if needed.   Past Medical History:   Diagnosis Date     Anxiety      Essential hypertension, benign      Hypercholesteremia      Sleep apnea      T2DM (type 2 diabetes mellitus) (H)        Past Surgical History   I have reviewed this patient's surgical history and updated it with pertinent information if needed.  Past Surgical History:   Procedure Laterality Date     COLONOSCOPY       COLONOSCOPY N/A 10/26/2020    Procedure: COLONOSCOPY;  Surgeon: Randy Gonzalez MD;  Location:  GI     ENT SURGERY      R) ear \"procedure\"       Social History   I have reviewed this patient's social history and updated it with pertinent information if needed.  Social History     Tobacco Use     Smoking status: Never Smoker     Smokeless tobacco: Never Used   Substance Use Topics     Alcohol use: Yes     Alcohol/week: 0.0 standard drinks     Comment: 3-6 drinks on occ weekend night, occ drink on weekday     Drug use: Yes     Types: Cocaine       Family History   I have reviewed this patient's family history and updated it with pertinent information if needed.  Family History   Adopted: Yes "   Problem Relation Age of Onset     Diabetes Mother      Respiratory Mother         asthma     Lipids Mother      Arthritis Mother         knee replacement     Alzheimer Disease Mother      Cerebrovascular Disease Brother      C.A.D. No family hx of      Cancer - colorectal No family hx of      Prostate Cancer No family hx of        Prior to Admission Medications   Prior to Admission Medications   Prescriptions Last Dose Informant Patient Reported? Taking?   LORazepam (ATIVAN) 0.5 MG tablet   No No   Sig: Take 1 tablet (0.5 mg) by mouth once as needed for anxiety   ORDER FOR DME   No No   Sig: Auto-CPAP: Max 12 cm H2O Min 10 cm H2O  Continuous Lifetime need and heated humidity.      alcohol swab prep pads   No No   Sig: Use to swab area of injection/ian as directed   aspirin (ASA) 325 MG EC tablet   No No   Sig: Take 1 tablet (325 mg) by mouth daily   blood glucose (NO BRAND SPECIFIED) test strip   No No   Sig: Use to test blood sugar 3 times daily or as directed. To accompany: Blood Glucose Monitor Brands: per insurance.   blood glucose calibration (NO BRAND SPECIFIED) solution   No No   Sig: Use to calibrate blood glucose monitor as needed as directed. To accompany: Blood Glucose Monitor Brands: per insurance.   clopidogrel (PLAVIX) 75 MG tablet   No No   Sig: Take 1 tablet (75 mg) by mouth daily   clotrimazole (LOTRIMIN) 1 % external cream  Spouse/Significant Other No No   Sig: Apply topically 2 times daily   Patient taking differently: Apply topically 2 times daily as needed    escitalopram (LEXAPRO) 20 MG tablet  Spouse/Significant Other No No   Sig: Take 1 tablet (20 mg) by mouth daily   insulin aspart (NOVOLOG FLEXPEN) 100 UNIT/ML pen   Yes No   Sig: Inject 12 Units Subcutaneous 3 times daily (with meals)   insulin glargine (LANTUS SOLOSTAR) 100 UNIT/ML pen  Spouse/Significant Other No No   Sig: INJ 55 UNITS SC HS   insulin pen needle (BD MARIE U/F) 32G X 4 MM miscellaneous  Spouse/Significant Other No No    Sig: USE THREE PEN NEEDLES DAILY AS DIRECTED   lisinopril (ZESTRIL) 40 MG tablet   No No   Sig: Take 0.5 tablets (20 mg) by mouth daily   rosuvastatin (CRESTOR) 40 MG tablet  Spouse/Significant Other No No   Sig: Take 1 tablet (40 mg) by mouth daily   sildenafil (REVATIO) 20 MG tablet  Spouse/Significant Other No No   Sig: Take 1 tablet (20 mg) by mouth 3 times daily   Patient not taking: Reported on 4/1/2022   thin (NO BRAND SPECIFIED) lancets   No No   Sig: Use to test blood sugar 3 times daily or as directed. To accompany: Blood Glucose Monitor Brands: per insurance.      Facility-Administered Medications: None     Allergies   Allergies   Allergen Reactions     Augmentin Diarrhea     Sulfa Drugs      Unknown - reaction occurred as a child     Victoza      Skin rash       Physical Exam   Vital Signs: Temp: 97.3  F (36.3  C) Temp src: Temporal BP: 132/75 Pulse: 68   Resp: 18 SpO2: 95 % O2 Device: None (Room air)    Weight: 233 lbs 0 oz    Constitutional: awake, alert, cooperative, no apparent distress, and appears stated age  Eyes: Lids and lashes normal, pupils equal, round and reactive to light, extra ocular muscles intact, sclera clear, conjunctiva normal  ENT: Normocephalic, without obvious abnormality, atraumatic, sinuses nontender on palpation, external ears without lesions, oral pharynx with moist mucous membranes, tonsils without erythema or exudates, gums normal and good dentition.  Hematologic / Lymphatic: no cervical lymphadenopathy  Respiratory: No increased work of breathing, good air exchange, clear to auscultation bilaterally, no crackles or wheezing  Cardiovascular: Normal apical impulse, regular rate and rhythm, normal S1 and S2, no murmur noted  GI: No scars, normal bowel sounds, soft, non-distended, non-tender, no masses palpated, no hepatosplenomegally  Skin: no bruising or bleeding and normal skin color, texture, turgor  Musculoskeletal: There is no redness, warmth, or swelling of the joints.   Full range of motion noted.  Motor strength is 5 out of 5 all extremities bilaterally.  Tone is normal.  Neurologic: Awake, alert, oriented to self, knows he is in [facility], knows this is April but unable to recall year.  Does not know who the president is.  Cranial nerves II-XII are grossly intact.  Strength is 4/5 in right upper extremity and 5/5 in all other extremities.       Data   Data reviewed today: I reviewed all medications, new labs and imaging results over the last 24 hours. I personally reviewed the EKG tracing showing Normal sinus rhythm and the head CT image(s) showing New left sided strokes.    Recent Labs   Lab 04/05/22  1149 04/05/22  1148   WBC 8.5  --    HGB 15.3  --    MCV 87  --      --    NA  --  133   POTASSIUM  --  4.1   CHLORIDE  --  100   CO2  --  26   BUN  --  15   CR  --  1.16   ANIONGAP  --  7   EVER  --  9.7   GLC  --  214*       HEAD MRI:  1.  Scattered acute infarcts in the left frontal and parietal parasagittal corona radiata consistent with border zone infarcts.  2.  The previously noted acute infarcts in the left parietal border zone territory and left temporal lobe demonstrate expected evolution.  3.  Petechial hemorrhage in the left temporal infarcts is slightly progressed. No gross hemorrhagic transformation. No gross mass effect.  4.  No hydrocephalus.     HEAD MRA:  1.  No change.  2.  Severe distal left M1 stenosis with decreased signal in the left M2 and M3 branches.  3.  Moderate left and mild right cavernous carotid stenoses.  4.  Mild stenosis in the left P2/P3 junction.     NECK MRA:  1.  Normal neck MRA for age.  2.  No significant stenosis as per NASCET criteria.

## 2022-04-05 NOTE — ED PROVIDER NOTES
History   Chief Complaint:  Altered Mental Status and Fall       The history is provided by the patient and the spouse.      Huseyin St is a 66 year old male with history of recent ischemic CVA, HTN, HLP, DM2, Obesity, NA, BPH, MDD with anxiety, and MDD with anxiety who presents with altered mental status. Huseyin recently visited the ED with slurred speech. He was admitted to the hospital mid march with CVA of the left temporal lobe and again a week later with worsening symptoms.     On Saturday Huseyin demonstrated increased confusion. Wife notes that he has had increased problems with word-finding. Yesterday Huseyin was noticed to display right leg dragging with ambulation and arm weakness with last known well around 0900. Today he fell while he was trying to put his pants on. At bedside he denies any pain or head trauma. He does not think that his speech has declined recently. No syncope    Review of Systems   Neurological: Negative for syncope.   Psychiatric/Behavioral: Positive for confusion.   All other systems reviewed and are negative.    Allergies:  Augmentin  Sulfa Drugs  Victoza    Medications:  Aspirin 81 mg  Clopidogrel   escitalopram   insulin aspart  insulin glargine   Lisinopril   Lorazepam    Rosuvastatin   Thin lancets    Past Medical History:     Anxiety   Hypercholesteremia   Sleep apnea   T2DM (type 2 diabetes mellitus)   Essential hypertension, benign  Hypertrophy of prostate without urinary obstruction  Tietze's disease  Rhinitis, allergic seasonal  HYPERLIPIDEMIA LDL GOAL <100  Mixed anxiety depressive disorder  Advanced directives, counseling/discussion  Microalbuminuria  Recurrent cold sores  NA (obstructive sleep apnea)  Type 2 diabetes mellitus with diabetic nephropathy (H)  Overweight BMI 35-40  Type 2 diabetes mellitus with hyperosmolarity without coma, without long-term current use of insulin (H)  LVH (left ventricular hypertrophy)  Class 2 obesity due to excess calories with serious  "comorbidity and body mass index (BMI) of 35.0 to 35.9 in adult  Microalbuminuria due to type 2 diabetes mellitus (H)  Cerebrovascular accident (CVA), unspecified mechanism (H)  Chronic kidney disease, stage 1  TIA (transient ischemic attack)  Dysarthria due to recent stroke      Past Surgical History:    Colonoscopy  Right eye surgery     Family History:    Diabetes  Asthma  Lipids  Arthritis  Alzheimer's disease  Cerebrovascular accident    Social History:  Patient accompanied with wife  PCP: Kendal Wong     Physical Exam     Patient Vitals for the past 24 hrs:   BP Temp Temp src Pulse Resp SpO2 Height Weight   04/05/22 1450 (!) 151/81 98.8  F (37.1  C) Oral 72 23 97 % -- --   04/05/22 1430 137/85 -- -- -- -- -- -- --   04/05/22 1315 -- -- -- 68 23 95 % -- --   04/05/22 1300 132/75 -- -- 68 18 95 % -- --   04/05/22 1245 139/80 -- -- 70 14 95 % -- --   04/05/22 1230 129/69 -- -- 74 23 95 % -- --   04/05/22 1215 131/71 -- -- -- -- -- -- --   04/05/22 1145 139/77 -- -- 76 12 -- -- --   04/05/22 1130 134/85 -- -- 77 -- -- -- --   04/05/22 1123 126/70 97.3  F (36.3  C) Temporal 83 16 98 % 1.554 m (5' 1.2\") 105.7 kg (233 lb)       Physical Exam  VS: Reviewed per above  HENT: normal speech  EYES: sclera anicteric  CV: Rate as noted, regular rhythm.   RESP: Effort normal. Breath sounds are normal bilaterally.  GI: no tenderness/rebound/guarding, not distended.  NEURO: GCS 14, expressive, possibly receptive aphasia.  no facial asymmetry, no drift in the extremities, no deficits to light touch in extremities.    MSK: No deformity of the extremities  SKIN: Warm and dry    Emergency Department Course   ECG  ECG taken at 1430, ECG read at 1435  Normal sinus rhythm. Left axis deviation. Incomplete right bundle branch block. Moderate voltage criteria for LVH, may be normal variant. Abnormal ECG.    No significant change as compared to prior, dated 03/16/22.  Rate 69 bpm. TN interval 132 ms. QRS duration 102 ms. QT/QTc " 414/443 ms. P-R-T axes -25 -32 2.     Imaging:  MRA Neck (Carotids) wo & w Contrast   Final Result   IMPRESSION:   HEAD MRI:   1.  Scattered acute infarcts in the left frontal and parietal   parasagittal corona radiata consistent with border zone infarcts.   2.  The previously noted acute infarcts in the left parietal border   zone territory and left temporal lobe demonstrate expected evolution.   3.  Petechial hemorrhage in the left temporal infarcts is slightly   progressed. No gross hemorrhagic transformation. No gross mass effect.   4.  No hydrocephalus.      HEAD MRA:   1.  No change.   2.  Severe distal left M1 stenosis with decreased signal in the left   M2 and M3 branches.   3.  Moderate left and mild right cavernous carotid stenoses.   4.  Mild stenosis in the left P2/P3 junction.      NECK MRA:   1.  Normal neck MRA for age.   2.  No significant stenosis as per NASCET criteria.             KARLO OJEDA MD            SYSTEM ID:  [id number]      MRA Brain ([place]) wo Contrast   Final Result   IMPRESSION:   HEAD MRI:   1.  Scattered acute infarcts in the left frontal and parietal   parasagittal corona radiata consistent with border zone infarcts.   2.  The previously noted acute infarcts in the left parietal border   zone territory and left temporal lobe demonstrate expected evolution.   3.  Petechial hemorrhage in the left temporal infarcts is slightly   progressed. No gross hemorrhagic transformation. No gross mass effect.   4.  No hydrocephalus.      HEAD MRA:   1.  No change.   2.  Severe distal left M1 stenosis with decreased signal in the left   M2 and M3 branches.   3.  Moderate left and mild right cavernous carotid stenoses.   4.  Mild stenosis in the left P2/P3 junction.      NECK MRA:   1.  Normal neck MRA for age.   2.  No significant stenosis as per NASCET criteria.             KARLO OJEDA MD            SYSTEM ID:  [id number]      MR Brain w/o & w Contrast   Final Result   IMPRESSION:    HEAD MRI:   1.  Scattered acute infarcts in the left frontal and parietal   parasagittal corona radiata consistent with border zone infarcts.   2.  The previously noted acute infarcts in the left parietal border   zone territory and left temporal lobe demonstrate expected evolution.   3.  Petechial hemorrhage in the left temporal infarcts is slightly   progressed. No gross hemorrhagic transformation. No gross mass effect.   4.  No hydrocephalus.      HEAD MRA:   1.  No change.   2.  Severe distal left M1 stenosis with decreased signal in the left   M2 and M3 branches.   3.  Moderate left and mild right cavernous carotid stenoses.   4.  Mild stenosis in the left P2/P3 junction.      NECK MRA:   1.  Normal neck MRA for age.   2.  No significant stenosis as per NASCET criteria.             KARLO OJEDA MD            SYSTEM ID:  [id number]      Head CT w/o contrast   Preliminary Result   IMPRESSION:   1. Two new small subacute infarcts involving the left corona   radiata/centrum semiovale.   2. Findings concerning for subtle cerebral swelling and loss of   cortical gray-white distinction involving left parietal lobe. This   raises concern for an acute left parietal region infarct.   3. Redemonstrated subacute left temporal lobe infarct.   4. No acute intracranial hemorrhage or significant mass   effect/herniation.      The findings were discussed with Dr. Browne by myself by phone at   approximately 12:28 PM on 4/5/2022.        Report per radiology    Laboratory:  Labs Ordered and Resulted from Time of ED Arrival to Time of ED Departure   BASIC METABOLIC PANEL - Abnormal       Result Value    Sodium 133      Potassium 4.1      Chloride 100      Carbon Dioxide (CO2) 26      Anion Gap 7      Urea Nitrogen 15      Creatinine 1.16      Calcium 9.7      Glucose 214 (*)     GFR Estimate 69     COVID-19 VIRUS (CORONAVIRUS) BY PCR - Normal    SARS CoV2 PCR Negative     GLUCOSE MONITOR NURSING POCT   CBC WITH PLATELETS  AND DIFFERENTIAL    WBC Count 8.5      RBC Count 5.38      Hemoglobin 15.3      Hematocrit 46.6      MCV 87      MCH 28.4      MCHC 32.8      RDW 11.9      Platelet Count 425      % Neutrophils 71      % Lymphocytes 14      % Monocytes 11      % Eosinophils 3      % Basophils 1      % Immature Granulocytes 0      NRBCs per 100 WBC 0      Absolute Neutrophils 6.1      Absolute Lymphocytes 1.2      Absolute Monocytes 0.9      Absolute Eosinophils 0.3      Absolute Basophils 0.1      Absolute Immature Granulocytes 0.0      Absolute NRBCs 0.0     ROUTINE UA WITH MICROSCOPIC REFLEX TO CULTURE        Emergency Department Course:             Reviewed:  I reviewed nursing notes, vitals, past medical history and Care Everywhere    Assessments/Consults:  ED Course as of 04/05/22 1549   Tue Apr 05, 2022   1129 Obtained history and examined the patient as noted above.    1214 Consulted with Neurology regarding the patient's history and presentation here in the emergency department.   1240 Rechecked the patient.        Interventions:  1311 Ativan 1 mg, IV    Disposition:  The patient was admitted to the hospital under the care of Dr. Avelar.     Impression & Plan       Medical Decision Making:  Patient presents to the ER for evaluation of worsening aphasia, possible right arm and leg weakness over the past few days.  On arrival vital signs are reassuring.  On exam I do appreciate aphasia although not objective weakness.  Noncontrast head CT does show new infarcts in the left corona radiata and centrum semiovale as well as possible new infarct in the left parietal region.  I spoke with stroke neurology and they recommended MR imaging and admission.  Other basic labs reassuring.  UA pending collection.  At time of admission, MR imaging was pending although as outlined per imaging section above.    Diagnosis:    ICD-10-CM    1. Cerebrovascular accident (CVA), unspecified mechanism (H)  I63.9      Scribe Disclosure:  Cristopher GARCIA  Jameel, juju serving as a scribe at 11:29 AM on 4/5/2022 to document services personally performed by Alpesh Browne MD based on my observations and the provider's statements to me.           Alpesh Browne MD  04/05/22 3016

## 2022-04-05 NOTE — PLAN OF CARE
RECEIVING UNIT ED HANDOFF REVIEW    ED Nurse Handoff Report was reviewed by: Ivette Wayne RN on April 5, 2022 at 2:35 PM

## 2022-04-05 NOTE — PROGRESS NOTES
Pt arrived with spouse for outpatient speech-language evaluation. Pt's spouse reported pt had fall today, worsening right foot drag, reduced right hand , and confusion. Advised to call 911. Pt's spouse indicated this was recommendation from PCP if any concerns arose. She declined an indicated she would immediately drive patient to nearby hospital ED. No evaluation completed this date.

## 2022-04-05 NOTE — PHARMACY-ADMISSION MEDICATION HISTORY
Pharmacy Medication History  Admission medication history interview status for the 4/5/2022  admission is complete. See EPIC admission navigator for prior to admission medications     Location of Interview: Patient room  Medication history sources: Patient's family/friend (wife) and Surescripts    Significant changes made to the medication list:  -Changed Novolog from 12 units to 15 units before meals    In the past week, patient estimated taking medication this percent of the time: greater than 90%    Additional medication history information:   -Wife acknowledged that he did stop taking diltiazem and hydrochlorothiazide per discharge instructions 3/27/22    Medication reconciliation completed by provider prior to medication history? No    Time spent in this activity: 10 min    Prior to Admission medications    Medication Sig Last Dose Taking? Auth Provider   aspirin (ASA) 325 MG EC tablet Take 1 tablet (325 mg) by mouth daily 4/4/2022 Yes Lisbet Saini MD   clopidogrel (PLAVIX) 75 MG tablet Take 1 tablet (75 mg) by mouth daily 4/4/2022 Yes Lisbet Saini MD   clotrimazole (LOTRIMIN) 1 % external cream Apply topically 2 times daily  Patient taking differently: Apply topically 2 times daily as needed  prn Yes Kendal Wong MD   escitalopram (LEXAPRO) 20 MG tablet Take 1 tablet (20 mg) by mouth daily 4/4/2022 Yes Kendal Wong MD   insulin aspart (NOVOLOG FLEXPEN) 100 UNIT/ML pen Inject 15 Units Subcutaneous 3 times daily (with meals)  4/5/2022 at am Yes Lisbet Saini MD   insulin glargine (LANTUS SOLOSTAR) 100 UNIT/ML pen INJ 55 UNITS SC HS 4/4/2022 at pm Yes Kendal Wong MD   lisinopril (ZESTRIL) 40 MG tablet Take 0.5 tablets (20 mg) by mouth daily 4/4/2022 Yes Lisbet Saini MD   LORazepam (ATIVAN) 0.5 MG tablet Take 1 tablet (0.5 mg) by mouth once as needed for anxiety prn MRI Yes Kendal Wong MD   rosuvastatin (CRESTOR) 40 MG tablet Take 1 tablet (40 mg) by mouth daily 4/4/2022 Yes  Kendal Wong MD   alcohol swab prep pads Use to swab area of injection/ian as directed   Kendal Wong MD   blood glucose (NO BRAND SPECIFIED) test strip Use to test blood sugar 3 times daily or as directed. To accompany: Blood Glucose Monitor Brands: per insurance.   Kendal Wong MD   blood glucose calibration (NO BRAND SPECIFIED) solution Use to calibrate blood glucose monitor as needed as directed. To accompany: Blood Glucose Monitor Brands: per insurance.   Kendal Wong MD   insulin pen needle (BD MARIE U/F) 32G X 4 MM miscellaneous USE THREE PEN NEEDLES DAILY AS DIRECTED   Kendal Wong MD   ORDER FOR DME Auto-CPAP: Max 12 cm H2O Min 10 cm H2O  Continuous Lifetime need and heated humidity.      Goltz, Bennett Ezra, PA-C   sildenafil (REVATIO) 20 MG tablet Take 1 tablet (20 mg) by mouth 3 times daily  Patient not taking: Reported on 4/1/2022 NOT TAKING  Kendal Wong MD   thin (NO BRAND SPECIFIED) lancets Use to test blood sugar 3 times daily or as directed. To accompany: Blood Glucose Monitor Brands: per insurance.   Kendal Wong MD       The information provided in this note is only as accurate as the sources available at the time of update(s)

## 2022-04-06 ENCOUNTER — APPOINTMENT (OUTPATIENT)
Dept: PHYSICAL THERAPY | Facility: CLINIC | Age: 67
DRG: 065 | End: 2022-04-06
Attending: INTERNAL MEDICINE
Payer: COMMERCIAL

## 2022-04-06 ENCOUNTER — APPOINTMENT (OUTPATIENT)
Dept: OCCUPATIONAL THERAPY | Facility: CLINIC | Age: 67
DRG: 065 | End: 2022-04-06
Attending: INTERNAL MEDICINE
Payer: COMMERCIAL

## 2022-04-06 ENCOUNTER — APPOINTMENT (OUTPATIENT)
Dept: SPEECH THERAPY | Facility: CLINIC | Age: 67
DRG: 065 | End: 2022-04-06
Attending: INTERNAL MEDICINE
Payer: COMMERCIAL

## 2022-04-06 ENCOUNTER — DOCUMENTATION ONLY (OUTPATIENT)
Dept: OTHER | Facility: CLINIC | Age: 67
End: 2022-04-06

## 2022-04-06 LAB
ALBUMIN UR-MCNC: 70 MG/DL
AMPHETAMINES UR QL SCN: NORMAL
ANION GAP SERPL CALCULATED.3IONS-SCNC: 6 MMOL/L (ref 3–14)
APPEARANCE UR: CLEAR
ATRIAL RATE - MUSE: 69 BPM
BACTERIA #/AREA URNS HPF: ABNORMAL /HPF
BARBITURATES UR QL: NORMAL
BENZODIAZ UR QL: NORMAL
BILIRUB UR QL STRIP: NEGATIVE
BUN SERPL-MCNC: 13 MG/DL (ref 7–30)
CALCIUM SERPL-MCNC: 9.4 MG/DL (ref 8.5–10.1)
CANNABINOIDS UR QL SCN: NORMAL
CHLORIDE BLD-SCNC: 104 MMOL/L (ref 94–109)
CHOLEST SERPL-MCNC: 109 MG/DL
CO2 SERPL-SCNC: 25 MMOL/L (ref 20–32)
COCAINE UR QL: NORMAL
COLOR UR AUTO: YELLOW
CREAT SERPL-MCNC: 1.05 MG/DL (ref 0.66–1.25)
DIASTOLIC BLOOD PRESSURE - MUSE: NORMAL MMHG
ERYTHROCYTE [DISTWIDTH] IN BLOOD BY AUTOMATED COUNT: 11.9 % (ref 10–15)
GFR SERPL CREATININE-BSD FRML MDRD: 78 ML/MIN/1.73M2
GLUCOSE BLD-MCNC: 85 MG/DL (ref 70–99)
GLUCOSE BLDC GLUCOMTR-MCNC: 146 MG/DL (ref 70–99)
GLUCOSE BLDC GLUCOMTR-MCNC: 156 MG/DL (ref 70–99)
GLUCOSE BLDC GLUCOMTR-MCNC: 191 MG/DL (ref 70–99)
GLUCOSE BLDC GLUCOMTR-MCNC: 216 MG/DL (ref 70–99)
GLUCOSE BLDC GLUCOMTR-MCNC: 243 MG/DL (ref 70–99)
GLUCOSE BLDC GLUCOMTR-MCNC: 84 MG/DL (ref 70–99)
GLUCOSE UR STRIP-MCNC: 70 MG/DL
GRANULAR CAST: 8 /LPF
HCT VFR BLD AUTO: 43.3 % (ref 40–53)
HDLC SERPL-MCNC: 34 MG/DL
HGB BLD-MCNC: 14.4 G/DL (ref 13.3–17.7)
HGB UR QL STRIP: NEGATIVE
INTERPRETATION ECG - MUSE: NORMAL
KETONES UR STRIP-MCNC: NEGATIVE MG/DL
LDLC SERPL CALC-MCNC: 53 MG/DL
LEUKOCYTE ESTERASE UR QL STRIP: NEGATIVE
MCH RBC QN AUTO: 28.5 PG (ref 26.5–33)
MCHC RBC AUTO-ENTMCNC: 33.3 G/DL (ref 31.5–36.5)
MCV RBC AUTO: 86 FL (ref 78–100)
MUCOUS THREADS #/AREA URNS LPF: PRESENT /LPF
NITRATE UR QL: NEGATIVE
NONHDLC SERPL-MCNC: 75 MG/DL
OPIATES UR QL SCN: NORMAL
P AXIS - MUSE: -25 DEGREES
PA AA BLD-ACNC: 398 ARU
PCP UR QL SCN: NORMAL
PH UR STRIP: 5.5 [PH] (ref 5–7)
PLATELET # BLD AUTO: 386 10E3/UL (ref 150–450)
POTASSIUM BLD-SCNC: 3.7 MMOL/L (ref 3.4–5.3)
PR INTERVAL - MUSE: 132 MS
QRS DURATION - MUSE: 102 MS
QT - MUSE: 414 MS
QTC - MUSE: 443 MS
R AXIS - MUSE: -32 DEGREES
RBC # BLD AUTO: 5.05 10E6/UL (ref 4.4–5.9)
RBC URINE: 1 /HPF
SODIUM SERPL-SCNC: 135 MMOL/L (ref 133–144)
SP GR UR STRIP: 1.01 (ref 1–1.03)
SQUAMOUS EPITHELIAL: 1 /HPF
SYSTOLIC BLOOD PRESSURE - MUSE: NORMAL MMHG
T AXIS - MUSE: 2 DEGREES
TRIGL SERPL-MCNC: 111 MG/DL
UROBILINOGEN UR STRIP-MCNC: NORMAL MG/DL
VENTRICULAR RATE- MUSE: 69 BPM
WBC # BLD AUTO: 7.2 10E3/UL (ref 4–11)
WBC URINE: 2 /HPF

## 2022-04-06 PROCEDURE — 92526 ORAL FUNCTION THERAPY: CPT | Mod: GN | Performed by: SPEECH-LANGUAGE PATHOLOGIST

## 2022-04-06 PROCEDURE — 85576 BLOOD PLATELET AGGREGATION: CPT

## 2022-04-06 PROCEDURE — 97530 THERAPEUTIC ACTIVITIES: CPT | Mod: GO | Performed by: OCCUPATIONAL THERAPIST

## 2022-04-06 PROCEDURE — 80307 DRUG TEST PRSMV CHEM ANLYZR: CPT | Performed by: NURSE PRACTITIONER

## 2022-04-06 PROCEDURE — 250N000013 HC RX MED GY IP 250 OP 250 PS 637: Performed by: INTERNAL MEDICINE

## 2022-04-06 PROCEDURE — 99233 SBSQ HOSP IP/OBS HIGH 50: CPT | Performed by: INTERNAL MEDICINE

## 2022-04-06 PROCEDURE — 97166 OT EVAL MOD COMPLEX 45 MIN: CPT | Mod: GO | Performed by: OCCUPATIONAL THERAPIST

## 2022-04-06 PROCEDURE — 120N000001 HC R&B MED SURG/OB

## 2022-04-06 PROCEDURE — 97530 THERAPEUTIC ACTIVITIES: CPT | Mod: GP | Performed by: PHYSICAL THERAPIST

## 2022-04-06 PROCEDURE — 92610 EVALUATE SWALLOWING FUNCTION: CPT | Mod: GN | Performed by: SPEECH-LANGUAGE PATHOLOGIST

## 2022-04-06 PROCEDURE — 97162 PT EVAL MOD COMPLEX 30 MIN: CPT | Mod: GP | Performed by: PHYSICAL THERAPIST

## 2022-04-06 PROCEDURE — 81001 URINALYSIS AUTO W/SCOPE: CPT | Performed by: EMERGENCY MEDICINE

## 2022-04-06 PROCEDURE — 99223 1ST HOSP IP/OBS HIGH 75: CPT | Mod: GC | Performed by: PSYCHIATRY & NEUROLOGY

## 2022-04-06 PROCEDURE — 250N000013 HC RX MED GY IP 250 OP 250 PS 637: Performed by: STUDENT IN AN ORGANIZED HEALTH CARE EDUCATION/TRAINING PROGRAM

## 2022-04-06 PROCEDURE — 36415 COLL VENOUS BLD VENIPUNCTURE: CPT | Performed by: PSYCHIATRY & NEUROLOGY

## 2022-04-06 PROCEDURE — 80061 LIPID PANEL: CPT | Performed by: PSYCHIATRY & NEUROLOGY

## 2022-04-06 PROCEDURE — 85027 COMPLETE CBC AUTOMATED: CPT | Performed by: INTERNAL MEDICINE

## 2022-04-06 PROCEDURE — 80048 BASIC METABOLIC PNL TOTAL CA: CPT | Performed by: INTERNAL MEDICINE

## 2022-04-06 PROCEDURE — 36415 COLL VENOUS BLD VENIPUNCTURE: CPT | Performed by: INTERNAL MEDICINE

## 2022-04-06 PROCEDURE — 97116 GAIT TRAINING THERAPY: CPT | Mod: GP | Performed by: PHYSICAL THERAPIST

## 2022-04-06 PROCEDURE — 250N000012 HC RX MED GY IP 250 OP 636 PS 637: Performed by: INTERNAL MEDICINE

## 2022-04-06 PROCEDURE — 258N000003 HC RX IP 258 OP 636: Performed by: INTERNAL MEDICINE

## 2022-04-06 RX ADMIN — FAMOTIDINE 20 MG: 20 TABLET ORAL at 08:42

## 2022-04-06 RX ADMIN — ASPIRIN 325 MG: 325 TABLET, COATED ORAL at 08:42

## 2022-04-06 RX ADMIN — ROSUVASTATIN CALCIUM 40 MG: 20 TABLET, FILM COATED ORAL at 08:42

## 2022-04-06 RX ADMIN — INSULIN GLARGINE 50 UNITS: 100 INJECTION, SOLUTION SUBCUTANEOUS at 22:23

## 2022-04-06 RX ADMIN — FAMOTIDINE 20 MG: 20 TABLET ORAL at 22:23

## 2022-04-06 RX ADMIN — CLOPIDOGREL BISULFATE 75 MG: 75 TABLET ORAL at 08:42

## 2022-04-06 RX ADMIN — ESCITALOPRAM OXALATE 20 MG: 10 TABLET ORAL at 08:42

## 2022-04-06 RX ADMIN — INSULIN ASPART 8 UNITS: 100 INJECTION, SOLUTION INTRAVENOUS; SUBCUTANEOUS at 08:57

## 2022-04-06 RX ADMIN — SODIUM CHLORIDE, PRESERVATIVE FREE: 5 INJECTION INTRAVENOUS at 13:11

## 2022-04-06 RX ADMIN — SODIUM CHLORIDE, PRESERVATIVE FREE: 5 INJECTION INTRAVENOUS at 18:25

## 2022-04-06 ASSESSMENT — ACTIVITIES OF DAILY LIVING (ADL)
ADLS_ACUITY_SCORE: 6

## 2022-04-06 NOTE — PLAN OF CARE
Goal Outcome Evaluation:    Reason for Admission: pt admitted today from ED. Dx: CVA. His admission is completed.     Code status: DNR/DNI   Seizure or isolation precaution: N/A   Cognitive/Mentation: A/Ox 2  Use of CPAP: No  Neuros/CMS: Neuro check was done Q4H. Intact ex pt does not fully follow commands. Not able to test shrugging shoulders, heel to shin, and vision test. Otherwise, his neuro is intact.   CIWA Protocol: NA  VS: within parameters   Tele: SR  GI: BS active x4, passing flatus.  Continent.  : voiding w/o difficulty. Continent.  Use of durbin or external catheter: N/A  Pulmonary: LS clear   Pain: No pain   Critical Labs to Monitor: BG  Electrolyte Replacement Protocol: N/A   Use of oxygen: N/A   Heparin or other drips: N/A     BG checks: Q4H and at 0200   Tests, MRI, CT, PEDRITO, Echo: waiting for pt to pee so that urine sample can be collected. Urinal was given to pt and his CNA is aware. MRI showed stroke in his brain. Pt is on 30 day zio patch hear monitor.   Drains: NS running at 100ml/hr   Skin: intact   Edema: No edema   Surgical site: N/A   Activity: SBA   Diet: moderate carb diet  with thin  liquids. Takes pills w  Tube Feeding: N/A   Discharge: pending. Home? Pt will needs to be seen by therapy in the morning.

## 2022-04-06 NOTE — PROGRESS NOTES
[facility]    Hospitalist Progress Note    Date of Service (when I saw the patient): 04/06/2022    Assessment & Plan   Huseyin St is a 66 year old male who was admitted on 4/5/2022.    Huseyin St is a 66 year old male with HTN, HLD, poorly controlled DMT2 (A1c 11.8%), NA, anxiety, prior substance abuse quit 1 year ago who presents on 4/5/2022 with recurrent strokes.      #.  Recurrent acute ischemic stroke  #.  Scattered acute infarcts in the left frontal and parietal parasagittal corona radiator consistent with infarcts  #.  Severe focal stenosis/near occlusion of left MCA bifurcation. CTA 3/2022  #.  Petechial hemorrhage in left  embolic infarcts  Acute encephalopathy secondary to the both strokes; improving  - 3/16/22-3/18/22 - admitted due to L temporal ischemic stroke with severe L MCA stenosis/near occlusion. Discharged on DAPT -  mg daily and Plavix 75 mg daily.  - readmitted from 3/24/22 - 3/27 due to 2 new tiny white matter L cerebral hemispheric ischemic infarcts with persistent severe L MCA stenosis/near occlusion. Blood glucose >400. Plavix and ASA platelet function assays showed therapeutic response.   - Neuro endovascular team did not recommend stenting due to uncontrolled diabetes.   - Medical management was advised with repeat MRI/MRA in 1 month  - It was felt that in future if there was evidence of recurrent ischemia within the L MCA territory despite adequate medical management (including well controlled glucose), then the issue of stenting would be re-visited.  -Head CT 4/5 -showed 2 new small subacute infarcts involving left corona radiate/centrum ovale  - Head MRI on 4/5 showed - Scattered acute infarcts in the left frontal and parietal parasagittal corona radiata consistent with border zone infarcts, Petechial hemorrhage in the left temporal infarcts. No gross hemorrhagic transformation. No gross mass effect.  - MRA of head - Severe distal left M1  stenosis with decreased signal in the left M2 and M3 branches, Moderate left and mild right cavernous carotid stenoses, Mild stenosis in the left P2/P3 junction.  - MRA neck - normal   -He is on a 30-day Zio patch monitor  -Monitor on telemetry  -Neurochecks  -consult stroke neurology  -Continue aspirin and Plavix  -Continue rosuvastatin  -Hydrate with NS at 100 mL/h  -PT/OT/speech pathology evaluation  Neurology to discuss with the neuro interventional regarding possible left MCA stenting with history of recurrent strokes in the last 4 weeks        #.  Essential hypertension  PTA lisinopril 20 mg daily  -Holding lisinopril to allow permissive hypertension in setting of acute stroke        #.  Hyperlipidemia  -Most recent lipid profile showed calculated , HDL 33, triglycerides 275 and total cholesterol 224 (3/16/22)  -On rosuvastatin 40 mg daily, continue        #.  Diabetes mellitus type, poorly controlled, hemoglobin A1c 11.8(3/16/22)  PTA-Lantus 55 units daily, aspart 15 units 3 times daily with meals  -Wife reports improved blood sugars in the past 1 week.  A.m. blood sugars are usually less than 120.  Blood sugars rest of the day are in low 200s.  -Since patient is n.p.o. for now, will administer Lantus 15 units at at bedtime and start on aspart 8 units with breakfast, 10 units each with lunch and supper  -Monitor blood sugars  -NovoLog sliding scale  Blood sugar at 85 overnight so reduced supper dose of  NovoLog to 6 units from 10 units  Monitor blood sugars closely and adjust insulin as needed        #.  Obstructive sleep apnea  -Continue CPAP as able     #.  Generalized anxiety disorder  -Continue Lexapro                 Diet: On moderate carb controlled diet  DVT Prophylaxis: Pneumatic Compression Devices  Rao Catheter: Not present  Central Lines: None  Cardiac Monitoring: None  Code Status:  DNR/DNI        Clinically Significant Risk Factors Present on Admission                    # Platelet  "Defect: home medication list includes an antiplatelet medication   # Severe Obesity: Estimated body mass index is 43.74 kg/m  as calculated from the following:    Height as of this encounter: 1.554 m (5' 1.2\").    Weight as of this encounter: 105.7 kg (233 lb).            Disposition Plan     Expected Discharge: In the next 1 to 2 days when okay with neurology  Anticipated discharge location:    Discussed with bedside RN, patient and his wife at bedside today      Saritha Ann MD  [phone] (P)      Interval History   Patient's mental status and underlying confusion that brought him in much improved.  Very mild right-sided weakness noted today.  Wife is concerned about him having 3 strokes and 3 hospitalizations within the last 4 weeks.  No other acute events since admission    -Data reviewed today: I reviewed all new labs and imaging results over the last 24 hours. I personally reviewed all the labs labs and imaging since admission  Physical Exam   Temp: 98.8  F (37.1  C) Temp src: Oral BP: 105/56 Pulse: 73   Resp: 18 SpO2: 93 % O2 Device: None (Room air)    Vitals:    04/05/22 1123   Weight: 105.7 kg (233 lb)     Vital Signs with Ranges  Temp:  [98  F (36.7  C)-99.5  F (37.5  C)] 98.8  F (37.1  C)  Pulse:  [64-83] 73  Resp:  [14-23] 18  BP: (105-151)/(56-85) 105/56  SpO2:  [93 %-97 %] 93 %  I/O last 3 completed shifts:  In: 330 [P.O.:330]  Out: -     Constitutional: Awake, alert, cooperative, no apparent distress, alert oriented x3  Respiratory: Clear to auscultation bilaterally, no crackles or wheezing  Cardiovascular: Regular rate and rhythm, normal S1 and S2, and no murmur noted  GI: Normal bowel sounds, soft, non-distended, non-tender  Skin/Integumen: No rashes, no cyanosis, no edema  Other: Very mild right-sided weakness noted today    Medications     - MEDICATION INSTRUCTIONS -       - MEDICATION INSTRUCTIONS -       sodium chloride 100 mL/hr at 04/05/22 7842       aspirin  325 mg Oral Daily     " clopidogrel  75 mg Oral Daily     escitalopram  20 mg Oral Daily     famotidine  20 mg Oral or NG Tube BID     insulin aspart  6 Units Subcutaneous Daily with supper     insulin aspart  8 Units Subcutaneous Daily with breakfast     insulin aspart  10 Units Subcutaneous Daily with lunch     insulin aspart  1-10 Units Subcutaneous TID AC     insulin aspart  1-7 Units Subcutaneous At Bedtime     insulin glargine  50 Units Subcutaneous At Bedtime     rosuvastatin  40 mg Oral Daily     sodium chloride (PF)  3 mL Intracatheter Q8H       Data   Recent Labs   Lab 04/06/22  0834 04/06/22  0725 04/06/22  0206 04/05/22  1608 04/05/22  1149 04/05/22  1148   WBC  --  7.2  --   --  8.5  --    HGB  --  14.4  --   --  15.3  --    MCV  --  86  --   --  87  --    PLT  --  386  --   --  425  --    NA  --  135  --   --   --  133   POTASSIUM  --  3.7  --   --   --  4.1   CHLORIDE  --  104  --   --   --  100   CO2  --  25  --   --   --  26   BUN  --  13  --   --   --  15   CR  --  1.05  --   --   --  1.16   ANIONGAP  --  6  --   --   --  7   EVER  --  9.4  --   --   --  9.7   GLC 84 85 146*   < >  --  214*    < > = values in this interval not displayed.       Recent Results (from the past 24 hour(s))   MR Brain w/o & w Contrast    Narrative    MRI OF THE BRAIN WITHOUT AND WITH CONTRAST  MRA OF THE HEAD WITHOUT CONTRAST  MRA OF THE NECK WITHOUT AND WITH CONTRAST    4/5/2022 2:35 PM     HISTORY:  Aphasia. Right-sided weakness. Clinical concern for an acute  stroke.     COMPARISON: Head CT 4/5/2022, head MRI 3/24/2022 and head and neck CTA  3/24/2022.    TECHNIQUE:   Brain: Axial diffusion-weighted with ADC map, T2-weighted with fat  saturation, T1-weighted and turboFLAIR and coronal T1-weighted images  of the brain were obtained without intravenous contrast.  Following  gadobutrol (GADAVIST) injection 15 mL IV, axial turboFLAIR and coronal  T1-weighted images of the brain were obtained.     MRA: 3D time-of-flight MR angiography of the major  arteries at the  base of the brain was performed without contrast.  2D time-of-flight  MRA without contrast with superior and inferior saturation bands and  3D T1-weighted postgadolinium MRA of the neck/cervical vessels were  also obtained. MIP reconstruction of all MR angiographic data was  performed.    FINDINGS:   HEAD MRI:  INTRACRANIAL CONTENTS: Scattered acute infarcts in the left frontal  and parietal parasagittal corona radiata. These are consistent with  border zone infarcts. The previously noted acute infarcts in the left  parietal border zone territory and in the left temporal lobe  demonstrate expected evolution. Progressed, petechial hemorrhages  noted in the left temporal lobe. No gross hemorrhagic transformation.  No gross mass effect. Superimposed mild presumed chronic small vessel  ischemic change. No hydrocephalus.    SELLA: No significant abnormality accounting for technique.    OSSEOUS STRUCTURES/SOFT TISSUES: No aggressive osseous lesion  involving the calvarium, skull base, or visualized upper cervical  spine. The major intracranial vascular flow voids are maintained.    ORBITS: No significant abnormality accounting for technique.    SINUSES/MASTOIDS: No significant paranasal sinus mucosal disease. No  significant middle ear or mastoid effusion.    HEAD MRA:  ANTERIOR CIRCULATION: Mild right and moderate left cavernous carotid  stenoses. Severe distal left M1 stenosis again noted. Associated  decreased signal throughout the left M2 and M3 branches. Normal  anterior cerebral arteries. Normal right middle cerebral artery.    POSTERIOR CIRCULATION: Mild stenosis in the left P2/P3 junction. The  remainder of the posterior circulation is normal. The left vertebral  artery is dominant.    ANEURYSM/VASCULAR MALFORMATION: None.    NECK MRA:  RIGHT CAROTID: No measurable stenosis in the right ICA based on NASCET  criteria.    LEFT CAROTID: No measurable stenosis in the left ICA based on  NASCET  criteria.     VERTEBRAL ARTERIES: Balanced vertebral arteries are patent in the neck  and into the head.     AORTIC ARCH: Classic aortic arch anatomy with no significant stenosis  at the origin of the great vessels.       Impression    IMPRESSION:  HEAD MRI:  1.  Scattered acute infarcts in the left frontal and parietal  parasagittal corona radiata consistent with border zone infarcts.  2.  The previously noted acute infarcts in the left parietal border  zone territory and left temporal lobe demonstrate expected evolution.  3.  Petechial hemorrhage in the left temporal infarcts is slightly  progressed. No gross hemorrhagic transformation. No gross mass effect.  4.  No hydrocephalus.    HEAD MRA:  1.  No change.  2.  Severe distal left M1 stenosis with decreased signal in the left  M2 and M3 branches.  3.  Moderate left and mild right cavernous carotid stenoses.  4.  Mild stenosis in the left P2/P3 junction.    NECK MRA:  1.  Normal neck MRA for age.  2.  No significant stenosis as per NASCET criteria.         KARLO OJEDA MD         SYSTEM ID:  [id number]   MRA Brain ([place]) wo Contrast    Narrative    MRI OF THE BRAIN WITHOUT AND WITH CONTRAST  MRA OF THE HEAD WITHOUT CONTRAST  MRA OF THE NECK WITHOUT AND WITH CONTRAST    4/5/2022 2:35 PM     HISTORY:  Aphasia. Right-sided weakness. Clinical concern for an acute  stroke.     COMPARISON: Head CT 4/5/2022, head MRI 3/24/2022 and head and neck CTA  3/24/2022.    TECHNIQUE:   Brain: Axial diffusion-weighted with ADC map, T2-weighted with fat  saturation, T1-weighted and turboFLAIR and coronal T1-weighted images  of the brain were obtained without intravenous contrast.  Following  gadobutrol (GADAVIST) injection 15 mL IV, axial turboFLAIR and coronal  T1-weighted images of the brain were obtained.     MRA: 3D time-of-flight MR angiography of the major arteries at the  base of the brain was performed without contrast.  2D time-of-flight  MRA without  contrast with superior and inferior saturation bands and  3D T1-weighted postgadolinium MRA of the neck/cervical vessels were  also obtained. MIP reconstruction of all MR angiographic data was  performed.    FINDINGS:   HEAD MRI:  INTRACRANIAL CONTENTS: Scattered acute infarcts in the left frontal  and parietal parasagittal corona radiata. These are consistent with  border zone infarcts. The previously noted acute infarcts in the left  parietal border zone territory and in the left temporal lobe  demonstrate expected evolution. Progressed, petechial hemorrhages  noted in the left temporal lobe. No gross hemorrhagic transformation.  No gross mass effect. Superimposed mild presumed chronic small vessel  ischemic change. No hydrocephalus.    SELLA: No significant abnormality accounting for technique.    OSSEOUS STRUCTURES/SOFT TISSUES: No aggressive osseous lesion  involving the calvarium, skull base, or visualized upper cervical  spine. The major intracranial vascular flow voids are maintained.    ORBITS: No significant abnormality accounting for technique.    SINUSES/MASTOIDS: No significant paranasal sinus mucosal disease. No  significant middle ear or mastoid effusion.    HEAD MRA:  ANTERIOR CIRCULATION: Mild right and moderate left cavernous carotid  stenoses. Severe distal left M1 stenosis again noted. Associated  decreased signal throughout the left M2 and M3 branches. Normal  anterior cerebral arteries. Normal right middle cerebral artery.    POSTERIOR CIRCULATION: Mild stenosis in the left P2/P3 junction. The  remainder of the posterior circulation is normal. The left vertebral  artery is dominant.    ANEURYSM/VASCULAR MALFORMATION: None.    NECK MRA:  RIGHT CAROTID: No measurable stenosis in the right ICA based on NASCET  criteria.    LEFT CAROTID: No measurable stenosis in the left ICA based on NASCET  criteria.     VERTEBRAL ARTERIES: Balanced vertebral arteries are patent in the neck  and into the head.      AORTIC ARCH: Classic aortic arch anatomy with no significant stenosis  at the origin of the great vessels.       Impression    IMPRESSION:  HEAD MRI:  1.  Scattered acute infarcts in the left frontal and parietal  parasagittal corona radiata consistent with border zone infarcts.  2.  The previously noted acute infarcts in the left parietal border  zone territory and left temporal lobe demonstrate expected evolution.  3.  Petechial hemorrhage in the left temporal infarcts is slightly  progressed. No gross hemorrhagic transformation. No gross mass effect.  4.  No hydrocephalus.    HEAD MRA:  1.  No change.  2.  Severe distal left M1 stenosis with decreased signal in the left  M2 and M3 branches.  3.  Moderate left and mild right cavernous carotid stenoses.  4.  Mild stenosis in the left P2/P3 junction.    NECK MRA:  1.  Normal neck MRA for age.  2.  No significant stenosis as per NASCET criteria.         KARLO OJEDA MD         SYSTEM ID:  [id number]   MRA Neck (Carotids) wo & w Contrast    Narrative    MRI OF THE BRAIN WITHOUT AND WITH CONTRAST  MRA OF THE HEAD WITHOUT CONTRAST  MRA OF THE NECK WITHOUT AND WITH CONTRAST    4/5/2022 2:35 PM     HISTORY:  Aphasia. Right-sided weakness. Clinical concern for an acute  stroke.     COMPARISON: Head CT 4/5/2022, head MRI 3/24/2022 and head and neck CTA  3/24/2022.    TECHNIQUE:   Brain: Axial diffusion-weighted with ADC map, T2-weighted with fat  saturation, T1-weighted and turboFLAIR and coronal T1-weighted images  of the brain were obtained without intravenous contrast.  Following  gadobutrol (GADAVIST) injection 15 mL IV, axial turboFLAIR and coronal  T1-weighted images of the brain were obtained.     MRA: 3D time-of-flight MR angiography of the major arteries at the  base of the brain was performed without contrast.  2D time-of-flight  MRA without contrast with superior and inferior saturation bands and  3D T1-weighted postgadolinium MRA of the neck/cervical vessels  were  also obtained. MIP reconstruction of all MR angiographic data was  performed.    FINDINGS:   HEAD MRI:  INTRACRANIAL CONTENTS: Scattered acute infarcts in the left frontal  and parietal parasagittal corona radiata. These are consistent with  border zone infarcts. The previously noted acute infarcts in the left  parietal border zone territory and in the left temporal lobe  demonstrate expected evolution. Progressed, petechial hemorrhages  noted in the left temporal lobe. No gross hemorrhagic transformation.  No gross mass effect. Superimposed mild presumed chronic small vessel  ischemic change. No hydrocephalus.    SELLA: No significant abnormality accounting for technique.    OSSEOUS STRUCTURES/SOFT TISSUES: No aggressive osseous lesion  involving the calvarium, skull base, or visualized upper cervical  spine. The major intracranial vascular flow voids are maintained.    ORBITS: No significant abnormality accounting for technique.    SINUSES/MASTOIDS: No significant paranasal sinus mucosal disease. No  significant middle ear or mastoid effusion.    HEAD MRA:  ANTERIOR CIRCULATION: Mild right and moderate left cavernous carotid  stenoses. Severe distal left M1 stenosis again noted. Associated  decreased signal throughout the left M2 and M3 branches. Normal  anterior cerebral arteries. Normal right middle cerebral artery.    POSTERIOR CIRCULATION: Mild stenosis in the left P2/P3 junction. The  remainder of the posterior circulation is normal. The left vertebral  artery is dominant.    ANEURYSM/VASCULAR MALFORMATION: None.    NECK MRA:  RIGHT CAROTID: No measurable stenosis in the right ICA based on NASCET  criteria.    LEFT CAROTID: No measurable stenosis in the left ICA based on NASCET  criteria.     VERTEBRAL ARTERIES: Balanced vertebral arteries are patent in the neck  and into the head.     AORTIC ARCH: Classic aortic arch anatomy with no significant stenosis  at the origin of the great vessels.        Impression    IMPRESSION:  HEAD MRI:  1.  Scattered acute infarcts in the left frontal and parietal  parasagittal corona radiata consistent with border zone infarcts.  2.  The previously noted acute infarcts in the left parietal border  zone territory and left temporal lobe demonstrate expected evolution.  3.  Petechial hemorrhage in the left temporal infarcts is slightly  progressed. No gross hemorrhagic transformation. No gross mass effect.  4.  No hydrocephalus.    HEAD MRA:  1.  No change.  2.  Severe distal left M1 stenosis with decreased signal in the left  M2 and M3 branches.  3.  Moderate left and mild right cavernous carotid stenoses.  4.  Mild stenosis in the left P2/P3 junction.    NECK MRA:  1.  Normal neck MRA for age.  2.  No significant stenosis as per NASCET criteria.         KARLO OJEDA MD         SYSTEM ID:  [id number]

## 2022-04-06 NOTE — PLAN OF CARE
Pt here with CVA. A&O. Neuros are intact, Pt insonsistent with following command. VSS. Tele SR. Mod carb  diet, thin liquids. Takes pills whole.  NS at 100ml/hr. Up with SBA. Denies pain. Pt scoring green on the Aggression Stop Light Tool. Discharge pending.

## 2022-04-06 NOTE — PROGRESS NOTES
04/06/22 1410   Quick Adds   Type of Visit Initial PT Evaluation   Living Environment   People in Home spouse   Current Living Arrangements house   Home Accessibility stairs to enter home;stairs within home  (ramp from garage per chart)   Number of Stairs, Main Entrance 2   Stair Railings, Main Entrance none   Number of Stairs, Within Home, Primary greater than 10 stairs  (to lower level TV)   Stair Railings, Within Home, Primary railings safe and in good condition   Transportation Anticipated family or friend will provide   Living Environment Comments per chart:Pt lives in [facility] with spouse, can stay on main level if needed. Laundry is in basement. Tub shower without grab bars; patient having difficulty giving PLOF    Self-Care   Usual Activity Tolerance good   Current Activity Tolerance fair  (patient reports being tired)   Equipment Currently Used at Home none   Fall history within last six months yes   Number of times patient has fallen within last six months 1  (patient unsure)   Activity/Exercise/Self-Care Comment per chart: This is pt's 3rd hospitalization for stroke in 1 month, was independent prior to first stroke. Prior to this admission, pt was walking without assistive device and was IND with toileting, dressing and bathing. Pt's spouse was doing all IADLs, pt has not been driving. Per spouse, pt fell while trying to put his pants on prior to this admission. Pt has had 2 OP OT visits prior to this admission; patient having difficulty stating PLOF this visit   General Information   Onset of Illness/Injury or Date of Surgery 04/05/22   Referring Physician Pham Avelar MD   Patient/Family Therapy Goals Statement (PT) not stated   Pertinent History of Current Problem (include personal factors and/or comorbidities that impact the POC) per chart: Huseyin St is a(n) 66 year old male with HTN, HLD, Dm2, NA, known severe L M1 stenosis with recurrent strokes (multiple admissions March 2022) who presented  on 4/5/2022 with mild worsening R sided weakness, aphasia. Admitted twice last month for L hemispheric strokes thought to be associated with L M1 stenosis, was started on DAPT (Plavix & ASA confirmed therapeutic). Had poor glycemic control, neuro IR evaluated for stent after second stroke but recommended improving medical management. Presents with mild R sided weakness (new) and slightly worse aphasia, MRI shows multifocal small strokes in L M1 distribution. ASA/Plavix still therapeutic, LDL well controlled, FSBG <250 consistently. Of note, had cocaine + UDS on first admission in March, negative last admission & this admission (per wife pt used cocaine consistently in the past & had just been given cocaine by a friend prior to first admission; has not had access to or used it since early March; see medical record for further information   Existing Precautions/Restrictions fall   General Observations patient very impulsive at times; decreased attention   Cognition   Orientation Status (Cognition) person;place  (Answered [place] for month and year)   Safety Deficit (Cognition) ability to follow commands;at risk behavior observed;awareness of need for assistance;impulsivity;insight into deficits/self-awareness;judgment;problem-solving;safety precautions awareness;safety precautions follow-through/compliance   Cognitive Status Comments difficulty following some commands today or even demonstration cues; also had significant inattention to tasks   Pain Assessment   Patient Currently in Pain No   Range of Motion (ROM)   ROM Comment shoulders lacking full active ROM; appears to have stiffness in hips but able to move LE's functionally   Strength (Manual Muscle Testing)   Strength Comments functional for walking but not following commands well for MMT   Bed Mobility   Comment, (Bed Mobility) SBA to mod I; impulsive   Transfers   Comment, (Transfers) sit>stand with CGA and safety cues;   Gait/Stairs (Locomotion)    Comment, (Gait/Stairs) current need for UE support; CGA; path deviations with no assistive device   Balance   Balance Comments impaired; path deviations R and L; improved with UE support   Clinical Impression   Criteria for Skilled Therapeutic Intervention Yes, treatment indicated   PT Diagnosis (PT) impaired functional mobility   Influenced by the following impairments impaired balance; impaired command following and poor safety awareness; impulsive; falls risk; impaired cognition; decreased attention to task   Functional limitations due to impairments impaired independence with mobility and cares secondary to above deficits   Clinical Presentation (PT Evaluation Complexity) Evolving/Changing   Clinical Presentation Rationale clinical judgement; level of assist   Clinical Decision Making (Complexity) moderate complexity   Planned Therapy Interventions (PT) gait training;balance training;neuromuscular re-education;stair training;transfer training   Anticipated Equipment Needs at Discharge (PT) other (see comments)  (to be determined)   Risk & Benefits of therapy have been explained evaluation/treatment results reviewed;care plan/treatment goals reviewed;risks/benefits reviewed;current/potential barriers reviewed;participants included;patient   Clinical Impression Comments patient appears to have greater deficits since recent strokes, impairing his safety and independence with mobility   PT Discharge Planning   PT Discharge Recommendation (DC Rec) Transitional Care Facility   PT Rationale for DC Rec Patient below reported baseline level of function and would benefit from ongoing PT in hospital and at discharge to a TCU to maximize return to PLOF; Patient's cognition appears to have larger deficts than his functional mobility deficits and make him unsafe with mobility;; if patient were to return home, would need 24/7 assist for all mobility and cares secondary to impaired cognition   PT Brief overview of current  status A x 1 with mobility; impaired cognition; high falls risk   Plan of Care Review   Plan of Care Reviewed With patient;other (see comments)  (Nurse)   Total Evaluation Time   Total Evaluation Time (Minutes) 10   Physical Therapy Goals   PT Frequency Daily   PT Predicated Duration/Target Date for Goal Attainment 04/11/22   PT Goals Bed Mobility;Transfers;Gait;Stairs   PT: Bed Mobility Independent   PT: Transfers Sit to/from stand;Bed to/from chair;Assistive device;Supervision/stand-by assist   PT: Gait Assistive device;Greater than 200 feet;Supervision/stand-by assist   PT: Stairs Supervision/stand-by assist;Greater than 10 stairs;Assistive device  (with rail)

## 2022-04-06 NOTE — PLAN OF CARE
Goal Outcome Evaluation:      Reason for Admission: L CVA    Cognitive/Mentation: A/Ox 3-4. It seems like it fluctuates depending on his mood. He's always a little slow and goofy.  Neuros/CMS: Intact ex confused  VS: Stable.   Tele: NSR  GI: BS +, + flatus, last BM 4/3. Continent.  : WDL. Continent.  Pulmonary: LS clear.  Pain: Pt denies.     Drains: N/A  Skin: Intact  Activity: Assist x 1 with WGB.  Diet: Mod Carb with thin liquids. Takes pills whole.     Therapies recs: home with assist;home with home care occupational therapy;Transitional Care Facility  Discharge: Pending. Waiting to hear if neuro wants to put in a stent before any discharge planning       End of shift summary:  Pt is always trying to joke around. When doing assessments you have to wait awhile for him to understand and respond to your questions, and then sometimes he will do something goofy and you have to ask him to do it the right way. It's hard to discern between when he is trying to joke around and when he is not understanding.

## 2022-04-06 NOTE — PHARMACY-CONSULT NOTE
Pharmacy Consult to evaluate for medication related stroke core measures    Huseyin St, 66 year old male admitted for CVA on 4/5/2022.    Thrombolytic was not given because of Time from onset contraindications    VTE Prophylaxis SCDs /PCDs placed on 4/5/22, as appropriate prior to end of hospital day 2.    Antithrombotic: aspirin and clopidogrel started on 4/5/22  , as appropriate by end of hospital day 2. Continue antithrombotic therapy on discharge to meet quality measures, unless contraindicated.    Anticoagulation if history of A-fib/flutter: Patient does not have history of A-fib/flutter - anticoagulation not required for medication related stroke core measures.     LDL Cholesterol Calculated   Date Value Ref Range Status   03/16/2022 136 (H) <=100 mg/dL Final   09/28/2020 96 <100 mg/dL Final     Comment:     Desirable:       <100 mg/dl       Patient currently receiving Crestor (rosuvastatin) continue statin on discharge to meet quality measures, unless contraindicated.    Recommendations: None at this time    Thank you for the consult.    Junito Hutton [facility] 4/6/2022 8:24 AM

## 2022-04-06 NOTE — PROGRESS NOTES
04/06/22 1100   Quick Adds   Type of Visit Initial Occupational Therapy Evaluation   Living Environment   People in Home spouse   Current Living Arrangements house   Home Accessibility stairs to enter home;stairs within home  (2 steps front door, ramp from garage)   Number of Stairs, Main Entrance 2   Stair Railings, Main Entrance none   Transportation Anticipated family or friend will provide   Living Environment Comments Pt lives in [facility] with spouse, can stay on main level if needed. Laundry is in basement. Tub shower without grab bars   Self-Care   Usual Activity Tolerance good   Current Activity Tolerance moderate   Equipment Currently Used at Home none   Fall history within last six months yes   Number of times patient has fallen within last six months 1   Activity/Exercise/Self-Care Comment This is pt's 3rd hospitalization for stroke in 1 month, was independent prior to first stroke. Prior to this admission, pt was walking without assistive device and was IND with toileting, dressing and bathing. Pt's spouse was doing all IADLs, pt has not been driving. Per spouse, pt fell while trying to put his pants on prior to this admission. Pt has had 2 OP OT visits prior to this admission   General Information   Onset of Illness/Injury or Date of Surgery 04/05/22   Referring Physician Pham Avelar MD   Additional Occupational Profile Info/Pertinent History of Current Problem 67 yo male admitted 4/5 with recurrent strokes. Found to have scattered infarcts in L frontal and parietal lobes. Pt was admitted twice in March for L hemisphere CVAs and discharged home. PMH: HTN, DM II, Hyperlipidemia, NA   Existing Precautions/Restrictions fall   Cognitive Status Examination   Orientation Status orientation to person, place and time   Affect/Mental Status (Cognitive) flat/blunted affect   Follows Commands follows one-step commands;25-49% accuracy;delayed response/completion;increased processing time needed;repetition of  directions required;verbal cues/prompting required;initiation impaired   Safety Deficit awareness of need for assistance;insight into deficits/self-awareness;moderate deficit   Attention Deficit distractible in noisy environment;focused/sustained attention;moderate deficit   Cognitive Status Comments Pt with flat affect, difficult to assess what is cognitive deficit and what is pt attempting to be humorous. Pt having difficulty following simple commands even with repeated cues   Visual Perception   Visual Impairment/Limitations WFL   Impact of Vision Impairment on Function (Vision) Scanning, peripheral, accomodation, convergence intact   Sensory   Sensory Quick Adds No deficits were identified   Pain Assessment   Patient Currently in Pain No   Integumentary/Edema   Integumentary/Edema no deficits were identifed   Posture   Posture protracted shoulders   Range of Motion Comprehensive   General Range of Motion no range of motion deficits identified   Strength Comprehensive (MMT)   Comment, General Manual Muscle Testing (MMT) Assessment L UE 5/5, R UE 4/5 shoulder flexion, elbow flexion, . L UE 5/5 shoulder and elbow extension   Muscle Tone Assessment   Muscle Tone Quick Adds No deficits were identified   Coordination   Upper Extremity Coordination Right UE impaired   Coordination Comments R UE impaired with opposition   Bed Mobility   Bed Mobility supine-sit;sit-supine   Supine-Sit [place] (Bed Mobility) supervision   Sit-Supine [place] (Bed Mobility) supervision   Transfers   Transfers sit-stand transfer;toilet transfer   Sit-Stand Transfer   Sit-Stand [place] (Transfers) contact guard   Toilet Transfer   Type (Toilet Transfer) sit-stand;stand-sit   [place] Level (Toilet Transfer) contact guard   Balance   Balance Comments Pt appearing to stumble once with ambulating in hallway, but overall steady on feet. Defer balance assessment to PT   Lower Body Dressing Assessment/Training   [place]  Level (Lower Body Dressing) supervision;verbal cues   Toileting   [place] Level (Toileting) supervision   Assistive Devices (Toileting) grab bar, toilet   Clinical Impression   Criteria for Skilled Therapeutic Interventions Met (OT) Yes, treatment indicated   OT Diagnosis Impaired independence and safety with I/ADLs   OT Problem List-Impairments impacting ADL problems related to;activity tolerance impaired;cognition;mobility;coordination   Assessment of Occupational Performance 3-5 Performance Deficits   Identified Performance Deficits functional mobility, toileting, dressing, med mgmt, higher level IADLs, bathing   Planned Therapy Interventions (OT) ADL retraining;IADL retraining;cognition   Clinical Decision Making Complexity (OT) moderate complexity   Anticipated Equipment Needs Upon Discharge (OT)   (TBD)   Risk & Benefits of therapy have been explained evaluation/treatment results reviewed;participants included;patient;spouse/significant other   OT Discharge Planning   OT Discharge Recommendation (DC Rec) home with assist;home with home care occupational therapy;Transitional Care Facility   OT Rationale for DC Rec Pt below baseline independence with mobility and I/ADLs, limited by impaired cognition, expressive and receptive aphasia, and decreased activity tolerance. Pt lacks awareness and insight into his deficits, was admitted due to a fall this admission and found to have new strokes. Recommend TCU for further therapies to reduce risk of readmission and for further cognitive evaluation and treatment. If pt unable to go to TCU, would need 24/7 supervision and assist for all I/ADLs. Pt was previously receiving OP OT, however pt's wife reports leaving home is a burden on her and would prefer HH OT   Total Evaluation Time (Minutes)   Total Evaluation Time (Minutes) 15   OT Goals   Therapy Frequency (OT) Daily   OT Predicated Duration/Target Date for Goal Attainment 04/13/22   OT Goals OT Goal 1   OT:  Hygiene/Grooming independent;while standing   OT: Upper Body Dressing Independent;including set-up/clothing retrieval   OT: Lower Body Dressing Modified independent;including set-up/clothing retrieval   OT: Toilet Transfer/Toileting Modified independent;toilet transfer;cleaning and garment management;using adaptive equipment   OT: Cognitive Patient/caregiver will verbalize understanding of cognitive assessment results/recommendations as needed for safe discharge planning   OT: Goal 1 Patient will follow 75% of commands on first attempt during I/ADL instruction

## 2022-04-06 NOTE — CONSULTS
Patient has had multiple strokes recently. Per patient's spouse, they completed our stroke education via outpatient phone call a few weeks ago. Scheduled class cancelled.

## 2022-04-06 NOTE — PROGRESS NOTES
CLINICAL SWALLOW EVALUATION       04/06/22 0954   General Information   Onset of Illness/Injury or Date of Surgery 04/05/22   Referring Physician Dr. Ann   Patient/Family Therapy Goal Statement (SLP) Patient would like to return home.    Pertinent History of Current Problem Patient admitted with recurrent CVA.  He experienced increased right sided weakness, fell at home yesterday, and was sent to the ED after arriving to an outpatient SLP appt.   He was found to have scattered acute infarcts in left frontoparietal lobes.  His PMH is significant for HTN, HLD, poorly controlled DM, NA, anxiety, and recent hospitalization for left MCA CVA with severe expressive and receptive aphasia at that time.    General Observations Patient alert, socially-conversational.   Past History of Dysphagia Eating regular diet with thin liquids at home after CVA discharge   Type of Evaluation   Type of Evaluation Swallow Evaluation   Oral Motor   Oral Musculature anomalies present   Structural Abnormalities none present   Mucosal Quality good   Dentition (Oral Motor)   Dentition (Oral Motor) natural dentition   Facial Symmetry (Oral Motor)   Facial Symmetry (Oral Motor) right side impairment   Right Side Facial Asymmetry minimal impairment   Bilateral Facial Asymmetry right side   Lip Function (Oral Motor)   Lip Range of Motion (Oral Motor) WNL   Protrusion, Lip Range of Motion minimal impairment   Retraction, Lip Range of Motion right side;minimal impairment   Lip Strength (Oral Motor) WFL   Tongue Function (Oral Motor)   Tongue ROM (Oral Motor) WNL   Jaw Function (Oral Motor)   Jaw Function (Oral Motor) WNL   Cough/Swallow/Gag Reflex (Oral Motor)   Soft Palate/Velum (Oral Motor) WNL   Volitional Throat Clear/Cough (Oral Motor) WNL   Vocal Quality/Secretion Management (Oral Motor)   Vocal Quality (Oral Motor) WNL   General Swallowing Observations   Current Diet/Method of Nutritional Intake (General Swallowing Observations, NIS) regular  diet;thin liquids (level 0)   Respiratory Support (General Swallowing Observations) none   Swallowing Evaluation Clinical swallow evaluation   Clinical Swallow Evaluation   Feeding Assistance set up only required   Additional evaluation(s) completed today No   Clinical Swallow Evaluation Textures Trialed thin liquids;solid foods   Clinical Swallow Eval: Thin Liquid Texture Trial   Mode of Presentation, Thin Liquids cup;self-fed;straw   Volume of Liquid or Food Presented 10 oz   Oral Phase of Swallow WFL   Pharyngeal Phase of Swallow intact   Diagnostic Statement No overt Sx of aspiration   Clinical Swallow Evaluation: Solid Food Texture Trial   Mode of Presentation self-fed   Volume Presented 1 pkg crackers   Oral Phase WFL   Pharyngeal Phase intact   Diagnostic Statement No overt Sx of aspiration   Swallowing Recommendations   Diet Consistency Recommendations regular diet;thin liquids (level 0)   Supervision Level for Intake distant supervision needed   Mode of Delivery Recommendations bolus size, small;slow rate of intake   Recommended Feeding/Eating Techniques (Swallow Eval) maintain upright sitting position for eating;maintain upright posture during/after eating for 30 minutes   Medication Administration Recommendations, Swallowing (SLP) whole with water, as preferred   Clinical Impression   Criteria for Skilled Therapeutic Interventions Met (SLP Eval) Yes, treatment indicated   SLP Diagnosis Minimal facial weakness, Functional oropharyngeal swallow   Risks & Benefits of therapy have been explained evaluation/treatment results reviewed;patient;spouse/significant other   Clinical Impression Comments Patient presents with functional oropharyngeal swallow despite minimal right sided facial-labial weakness.  Patient exhibited cough response x1 after consuming 6-8 oz consecutively of thin liquids by cup, no other overt Sx of aspiration.   SLP Discharge Planning   SLP Discharge Recommendation home with outpatient  speech therapy   SLP Rationale for DC Rec Patient with persistent aphasia, improved from previous hospitalization but indicative of OP SLP need for higher level language and cognition function.     SLP Brief overview of current status  Clinical swallow evaluation and 1x treatment completed:  recommend regular diet with thin liquids.  Patient following 1 step commands appropriately and able to express personal and medical information at a basic level today.  Recommend resume plan for OP SLP follow up for cognitive-linguistic rehab.     Total Evaluation Time   Total Evaluation Time (Minutes) 30

## 2022-04-06 NOTE — PROVIDER NOTIFICATION
"Paged Dr. Saritha Ann \"Pt's oral fluid intake is adequate, can his IV fluid be discontinued? MD gave written order to reduce to 50ml/hr   "

## 2022-04-06 NOTE — CONSULTS
[facility]    Stroke Consult Note    Reason for Consult:  New ischemic infarct MRI    Chief Complaint: R weakness, altered mental status     HPI    I was called by Pham Avelar on 04/05/22 regarding patient [address] The patient is a 66 year old male w hx HTN, HLD, poorly controlled DM2, L MCA infarcts, NA, hx substance abuse quit 1 year ago, presents with recurrent CVA.      3/16/22-3/18/22 admitted with L temporal ischemic infarct, in setting cocaine use, found to have severe LMCA stenosis, discharged on DAPT, 3/24 readmitted due to small new LMCA infarcts, platelet assays for ASA and Plavix showed therapeutic response. BG >400 IR recommended better risk factor control before attempting stenting. Plan to repeat MRI MRA in 1 month. Patient reports improved glycemic control. Here after wife noticed several days of word finding difficulty and worsening R weakness, dragging R foot, using both hands to hold mug. Patient has been abstinent from cocaine since initial admission per wife.     In ED CTH showed new small L corona radiata infarct, possible L parietal infarct. MRI brain revealed scattered infarcts in LMCA territory.     Stroke Evaluation Summarized    MRI/Head CT CTH  1. Two new small acute/subacute infarcts involving the left corona  radiata/centrum semiovale.  2. Findings concerning for subtle cerebral swelling and loss of  cortical gray-white distinction involving left parietal lobe. This  raises concern for an acute left parietal region infarct.  3. Redemonstrated subacute left temporal lobe infarct.  4. No acute intracranial hemorrhage or significant mass  Effect/herniation.     MR brain  HEAD MRI:  1.  Scattered acute infarcts in the left frontal and parietal  parasagittal corona radiata consistent with border zone infarcts.  2.  The previously noted acute infarcts in the left parietal border  zone territory and left temporal lobe demonstrate expected evolution.  3.   Petechial hemorrhage in the left temporal infarcts is slightly  progressed. No gross hemorrhagic transformation. No gross mass effect.  4.  No hydrocephalus.   Intracranial Vasculature HEAD MRA:  1.  No change.  2.  Severe distal left M1 stenosis with decreased signal in the left  M2 and M3 branches.  3.  Moderate left and mild right cavernous carotid stenoses.  4.  Mild stenosis in the left P2/P3 junction   Cervical Vasculature NECK MRA:  1.  Normal neck MRA for age.  2.  No significant stenosis as per NASCET criteria.     Echocardiogram EF 60-65%, LA normal,   EKG/Telemetry SR   Other Testing Aspirin and Plavix therapeutic on plat assay  Cocaine negative this admission, was positive on initial presentation 3/16     LDL  4/6/2022: 53 mg/dL   A1C  3/16/2022: 11.8 %   Troponin No lab value available in past 48 hrs     Impression    LMCA infarcts 2/2 intracranial large vessel stenosis, second breakthrough infarct despite duel antiplatelet therapy, therapeutic on platelet assay. Initially started on medical management w DAPT, when failed therapy chose not to stent because was poorly controlled diabetic with glucose in 400s, A1C 11.8, glucose now better controlled, abstinent from cocaine per wife, verified on 2 UDS. Patient has now failed medical therapy and needs stenting to avoid M1 occlusion and further strokes.     Recommendations     - IR discussing case for potential stenting    - Neurochecks and Vital Signs every q4h   - Permissive HTN; goal SBP < 180 mmHg  - Daily aspirin 325 mg for secondary stroke prevention  - Continue Plavix 75mg qd  - Statin: Rosuvastatin 40mg qhs  - Telemetry, EKG  - Bedside Glucose Monitoring  - PT/OT/SLP  - Stroke Education  - Euthermia, Euglycemia    Patient Follow-up    - final recommendation pending work-up    Thank you for this consult. We will continue to follow.     The Stroke Staff is Dr. Turner.    Frederick Reese MD  Vascular Neurology Fellow  To page me or covering stroke  "neurology team member, click here: AMCOM   Choose \"On Call\" tab at top, then search dropdown box for \"Neurology Adult\", select location, press Enter, then look for stroke/neuro ICU/telestroke.    _____________________________________________________    Clinically Significant Risk Factors Present on Admission               # Platelet Defect: home medication list includes an antiplatelet medication          Past Medical History   Past Medical History:   Diagnosis Date     Anxiety      Essential hypertension, benign      Hypercholesteremia      Sleep apnea      T2DM (type 2 diabetes mellitus) (H)      Past Surgical History   Past Surgical History:   Procedure Laterality Date     COLONOSCOPY       COLONOSCOPY N/A 10/26/2020    Procedure: COLONOSCOPY;  Surgeon: Randy Gonzalez MD;  Location:  GI     ENT SURGERY      R) ear \"procedure\"     Medications   Home Meds  Prior to Admission medications    Medication Sig Start Date End Date Taking? Authorizing Provider   aspirin (ASA) 325 MG EC tablet Take 1 tablet (325 mg) by mouth daily 3/27/22  Yes Lisbet Saini MD   clopidogrel (PLAVIX) 75 MG tablet Take 1 tablet (75 mg) by mouth daily 3/27/22  Yes Lisbet Saini MD   clotrimazole (LOTRIMIN) 1 % external cream Apply topically 2 times daily  Patient taking differently: Apply topically 2 times daily as needed  12/11/20  Yes Kendal Wong MD   escitalopram (LEXAPRO) 20 MG tablet Take 1 tablet (20 mg) by mouth daily 10/12/21  Yes Kendal Wong MD   insulin aspart (NOVOLOG FLEXPEN) 100 UNIT/ML pen Inject 15 Units Subcutaneous 3 times daily (with meals)  3/27/22  Yes Lisbet Saini MD   insulin glargine (LANTUS SOLOSTAR) 100 UNIT/ML pen INJ 55 UNITS SC HS 10/12/21  Yes Kendal Wong MD   lisinopril (ZESTRIL) 40 MG tablet Take 0.5 tablets (20 mg) by mouth daily 3/27/22 4/26/22 Yes Lisbet Saini MD   LORazepam (ATIVAN) 0.5 MG tablet Take 1 tablet (0.5 mg) by mouth once as needed for anxiety 4/1/22  Yes " Kendal Wong MD   rosuvastatin (CRESTOR) 40 MG tablet Take 1 tablet (40 mg) by mouth daily 10/12/21  Yes Kendal Wong MD   alcohol swab prep pads Use to swab area of injection/ian as directed 1/6/20   Kendal Wong MD   blood glucose (NO BRAND SPECIFIED) test strip Use to test blood sugar 3 times daily or as directed. To accompany: Blood Glucose Monitor Brands: per insurance. 10/12/21   Kendal Wong MD   blood glucose calibration (NO BRAND SPECIFIED) solution Use to calibrate blood glucose monitor as needed as directed. To accompany: Blood Glucose Monitor Brands: per insurance. 1/6/20   Kendal Wong MD   insulin pen needle (BD MARIE U/F) 32G X 4 MM miscellaneous USE THREE PEN NEEDLES DAILY AS DIRECTED 10/12/21   Kendal Wong MD   ORDER FOR DME Auto-CPAP: Max 12 cm H2O Min 10 cm H2O  Continuous Lifetime need and heated humidity.    7/5/12   Goltz, Bennett Ezra, PA-C   sildenafil (REVATIO) 20 MG tablet Take 1 tablet (20 mg) by mouth 3 times daily  Patient not taking: Reported on 4/1/2022 10/12/21   Kendal Wong MD   thin (NO BRAND SPECIFIED) lancets Use to test blood sugar 3 times daily or as directed. To accompany: Blood Glucose Monitor Brands: per insurance. 1/6/20   Kendal Wong MD       Scheduled Meds    aspirin  325 mg Oral Daily     clopidogrel  75 mg Oral Daily     escitalopram  20 mg Oral Daily     famotidine  20 mg Oral or NG Tube BID     insulin aspart  6 Units Subcutaneous Daily with supper     insulin aspart  8 Units Subcutaneous Daily with breakfast     insulin aspart  10 Units Subcutaneous Daily with lunch     insulin aspart  1-10 Units Subcutaneous TID AC     insulin aspart  1-7 Units Subcutaneous At Bedtime     insulin glargine  50 Units Subcutaneous At Bedtime     rosuvastatin  40 mg Oral Daily     sodium chloride (PF)  3 mL Intracatheter Q8H       Infusion Meds    - MEDICATION INSTRUCTIONS -       - MEDICATION INSTRUCTIONS -       sodium  chloride 100 mL/hr at 04/05/22 1845       PRN Meds  acetaminophen **OR** acetaminophen, glucose **OR** dextrose **OR** glucagon, lidocaine 4%, lidocaine (buffered or not buffered), LORazepam, - MEDICATION INSTRUCTIONS -, - MEDICATION INSTRUCTIONS -, melatonin, senna-docusate **OR** senna-docusate, sodium chloride (PF)    Allergies   Allergies   Allergen Reactions     Augmentin Diarrhea     Sulfa Drugs      Unknown - reaction occurred as a child     Victoza      Skin rash     Family History   Family History   Adopted: Yes   Problem Relation Age of Onset     Diabetes Mother      Respiratory Mother         asthma     Lipids Mother      Arthritis Mother         knee replacement     Alzheimer Disease Mother      Cerebrovascular Disease Brother      C.A.D. No family hx of      Cancer - colorectal No family hx of      Prostate Cancer No family hx of      Social History   Social History     Tobacco Use     Smoking status: Never Smoker     Smokeless tobacco: Never Used   Substance Use Topics     Alcohol use: Yes     Alcohol/week: 0.0 standard drinks     Comment: 3-6 drinks on occ weekend night, occ drink on weekday     Drug use: Yes     Types: Cocaine       Review of Systems   Aphasic cant provide       PHYSICAL EXAMINATION   Temp:  [98  F (36.7  C)-99.5  F (37.5  C)] 98.8  F (37.1  C)  Pulse:  [64-83] 73  Resp:  [14-23] 18  BP: (105-151)/(56-85) 105/56  SpO2:  [93 %-97 %] 93 %    Neurologic  Mental Status:  alert, oriented x 3, follows commands with some difficulty and coaching, dysarthric speech, naming intact, repetition with mistakes, low verbal output, flat affect  Cranial Nerves:  visual fields intact, PERRL, EOMI with normal smooth pursuit, facial sensation intact and symmetric, facial movements symmetric, hearing not formally tested but intact to conversation  Motor:  5/5 LUE LLE 4/5 RUE RLE  Reflexes:  toes down-going  Sensory:  light touch sensation intact and symmetric throughout upper and lower extremities, no  extinction on double simultaneous stimulation   Coordination:  normal finger-to-nose and heel-to-shin bilaterally without dysmetria, rapid alternating movements symmetric  Station/Gait:  deferred    Dysphagia Screen  Per Nursing    Stroke Scales    NIHSS  1a. Level of Consciousness 0-->Alert, keenly responsive   1b. LOC Questions 0-->Answers both questions correctly   1c. LOC Commands 1-->Performs one task correctly   2.   Best Gaze 0-->Normal   3.   Visual 0-->No visual loss   4.   Facial Palsy 1-->Minor paralysis (flattened nasolabial fold, asymmetry on smiling)   5a. Motor Arm, Left 0-->No drift, limb holds 90 (or 45) degrees for full 10 secs   5b. Motor Arm, Right 1-->Drift, limb holds 90 (or 45) degrees, but drifts down before full 10 secs, does not hit bed or other support   6a. Motor Leg, Left 0-->No drift, leg holds 30 degree position for full 5 secs   6b. Motor Leg, right 1-->Drift, leg falls by the end of the 5-sec period but does not hit bed   7.   Limb Ataxia 0-->Absent   8.   Sensory 0-->Normal, no sensory loss   9.   Best Language 1-->Mild-to-moderate aphasia, some obvious loss of fluency or facility of comprehension, without significant limitation on ideas expressed or form of expression. Reduction of speech and/or comprehension, however, makes conversation. . . (see row details)   10. Dysarthria 1-->Mild-to-moderate dysarthria, patient slurs at least some words and, at worst, can be understood with some difficulty   11. Extinction and Inattention  0-->No abnormality   Total 6 (04/06/22 1346)       Modified Tubac Score (Pre-morbid)  3 - Moderate disability.  Requires some help, but able to walk unassisted.    Imaging  I personally reviewed all imaging; relevant findings per HPI.    Labs Data   CBC  Recent Labs   Lab 04/06/22  0725 04/05/22  1149   WBC 7.2 8.5   RBC 5.05 5.38   HGB 14.4 15.3   HCT 43.3 46.6    425     Basic Metabolic Panel   Recent Labs   Lab 04/06/22  1157 04/06/22  0834  04/06/22  0725 04/05/22  1608 04/05/22  1148   NA  --   --  135  --  133   POTASSIUM  --   --  3.7  --  4.1   CHLORIDE  --   --  104  --  100   CO2  --   --  25  --  26   BUN  --   --  13  --  15   CR  --   --  1.05  --  1.16   * 84 85   < > 214*   EVER  --   --  9.4  --  9.7    < > = values in this interval not displayed.     Liver Panel  No results for input(s): PROTTOTAL, ALBUMIN, BILITOTAL, ALKPHOS, AST, ALT, BILIDIRECT in the last 168 hours.  INR    Recent Labs   Lab Test 03/16/22  1704   INR 1.02      Lipid Profile    Recent Labs   Lab Test 04/06/22  0725 03/16/22  1704 10/11/21  0942 04/21/16  0906 08/20/15  0830 01/14/15  0847 04/21/14  0815   CHOL 109 224* 228*   < > 136 316* 155   HDL 34* 33* 37*   < > 42 41 37*   LDL 53 136* 116*   < > 59 219* 80   TRIG 111 275* 377*   < > 176* 282* 189*   CHOLHDLRATIO  --   --   --   --  3.2 7.7* 4.2    < > = values in this interval not displayed.     A1C    Recent Labs   Lab Test 03/16/22  1704 10/11/21  0943 09/28/20  0925   A1C 11.8* 11.8* 13.2*     Troponin I    Recent Labs   Lab 04/05/22  1149   TROPONINIS <3          Stroke Consult Data Data   This was a non-emergent, non-telestroke consult.

## 2022-04-07 ENCOUNTER — APPOINTMENT (OUTPATIENT)
Dept: PHYSICAL THERAPY | Facility: CLINIC | Age: 67
DRG: 065 | End: 2022-04-07
Payer: COMMERCIAL

## 2022-04-07 ENCOUNTER — APPOINTMENT (OUTPATIENT)
Dept: INTERVENTIONAL RADIOLOGY/VASCULAR | Facility: CLINIC | Age: 67
DRG: 065 | End: 2022-04-07
Payer: COMMERCIAL

## 2022-04-07 LAB
GLUCOSE BLDC GLUCOMTR-MCNC: 126 MG/DL (ref 70–99)
GLUCOSE BLDC GLUCOMTR-MCNC: 152 MG/DL (ref 70–99)
GLUCOSE BLDC GLUCOMTR-MCNC: 159 MG/DL (ref 70–99)
GLUCOSE BLDC GLUCOMTR-MCNC: 203 MG/DL (ref 70–99)
GLUCOSE BLDC GLUCOMTR-MCNC: 229 MG/DL (ref 70–99)

## 2022-04-07 PROCEDURE — 120N000001 HC R&B MED SURG/OB

## 2022-04-07 PROCEDURE — 250N000011 HC RX IP 250 OP 636

## 2022-04-07 PROCEDURE — 99233 SBSQ HOSP IP/OBS HIGH 50: CPT | Mod: GC | Performed by: PSYCHIATRY & NEUROLOGY

## 2022-04-07 PROCEDURE — 99152 MOD SED SAME PHYS/QHP 5/>YRS: CPT | Mod: GC | Performed by: RADIOLOGY

## 2022-04-07 PROCEDURE — 272N000567 HC SHEATH CR4

## 2022-04-07 PROCEDURE — 36218 PLACE CATHETER IN ARTERY: CPT | Mod: GC | Performed by: RADIOLOGY

## 2022-04-07 PROCEDURE — 250N000009 HC RX 250

## 2022-04-07 PROCEDURE — 76937 US GUIDE VASCULAR ACCESS: CPT | Mod: 26 | Performed by: RADIOLOGY

## 2022-04-07 PROCEDURE — 272N000192 HC ACCESSORY CR2

## 2022-04-07 PROCEDURE — 258N000003 HC RX IP 258 OP 636: Performed by: STUDENT IN AN ORGANIZED HEALTH CARE EDUCATION/TRAINING PROGRAM

## 2022-04-07 PROCEDURE — 250N000013 HC RX MED GY IP 250 OP 250 PS 637: Performed by: INTERNAL MEDICINE

## 2022-04-07 PROCEDURE — C1769 GUIDE WIRE: HCPCS

## 2022-04-07 PROCEDURE — 258N000003 HC RX IP 258 OP 636: Performed by: INTERNAL MEDICINE

## 2022-04-07 PROCEDURE — 250N000013 HC RX MED GY IP 250 OP 250 PS 637: Performed by: STUDENT IN AN ORGANIZED HEALTH CARE EDUCATION/TRAINING PROGRAM

## 2022-04-07 PROCEDURE — 36216 PLACE CATHETER IN ARTERY: CPT | Mod: GC | Performed by: RADIOLOGY

## 2022-04-07 PROCEDURE — 76937 US GUIDE VASCULAR ACCESS: CPT

## 2022-04-07 PROCEDURE — 999N000128 HC STATISTIC PERIPHERAL IV START W/O US GUIDANCE

## 2022-04-07 PROCEDURE — 99233 SBSQ HOSP IP/OBS HIGH 50: CPT | Performed by: INTERNAL MEDICINE

## 2022-04-07 PROCEDURE — C1760 CLOSURE DEV, VASC: HCPCS

## 2022-04-07 PROCEDURE — 272N000196 HC ACCESSORY CR5

## 2022-04-07 PROCEDURE — 255N000002 HC RX 255 OP 636: Performed by: RADIOLOGY

## 2022-04-07 PROCEDURE — 97116 GAIT TRAINING THERAPY: CPT | Mod: GP | Performed by: PHYSICAL THERAPIST

## 2022-04-07 PROCEDURE — C1887 CATHETER, GUIDING: HCPCS

## 2022-04-07 PROCEDURE — 99152 MOD SED SAME PHYS/QHP 5/>YRS: CPT

## 2022-04-07 RX ORDER — NALOXONE HYDROCHLORIDE 0.4 MG/ML
0.2 INJECTION, SOLUTION INTRAMUSCULAR; INTRAVENOUS; SUBCUTANEOUS
Status: DISCONTINUED | OUTPATIENT
Start: 2022-04-07 | End: 2022-04-07 | Stop reason: HOSPADM

## 2022-04-07 RX ORDER — PROCHLORPERAZINE MALEATE 5 MG
5 TABLET ORAL EVERY 6 HOURS PRN
Status: DISCONTINUED | OUTPATIENT
Start: 2022-04-07 | End: 2022-04-08 | Stop reason: HOSPADM

## 2022-04-07 RX ORDER — LIDOCAINE 40 MG/G
CREAM TOPICAL
Status: CANCELLED | OUTPATIENT
Start: 2022-04-07

## 2022-04-07 RX ORDER — NALOXONE HYDROCHLORIDE 0.4 MG/ML
0.4 INJECTION, SOLUTION INTRAMUSCULAR; INTRAVENOUS; SUBCUTANEOUS
Status: DISCONTINUED | OUTPATIENT
Start: 2022-04-07 | End: 2022-04-07 | Stop reason: HOSPADM

## 2022-04-07 RX ORDER — PROCHLORPERAZINE 25 MG
12.5 SUPPOSITORY, RECTAL RECTAL EVERY 12 HOURS PRN
Status: DISCONTINUED | OUTPATIENT
Start: 2022-04-07 | End: 2022-04-08 | Stop reason: HOSPADM

## 2022-04-07 RX ORDER — FENTANYL CITRATE 50 UG/ML
25-50 INJECTION, SOLUTION INTRAMUSCULAR; INTRAVENOUS EVERY 5 MIN PRN
Status: DISCONTINUED | OUTPATIENT
Start: 2022-04-07 | End: 2022-04-07 | Stop reason: HOSPADM

## 2022-04-07 RX ORDER — HEPARIN SODIUM 200 [USP'U]/100ML
1 INJECTION, SOLUTION INTRAVENOUS CONTINUOUS PRN
Status: DISCONTINUED | OUTPATIENT
Start: 2022-04-07 | End: 2022-04-07 | Stop reason: HOSPADM

## 2022-04-07 RX ORDER — FLUMAZENIL 0.1 MG/ML
0.2 INJECTION, SOLUTION INTRAVENOUS
Status: DISCONTINUED | OUTPATIENT
Start: 2022-04-07 | End: 2022-04-07 | Stop reason: HOSPADM

## 2022-04-07 RX ORDER — ONDANSETRON 4 MG/1
4 TABLET, ORALLY DISINTEGRATING ORAL EVERY 6 HOURS PRN
Status: DISCONTINUED | OUTPATIENT
Start: 2022-04-07 | End: 2022-04-08 | Stop reason: HOSPADM

## 2022-04-07 RX ORDER — IOPAMIDOL 612 MG/ML
100 INJECTION, SOLUTION INTRAVASCULAR ONCE
Status: COMPLETED | OUTPATIENT
Start: 2022-04-07 | End: 2022-04-07

## 2022-04-07 RX ORDER — SODIUM CHLORIDE 9 MG/ML
INJECTION, SOLUTION INTRAVENOUS CONTINUOUS
Status: DISCONTINUED | OUTPATIENT
Start: 2022-04-07 | End: 2022-04-08

## 2022-04-07 RX ORDER — SODIUM CHLORIDE 9 MG/ML
INJECTION, SOLUTION INTRAVENOUS CONTINUOUS
Status: CANCELLED | OUTPATIENT
Start: 2022-04-07

## 2022-04-07 RX ORDER — LIDOCAINE HYDROCHLORIDE 20 MG/ML
1 JELLY TOPICAL
Status: DISCONTINUED | OUTPATIENT
Start: 2022-04-07 | End: 2022-04-08 | Stop reason: HOSPADM

## 2022-04-07 RX ORDER — ONDANSETRON 2 MG/ML
4 INJECTION INTRAMUSCULAR; INTRAVENOUS EVERY 6 HOURS PRN
Status: DISCONTINUED | OUTPATIENT
Start: 2022-04-07 | End: 2022-04-08 | Stop reason: HOSPADM

## 2022-04-07 RX ADMIN — MIDAZOLAM HYDROCHLORIDE 1 MG: 1 INJECTION, SOLUTION INTRAMUSCULAR; INTRAVENOUS at 14:00

## 2022-04-07 RX ADMIN — SODIUM CHLORIDE: 9 INJECTION, SOLUTION INTRAVENOUS at 18:28

## 2022-04-07 RX ADMIN — MIDAZOLAM HYDROCHLORIDE 1 MG: 1 INJECTION, SOLUTION INTRAMUSCULAR; INTRAVENOUS at 14:05

## 2022-04-07 RX ADMIN — FAMOTIDINE 20 MG: 20 TABLET ORAL at 09:37

## 2022-04-07 RX ADMIN — SODIUM CHLORIDE: 9 INJECTION, SOLUTION INTRAVENOUS at 15:49

## 2022-04-07 RX ADMIN — IOPAMIDOL 40 ML: 612 INJECTION, SOLUTION INTRAVENOUS at 14:37

## 2022-04-07 RX ADMIN — MIDAZOLAM HYDROCHLORIDE 0.5 MG: 1 INJECTION, SOLUTION INTRAMUSCULAR; INTRAVENOUS at 14:35

## 2022-04-07 RX ADMIN — CLOPIDOGREL BISULFATE 75 MG: 75 TABLET ORAL at 09:37

## 2022-04-07 RX ADMIN — FENTANYL CITRATE 25 MCG: 50 INJECTION, SOLUTION INTRAMUSCULAR; INTRAVENOUS at 14:35

## 2022-04-07 RX ADMIN — ROSUVASTATIN CALCIUM 40 MG: 20 TABLET, FILM COATED ORAL at 09:37

## 2022-04-07 RX ADMIN — HEPARIN SODIUM 5 BAG: 200 INJECTION, SOLUTION INTRAVENOUS at 13:53

## 2022-04-07 RX ADMIN — SODIUM CHLORIDE, PRESERVATIVE FREE: 5 INJECTION INTRAVENOUS at 11:38

## 2022-04-07 RX ADMIN — FENTANYL CITRATE 50 MCG: 50 INJECTION, SOLUTION INTRAMUSCULAR; INTRAVENOUS at 14:05

## 2022-04-07 RX ADMIN — LIDOCAINE HYDROCHLORIDE 3 ML: 10 INJECTION, SOLUTION INFILTRATION; PERINEURAL at 14:06

## 2022-04-07 RX ADMIN — FAMOTIDINE 20 MG: 20 TABLET ORAL at 22:38

## 2022-04-07 RX ADMIN — ASPIRIN 325 MG: 325 TABLET, COATED ORAL at 09:37

## 2022-04-07 RX ADMIN — INSULIN GLARGINE 50 UNITS: 100 INJECTION, SOLUTION SUBCUTANEOUS at 22:37

## 2022-04-07 RX ADMIN — ESCITALOPRAM OXALATE 20 MG: 10 TABLET ORAL at 09:37

## 2022-04-07 RX ADMIN — FENTANYL CITRATE 50 MCG: 50 INJECTION, SOLUTION INTRAMUSCULAR; INTRAVENOUS at 14:00

## 2022-04-07 ASSESSMENT — ACTIVITIES OF DAILY LIVING (ADL)
ADLS_ACUITY_SCORE: 11
ADLS_ACUITY_SCORE: 5
ADLS_ACUITY_SCORE: 9
ADLS_ACUITY_SCORE: 11
ADLS_ACUITY_SCORE: 9
ADLS_ACUITY_SCORE: 11
ADLS_ACUITY_SCORE: 11
ADLS_ACUITY_SCORE: 9
ADLS_ACUITY_SCORE: 9
ADLS_ACUITY_SCORE: 11
ADLS_ACUITY_SCORE: 6
ADLS_ACUITY_SCORE: 9
ADLS_ACUITY_SCORE: 11
ADLS_ACUITY_SCORE: 9
ADLS_ACUITY_SCORE: 5
ADLS_ACUITY_SCORE: 5
ADLS_ACUITY_SCORE: 11
ADLS_ACUITY_SCORE: 9
ADLS_ACUITY_SCORE: 5

## 2022-04-07 NOTE — PROVIDER NOTIFICATION
Confirmed with Dr. Tomlinson with stroke neuro team that pt should receive ordered ASA and plavix today prior to cerebral angiogram.

## 2022-04-07 NOTE — PLAN OF CARE
Patient transferred back to floor from IR this afternoon, had cerebral angiogram. VSS. Tele NSR. Lethargic this afternoon, still somewhat sedated, following commands, moving all 4 extremities. RUE pronator drift, R facial droop. WFD. Disoriented to time, place and situation this afternoon. Patient more oriented later in the afternoon. Rao catheter still in place, plan to take out early this evening, once patient is able to get up. R groin site intact. No bleeding, no swelling. On a mod carb diet, good appetite. Discharge pending plan of care.

## 2022-04-07 NOTE — PLAN OF CARE
Reason for Admission: CVA     Code status: DNR/DNI   Seizure or isolation precaution: N/A   Cognitive/Mentation: A/O to self only, pt is forgetful and confused   Use of CPAP: Pt said he does not use CPAP  Neuros/CMS: Neuro check was done Q4H. His neuro is intact.   CIWA Protocol: NA  VS: within parameters   Tele: SR  GI: BS active x4, passing flatus.  Continent.  : voiding w/o difficulty. Continent.  Use of durbin or external catheter: N/A  Pulmonary: LS clear   Pain: No pain   Critical Labs to Monitor: BG  Electrolyte Replacement Protocol: N/A   Use of oxygen: N/A   Heparin or other drips: N/A      BG checks: Q4H and at 0200   Tests, MRI, CT, PEDRITO, Echo: MRI showed stroke in his brain. Pt is on 30 day zio patch heart monitor.   Drains: NS running at 50ml/hr   Skin: intact   Edema: No edema   Surgical site: N/A   Activity: 1 GBW   Diet: NPO starting at midnight. Dr. Reese called and he said pt will have Bilateral carotid cerebral angiogram in the morning.   Tube Feeding: N/A   Discharge: pending. Home after stent placement?

## 2022-04-07 NOTE — PROGRESS NOTES
[facility]    Hospitalist Progress Note    Date of Service (when I saw the patient): 04/07/2022    Assessment & Plan   Huseyin St is a 66 year old male who was admitted on 4/5/2022.    Huseyin St is a 66 year old male with HTN, HLD, poorly controlled DMT2 (A1c 11.8%), NA, anxiety, prior substance abuse quit 1 year ago who presents on 4/5/2022 with recurrent strokes.      #.  Recurrent acute ischemic stroke  #.  Scattered acute infarcts in the left frontal and parietal parasagittal corona radiator consistent with infarcts  #.  Severe focal stenosis/near occlusion of left MCA bifurcation. CTA 3/2022  #.  Petechial hemorrhage in left  embolic infarcts  Acute encephalopathy secondary to the both strokes; improving  - 3/16/22-3/18/22 - admitted due to L temporal ischemic stroke with severe L MCA stenosis/near occlusion. Discharged on DAPT -  mg daily and Plavix 75 mg daily.  - readmitted from 3/24/22 - 3/27 due to 2 new tiny white matter L cerebral hemispheric ischemic infarcts with persistent severe L MCA stenosis/near occlusion. Blood glucose >400. Plavix and ASA platelet function assays showed therapeutic response.   - Neuro endovascular team did not recommend stenting due to uncontrolled diabetes.   - Medical management was advised with repeat MRI/MRA in 1 month  - It was felt that in future if there was evidence of recurrent ischemia within the L MCA territory despite adequate medical management (including well controlled glucose), then the issue of stenting would be re-visited.  -Head CT 4/5 -showed 2 new small subacute infarcts involving left corona radiate/centrum ovale  - Head MRI on 4/5 showed - Scattered acute infarcts in the left frontal and parietal parasagittal corona radiata consistent with border zone infarcts, Petechial hemorrhage in the left temporal infarcts. No gross hemorrhagic transformation. No gross mass effect.  - MRA of head - Severe distal left M1  stenosis with decreased signal in the left M2 and M3 branches, Moderate left and mild right cavernous carotid stenoses, Mild stenosis in the left P2/P3 junction.  - MRA neck - normal   -He is on a 30-day Zio patch monitor  -Monitor on telemetry  -Neurochecks  -consult stroke neurology  -Continue aspirin and Plavix  -Continue rosuvastatin  -PT/OT/speech pathology evaluation   PT to undergo cerebral angiogram today per NEurology   Pt is little more forgetful today      #.  Essential hypertension  PTA lisinopril 20 mg daily  -Holding lisinopril to allow permissive hypertension in setting of acute stroke        #.  Hyperlipidemia  -Most recent lipid profile showed calculated , HDL 33, triglycerides 275 and total cholesterol 224 (3/16/22)  -On rosuvastatin 40 mg daily, continue        #.  Diabetes mellitus type, poorly controlled, hemoglobin A1c 11.8(3/16/22)  PTA-Lantus 55 units daily, aspart 15 units 3 times daily with meals  -Wife reports improved blood sugars in the past 1 week.  A.m. blood sugars are usually less than 120.  Blood sugars rest of the day are in low 200s.  -Since patient is n.p.o. for now, will administer Lantus 15 units at at bedtime and start on aspart 8 units with breakfast, 10 units each with lunch and supper  -Monitor blood sugars  -NovoLog sliding scale  Blood sugar at 85 overnight so reduced supper dose of  NovoLog to 6 units from 10 units  Monitor blood sugars closely and adjust insulin as needed        #.  Obstructive sleep apnea  -Continue CPAP as able     #.  Generalized anxiety disorder  -Continue Lexapro                 Diet: On moderate carb controlled diet  DVT Prophylaxis: Pneumatic Compression Devices  Rao Catheter: Not present  Central Lines: None  Cardiac Monitoring: None  Code Status:  DNR/DNI        Clinically Significant Risk Factors Present on Admission                    # Platelet Defect: home medication list includes an antiplatelet medication   # Severe Obesity:  "Estimated body mass index is 43.74 kg/m  as calculated from the following:    Height as of this encounter: 1.554 m (5' 1.2\").    Weight as of this encounter: 105.7 kg (233 lb).            Disposition Plan     Expected Discharge: In the next 1 to 2 days when okay with neurology  Anticipated discharge location:    Discussed with bedside RN, patient and his wife at bedside today      Saritha Ann MD  [phone] (P)      Interval History   Patient is little more forgetful today compared to yesterday. Neurology planning on cerebral angiogram this afternoon . No other acute events since yesterday     -Data reviewed today: I reviewed all new labs and imaging results over the last 24 hours. I personally reviewed all the labs labs and imaging since admission  Physical Exam   Temp: 98.5  F (36.9  C) Temp src: Oral BP: 127/63 Pulse: 71   Resp: 16 SpO2: 96 % O2 Device: None (Room air)    Vitals:    04/05/22 1123   Weight: 105.7 kg (233 lb)     Vital Signs with Ranges  Temp:  [98.2  F (36.8  C)-98.8  F (37.1  C)] 98.5  F (36.9  C)  Pulse:  [71-81] 71  Resp:  [16-18] 16  BP: (105-156)/(56-81) 127/63  SpO2:  [93 %-96 %] 96 %  I/O last 3 completed shifts:  In: 960 [P.O.:960]  Out: 500 [Urine:500]    Constitutional: Awake, alert, cooperative, no apparent distress. Forgetful   Respiratory: Clear to auscultation bilaterally, no crackles or wheezing  Cardiovascular: Regular rate and rhythm, normal S1 and S2, and no murmur noted  GI: Normal bowel sounds, soft, non-distended, non-tender  Skin/Integumen: No rashes, no cyanosis, no edema  Other: Very mild right-sided weakness noted     Medications     - MEDICATION INSTRUCTIONS -       - MEDICATION INSTRUCTIONS -       sodium chloride 50 mL/hr at 04/06/22 1917       aspirin  325 mg Oral Daily     clopidogrel  75 mg Oral Daily     escitalopram  20 mg Oral Daily     famotidine  20 mg Oral or NG Tube BID     insulin aspart  6 Units Subcutaneous Daily with supper     insulin aspart  8 Units " Subcutaneous Daily with breakfast     insulin aspart  10 Units Subcutaneous Daily with lunch     insulin aspart  1-10 Units Subcutaneous TID AC     insulin aspart  1-7 Units Subcutaneous At Bedtime     insulin glargine  50 Units Subcutaneous At Bedtime     rosuvastatin  40 mg Oral Daily     sodium chloride (PF)  3 mL Intracatheter Q8H       Data   Recent Labs   Lab 04/07/22  0738 04/07/22  0205 04/06/22  2101 04/06/22  0834 04/06/22  0725 04/05/22  1608 04/05/22  1149 04/05/22  1148   WBC  --   --   --   --  7.2  --  8.5  --    HGB  --   --   --   --  14.4  --  15.3  --    MCV  --   --   --   --  86  --  87  --    PLT  --   --   --   --  386  --  425  --    NA  --   --   --   --  135  --   --  133   POTASSIUM  --   --   --   --  3.7  --   --  4.1   CHLORIDE  --   --   --   --  104  --   --  100   CO2  --   --   --   --  25  --   --  26   BUN  --   --   --   --  13  --   --  15   CR  --   --   --   --  1.05  --   --  1.16   ANIONGAP  --   --   --   --  6  --   --  7   EVER  --   --   --   --  9.4  --   --  9.7   * 229* 243*   < > 85   < >  --  214*    < > = values in this interval not displayed.       No results found for this or any previous visit (from the past 24 hour(s)).

## 2022-04-07 NOTE — CONSULTS
Interventional Neuroradiology    Huseyin St is a 67 yo male with recurrent left MCA territory and watershed infarcts. He failed maximal medical management. He has calcified moderate to severe stenosis of left cavernous ICA and focal severe stenosis of the left MCA trifurcation. We are going to perform a cerebral angiography to further evaluate his left carotid circulation. Depending on the images we will perform balloon angioplasty with or without intracranial stenting. The alternative will be ECA-ICA bypass.     I explained the findings and the rationale of the procedure to Huseyin and his wife Estefania, and answered their questions. The risks include stroke, not being able to open the stent, stent restenosis, vessel injury, intracranial hemorrhage, and death. They agreed to proceed.     Simone Verde MD   The patient is a 51y Female complaining of flu-like symptoms.

## 2022-04-07 NOTE — PROCEDURES
[facility]     Endovascular Surgical Neuroradiology Post-Procedure Note    Pre-Procedure Diagnosis: Left M1-M2 junction atherosclerotic stenosis   Post-Procedure Diagnosis:  Same     Procedure(s):   Diagnostic cervicocerebral angiography    Findings:  Critical stenosis of left M1 artery extending into bilateral M2 divisions making endovascular treatment challenging      Plan:    Discuss ST-MC bypass options with the neurosurgery team    Primary Surgeon:  Dr Verde  Secondary Surgeon:  Not applicable  Secondary Surgeon Review:  None  Fellow:  Grisel Krause   Additional Assistants: None     Prior to the start of the procedure and with procedural staff participation, I verbally confirmed: the patient s identity using two indicators, relevant allergies, that the procedure was appropriate and matched the consent or emergent situation, and that the correct equipment/implants were available. Immediately prior to starting the procedure I conducted the Time Out with the procedural staff and re-confirmed the patient s name, procedure, and site/side. (The Joint Commission universal protocol was followed.)  Yes    PRU value: Not applicable    Anesthesia:  Conscious Sedation  Medications:  2.5 mg midazolam, 125 mcg fentanyl  Puncture site:  Right femoral artery    Fluoroscopy time (minutes): 9.2  Radiation dose (mGy):  1053.1    Contrast amount (mL):  60 cc of Isovue-300     Estimated blood loss (mL):  10    Closure:  Device 6F Angioseal    Disposition:  Home after recovery.        Sedation Post-Procedure Summary    Sedatives: Fentanyl and Midazolam (Versed)    Vital signs and pulse oximetry were monitored and remained stable throughout the procedure, and sedation was maintained until the procedure was complete.  The patient was monitored by staff until sedation discharge criteria were met.    Patient tolerance:  Patient tolerated the procedure well with no immediate complications.    Time of sedation in  minutes:  30 minutes from beginning to end of physician one to one monitoring.    STEFFANY PAIZ MD  Pager:  [phone]

## 2022-04-07 NOTE — PLAN OF CARE
Pt here with CVA. A&O to self and place , confused and forgetful. Neuros are intact, Pt insonsistent with following command. VSS. Tele SR. NPO by midnight. Takes pills whole. NS at 50ml/hr. Up with A1/GBW. Denies pain. Pt scoring green on the Aggression Stop Light Tool. Plan is for possible carotid cerebral angiogram today. Discharge pending.

## 2022-04-07 NOTE — PROGRESS NOTES
"[facility]     Endovascular Surgical Neuroradiology Pre-Procedure Note      HPI:  Huseyin St is a right-hand-dominant, 66 yr with HTN, HLD, DM2, NA, known severe L M1 stenosis with recurrent strokes (multiple admissions in March 2022) who presented on 4/5/2022 with mild R sided facial palsy and right upper limb pronator drift and dysphasia. Admitted twice last month for L hemispheric strokes thought to be associated with L M1 stenosis. Had poor glycemic control but now has a fasting blood sugar of less than 250 mg/dL.  MRI shows extension of multifocal small strokes in L M1 distribution despite DAPT therapy with ASA/Plavix with a P2Y12 level of consistently less than 180.    Recurrent L MCA distribution strokes due to L M1 stenosis that is likely due to atherosclerotic disease; this is the third series of strokes in this distribution and he has failed medical management (on therapeutic DAPT, LDL well controlled, FSBG well controlled, negative UDS). We are proceeding with a diagnostic cerebral angiogram with the possibility of stenting of the distal left M1 segment and balloon angioplasty.    Medical History:  Past Medical History:   Diagnosis Date     Anxiety      Essential hypertension, benign      Hypercholesteremia      Sleep apnea      T2DM (type 2 diabetes mellitus) (H)        Surgical History:  Past Surgical History:   Procedure Laterality Date     COLONOSCOPY       COLONOSCOPY N/A 10/26/2020    Procedure: COLONOSCOPY;  Surgeon: Randy Gonzalez MD;  Location:  GI     ENT SURGERY      R) ear \"procedure\"       Family History:  Family History   Adopted: Yes   Problem Relation Age of Onset     Diabetes Mother      Respiratory Mother         asthma     Lipids Mother      Arthritis Mother         knee replacement     Alzheimer Disease Mother      Cerebrovascular Disease Brother      C.A.D. No family hx of      Cancer - colorectal No family hx of      Prostate Cancer No family " hx of        Social History:  Social History     Socioeconomic History     Marital status:      Spouse name: Estefania Laura     Number of children: 1     Years of education: Not on file     Highest education level: Not on file   Occupational History     Not on file   Tobacco Use     Smoking status: Never Smoker     Smokeless tobacco: Never Used   Substance and Sexual Activity     Alcohol use: Yes     Alcohol/week: 0.0 standard drinks     Comment: 3-6 drinks on occ weekend night, occ drink on weekday     Drug use: Yes     Types: Cocaine     Sexual activity: Yes     Partners: Female   Other Topics Concern      Service No     Blood Transfusions No     Caffeine Concern Not Asked     Occupational Exposure Not Asked     Hobby Hazards Not Asked     Sleep Concern Not Asked     Stress Concern Not Asked     Weight Concern Not Asked     Special Diet Not Asked     Back Care Not Asked     Exercise Not Asked     Bike Helmet Not Asked     Seat Belt Not Asked     Self-Exams Not Asked     Parent/sibling w/ CABG, MI or angioplasty before 65F 55M? Not Asked   Social History Narrative     Not on file     Social Determinants of Health     Financial Resource Strain: Not on file   Food Insecurity: Not on file   Transportation Needs: Not on file   Physical Activity: Not on file   Stress: Not on file   Social Connections: Not on file   Intimate Partner Violence: Not on file   Housing Stability: Not on file       Allergies:  Allergies   Allergen Reactions     Augmentin Diarrhea     Sulfa Drugs      Unknown - reaction occurred as a child     Victoza      Skin rash       Is there a contrast allergy?  No    Medications:  Medications Prior to Admission   Medication Sig Dispense Refill Last Dose     aspirin (ASA) 325 MG EC tablet Take 1 tablet (325 mg) by mouth daily 90 tablet 0 4/4/2022     clopidogrel (PLAVIX) 75 MG tablet Take 1 tablet (75 mg) by mouth daily 90 tablet 1 4/4/2022     clotrimazole (LOTRIMIN) 1 % external cream  Apply topically 2 times daily (Patient taking differently: Apply topically 2 times daily as needed ) 30 g 11 prn     escitalopram (LEXAPRO) 20 MG tablet Take 1 tablet (20 mg) by mouth daily 90 tablet 3 4/4/2022     insulin aspart (NOVOLOG FLEXPEN) 100 UNIT/ML pen Inject 15 Units Subcutaneous 3 times daily (with meals)  15 mL 3 4/5/2022 at am     insulin glargine (LANTUS SOLOSTAR) 100 UNIT/ML pen INJ 55 UNITS SC HS 10 mL 9 4/4/2022 at pm     lisinopril (ZESTRIL) 40 MG tablet Take 0.5 tablets (20 mg) by mouth daily 15 tablet 1 4/4/2022     LORazepam (ATIVAN) 0.5 MG tablet Take 1 tablet (0.5 mg) by mouth once as needed for anxiety 3 tablet 1 prn MRI     rosuvastatin (CRESTOR) 40 MG tablet Take 1 tablet (40 mg) by mouth daily 90 tablet 3 4/4/2022     alcohol swab prep pads Use to swab area of injection/ian as directed 100 each 3      blood glucose (NO BRAND SPECIFIED) test strip Use to test blood sugar 3 times daily or as directed. To accompany: Blood Glucose Monitor Brands: per insurance. 300 strip 1      blood glucose calibration (NO BRAND SPECIFIED) solution Use to calibrate blood glucose monitor as needed as directed. To accompany: Blood Glucose Monitor Brands: per insurance. 1 Bottle 3      insulin pen needle (BD MARIE U/F) 32G X 4 MM miscellaneous USE THREE PEN NEEDLES DAILY AS DIRECTED 300 each 1      ORDER FOR DME Auto-CPAP: Max 12 cm H2O Min 10 cm H2O  Continuous Lifetime need and heated humidity.    1 Device 0      sildenafil (REVATIO) 20 MG tablet Take 1 tablet (20 mg) by mouth 3 times daily (Patient not taking: Reported on 4/1/2022) 30 tablet 11 NOT TAKING     thin (NO BRAND SPECIFIED) lancets Use to test blood sugar 3 times daily or as directed. To accompany: Blood Glucose Monitor Brands: per insurance. 200 each 6    .    ROS:  The 10 point Review of Systems is negative other than noted in the HPI or here.     PHYSICAL EXAMINATION  Vital Signs:  B/P: 127/63,  T: 98.5,  P: 71,  R: 16    Cardio:   RRR  Pulmonary:  no respiratory distress  Abdomen:  soft    Neurologic  Mental Status:  fully alert  Cranial Nerves:  PERRL, Mild right facial plasy  Motor:  Right Upper Extremity pronator drift  Sensory:  intact/symmetric to light touch and pin prick throughout upper and lower extremities  Coordination:  FNF and HS intact without dysmetria    Pre-procedure National Institutes of Health Stroke Scale:      Score    Level of consciousness: (0)   Alert, keenly responsive    LOC questions: (0)   Answers both questions correctly    LOC commands: (0)   Performs both tasks correctly    Best gaze: (0)   Normal    Visual: (0)   No visual loss    Facial palsy: (1)   Minor paralysis (flat nasolabial fold, smile asymmetry)    Motor arm (left): (0)   No drift    Motor arm (right): (1)   Drift    Motor leg (left): (0)   No drift    Motor leg (right): (0)   No drift    Limb ataxia: (0)   Absent    Sensory: (0)   Normal- no sensory loss    Best language: (0)   Normal- no aphasia    Dysarthria: (0)   Normal    Extinction and inattention: (0)   No abnormality        Total Score:  2       LABS  (most recent Cr, BUN, GFR, PLT, INR, PTT within the past 7 days):  Recent Labs   Lab 04/06/22  0725   CR 1.05   BUN 13   GFRESTIMATED 78           Platelet Function P2Y12 (PRU):  152      ASSESSMENT: 66-year-old with recurrent acute left hemispheric CVA with severe focal stenosis distal left M1 segment of the middle cerebral artery    PLAN: Diagnostic cerebral angiogram with possible stenting of distal left M1 segment of the middle cerebral artery        PRE-PROCEDURE SEDATION ASSESSMENT     Pre-Procedure Sedation Assessment done at 1100.    Expected Level:  Moderate Sedation    Indication:  Sedation is required to allow for neurointerventional procedure.    Consent obtained from spouse after discussing the risks, benefits and alternatives.     PO Intake:  Appropriately NPO for procedure    ASA Class:  Class 2 - MILD SYSTEMIC DISEASE, NO  ACUTE PROBLEMS, NO FUNCTIONAL LIMITATIONS.    Mallampati:  Grade 2:  Soft palate, base of uvula, tonsillar pillars, and portion of posterior pharyngeal wall visible    History and physical reviewed and no updates needed. I have reviewed the lab findings, diagnostic data, medications, and the plan for sedation. I have determined this patient to be an appropriate candidate for the planned sedation and procedure and have reassessed the patient IMMEDIATELY PRIOR to sedation and procedure.    Patient was discussed with the Attending, , who agrees with the plan.    DARLINE Pearson  Neuro IR Fellow  Pager: [phone]

## 2022-04-07 NOTE — CONSULTS
[facility]    Stroke Progress Note    Interval EventsNo acute events, going to angio today    HPI Summary    I was called by Pham Avelar on 04/05/22 regarding patient Husey[address]. The patient is a 66 year old male w hx HTN, HLD, poorly controlled DM2, L MCA infarcts, NA, hx substance abuse quit 1 year ago, presents with recurrent CVA.      3/16/22-3/18/22 admitted with L temporal ischemic infarct, in setting cocaine use, found to have severe LMCA stenosis, discharged on DAPT, 3/24 readmitted due to small new LMCA infarcts, platelet assays for ASA and Plavix showed therapeutic response. BG >400 IR recommended better risk factor control before attempting stenting. Plan to repeat MRI MRA in 1 month. Patient reports improved glycemic control. Here after wife noticed several days of word finding difficulty and worsening R weakness, dragging R foot, using both hands to hold mug. Patient has been abstinent from cocaine since initial admission per wife.     In ED CTH showed new small L corona radiata infarct, possible L parietal infarct. MRI brain revealed scattered infarcts in LMCA territory.    MRI/Head CT CTH  1. Two new small acute/subacute infarcts involving the left corona  radiata/centrum semiovale.  2. Findings concerning for subtle cerebral swelling and loss of  cortical gray-white distinction involving left parietal lobe. This  raises concern for an acute left parietal region infarct.  3. Redemonstrated subacute left temporal lobe infarct.  4. No acute intracranial hemorrhage or significant mass  Effect/herniation.     MR brain  HEAD MRI:  1.  Scattered acute infarcts in the left frontal and parietal  parasagittal corona radiata consistent with border zone infarcts.  2.  The previously noted acute infarcts in the left parietal border  zone territory and left temporal lobe demonstrate expected evolution.  3.  Petechial hemorrhage in the left temporal infarcts is  slightly  progressed. No gross hemorrhagic transformation. No gross mass effect.  4.  No hydrocephalus.   Intracranial Vasculature HEAD MRA:  1.  No change.  2.  Severe distal left M1 stenosis with decreased signal in the left  M2 and M3 branches.  3.  Moderate left and mild right cavernous carotid stenoses.  4.  Mild stenosis in the left P2/P3 junction   Cervical Vasculature NECK MRA:  1.  Normal neck MRA for age.  2.  No significant stenosis as per NASCET criteria.      Echocardiogram EF 60-65%, LA normal,   EKG/Telemetry SR   Other Testing Aspirin and Plavix therapeutic on plat assay  Cocaine negative this admission, was positive on initial presentation 3/16      LDL  4/6/2022: 53 mg/dL   A1C  3/16/2022: 11.8 %   Troponin No lab value available in past 48 hrs     Impression     MCA infarcts 2/2 intracranial large vessel stenosis, second breakthrough infarct despite duel antiplatelet therapy, therapeutic on platelet assay. Initially started on medical management w DAPT, when failed therapy chose not to stent because was poorly controlled diabetic with glucose in 400s, A1C 11.8, glucose now better controlled, abstinent from cocaine per wife, verified on 2 UDS. Patient has now failed medical therapy and needs stenting to avoid M1 occlusion and further strokes.     Taken to IR for stenting vs referral for bypass (has tandem ICA stenosis).    Plan    - IR for stenting vs referral for vessel to vessel bypass  - Neurochecks and Vital Signs every q4h   - Permissive HTN; goal -180 mmHg   - Daily aspirin 325 mg for secondary stroke prevention  - Continue Plavix 75mg qd  - Statin: Rosuvastatin 40mg qhs  - Telemetry, EKG  - Bedside Glucose Monitoring  - PT/OT/SLP  - Stroke Education  - Euthermia, Euglycemia     Patient Follow-up    - final recommendation pending work-up    We will continue to follow.     The Stroke Staff is Dr. Turner.    Frederick Reese MD  Vascular Neurology Fellow  To page me or covering stroke  "neurology team member, click here: AMCOM   Choose \"On Call\" tab at top, then search dropdown box for \"Neurology Adult\", select location, press Enter, then look for stroke/neuro ICU/telestroke.    ______________________________________________________    Clinically Significant Risk Factors Present on Admission                 Medications   Scheduled Meds    aspirin  325 mg Oral Daily     clopidogrel  75 mg Oral Daily     escitalopram  20 mg Oral Daily     famotidine  20 mg Oral or NG Tube BID     insulin aspart  6 Units Subcutaneous Daily with supper     insulin aspart  8 Units Subcutaneous Daily with breakfast     insulin aspart  10 Units Subcutaneous Daily with lunch     insulin aspart  1-10 Units Subcutaneous TID AC     insulin aspart  1-7 Units Subcutaneous At Bedtime     insulin glargine  50 Units Subcutaneous At Bedtime     iopamidol  100 mL Arterial Once     rosuvastatin  40 mg Oral Daily     sodium chloride (PF)  3 mL Intracatheter Q8H       Infusion Meds    heparin 2 units/mL in 0.9% NaCl TABLE SOLN       - MEDICATION INSTRUCTIONS -       - MEDICATION INSTRUCTIONS -       sodium chloride 50 mL/hr at 04/07/22 1138       PRN Meds  acetaminophen **OR** acetaminophen, glucose **OR** dextrose **OR** glucagon, fentaNYL, flumazenil, heparin 2 units/mL in 0.9% NaCl TABLE SOLN, lidocaine 4%, lidocaine, lidocaine (buffered or not buffered), lidocaine (buffered or not buffered), LORazepam, - MEDICATION INSTRUCTIONS -, - MEDICATION INSTRUCTIONS -, melatonin, midazolam, naloxone **OR** naloxone **OR** naloxone **OR** naloxone, senna-docusate **OR** senna-docusate, sodium chloride (PF)       PHYSICAL EXAMINATION  Temp:  [98.2  F (36.8  C)-98.8  F (37.1  C)] 98.5  F (36.9  C)  Pulse:  [71-81] 71  Resp:  [16-18] 16  BP: (114-156)/(60-81) 127/63  SpO2:  [93 %-96 %] 96 %      Mental Status:  alert, oriented x 3, follows commands with some difficulty and coaching, dysarthric speech, naming intact,not able to repeat, low verbal " output, flat affect  Cranial Nerves:  visual fields intact, PERRL, EOMI with normal smooth pursuit, facial sensation intact and symmetric, mild L facial droop, hearing not formally tested but intact to conversation  Motor:  5/5 LUE LLE 4/5 RUE 5/5 RLE  Reflexes:  toes down-going  Sensory:  light touch sensation intact and symmetric throughout upper and lower extremities, no extinction on double simultaneous stimulation   Coordination:  normal finger-to-nose and heel-to-shin bilaterally without dysmetria, rapid alternating movements symmetric  Station/Gait:  deferred    Stroke Scales    NIHSS  1a. Level of Consciousness 0-->Alert, keenly responsive   1b. LOC Questions 0-->Answers both questions correctly   1c. LOC Commands 1-->Performs one task correctly   2.   Best Gaze 0-->Normal   3.   Visual 0-->No visual loss   4.   Facial Palsy 1-->Minor paralysis (flattened nasolabial fold, asymmetry on smiling)   5a. Motor Arm, Left 0-->No drift, limb holds 90 (or 45) degrees for full 10 secs   5b. Motor Arm, Right 1-->Drift, limb holds 90 (or 45) degrees, but drifts down before full 10 secs, does not hit bed or other support   6a. Motor Leg, Left 0-->No drift, leg holds 30 degree position for full 5 secs   6b. Motor Leg, right 1-->Drift, leg falls by the end of the 5-sec period but does not hit bed   7.   Limb Ataxia 0-->Absent   8.   Sensory 0-->Normal, no sensory loss   9.   Best Language 1-->Mild-to-moderate aphasia, some obvious loss of fluency or facility of comprehension, without significant limitation on ideas expressed or form of expression. Reduction of speech and/or comprehension, however, makes conversation. . . (see row details)   10. Dysarthria 1-->Mild-to-moderate dysarthria, patient slurs at least some words and, at worst, can be understood with some difficulty   11. Extinction and Inattention  0-->No abnormality   Total 6 (04/06/22 1346)       Modified Bryan Score (Pre-morbid)  3 - Moderate disability.   Requires some help, but able to walk unassisted.    Imaging  I personally reviewed all imaging; relevant findings per HPI.     Lab Results Data   CBC  Recent Labs   Lab 04/06/22  0725 04/05/22  1149   WBC 7.2 8.5   RBC 5.05 5.38   HGB 14.4 15.3   HCT 43.3 46.6    425     Basic Metabolic Panel    Recent Labs   Lab 04/07/22  1207 04/07/22  0738 04/07/22  0205 04/06/22  0834 04/06/22  0725 04/05/22  1608 04/05/22  1148   NA  --   --   --   --  135  --  133   POTASSIUM  --   --   --   --  3.7  --  4.1   CHLORIDE  --   --   --   --  104  --  100   CO2  --   --   --   --  25  --  26   BUN  --   --   --   --  13  --  15   CR  --   --   --   --  1.05  --  1.16   * 159* 229*   < > 85   < > 214*   EVER  --   --   --   --  9.4  --  9.7    < > = values in this interval not displayed.     Liver Panel  No results for input(s): PROTTOTAL, ALBUMIN, BILITOTAL, ALKPHOS, AST, ALT, BILIDIRECT in the last 168 hours.  INR    Recent Labs   Lab Test 03/16/22  1704   INR 1.02      Lipid Profile    Recent Labs   Lab Test 04/06/22  0725 03/16/22  1704 10/11/21  0942 04/21/16  0906 08/20/15  0830 01/14/15  0847 04/21/14  0815   CHOL 109 224* 228*   < > 136 316* 155   HDL 34* 33* 37*   < > 42 41 37*   LDL 53 136* 116*   < > 59 219* 80   TRIG 111 275* 377*   < > 176* 282* 189*   CHOLHDLRATIO  --   --   --   --  3.2 7.7* 4.2    < > = values in this interval not displayed.     A1C    Recent Labs   Lab Test 03/16/22  1704 10/11/21  0943 09/28/20  0925   A1C 11.8* 11.8* 13.2*     Troponin I    Recent Labs   Lab 04/05/22  1149   TROPONINIS <3

## 2022-04-07 NOTE — IR NOTE
Interventional Radiology Intra-procedural Nursing Note    Patient Name: Huseyin St  Medical Record Number: [id number]  Today's Date: April 7, 2022    Procedure: Diagnostic Cerebral Angiogram with Left MCA stenting and angioplasty, moderate sedation and closure device  Start time: 1401  End time: 1437  Report provided to: station 77 RN  Patient depart time and location: 1500 to Neuro Floor 1724    Note: Patient entered Interventional Radiology Suite number 3 via cart. Patient awake, alert and orientated. Assisted onto procedural table in supine position. Prepped and draped.  Dr. Krause and Dr Verde in room. Time out and procedure started. Vital signs stable. Telemetry reading NSR. Dr Matamoros and Dr Recinos in control room    Procedure well tolerated by patient without complications. Procedure end with debrief by Dr. Cabello and Grisel. No interventions done d/t  anatomy      Administered medication totals:  Lidocaine 1% 3 mL Intradermal  Versed 2.5 mg IVP   Fentanyl 125 mcg IVP    Last dose of sedation administered at 1435.

## 2022-04-08 ENCOUNTER — APPOINTMENT (OUTPATIENT)
Dept: PHYSICAL THERAPY | Facility: CLINIC | Age: 67
DRG: 065 | End: 2022-04-08
Payer: COMMERCIAL

## 2022-04-08 ENCOUNTER — HOSPITAL ENCOUNTER (INPATIENT)
Facility: CLINIC | Age: 67
LOS: 9 days | Discharge: HOME | DRG: 023 | End: 2022-04-18
Attending: PSYCHIATRY & NEUROLOGY | Admitting: PSYCHIATRY & NEUROLOGY
Payer: COMMERCIAL

## 2022-04-08 ENCOUNTER — APPOINTMENT (OUTPATIENT)
Dept: CT IMAGING | Facility: CLINIC | Age: 67
DRG: 065 | End: 2022-04-08
Payer: COMMERCIAL

## 2022-04-08 VITALS
WEIGHT: 233 LBS | RESPIRATION RATE: 22 BRPM | SYSTOLIC BLOOD PRESSURE: 154 MMHG | OXYGEN SATURATION: 97 % | HEIGHT: 61 IN | HEART RATE: 75 BPM | BODY MASS INDEX: 43.99 KG/M2 | TEMPERATURE: 98.8 F | DIASTOLIC BLOOD PRESSURE: 94 MMHG

## 2022-04-08 DIAGNOSIS — E11.21 TYPE 2 DIABETES MELLITUS WITH DIABETIC NEPHROPATHY, UNSPECIFIED WHETHER LONG TERM INSULIN USE (H): ICD-10-CM

## 2022-04-08 DIAGNOSIS — E11.21 TYPE 2 DIABETES MELLITUS WITH DIABETIC NEPHROPATHY, WITH LONG-TERM CURRENT USE OF INSULIN (H): ICD-10-CM

## 2022-04-08 DIAGNOSIS — I63.9 CEREBROVASCULAR ACCIDENT (CVA), UNSPECIFIED MECHANISM (H): ICD-10-CM

## 2022-04-08 DIAGNOSIS — E11.00 TYPE 2 DIABETES MELLITUS WITH HYPEROSMOLARITY WITHOUT COMA, WITHOUT LONG-TERM CURRENT USE OF INSULIN (H): ICD-10-CM

## 2022-04-08 DIAGNOSIS — G45.9 TIA (TRANSIENT ISCHEMIC ATTACK): ICD-10-CM

## 2022-04-08 DIAGNOSIS — Z79.4 TYPE 2 DIABETES MELLITUS WITH DIABETIC NEPHROPATHY, WITH LONG-TERM CURRENT USE OF INSULIN (H): ICD-10-CM

## 2022-04-08 DIAGNOSIS — I63.9 ISCHEMIC STROKE (H): Primary | ICD-10-CM

## 2022-04-08 LAB
ANION GAP SERPL CALCULATED.3IONS-SCNC: 7 MMOL/L (ref 3–14)
BUN SERPL-MCNC: 10 MG/DL (ref 7–30)
CALCIUM SERPL-MCNC: 9 MG/DL (ref 8.5–10.1)
CHLORIDE BLD-SCNC: 101 MMOL/L (ref 94–109)
CO2 SERPL-SCNC: 25 MMOL/L (ref 20–32)
CREAT SERPL-MCNC: 1.08 MG/DL (ref 0.66–1.25)
GFR SERPL CREATININE-BSD FRML MDRD: 75 ML/MIN/1.73M2
GLUCOSE BLD-MCNC: 132 MG/DL (ref 70–99)
GLUCOSE BLDC GLUCOMTR-MCNC: 107 MG/DL (ref 70–99)
GLUCOSE BLDC GLUCOMTR-MCNC: 113 MG/DL (ref 70–99)
GLUCOSE BLDC GLUCOMTR-MCNC: 137 MG/DL (ref 70–99)
GLUCOSE BLDC GLUCOMTR-MCNC: 141 MG/DL (ref 70–99)
GLUCOSE BLDC GLUCOMTR-MCNC: 146 MG/DL (ref 70–99)
GLUCOSE BLDC GLUCOMTR-MCNC: 153 MG/DL (ref 70–99)
GLUCOSE BLDC GLUCOMTR-MCNC: 236 MG/DL (ref 70–99)
POTASSIUM BLD-SCNC: 3.7 MMOL/L (ref 3.4–5.3)
SODIUM SERPL-SCNC: 133 MMOL/L (ref 133–144)

## 2022-04-08 PROCEDURE — 258N000003 HC RX IP 258 OP 636: Performed by: STUDENT IN AN ORGANIZED HEALTH CARE EDUCATION/TRAINING PROGRAM

## 2022-04-08 PROCEDURE — 97116 GAIT TRAINING THERAPY: CPT | Mod: GP | Performed by: PHYSICAL THERAPIST

## 2022-04-08 PROCEDURE — 80048 BASIC METABOLIC PNL TOTAL CA: CPT | Performed by: INTERNAL MEDICINE

## 2022-04-08 PROCEDURE — 70496 CT ANGIOGRAPHY HEAD: CPT

## 2022-04-08 PROCEDURE — 99239 HOSP IP/OBS DSCHRG MGMT >30: CPT | Performed by: INTERNAL MEDICINE

## 2022-04-08 PROCEDURE — 99233 SBSQ HOSP IP/OBS HIGH 50: CPT | Mod: GC | Performed by: PSYCHIATRY & NEUROLOGY

## 2022-04-08 PROCEDURE — 250N000013 HC RX MED GY IP 250 OP 250 PS 637: Performed by: INTERNAL MEDICINE

## 2022-04-08 PROCEDURE — 250N000009 HC RX 250: Performed by: INTERNAL MEDICINE

## 2022-04-08 PROCEDURE — 36415 COLL VENOUS BLD VENIPUNCTURE: CPT | Performed by: INTERNAL MEDICINE

## 2022-04-08 PROCEDURE — 70450 CT HEAD/BRAIN W/O DYE: CPT

## 2022-04-08 PROCEDURE — 250N000013 HC RX MED GY IP 250 OP 250 PS 637: Performed by: STUDENT IN AN ORGANIZED HEALTH CARE EDUCATION/TRAINING PROGRAM

## 2022-04-08 PROCEDURE — 250N000011 HC RX IP 250 OP 636: Performed by: INTERNAL MEDICINE

## 2022-04-08 RX ORDER — INSULIN ASPART 100 [IU]/ML
6-8 INJECTION, SOLUTION INTRAVENOUS; SUBCUTANEOUS
Qty: 15 ML | Refills: 3 | Status: ON HOLD | COMMUNITY
Start: 2022-04-08 | End: 2022-04-18

## 2022-04-08 RX ORDER — IOPAMIDOL 755 MG/ML
70 INJECTION, SOLUTION INTRAVASCULAR ONCE
Status: COMPLETED | OUTPATIENT
Start: 2022-04-08 | End: 2022-04-08

## 2022-04-08 RX ADMIN — ASPIRIN 325 MG: 325 TABLET, COATED ORAL at 10:48

## 2022-04-08 RX ADMIN — FAMOTIDINE 20 MG: 20 TABLET ORAL at 20:49

## 2022-04-08 RX ADMIN — SODIUM CHLORIDE 100 ML: 900 INJECTION INTRAVENOUS at 10:03

## 2022-04-08 RX ADMIN — IOPAMIDOL 70 ML: 755 INJECTION, SOLUTION INTRAVENOUS at 10:02

## 2022-04-08 RX ADMIN — CLOPIDOGREL BISULFATE 75 MG: 75 TABLET ORAL at 10:48

## 2022-04-08 RX ADMIN — INSULIN GLARGINE 50 UNITS: 100 INJECTION, SOLUTION SUBCUTANEOUS at 21:33

## 2022-04-08 RX ADMIN — SODIUM CHLORIDE 1000 ML: 9 INJECTION, SOLUTION INTRAVENOUS at 10:49

## 2022-04-08 RX ADMIN — FAMOTIDINE 20 MG: 20 TABLET ORAL at 10:48

## 2022-04-08 RX ADMIN — ESCITALOPRAM OXALATE 20 MG: 10 TABLET ORAL at 10:48

## 2022-04-08 RX ADMIN — ROSUVASTATIN CALCIUM 40 MG: 20 TABLET, FILM COATED ORAL at 10:48

## 2022-04-08 ASSESSMENT — ACTIVITIES OF DAILY LIVING (ADL)
ADLS_ACUITY_SCORE: 11
ADLS_ACUITY_SCORE: 11
ADLS_ACUITY_SCORE: 12
ADLS_ACUITY_SCORE: 11

## 2022-04-08 NOTE — DISCHARGE SUMMARY
[facility]  Discharge Summary        Huseyin St MRN# [id number]   YOB: 1955 Age: 66 year old     Date of Admission:  4/5/2022  Date of Discharge:  4/8/2022  Admitting Physician:  Pham Avelar MD  Discharge Physician: Saritha Ann MD  Discharging Service: Hospitalist     Primary Provider: Kendal Wong  Primary Care Physician Phone Number: [phone]         Discharge Diagnoses/Problem Oriented Hospital Course (Providers):    Huseyin St was admitted on 4/5/2022 by Pham Avelar MD and I would refer you to their history and physical.  The following problems were addressed during his hospitalization:    Huseyin St is a 66 year old male with HTN, HLD, poorly controlled DMT2 (A1c 11.8%), NA, anxiety, prior substance abuse quit 1 year ago who presents on 4/5/2022 with recurrent strokes.      #.  Recurrent acute ischemic stroke  #.  Scattered acute infarcts in the left frontal and parietal parasagittal corona radiator consistent with infarcts  #.  Severe focal stenosis/near occlusion of left MCA bifurcation. CTA 3/2022  #.  Petechial hemorrhage in left  embolic infarcts  Acute encephalopathy secondary to the both strokes; improving  - 3/16/22-3/18/22 - admitted due to L temporal ischemic stroke with severe L MCA stenosis/near occlusion. Discharged on DAPT -  mg daily and Plavix 75 mg daily.  - readmitted from 3/24/22 - 3/27 due to 2 new tiny white matter L cerebral hemispheric ischemic infarcts with persistent severe L MCA stenosis/near occlusion. Blood glucose >400. Plavix and ASA platelet function assays showed therapeutic response.   - Neuro endovascular team did not recommend stenting due to uncontrolled diabetes.   - Medical management was advised with repeat MRI/MRA in 1 month  - It was felt that in future if there was evidence of recurrent ischemia within the L MCA territory despite adequate medical management (including well controlled glucose), then the  issue of stenting would be re-visited.  -Head CT 4/5 -showed 2 new small subacute infarcts involving left corona radiate/centrum ovale  - Head MRI on 4/5 showed - Scattered acute infarcts in the left frontal and parietal parasagittal corona radiata consistent with border zone infarcts, Petechial hemorrhage in the left temporal infarcts. No gross hemorrhagic transformation. No gross mass effect.  - MRA of head - Severe distal left M1 stenosis with decreased signal in the left M2 and M3 branches, Moderate left and mild right cavernous carotid stenoses, Mild stenosis in the left P2/P3 junction.  - MRA neck - normal   -He is on a 30-day Zio patch monitor  -Monitor on telemetry  -Neurochecks  -consult stroke neurology  -Continue aspirin and Plavix  -Continue rosuvastatin  -PT/OT/speech pathology evaluation   PT to undergo cerebral angiogram yesterday and was unable to stent left MCA as stenosis was too tight and next to another branch of vessel   He needs intracranial bypass at [facility]. Call placed for transfer and getting transferred today  Pt with worsening aphasia this AM RRT called CTA and  CT head done which showed stable eolving strokes and stensoses . No new findings seen on CT scans   Stopped IVF today     #.  Essential hypertension  PTA lisinopril 20 mg daily  -Holding lisinopril to allow permissive hypertension in setting of acute stroke        #.  Hyperlipidemia  -Most recent lipid profile showed calculated , HDL 33, triglycerides 275 and total cholesterol 224 (3/16/22)  -On rosuvastatin 40 mg daily, continue        #.  Diabetes mellitus type, poorly controlled, hemoglobin A1c 11.8(3/16/22)  PTA-Lantus 55 units daily, aspart 15 units 3 times daily with meals  -Wife reports improved blood sugars in the past 1 week.  A.m. blood sugars are usually less than 120.  Blood sugars rest of the day are in low 200s.  -Since patient is n.p.o. for now, will administer Lantus 15 units at at bedtime and start  "on aspart 8 units with breakfast, 10 units each with lunch and supper  -Monitor blood sugars  -NovoLog sliding scale  Blood sugar at 85 overnight so reduced supper dose of  NovoLog to 6 units from 10 units  Monitor blood sugars closely and adjust insulin as needed        #.  Obstructive sleep apnea  -Continue CPAP as able     #.  Generalized anxiety disorder  -Continue Lexapro                 Diet: On moderate carb controlled diet  DVT Prophylaxis: Pneumatic Compression Devices  Rao Catheter: Not present  Central Lines: None  Cardiac Monitoring: None  Code Status:  DNR/DNI        Clinically Significant Risk Factors Present on Admission                    # Platelet Defect: home medication list includes an antiplatelet medication   # Severe Obesity: Estimated body mass index is 43.74 kg/m  as calculated from the following:    Height as of this encounter: 1.554 m (5' 1.2\").    Weight as of this encounter: 105.7 kg (233 lb).            Disposition Plan     Expected Discharge: Transfer to [facility] today   Anticipated discharge location:            Code Status:      DNR / DNI        Brief Hospital Stay Summary Sent Home With Patient in AVS:        Reason for your hospital stay      Acute stroke secondary to LMCA stenosis                 Important Results:      See below         Pending Results:        Unresulted Labs Ordered in the Past 30 Days of this Admission     No orders found from 3/6/2022 to 4/6/2022.            Discharge Instructions and Follow-Up:            Discharge Disposition:      Discharged to home        Discharge Medications:        Current Discharge Medication List      CONTINUE these medications which have CHANGED    Details   insulin aspart (NOVOLOG FLEXPEN) 100 UNIT/ML pen Inject 6-8 Units Subcutaneous 3 times daily (with meals)  Qty: 15 mL, Refills: 3    Associated Diagnoses: Type 2 diabetes mellitus with diabetic nephropathy, with long-term current use of insulin (H)         CONTINUE these medications " which have NOT CHANGED    Details   aspirin (ASA) 325 MG EC tablet Take 1 tablet (325 mg) by mouth daily  Qty: 90 tablet, Refills: 0    Associated Diagnoses: Cerebrovascular accident (CVA), unspecified mechanism (H)      clopidogrel (PLAVIX) 75 MG tablet Take 1 tablet (75 mg) by mouth daily  Qty: 90 tablet, Refills: 1    Associated Diagnoses: Cerebrovascular accident (CVA), unspecified mechanism (H)      clotrimazole (LOTRIMIN) 1 % external cream Apply topically 2 times daily  Qty: 30 g, Refills: 11    Associated Diagnoses: Candidal balanitis      escitalopram (LEXAPRO) 20 MG tablet Take 1 tablet (20 mg) by mouth daily  Qty: 90 tablet, Refills: 3    Associated Diagnoses: Mixed anxiety depressive disorder      insulin glargine (LANTUS SOLOSTAR) 100 UNIT/ML pen INJ 55 UNITS SC HS  Qty: 10 mL, Refills: 9    Comments: If Lantus is not covered by insurance, may substitute Basaglar at same dose and frequency.    Associated Diagnoses: Type 2 diabetes mellitus with diabetic nephropathy, with long-term current use of insulin (H)      LORazepam (ATIVAN) 0.5 MG tablet Take 1 tablet (0.5 mg) by mouth once as needed for anxiety  Qty: 3 tablet, Refills: 1    Associated Diagnoses: Mixed anxiety depressive disorder; Claustrophobia      rosuvastatin (CRESTOR) 40 MG tablet Take 1 tablet (40 mg) by mouth daily  Qty: 90 tablet, Refills: 3    Associated Diagnoses: Hyperlipidemia LDL goal <100      alcohol swab prep pads Use to swab area of injection/ian as directed  Qty: 100 each, Refills: 3    Associated Diagnoses: Type 2 diabetes mellitus with diabetic nephropathy, with long-term current use of insulin (H)      blood glucose (NO BRAND SPECIFIED) test strip Use to test blood sugar 3 times daily or as directed. To accompany: Blood Glucose Monitor Brands: per insurance.  Qty: 300 strip, Refills: 1    Associated Diagnoses: Type 2 diabetes mellitus with diabetic nephropathy, with long-term current use of insulin (H)      blood glucose  calibration (NO BRAND SPECIFIED) solution Use to calibrate blood glucose monitor as needed as directed. To accompany: Blood Glucose Monitor Brands: per insurance.  Qty: 1 Bottle, Refills: 3    Associated Diagnoses: Type 2 diabetes mellitus with diabetic nephropathy, with long-term current use of insulin (H)      insulin pen needle (BD MARIE U/F) 32G X 4 MM miscellaneous USE THREE PEN NEEDLES DAILY AS DIRECTED  Qty: 300 each, Refills: 1    Associated Diagnoses: Type 2 diabetes mellitus with diabetic nephropathy, with long-term current use of insulin (H)      ORDER FOR DME Auto-CPAP: Max 12 cm H2O Min 10 cm H2O  Continuous Lifetime need and heated humidity.     Qty: 1 Device, Refills: 0    Associated Diagnoses: Obstructive sleep apnea (adult) (pediatric)      thin (NO BRAND SPECIFIED) lancets Use to test blood sugar 3 times daily or as directed. To accompany: Blood Glucose Monitor Brands: per insurance.  Qty: 200 each, Refills: 6    Associated Diagnoses: Type 2 diabetes mellitus with diabetic nephropathy, with long-term current use of insulin (H)         STOP taking these medications       lisinopril (ZESTRIL) 40 MG tablet Comments:   Reason for Stopping:         sildenafil (REVATIO) 20 MG tablet Comments:   Reason for Stopping:                 Allergies:         Allergies   Allergen Reactions     Augmentin Diarrhea     Sulfa Drugs      Unknown - reaction occurred as a child     Victoza      Skin rash           Consultations This Hospital Stay:      Consultation during this admission received from neurology               Discharge Time:      Greater than 30 minutes.        Image Results From This Hospital Stay (For Non-EPIC Providers):        Results for orders placed or performed during the hospital encounter of 04/05/22   Head CT w/o contrast    Narrative    CT SCAN OF THE HEAD WITHOUT CONTRAST April 5, 2022 12:10 PM     HISTORY: Recent stroke, increased confusion/anesthesia, decreased  extremity right hand and  dragging of the right foot.    TECHNIQUE: Axial images of the head and coronal reformations without  IV contrast material. Radiation dose for this scan was reduced using  automated exposure control, adjustment of the mA and/or kV according  to patient size, or iterative reconstruction technique.    COMPARISON: MRI brain 3/24/2022. CT head 3/24/2022.    FINDINGS: The ventricles are normal in size and configuration.  Redemonstration of a moderate sized subacute-appearing infarct  involving the left temporal lobe. There appear to be new small  infarcts in the left frontal corona radiata and posterior left frontal  centrum semiovale (series 2 image 14, series 2 image 19), presumably  acute/subacute. There appear to be subtle areas of left parietal lobe  cerebral swelling and possible loss of the cortical gray-white  distinction, concerning for acute ischemia/infarct (series 2 image  20). There is no significant mass effect or midline shift/herniation.  No intracranial hemorrhage identified. Scattered intracranial  atherosclerotic calcifications are present. There is a background of  mild generalized brain parenchymal volume loss and mild presumed  chronic small vessel ischemic changes.    The visualized orbits appear normal. The paranasal sinuses are free of  significant disease. The mastoid and medullary cavities are clear.      Impression    IMPRESSION:  1. Two new small acute/subacute infarcts involving the left corona  radiata/centrum semiovale.  2. Findings concerning for subtle cerebral swelling and loss of  cortical gray-white distinction involving left parietal lobe. This  raises concern for an acute left parietal region infarct.  3. Redemonstrated subacute left temporal lobe infarct.  4. No acute intracranial hemorrhage or significant mass  effect/herniation.    The findings were discussed with Dr. Browne by myself by phone at  approximately 12:28 PM on 4/5/2022.    CRYSTAL SOSA MD         SYSTEM ID:   [id number]   MR Brain w/o & w Contrast    Narrative    MRI OF THE BRAIN WITHOUT AND WITH CONTRAST  MRA OF THE HEAD WITHOUT CONTRAST  MRA OF THE NECK WITHOUT AND WITH CONTRAST    4/5/2022 2:35 PM     HISTORY:  Aphasia. Right-sided weakness. Clinical concern for an acute  stroke.     COMPARISON: Head CT 4/5/2022, head MRI 3/24/2022 and head and neck CTA  3/24/2022.    TECHNIQUE:   Brain: Axial diffusion-weighted with ADC map, T2-weighted with fat  saturation, T1-weighted and turboFLAIR and coronal T1-weighted images  of the brain were obtained without intravenous contrast.  Following  gadobutrol (GADAVIST) injection 15 mL IV, axial turboFLAIR and coronal  T1-weighted images of the brain were obtained.     MRA: 3D time-of-flight MR angiography of the major arteries at the  base of the brain was performed without contrast.  2D time-of-flight  MRA without contrast with superior and inferior saturation bands and  3D T1-weighted postgadolinium MRA of the neck/cervical vessels were  also obtained. MIP reconstruction of all MR angiographic data was  performed.    FINDINGS:   HEAD MRI:  INTRACRANIAL CONTENTS: Scattered acute infarcts in the left frontal  and parietal parasagittal corona radiata. These are consistent with  border zone infarcts. The previously noted acute infarcts in the left  parietal border zone territory and in the left temporal lobe  demonstrate expected evolution. Progressed, petechial hemorrhages  noted in the left temporal lobe. No gross hemorrhagic transformation.  No gross mass effect. Superimposed mild presumed chronic small vessel  ischemic change. No hydrocephalus.    SELLA: No significant abnormality accounting for technique.    OSSEOUS STRUCTURES/SOFT TISSUES: No aggressive osseous lesion  involving the calvarium, skull base, or visualized upper cervical  spine. The major intracranial vascular flow voids are maintained.    ORBITS: No significant abnormality accounting for  technique.    SINUSES/MASTOIDS: No significant paranasal sinus mucosal disease. No  significant middle ear or mastoid effusion.    HEAD MRA:  ANTERIOR CIRCULATION: Mild right and moderate left cavernous carotid  stenoses. Severe distal left M1 stenosis again noted. Associated  decreased signal throughout the left M2 and M3 branches. Normal  anterior cerebral arteries. Normal right middle cerebral artery.    POSTERIOR CIRCULATION: Mild stenosis in the left P2/P3 junction. The  remainder of the posterior circulation is normal. The left vertebral  artery is dominant.    ANEURYSM/VASCULAR MALFORMATION: None.    NECK MRA:  RIGHT CAROTID: No measurable stenosis in the right ICA based on NASCET  criteria.    LEFT CAROTID: No measurable stenosis in the left ICA based on NASCET  criteria.     VERTEBRAL ARTERIES: Balanced vertebral arteries are patent in the neck  and into the head.     AORTIC ARCH: Classic aortic arch anatomy with no significant stenosis  at the origin of the great vessels.       Impression    IMPRESSION:  HEAD MRI:  1.  Scattered acute infarcts in the left frontal and parietal  parasagittal corona radiata consistent with border zone infarcts.  2.  The previously noted acute infarcts in the left parietal border  zone territory and left temporal lobe demonstrate expected evolution.  3.  Petechial hemorrhage in the left temporal infarcts is slightly  progressed. No gross hemorrhagic transformation. No gross mass effect.  4.  No hydrocephalus.    HEAD MRA:  1.  No change.  2.  Severe distal left M1 stenosis with decreased signal in the left  M2 and M3 branches.  3.  Moderate left and mild right cavernous carotid stenoses.  4.  Mild stenosis in the left P2/P3 junction.    NECK MRA:  1.  Normal neck MRA for age.  2.  No significant stenosis as per NASCET criteria.         KARLO OJEDA MD         SYSTEM ID:  [id number]   MRA Brain ([place]) wo Contrast    Narrative    MRI OF THE BRAIN WITHOUT AND WITH  CONTRAST  MRA OF THE HEAD WITHOUT CONTRAST  MRA OF THE NECK WITHOUT AND WITH CONTRAST    4/5/2022 2:35 PM     HISTORY:  Aphasia. Right-sided weakness. Clinical concern for an acute  stroke.     COMPARISON: Head CT 4/5/2022, head MRI 3/24/2022 and head and neck CTA  3/24/2022.    TECHNIQUE:   Brain: Axial diffusion-weighted with ADC map, T2-weighted with fat  saturation, T1-weighted and turboFLAIR and coronal T1-weighted images  of the brain were obtained without intravenous contrast.  Following  gadobutrol (GADAVIST) injection 15 mL IV, axial turboFLAIR and coronal  T1-weighted images of the brain were obtained.     MRA: 3D time-of-flight MR angiography of the major arteries at the  base of the brain was performed without contrast.  2D time-of-flight  MRA without contrast with superior and inferior saturation bands and  3D T1-weighted postgadolinium MRA of the neck/cervical vessels were  also obtained. MIP reconstruction of all MR angiographic data was  performed.    FINDINGS:   HEAD MRI:  INTRACRANIAL CONTENTS: Scattered acute infarcts in the left frontal  and parietal parasagittal corona radiata. These are consistent with  border zone infarcts. The previously noted acute infarcts in the left  parietal border zone territory and in the left temporal lobe  demonstrate expected evolution. Progressed, petechial hemorrhages  noted in the left temporal lobe. No gross hemorrhagic transformation.  No gross mass effect. Superimposed mild presumed chronic small vessel  ischemic change. No hydrocephalus.    SELLA: No significant abnormality accounting for technique.    OSSEOUS STRUCTURES/SOFT TISSUES: No aggressive osseous lesion  involving the calvarium, skull base, or visualized upper cervical  spine. The major intracranial vascular flow voids are maintained.    ORBITS: No significant abnormality accounting for technique.    SINUSES/MASTOIDS: No significant paranasal sinus mucosal disease. No  significant middle ear or  mastoid effusion.    HEAD MRA:  ANTERIOR CIRCULATION: Mild right and moderate left cavernous carotid  stenoses. Severe distal left M1 stenosis again noted. Associated  decreased signal throughout the left M2 and M3 branches. Normal  anterior cerebral arteries. Normal right middle cerebral artery.    POSTERIOR CIRCULATION: Mild stenosis in the left P2/P3 junction. The  remainder of the posterior circulation is normal. The left vertebral  artery is dominant.    ANEURYSM/VASCULAR MALFORMATION: None.    NECK MRA:  RIGHT CAROTID: No measurable stenosis in the right ICA based on NASCET  criteria.    LEFT CAROTID: No measurable stenosis in the left ICA based on NASCET  criteria.     VERTEBRAL ARTERIES: Balanced vertebral arteries are patent in the neck  and into the head.     AORTIC ARCH: Classic aortic arch anatomy with no significant stenosis  at the origin of the great vessels.       Impression    IMPRESSION:  HEAD MRI:  1.  Scattered acute infarcts in the left frontal and parietal  parasagittal corona radiata consistent with border zone infarcts.  2.  The previously noted acute infarcts in the left parietal border  zone territory and left temporal lobe demonstrate expected evolution.  3.  Petechial hemorrhage in the left temporal infarcts is slightly  progressed. No gross hemorrhagic transformation. No gross mass effect.  4.  No hydrocephalus.    HEAD MRA:  1.  No change.  2.  Severe distal left M1 stenosis with decreased signal in the left  M2 and M3 branches.  3.  Moderate left and mild right cavernous carotid stenoses.  4.  Mild stenosis in the left P2/P3 junction.    NECK MRA:  1.  Normal neck MRA for age.  2.  No significant stenosis as per NASCET criteria.         KARLO OJEDA MD         SYSTEM ID:  [id number]   MRA Neck (Carotids) wo & w Contrast    Narrative    MRI OF THE BRAIN WITHOUT AND WITH CONTRAST  MRA OF THE HEAD WITHOUT CONTRAST  MRA OF THE NECK WITHOUT AND WITH CONTRAST    4/5/2022 2:35 PM      HISTORY:  Aphasia. Right-sided weakness. Clinical concern for an acute  stroke.     COMPARISON: Head CT 4/5/2022, head MRI 3/24/2022 and head and neck CTA  3/24/2022.    TECHNIQUE:   Brain: Axial diffusion-weighted with ADC map, T2-weighted with fat  saturation, T1-weighted and turboFLAIR and coronal T1-weighted images  of the brain were obtained without intravenous contrast.  Following  gadobutrol (GADAVIST) injection 15 mL IV, axial turboFLAIR and coronal  T1-weighted images of the brain were obtained.     MRA: 3D time-of-flight MR angiography of the major arteries at the  base of the brain was performed without contrast.  2D time-of-flight  MRA without contrast with superior and inferior saturation bands and  3D T1-weighted postgadolinium MRA of the neck/cervical vessels were  also obtained. MIP reconstruction of all MR angiographic data was  performed.    FINDINGS:   HEAD MRI:  INTRACRANIAL CONTENTS: Scattered acute infarcts in the left frontal  and parietal parasagittal corona radiata. These are consistent with  border zone infarcts. The previously noted acute infarcts in the left  parietal border zone territory and in the left temporal lobe  demonstrate expected evolution. Progressed, petechial hemorrhages  noted in the left temporal lobe. No gross hemorrhagic transformation.  No gross mass effect. Superimposed mild presumed chronic small vessel  ischemic change. No hydrocephalus.    SELLA: No significant abnormality accounting for technique.    OSSEOUS STRUCTURES/SOFT TISSUES: No aggressive osseous lesion  involving the calvarium, skull base, or visualized upper cervical  spine. The major intracranial vascular flow voids are maintained.    ORBITS: No significant abnormality accounting for technique.    SINUSES/MASTOIDS: No significant paranasal sinus mucosal disease. No  significant middle ear or mastoid effusion.    HEAD MRA:  ANTERIOR CIRCULATION: Mild right and moderate left cavernous  carotid  stenoses. Severe distal left M1 stenosis again noted. Associated  decreased signal throughout the left M2 and M3 branches. Normal  anterior cerebral arteries. Normal right middle cerebral artery.    POSTERIOR CIRCULATION: Mild stenosis in the left P2/P3 junction. The  remainder of the posterior circulation is normal. The left vertebral  artery is dominant.    ANEURYSM/VASCULAR MALFORMATION: None.    NECK MRA:  RIGHT CAROTID: No measurable stenosis in the right ICA based on NASCET  criteria.    LEFT CAROTID: No measurable stenosis in the left ICA based on NASCET  criteria.     VERTEBRAL ARTERIES: Balanced vertebral arteries are patent in the neck  and into the head.     AORTIC ARCH: Classic aortic arch anatomy with no significant stenosis  at the origin of the great vessels.       Impression    IMPRESSION:  HEAD MRI:  1.  Scattered acute infarcts in the left frontal and parietal  parasagittal corona radiata consistent with border zone infarcts.  2.  The previously noted acute infarcts in the left parietal border  zone territory and left temporal lobe demonstrate expected evolution.  3.  Petechial hemorrhage in the left temporal infarcts is slightly  progressed. No gross hemorrhagic transformation. No gross mass effect.  4.  No hydrocephalus.    HEAD MRA:  1.  No change.  2.  Severe distal left M1 stenosis with decreased signal in the left  M2 and M3 branches.  3.  Moderate left and mild right cavernous carotid stenoses.  4.  Mild stenosis in the left P2/P3 junction.    NECK MRA:  1.  Normal neck MRA for age.  2.  No significant stenosis as per NASCET criteria.         KARLO OJEDA MD         SYSTEM ID:  [id number]   IR Carotid Cerebral Angiogram Bilateral    Narrative    [id number]  CHRISTA HOGUE  1955  66 years    History: 66 yr?with HTN, HLD, DM2, NA with sever left M1-M2 junction  atherosclerotic stenosis?with recurrent strokes despite maximal  medical management.     : Dr Verde  [place]:  DARLINE Reid    Anesthesia: Local anesthesia and conscious sedation were provided  Contrast used: 60cc of VISI 300  Fluoro time: 9.2minutes  Radiation dose: 1053.15 mGy  Sedation time: 37minutes  Sedatives: Versed 2.5 milligram and fentanyl 125 micrograms  Other medications: None    Procedure:   1. Diagnostic cerebral angiography and interpretation of the images.  2. Ultrasound guided right common femoral arteriotomy with permanent  image of the anatomy stored in the electronic medical record.  3. Diagnostic cerebral angiography of the left internal carotid  artery.   4. Diagnostic angiography of the right common femoral artery.   5. Percutaneous closure of a right femoral arteriotomy using a 6 Fr  AngioSeal closure device.    Consent:   The risks, benefits of a conventional diagnostic cerebral angiography  were discussed with the patient's wife who agreed to proceed. The  risks, which were discussed included risk of stroke, arterial  dissection in the neck, groin hematoma, arteriovenous fistula in the  groin and pseudoaneurysm of the femoral artery. Subsequently, verbal  and written informed consent was obtained.    Technique/findings:   The patient was brought to the angiography suite and placed in supine  position. The medications were administered by the radiology nursing  staff. The nursing staff independently monitored the patient's vital  signs during the procedure.   ?  The patient 's right groin was prepped and draped in standard fashion.  The right common femoral artery was palpated. Ultrasound was used to  image the common femoral artery. The femoral artery was shown to be  patent. Lidocaine was injected locally and a small skin incision was  made over the femoral artery using a scalpel. The subcutaneous tissue  was dissected using a Emili clamp. Using real-time ultrasound  guidance, 21 gauge needle was placed into the right femoral artery  which was exchanged for a 4 [place] dilator over a  microwire. The 4  Citizen of [place] dilator was exchanged for a 5French sheath over a Bentson wire.  The sheath was connected to a continuous flush of heparinized saline.  A hard copy was stored in the patient's record. A 5F Leavitt 2  diagnostic catheter was used to catheterize the left common and left  internal carotid arteries under fluoroscopic guidance using roadmap  technique.     Upon completion of the procedure 5French sheath was removed.  Hemostasis was obtained with a 6 Citizen of [place] AngioSeal closure device.  Procedure was completed without any complications. Patient was then  transferred to the floor in stable condition.    FINDINGS: ?  Left internal carotid artery injection: Cranial view   Biplane angiography was performed over the cranium. Intracranial view  of the left internal carotid artery injection in the AP and lateral   projections demonstrates normal petrous, laceral segments. There is  diffuse atherosclerotic disease involving the cavernous, clinoidal,  ophthalmic, and communicating segments of the left internal carotid  artery. The left internal carotid artery bifurcates into the left  anterior cerebral artery and left middle cerebral artery. The left  anterior cerebral artery has normal origin and there is contralateral  anterior cerebral artery filling through anterior communicating  artery. There is 99% stenosis (per WASID criteria) of the left middle  cerebral artery at its bifurcation, with the stenotic segments  extending into both the fontal and the temporal divisions (best seen  on series#6). There is a slight delay in contrast opacification of the  M2 segments distal to the stenosis (best seen on series# 7), and is  augmented via leptomeningeal collaterals from the ipsilateral anterior  cerebral artery. In addition, the ipsilateral external carotid artery  also results in transethmoidal opacification of the opthalmic segment  of the left carotid artery (series#9). The capillary and venous phases  are  overall unremarkable, but limited by motion artifact.     Left external carotid artery: Cranial view  Biplane angiography was performed over the cranium in AP and lateral  projections with the catheter in proximal left external carotid  artery. The left internal maxillary artery, left occipital artery,  left middle meningeal artery and left superficial temporal artery are  normal.     Right common femoral artery injection: Pelvic view   Angiography was performed over the right pelvis by injection of the  sheath. Pelvic view of the right common femoral artery in the HERNANDEZ  projection demonstrates a normal common femoral artery, superficial  femoral artery, and deep femoral artery. The insertion point of the  sheath is located above the bifurcation. No significant stenosis,  dissection or pseudoaneurysm is visualized.     Dr. Verde was present for the entire procedure.   ?    Impression    Impression:   There is 99% stenosis of the left middle cerebral artery at its  bifurcation, with the stenotic segment extending into both left M2 MCA  divisions resulting in subtle contrast delay distal to the  bifurcation, with leptomeningeal collaterals from the ipsilateral  anterior cerebral artery. Endovascular stenting is considered  high-risk given the potential for inadvertent thrombosis of one of the  two M2 divisions. The presence of a robust ipsilateral superficial  temporal artery makes an ipsilateral ST-MC bypass a viable alternative  treatment modality.     DARLINE Reid  Endovascular Surgical Neuroradiology Fellow  Pager: [phone]   CTA Head Neck with Contrast    Narrative    CT ANGIOGRAM OF THE HEAD AND NECK WITH CONTRAST  4/8/2022 10:21 AM     HISTORY: Neuro deficit, acute, stroke suspected.     TECHNIQUE:  CT angiography with an injection of 70mL [id number]-370 IV  with scans through the head and neck. Images were transferred to a  separate 3-D workstation where multiplanar reformations and 3-D images  were  created. Estimates of carotid stenoses are made relative to the  distal internal carotid artery diameters except as noted. Radiation  dose for this scan was reduced using automated exposure control,  adjustment of the mA and/or kV according to patient size, or iterative  reconstruction technique.    COMPARISON: Images from diagnostic cerebral angiography dated  4/7/2022, MR angiogram of the [place] [facility] [place] 4/5/2022, CTA head and  neck 3/24/2022.     CT HEAD FINDINGS: No contrast enhancing lesions. Cerebral blood flow  is grossly normal.     CT ANGIOGRAM HEAD FINDINGS: Unchanged severe/critical stenosis of the  left middle cerebral artery M1-M2 bifurcation involving the distal M1  segment and the origins of the M2 segments. This appears unchanged.  There is unchanged persistent satisfactory opacification of the more  distal M2/M3 branches likely due to collateral filling. No significant  stenosis of the proximal visualized anterior cerebral arteries. No  significant stenosis of the proximal visualized branches of the right  middle cerebral artery. Unchanged atherosclerotic disease involving  the bilateral carotid siphons resulting in stenosis of the paraclinoid  segments, moderate on the left and mild/mild to moderate on the right.    Mild atherosclerosis of the V4 segments of both vertebral arteries  without flow-limiting stenosis. The basilar artery appears patent.  Unchanged two tandem moderate focal stenoses involving the P2 segment  of the left posterior cerebral artery. The proximal branches of the  right posterior cerebral artery appear patent. No definite new  high-grade central arterial stenosis or large vessel occlusion  identified. No aneurysm or high flow vascular malformation is  identified.    CT ANGIOGRAM NECK FINDINGS:   Conventional three-vessel aortic arch branching pattern. Mild  atherosclerosis of the aortic arch without stenosis of the origins of  the great vessels.     Right carotid artery: The  right common and internal carotid arteries  are patent. Mild atherosclerosis of the carotid bifurcation with less  than 50% stenosis by NASCET criteria.     Left carotid artery: The left common and internal carotid arteries are  patent. Minimal atherosclerosis at the carotid bifurcation without  significant stenosis.    Vertebral arteries: Moderate atherosclerotic focal stenosis of the  origin of the left vertebral artery. Mild multifocal stenosis of the  V2 segments of both vertebral arteries due to external mass effect  from cervical spine osteophytes. Otherwise, the bilateral cervical  vertebral arteries are patent.    Other findings: Coronary artery calcifications. Multilevel  degenerative changes in the cervical and partially visualized upper  thoracic spine.       Impression    IMPRESSION:  1. Unchanged severe/critical focal stenosis of the left M1-M2 middle  cerebral artery bifurcation.  2. Unchanged moderate tandem stenoses involving the P2 segment of the  left posterior cerebral artery.  3. Unchanged atherosclerotic disease involving the bilateral carotid  siphons resulting in up to moderate left and mild right paraclinoid  segment internal carotid artery stenosis.  4. No definite new high-grade central arterial stenosis/large vessel  occlusion identified.  5. Unchanged mild atherosclerosis of the bilateral carotid  bifurcations without flow limiting stenosis.  6. Unchanged moderate atherosclerotic stenosis at the origin of the  left vertebral artery.    CT Head w/o Contrast    Narrative    CT SCAN OF THE HEAD WITHOUT CONTRAST April 8, 2022 10:20 AM     HISTORY: Stroke, follow up.    TECHNIQUE: Axial images of the head and coronal reformations without  IV contrast material. Radiation dose for this scan was reduced using  automated exposure control, adjustment of the mA and/or kV according  to patient size, or iterative reconstruction technique.    COMPARISON: MRI of the brain dated 4/5/2022 and prior. CT  head  4/5/2022.    FINDINGS: Continued expected evolution of the previously demonstrated  moderate size left temporal lobe subacute infarct. Expected evolution  of previously seen small acute to subacute infarcts involving the left  corona radiata/centrum semiovale. Expected evolution of the small  subacute infarcts in the left parietal white matter. Other punctate  acute subacute infarcts in the left frontoparietal white matter as  seen on the prior MRI are not as well seen on CT. No definite new  infarct identified on unenhanced CT.    The ventricles are normal in size and configuration. Mild generalized  brain parenchymal volume loss. No CT findings of acute intracranial  hemorrhage. The areas of petechial hemorrhage in the left temporal  lobe infarct bed are not well seen on CT. There is no extra-axial  fluid collection. There is unchanged mild swelling of the left  temporal lobe. No midline shift/herniation. Scattered intracranial  atherosclerotic calcifications, as before.    The paranasal sinuses and mastoids are clear. The calvarium and skull  base appear grossly intact. Hyperostosis frontalis interna is noted.      Impression    IMPRESSION: Expected evolution of multiple acute/subacute ischemic  infarcts of varying ages in the left cerebral hemisphere, as  described. Unchanged mild left temporal lobe swelling. No acute  intracranial hemorrhage identified on CT. No midline shift/herniation.             Most Recent Lab Results In EPIC (For Non-EPIC Providers):    Most Recent 3 CBC's:  Recent Labs   Lab Test 04/06/22  0725 04/05/22  1149 03/25/22  0711   WBC 7.2 8.5 9.7   HGB 14.4 15.3 14.3   MCV 86 87 84    425 448      Most Recent 3 BMP's:  Recent Labs   Lab Test 04/08/22  1150 04/08/22  0928 04/08/22  0837 04/06/22  0834 04/06/22  0725 04/05/22  1608 04/05/22  1148   NA  --   --  133  --  135  --  133   POTASSIUM  --   --  3.7  --  3.7  --  4.1   CHLORIDE  --   --  101  --  104  --  100   CO2  --    --  25  --  25  --  26   BUN  --   --  10  --  13  --  15   CR  --   --  1.08  --  1.05  --  1.16   ANIONGAP  --   --  7  --  6  --  7   EVER  --   --  9.0  --  9.4  --  9.7   * 141* 132*   < > 85   < > 214*    < > = values in this interval not displayed.     Most Recent 3 Troponin's:No lab results found.    Invalid input(s): TROP, TROPONINIES  Most Recent 3 INR's:  Recent Labs   Lab Test 03/16/22  1704   INR 1.02     Most Recent 2 LFT's:  Recent Labs   Lab Test 03/24/22  1438 03/16/22  1704   AST 10 11   ALT 17 17   ALKPHOS 99 70   BILITOTAL 0.2 0.4     Most Recent Cholesterol Panel:  Recent Labs   Lab Test 04/06/22  0725   CHOL 109   LDL 53   HDL 34*   TRIG 111     Most Recent 6 Bacteria Isolates From Any Culture (See EPIC Reports for Culture Details):No lab results found.  Most Recent TSH, T4 and HgbA1c:   Recent Labs   Lab Test 03/16/22  1704 03/24/17  1143 12/05/16  1058   TSH  --   --  1.94   A1C 11.8*   < > 8.8*    < > = values in this interval not displayed.

## 2022-04-08 NOTE — PLAN OF CARE
Physical Therapy Discharge Summary    Reason for therapy discharge:    Patient had an episode of increased neuro symptoms after PT this am. Per patients wife, MD felt increased symptoms were due to increased activity with PT and wife requesting PT and OT hold until after patient has surgery at the . Therapist paged Dr. Ann and she is in agreement with this request.     Progress towards therapy goal(s). See goals on Care Plan in [facility] electronic health record for goal details.  Plan is to hold until after surgery at the     Therapy recommendation(s):    Anticipate PT will be appropriate after surgery.     Goal Outcome Evaluation:

## 2022-04-08 NOTE — CONSULTS
[facility]    Stroke Progress Note    Interval EventsWorst aphasia and R weakness after being up with PT, improved with laying flat, and 1L fluids    HPI Summary    I was called by Pham Avelar on 04/05/22 regarding patient [address] The patient is a 66 year old male w hx HTN, HLD, poorly controlled DM2, L MCA infarcts, NA, hx substance abuse quit 1 year ago, presents with recurrent CVA.      3/16/22-3/18/22 admitted with L temporal ischemic infarct, in setting cocaine use, found to have severe LMCA stenosis, discharged on DAPT, 3/24 readmitted due to small new LMCA infarcts, platelet assays for ASA and Plavix showed therapeutic response. BG >400 IR recommended better risk factor control before attempting stenting. Plan to repeat MRI MRA in 1 month. Patient reports improved glycemic control. Here after wife noticed several days of word finding difficulty and worsening R weakness, dragging R foot, using both hands to hold mug. Patient has been abstinent from cocaine since initial admission per wife.     In ED CTH showed new small L corona radiata infarct, possible L parietal infarct. MRI brain revealed scattered infarcts in LMCA territory.    MRI/Head CT CTH  1. Two new small acute/subacute infarcts involving the left corona  radiata/centrum semiovale.  2. Findings concerning for subtle cerebral swelling and loss of  cortical gray-white distinction involving left parietal lobe. This  raises concern for an acute left parietal region infarct.  3. Redemonstrated subacute left temporal lobe infarct.  4. No acute intracranial hemorrhage or significant mass  Effect/herniation.     CTH 4/8/22 -   Stable    CTA H+N 4/8/22 -   Stable    MR brain  HEAD MRI:  1.  Scattered acute infarcts in the left frontal and parietal  parasagittal corona radiata consistent with border zone infarcts.  2.  The previously noted acute infarcts in the left parietal border  zone territory and left temporal  lobe demonstrate expected evolution.  3.  Petechial hemorrhage in the left temporal infarcts is slightly  progressed. No gross hemorrhagic transformation. No gross mass effect.  4.  No hydrocephalus.     Intracranial Vasculature HEAD MRA:  1.  No change.  2.  Severe distal left M1 stenosis with decreased signal in the left  M2 and M3 branches.  3.  Moderate left and mild right cavernous carotid stenoses.  4.  Mild stenosis in the left P2/P3 junction   Cervical Vasculature NECK MRA:  1.  Normal neck MRA for age.  2.  No significant stenosis as per NASCET criteria.      Echocardiogram EF 60-65%, LA normal,   EKG/Telemetry SR   Other Testing Aspirin and Plavix therapeutic on plat assay  Cocaine negative this admission, was positive on initial presentation 3/16      LDL  4/6/2022: 53 mg/dL   A1C  3/16/2022: 11.8 %   Troponin No lab value available in past 48 hrs     Impression     MCA infarcts 2/2 intracranial large vessel stenosis, second breakthrough infarct despite duel antiplatelet therapy, therapeutic on platelet assay. Initially started on medical management w DAPT, when failed therapy chose not to stent because was poorly controlled diabetic with glucose in 400s, A1C 11.8, glucose now better controlled, abstinent from cocaine per wife, verified on 2 UDS. Patient has now failed medical therapy and needs stenting to avoid M1 occlusion and further strokes.     Taken to IR where decision made not to stent due to tandem ICA-M1 stenosis, severe stenosis, stenosis close to bifurcation, high likelihood of poor outcome with distal occlusions if stented. Vessel to vessel bypass planned at [facility] next week, needs transfer to Dr. Alegria [facility] stroke service for the procedure.    Plan    - IR for stenting vs referral for vessel to vessel bypass  - Neurochecks and Vital Signs every q4h   - Permissive HTN; goal -180 mmHg avoid hypoperfusion     - Daily aspirin 325 mg for secondary stroke prevention  - Continue Plavix 75mg  "qd  - Statin: Rosuvastatin 40mg qhs  - Telemetry, EKG  - Bedside Glucose Monitoring  - PT/OT/SLP  - Stroke Education  - Euthermia, Euglycemia     Patient Follow-up    - final recommendation pending work-up    We will continue to follow.     The Stroke Staff is Dr. Turner.    Frederick Reese MD  Vascular Neurology Fellow  To page me or covering stroke neurology team member, click here: [email or web address]   Choose \"On Call\" tab at top, then search dropdown box for \"Neurology Adult\", select location, press Enter, then look for stroke/neuro ICU/telestroke.    ______________________________________________________    Clinically Significant Risk Factors Present on Admission                 Medications   Scheduled Meds    aspirin  325 mg Oral Daily     clopidogrel  75 mg Oral Daily     escitalopram  20 mg Oral Daily     famotidine  20 mg Oral or NG Tube BID     insulin aspart  6 Units Subcutaneous Daily with supper     insulin aspart  8 Units Subcutaneous Daily with breakfast     insulin aspart  10 Units Subcutaneous Daily with lunch     insulin aspart  1-10 Units Subcutaneous TID AC     insulin aspart  1-7 Units Subcutaneous At Bedtime     insulin glargine  50 Units Subcutaneous At Bedtime     rosuvastatin  40 mg Oral Daily     sodium chloride (PF)  3 mL Intracatheter Q8H       Infusion Meds    - MEDICATION INSTRUCTIONS -       - MEDICATION INSTRUCTIONS -         PRN Meds  acetaminophen **OR** acetaminophen, glucose **OR** dextrose **OR** glucagon, lidocaine 4%, lidocaine, lidocaine (buffered or not buffered), LORazepam, - MEDICATION INSTRUCTIONS -, - MEDICATION INSTRUCTIONS -, melatonin, ondansetron **OR** ondansetron, prochlorperazine **OR** prochlorperazine **OR** prochlorperazine, senna-docusate **OR** senna-docusate, sodium chloride (PF)       PHYSICAL EXAMINATION  Temp:  [97.8  F (36.6  C)-98.8  F (37.1  C)] 98.8  F (37.1  C)  Pulse:  [67-82] 74  Resp:  [16-20] 20  BP: ()/(62-93) 141/83  SpO2:  [94 %-96 %] 96 %      Mental " Status:  alert, follows commands with some difficulty and coaching, dysarthric speech, difficulty naming,not able to repeat, low verbal output  Cranial Nerves:  visual fields intact, PERRL, EOMI with normal smooth pursuit, facial sensation intact and symmetric, mild L facial droop, hearing not formally tested but intact to conversation  Motor:  5/5 LUE 5/5 LLE 4/5 RUE 5/5 RLE  Reflexes:  toes down-going  Sensory:  light touch sensation intact and symmetric throughout upper and lower extremities, no extinction on double simultaneous stimulation   Coordination:  normal finger-to-nose and heel-to-shin bilaterally without dysmetria, rapid alternating movements symmetric  Station/Gait:  deferred    Stroke Scales    NIHSS  1a. Level of Consciousness 0-->Alert, keenly responsive   1b. LOC Questions 0-->Answers both questions correctly   1c. LOC Commands 1-->Performs one task correctly   2.   Best Gaze 0-->Normal   3.   Visual 0-->No visual loss   4.   Facial Palsy 1-->Minor paralysis (flattened nasolabial fold, asymmetry on smiling)   5a. Motor Arm, Left 0-->No drift, limb holds 90 (or 45) degrees for full 10 secs   5b. Motor Arm, Right 1-->Drift, limb holds 90 (or 45) degrees, but drifts down before full 10 secs, does not hit bed or other support   6a. Motor Leg, Left 0-->No drift, leg holds 30 degree position for full 5 secs   6b. Motor Leg, right 1-->Drift, leg falls by the end of the 5-sec period but does not hit bed   7.   Limb Ataxia 0-->Absent   8.   Sensory 0-->Normal, no sensory loss   9.   Best Language 1-->Mild-to-moderate aphasia, some obvious loss of fluency or facility of comprehension, without significant limitation on ideas expressed or form of expression. Reduction of speech and/or comprehension, however, makes conversation. . . (see row details)   10. Dysarthria 1-->Mild-to-moderate dysarthria, patient slurs at least some words and, at worst, can be understood with some difficulty   11. Extinction and  Inattention  0-->No abnormality   Total 6 (04/06/22 1346)       Modified [place] Score (Pre-morbid)  3 - Moderate disability.  Requires some help, but able to walk unassisted.    Imaging  I personally reviewed all imaging; relevant findings per HPI.     Lab Results Data   CBC  Recent Labs   Lab 04/06/22  0725 04/05/22  1149   WBC 7.2 8.5   RBC 5.05 5.38   HGB 14.4 15.3   HCT 43.3 46.6    425     Basic Metabolic Panel    Recent Labs   Lab 04/08/22  1614 04/08/22  1150 04/08/22  0928 04/08/22  0837 04/06/22  0834 04/06/22  0725 04/05/22  1608 04/05/22  1148   NA  --   --   --  133  --  135  --  133   POTASSIUM  --   --   --  3.7  --  3.7  --  4.1   CHLORIDE  --   --   --  101  --  104  --  100   CO2  --   --   --  25  --  25  --  26   BUN  --   --   --  10  --  13  --  15   CR  --   --   --  1.08  --  1.05  --  1.16   * 113* 141* 132*   < > 85   < > 214*   EVER  --   --   --  9.0  --  9.4  --  9.7    < > = values in this interval not displayed.     Liver Panel  No results for input(s): PROTTOTAL, ALBUMIN, BILITOTAL, ALKPHOS, AST, ALT, BILIDIRECT in the last 168 hours.  INR    Recent Labs   Lab Test 03/16/22  1704   INR 1.02      Lipid Profile    Recent Labs   Lab Test 04/06/22  0725 03/16/22  1704 10/11/21  0942 04/21/16  0906 08/20/15  0830 01/14/15  0847 04/21/14  0815   CHOL 109 224* 228*   < > 136 316* 155   HDL 34* 33* 37*   < > 42 41 37*   LDL 53 136* 116*   < > 59 219* 80   TRIG 111 275* 377*   < > 176* 282* 189*   CHOLHDLRATIO  --   --   --   --  3.2 7.7* 4.2    < > = values in this interval not displayed.     A1C    Recent Labs   Lab Test 03/16/22  1704 10/11/21  0943 09/28/20  0925   A1C 11.8* 11.8* 13.2*     Troponin I    Recent Labs   Lab 04/05/22  1149   TROPONINIS <3

## 2022-04-08 NOTE — PROGRESS NOTES
[facility]    Hospitalist Progress Note    Date of Service (when I saw the patient): 04/08/2022    Assessment & Plan   Huseyin St is a 66 year old male who was admitted on 4/5/2022.    Huseyin St is a 66 year old male with HTN, HLD, poorly controlled DMT2 (A1c 11.8%), NA, anxiety, prior substance abuse quit 1 year ago who presents on 4/5/2022 with recurrent strokes.      #.  Recurrent acute ischemic stroke  #.  Scattered acute infarcts in the left frontal and parietal parasagittal corona radiator consistent with infarcts  #.  Severe focal stenosis/near occlusion of left MCA bifurcation. CTA 3/2022  #.  Petechial hemorrhage in left  embolic infarcts  Acute encephalopathy secondary to the both strokes; improving  - 3/16/22-3/18/22 - admitted due to L temporal ischemic stroke with severe L MCA stenosis/near occlusion. Discharged on DAPT -  mg daily and Plavix 75 mg daily.  - readmitted from 3/24/22 - 3/27 due to 2 new tiny white matter L cerebral hemispheric ischemic infarcts with persistent severe L MCA stenosis/near occlusion. Blood glucose >400. Plavix and ASA platelet function assays showed therapeutic response.   - Neuro endovascular team did not recommend stenting due to uncontrolled diabetes.   - Medical management was advised with repeat MRI/MRA in 1 month  - It was felt that in future if there was evidence of recurrent ischemia within the L MCA territory despite adequate medical management (including well controlled glucose), then the issue of stenting would be re-visited.  -Head CT 4/5 -showed 2 new small subacute infarcts involving left corona radiate/centrum ovale  - Head MRI on 4/5 showed - Scattered acute infarcts in the left frontal and parietal parasagittal corona radiata consistent with border zone infarcts, Petechial hemorrhage in the left temporal infarcts. No gross hemorrhagic transformation. No gross mass effect.  - MRA of head - Severe distal left M1  stenosis with decreased signal in the left M2 and M3 branches, Moderate left and mild right cavernous carotid stenoses, Mild stenosis in the left P2/P3 junction.  - MRA neck - normal   -He is on a 30-day Zio patch monitor  -Monitor on telemetry  -Neurochecks  -consult stroke neurology  -Continue aspirin and Plavix  -Continue rosuvastatin  -PT/OT/speech pathology evaluation   PT to undergo cerebral angiogram yesterday and was unable to stent left MCA as stenosis was too tight and next to another branch of vessel   He needs intracranial bypass at [facility]. Call placed for transfer and waiting to hear back from them   Pt with worsening aphasia this AM RRT called CTA and  CT head done which showed stable eolving strokes and stensoses . No new findings seen on CT scans   Stopped IVF today     #.  Essential hypertension  PTA lisinopril 20 mg daily  -Holding lisinopril to allow permissive hypertension in setting of acute stroke        #.  Hyperlipidemia  -Most recent lipid profile showed calculated , HDL 33, triglycerides 275 and total cholesterol 224 (3/16/22)  -On rosuvastatin 40 mg daily, continue        #.  Diabetes mellitus type, poorly controlled, hemoglobin A1c 11.8(3/16/22)  PTA-Lantus 55 units daily, aspart 15 units 3 times daily with meals  -Wife reports improved blood sugars in the past 1 week.  A.m. blood sugars are usually less than 120.  Blood sugars rest of the day are in low 200s.  -Since patient is n.p.o. for now, will administer Lantus 15 units at at bedtime and start on aspart 8 units with breakfast, 10 units each with lunch and supper  -Monitor blood sugars  -NovoLog sliding scale  Blood sugar at 85 overnight so reduced supper dose of  NovoLog to 6 units from 10 units  Monitor blood sugars closely and adjust insulin as needed        #.  Obstructive sleep apnea  -Continue CPAP as able     #.  Generalized anxiety disorder  -Continue Lexapro                 Diet: On moderate carb controlled  "diet  DVT Prophylaxis: Pneumatic Compression Devices  Rao Catheter: Not present  Central Lines: None  Cardiac Monitoring: None  Code Status:  DNR/DNI        Clinically Significant Risk Factors Present on Admission                    # Platelet Defect: home medication list includes an antiplatelet medication   # Severe Obesity: Estimated body mass index is 43.74 kg/m  as calculated from the following:    Height as of this encounter: 1.554 m (5' 1.2\").    Weight as of this encounter: 105.7 kg (233 lb).            Disposition Plan     Expected Discharge: Transfer to [facility] when bed is available   Anticipated discharge location:    Discussed with bedside RN, patient and his wife at bedside today      Saritha Ann MD  [phone] (P)      Interval History      Patient vega worsening aphasia this morning with no new findings on CT scans. Symptoms were transient and he is back to baseline neurologically now.  Needs to transfer to [facility] when bed is available for intracranial vascular bypass     -Data reviewed today: I reviewed all new labs and imaging results over the last 24 hours. I personally reviewed all the labs labs and imaging since yesterday   Physical Exam   Temp: 98.4  F (36.9  C) Temp src: Oral BP: (!) 149/78 Pulse: 78   Resp: 16 SpO2: 96 % O2 Device: None (Room air) Oxygen Delivery: 2 LPM  Vitals:    04/05/22 1123   Weight: 105.7 kg (233 lb)     Vital Signs with Ranges  Temp:  [97.3  F (36.3  C)-98.4  F (36.9  C)] 98.4  F (36.9  C)  Pulse:  [66-82] 78  Resp:  [12-27] 16  BP: ()/(62-93) 149/78  SpO2:  [94 %-99 %] 96 %  I/O last 3 completed shifts:  In: 240 [P.O.:240]  Out: 1075 [Urine:1075]    Constitutional: Awake, alert, cooperative, no apparent distress. Forgetful   Respiratory: Clear to auscultation bilaterally, no crackles or wheezing  Cardiovascular: Regular rate and rhythm, normal S1 and S2, and no murmur noted  GI: Normal bowel sounds, soft, non-distended, non-tender  Skin/Integumen: No rashes, no " cyanosis, no edema  Other: Very mild right-sided weakness noted     Medications     - MEDICATION INSTRUCTIONS -       - MEDICATION INSTRUCTIONS -         aspirin  325 mg Oral Daily     clopidogrel  75 mg Oral Daily     escitalopram  20 mg Oral Daily     famotidine  20 mg Oral or NG Tube BID     insulin aspart  6 Units Subcutaneous Daily with supper     insulin aspart  8 Units Subcutaneous Daily with breakfast     insulin aspart  10 Units Subcutaneous Daily with lunch     insulin aspart  1-10 Units Subcutaneous TID AC     insulin aspart  1-7 Units Subcutaneous At Bedtime     insulin glargine  50 Units Subcutaneous At Bedtime     rosuvastatin  40 mg Oral Daily     sodium chloride (PF)  3 mL Intracatheter Q8H       Data   Recent Labs   Lab 04/08/22  0928 04/08/22  0837 04/08/22  0753 04/06/22  0834 04/06/22  0725 04/05/22  1608 04/05/22  1149 04/05/22  1148   WBC  --   --   --   --  7.2  --  8.5  --    HGB  --   --   --   --  14.4  --  15.3  --    MCV  --   --   --   --  86  --  87  --    PLT  --   --   --   --  386  --  425  --    NA  --  133  --   --  135  --   --  133   POTASSIUM  --  3.7  --   --  3.7  --   --  4.1   CHLORIDE  --  101  --   --  104  --   --  100   CO2  --  25  --   --  25  --   --  26   BUN  --  10  --   --  13  --   --  15   CR  --  1.08  --   --  1.05  --   --  1.16   ANIONGAP  --  7  --   --  6  --   --  7   EVER  --  9.0  --   --  9.4  --   --  9.7   * 132* 137*   < > 85   < >  --  214*    < > = values in this interval not displayed.       Recent Results (from the past 24 hour(s))   IR Carotid Cerebral Angiogram Bilateral    Narrative    [id number]  CHRISTA HOGUE  1955  66 years    History: 66 yr?with HTN, HLD, DM2, NA with sever left M1-M2 junction  atherosclerotic stenosis?with recurrent strokes despite maximal  medical management.     : Dr Verde  [place]: DARLINE Reid    Anesthesia: Local anesthesia and conscious sedation were provided  Contrast used: 60cc of VISI  300  Fluoro time: 9.2minutes  Radiation dose: 1053.15 mGy  Sedation time: 37minutes  Sedatives: Versed 2.5 milligram and fentanyl 125 micrograms  Other medications: None    Procedure:   1. Diagnostic cerebral angiography and interpretation of the images.  2. Ultrasound guided right common femoral arteriotomy with permanent  image of the anatomy stored in the electronic medical record.  3. Diagnostic cerebral angiography of the left internal carotid  artery.   4. Diagnostic angiography of the right common femoral artery.   5. Percutaneous closure of a right femoral arteriotomy using a 6 Fr  AngioSeal closure device.    Consent:   The risks, benefits of a conventional diagnostic cerebral angiography  were discussed with the patient's wife who agreed to proceed. The  risks, which were discussed included risk of stroke, arterial  dissection in the neck, groin hematoma, arteriovenous fistula in the  groin and pseudoaneurysm of the femoral artery. Subsequently, verbal  and written informed consent was obtained.    Technique/findings:   The patient was brought to the angiography suite and placed in supine  position. The medications were administered by the radiology nursing  staff. The nursing staff independently monitored the patient's vital  signs during the procedure.   ?  The patient 's right groin was prepped and draped in standard fashion.  The right common femoral artery was palpated. Ultrasound was used to  image the common femoral artery. The femoral artery was shown to be  patent. Lidocaine was injected locally and a small skin incision was  made over the femoral artery using a scalpel. The subcutaneous tissue  was dissected using a Emili clamp. Using real-time ultrasound  guidance, 21 gauge needle was placed into the right femoral artery  which was exchanged for a 4 Cameroonian dilator over a microwire. The 4  Cameroonian dilator was exchanged for a 5French sheath over a Bentson wire.  The sheath was connected to a  continuous flush of heparinized saline.  A hard copy was stored in the patient's record. A 5F Leavitt 2  diagnostic catheter was used to catheterize the left common and left  internal carotid arteries under fluoroscopic guidance using roadmap  technique.     Upon completion of the procedure 5French sheath was removed.  Hemostasis was obtained with a 6 Upper sorbian AngioSeal closure device.  Procedure was completed without any complications. Patient was then  transferred to the floor in stable condition.    FINDINGS: ?  Left internal carotid artery injection: Cranial view   Biplane angiography was performed over the cranium. Intracranial view  of the left internal carotid artery injection in the AP and lateral   projections demonstrates normal petrous, laceral segments. There is  diffuse atherosclerotic disease involving the cavernous, clinoidal,  ophthalmic, and communicating segments of the left internal carotid  artery. The left internal carotid artery bifurcates into the left  anterior cerebral artery and left middle cerebral artery. The left  anterior cerebral artery has normal origin and there is contralateral  anterior cerebral artery filling through anterior communicating  artery. There is 99% stenosis (per WASID criteria) of the left middle  cerebral artery at its bifurcation, with the stenotic segments  extending into both the fontal and the temporal divisions (best seen  on series#6). There is a slight delay in contrast opacification of the  M2 segments distal to the stenosis (best seen on series# 7), and is  augmented via leptomeningeal collaterals from the ipsilateral anterior  cerebral artery. In addition, the ipsilateral external carotid artery  also results in transethmoidal opacification of the opthalmic segment  of the left carotid artery (series#9). The capillary and venous phases  are overall unremarkable, but limited by motion artifact.     Left external carotid artery: Cranial view  Biplane  angiography was performed over the cranium in AP and lateral  projections with the catheter in proximal left external carotid  artery. The left internal maxillary artery, left occipital artery,  left middle meningeal artery and left superficial temporal artery are  normal.     Right common femoral artery injection: Pelvic view   Angiography was performed over the right pelvis by injection of the  sheath. Pelvic view of the right common femoral artery in the HERNANDEZ  projection demonstrates a normal common femoral artery, superficial  femoral artery, and deep femoral artery. The insertion point of the  sheath is located above the bifurcation. No significant stenosis,  dissection or pseudoaneurysm is visualized.     Dr. Verde was present for the entire procedure.   ?    Impression    Impression:   There is 99% stenosis of the left middle cerebral artery at its  bifurcation, with the stenotic segment extending into both left M2 MCA  divisions resulting in subtle contrast delay distal to the  bifurcation, with leptomeningeal collaterals from the ipsilateral  anterior cerebral artery. Endovascular stenting is considered  high-risk given the potential for inadvertent thrombosis of one of the  two M2 divisions. The presence of a robust ipsilateral superficial  temporal artery makes an ipsilateral ST-MC bypass a viable alternative  treatment modality.     DARLINE Reid  Endovascular Surgical Neuroradiology Fellow  Pager: [phone]   CT Head w/o Contrast    Narrative    CT SCAN OF THE HEAD WITHOUT CONTRAST April 8, 2022 10:20 AM     HISTORY: Stroke, follow up.    TECHNIQUE: Axial images of the head and coronal reformations without  IV contrast material. Radiation dose for this scan was reduced using  automated exposure control, adjustment of the mA and/or kV according  to patient size, or iterative reconstruction technique.    COMPARISON: MRI of the brain dated 4/5/2022 and prior. CT head  4/5/2022.    FINDINGS:  Continued expected evolution of the previously demonstrated  moderate size left temporal lobe subacute infarct. Expected evolution  of previously seen small acute to subacute infarcts involving the left  corona radiata/centrum semiovale. Expected evolution of the small  subacute infarcts in the left parietal white matter. Other punctate  acute subacute infarcts in the left frontoparietal white matter as  seen on the prior MRI are not as well seen on CT. No definite new  infarct identified on unenhanced CT.    The ventricles are normal in size and configuration. Mild generalized  brain parenchymal volume loss. No CT findings of acute intracranial  hemorrhage. The areas of petechial hemorrhage in the left temporal  lobe infarct bed are not well seen on CT. There is no extra-axial  fluid collection. There is unchanged mild swelling of the left  temporal lobe. No midline shift/herniation. Scattered intracranial  atherosclerotic calcifications, as before.    The paranasal sinuses and mastoids are clear. The calvarium and skull  base appear grossly intact. Hyperostosis frontalis interna is noted.      Impression    IMPRESSION: Expected evolution of multiple acute/subacute ischemic  infarcts of varying ages in the left cerebral hemisphere, as  described. Unchanged mild left temporal lobe swelling. No acute  intracranial hemorrhage identified on CT. No midline shift/herniation.   CTA Head Neck with Contrast    Narrative    CT ANGIOGRAM OF THE HEAD AND NECK WITH CONTRAST  4/8/2022 10:21 AM     HISTORY: Neuro deficit, acute, stroke suspected.     TECHNIQUE:  CT angiography with an injection of 70mL [id number]-[id number] IV  with scans through the head and neck. Images were transferred to a  separate 3-D workstation where multiplanar reformations and 3-D images  were created. Estimates of carotid stenoses are made relative to the  distal internal carotid artery diameters except as noted. Radiation  dose for this scan was reduced using  automated exposure control,  adjustment of the mA and/or kV according to patient size, or iterative  reconstruction technique.    COMPARISON: Images from diagnostic cerebral angiography dated  4/7/2022, MR angiogram of the [place] 4/5/2022, CTA head and  neck 3/24/2022.     CT HEAD FINDINGS: No contrast enhancing lesions. Cerebral blood flow  is grossly normal.     CT ANGIOGRAM HEAD FINDINGS: Unchanged severe/critical stenosis of the  left middle cerebral artery M1-M2 bifurcation involving the distal M1  segment and the origins of the M2 segments. This appears unchanged.  There is unchanged persistent satisfactory opacification of the more  distal M2/M3 branches likely due to collateral filling. No significant  stenosis of the proximal visualized anterior cerebral arteries. No  significant stenosis of the proximal visualized branches of the right  middle cerebral artery. Unchanged atherosclerotic disease involving  the bilateral carotid siphons resulting in stenosis of the paraclinoid  segments, moderate on the left and mild/mild to moderate on the right.    Mild atherosclerosis of the V4 segments of both vertebral arteries  without flow-limiting stenosis. The basilar artery appears patent.  Unchanged two tandem moderate focal stenoses involving the P2 segment  of the left posterior cerebral artery. The proximal branches of the  right posterior cerebral artery appear patent. No definite new  high-grade central arterial stenosis or large vessel occlusion  identified. No aneurysm or high flow vascular malformation is  identified.    CT ANGIOGRAM NECK FINDINGS:   Conventional three-vessel aortic arch branching pattern. Mild  atherosclerosis of the aortic arch without stenosis of the origins of  the great vessels.     Right carotid artery: The right common and internal carotid arteries  are patent. Mild atherosclerosis of the carotid bifurcation with less  than 50% stenosis by NASCET criteria.     Left carotid  artery: The left common and internal carotid arteries are  patent. Minimal atherosclerosis at the carotid bifurcation without  significant stenosis.    Vertebral arteries: Moderate atherosclerotic focal stenosis of the  origin of the left vertebral artery. Mild multifocal stenosis of the  V2 segments of both vertebral arteries due to external mass effect  from cervical spine osteophytes. Otherwise, the bilateral cervical  vertebral arteries are patent.    Other findings: Coronary artery calcifications. Multilevel  degenerative changes in the cervical and partially visualized upper  thoracic spine.       Impression    IMPRESSION:  1. Unchanged severe/critical focal stenosis of the left M1-M2 middle  cerebral artery bifurcation.  2. Unchanged moderate tandem stenoses involving the P2 segment of the  left posterior cerebral artery.  3. Unchanged atherosclerotic disease involving the bilateral carotid  siphons resulting in up to moderate left and mild right paraclinoid  segment internal carotid artery stenosis.  4. No definite new high-grade central arterial stenosis/large vessel  occlusion identified.  5. Unchanged mild atherosclerosis of the bilateral carotid  bifurcations without flow limiting stenosis.  6. Unchanged moderate atherosclerotic stenosis at the origin of the  left vertebral artery.

## 2022-04-08 NOTE — PLAN OF CARE
Pt here with CVA. A&O to self, confused and forgetful. Neuros are WFD, slight R sided weakness, Slight R droop. Pt insonsistent with following command. VSS. Tele SR. MOD CHO diet. Takes pills whole. NS at 75ml/hr. Up with A1/GBW Denies pain. Pt scoring green on the Aggression Stop Light Tool. Discharge pending.

## 2022-04-08 NOTE — PLAN OF CARE
"BP (!) 146/79 (BP Location: Left arm)   Pulse 78   Temp 98.4  F (36.9  C) (Oral)   Resp 16   Ht 1.554 m (5' 1.2\")   Wt 105.7 kg (233 lb)   SpO2 96%   BMI 43.74 kg/m      Patient name: Huseyin St    Nursing shift note  Summary: Patient in with Critical stenosis  Alertness/orientation: Alert, disoriented to situation, time, and place  Neuro: Right upper extremity weakness and pronator drift.  Cardiac: NSR  Resp: WDL  GI: WDL  : Voiding well. Bladder scans negative for retention  IV: IV fluids running. Bolus given  Mobility: A1 Gb walker  Pain: none  Diet: Regular  Skin: WDL  Plan: Transfer to [facility] for M1 artery graft  Other Important Info: Patient developed new aphasia this AM after working with PT. 1 Liter bolus was given with great improvement of symptoms      Jevon Chamorro, RN  [facility]  [phone]      "

## 2022-04-08 NOTE — CODE/RAPID RESPONSE
House NP note    I responded at 0937 to RRT called on this pt for worsening facial droop and word salad after walking in halls.      On my arrival stroke fellow is at bedside (pt has known, recurrent ischemic stroke from severe focal stenosis/near occlusion of left MCA bifurcation that has failed medical mgmt and anatomy not amenable to stenting.     Impression:  worsening facial droop and word salad after walking in halls.      Interventions:  -CT imaging as per stroke fellow  -gluc 150s  -/88, HR 86, 95% SPO2 on RA.  Pt was awake, able to protect airway.      I did not examine pt and I had another emergency to attend and thus did not accompany pt to CT.  I've updated hospitalist attending physician on acute changes.      CTs reviewed, CTA w/o significant change in various levels of stenosis, CT head w/o showing expected evolution of multiple acute/subacute ischemic infarcts of varying ages in the left cerebral hemisphere, as described. Unchanged mild left temporal lobe swelling. No acute  intracranial hemorrhage identified on CT. No midline shift/herniation.    No charge.     Natividad Siegel CNP  Hospitalist - [facility]  [phone]     Text Page

## 2022-04-09 ENCOUNTER — APPOINTMENT (OUTPATIENT)
Dept: SPEECH THERAPY | Facility: CLINIC | Age: 67
DRG: 023 | End: 2022-04-09
Attending: PSYCHIATRY & NEUROLOGY
Payer: COMMERCIAL

## 2022-04-09 PROBLEM — I63.9 ISCHEMIC STROKE (H): Status: ACTIVE | Noted: 2022-04-09

## 2022-04-09 LAB
ANION GAP SERPL CALCULATED.3IONS-SCNC: 7 MMOL/L (ref 3–14)
BUN SERPL-MCNC: 10 MG/DL (ref 7–30)
CALCIUM SERPL-MCNC: 9.7 MG/DL (ref 8.5–10.1)
CHLORIDE BLD-SCNC: 104 MMOL/L (ref 94–109)
CO2 SERPL-SCNC: 26 MMOL/L (ref 20–32)
CREAT SERPL-MCNC: 0.95 MG/DL (ref 0.66–1.25)
ERYTHROCYTE [DISTWIDTH] IN BLOOD BY AUTOMATED COUNT: 11.8 % (ref 10–15)
ERYTHROCYTE [DISTWIDTH] IN BLOOD BY AUTOMATED COUNT: 11.9 % (ref 10–15)
GFR SERPL CREATININE-BSD FRML MDRD: 88 ML/MIN/1.73M2
GLUCOSE BLD-MCNC: 76 MG/DL (ref 70–99)
GLUCOSE BLDC GLUCOMTR-MCNC: 105 MG/DL (ref 70–99)
GLUCOSE BLDC GLUCOMTR-MCNC: 215 MG/DL (ref 70–99)
GLUCOSE BLDC GLUCOMTR-MCNC: 253 MG/DL (ref 70–99)
GLUCOSE BLDC GLUCOMTR-MCNC: 261 MG/DL (ref 70–99)
GLUCOSE BLDC GLUCOMTR-MCNC: 79 MG/DL (ref 70–99)
HCT VFR BLD AUTO: 39.3 % (ref 40–53)
HCT VFR BLD AUTO: 44.9 % (ref 40–53)
HGB BLD-MCNC: 13.5 G/DL (ref 13.3–17.7)
HGB BLD-MCNC: 14.8 G/DL (ref 13.3–17.7)
HOLD SPECIMEN: NORMAL
MCH RBC QN AUTO: 28.5 PG (ref 26.5–33)
MCH RBC QN AUTO: 28.5 PG (ref 26.5–33)
MCHC RBC AUTO-ENTMCNC: 33 G/DL (ref 31.5–36.5)
MCHC RBC AUTO-ENTMCNC: 34.4 G/DL (ref 31.5–36.5)
MCV RBC AUTO: 83 FL (ref 78–100)
MCV RBC AUTO: 87 FL (ref 78–100)
PLATELET # BLD AUTO: 380 10E3/UL (ref 150–450)
PLATELET # BLD AUTO: 410 10E3/UL (ref 150–450)
POTASSIUM BLD-SCNC: 3.7 MMOL/L (ref 3.4–5.3)
RBC # BLD AUTO: 4.74 10E6/UL (ref 4.4–5.9)
RBC # BLD AUTO: 5.19 10E6/UL (ref 4.4–5.9)
SODIUM SERPL-SCNC: 137 MMOL/L (ref 133–144)
TROPONIN I SERPL HS-MCNC: 4 NG/L
UFH PPP CHRO-ACNC: 0.1 IU/ML
WBC # BLD AUTO: 6.9 10E3/UL (ref 4–11)
WBC # BLD AUTO: 7.9 10E3/UL (ref 4–11)

## 2022-04-09 PROCEDURE — 36415 COLL VENOUS BLD VENIPUNCTURE: CPT

## 2022-04-09 PROCEDURE — 85027 COMPLETE CBC AUTOMATED: CPT

## 2022-04-09 PROCEDURE — 36415 COLL VENOUS BLD VENIPUNCTURE: CPT | Performed by: STUDENT IN AN ORGANIZED HEALTH CARE EDUCATION/TRAINING PROGRAM

## 2022-04-09 PROCEDURE — 120N000002 HC R&B MED SURG/OB UMMC

## 2022-04-09 PROCEDURE — 92526 ORAL FUNCTION THERAPY: CPT | Mod: GN

## 2022-04-09 PROCEDURE — 250N000012 HC RX MED GY IP 250 OP 636 PS 637: Performed by: STUDENT IN AN ORGANIZED HEALTH CARE EDUCATION/TRAINING PROGRAM

## 2022-04-09 PROCEDURE — 85027 COMPLETE CBC AUTOMATED: CPT | Performed by: STUDENT IN AN ORGANIZED HEALTH CARE EDUCATION/TRAINING PROGRAM

## 2022-04-09 PROCEDURE — 250N000013 HC RX MED GY IP 250 OP 250 PS 637: Performed by: STUDENT IN AN ORGANIZED HEALTH CARE EDUCATION/TRAINING PROGRAM

## 2022-04-09 PROCEDURE — 92610 EVALUATE SWALLOWING FUNCTION: CPT | Mod: GN

## 2022-04-09 PROCEDURE — 82310 ASSAY OF CALCIUM: CPT | Performed by: STUDENT IN AN ORGANIZED HEALTH CARE EDUCATION/TRAINING PROGRAM

## 2022-04-09 PROCEDURE — 99223 1ST HOSP IP/OBS HIGH 75: CPT | Mod: GC | Performed by: PSYCHIATRY & NEUROLOGY

## 2022-04-09 PROCEDURE — 999N000248 HC STATISTIC IV INSERT WITH US BY RN

## 2022-04-09 PROCEDURE — 85520 HEPARIN ASSAY: CPT | Performed by: PSYCHIATRY & NEUROLOGY

## 2022-04-09 PROCEDURE — 84484 ASSAY OF TROPONIN QUANT: CPT | Performed by: STUDENT IN AN ORGANIZED HEALTH CARE EDUCATION/TRAINING PROGRAM

## 2022-04-09 PROCEDURE — 250N000011 HC RX IP 250 OP 636

## 2022-04-09 PROCEDURE — 36415 COLL VENOUS BLD VENIPUNCTURE: CPT | Performed by: PSYCHIATRY & NEUROLOGY

## 2022-04-09 RX ORDER — ROSUVASTATIN CALCIUM 10 MG/1
40 TABLET, COATED ORAL DAILY
Status: DISCONTINUED | OUTPATIENT
Start: 2022-04-09 | End: 2022-04-18 | Stop reason: HOSPADM

## 2022-04-09 RX ORDER — CLOPIDOGREL BISULFATE 75 MG/1
75 TABLET ORAL DAILY
Status: DISCONTINUED | OUTPATIENT
Start: 2022-04-09 | End: 2022-04-09

## 2022-04-09 RX ORDER — DEXTROSE MONOHYDRATE 25 G/50ML
25-50 INJECTION, SOLUTION INTRAVENOUS
Status: DISCONTINUED | OUTPATIENT
Start: 2022-04-09 | End: 2022-04-09

## 2022-04-09 RX ORDER — CLOPIDOGREL BISULFATE 75 MG/1
75 TABLET ORAL DAILY
Status: CANCELLED | OUTPATIENT
Start: 2022-04-09

## 2022-04-09 RX ORDER — ASPIRIN 325 MG
325 TABLET ORAL DAILY
Status: DISCONTINUED | OUTPATIENT
Start: 2022-04-09 | End: 2022-04-18 | Stop reason: HOSPADM

## 2022-04-09 RX ORDER — ESCITALOPRAM OXALATE 10 MG/1
20 TABLET ORAL DAILY
Status: CANCELLED | OUTPATIENT
Start: 2022-04-09

## 2022-04-09 RX ORDER — DEXTROSE MONOHYDRATE 25 G/50ML
25-50 INJECTION, SOLUTION INTRAVENOUS
Status: DISCONTINUED | OUTPATIENT
Start: 2022-04-09 | End: 2022-04-12

## 2022-04-09 RX ORDER — ROSUVASTATIN CALCIUM 10 MG/1
40 TABLET, COATED ORAL DAILY
Status: CANCELLED | OUTPATIENT
Start: 2022-04-09

## 2022-04-09 RX ORDER — LABETALOL HYDROCHLORIDE 5 MG/ML
10-20 INJECTION, SOLUTION INTRAVENOUS EVERY 10 MIN PRN
Status: DISCONTINUED | OUTPATIENT
Start: 2022-04-09 | End: 2022-04-18 | Stop reason: HOSPADM

## 2022-04-09 RX ORDER — ESCITALOPRAM OXALATE 10 MG/1
20 TABLET ORAL DAILY
Status: DISCONTINUED | OUTPATIENT
Start: 2022-04-09 | End: 2022-04-18 | Stop reason: HOSPADM

## 2022-04-09 RX ORDER — NICOTINE POLACRILEX 4 MG
15-30 LOZENGE BUCCAL
Status: DISCONTINUED | OUTPATIENT
Start: 2022-04-09 | End: 2022-04-09

## 2022-04-09 RX ORDER — LIDOCAINE 40 MG/G
CREAM TOPICAL
Status: DISCONTINUED | OUTPATIENT
Start: 2022-04-09 | End: 2022-04-16

## 2022-04-09 RX ORDER — HYDRALAZINE HYDROCHLORIDE 20 MG/ML
10-20 INJECTION INTRAMUSCULAR; INTRAVENOUS
Status: DISCONTINUED | OUTPATIENT
Start: 2022-04-09 | End: 2022-04-18 | Stop reason: HOSPADM

## 2022-04-09 RX ORDER — HEPARIN SODIUM 10000 [USP'U]/100ML
0-5000 INJECTION, SOLUTION INTRAVENOUS CONTINUOUS
Status: DISCONTINUED | OUTPATIENT
Start: 2022-04-09 | End: 2022-04-13

## 2022-04-09 RX ORDER — NICOTINE POLACRILEX 4 MG
15-30 LOZENGE BUCCAL
Status: DISCONTINUED | OUTPATIENT
Start: 2022-04-09 | End: 2022-04-12

## 2022-04-09 RX ADMIN — ESCITALOPRAM OXALATE 20 MG: 10 TABLET ORAL at 08:18

## 2022-04-09 RX ADMIN — INSULIN ASPART 5 UNITS: 100 INJECTION, SOLUTION INTRAVENOUS; SUBCUTANEOUS at 17:48

## 2022-04-09 RX ADMIN — ROSUVASTATIN CALCIUM 40 MG: 10 TABLET, FILM COATED ORAL at 08:19

## 2022-04-09 RX ADMIN — CLOPIDOGREL BISULFATE 75 MG: 75 TABLET ORAL at 08:17

## 2022-04-09 RX ADMIN — HEPARIN SODIUM AND DEXTROSE 1200 UNITS/HR: 10000; 5 INJECTION INTRAVENOUS at 14:18

## 2022-04-09 RX ADMIN — ASPIRIN 325 MG ORAL TABLET 325 MG: 325 PILL ORAL at 08:12

## 2022-04-09 RX ADMIN — INSULIN ASPART 5 UNITS: 100 INJECTION, SOLUTION INTRAVENOUS; SUBCUTANEOUS at 14:25

## 2022-04-09 ASSESSMENT — VISUAL ACUITY
OU: BASELINE
OU: BASELINE

## 2022-04-09 ASSESSMENT — ACTIVITIES OF DAILY LIVING (ADL)
ADLS_ACUITY_SCORE: 17
ADLS_ACUITY_SCORE: 17
ADLS_ACUITY_SCORE: 15
ADLS_ACUITY_SCORE: 15
ADLS_ACUITY_SCORE: 17
ADLS_ACUITY_SCORE: 15
ADLS_ACUITY_SCORE: 17
ADLS_ACUITY_SCORE: 15
ADLS_ACUITY_SCORE: 17
ADLS_ACUITY_SCORE: 17
ADLS_ACUITY_SCORE: 15
ADLS_ACUITY_SCORE: 15
ADLS_ACUITY_SCORE: 17
ADLS_ACUITY_SCORE: 15
ADLS_ACUITY_SCORE: 17
ADLS_ACUITY_SCORE: 15
ADLS_ACUITY_SCORE: 17
ADLS_ACUITY_SCORE: 12
ADLS_ACUITY_SCORE: 17
ADLS_ACUITY_SCORE: 15
ADLS_ACUITY_SCORE: 17
ADLS_ACUITY_SCORE: 17
ADLS_ACUITY_SCORE: 15

## 2022-04-09 NOTE — PROVIDER NOTIFICATION
MD Notification    Notified Person: MD    Notified Person Name: Bajgur    Notification Date/Time: 4/8/22    Notification Interaction: amcom    Purpose of Notification: . Pt currently confused with word finding difficulties. Given in report Pt had a similar episode this morning. Please Advise. Thanks.    Orders Received:    Comments:

## 2022-04-09 NOTE — PLAN OF CARE
Pt admitted for CVA with plan to discuss ST- bypass options with neurosurgery team. VSS on RA. Pt did not follow commands consistently. Pt oriented to person and place. RUE and RLE weakness. Mild R droop. Pt has word difficulty and reports frustration communicating words at times. Regular diet with thins. Pt swallowed pills and breakfast without coughing. Last reported BM 4/8. Void at 8:43am in bathroom. RRR. Lungs CTA. Bowel sounds x 4 Qs. Pt denies pain, numbness and tingling. Skin warm and dry.

## 2022-04-09 NOTE — H&P
"[facility]    Stroke Admission Note    Chief Complaint  Recurrent stroke    HPI  Huseyin St is a 66 year old male with HTN, HLD, DMII (A1c 11.8), NA, cocaine abuse and recurrent L MCA infarcts who presents as a transfer from [facility] for possible STA-MCA bypass. Patient was admitted to [facility] 4/5/22 for worsening R sided weakness and aphasia. Patient was admitted twice in March for L MCA territory strokes (likely d/t L M1 stenosis). The first admission was 3/16-3/18 and the patient was started on DAPT. He was admitted again 3/24-3/27 due to expressive aphasia and was found to have 2 new cortical white matter strokes. Given the recurrent strokes on DAPT neuro-IR was consulted for possible stenting but did not recommend stenting due to poorly controlled diabetes.     The patient returned to the hospital 4/5/22 due to \"talking gibberish\" for 2 days where the patient was making repeated significant paraphasic errors and was having increased R sided weakness. During that admission the patient was seen by the stroke team several times and underwent a DSA which showed critical stenosis of L M1 artery extending into b/l M2 divisions which made stenting difficult. Given this, the decision was made to transfer the patient to [facility] for a possible ST-MC bypass which could occur as early as next week.     On arrival, the patient is able to only provide a cursory history as he is making frequent paraphasic errors. He reports frustration with being awoken, but notes that his symptoms are relatively stable. Voices no new complaints.    Intravenous Thrombolysis  Not given due to:   - unclear or unfavorable risk-benefit profile for extended window thrombolysis beyond the conventional 4.5 hour time window    Endovascular Treatment  Not able to stent M1/M2 stenosis, planning for ST-MC bypass    Impression   66 year old male with HTN, HLD, DMII (A1c 11.8), NA, cocaine abuse and " recurrent L MCA infarcts who presents as a transfer from [facility] for possible STA-MCA bypass. Exam stable from [facility], most notable for receptive>expressive aphasia. The patient has been having recurrent strokes from L M1-M2 stenosis and has undergone a DSA which showed that the stenosis is too distal to stent. Given this the patient was transferred to [facility] for evaluation for a possible ST-MC bypass procedure. For now will continue the patient on DAPT and monitor for worsening symptoms while he awaits the procedure    Plan  #Recurrent L MCA territory strokes  #L M1 to M2 stenosis  - Will discuss timing of possible ST-MC bypass in the AM  - Continue  mg  - Continue Plavix 75 mg  - Continue rosuvastatin 40 mg daily   - SBP goal normotension, 100-180  - Regular diet with thins (cleared by speech at [facility])  - Avoid hypotonic IV fluids  - PT/OT/SLP  - Depression Screen  - Apnea Screen  - Euthermia, Euglycemia    #Essential HTN  - Holding PTA lisinopril     #HLD  - Continue rosuvastatin once cleared for diet    #Poorly controlled T2DM (A1c 11.8)  - HDSSI  - Holding PTA Lantus  - Monitor blood sugar    #NA  - CPAP as able    #Generalized anxiety disorder  - Continue Lexapro      Prophylaxis            DVT: SCDs for tonight given unclear timeline for procedure    For Acid Suppression:  - GI prophylaxis is not indicated    Code Status  Presumed full as unable to discuss with family and patient with receptive aphasia, will readdress in the AM    During initial physical assessment, the plan of care was discussed and developed with patient.  Plan of care includes: the above.    Patient was admitted via transfer from [facility]     The patient will be admitted to the stroke team    Patient to be formally staffed in the AM. The stroke staff is Dr. Alegria.    Sourav Foy MD  PGY-2 Neurology Resident  Stroke ASCOM *[id number]  ___________________________________________________    Nutrition:   Orders  "Placed This Encounter      NPO for Medical/Clinical Reasons Except for: No Exceptions    Clinically Significant Risk Factors Present on Admission               # Platelet Defect: home medication list includes an antiplatelet medication         Past Medical History   Past Medical History:   Diagnosis Date     Anxiety      Essential hypertension, benign      Hypercholesteremia      Sleep apnea      T2DM (type 2 diabetes mellitus) (H)      Past Surgical History   Past Surgical History:   Procedure Laterality Date     COLONOSCOPY       COLONOSCOPY N/A 10/26/2020    Procedure: COLONOSCOPY;  Surgeon: Randy Gonzalez MD;  Location:  GI     ENT SURGERY      R) ear \"procedure\"     IR CAROTID CEREBRAL ANGIOGRAM BILATERAL  4/7/2022     Medications   Home Meds  Prior to Admission medications    Medication Sig Start Date End Date Taking? Authorizing Provider   alcohol swab prep pads Use to swab area of injection/ian as directed 1/6/20   Kendal Wong MD   aspirin (ASA) 325 MG EC tablet Take 1 tablet (325 mg) by mouth daily 3/27/22   Lisbet Saini MD   blood glucose (NO BRAND SPECIFIED) test strip Use to test blood sugar 3 times daily or as directed. To accompany: Blood Glucose Monitor Brands: per insurance. 10/12/21   Kendal Wong MD   blood glucose calibration (NO BRAND SPECIFIED) solution Use to calibrate blood glucose monitor as needed as directed. To accompany: Blood Glucose Monitor Brands: per insurance. 1/6/20   Kendal Wong MD   clopidogrel (PLAVIX) 75 MG tablet Take 1 tablet (75 mg) by mouth daily 3/27/22   Lisbet Saini MD   clotrimazole (LOTRIMIN) 1 % external cream Apply topically 2 times daily  Patient taking differently: Apply topically 2 times daily as needed  12/11/20   Kendal Wong MD   escitalopram (LEXAPRO) 20 MG tablet Take 1 tablet (20 mg) by mouth daily 10/12/21   Kendal Wong MD   insulin aspart (NOVOLOG FLEXPEN) 100 UNIT/ML pen Inject 6-8 Units Subcutaneous 3 " times daily (with meals) 4/8/22   Saritha Ann MD   insulin glargine (LANTUS SOLOSTAR) 100 UNIT/ML pen INJ 55 UNITS SC HS 10/12/21   Kendal Wong MD   insulin pen needle (BD MARIE U/F) 32G X 4 MM miscellaneous USE THREE PEN NEEDLES DAILY AS DIRECTED 10/12/21   Kendal Wong MD   LORazepam (ATIVAN) 0.5 MG tablet Take 1 tablet (0.5 mg) by mouth once as needed for anxiety 4/1/22   Kendal Wong MD   ORDER FOR DME Auto-CPAP: Max 12 cm H2O Min 10 cm H2O  Continuous Lifetime need and heated humidity.    7/5/12   Goltz, Bennett Ezra, PA-C   rosuvastatin (CRESTOR) 40 MG tablet Take 1 tablet (40 mg) by mouth daily 10/12/21   Kendal Wong MD   thin (NO BRAND SPECIFIED) lancets Use to test blood sugar 3 times daily or as directed. To accompany: Blood Glucose Monitor Brands: per insurance. 1/6/20   Kendal Wong MD       Scheduled Meds    sodium chloride (PF)  3 mL Intracatheter Q8H       Infusion Meds    - MEDICATION INSTRUCTIONS -       - MEDICATION INSTRUCTIONS -         PRN Meds  labetalol **OR** hydrALAZINE, lidocaine 4%, lidocaine (buffered or not buffered), - MEDICATION INSTRUCTIONS -, - MEDICATION INSTRUCTIONS -, sodium chloride (PF)    Allergies   Allergies   Allergen Reactions     Augmentin Diarrhea     Sulfa Drugs      Unknown - reaction occurred as a child     Victoza      Skin rash     Family History   Family History   Adopted: Yes   Problem Relation Age of Onset     Diabetes Mother      Respiratory Mother         asthma     Lipids Mother      Arthritis Mother         knee replacement     Alzheimer Disease Mother      Cerebrovascular Disease Brother      C.A.D. No family hx of      Cancer - colorectal No family hx of      Prostate Cancer No family hx of      Social History   Social History     Tobacco Use     Smoking status: Never Smoker     Smokeless tobacco: Never Used   Substance Use Topics     Alcohol use: Yes     Alcohol/week: 0.0 standard drinks     Comment: 3-6 drinks on occ  weekend night, occ drink on weekday     Drug use: Yes     Types: Cocaine       Review of Systems   The 10 point Review of Systems is negative other than noted in the HPI or here.        PHYSICAL EXAMINATION  Temp:  [98.2  F (36.8  C)-99.1  F (37.3  C)] 99.1  F (37.3  C)  Pulse:  [74-78] 78  Resp:  [16-22] 16  BP: (141-155)/(78-94) 155/92  SpO2:  [94 %-98 %] 98 %    General: Asleep, awakens to voice in NAD   HEENT: Normocephalic, atraumatic, no epistaxis, no oral lesions  Resp: Breathing comfortably on room air  Extremities: Warm and well perfused, peripheral pulses present, no edema  Skin: Not jaundiced, no rash, no ecchymoses  Psych: Normal mood and affect    Neuro:  Mental status: Asleep, awakens to voice, oriented to person, place, and time. Follows commands intermittently  Speech: Fluent,; No dysarthria, frequent paraphasic errors. Fluent receptive aphasia  Cranial nerves: Visual fields intact, Eyes conjugate, EOMI w/ normal and smooth pursuit, L lower facial droop, hearing intact to conversation, shoulder shrug strong, palate rise symmetric, tongue/uvula midline.  Motor: Tone normal. LUE is 5/5, RUE is 4-/5, LLE is 5/5, RLE is 4+/5. No abnormal movements noted. Pronator drift absent.  Reflexes: Toes down-going bilaterally.  Sensory: Intact to light touch in all 4 extremities  Coordination: FNF intact bilaterally with no dysmetria; Normal heel-shin test bilaterally with no dysmetria noted  Gait: Deferred    Dysphagia Screen  Dysarthria or facial droop present - Maintain NPO, consult SLP    Stroke Scales    NIHSS  1a. Level of Consciousness 0-->Alert, keenly responsive   1b. LOC Questions 1-->Answers one question correctly   1c. LOC Commands 1-->Performs one task correctly   2.   Best Gaze 0-->Normal   3.   Visual 0-->No visual loss   4.   Facial Palsy 1-->Minor paralysis (flattened nasolabial fold, asymmetry on smiling)   5a. Motor Arm, Left 0-->No drift, limb holds 90 (or 45) degrees for full 10 secs   5b.  Motor Arm, Right 0-->No drift, limb holds 90 (or 45) degrees for full 10 secs   6a. Motor Leg, Left 0-->No drift, leg holds 30 degree position for full 5 secs   6b. Motor Leg, right 0-->No drift, leg holds 30 degree position for full 5 secs   7.   Limb Ataxia 0-->Absent   8.   Sensory 0-->Normal, no sensory loss   9.   Best Language 1-->Mild-to-moderate aphasia, some obvious loss of fluency or facility of comprehension, without significant limitation on ideas expressed or form of expression. Reduction of speech and/or comprehension, however, makes conversation. . . (see row details)   10. Dysarthria 1-->Mild-to-moderate dysarthria, patient slurs at least some words and, at worst, can be understood with some difficulty   11. Extinction and Inattention  0-->No abnormality   Total 5 (04/09/22 0150)       Modified [place] Score (Pre-morbid)  3-Moderate disability; requiring some help, but able to walk without assistance    Imaging  I personally reviewed all imaging; relevant findings per the HPI.    Lab Results Data   CBC  Recent Labs   Lab 04/06/22  0725 04/05/22  1149   WBC 7.2 8.5   RBC 5.05 5.38   HGB 14.4 15.3   HCT 43.3 46.6    425     Basic Metabolic Panel   Recent Labs   Lab 04/08/22  2113 04/08/22  1614 04/08/22  1150 04/08/22  0928 04/08/22  0837 04/06/22  0834 04/06/22  0725 04/05/22  1608 04/05/22  1148   NA  --   --   --   --  133  --  135  --  133   POTASSIUM  --   --   --   --  3.7  --  3.7  --  4.1   CHLORIDE  --   --   --   --  101  --  104  --  100   CO2  --   --   --   --  25  --  25  --  26   BUN  --   --   --   --  10  --  13  --  15   CR  --   --   --   --  1.08  --  1.05  --  1.16   * 107* 113*   < > 132*   < > 85   < > 214*   EVER  --   --   --   --  9.0  --  9.4  --  9.7    < > = values in this interval not displayed.     Liver Panel  No results for input(s): PROTTOTAL, ALBUMIN, BILITOTAL, ALKPHOS, AST, ALT, BILIDIRECT in the last 168 hours.  INR    Recent Labs   Lab Test  03/16/22  1704   INR 1.02      Lipid Profile    Recent Labs   Lab Test 04/06/22  0725 03/16/22  1704 10/11/21  0942 04/21/16  0906 08/20/15  0830 01/14/15  0847 04/21/14  0815   CHOL 109 224* 228*   < > 136 316* 155   HDL 34* 33* 37*   < > 42 41 37*   LDL 53 136* 116*   < > 59 219* 80   TRIG 111 275* 377*   < > 176* 282* 189*   CHOLHDLRATIO  --   --   --   --  3.2 7.7* 4.2    < > = values in this interval not displayed.     A1C    Recent Labs   Lab Test 03/16/22  1704 10/11/21  0943 09/28/20  0925   A1C 11.8* 11.8* 13.2*     Troponin I    Recent Labs   Lab 04/05/22  1149   TROPONINIS <3     Stroke Code / Stroke Consult Data Data    Not a stroke code

## 2022-04-09 NOTE — PHARMACY-ADMISSION MEDICATION HISTORY
Admission Medication History Completed by Pharmacy    See [facility] Admission Navigator for allergy information, preferred outpatient pharmacy, prior to admission medications and immunization status.     Medication History Sources:     Medication history was completed at [facility] beds at the [facility] on 4/6/2022    Changes made to [facility] medication list (reason):    Added: None    Deleted: None    Changed: None    Additional Information:    None    Prior to Admission medications    Medication Sig Last Dose Taking? Auth Provider   alcohol swab prep pads Use to swab area of injection/ian as directed   Kendal Wong MD   aspirin (ASA) 325 MG EC tablet Take 1 tablet (325 mg) by mouth daily   Lisbet Saini MD   blood glucose (NO BRAND SPECIFIED) test strip Use to test blood sugar 3 times daily or as directed. To accompany: Blood Glucose Monitor Brands: per insurance.   Kendal Wong MD   blood glucose calibration (NO BRAND SPECIFIED) solution Use to calibrate blood glucose monitor as needed as directed. To accompany: Blood Glucose Monitor Brands: per insurance.   Kendal Wong MD   clopidogrel (PLAVIX) 75 MG tablet Take 1 tablet (75 mg) by mouth daily   Lisbet Saini MD   clotrimazole (LOTRIMIN) 1 % external cream Apply topically 2 times daily  Patient taking differently: Apply topically 2 times daily as needed    Kendal Wong MD   escitalopram (LEXAPRO) 20 MG tablet Take 1 tablet (20 mg) by mouth daily   Kendal Wong MD   insulin aspart (NOVOLOG FLEXPEN) 100 UNIT/ML pen Inject 6-8 Units Subcutaneous 3 times daily (with meals)   Saritha Ann MD   insulin glargine (LANTUS SOLOSTAR) 100 UNIT/ML pen INJ 55 UNITS SC HS   Kendal Wong MD   insulin pen needle (BD MARIE U/F) 32G X 4 MM miscellaneous USE THREE PEN NEEDLES DAILY AS DIRECTED   Kendal Wong MD   LORazepam (ATIVAN) 0.5 MG tablet Take 1 tablet (0.5 mg) by mouth once as needed for anxiety   Kendal Wong MD   ORDER FOR DME Auto-CPAP: Max 12 cm  H2O Min 10 cm H2O  Continuous Lifetime need and heated humidity.      Goltz, Bennett Ezra, PA-C   rosuvastatin (CRESTOR) 40 MG tablet Take 1 tablet (40 mg) by mouth daily   Kendal Wong MD   thin (NO BRAND SPECIFIED) lancets Use to test blood sugar 3 times daily or as directed. To accompany: Blood Glucose Monitor Brands: per insurance.   Kendal Wong MD       Date completed: 04/09/22    Medication history completed by: Margoth Durán [facility]

## 2022-04-09 NOTE — PLAN OF CARE
Status: Pt. w hx HTN, HLD, poorly controlled DM2, L MCA infarcts, NA, hx substance abuse quit 1 year ago, presents with recurrent CVA.   Vitals: VSS on RA ex/ HTN within 220 roni. CCM  Neuros: A&O to self only with choices to others. Dysarthric speech with WFD and inappropriate words at times.Mild R. Facial droop. R<L. With slight decrease sensation to right side.   IV: PIV, SL   Labs/Electrolytes: BG checks  Resp/trach: LS clear. Continuous pulse ox mid 90's  Diet: Regular with thins. Assist with meal ordering  Bowel status: No BM this shift, LBM 4/8 per report  : Voids spontaneously in bathroom  Skin:  RN-Scab on stomach. R. Groin site with transparent dressing. Blanchable erythema at EKG sticker sites.  Pain: Denied   Activity: A1/GB, slightly unsteady   Plan: Possible ST-MC. Continue to monitor and follow POC  Updates this shift: Neuro Surgery consulted. Heparin infusion started. Family updated by team.

## 2022-04-09 NOTE — PLAN OF CARE
Status: Pt. w hx HTN, HLD, poorly controlled DM2, L MCA infarcts, NA, hx substance abuse quit 1 year ago, presents with recurrent CVA.   Vitals: VSS on RA ex/ HTN within 220 roni. CCM  Neuros: A&O to self only. Dysarthric speech with WFD and inappropriate words at times.Mild L. Facial droop. RUE 4/5 with drift present at 0000, drift absent at 0400. AOE 5/5.   IV: PIV, SL   Labs/Electrolytes: BG check 105. Trop 4  Resp/trach: LS clear. Continuous pulse ox mid 90's  Diet: Regular with thins, cleared by speech at [facility] per MD note and verified this shift with bedside swallow   Bowel status: No BM this shift, LBM 4/8 per report  : Voids spontaneously in bathroom  Skin: 2 nurse skin check completed by Rufino EVERETT RN and Guillermina RODRIGUEZ RN-Scab on stomach. R. Groin site with transparent dressing. Blanchable erythema at EKG sticker sites.  Pain: Denied   Activity: A1/GB, slightly unsteady   Plan: Possible ST-MC. Continue to monitor and follow POC  Updates this shift: Arrived from [facility] around midnight. Pt. Belongings include shorts. Passed bedside swallow. PCDs off, ordered. Stroke booklet at bedside but will need to have family present for teaching/admission ?'s d/t aphasia.

## 2022-04-09 NOTE — CONSULTS
Care Management Initial Consult    General Information  Assessment completed with: Spouse or significant other,    Type of CM/SW Visit: Initial Assessment    Primary Care Provider verified and updated as needed: Yes   Readmission within the last 30 days: previous discharge plan unsuccessful      Reason for Consult: other (see comments), discharge planning (stroke)  Advance Care Planning: Advance Care Planning Reviewed: present on chart          Communication Assessment  Patient's communication style: spoken language (English or Bilingual)    Hearing Difficulty or Deaf: no        Cognitive  Cognitive/Neuro/Behavioral: .WDL except, motor response, orientation, speech  Level of Consciousness: alert  Arousal Level: opens eyes spontaneously  Orientation: disoriented to, time, situation  Mood/Behavior: calm  Best Language: 1 - Mild to moderate  Speech: word-finding difficulty, inappropriate words, garbled    Living Environment:   People in home: spouse  Estefania (spouse)  Current living Arrangements: house      Able to return to prior arrangements: other (see comments) (TBD)       Family/Social Support:  Care provided by: self, spouse/significant other  Provides care for: no one  Marital Status:   Wife, Other (specify) (Family and friends ( Aunt Candace))  Estefania       Description of Support System: Supportive, Involved         Current Resources:   Patient receiving home care services: No     Community Resources: Other (see comment) (OP OT, PT, and SLP)  Equipment currently used at home: none  Supplies currently used at home: None    Employment/Financial:  Employment Status: employed part-time, other (see comments) (Pt was a [employer] -unlikely to return )        Financial Concerns:             Lifestyle & Psychosocial Needs:  Social Determinants of Health     Tobacco Use: Low Risk      Smoking Tobacco Use: Never Smoker     Smokeless Tobacco Use: Never Used   Alcohol Use: Not on file   Financial Resource  Strain: Not on file   Food Insecurity: Not on file   Transportation Needs: Not on file   Physical Activity: Not on file   Stress: Not on file   Social Connections: Not on file   Intimate Partner Violence: Not on file   Depression: Not at risk     PHQ-2 Score: 0   Housing Stability: Not on file       Functional Status:  Prior to admission patient needed assistance:       Some, pt was able to perform ADL on his own. Everything the pt needs is on one floor.       Mental Health Status:          Chemical Dependency Status:                Values/Beliefs:  Spiritual, Cultural Beliefs, Mandaeism Practices, Values that affect care:                 Additional Information:  RNCC met with the pt and wife Estefania, described the role. The pt's wife would feel most comfortable with the pt going to TCU for a short period of time, as he continues to be admitted after discharge. The pt has yet to have surgery, so therapy recommendation are TBD. The pt does have OP SLP, OT, and PT, however the wife would prefer this to be in the home as she still works a full time job. The pt's wife would also be interested in a home health aid for a few hours per day. I informed the pt's wife that we will work on securing a home care agency for the above services. The pt's wife is unsure if they will need grab bars in the home, a home OT evaluation would be beneficial to ensure safety. RNCC will continue to follow for recommendation post surgery. Please work on securing home care. No orders placed. Internal hand off sent.   Lottie Jackson, [place], BSN, PHN  Weekend/Holiday RNCC Pager [phone]

## 2022-04-09 NOTE — PLAN OF CARE
Pt here with Stenosis. A&Ox2 ex time and situation. Neuros RUE weakness, R drift, R droop, and word finding difficulities. VSS. Tele NSR. Mod carb diet, thin liquids. Takes pills whole. Up with A1 GB. Denies pain. Pt scoring green on the Aggression Stop Light Tool. Pt experienced confusion and word finding difficulties this evening, MD notified. Discharge to [facility] transport set up for 11pm 4/8.

## 2022-04-09 NOTE — CONSULTS
[facility]       NEUROSURGERY CONSULTATION    This consultation was requested by Dr. Alegria from stroke service    Reason for Consultation: Multiple strokes refractory to medical management, no endovascular options, consideration for EC-IC bypass    HPI:    66-year-old male with a past medical history of obesity, hyperlipidemia, NA, hypertension, cocaine use (apparently regular user), diabetes (hemoglobin A1c 11.8) presenting with recurrent strokes on dual antiplatelet therapy with left distal M1/proximal M2 99% stenosis on angiogram, admitted to [facility] directly for consideration for EC-IC bypass.    History obtained from chart review as patient refuses to give a history:    Patient was admitted first on 3/16/2022 with word finding difficulties left M1 distribution (primarily temporal lobe) stroke on MRI started on dual antiplatelet therapy (aspirin 325, Plavix 75 mg).  Subsequently readmitted on 3/24/2022 for concerns of speaking gibberish, found to have 2 new DWI lesions along the left MCA distribution.  Platelet function assay and P2 Y 12 sent at that time-patient found to be a responder to both medications.  Endovascular team was consulted at that time for consideration for stenting-he was declined due to patient's multiple risk factors.  Patient was again readmitted to [facility] on 4/5/2022 for several days of word finding difficulties, worsening right-sided weakness, right foot drag-found to have new small left corona corona radiata infarcts.  Angiogram done showing left-sided distal M1 and 99% stenosis extending into proximal M2's.  Due to difficult location of stenosis (small-caliber, involving both M2's) patient was deemed not a suitable stent candidate.  He was subsequent transferred to the [facility] for consideration of EC-IC bypass.      PAST MEDICAL HISTORY:   Past Medical History:   Diagnosis Date     Anxiety      Essential  "hypertension, benign      Hypercholesteremia      Sleep apnea      T2DM (type 2 diabetes mellitus) (H)        PAST SURGICAL HISTORY:   Past Surgical History:   Procedure Laterality Date     COLONOSCOPY       COLONOSCOPY N/A 10/26/2020    Procedure: COLONOSCOPY;  Surgeon: Randy Gonzalez MD;  Location:  GI     ENT SURGERY      R) ear \"procedure\"     IR CAROTID CEREBRAL ANGIOGRAM BILATERAL  4/7/2022       FAMILY HISTORY:   Family History   Adopted: Yes   Problem Relation Age of Onset     Diabetes Mother      Respiratory Mother         asthma     Lipids Mother      Arthritis Mother         knee replacement     Alzheimer Disease Mother      Cerebrovascular Disease Brother      C.A.D. No family hx of      Cancer - colorectal No family hx of      Prostate Cancer No family hx of        SOCIAL HISTORY:   Social History     Tobacco Use     Smoking status: Never Smoker     Smokeless tobacco: Never Used   Substance Use Topics     Alcohol use: Yes     Alcohol/week: 0.0 standard drinks     Comment: 3-6 drinks on occ weekend night, occ drink on weekday       MEDICATIONS:  No current outpatient medications on file.       Allergies:  Allergies   Allergen Reactions     Augmentin Diarrhea     Sulfa Drugs      Unknown - reaction occurred as a child     Victoza      Skin rash       ROS: 10 point ROS of systems including Constitutional, Eyes, Respiratory, Cardiovascular, Gastroenterology, Genitourinary, Integumentary, Muscularskeletal, Psychiatric were all negative except for pertinent positives noted in my HPI.    Physical exam:   Blood pressure (!) 155/92, pulse 71, temperature 98.8  F (37.1  C), temperature source Oral, resp. rate 16, weight 103.7 kg (228 lb 9.6 oz), SpO2 95 %.  General: Laying in bed, sleeping, refusing to participate in exam, refusing to answer questions  HEENT: Atraumatic, normocephalic  PULM: Breathing comfortably on room air  MSK: Grossly unremarkable, no obvious abdominal tenderness  NEUROLOGIC:  Does " not answer orientation questions  Sleeping but arousable, refusing to participate in exam  Nonsensical, but fluent, spontaneous speech  Not following/refusing to follow commands including simple and complex  Mild right nasolabial flattening (patient refusing to activate)  Moving all extremities purposefully antigravity (appearing slightly less brisk on right side)    Gait: Deferred      IMAGING:  No results found for this or any previous visit (from the past 24 hour(s)).        LABS:   Last Comprehensive Metabolic Panel:  Sodium   Date Value Ref Range Status   04/09/2022 137 133 - 144 mmol/L Final   03/12/2018 135 133 - 144 mmol/L Final     Potassium   Date Value Ref Range Status   04/09/2022 3.7 3.4 - 5.3 mmol/L Final   03/12/2018 4.3 3.4 - 5.3 mmol/L Final     Chloride   Date Value Ref Range Status   04/09/2022 104 94 - 109 mmol/L Final   03/12/2018 102 94 - 109 mmol/L Final     Carbon Dioxide   Date Value Ref Range Status   03/12/2018 26 20 - 32 mmol/L Final     Carbon Dioxide (CO2)   Date Value Ref Range Status   04/09/2022 26 20 - 32 mmol/L Final     Anion Gap   Date Value Ref Range Status   04/09/2022 7 3 - 14 mmol/L Final   03/12/2018 7 3 - 14 mmol/L Final     Glucose   Date Value Ref Range Status   04/09/2022 76 70 - 99 mg/dL Final   03/12/2018 220 (H) 70 - 99 mg/dL Final     GLUCOSE BY METER POCT   Date Value Ref Range Status   04/09/2022 79 70 - 99 mg/dL Final     Urea Nitrogen   Date Value Ref Range Status   04/09/2022 10 7 - 30 mg/dL Final   03/12/2018 13 7 - 30 mg/dL Final     Creatinine   Date Value Ref Range Status   04/09/2022 0.95 0.66 - 1.25 mg/dL Final   09/28/2020 1.03 0.66 - 1.25 mg/dL Final     GFR Estimate   Date Value Ref Range Status   04/09/2022 88 >60 mL/min/1.73m2 Final     Comment:     Effective December 21, 2021 eGFRcr in adults is calculated using the 2021 CKD-EPI creatinine equation which includes age and gender (Delio et al., NEJ, DOI: 10.1056/JAVIpb4980023)   09/28/2020 75 >60  mL/min/[1.73_m2] Final     Comment:     Non  GFR Calc  Starting 12/18/2018, serum creatinine based estimated GFR (eGFR) will be   calculated using the Chronic Kidney Disease Epidemiology Collaboration   (CKD-EPI) equation.       Calcium   Date Value Ref Range Status   04/09/2022 9.7 8.5 - 10.1 mg/dL Final   03/12/2018 9.6 8.5 - 10.1 mg/dL Final     Lab Results   Component Value Date    WBC 7.9 04/09/2022    WBC 9.2 04/17/2017     Lab Results   Component Value Date    RBC 5.19 04/09/2022    RBC 4.97 04/17/2017     Lab Results   Component Value Date    HGB 14.8 04/09/2022    HGB 14.4 04/17/2017     Lab Results   Component Value Date    HCT 44.9 04/09/2022    HCT 43.7 04/17/2017     Lab Results   Component Value Date    MCV 87 04/09/2022    MCV 88 04/17/2017     Lab Results   Component Value Date    MCH 28.5 04/09/2022    MCH 29.0 04/17/2017     Lab Results   Component Value Date    MCHC 33.0 04/09/2022    MCHC 33.0 04/17/2017     Lab Results   Component Value Date    RDW 11.8 04/09/2022    RDW 12.8 04/17/2017     Lab Results   Component Value Date     04/09/2022     04/17/2017     INR   Date Value Ref Range Status   03/16/2022 1.02 0.85 - 1.15 Final      aPTT   Date Value Ref Range Status   03/16/2022 27 22 - 38 Seconds Final      @Fibrinogen1@    ASSESSMENT: 66-year-old male with recurrent strokes despite near-maximal medical therapy and without realistic endovascular options, and DSA proven severe/near total occlusion of distal left M1-proximal M2s (although with reconstitution in distal M2s).  Exam notable for right-sided weakness, some degree of receptive aphasia.    Clinically Significant Risk Factors Present on Admission               # Platelet Defect: home medication list includes an antiplatelet medication             RECOMMENDATIONS:  No neurosurgical intervention indicated at this time   Will plan for OR for STA-M2 bypass sometime this week    Nino Herron MD  Neurosurgery  Resident, PGY-2    The patient was discussed with Dr. Ledezma, neurosurgery chief resident, and Dr. Garza, neurosurgery staff.

## 2022-04-09 NOTE — PLAN OF CARE
Occupational Therapy Discharge Summary    Reason for therapy discharge:    Discharged to transfer to [place]    Progress towards therapy goal(s). See goals on Care Plan in [facility] electronic health record for goal details.  Goals partially met.  Barriers to achieving goals:   discharge from facility.    Therapy recommendation(s):    Continued therapy is recommended.  Rationale/Recommendations:  pt would benefit from continued OT to maximize I with ADl's.

## 2022-04-09 NOTE — PROGRESS NOTES
04/09/22 1000   General Information   Onset of Illness/Injury or Date of Surgery 04/08/22   Referring Physician Sourav Foy MD   Patient/Family Therapy Goal Statement (SLP) Pt did not state   Pertinent History of Current Problem Pt is a 66 year old male with HTN, HLD, DMII (A1c 11.8), NA, cocaine abuse and recurrent L MCA infarcts who presents as a transfer from [facility] for possible STA-MCA bypass. Exam stable from [facility], most notable for receptive>expressive aphasia. The patient has been having recurrent strokes from L M1-M2 stenosis and has undergone a DSA which showed that the stenosis is too distal to stent. Given this the patient was transferred to [facility] for evaluation for a possible ST-MC bypass procedure. For now will continue the patient on DAPT and monitor for worsening symptoms while he awaits the procedure.  Clinical swallow assessment completed per MD order.   General Observations Patient alert, socially-conversational.   Past History of Dysphagia Pt is familiar to SLP service at [facility].  Was recommended a regular diet and thin liquids by SLP at [facility] on 4/6/22.  Pt was also initiating cognitive-linguistic OP SLP services PTA, consuming a regular diet and thin liquids PTA.   Type of Evaluation   Type of Evaluation Swallow Evaluation   Oral Motor   Oral Musculature anomalies present   Structural Abnormalities none present   Mucosal Quality good   Dentition (Oral Motor)   Dentition (Oral Motor) natural dentition   Facial Symmetry (Oral Motor)   Facial Symmetry (Oral Motor) right side impairment   Right Side Facial Asymmetry minimal impairment   Lip Function (Oral Motor)   Lip Range of Motion (Oral Motor) protrusion impairment;retraction impairment   Protrusion, Lip Range of Motion minimal impairment   Retraction, Lip Range of Motion right side;minimal impairment   Lip Strength (Oral Motor) WFL   Tongue Function (Oral Motor)   Tongue ROM (Oral Motor) protrusion is impaired   Protrusion, Tongue ROM  Impairment (Oral Motor)   (L deviation)   Jaw Function (Oral Motor)   Jaw Function (Oral Motor) WNL   Cough/Swallow/Gag Reflex (Oral Motor)   Volitional Throat Clear/Cough (Oral Motor) WNL   Volitional Swallow (Oral Motor) WNL   General Swallowing Observations   Current Diet/Method of Nutritional Intake (General Swallowing Observations, NIS) regular diet;thin liquids (level 0)   Respiratory Support (General Swallowing Observations) none   Swallowing Evaluation Clinical swallow evaluation   Clinical Swallow Evaluation   Feeding Assistance no assistance needed   Clinical Swallow Evaluation Textures Trialed thin liquids;solid foods   Clinical Swallow Eval: Thin Liquid Texture Trial   Mode of Presentation, Thin Liquids cup;self-fed;straw   Volume of Liquid or Food Presented 5 OZ   Oral Phase of Swallow WFL   Pharyngeal Phase of Swallow intact   Diagnostic Statement No overt Sx of aspiration, oral phase WFL   Clinical Swallow Evaluation: Solid Food Texture Trial   Mode of Presentation self-fed   Volume Presented 2 crackers   Oral Phase WFL   Pharyngeal Phase intact   Diagnostic Statement No overt Sx of aspiration, oral phase WFL   Esophageal Phase of Swallow   Patient reports or presents with symptoms of esophageal dysphagia No   Swallowing Recommendations   Diet Consistency Recommendations regular diet;thin liquids (level 0)   Supervision Level for Intake distant supervision needed   Mode of Delivery Recommendations bolus size, small;slow rate of intake   Recommended Feeding/Eating Techniques (Swallow Eval) maintain upright sitting position for eating;schedule meals prior to therapy to avoid therapy fatigue   Medication Administration Recommendations, Swallowing (SLP) per pt preference   General Therapy Interventions   Planned Therapy Interventions Dysphagia Treatment   Dysphagia treatment Instruction of safe swallow strategies   Clinical Impression   Criteria for Skilled Therapeutic Interventions Met (SLP Eval) Yes,  treatment indicated   SLP Diagnosis Functional oropharyngeal swallowing mechanism   Risks & Benefits of therapy have been explained evaluation/treatment results reviewed;patient;spouse/significant other   Clinical Impression Comments Clinical swallow assessment completed per MD order.  Pt presents with a functional oropharyngeal swallowing mechanism.  Recommend regular diet and thin liquids.  Ensure pt is fully alert and upright for all PO, given small bites/sips, alternating bites/sips.  Pt awake and alert, word-finding difficulties and suspected cognitive impairments noted.  L lingual deviation, labial ROM deficits noted.  No overt s/sx of aspiration with thin via cup/straw, solid texture.  Oral phase WFL.  Pt following safe swallow precautions with min cues.  SLP to follow to ensure PO tolerance, speech-language/cognitive-linguistic assessments to be completed at this or next level of care pending discharge plan.   SLP Discharge Planning   SLP Discharge Recommendation home with outpatient speech therapy   SLP Rationale for DC Rec Patient with persistent aphasia, improved from previous hospitalization but indicative of OP SLP need for higher level language and cognition function.     SLP Brief overview of current status  Recommend regular diet and thin liquids.  Ensure pt is fully alert and upright for all PO, given small bites/sips, alternating bites/sips.  SLP to follow to ensure PO tolerance, speech-language/cognitive-linguistic assessments to be completed at this or next level of care pending discharge plan.    Total Evaluation Time   Total Evaluation Time (Minutes) 12   SLP Goals   Therapy Frequency (SLP Eval) 4 times/wk   SLP Predicated Duration/Target Date for Goal Attainment 04/30/22   SLP Goals Swallow   SLP: Safely tolerate diet without signs/symptoms of aspiration Regular diet;Thin liquids;Independently;Goal Met;With use of swallow precautions

## 2022-04-10 ENCOUNTER — APPOINTMENT (OUTPATIENT)
Dept: EDUCATION SERVICES | Facility: CLINIC | Age: 67
DRG: 023 | End: 2022-04-10
Attending: PSYCHIATRY & NEUROLOGY
Payer: COMMERCIAL

## 2022-04-10 LAB
ANION GAP SERPL CALCULATED.3IONS-SCNC: 7 MMOL/L (ref 3–14)
BUN SERPL-MCNC: 13 MG/DL (ref 7–30)
CALCIUM SERPL-MCNC: 9.1 MG/DL (ref 8.5–10.1)
CHLORIDE BLD-SCNC: 102 MMOL/L (ref 94–109)
CO2 SERPL-SCNC: 26 MMOL/L (ref 20–32)
CREAT SERPL-MCNC: 1.04 MG/DL (ref 0.66–1.25)
ERYTHROCYTE [DISTWIDTH] IN BLOOD BY AUTOMATED COUNT: 11.9 % (ref 10–15)
GFR SERPL CREATININE-BSD FRML MDRD: 79 ML/MIN/1.73M2
GLUCOSE BLD-MCNC: 261 MG/DL (ref 70–99)
GLUCOSE BLDC GLUCOMTR-MCNC: 259 MG/DL (ref 70–99)
GLUCOSE BLDC GLUCOMTR-MCNC: 273 MG/DL (ref 70–99)
GLUCOSE BLDC GLUCOMTR-MCNC: 295 MG/DL (ref 70–99)
GLUCOSE BLDC GLUCOMTR-MCNC: 303 MG/DL (ref 70–99)
GLUCOSE BLDC GLUCOMTR-MCNC: 328 MG/DL (ref 70–99)
HCT VFR BLD AUTO: 40.9 % (ref 40–53)
HGB BLD-MCNC: 13.6 G/DL (ref 13.3–17.7)
MCH RBC QN AUTO: 27.9 PG (ref 26.5–33)
MCHC RBC AUTO-ENTMCNC: 33.3 G/DL (ref 31.5–36.5)
MCV RBC AUTO: 84 FL (ref 78–100)
PLATELET # BLD AUTO: 367 10E3/UL (ref 150–450)
POTASSIUM BLD-SCNC: 3.9 MMOL/L (ref 3.4–5.3)
RBC # BLD AUTO: 4.87 10E6/UL (ref 4.4–5.9)
SODIUM SERPL-SCNC: 135 MMOL/L (ref 133–144)
UFH PPP CHRO-ACNC: 0.21 IU/ML
UFH PPP CHRO-ACNC: 0.42 IU/ML
UFH PPP CHRO-ACNC: <0.1 IU/ML
WBC # BLD AUTO: 6.8 10E3/UL (ref 4–11)

## 2022-04-10 PROCEDURE — 85520 HEPARIN ASSAY: CPT | Performed by: PSYCHIATRY & NEUROLOGY

## 2022-04-10 PROCEDURE — 250N000012 HC RX MED GY IP 250 OP 636 PS 637

## 2022-04-10 PROCEDURE — 999N000157 HC STATISTIC RCP TIME EA 10 MIN

## 2022-04-10 PROCEDURE — 36415 COLL VENOUS BLD VENIPUNCTURE: CPT | Performed by: PSYCHIATRY & NEUROLOGY

## 2022-04-10 PROCEDURE — 36415 COLL VENOUS BLD VENIPUNCTURE: CPT | Performed by: STUDENT IN AN ORGANIZED HEALTH CARE EDUCATION/TRAINING PROGRAM

## 2022-04-10 PROCEDURE — 99232 SBSQ HOSP IP/OBS MODERATE 35: CPT | Mod: GC | Performed by: PSYCHIATRY & NEUROLOGY

## 2022-04-10 PROCEDURE — 85014 HEMATOCRIT: CPT | Performed by: STUDENT IN AN ORGANIZED HEALTH CARE EDUCATION/TRAINING PROGRAM

## 2022-04-10 PROCEDURE — 120N000002 HC R&B MED SURG/OB UMMC

## 2022-04-10 PROCEDURE — 250N000013 HC RX MED GY IP 250 OP 250 PS 637: Performed by: STUDENT IN AN ORGANIZED HEALTH CARE EDUCATION/TRAINING PROGRAM

## 2022-04-10 PROCEDURE — 80048 BASIC METABOLIC PNL TOTAL CA: CPT | Performed by: STUDENT IN AN ORGANIZED HEALTH CARE EDUCATION/TRAINING PROGRAM

## 2022-04-10 PROCEDURE — 250N000011 HC RX IP 250 OP 636

## 2022-04-10 RX ORDER — AMOXICILLIN 250 MG
1 CAPSULE ORAL AT BEDTIME
Status: DISCONTINUED | OUTPATIENT
Start: 2022-04-10 | End: 2022-04-18 | Stop reason: HOSPADM

## 2022-04-10 RX ADMIN — INSULIN GLARGINE 20 UNITS: 100 INJECTION, SOLUTION SUBCUTANEOUS at 22:49

## 2022-04-10 RX ADMIN — ESCITALOPRAM OXALATE 20 MG: 10 TABLET ORAL at 09:01

## 2022-04-10 RX ADMIN — ASPIRIN 325 MG ORAL TABLET 325 MG: 325 PILL ORAL at 09:00

## 2022-04-10 RX ADMIN — ROSUVASTATIN CALCIUM 40 MG: 10 TABLET, FILM COATED ORAL at 09:02

## 2022-04-10 RX ADMIN — HEPARIN SODIUM AND DEXTROSE 1500 UNITS/HR: 10000; 5 INJECTION INTRAVENOUS at 11:21

## 2022-04-10 RX ADMIN — HEPARIN SODIUM AND DEXTROSE 1800 UNITS/HR: 10000; 5 INJECTION INTRAVENOUS at 12:21

## 2022-04-10 ASSESSMENT — ACTIVITIES OF DAILY LIVING (ADL)
ADLS_ACUITY_SCORE: 14
ADLS_ACUITY_SCORE: 17
ADLS_ACUITY_SCORE: 15
ADLS_ACUITY_SCORE: 17
ADLS_ACUITY_SCORE: 15
ADLS_ACUITY_SCORE: 14
ADLS_ACUITY_SCORE: 15
ADLS_ACUITY_SCORE: 14
ADLS_ACUITY_SCORE: 17
ADLS_ACUITY_SCORE: 17
ADLS_ACUITY_SCORE: 15
ADLS_ACUITY_SCORE: 14
ADLS_ACUITY_SCORE: 15
ADLS_ACUITY_SCORE: 17
ADLS_ACUITY_SCORE: 14
ADLS_ACUITY_SCORE: 17
ADLS_ACUITY_SCORE: 17

## 2022-04-10 ASSESSMENT — VISUAL ACUITY
OU: BASELINE
OU: BASELINE

## 2022-04-10 NOTE — PLAN OF CARE
Status: Pt. w hx HTN, HLD, poorly controlled DM2, L MCA infarcts, NA, hx substance abuse quit 1 year ago, presents with recurrent CVA.   Vitals: VSS on RA ex/ HTN within 220 roni. RT brought CPAP for pt. But refused, continuous pulse ox mid-high 90's. CCM  Neuros: A&O to self only. Dysarthric speech with WFD and inappropriate words at times.R. Facial droop. RUE 4/5. AOE 5/5.   IV: PIV infusing heparin gtt at 1500 units/hr  Labs/Electrolytes: Hep Xa 0.21, redraw at 1115  Resp/trach: LS clear. Continuous pulse ox mid 90's  Diet: Regular   Bowel status: No BM this shift, LBM 4/8 per report  : Voids spontaneously in bathroom  Skin: Scab on stomach. R. Groin site with transparent dressing. Blanchable erythema at EKG sticker sites.  Pain: Denied   Activity: A1/GB  Plan: Plan for OR for STA-M2 bypass sometime this week. Continue to monitor and follow POC  Updates this shift: Refused 0000 neuro assessment, MD notified. Refused CPAP.

## 2022-04-10 NOTE — CONSULTS
04/10/22 1303 Ene Koch, [place]     Stroke Education Note     The following information has been reviewed with the patient and family:     1. Warning signs of stroke     2. Calling 911 if having warning signs of stroke     3. All modifiable risk factors: hypertension, CAD, atrial fib, diabetes, hypercholesterolemia, smoking, substance abuse, diet, physical inactivity, obesity, sleep apnea.     4. Patient's risk factors for stroke which include: HTN, HLD, NA, Obesity, DM, substance abuse, physical inactivtiy, diet     5. Follow-up plan for after discharge     6. Discharge medications which include: ASA, STATIN, HTN     In addition, the above information was given to the patient and family in writing as a part of the [facility] Stroke Class Handout.     Learner's response to risk factors / lifestyle modification education: Ability to change Reasons to change Need to change      Ene Koch RN,

## 2022-04-10 NOTE — PLAN OF CARE
Pt admitted for CVA 4/9. VSS on RA. Pt able to follow commands today which is improvement since yesterday. Pt oriented x 4. Mild R droop. Pt has word difficulty. Pt swallowed pills without difficulty. BM 4/10/22. Pt voiding in toilet without difficulty. Pt denies pain, numbness and tingling. Lungs CTA. Bowel sounds x 4 Qs. Skin warm and dry. Pt switched to consistent carb diet--60 g carb per meal.

## 2022-04-10 NOTE — PROGRESS NOTES
"[facility]    Stroke Progress Note    Interval EventsNo acute events overnight.     HPI Summary  Huseyin St is a 66 year old male with HTN, HLD, DMII (A1c 11.8), NA, cocaine abuse and recurrent L MCA infarcts who presents as a transfer from [facility] for possible STA-MCA bypass. Patient was admitted to [facility] 4/5/22 for worsening R sided weakness and aphasia. Patient was admitted twice in March for L MCA territory strokes (likely d/t L M1 stenosis). The first admission was 3/16-3/18 and the patient was started on DAPT. He was admitted again 3/24-3/27 due to expressive aphasia and was found to have 2 new cortical white matter strokes. Given the recurrent strokes on DAPT neuro-IR was consulted for possible stenting but did not recommend stenting due to poorly controlled diabetes.      The patient returned to the hospital 4/5/22 due to \"talking gibberish\" for 2 days where the patient was making repeated significant paraphasic errors and was having increased R sided weakness. During that admission the patient was seen by the stroke team several times and underwent a DSA which showed critical stenosis of L M1 artery extending into b/l M2 divisions which made stenting difficult. Given this, the decision was made to transfer the patient to [facility] for a possible ST-MC bypass which could occur as early as next week.      On arrival, the patient is able to only provide a cursory history as he is making frequent paraphasic errors. He reports frustration with being awoken, but notes that his symptoms are relatively stable. Voices no new complaints.    Stroke Evaluation Summarized    MRI/Head CT CT head :  Expected evolution of multiple acute/subacute ischemic  infarcts of varying ages in the left cerebral hemisphere, as  described. Unchanged mild left temporal lobe swelling. No acute  intracranial hemorrhage identified on CT. No midline shift/herniation.    MRI OF THE " BRAIN WITHOUT AND WITH CONTRAST  MRA OF THE HEAD WITHOUT CONTRAST  MRA OF THE NECK WITHOUT AND WITH CONTRAST :   1.  No change.  2.  Severe distal left M1 stenosis with decreased signal in the left  M2 and M3 branches.  3.  Moderate left and mild right cavernous carotid stenoses.  4.  Mild stenosis in the left P2/P3 junction.     NECK MRA:  1.  Normal neck MRA for age.  2.  No significant stenosis as per NASCET criteria.     MRI OF THE BRAIN WITHOUT AND WITH CONTRAST :  1. Evolving subacute ischemic infarct in the left temporal lobe again  noted without change.  2. 2 new tiny recent white matter infarcts in the periatrial white  matter of the left cerebral hemisphere.  3. No other new infarcts.  4. Otherwise, normal brain MRI.   Intracranial + cervical Vasculature 1. Unchanged severe/critical focal stenosis of the left M1-M2 middle  cerebral artery bifurcation.  2. Unchanged moderate/moderate to severe tandem stenoses involving the  P2 segment of the left posterior cerebral artery.  3. Unchanged atherosclerotic disease involving the bilateral carotid  siphons resulting in up to moderate left and mild right paraclinoid  segment internal carotid artery stenosis.  4. No definite new high-grade central arterial stenosis/large vessel  occlusion identified.  5. Unchanged mild atherosclerosis of the bilateral carotid  bifurcations without flow limiting stenosis.  6. Unchanged moderate atherosclerotic stenosis at the origin of the  left vertebral artery.      Echocardiogram Technically challenging study due to patient body habitus, even with the use  of contrast imaging.     Left ventricular size, global systolic function are normal, estimated LVEF 60-  65%.  Endocardial borders are not optimally visualized, however no clear wall motion  abnormalities are seen.  Right ventricular global function is normal.  No significant valvular abnormalities.  There is no color Doppler evidence of an atrial shunt.     There are no prior  studies available for comparison.   Other Testing Not Applicable     LDL  4/6/2022: 53 mg/dL   A1C  3/16/2022: 11.8 %   Troponin No lab value available in past 48 hrs       Impression   66 year old male with HTN, HLD, DMII (A1c 11.8), NA, cocaine abuse and recurrent L MCA infarcts who presents as a transfer from [facility] for possible STA-MCA bypass. Exam stable from [facility], most notable for receptive>expressive aphasia. The patient has been having recurrent strokes from L M1-M2 stenosis and has undergone a DSA which showed that the stenosis is too distal to stent. Given this the patient was transferred to [facility] for evaluation for a possible ST-MC bypass procedure. For now will continue the patient on DAPT and monitor for worsening symptoms while he awaits the procedure     Plan  #Recurrent L MCA territory strokes  #L M1 to M2 stenosis  - Will discuss timing of possible ST-MC bypass in the AM  - Continue  mg  - Stopped Plavix starte don heparin  Drip without bolus on 04/09/2022  - Continue rosuvastatin 40 mg daily   - SBP goal normotension, 100-180  - Regular diet with thins (cleared by speech at [facility])  - Avoid hypotonic IV fluids  - PT/OT/SLP  - Depression Screen  - Apnea Screen  - Euthermia, Euglycemia     #Essential HTN  - Holding PTA lisinopril      #HLD  - Continue rosuvastatin once cleared for diet     #Poorly controlled T2DM (A1c 11.8)  - HDSSI  - Holding PTA Lantus  - Monitor blood sugar     #NA  - CPAP as able     #Generalized anxiety disorder  - Continue Lexapro      Prophylaxis            DVT: SCDs for tonight given unclear timeline for procedure     For Acid Suppression:  - GI prophylaxis is not indicated    Patient Follow-up        We will continue to follow.     The patient was discussed with Stroke Staff is Dr. Alegria.    Francisco Lovell MD  Neurology Resident  ASCOM: [id number]  ______________________________________________________    Clinically Significant Risk Factors Present on  Admission                 Medications   Scheduled Meds    aspirin  325 mg Oral or Feeding Tube Daily     escitalopram  20 mg Oral or Feeding Tube Daily     insulin aspart  1-10 Units Subcutaneous TID AC     insulin aspart  1-7 Units Subcutaneous At Bedtime     rosuvastatin  40 mg Oral or Feeding Tube Daily     senna-docusate  1 tablet Oral At Bedtime     sodium chloride (PF)  3 mL Intracatheter Q8H       Infusion Meds    heparin 1,800 Units/hr (04/10/22 1221)     - MEDICATION INSTRUCTIONS -       - MEDICATION INSTRUCTIONS -         PRN Meds  glucose **OR** dextrose **OR** glucagon, labetalol **OR** hydrALAZINE, lidocaine 4%, lidocaine (buffered or not buffered), - MEDICATION INSTRUCTIONS -, - MEDICATION INSTRUCTIONS -, sodium chloride (PF)       PHYSICAL EXAMINATION  Temp:  [97.9  F (36.6  C)-99.3  F (37.4  C)] 97.9  F (36.6  C)  Pulse:  [66-76] 66  Resp:  [16-18] 18  BP: (125-148)/(69-93) 148/85  SpO2:  [94 %-98 %] 98 %      General: Asleep, awakens to voice in NAD   HEENT: Normocephalic, atraumatic, no epistaxis, no oral lesions  Resp: Breathing comfortably on room air  Extremities: Warm and well perfused, peripheral pulses present, no edema  Skin: Not jaundiced, no rash, no ecchymoses  Psych: Normal mood and affect     Neuro:  Mental status: Alert and oriented to person, place, and time. Follows commands intermittently  Speech: Fluent,; No dysarthria, frequent paraphasic errors. Fluent receptive aphasia  Cranial nerves: Visual fields intact, Eyes conjugate, EOMI w/ normal and smooth pursuit, L lower facial droop, hearing intact to conversation, shoulder shrug strong, palate rise symmetric, tongue/uvula midline.  Motor: Tone normal. LUE is 5/5, RUE is 4-/5, LLE is 5/5, RLE is 4+/5. No abnormal movements noted. Pronator drift absent.  Reflexes: Toes down-going bilaterally.  Sensory: Intact to light touch in all 4 extremities  Coordination: FNF intact bilaterally with no dysmetria; Normal heel-shin test bilaterally  with no dysmetria noted  Gait: Deferred    Stroke Scales    NIHSS  1a. Level of Consciousness 0-->Alert, keenly responsive   1b. LOC Questions 1-->Answers one question correctly   1c. LOC Commands 1-->Performs one task correctly   2.   Best Gaze 0-->Normal   3.   Visual 0-->No visual loss   4.   Facial Palsy 1-->Minor paralysis (flattened nasolabial fold, asymmetry on smiling)   5a. Motor Arm, Left 0-->No drift, limb holds 90 (or 45) degrees for full 10 secs   5b. Motor Arm, Right 0-->No drift, limb holds 90 (or 45) degrees for full 10 secs   6a. Motor Leg, Left 0-->No drift, leg holds 30 degree position for full 5 secs   6b. Motor Leg, right 0-->No drift, leg holds 30 degree position for full 5 secs   7.   Limb Ataxia 0-->Absent   8.   Sensory 0-->Normal, no sensory loss   9.   Best Language 1-->Mild-to-moderate aphasia, some obvious loss of fluency or facility of comprehension, without significant limitation on ideas expressed or form of expression. Reduction of speech and/or comprehension, however, makes conversation. . . (see row details)   10. Dysarthria 1-->Mild-to-moderate dysarthria, patient slurs at least some words and, at worst, can be understood with some difficulty   11. Extinction and Inattention  0-->No abnormality   Total 5 (04/09/22 0150)       Imaging  I personally reviewed all imaging; relevant findings per HPI.     Lab Results Data   CBC  Recent Labs   Lab 04/10/22  0339 04/09/22  1317 04/09/22  0826   WBC 6.8 6.9 7.9   RBC 4.87 4.74 5.19   HGB 13.6 13.5 14.8   HCT 40.9 39.3* 44.9    380 410     Basic Metabolic Panel    Recent Labs   Lab 04/10/22  1138 04/10/22  0855 04/10/22  0339 04/09/22  1425 04/09/22  0826 04/08/22  0928 04/08/22  0837   NA  --   --  135  --  137  --  133   POTASSIUM  --   --  3.9  --  3.7  --  3.7   CHLORIDE  --   --  102  --  104  --  101   CO2  --   --  26  --  26  --  25   BUN  --   --  13  --  10  --  10   CR  --   --  1.04  --  0.95  --  1.08   * 259*  261*   < > 76   < > 132*   EVER  --   --  9.1  --  9.7  --  9.0    < > = values in this interval not displayed.     Liver Panel  No results for input(s): PROTTOTAL, ALBUMIN, BILITOTAL, ALKPHOS, AST, ALT, BILIDIRECT in the last 168 hours.  INR    Recent Labs   Lab Test 03/16/22  1704   INR 1.02      Lipid Profile    Recent Labs   Lab Test 04/06/22  0725 03/16/22  1704 10/11/21  0942 04/21/16  0906 08/20/15  0830 01/14/15  0847 04/21/14  0815   CHOL 109 224* 228*   < > 136 316* 155   HDL 34* 33* 37*   < > 42 41 37*   LDL 53 136* 116*   < > 59 219* 80   TRIG 111 275* 377*   < > 176* 282* 189*   CHOLHDLRATIO  --   --   --   --  3.2 7.7* 4.2    < > = values in this interval not displayed.     A1C    Recent Labs   Lab Test 03/16/22  1704 10/11/21  0943 09/28/20  0925   A1C 11.8* 11.8* 13.2*     Troponin I    Recent Labs   Lab 04/09/22  0037 04/05/22  1149   TROPONINIS 4 <3

## 2022-04-10 NOTE — PLAN OF CARE
Status: Pt. w hx HTN, HLD, poorly controlled DM2, L MCA infarcts, NA, hx substance abuse quit 1 year ago, presents with recurrent CVA.   Vitals: VSS on RA ex/ HTN within 220 roni. At home CPAP at bedside.   Neuros: A&O to self with choices for others. Dysarthric speech with WFD and inappropriate words at times.R. Facial droop. RUE 4/5. AOE 5/5.   IV: PIV infusing heparin gtt at 1500 units/hr  Labs/Electrolytes: Hep Xa <.10. Heparin increased to 1800. Recheck at 1830  Resp/trach: LS clear. Continuous pulse ox mid 90's  Diet: Regular   Bowel status: Pt report he had a BM today, writer did not witness it.   : Voids spontaneously in bathroom  Skin: Scab on stomach. R. Groin site with transparent dressing. Blanchable erythema at EKG sticker sites.  Pain: Denied   Activity: A1/GB. Completed shower/shampoo.   Plan: Plan for OR for STA-M2 bypass sometime this week. Manage DM  Updates this shift:Wife brought at home CPAP. Waiting for surgery to be scheduled. Wife would like pt to start therapies before surgery.

## 2022-04-10 NOTE — PROGRESS NOTES
[facility], [place]     Neurosurgery Progress Note:  04/10/2022      Interval History: Improving receptive aphasia.  More participatory in exam this morning.  Continues on heparin drip and aspirin 325.  Plan for OR likely Wednesday of this week.  Will discuss with family regarding complexities, goals, risks of surgery.    Assessment:   66-year-old male with recurrent strokes despite near-maximal medical therapy and without realistic endovascular options, and DSA proven severe/near total occlusion of distal left M1-proximal M2s (although with reconstitution in distal M2s).  Exam notable for right-sided weakness, some degree of receptive aphasia.          Clinically Significant Risk Factors Present on Admission              Cerebral edema    Recommendations:  Plan for OR for STA-MCA bypass in this coming week.  Continued medical management per stroke team    -----------------------------------  Nino Herron MD  Neurosurgery resident, PGY-2  -----------------------------------    Gen: Appears comfortable, NAD, laying in bed  Neurologic:  Alert & Oriented to person, place with prompting  Follows commands  Speech fluent, spontaneous.  No obvious receptive aphasia on exam, only mild difficulties following complex commands  No gaze preference. No apparent hemineglect.  PERRL, EOMI  R mild nasolabial fold flattening  Palate elevates symmetrically, uvula midline, tongue protrudes midline  Trapezii muscles 5/5 bilaterally  R facial droop     Del Tr Bi WE WF Gr   R 4+ 4+ 4+ 4+ 4+ 4+   L 5 5 5 5 5 5    HF KE KF DF PF EHL   R 4+ 4+ 4+ 4+ 4+ 4+   L 5 5 5 5 5 5     Reflexes 2+ throughout    Sensation intact and symmetric to light touch throughout    Objective:   Temp:  [97.9  F (36.6  C)-99.3  F (37.4  C)] 97.9  F (36.6  C)  Pulse:  [66-76] 66  Resp:  [16-18] 18  BP: (125-148)/(69-93) 148/85  SpO2:  [94 %-98 %] 98 %  I/O last 3 completed shifts:  In: 461.95 [P.O.:360; I.V.:101.95]  Out: -          LABS:  Recent Labs   Lab 04/10/22  1138 04/10/22  0855 04/10/22  0339 04/09/22  1425 04/09/22  0826 04/08/22  0928 04/08/22  0837   NA  --   --  135  --  137  --  133   POTASSIUM  --   --  3.9  --  3.7  --  3.7   CHLORIDE  --   --  102  --  104  --  101   CO2  --   --  26  --  26  --  25   ANIONGAP  --   --  7  --  7  --  7   * 259* 261*   < > 76   < > 132*   BUN  --   --  13  --  10  --  10   CR  --   --  1.04  --  0.95  --  1.08   EVER  --   --  9.1  --  9.7  --  9.0    < > = values in this interval not displayed.       Recent Labs   Lab 04/10/22  0339   WBC 6.8   RBC 4.87   HGB 13.6   HCT 40.9   MCV 84   MCH 27.9   MCHC 33.3   RDW 11.9          IMAGING:  No results found for this or any previous visit (from the past 24 hour(s)).

## 2022-04-10 NOTE — PROVIDER NOTIFICATION
"MD notified that pt. Refusing to do neuro assessment stating, \"this is bullshit i'm not doing it, we can do this again in the morning.\" Importance of monitoring status explained and reoriented to place/situation but continued to refuse. VSS on RA ex/ HTN within roni    Per MD re attempt assessment in 2 hours.   "

## 2022-04-10 NOTE — PLAN OF CARE
Vitals: VSS on RA except HTN within parameters. CCM and continuous pulse ox.   Neuros:  A&O to self only. Dysarthric speech with WFD and inappropriate words at times. Slight R. Facial droop. RUE 4/5, AOE 5/5.   IV: PIV infusing Heparin at 1350 units/hr, heparin Xa scheduled for 0330  Resp/trach: WNL on RA. Orders for CPAP, RT to set up.   Diet: Regular diet, needs help ordering. Good intake.  Bowel status: Bs+x4, no BM this shift.  : Voiding.  Skin: Scab on stomach. R. Groin site with transparent dressing. Blanchable erythema at EKG sticker sites  Pain: Denied.  Activity: Up with assist of 1, GB  Plan: Continue to monitor and follow POC. Plan for OR for STA-M2 bypass sometime this week.

## 2022-04-11 ENCOUNTER — APPOINTMENT (OUTPATIENT)
Dept: SPEECH THERAPY | Facility: CLINIC | Age: 67
DRG: 023 | End: 2022-04-11
Attending: PSYCHIATRY & NEUROLOGY
Payer: COMMERCIAL

## 2022-04-11 ENCOUNTER — APPOINTMENT (OUTPATIENT)
Dept: OCCUPATIONAL THERAPY | Facility: CLINIC | Age: 67
DRG: 023 | End: 2022-04-11
Attending: PSYCHIATRY & NEUROLOGY
Payer: COMMERCIAL

## 2022-04-11 ENCOUNTER — TELEPHONE (OUTPATIENT)
Dept: NEUROLOGY | Facility: CLINIC | Age: 67
End: 2022-04-11
Payer: COMMERCIAL

## 2022-04-11 LAB
ANION GAP SERPL CALCULATED.3IONS-SCNC: 10 MMOL/L (ref 3–14)
BUN SERPL-MCNC: 11 MG/DL (ref 7–30)
CALCIUM SERPL-MCNC: 9.2 MG/DL (ref 8.5–10.1)
CHLORIDE BLD-SCNC: 102 MMOL/L (ref 94–109)
CO2 SERPL-SCNC: 25 MMOL/L (ref 20–32)
CREAT SERPL-MCNC: 1.12 MG/DL (ref 0.66–1.25)
ERYTHROCYTE [DISTWIDTH] IN BLOOD BY AUTOMATED COUNT: 11.9 % (ref 10–15)
GFR SERPL CREATININE-BSD FRML MDRD: 72 ML/MIN/1.73M2
GLUCOSE BLD-MCNC: 234 MG/DL (ref 70–99)
GLUCOSE BLDC GLUCOMTR-MCNC: 232 MG/DL (ref 70–99)
GLUCOSE BLDC GLUCOMTR-MCNC: 252 MG/DL (ref 70–99)
GLUCOSE BLDC GLUCOMTR-MCNC: 273 MG/DL (ref 70–99)
GLUCOSE BLDC GLUCOMTR-MCNC: 298 MG/DL (ref 70–99)
GLUCOSE BLDC GLUCOMTR-MCNC: 301 MG/DL (ref 70–99)
HCT VFR BLD AUTO: 41.4 % (ref 40–53)
HGB BLD-MCNC: 13.9 G/DL (ref 13.3–17.7)
MCH RBC QN AUTO: 28.5 PG (ref 26.5–33)
MCHC RBC AUTO-ENTMCNC: 33.6 G/DL (ref 31.5–36.5)
MCV RBC AUTO: 85 FL (ref 78–100)
PLATELET # BLD AUTO: 431 10E3/UL (ref 150–450)
POTASSIUM BLD-SCNC: 3.8 MMOL/L (ref 3.4–5.3)
RBC # BLD AUTO: 4.87 10E6/UL (ref 4.4–5.9)
SODIUM SERPL-SCNC: 137 MMOL/L (ref 133–144)
UFH PPP CHRO-ACNC: 0.42 IU/ML
UFH PPP CHRO-ACNC: 0.46 IU/ML
UFH PPP CHRO-ACNC: 0.64 IU/ML
WBC # BLD AUTO: 7.7 10E3/UL (ref 4–11)

## 2022-04-11 PROCEDURE — 97535 SELF CARE MNGMENT TRAINING: CPT | Mod: GO

## 2022-04-11 PROCEDURE — 85027 COMPLETE CBC AUTOMATED: CPT | Performed by: STUDENT IN AN ORGANIZED HEALTH CARE EDUCATION/TRAINING PROGRAM

## 2022-04-11 PROCEDURE — 97165 OT EVAL LOW COMPLEX 30 MIN: CPT | Mod: GO

## 2022-04-11 PROCEDURE — 250N000012 HC RX MED GY IP 250 OP 636 PS 637: Performed by: PHYSICIAN ASSISTANT

## 2022-04-11 PROCEDURE — 36415 COLL VENOUS BLD VENIPUNCTURE: CPT | Performed by: PSYCHIATRY & NEUROLOGY

## 2022-04-11 PROCEDURE — 120N000002 HC R&B MED SURG/OB UMMC

## 2022-04-11 PROCEDURE — 250N000013 HC RX MED GY IP 250 OP 250 PS 637: Performed by: STUDENT IN AN ORGANIZED HEALTH CARE EDUCATION/TRAINING PROGRAM

## 2022-04-11 PROCEDURE — 85520 HEPARIN ASSAY: CPT | Performed by: PSYCHIATRY & NEUROLOGY

## 2022-04-11 PROCEDURE — 80048 BASIC METABOLIC PNL TOTAL CA: CPT | Performed by: STUDENT IN AN ORGANIZED HEALTH CARE EDUCATION/TRAINING PROGRAM

## 2022-04-11 PROCEDURE — 99232 SBSQ HOSP IP/OBS MODERATE 35: CPT | Performed by: NURSE PRACTITIONER

## 2022-04-11 PROCEDURE — 92526 ORAL FUNCTION THERAPY: CPT | Mod: GN

## 2022-04-11 PROCEDURE — 250N000011 HC RX IP 250 OP 636

## 2022-04-11 PROCEDURE — 99231 SBSQ HOSP IP/OBS SF/LOW 25: CPT | Mod: GC | Performed by: PSYCHIATRY & NEUROLOGY

## 2022-04-11 PROCEDURE — 36415 COLL VENOUS BLD VENIPUNCTURE: CPT | Performed by: STUDENT IN AN ORGANIZED HEALTH CARE EDUCATION/TRAINING PROGRAM

## 2022-04-11 PROCEDURE — 250N000013 HC RX MED GY IP 250 OP 250 PS 637

## 2022-04-11 RX ADMIN — ASPIRIN 325 MG ORAL TABLET 325 MG: 325 PILL ORAL at 08:28

## 2022-04-11 RX ADMIN — HEPARIN SODIUM AND DEXTROSE 1600 UNITS/HR: 10000; 5 INJECTION INTRAVENOUS at 18:22

## 2022-04-11 RX ADMIN — ESCITALOPRAM OXALATE 20 MG: 10 TABLET ORAL at 08:28

## 2022-04-11 RX ADMIN — INSULIN ASPART: 100 INJECTION, SOLUTION INTRAVENOUS; SUBCUTANEOUS at 18:07

## 2022-04-11 RX ADMIN — ROSUVASTATIN CALCIUM 40 MG: 10 TABLET, FILM COATED ORAL at 08:28

## 2022-04-11 RX ADMIN — SENNOSIDES AND DOCUSATE SODIUM 1 TABLET: 8.6; 5 TABLET ORAL at 22:21

## 2022-04-11 RX ADMIN — HEPARIN SODIUM AND DEXTROSE 1800 UNITS/HR: 10000; 5 INJECTION INTRAVENOUS at 01:51

## 2022-04-11 ASSESSMENT — ACTIVITIES OF DAILY LIVING (ADL)
ADLS_ACUITY_SCORE: 15
IADL_COMMENTS: PT AND SPOUSE SHARE IADL RESPONSIBILITIES
ADLS_ACUITY_SCORE: 15
PREVIOUS_RESPONSIBILITIES: MEAL PREP;HOUSEKEEPING;LAUNDRY;SHOPPING;YARDWORK;MEDICATION MANAGEMENT;FINANCES;DRIVING;WORK
ADLS_ACUITY_SCORE: 15

## 2022-04-11 NOTE — PROGRESS NOTES
[facility], [place]     Neurosurgery Progress Note:  04/11/2022      Interval History: Receptive aphasia continues to improve. Continues on heparin drip and aspirin 325.     Assessment:   66-year-old male with recurrent strokes despite near-maximal medical therapy and without realistic endovascular options, and DSA proven severe/near total occlusion of distal left M1-proximal M2s (although with reconstitution in distal M2s).  Exam notable for right-sided weakness, some degree of receptive aphasia.          Clinically Significant Risk Factors Present on Admission              Cerebral edema    Recommendations:  Plan for OR for STA-MCA bypass in this coming week.  Continued medical management per stroke team    -----------------------------------  CHAPO Herron, CNP  Department of Neurosurgery  Pager: [phone]    -----------------------------------    Gen: Appears comfortable, NAD, laying in bed  Neurologic:  Alert & Oriented to person, place with prompting  Follows commands  Speech fluent, spontaneous.  No obvious receptive aphasia on exam, only mild difficulties following complex commands  No gaze preference. No apparent hemineglect.  PERRL, EOMI  R mild nasolabial fold flattening  Palate elevates symmetrically, uvula midline, tongue protrudes midline  Trapezii muscles 5/5 bilaterally  R facial droop     Del Tr Bi WE WF Gr   R 4+ 4+ 4+ 4+ 4+ 4+   L 5 5 5 5 5 5    HF KE KF DF PF EHL   R 4+ 4+ 4+ 4+ 4+ 4+   L 5 5 5 5 5 5     Reflexes 2+ throughout    Sensation intact and symmetric to light touch throughout    Objective:   Temp:  [97.1  F (36.2  C)-98.8  F (37.1  C)] 97.8  F (36.6  C)  Pulse:  [65-81] 65  Resp:  [16-20] 16  BP: (119-154)/(89-95) 151/90  SpO2:  [95 %-97 %] 95 %  I/O last 3 completed shifts:  In: 467.47 [P.O.:360; I.V.:107.47]  Out: -         LABS:  Recent Labs   Lab 04/11/22 0826 04/11/22  0717 04/11/22  0206 04/10/22  0855 04/10/22  0339 04/09/22  1425 04/09/22  0826   NA   --  137  --   --  135  --  137   POTASSIUM  --  3.8  --   --  3.9  --  3.7   CHLORIDE  --  102  --   --  102  --  104   CO2  --  25  --   --  26  --  26   ANIONGAP  --  10  --   --  7  --  7   * 234* 252*   < > 261*   < > 76   BUN  --  11  --   --  13  --  10   CR  --  1.12  --   --  1.04  --  0.95   EVER  --  9.2  --   --  9.1  --  9.7    < > = values in this interval not displayed.       Recent Labs   Lab 04/11/22  0717   WBC 7.7   RBC 4.87   HGB 13.9   HCT 41.4   MCV 85   MCH 28.5   MCHC 33.6   RDW 11.9          IMAGING:  No results found for this or any previous visit (from the past 24 hour(s)).

## 2022-04-11 NOTE — PLAN OF CARE
Vitals: VSS on RA except HTN within parameters. CCM and continuous pulse ox.   Neuros:  A&O to self, place and situation. Dysarthric speech with WFD and inappropriate words at times. Slight R. Facial droop. RUE 4/5, AOE 5/5.   IV: PIV infusing Heparin at 1800 units/hr, heparin Xa scheduled for 0300  Resp/trach: WNL on RA. CPAP on at HS.  Diet: Regular diet, needs help ordering. Good intake.  Bowel status: Bs+x4, no BM this shift.  : Voiding.  Skin: Scab on stomach. R. Groin site with transparent dressing. Blanchable erythema at EKG sticker sites  Pain: Denied.  Activity: Up with assist of 1, GB  Plan: Continue to monitor and follow POC. Plan for OR for STA-M2 bypass sometime this week. Lantus restarted today.

## 2022-04-11 NOTE — PLAN OF CARE
[id number]:  Pt here for recurrent CVA, vs ex HTN within parameters, neuros include: see flowsheet. PIV running heparin gtt, within goal rate (0.46), redraw for 1815 tonight, up AO1 w/GB, voids spont, no BM this shift, mod-CHO diet with some assistance ordering and tray setup. Pt denies pain, worked well with therapies, on CCM with NSR, MD came to bedside and updated family. BG high (301 @ 1200), insulin given accordingly, wife @ bedside and supportive. Continue to care per orders.

## 2022-04-11 NOTE — CONSULTS
Brief Diabetes note, full consult to follow in AM:     66 year old male with TIIDM, admitted with multiple recent strokes, and found to have severe L M1 stenosis. He needs EC-IC bypass.    Diabetes is uncontrolled at baseline, A1c 11.8%.     At [facility], regimen included Lantus 50 units daily, and sliding scale insulin. BG were elevated prandially in the absence of a prandial agent, but pretty well controlled overall. Upon admission here, primary team only ordered Lantus 20 units daily, with fear of precipitating hypoglycemia. He is eating. No prandial agent added. Is now profoundly hyperglycemic.       Surgical intervention not cleared until BG are better; hoping for later this week. Primary team preferring our management of subQ dosing, in lieu of an insulin drip.     Plan:  -Lantus increased to 35 units daily HS  -added Novolog 1 unit per 7 grams of carbohydrate coverage, with meals/snacks  -continue Novolog high intensity sliding scale AC/HS  -the mainstay of perioperative glycemic management is the use of continuous IV insulin infusion; we would recommend this if BG control does not improve with the above changes, and is generally recommended, regardless of BG control, perioperatively to maintain normoglycemia <180mg/dL.     Yenny Blanchard PA-C  Diabetes Management Service  Pager [phone]

## 2022-04-11 NOTE — PROGRESS NOTES
"      [facility]    Stroke Progress Note    Interval EventsNo acute events overnight. Patients blood sugars were elevated this morning too. Spoke to the patients wife this morning who endorsed that his sugars are in the 300-400 rage even on his home insulin dose.   - Ordered Endocrine consult.   - Neurosurgery to decide on date for bypass surgery.     HPI Summary  Huseyin St is a 66 year old male with HTN, HLD, DMII (A1c 11.8), NA, cocaine abuse and recurrent L MCA infarcts who presents as a transfer from [facility] for possible STA-MCA bypass. Patient was admitted to [facility] 4/5/22 for worsening R sided weakness and aphasia. Patient was admitted twice in March for L MCA territory strokes (likely d/t L M1 stenosis). The first admission was 3/16-3/18 and the patient was started on DAPT. He was admitted again 3/24-3/27 due to expressive aphasia and was found to have 2 new cortical white matter strokes. Given the recurrent strokes on DAPT neuro-IR was consulted for possible stenting but did not recommend stenting due to poorly controlled diabetes.      The patient returned to the hospital 4/5/22 due to \"talking gibberish\" for 2 days where the patient was making repeated significant paraphasic errors and was having increased R sided weakness. During that admission the patient was seen by the stroke team several times and underwent a DSA which showed critical stenosis of L M1 artery extending into b/l M2 divisions which made stenting difficult. Given this, the decision was made to transfer the patient to [facility] for a possible ST-MC bypass which could occur as early as next week.      On arrival, the patient is able to only provide a cursory history as he is making frequent paraphasic errors. He reports frustration with being awoken, but notes that his symptoms are relatively stable. Voices no new complaints.    Stroke Evaluation Summarized    MRI/Head CT CT head " :  Expected evolution of multiple acute/subacute ischemic  infarcts of varying ages in the left cerebral hemisphere, as  described. Unchanged mild left temporal lobe swelling. No acute  intracranial hemorrhage identified on CT. No midline shift/herniation.    MRI OF THE BRAIN WITHOUT AND WITH CONTRAST  MRA OF THE HEAD WITHOUT CONTRAST  MRA OF THE NECK WITHOUT AND WITH CONTRAST :   1.  No change.  2.  Severe distal left M1 stenosis with decreased signal in the left  M2 and M3 branches.  3.  Moderate left and mild right cavernous carotid stenoses.  4.  Mild stenosis in the left P2/P3 junction.     NECK MRA:  1.  Normal neck MRA for age.  2.  No significant stenosis as per NASCET criteria.     MRI OF THE BRAIN WITHOUT AND WITH CONTRAST :  1. Evolving subacute ischemic infarct in the left temporal lobe again  noted without change.  2. 2 new tiny recent white matter infarcts in the periatrial white  matter of the left cerebral hemisphere.  3. No other new infarcts.  4. Otherwise, normal brain MRI.   Intracranial + cervical Vasculature 1. Unchanged severe/critical focal stenosis of the left M1-M2 middle  cerebral artery bifurcation.  2. Unchanged moderate/moderate to severe tandem stenoses involving the  P2 segment of the left posterior cerebral artery.  3. Unchanged atherosclerotic disease involving the bilateral carotid  siphons resulting in up to moderate left and mild right paraclinoid  segment internal carotid artery stenosis.  4. No definite new high-grade central arterial stenosis/large vessel  occlusion identified.  5. Unchanged mild atherosclerosis of the bilateral carotid  bifurcations without flow limiting stenosis.  6. Unchanged moderate atherosclerotic stenosis at the origin of the  left vertebral artery.      Echocardiogram Technically challenging study due to patient body habitus, even with the use  of contrast imaging.     Left ventricular size, global systolic function are normal, estimated LVEF  60-  65%.  Endocardial borders are not optimally visualized, however no clear wall motion  abnormalities are seen.  Right ventricular global function is normal.  No significant valvular abnormalities.  There is no color Doppler evidence of an atrial shunt.     There are no prior studies available for comparison.   Other Testing Not Applicable     LDL  4/6/2022: 53 mg/dL   A1C  3/16/2022: 11.8 %   Troponin No lab value available in past 48 hrs       Impression   66 year old male with HTN, HLD, DMII (A1c 11.8), NA, cocaine abuse and recurrent L MCA infarcts who presents as a transfer from [facility] for possible STA-MCA bypass. Exam stable from [facility], most notable for receptive>expressive aphasia. The patient has been having recurrent strokes from L M1-M2 stenosis and has undergone a DSA which showed that the stenosis is too distal to stent. Given this the patient was transferred to [facility] for evaluation for a possible ST-MC bypass procedure. For now will continue the patient on DAPT and monitor for worsening symptoms while he awaits the procedure     Plan  #Recurrent L MCA territory strokes  #L M1 to M2 stenosis  - Will discuss timing of possible ST-MC bypass in the AM  - Continue  mg  - Continues to be on heparin  Drip.  - Continue rosuvastatin 40 mg daily   - SBP goal normotension, 100-180  - Regular diet with thins (cleared by speech at [facility])  - Avoid hypotonic IV fluids  - PT/OT/SLP  - Depression Screen  - Apnea Screen  - Euthermia, Euglycemia     #Essential HTN  - Holding PTA lisinopril      #HLD  - Continue rosuvastatin once cleared for diet     #Poorly controlled T2DM (A1c 11.8)  - HDSSI  - consulted endocrine. ordered will be placed by the team.      #NA  - CPAP as able     #Generalized anxiety disorder  - Continue Lexapro      Prophylaxis            DVT: SCDs for tonight given unclear timeline for procedure     For Acid Suppression:  - GI prophylaxis is not indicated    Patient Follow-up         We will continue to follow.     The patient was discussed with Stroke Staff is Dr. Thakur.    Francisco Lovell MD  Neurology Resident  ASCOM: [id number]  ______________________________________________________    Clinically Significant Risk Factors Present on Admission                 Medications   Scheduled Meds    aspirin  325 mg Oral or Feeding Tube Daily     escitalopram  20 mg Oral or Feeding Tube Daily     insulin aspart  1-10 Units Subcutaneous TID AC     insulin aspart  1-7 Units Subcutaneous At Bedtime     insulin glargine  20 Units Subcutaneous At Bedtime     rosuvastatin  40 mg Oral or Feeding Tube Daily     senna-docusate  1 tablet Oral At Bedtime     sodium chloride (PF)  3 mL Intracatheter Q8H       Infusion Meds    heparin 1,600 Units/hr (04/11/22 1142)     - MEDICATION INSTRUCTIONS -       - MEDICATION INSTRUCTIONS -         PRN Meds  glucose **OR** dextrose **OR** glucagon, labetalol **OR** hydrALAZINE, lidocaine 4%, lidocaine (buffered or not buffered), - MEDICATION INSTRUCTIONS -, - MEDICATION INSTRUCTIONS -, sodium chloride (PF)       PHYSICAL EXAMINATION  Temp:  [97.1  F (36.2  C)-98.8  F (37.1  C)] 97.8  F (36.6  C)  Pulse:  [65-81] 65  Resp:  [15-19] 15  BP: (119-154)/(84-95) 135/84  SpO2:  [95 %-97 %] 96 %      General: Asleep, awakens to voice in NAD   HEENT: Normocephalic, atraumatic, no epistaxis, no oral lesions  Resp: Breathing comfortably on room air  Extremities: Warm and well perfused, peripheral pulses present, no edema  Skin: Not jaundiced, no rash, no ecchymoses  Psych: Normal mood and affect     Neuro:  Mental status: Alert and oriented to person, place, and time. Follows commands intermittently  Speech: Fluent,; No dysarthria, frequent paraphasic errors. Fluent receptive aphasia  Cranial nerves: Visual fields intact, Eyes conjugate, EOMI w/ normal and smooth pursuit, L lower facial droop, hearing intact to conversation, shoulder shrug strong, palate rise symmetric, tongue/uvula  midline.  Motor: Tone normal. LUE is 5/5, RUE is 4-/5, LLE is 5/5, RLE is 4+/5. No abnormal movements noted. Pronator drift absent.  Reflexes: Toes down-going bilaterally.  Sensory: Intact to light touch in all 4 extremities  Coordination: FNF intact bilaterally with no dysmetria; Normal heel-shin test bilaterally with no dysmetria noted  Gait: Deferred    Stroke Scales    NIHSS  1a. Level of Consciousness 0-->Alert, keenly responsive   1b. LOC Questions 1-->Answers one question correctly   1c. LOC Commands 1-->Performs one task correctly   2.   Best Gaze 0-->Normal   3.   Visual 0-->No visual loss   4.   Facial Palsy 1-->Minor paralysis (flattened nasolabial fold, asymmetry on smiling)   5a. Motor Arm, Left 0-->No drift, limb holds 90 (or 45) degrees for full 10 secs   5b. Motor Arm, Right 0-->No drift, limb holds 90 (or 45) degrees for full 10 secs   6a. Motor Leg, Left 0-->No drift, leg holds 30 degree position for full 5 secs   6b. Motor Leg, right 0-->No drift, leg holds 30 degree position for full 5 secs   7.   Limb Ataxia 0-->Absent   8.   Sensory 0-->Normal, no sensory loss   9.   Best Language 1-->Mild-to-moderate aphasia, some obvious loss of fluency or facility of comprehension, without significant limitation on ideas expressed or form of expression. Reduction of speech and/or comprehension, however, makes conversation. . . (see row details)   10. Dysarthria 1-->Mild-to-moderate dysarthria, patient slurs at least some words and, at worst, can be understood with some difficulty   11. Extinction and Inattention  0-->No abnormality   Total 5 (04/09/22 0150)       Imaging  I personally reviewed all imaging; relevant findings per HPI.     Lab Results Data   CBC  Recent Labs   Lab 04/11/22  0717 04/10/22  0339 04/09/22  1317   WBC 7.7 6.8 6.9   RBC 4.87 4.87 4.74   HGB 13.9 13.6 13.5   HCT 41.4 40.9 39.3*    367 380     Basic Metabolic Panel    Recent Labs   Lab 04/11/22  1145 04/11/22  0873  04/11/22  0717 04/10/22  0855 04/10/22  0339 04/09/22  1425 04/09/22  0826   NA  --   --  137  --  135  --  137   POTASSIUM  --   --  3.8  --  3.9  --  3.7   CHLORIDE  --   --  102  --  102  --  104   CO2  --   --  25  --  26  --  26   BUN  --   --  11  --  13  --  10   CR  --   --  1.12  --  1.04  --  0.95   * 232* 234*   < > 261*   < > 76   EVER  --   --  9.2  --  9.1  --  9.7    < > = values in this interval not displayed.     Liver Panel  No results for input(s): PROTTOTAL, ALBUMIN, BILITOTAL, ALKPHOS, AST, ALT, BILIDIRECT in the last 168 hours.  INR    Recent Labs   Lab Test 03/16/22  1704   INR 1.02      Lipid Profile    Recent Labs   Lab Test 04/06/22  0725 03/16/22  1704 10/11/21  0942 04/21/16  0906 08/20/15  0830 01/14/15  0847 04/21/14  0815   CHOL 109 224* 228*   < > 136 316* 155   HDL 34* 33* 37*   < > 42 41 37*   LDL 53 136* 116*   < > 59 219* 80   TRIG 111 275* 377*   < > 176* 282* 189*   CHOLHDLRATIO  --   --   --   --  3.2 7.7* 4.2    < > = values in this interval not displayed.     A1C    Recent Labs   Lab Test 03/16/22  1704 10/11/21  0943 09/28/20  0925   A1C 11.8* 11.8* 13.2*     Troponin I    Recent Labs   Lab 04/09/22  0037 04/05/22  1149   TROPONINIS 4 <3

## 2022-04-11 NOTE — PLAN OF CARE
Status: Pt. w hx HTN, HLD, poorly controlled DM2, L MCA infarcts, NA, hx substance abuse quit 1 year ago, presents with recurrent CVA.   Vitals: VSS on home CPAP ex/ HTN within 220 roni. CCM  Neuros: At midnight A&O to self and time and place with choices d/o to situation and at 0400 oriented to self and place with choices. Waxes and wanes. Dysarthric speech with WFD and inappropriate words at times.Slight R. Facial droop.5/5 t/o with RUE very slightly weaker 5-  IV: PIV infusing heparin gtt at 1600 units/hr  Labs/Electrolytes: Hep Xa 0.64, redraw at 1100  Resp/trach: LS clear. CPAP overnight   Diet: Consistent carb  Bowel status: No BM this shift, LBM 4/10  : Voids spontaneously in bathroom  Skin: Scab on stomach. R. Groin site with transparent dressing. Blanchable erythema at EKG sticker sites.  Pain: Denied   Activity: A1/GB  Plan: Plan for OR for STA-M2 bypass sometime this week. Continue to monitor and follow POC

## 2022-04-11 NOTE — PLAN OF CARE
"Speech Language Therapy Discharge Summary    Reason for therapy discharge:    All goals and outcomes met, no further needs identified.  See summary/recs below    Progress towards therapy goal(s). See goals on Care Plan in Epic electronic health record for goal details.  Goals partially met.  Barriers to achieving goals:   Pt met swallow goals, see summary/recs below.    Therapy recommendation(s):      Recommend regular diet and thin liquids.  Ensure pt is fully alert and upright for all PO, given small bites/sips, alternating bites/sips.  No further SLP tx indicated for swallow goals.      Pt with persistent speech-language/cognitive-linguistic deficits, which were present PTA and being addressed in OP SLP prior to this hospital admission.  Per EMR, \"possible STA-MCA bypass in this coming week\".  Recommend SLP consult following this procedure as appropriate (if pt presenting with new swallow deficits, and/or for ongoing speech-language/cognitive-linguistic deficits).  To complete order at this time.   "

## 2022-04-11 NOTE — PROGRESS NOTES
04/11/22 1000   Quick Adds   Type of Visit Initial Occupational Therapy Evaluation       Present no   Language english   Living Environment   People in Home spouse   Current Living Arrangements house   Home Accessibility stairs to enter home;stairs within home   Number of Stairs, Main Entrance 2   Stair Railings, Main Entrance none   Number of Stairs, Within Home, Primary greater than 10 stairs  (one flight)   Transportation Anticipated car, drives self   Living Environment Comments Pt lives in a house with his wife. Pt has 2 stairs to enter the home and one flight to basement for laundry, however does not have to use. Pt has a tub/shower combo in the bathroom   Self-Care   Usual Activity Tolerance good   Current Activity Tolerance moderate   Regular Exercise No   Equipment Currently Used at Home none   Fall history within last six months yes   Number of times patient has fallen within last six months 1   Activity/Exercise/Self-Care Comment Pt was previously independent with ADL completion.   Instrumental Activities of Daily Living (IADL)   Previous Responsibilities meal prep;housekeeping;laundry;shopping;yardwork;medication management;finances;driving;work   IADL Comments Pt and spouse share IADL responsibilities   General Information   Onset of Illness/Injury or Date of Surgery 04/09/22   Referring Physician Sourav Foy mD   Patient/Family Therapy Goal Statement (OT) To return home   Additional Occupational Profile Info/Pertinent History of Current Problem 66-year-old male with recurrent strokes despite near-maximal medical therapy and without realistic endovascular options, and DSA proven severe/near total occlusion of distal left M1-proximal M2s (although with reconstitution in distal M2s).  Exam notable for right-sided weakness, some degree of receptive aphasia.   Existing Precautions/Restrictions fall   Limitations/Impairments safety/cognitive   Left Upper Extremity (Weight-bearing  Status) full weight-bearing (FWB)   Right Upper Extremity (Weight-bearing Status) full weight-bearing (FWB)   Left Lower Extremity (Weight-bearing Status) full weight-bearing (FWB)   Right Lower Extremity (Weight-bearing Status) full weight-bearing (FWB)   Heart Disease Risk Factors Medical history   General Observations and Info Activity: up with assist   Cognitive Status Examination   Orientation Status orientation to person, place and time   Affect/Mental Status (Cognitive) confabulatory;confused   Follows Commands follows one-step commands   Cognitive Status Comments Pt is alert, oriented and able to follow simple commands. Pt presents with global aphasia. Will continue to monitor cognition   Visual Perception   Visual Impairment/Limitations WFL   Sensory   Sensory Quick Adds No deficits were identified   Pain Assessment   Patient Currently in Pain No   Integumentary/Edema   Integumentary/Edema no deficits were identifed   Range of Motion Comprehensive   General Range of Motion no range of motion deficits identified   Comment, General Range of Motion BUE ROM WFL   Strength Comprehensive (MMT)   Comment, General Manual Muscle Testing (MMT) Assessment BUE grossly 5/5, however LUE greater than RUE   Sit-Stand Transfer   Sit-Stand [place] (Transfers) supervision   Activities of Daily Living   BADL Assessment/Intervention bathing;upper body dressing;grooming   Bathing Assessment/Intervention   [place] Level (Bathing) minimum assist (75% patient effort)   Upper Body Dressing Assessment/Training   [place] Level (Upper Body Dressing) minimum assist (75% patient effort)   Lower Body Dressing Assessment/Training   [place] Level (Lower Body Dressing) minimum assist (75% patient effort)   Grooming Assessment/Training   [place] Level (Grooming) minimum assist (75% patient effort)   Toileting   [place] Level (Toileting) minimum assist (75% patient effort)   Clinical Impression   Criteria for  Skilled Therapeutic Interventions Met (OT) Yes, treatment indicated   OT Diagnosis decreased activity tolerance and independence with ADL completion   Influenced by the following impairments weakness, fatigue, deconditioning   OT Problem List-Impairments impacting ADL problems related to;activity tolerance impaired;strength;cognition   Assessment of Occupational Performance 5 or more Performance Deficits   Identified Performance Deficits g/g, toileting, bathing, dressing, functional mobility and cognition   Planned Therapy Interventions (OT) ADL retraining;IADL retraining;cognition;strengthening;progressive activity/exercise;home program guidelines   Clinical Decision Making Complexity (OT) low complexity   Anticipated Equipment Needs Upon Discharge (OT) other (see comments)  (TBD)   Risk & Benefits of therapy have been explained evaluation/treatment results reviewed;care plan/treatment goals reviewed;risks/benefits reviewed;current/potential barriers reviewed;participants voiced agreement with care plan;participants included;patient   OT Discharge Planning   OT Discharge Recommendation (DC Rec) home with assist;home with outpatient occupational therapy   OT Rationale for DC Rec Pt is primarily SBA with ADL completion and functional mobility. Pt would benefit from OP OT to further address cognitive deficits and safety with ADL/IADL completion   OT Brief overview of current status SBA   Total Evaluation Time (Minutes)   Total Evaluation Time (Minutes) 8   OT Goals   Therapy Frequency (OT) 4 times/wk   OT Predicated Duration/Target Date for Goal Attainment 04/29/22   OT: Hygiene/Grooming modified independent;while standing   OT: Upper Body Dressing Modified independent   OT: Lower Body Dressing Modified independent   OT: Toilet Transfer/Toileting Modified independent;toilet transfer;cleaning and garment management   OT: Cognitive Patient/caregiver will verbalize understanding of cognitive assessment  results/recommendations as needed for safe discharge planning

## 2022-04-11 NOTE — TELEPHONE ENCOUNTER
Identified as possible mGlide candidate by Dr. Frederick Reese. Not eligible due to exclusion criteria of BMI >37.

## 2022-04-12 LAB
ANION GAP SERPL CALCULATED.3IONS-SCNC: 8 MMOL/L (ref 3–14)
BUN SERPL-MCNC: 14 MG/DL (ref 7–30)
CALCIUM SERPL-MCNC: 9.4 MG/DL (ref 8.5–10.1)
CHLORIDE BLD-SCNC: 103 MMOL/L (ref 94–109)
CO2 SERPL-SCNC: 24 MMOL/L (ref 20–32)
CREAT SERPL-MCNC: 1.12 MG/DL (ref 0.66–1.25)
ERYTHROCYTE [DISTWIDTH] IN BLOOD BY AUTOMATED COUNT: 11.9 % (ref 10–15)
GFR SERPL CREATININE-BSD FRML MDRD: 72 ML/MIN/1.73M2
GLUCOSE BLD-MCNC: 301 MG/DL (ref 70–99)
GLUCOSE BLDC GLUCOMTR-MCNC: 133 MG/DL (ref 70–99)
GLUCOSE BLDC GLUCOMTR-MCNC: 135 MG/DL (ref 70–99)
GLUCOSE BLDC GLUCOMTR-MCNC: 163 MG/DL (ref 70–99)
GLUCOSE BLDC GLUCOMTR-MCNC: 163 MG/DL (ref 70–99)
GLUCOSE BLDC GLUCOMTR-MCNC: 168 MG/DL (ref 70–99)
GLUCOSE BLDC GLUCOMTR-MCNC: 177 MG/DL (ref 70–99)
GLUCOSE BLDC GLUCOMTR-MCNC: 199 MG/DL (ref 70–99)
GLUCOSE BLDC GLUCOMTR-MCNC: 243 MG/DL (ref 70–99)
GLUCOSE BLDC GLUCOMTR-MCNC: 245 MG/DL (ref 70–99)
GLUCOSE BLDC GLUCOMTR-MCNC: 253 MG/DL (ref 70–99)
GLUCOSE BLDC GLUCOMTR-MCNC: 307 MG/DL (ref 70–99)
HCT VFR BLD AUTO: 41.8 % (ref 40–53)
HGB BLD-MCNC: 14.1 G/DL (ref 13.3–17.7)
MCH RBC QN AUTO: 28.6 PG (ref 26.5–33)
MCHC RBC AUTO-ENTMCNC: 33.7 G/DL (ref 31.5–36.5)
MCV RBC AUTO: 85 FL (ref 78–100)
PLATELET # BLD AUTO: 447 10E3/UL (ref 150–450)
POTASSIUM BLD-SCNC: 3.8 MMOL/L (ref 3.4–5.3)
RBC # BLD AUTO: 4.93 10E6/UL (ref 4.4–5.9)
SODIUM SERPL-SCNC: 135 MMOL/L (ref 133–144)
UFH PPP CHRO-ACNC: 0.48 IU/ML
WBC # BLD AUTO: 8.2 10E3/UL (ref 4–11)

## 2022-04-12 PROCEDURE — 36415 COLL VENOUS BLD VENIPUNCTURE: CPT | Performed by: STUDENT IN AN ORGANIZED HEALTH CARE EDUCATION/TRAINING PROGRAM

## 2022-04-12 PROCEDURE — 80048 BASIC METABOLIC PNL TOTAL CA: CPT | Performed by: STUDENT IN AN ORGANIZED HEALTH CARE EDUCATION/TRAINING PROGRAM

## 2022-04-12 PROCEDURE — 250N000013 HC RX MED GY IP 250 OP 250 PS 637: Performed by: STUDENT IN AN ORGANIZED HEALTH CARE EDUCATION/TRAINING PROGRAM

## 2022-04-12 PROCEDURE — 250N000009 HC RX 250: Performed by: PHYSICIAN ASSISTANT

## 2022-04-12 PROCEDURE — 99231 SBSQ HOSP IP/OBS SF/LOW 25: CPT | Mod: GC | Performed by: PSYCHIATRY & NEUROLOGY

## 2022-04-12 PROCEDURE — 120N000002 HC R&B MED SURG/OB UMMC

## 2022-04-12 PROCEDURE — 99232 SBSQ HOSP IP/OBS MODERATE 35: CPT | Performed by: NURSE PRACTITIONER

## 2022-04-12 PROCEDURE — 250N000011 HC RX IP 250 OP 636

## 2022-04-12 PROCEDURE — 999N000128 HC STATISTIC PERIPHERAL IV START W/O US GUIDANCE

## 2022-04-12 PROCEDURE — 85520 HEPARIN ASSAY: CPT | Performed by: PSYCHIATRY & NEUROLOGY

## 2022-04-12 PROCEDURE — 85027 COMPLETE CBC AUTOMATED: CPT | Performed by: STUDENT IN AN ORGANIZED HEALTH CARE EDUCATION/TRAINING PROGRAM

## 2022-04-12 RX ORDER — DEXTROSE MONOHYDRATE 25 G/50ML
25-50 INJECTION, SOLUTION INTRAVENOUS
Status: DISCONTINUED | OUTPATIENT
Start: 2022-04-12 | End: 2022-04-18 | Stop reason: HOSPADM

## 2022-04-12 RX ORDER — NICOTINE POLACRILEX 4 MG
15-30 LOZENGE BUCCAL
Status: DISCONTINUED | OUTPATIENT
Start: 2022-04-12 | End: 2022-04-18 | Stop reason: HOSPADM

## 2022-04-12 RX ADMIN — ESCITALOPRAM OXALATE 20 MG: 10 TABLET ORAL at 08:48

## 2022-04-12 RX ADMIN — ASPIRIN 325 MG ORAL TABLET 325 MG: 325 PILL ORAL at 08:48

## 2022-04-12 RX ADMIN — HEPARIN SODIUM AND DEXTROSE 1600 UNITS/HR: 10000; 5 INJECTION INTRAVENOUS at 08:53

## 2022-04-12 RX ADMIN — ROSUVASTATIN CALCIUM 40 MG: 10 TABLET, FILM COATED ORAL at 08:48

## 2022-04-12 RX ADMIN — HUMAN INSULIN 2.5 UNITS/HR: 100 INJECTION, SOLUTION SUBCUTANEOUS at 15:34

## 2022-04-12 ASSESSMENT — ACTIVITIES OF DAILY LIVING (ADL)
ADLS_ACUITY_SCORE: 8
ADLS_ACUITY_SCORE: 8
ADLS_ACUITY_SCORE: 15
TOILETING_ISSUES: NO
ADLS_ACUITY_SCORE: 8
DIFFICULTY_EATING/SWALLOWING: NO
DOING_ERRANDS_INDEPENDENTLY_DIFFICULTY: NO
ADLS_ACUITY_SCORE: 8
WEAR_GLASSES_OR_BLIND: NO
ADLS_ACUITY_SCORE: 10
CONCENTRATING,_REMEMBERING_OR_MAKING_DECISIONS_DIFFICULTY: NO
ADLS_ACUITY_SCORE: 15
ADLS_ACUITY_SCORE: 8
ADLS_ACUITY_SCORE: 15
ADLS_ACUITY_SCORE: 10
WALKING_OR_CLIMBING_STAIRS_DIFFICULTY: NO
ADLS_ACUITY_SCORE: 15
ADLS_ACUITY_SCORE: 10
ADLS_ACUITY_SCORE: 15
NUMBER_OF_TIMES_PATIENT_HAS_FALLEN_WITHIN_LAST_SIX_MONTHS: 1
ADLS_ACUITY_SCORE: 15
ADLS_ACUITY_SCORE: 15
DRESSING/BATHING_DIFFICULTY: NO
ADLS_ACUITY_SCORE: 15
COMMUNICATION: DIFFICULTY UNDERSTANDING;DIFFICULTY SPEAKING
DIFFICULTY_COMMUNICATING: YES
ADLS_ACUITY_SCORE: 15
ADLS_ACUITY_SCORE: 8
CHANGE_IN_FUNCTIONAL_STATUS_SINCE_ONSET_OF_CURRENT_ILLNESS/INJURY: YES
ADLS_ACUITY_SCORE: 15
ADLS_ACUITY_SCORE: 10
FALL_HISTORY_WITHIN_LAST_SIX_MONTHS: YES
ADLS_ACUITY_SCORE: 10
ADLS_ACUITY_SCORE: 15
ADLS_ACUITY_SCORE: 15
ADLS_ACUITY_SCORE: 10

## 2022-04-12 ASSESSMENT — VISUAL ACUITY: OU: BASELINE

## 2022-04-12 NOTE — PLAN OF CARE
Status: Pt. w hx HTN, HLD, poorly controlled DM2, L MCA infarcts, NA, hx substance abuse quit 1 year ago, presents with recurrent CVA.   Vitals: VSS on RA, home CPAP overnight. CCM  Neuros: A/Ox3-4, situation with choices. Dysarthric speech with WFD and inappropriate words at times. Slight R droop. All extremities 5/5  IV: PIV infusing heparin gtt @ 1600 units/hr  Labs/Electrolytes: Hep Xa 0.42, within goal range. Redraw tomorrow AM  Resp/trach: LS clear. Denies SOB  Diet: Consistent carb diet  Bowel status: LBM 4/10  : Voiding spontaneously in bathroom  Skin: Scab on stomach. R. Groin site with transparent dressing. Blanchable erythema at EKG sticker sites.  Pain: Denies  Activity: A1/GB, up to chair x1 this shift  Plan: OR for STA-M2 bypass sometime this week. Continue to monitor and follow POC

## 2022-04-12 NOTE — PROGRESS NOTES
[facility], [place]     Neurosurgery Progress Note:  04/12/2022      Interval History: No acute events overnight.  Awaiting final plan for intervention vs ongoing medical management. Continues on heparin drip and aspirin 325.     Assessment:   66-year-old male with recurrent strokes despite near-maximal medical therapy and without realistic endovascular options, and DSA proven severe/near total occlusion of distal left M1-proximal M2s (although with reconstitution in distal M2s).  Exam notable for right-sided weakness, some degree of receptive aphasia.          Clinically Significant Risk Factors Present on Admission                Cerebral edema    Recommendations:  Interventional treatment plan pending.  Will update primary team once this is established, in the meantime, please continue medical management.     -----------------------------------  CHAPO Herron, CNP  Department of Neurosurgery  Pager: [phone]    -----------------------------------    Gen: Appears comfortable, NAD, laying in bed  Neurologic:  Alert & Oriented to person, place with prompting  Follows commands  Speech fluent, spontaneous.  No obvious receptive aphasia on exam, only mild difficulties following complex commands  No gaze preference. No apparent hemineglect.  PERRL, EOMI  R mild nasolabial fold flattening  Palate elevates symmetrically, uvula midline, tongue protrudes midline  Trapezii muscles 5/5 bilaterally  R facial droop     Del Tr Bi WE WF Gr   R 4+ 4+ 4+ 4+ 4+ 4+   L 5 5 5 5 5 5    HF KE KF DF PF EHL   R 4+ 4+ 4+ 4+ 4+ 4+   L 5 5 5 5 5 5     Reflexes 2+ throughout    Sensation intact and symmetric to light touch throughout    Objective:   Temp:  [97.9  F (36.6  C)-98.6  F (37  C)] 97.9  F (36.6  C)  Pulse:  [66-71] 66  Resp:  [15-18] 16  BP: (135-154)/(79-91) 139/91  SpO2:  [94 %-99 %] 99 %  I/O last 3 completed shifts:  In: 400 [P.O.:400]  Out: -         LABS:  Recent Labs   Lab 04/12/22  0657  04/11/22  2154 04/11/22  1655 04/11/22  0826 04/11/22  0717 04/10/22  0855 04/10/22  0339     --   --   --  137  --  135   POTASSIUM 3.8  --   --   --  3.8  --  3.9   CHLORIDE 103  --   --   --  102  --  102   CO2 24  --   --   --  25  --  26   ANIONGAP 8  --   --   --  10  --  7   * 273* 298*   < > 234*   < > 261*   BUN 14  --   --   --  11  --  13   CR 1.12  --   --   --  1.12  --  1.04   EVER 9.4  --   --   --  9.2  --  9.1    < > = values in this interval not displayed.       Recent Labs   Lab 04/12/22  0657   WBC 8.2   RBC 4.93   HGB 14.1   HCT 41.8   MCV 85   MCH 28.6   MCHC 33.7   RDW 11.9          IMAGING:  No results found for this or any previous visit (from the past 24 hour(s)).

## 2022-04-12 NOTE — PROGRESS NOTES
Care Management Follow Up    Length of Stay (days): 3     Patient plan of care discussed at interdisciplinary rounds: Yes    Additional Information:  In 6A Discharge Rounds Dr Gayle reported pt may have surgery, carotid artery bypass. Angie Sanders NP, reported pt may have an angioplasty, plan pending Dr Conrad.   OT recommending outpt OT.     Per [facility] consult done on 4-09-22, wife was considering TCU. Pt was receiving outpt therapies but wife would prefer in-home therapies. [facility] will follow-up with pt/wife.      Minerva Ivory RN Care Coordinator  Unit 6A, [facility]

## 2022-04-12 NOTE — CONSULTS
"NEW INPATIENT DIABETES MANAGEMENT CONSULT  Huseyin St  Age: 66 year old  MRN # [id number]   YOB: 1955    Chief Complaint: recurrent strokes, with critical stenosis  Reason for Consult: \"neurosurgical bypass surgery, need sugars controlled under 200\"  Consulting Provider: Francisco Lovell MD    History of Present Illness:   Huseyin is a 66 year old male with TIIDM, admitted with multiple recent strokes, and found to have severe L M1 stenosis. He needs EC-IC bypass.    Diabetes is uncontrolled at baseline, A1c 11.8%. He has been on insulin for some time.    At [facility], regimen included Lantus 50 units daily, and sliding scale insulin. BG were elevated prandially in the absence of a prandial agent, but pretty well controlled overall. Upon admission here, primary team only ordered Lantus 20 units daily, with fear of precipitating hypoglycemia. He is eating. No prandial agent added. Is now profoundly hyperglycemic.    This continues despite increases to Lantus and addition of prandial insulin by this team yesterday, 4/11. His wife at bedside clarifies this is because he is on much less insulin here, than he was taking at baseline, at home.    Primary team had deferred the use of IV insulin in favor of increases to subQ insulin. NSG service wants BG <200 prior to doing surgery.        Other Active Medical Problems: recurrent CVA and critical M1 stenosis.  Diabetes Mellitus Type: II  Duration: since age 39  Diabetic Complications: stroke  Prior to Admission Diabetes Regimen:      Frequency of checks: 3-4 times daily  Lantus 60 units daily HS  Novolog 12 units TID with meals- but would often skip/miss lunchtime injection  BG on this regimen variable, 130-300's, most above 200  Usual BG control PTA:  uncontrolled, with A1c 11.8% on admission.   Able to Detect Hypoglycemia: has not experienced lately   Usual Diabetes Care Provider: PCP  Primary Care Provider: Kendal Wong  Factors Impacting IP Glucose Control: " poor control at baseline, omission of prandial agent while consuming PO.   Current Diet: Orders Placed This Encounter      Consistent Carbohydrate Diet Moderate Consistent Carb (60 g CHO per Meal) Diet    10 point ROS completed with pertinent positives and negatives noted in the HPI  Past medical, family and social histories are reviewed and updated.    Social History    Tobacco: never    Alcohol: yes moderate (3-6 drinks daily, mostly on weekends)    Marital Status:  (Estefania)    Place of Residence: [place], [place]    Family History   mother with TIIDM hx.    Physical Exam   /80 (BP Location: Right arm)   Pulse 65   Temp 98.6  F (37  C) (Axillary)   Resp 16   Wt 103.7 kg (228 lb 9.6 oz)   SpO2 98%   BMI 42.91 kg/m    General: pleasant, in no distress, sitting up in chair. Wife is present and provides additional history, as pt not reliable historian    HEENT: normocephalic, atraumatic. Oral mucous membranes moist.   Lungs: unlabored respiration, no cough  ABD: rounded, nondistended  Skin: warm and dry, no obvious lesions  MSK:  moves all extremities  Lymp:  no LE edema   Mental status:  alert, oriented to self, and some aspects of care; he is witnessed at times to confabulate and have word finding difficulties, expressive aphasia.  Psych:  calm and appropriate interaction     Most Recent Laboratory Tests:  Recent Labs   Lab 04/12/22  0657   HGB 14.1     No results for input(s): A1C in the last 168 hours.  Recent Labs   Lab 04/12/22  0657   CR 1.12     Recent Labs   Lab 04/12/22  1155 04/12/22  0657 04/11/22  2154 04/11/22  1655 04/11/22  1145 04/11/22  0826   * 301* 273* 298* 301* 232*       Assessment:   1) Uncontrolled TIIDM (A1c 11.8%)    Reviewed BG trends and recommendations going forward. Huseyin is agreeable to IV insulin infusion and his wife strongly agrees with this plan, to quickly improve BG control in preparation for surgery.       Plan:    -start continuous infusion of IV insulin  this afternoon- pending surgical timing, we may continue through the perioperative period, or transition temporarily to minimize BG checks, once new steady state is reached.    -Novolog increased from 1:7g to 1:5g CHO coverage with meals and snacks   -BG monitoring q1-2 hours on IV insulin    -hypoglycemia protocol   -recommend consistent diet with carb counting protocol   -diabetes education needs will be assessed closer to discharge   -on discharge, will recommend outpatient follow up with endocrine subspecialty service.    Discussed plan of care with patient and primary team paged  Thank you for this consult; Inpatient Diabetes will continue to follow.     To contact Endocrine Diabetes service:   From 8AM-4PM: page inpatient diabetes provider that is following the patient  For questions or updates from 4PM-8AM: page the diabetes job code for on call fellow: [phone]    80 minutes spent on the date of the encounter doing chart review, history and exam, documentation and further activities per the note      Over 50% of my time on the unit was spent counseling the patient and/or coordinating care regarding acute hyperglycemia management.  See note for details.    Yenny Blanchard PA-C  Inpatient Diabetes Management Service  Pager [phone]

## 2022-04-12 NOTE — PROGRESS NOTES
Brief Neurosurgery progress note:    Assessment:   66-year-old male with recurrent strokes despite near-maximal medical therapy and without realistic endovascular options, and DSA proven severe/near total occlusion of distal left M1-proximal M2s (although with reconstitution in distal M2s).  Exam notable for right-sided weakness, some degree of receptive aphasia.    Plan:  - Angioplasty to be planned by Neuro IR team    Neurosurgery will follow peripherally; please call with any new concerns      Jose Luis Corcoran MD  Neurosurgery PGY-1

## 2022-04-12 NOTE — PROGRESS NOTES
"[facility]    Stroke Progress Note    Interval EventsNo acute events overnight.     - Endocrine following for management of hyperglycemia  - Neurosurgery to determine date for bypass surgery, possibly this week  - Cardiac monitoring discontinued    HPI Summary  Huseyin St is a 66 year old male with HTN, HLD, DMII (A1c 11.8), NA, cocaine abuse and recurrent L MCA infarcts who presents as a transfer from [facility] for possible STA-MCA bypass. Patient was admitted to [facility] 4/5/22 for worsening R sided weakness and aphasia. Patient was admitted twice in March for L MCA territory strokes (likely d/t L M1 stenosis). The first admission was 3/16-3/18 and the patient was started on DAPT. He was admitted again 3/24-3/27 due to expressive aphasia and was found to have 2 new cortical white matter strokes. Given the recurrent strokes on DAPT neuro-IR was consulted for possible stenting but did not recommend stenting due to poorly controlled diabetes.      The patient returned to the hospital 4/5/22 due to \"talking gibberish\" for 2 days where the patient was making repeated significant paraphasic errors and was having increased R sided weakness. During that admission the patient was seen by the stroke team several times and underwent a DSA which showed critical stenosis of L M1 artery extending into b/l M2 divisions which made stenting difficult. Given this, the decision was made to transfer the patient to [facility] for a possible ST-MC bypass which could occur as early as next week.      On arrival, the patient is able to only provide a cursory history as he is making frequent paraphasic errors. He reports frustration with being awoken, but notes that his symptoms are relatively stable. Voices no new complaints.    Stroke Evaluation Summarized    MRI/Head CT CT head :  Expected evolution of multiple acute/subacute ischemic  infarcts of varying ages in the left cerebral " hemisphere, as  described. Unchanged mild left temporal lobe swelling. No acute  intracranial hemorrhage identified on CT. No midline shift/herniation.    MRI OF THE BRAIN WITHOUT AND WITH CONTRAST  MRA OF THE HEAD WITHOUT CONTRAST  MRA OF THE NECK WITHOUT AND WITH CONTRAST :   1.  No change.  2.  Severe distal left M1 stenosis with decreased signal in the left  M2 and M3 branches.  3.  Moderate left and mild right cavernous carotid stenoses.  4.  Mild stenosis in the left P2/P3 junction.     NECK MRA:  1.  Normal neck MRA for age.  2.  No significant stenosis as per NASCET criteria.     MRI OF THE BRAIN WITHOUT AND WITH CONTRAST :  1. Evolving subacute ischemic infarct in the left temporal lobe again  noted without change.  2. 2 new tiny recent white matter infarcts in the periatrial white  matter of the left cerebral hemisphere.  3. No other new infarcts.  4. Otherwise, normal brain MRI.   Intracranial + cervical Vasculature 1. Unchanged severe/critical focal stenosis of the left M1-M2 middle  cerebral artery bifurcation.  2. Unchanged moderate/moderate to severe tandem stenoses involving the  P2 segment of the left posterior cerebral artery.  3. Unchanged atherosclerotic disease involving the bilateral carotid  siphons resulting in up to moderate left and mild right paraclinoid  segment internal carotid artery stenosis.  4. No definite new high-grade central arterial stenosis/large vessel  occlusion identified.  5. Unchanged mild atherosclerosis of the bilateral carotid  bifurcations without flow limiting stenosis.  6. Unchanged moderate atherosclerotic stenosis at the origin of the  left vertebral artery.      Echocardiogram Technically challenging study due to patient body habitus, even with the use  of contrast imaging.     Left ventricular size, global systolic function are normal, estimated LVEF 60-  65%.  Endocardial borders are not optimally visualized, however no clear wall motion  abnormalities are  seen.  Right ventricular global function is normal.  No significant valvular abnormalities.  There is no color Doppler evidence of an atrial shunt.     There are no prior studies available for comparison.   Other Testing Not Applicable     LDL  4/6/2022: 53 mg/dL   A1C  3/16/2022: 11.8 %   Troponin No lab value available in past 48 hrs       Impression   66 year old male with HTN, HLD, DMII (A1c 11.8), NA, cocaine abuse and recurrent L MCA infarcts who presents as a transfer from [facility] for possible STA-MCA bypass. Exam stable from [facility], most notable for receptive>expressive aphasia. The patient has been having recurrent strokes from L M1-M2 stenosis and has undergone a DSA which showed that the stenosis is too distal to stent. Given this the patient was transferred to [facility] for evaluation for a possible ST-MC bypass procedure. For now will continue the patient on DAPT and monitor for worsening symptoms while he awaits the procedure     Plan  #Recurrent L MCA territory strokes  #L M1 to M2 stenosis  - Will discuss timing of possible ST-MC bypass in the AM  - Continue  mg  - Continues to be on heparin  Drip.  - Continue rosuvastatin 40 mg daily   - SBP goal normotension, 100-180  - Regular diet with thins (cleared by speech at [facility])   - Consistent Carbohydrate diet  - Avoid hypotonic IV fluids  - PT/OT/SLP  - Depression Screen  - Apnea Screen  - Euthermia, Euglycemia     #Essential HTN  - Holding PTA lisinopril      #HLD  - Continue rosuvastatin      #Poorly controlled T2DM (A1c 11.8)  - Endocrine consulted, appreciate recs  - Continuing management subcutaneous insuling  - Insulin gtt can be started if surgery needs to be completed before control is achieved w/ SQ     #NA  - CPAP as able     #Generalized anxiety disorder  - Continue Lexapro      Prophylaxis            DVT: SCDs for tonight given unclear timeline for procedure     For Acid Suppression:  - GI prophylaxis is not indicated    Patient  Follow-up        We will continue to follow.     The patient was discussed with Stroke Staff is Dr. Thakur.    Charlie Gayle MD  Neurology Resident, PGY2  ASCOM: [id number]  ______________________________________________________    Clinically Significant Risk Factors Present on Admission                 Medications   Scheduled Meds    aspirin  325 mg Oral or Feeding Tube Daily     escitalopram  20 mg Oral or Feeding Tube Daily     insulin aspart   Subcutaneous TID AC     insulin aspart  1-10 Units Subcutaneous TID AC     insulin aspart  1-7 Units Subcutaneous At Bedtime     insulin glargine  35 Units Subcutaneous At Bedtime     rosuvastatin  40 mg Oral or Feeding Tube Daily     senna-docusate  1 tablet Oral At Bedtime     sodium chloride (PF)  3 mL Intracatheter Q8H       Infusion Meds    heparin 1,600 Units/hr (04/12/22 0030)     - MEDICATION INSTRUCTIONS -       - MEDICATION INSTRUCTIONS -         PRN Meds  glucose **OR** dextrose **OR** glucagon, labetalol **OR** hydrALAZINE, insulin aspart, lidocaine 4%, lidocaine (buffered or not buffered), - MEDICATION INSTRUCTIONS -, - MEDICATION INSTRUCTIONS -, sodium chloride (PF)       PHYSICAL EXAMINATION  Temp:  [97.8  F (36.6  C)-98.6  F (37  C)] 98.6  F (37  C)  Pulse:  [65-71] 70  Resp:  [15-18] 16  BP: (135-154)/(79-90) 154/90  SpO2:  [94 %-98 %] 94 %      General: Asleep, awakens to voice in NAD   HEENT: Normocephalic, atraumatic, no epistaxis, no oral lesions  Resp: Breathing comfortably on room air  Extremities: Warm and well perfused, peripheral pulses present, no edema  Skin: Not jaundiced, no rash, no ecchymoses  Psych: Normal mood and affect     Neuro:  Mental status: Alert and oriented to person, place, and time. Follows commands intermittently  Speech: Fluent,; No dysarthria, frequent paraphasic errors. Fluent receptive aphasia  Cranial nerves: Visual fields intact, Eyes conjugate, EOMI w/ normal and smooth pursuit, L lower facial droop, hearing intact to  conversation, shoulder shrug strong, palate rise symmetric, tongue/uvula midline.  Motor: Tone normal. LUE is 5/5, RUE is 4-/5, LLE is 5/5, RLE is 4+/5. No abnormal movements noted. Pronator drift absent.  Reflexes: Toes down-going bilaterally.  Sensory: Intact to light touch in all 4 extremities  Coordination: FNF intact bilaterally with no dysmetria; Normal heel-shin test bilaterally with no dysmetria noted  Gait: Deferred    Stroke Scales    NIHSS  1a. Level of Consciousness 0-->Alert, keenly responsive   1b. LOC Questions 1-->Answers one question correctly   1c. LOC Commands 1-->Performs one task correctly   2.   Best Gaze 0-->Normal   3.   Visual 0-->No visual loss   4.   Facial Palsy 1-->Minor paralysis (flattened nasolabial fold, asymmetry on smiling)   5a. Motor Arm, Left 0-->No drift, limb holds 90 (or 45) degrees for full 10 secs   5b. Motor Arm, Right 0-->No drift, limb holds 90 (or 45) degrees for full 10 secs   6a. Motor Leg, Left 0-->No drift, leg holds 30 degree position for full 5 secs   6b. Motor Leg, right 0-->No drift, leg holds 30 degree position for full 5 secs   7.   Limb Ataxia 0-->Absent   8.   Sensory 0-->Normal, no sensory loss   9.   Best Language 1-->Mild-to-moderate aphasia, some obvious loss of fluency or facility of comprehension, without significant limitation on ideas expressed or form of expression. Reduction of speech and/or comprehension, however, makes conversation. . . (see row details)   10. Dysarthria 1-->Mild-to-moderate dysarthria, patient slurs at least some words and, at worst, can be understood with some difficulty   11. Extinction and Inattention  0-->No abnormality   Total 5 (04/09/22 0150)       Imaging  I personally reviewed all imaging; relevant findings per HPI.     Lab Results Data   CBC  Recent Labs   Lab 04/11/22  0717 04/10/22  0339 04/09/22  1317   WBC 7.7 6.8 6.9   RBC 4.87 4.87 4.74   HGB 13.9 13.6 13.5   HCT 41.4 40.9 39.3*    367 380     Basic  Metabolic Panel    Recent Labs   Lab 04/11/22  2154 04/11/22  1655 04/11/22  1145 04/11/22  0826 04/11/22  0717 04/10/22  0855 04/10/22  0339 04/09/22  1425 04/09/22  0826   NA  --   --   --   --  137  --  135  --  137   POTASSIUM  --   --   --   --  3.8  --  3.9  --  3.7   CHLORIDE  --   --   --   --  102  --  102  --  104   CO2  --   --   --   --  25  --  26  --  26   BUN  --   --   --   --  11  --  13  --  10   CR  --   --   --   --  1.12  --  1.04  --  0.95   * 298* 301*   < > 234*   < > 261*   < > 76   EVER  --   --   --   --  9.2  --  9.1  --  9.7    < > = values in this interval not displayed.     Liver Panel  No results for input(s): PROTTOTAL, ALBUMIN, BILITOTAL, ALKPHOS, AST, ALT, BILIDIRECT in the last 168 hours.  INR    Recent Labs   Lab Test 03/16/22  1704   INR 1.02      Lipid Profile    Recent Labs   Lab Test 04/06/22  0725 03/16/22  1704 10/11/21  0942 04/21/16  0906 08/20/15  0830 01/14/15  0847 04/21/14  0815   CHOL 109 224* 228*   < > 136 316* 155   HDL 34* 33* 37*   < > 42 41 37*   LDL 53 136* 116*   < > 59 219* 80   TRIG 111 275* 377*   < > 176* 282* 189*   CHOLHDLRATIO  --   --   --   --  3.2 7.7* 4.2    < > = values in this interval not displayed.     A1C    Recent Labs   Lab Test 03/16/22  1704 10/11/21  0943 09/28/20  0925   A1C 11.8* 11.8* 13.2*     Troponin I    Recent Labs   Lab 04/09/22  0037 04/05/22  1149   TROPONINIS 4 <3

## 2022-04-12 NOTE — PLAN OF CARE
Status: admitted for ischemic stroke  Vitals: VSS  Neuros: global aphasia and slight R facial droop persist. Writer noted 5/5 strength in all extremities  IV: PIV x 2 for hep gtt and insulin gtt. Hep unchanged this AM at 1600 units/hr, next AM draw scheduled already.  Labs/Electrolytes: elevated glucose levels continue, will start insuline gtt at alg. #1  Resp/trach: WNL  Diet: tolerated regular diet without difficulty, carb coverage in place and will remain alongside insulin gtt  Bowel status: BM yesterday per pt  : voiding without difficulty, continent  Skin: intact  Pain: denies  Activity: up with SBA, GB  Social: wife at bedside most of day, supportive and helpful  Plan: monitor BG levels and awaiting NSG potential for surgical intervention  Updates this shift: continuous cardiac monitoring DC'd

## 2022-04-12 NOTE — PLAN OF CARE
Status: Admitted 4/8 for recurrent strokes and near-total occlusion of distal/Left M1 and proximal M2.   Vitals: VSS  Neuros: Alert, Oriented x3-4, LUE 5/5, RUE 4/5,  L>R. Easily arousable throughout the night and declined majority of assessment overnight to promote sleep.   IV: PIV w/ Heparin gtt @ 1600 units/hour   Labs/Electrolytes: Next hep Xa this AM (previous 2 draws therapeutic)  Resp/trach: RA throughout the night, normally wears CPAP overnight  Diet: Consistent carb diet  Bowel status: LB 4/10  : Voiding spontaneously in bathroom   Skin: No new deficits   Pain: Denies  Activity: Ax1 w/ GB  Plan: Continue to monitor hyperglycemia, potentially starting insulin gtt if still uncontrolled, will have full endocrine consult today. Needs to have glycemic control to go to OR for STA-M2 bypass sometime this week.

## 2022-04-13 ENCOUNTER — APPOINTMENT (OUTPATIENT)
Dept: OCCUPATIONAL THERAPY | Facility: CLINIC | Age: 67
DRG: 023 | End: 2022-04-13
Attending: PSYCHIATRY & NEUROLOGY
Payer: COMMERCIAL

## 2022-04-13 LAB
ANION GAP SERPL CALCULATED.3IONS-SCNC: 9 MMOL/L (ref 3–14)
BUN SERPL-MCNC: 13 MG/DL (ref 7–30)
CALCIUM SERPL-MCNC: 9.4 MG/DL (ref 8.5–10.1)
CHLORIDE BLD-SCNC: 104 MMOL/L (ref 94–109)
CO2 SERPL-SCNC: 25 MMOL/L (ref 20–32)
CREAT SERPL-MCNC: 1.11 MG/DL (ref 0.66–1.25)
ERYTHROCYTE [DISTWIDTH] IN BLOOD BY AUTOMATED COUNT: 11.9 % (ref 10–15)
GFR SERPL CREATININE-BSD FRML MDRD: 73 ML/MIN/1.73M2
GLUCOSE BLD-MCNC: 94 MG/DL (ref 70–99)
GLUCOSE BLDC GLUCOMTR-MCNC: 107 MG/DL (ref 70–99)
GLUCOSE BLDC GLUCOMTR-MCNC: 109 MG/DL (ref 70–99)
GLUCOSE BLDC GLUCOMTR-MCNC: 110 MG/DL (ref 70–99)
GLUCOSE BLDC GLUCOMTR-MCNC: 111 MG/DL (ref 70–99)
GLUCOSE BLDC GLUCOMTR-MCNC: 122 MG/DL (ref 70–99)
GLUCOSE BLDC GLUCOMTR-MCNC: 127 MG/DL (ref 70–99)
GLUCOSE BLDC GLUCOMTR-MCNC: 134 MG/DL (ref 70–99)
GLUCOSE BLDC GLUCOMTR-MCNC: 143 MG/DL (ref 70–99)
GLUCOSE BLDC GLUCOMTR-MCNC: 156 MG/DL (ref 70–99)
GLUCOSE BLDC GLUCOMTR-MCNC: 167 MG/DL (ref 70–99)
GLUCOSE BLDC GLUCOMTR-MCNC: 170 MG/DL (ref 70–99)
GLUCOSE BLDC GLUCOMTR-MCNC: 172 MG/DL (ref 70–99)
GLUCOSE BLDC GLUCOMTR-MCNC: 172 MG/DL (ref 70–99)
GLUCOSE BLDC GLUCOMTR-MCNC: 173 MG/DL (ref 70–99)
GLUCOSE BLDC GLUCOMTR-MCNC: 178 MG/DL (ref 70–99)
GLUCOSE BLDC GLUCOMTR-MCNC: 182 MG/DL (ref 70–99)
GLUCOSE BLDC GLUCOMTR-MCNC: 183 MG/DL (ref 70–99)
GLUCOSE BLDC GLUCOMTR-MCNC: 189 MG/DL (ref 70–99)
GLUCOSE BLDC GLUCOMTR-MCNC: 191 MG/DL (ref 70–99)
GLUCOSE BLDC GLUCOMTR-MCNC: 195 MG/DL (ref 70–99)
GLUCOSE BLDC GLUCOMTR-MCNC: 199 MG/DL (ref 70–99)
GLUCOSE BLDC GLUCOMTR-MCNC: 216 MG/DL (ref 70–99)
GLUCOSE BLDC GLUCOMTR-MCNC: 73 MG/DL (ref 70–99)
GLUCOSE BLDC GLUCOMTR-MCNC: 94 MG/DL (ref 70–99)
HCT VFR BLD AUTO: 43.9 % (ref 40–53)
HGB BLD-MCNC: 14.5 G/DL (ref 13.3–17.7)
MCH RBC QN AUTO: 28 PG (ref 26.5–33)
MCHC RBC AUTO-ENTMCNC: 33 G/DL (ref 31.5–36.5)
MCV RBC AUTO: 85 FL (ref 78–100)
PLATELET # BLD AUTO: 514 10E3/UL (ref 150–450)
POTASSIUM BLD-SCNC: 3.4 MMOL/L (ref 3.4–5.3)
RBC # BLD AUTO: 5.17 10E6/UL (ref 4.4–5.9)
SODIUM SERPL-SCNC: 138 MMOL/L (ref 133–144)
UFH PPP CHRO-ACNC: 0.33 IU/ML
WBC # BLD AUTO: 11 10E3/UL (ref 4–11)

## 2022-04-13 PROCEDURE — 999N000248 HC STATISTIC IV INSERT WITH US BY RN

## 2022-04-13 PROCEDURE — 97535 SELF CARE MNGMENT TRAINING: CPT | Mod: GO

## 2022-04-13 PROCEDURE — 120N000002 HC R&B MED SURG/OB UMMC

## 2022-04-13 PROCEDURE — 85520 HEPARIN ASSAY: CPT | Performed by: PSYCHIATRY & NEUROLOGY

## 2022-04-13 PROCEDURE — 250N000013 HC RX MED GY IP 250 OP 250 PS 637: Performed by: STUDENT IN AN ORGANIZED HEALTH CARE EDUCATION/TRAINING PROGRAM

## 2022-04-13 PROCEDURE — 82310 ASSAY OF CALCIUM: CPT | Performed by: STUDENT IN AN ORGANIZED HEALTH CARE EDUCATION/TRAINING PROGRAM

## 2022-04-13 PROCEDURE — 250N000013 HC RX MED GY IP 250 OP 250 PS 637

## 2022-04-13 PROCEDURE — 85027 COMPLETE CBC AUTOMATED: CPT | Performed by: STUDENT IN AN ORGANIZED HEALTH CARE EDUCATION/TRAINING PROGRAM

## 2022-04-13 PROCEDURE — 99231 SBSQ HOSP IP/OBS SF/LOW 25: CPT | Mod: GC | Performed by: PSYCHIATRY & NEUROLOGY

## 2022-04-13 PROCEDURE — 36415 COLL VENOUS BLD VENIPUNCTURE: CPT | Performed by: STUDENT IN AN ORGANIZED HEALTH CARE EDUCATION/TRAINING PROGRAM

## 2022-04-13 PROCEDURE — 250N000011 HC RX IP 250 OP 636

## 2022-04-13 PROCEDURE — 99233 SBSQ HOSP IP/OBS HIGH 50: CPT | Performed by: PHYSICIAN ASSISTANT

## 2022-04-13 PROCEDURE — 250N000009 HC RX 250: Performed by: PHYSICIAN ASSISTANT

## 2022-04-13 RX ORDER — CLOPIDOGREL BISULFATE 75 MG/1
450 TABLET ORAL ONCE
Status: COMPLETED | OUTPATIENT
Start: 2022-04-13 | End: 2022-04-13

## 2022-04-13 RX ORDER — CLOPIDOGREL BISULFATE 75 MG/1
75 TABLET ORAL DAILY
Status: DISCONTINUED | OUTPATIENT
Start: 2022-04-14 | End: 2022-04-18 | Stop reason: HOSPADM

## 2022-04-13 RX ADMIN — HUMAN INSULIN 8 UNITS/HR: 100 INJECTION, SOLUTION SUBCUTANEOUS at 11:23

## 2022-04-13 RX ADMIN — ROSUVASTATIN CALCIUM 40 MG: 10 TABLET, FILM COATED ORAL at 07:21

## 2022-04-13 RX ADMIN — HUMAN INSULIN 6 UNITS/HR: 100 INJECTION, SOLUTION SUBCUTANEOUS at 12:11

## 2022-04-13 RX ADMIN — HUMAN INSULIN 8 UNITS/HR: 100 INJECTION, SOLUTION SUBCUTANEOUS at 00:47

## 2022-04-13 RX ADMIN — CLOPIDOGREL BISULFATE 450 MG: 75 TABLET ORAL at 17:46

## 2022-04-13 RX ADMIN — HUMAN INSULIN 3 UNITS/HR: 100 INJECTION, SOLUTION SUBCUTANEOUS at 06:26

## 2022-04-13 RX ADMIN — HUMAN INSULIN 2 UNITS/HR: 100 INJECTION, SOLUTION SUBCUTANEOUS at 13:10

## 2022-04-13 RX ADMIN — ESCITALOPRAM OXALATE 20 MG: 10 TABLET ORAL at 07:21

## 2022-04-13 RX ADMIN — HEPARIN SODIUM AND DEXTROSE 1600 UNITS/HR: 10000; 5 INJECTION INTRAVENOUS at 00:42

## 2022-04-13 RX ADMIN — HUMAN INSULIN 8 UNITS/HR: 100 INJECTION, SOLUTION SUBCUTANEOUS at 20:00

## 2022-04-13 RX ADMIN — HUMAN INSULIN 6 UNITS/HR: 100 INJECTION, SOLUTION SUBCUTANEOUS at 15:01

## 2022-04-13 RX ADMIN — HUMAN INSULIN 3 UNITS/HR: 100 INJECTION, SOLUTION SUBCUTANEOUS at 08:08

## 2022-04-13 RX ADMIN — ASPIRIN 325 MG ORAL TABLET 325 MG: 325 PILL ORAL at 07:21

## 2022-04-13 RX ADMIN — HUMAN INSULIN 6 UNITS/HR: 100 INJECTION, SOLUTION SUBCUTANEOUS at 14:16

## 2022-04-13 ASSESSMENT — ACTIVITIES OF DAILY LIVING (ADL)
ADLS_ACUITY_SCORE: 10

## 2022-04-13 NOTE — CONSULTS
Discharge Pharmacy Test Claim    Patient has commercial insurance through [employer]. Patient has $0 copays for ozempic, trulicity, or victoza.    Test Claim Copay   ozempic 0.00   trulicity 0.00   victoza 0.00     Angie Boyle  [facility] Pharm[facility] Liaison  Ph: [phone] Pager: [phone]

## 2022-04-13 NOTE — PROGRESS NOTES
"[facility]    Stroke Progress Note    Interval EventsNo acute events overnight.     - Endocrine following to manage the sugars.   - Neuro IR to plan for stenting late this week/ over the weekend.   - Stopped Heparin drip  - Ordered loading dose of 450 mg of Plavix  - ordered 75 mg Plavix daily staring tomorrow.     HPI Summary  Huseyin St is a 66 year old male with HTN, HLD, DMII (A1c 11.8), NA, cocaine abuse and recurrent L MCA infarcts who presents as a transfer from [facility] for possible STA-MCA bypass. Patient was admitted to [facility] 4/5/22 for worsening R sided weakness and aphasia. Patient was admitted twice in March for L MCA territory strokes (likely d/t L M1 stenosis). The first admission was 3/16-3/18 and the patient was started on DAPT. He was admitted again 3/24-3/27 due to expressive aphasia and was found to have 2 new cortical white matter strokes. Given the recurrent strokes on DAPT neuro-IR was consulted for possible stenting but did not recommend stenting due to poorly controlled diabetes.      The patient returned to the hospital 4/5/22 due to \"talking gibberish\" for 2 days where the patient was making repeated significant paraphasic errors and was having increased R sided weakness. During that admission the patient was seen by the stroke team several times and underwent a DSA which showed critical stenosis of L M1 artery extending into b/l M2 divisions which made stenting difficult. Given this, the decision was made to transfer the patient to [facility] for a possible ST-MC bypass which could occur as early as next week.      On arrival, the patient is able to only provide a cursory history as he is making frequent paraphasic errors. He reports frustration with being awoken, but notes that his symptoms are relatively stable. Voices no new complaints.    Stroke Evaluation Summarized    MRI/Head CT CT head :  Expected evolution of multiple " acute/subacute ischemic  infarcts of varying ages in the left cerebral hemisphere, as  described. Unchanged mild left temporal lobe swelling. No acute  intracranial hemorrhage identified on CT. No midline shift/herniation.    MRI OF THE BRAIN WITHOUT AND WITH CONTRAST  MRA OF THE HEAD WITHOUT CONTRAST  MRA OF THE NECK WITHOUT AND WITH CONTRAST :   1.  No change.  2.  Severe distal left M1 stenosis with decreased signal in the left  M2 and M3 branches.  3.  Moderate left and mild right cavernous carotid stenoses.  4.  Mild stenosis in the left P2/P3 junction.     NECK MRA:  1.  Normal neck MRA for age.  2.  No significant stenosis as per NASCET criteria.     MRI OF THE BRAIN WITHOUT AND WITH CONTRAST :  1. Evolving subacute ischemic infarct in the left temporal lobe again  noted without change.  2. 2 new tiny recent white matter infarcts in the periatrial white  matter of the left cerebral hemisphere.  3. No other new infarcts.  4. Otherwise, normal brain MRI.   Intracranial + cervical Vasculature 1. Unchanged severe/critical focal stenosis of the left M1-M2 middle  cerebral artery bifurcation.  2. Unchanged moderate/moderate to severe tandem stenoses involving the  P2 segment of the left posterior cerebral artery.  3. Unchanged atherosclerotic disease involving the bilateral carotid  siphons resulting in up to moderate left and mild right paraclinoid  segment internal carotid artery stenosis.  4. No definite new high-grade central arterial stenosis/large vessel  occlusion identified.  5. Unchanged mild atherosclerosis of the bilateral carotid  bifurcations without flow limiting stenosis.  6. Unchanged moderate atherosclerotic stenosis at the origin of the  left vertebral artery.      Echocardiogram Technically challenging study due to patient body habitus, even with the use  of contrast imaging.     Left ventricular size, global systolic function are normal, estimated LVEF 60-  65%.  Endocardial borders are not  optimally visualized, however no clear wall motion  abnormalities are seen.  Right ventricular global function is normal.  No significant valvular abnormalities.  There is no color Doppler evidence of an atrial shunt.     There are no prior studies available for comparison.   Other Testing Not Applicable     LDL  4/6/2022: 53 mg/dL   A1C  3/16/2022: 11.8 %   Troponin No lab value available in past 48 hrs       Impression   66 year old male with HTN, HLD, DMII (A1c 11.8), NA, cocaine abuse and recurrent L MCA infarcts who presents as a transfer from [facility] for possible STA-MCA bypass. Exam stable from [facility], most notable for receptive>expressive aphasia. The patient has been having recurrent strokes from L M1-M2 stenosis and has undergone a DSA which showed that the stenosis is too distal to stent. Given this the patient was transferred to [facility] for evaluation for a possible ST-MC bypass procedure. For now will continue the patient on DAPT and monitor for worsening symptoms while he awaits the procedure     Plan  #Recurrent L MCA territory strokes  #L M1 to M2 stenosis  - Neuro IR to plan for stenting late this week/ over the weekend.   - Continue  mg  - Stopped Heparin drip  - Ordered loading dose of 450 mg of Plavix  - ordered 75 mg Plavix daily staring tomorrow.   - Continue rosuvastatin 40 mg daily   - SBP goal normotension, 100-180  - Regular diet with thins (cleared by speech at [facility])   - Consistent Carbohydrate diet  - Avoid hypotonic IV fluids  - PT/OT/SLP  - Depression Screen  - Apnea Screen  - Euthermia, Euglycemia     #Essential HTN  - Holding PTA lisinopril      #HLD  - Continue rosuvastatin      #Poorly controlled T2DM (A1c 11.8)  - Endocrine consulted, appreciate recs  - Continuing management subcutaneous insulin  - Insulin gtt can be started if surgery needs to be completed before control is achieved w/ SQ     #NA  - CPAP as able     #Generalized anxiety disorder  - Continue  Lexapro      Prophylaxis            DVT: SCDs for tonight given unclear timeline for procedure     For Acid Suppression:  - GI prophylaxis is not indicated    Patient Follow-up        We will continue to follow.     The patient was discussed with Stroke Staff is Dr. Thakur.    Francisco Lovell MD  Neurology Resident, PGY1  ASCOM: [id number]  ______________________________________________________    Clinically Significant Risk Factors Present on Admission                  Medications   Scheduled Meds    aspirin  325 mg Oral or Feeding Tube Daily     escitalopram  20 mg Oral or Feeding Tube Daily     insulin aspart   Subcutaneous TID AC     rosuvastatin  40 mg Oral or Feeding Tube Daily     senna-docusate  1 tablet Oral At Bedtime     sodium chloride (PF)  3 mL Intracatheter Q8H       Infusion Meds    heparin 1,600 Units/hr (04/13/22 1522)     insulin regular 6 Units/hr (04/13/22 1501)     - MEDICATION INSTRUCTIONS -       - MEDICATION INSTRUCTIONS -         PRN Meds  glucose **OR** dextrose **OR** glucagon, labetalol **OR** hydrALAZINE, insulin aspart, lidocaine 4%, lidocaine (buffered or not buffered), - MEDICATION INSTRUCTIONS -, - MEDICATION INSTRUCTIONS -, sodium chloride (PF)       PHYSICAL EXAMINATION  Temp:  [98  F (36.7  C)-99.7  F (37.6  C)] 98.4  F (36.9  C)  Pulse:  [68-72] 68  Resp:  [16] 16  BP: (125-162)/(63-96) 125/81  SpO2:  [95 %-99 %] 99 %      General: Asleep, awakens to voice in NAD   HEENT: Normocephalic, atraumatic, no epistaxis, no oral lesions  Resp: Breathing comfortably on room air  Extremities: Warm and well perfused, peripheral pulses present, no edema  Skin: Not jaundiced, no rash, no ecchymoses  Psych: Normal mood and affect     Neuro:  Mental status: Alert and oriented to person, place, and time. Follows commands intermittently  Speech: Fluent,; No dysarthria, frequent paraphasic errors. Fluent receptive aphasia  Cranial nerves: Visual fields intact, Eyes conjugate, EOMI w/ normal and smooth  pursuit, L lower facial droop, hearing intact to conversation, shoulder shrug strong, palate rise symmetric, tongue/uvula midline.  Motor: Tone normal. LUE is 5/5, RUE is 4-/5, LLE is 5/5, RLE is 4+/5. No abnormal movements noted. Pronator drift absent.  Reflexes: Toes down-going bilaterally.  Sensory: Intact to light touch in all 4 extremities  Coordination: FNF intact bilaterally with no dysmetria; Normal heel-shin test bilaterally with no dysmetria noted  Gait: Deferred    Stroke Scales    NIHSS  1a. Level of Consciousness 0-->Alert, keenly responsive   1b. LOC Questions 1-->Answers one question correctly   1c. LOC Commands 1-->Performs one task correctly   2.   Best Gaze 0-->Normal   3.   Visual 0-->No visual loss   4.   Facial Palsy 1-->Minor paralysis (flattened nasolabial fold, asymmetry on smiling)   5a. Motor Arm, Left 0-->No drift, limb holds 90 (or 45) degrees for full 10 secs   5b. Motor Arm, Right 0-->No drift, limb holds 90 (or 45) degrees for full 10 secs   6a. Motor Leg, Left 0-->No drift, leg holds 30 degree position for full 5 secs   6b. Motor Leg, right 0-->No drift, leg holds 30 degree position for full 5 secs   7.   Limb Ataxia 0-->Absent   8.   Sensory 0-->Normal, no sensory loss   9.   Best Language 1-->Mild-to-moderate aphasia, some obvious loss of fluency or facility of comprehension, without significant limitation on ideas expressed or form of expression. Reduction of speech and/or comprehension, however, makes conversation. . . (see row details)   10. Dysarthria 1-->Mild-to-moderate dysarthria, patient slurs at least some words and, at worst, can be understood with some difficulty   11. Extinction and Inattention  0-->No abnormality   Total 5 (04/09/22 0150)       Imaging  I personally reviewed all imaging; relevant findings per HPI.     Lab Results Data   CBC  Recent Labs   Lab 04/13/22  0759 04/12/22  0657 04/11/22  0717   WBC 11.0 8.2 7.7   RBC 5.17 4.93 4.87   HGB 14.5 14.1 13.9   HCT  43.9 41.8 41.4   * 447 431     Basic Metabolic Panel    Recent Labs   Lab 04/13/22  1500 04/13/22  1413 04/13/22  1310 04/13/22  0807 04/13/22  0759 04/12/22  1155 04/12/22  0657 04/11/22  0826 04/11/22  0717   NA  --   --   --   --  138  --  135  --  137   POTASSIUM  --   --   --   --  3.4  --  3.8  --  3.8   CHLORIDE  --   --   --   --  104  --  103  --  102   CO2  --   --   --   --  25  --  24  --  25   BUN  --   --   --   --  13  --  14  --  11   CR  --   --   --   --  1.11  --  1.12  --  1.12   * 173* 107*   < > 94   < > 301*   < > 234*   EVER  --   --   --   --  9.4  --  9.4  --  9.2    < > = values in this interval not displayed.     Liver Panel  No results for input(s): PROTTOTAL, ALBUMIN, BILITOTAL, ALKPHOS, AST, ALT, BILIDIRECT in the last 168 hours.  INR    Recent Labs   Lab Test 03/16/22  1704   INR 1.02      Lipid Profile    Recent Labs   Lab Test 04/06/22  0725 03/16/22  1704 10/11/21  0942 04/21/16  0906 08/20/15  0830 01/14/15  0847 04/21/14  0815   CHOL 109 224* 228*   < > 136 316* 155   HDL 34* 33* 37*   < > 42 41 37*   LDL 53 136* 116*   < > 59 219* 80   TRIG 111 275* 377*   < > 176* 282* 189*   CHOLHDLRATIO  --   --   --   --  3.2 7.7* 4.2    < > = values in this interval not displayed.     A1C    Recent Labs   Lab Test 03/16/22  1704 10/11/21  0943 09/28/20  0925   A1C 11.8* 11.8* 13.2*     Troponin I    Recent Labs   Lab 04/09/22  0037   TROPONINIS 4

## 2022-04-13 NOTE — PROGRESS NOTES
"IP Diabetes Management  Daily Note           Assessment and Plan:   HPI: Huseyin is a 66 year old male with TIIDM, admitted with multiple recent strokes, and found to have severe L M1 stenosis, transferred to [facility] for consideration of NSG intervention/ EC-IC bypass.     Assessment:   1) Uncontrolled TIIDM (A1c 11.8%)    Plan:    -continue IV insulin today- transition is pending surgical/interventional plan.    -increase Novolog from 1:5g>1:4g CHO coverage with meals and snacks/supplements   -BG monitoring q1-2 hours per IV insulin protocol.   -test claim: Victoza, Ozempic, Trulicity covered $0 copay. See below for \"allergy\" history.   -hypoglycemia protocol   -carb counting protocol   -diabetes education needs will be assessed closer to discharge- anticipate his wife will need to play more active management role going forwards, and she would appreciate/benefit from education session, pending dispo.    Outpatient follow up: pt prefers [facility]-this location has both diabetes education and endocrinology. Placed appt request to discharge navigator 4/13.      Plan discussed with patient, wife, bedside RN, and primary team.        Interval History and Assessment: interval glucose trend reviewed:  BG are improving; needed algorithm 4+ for a time overnight, but BG trended to 80-90s by early AM. Eating well. Postprandial hyperglycemia is evident; increasing IC ratio.      No longer planning bypass surgery- to have IR angioplasty. Timing TBD (? Friday).    Test claim for GLP1: full coverage for Victoza, Ozempic, Trulicity. Noted that patient has Victoza \"allergy\" listed. Per notes from 2014, he had an injection site reaction with the Victoza. His wife Estefania has no recollection of him ever trialing this medication. Huseyin is not able to contribute or provide to history. She was willing to allow us to try the Victoza, or alternative weekly agent again, due to the benefit of stroke reduction> risk of localized " injection site reaction. Wouldn't be until after procedures, and likely closer to discharge to next level of care. No hx pancreatitis or known hx thyroid cancer in family. Would want to reinforce avoidance of moderate or greater EtOH use.    Current nutritional intake and type: Orders Placed This Encounter      Consistent Carbohydrate Diet Moderate Consistent Carb (60 g CHO per Meal) Diet    PTA Diabetes Regimen:   Frequency of checks: 3-4 times daily  Lantus 60 units daily HS  Novolog 12 units TID with meals- but would often skip/miss lunchtime injection  BG on this regimen variable, 130-300's, most above 200    *additional chart review: has been on Januvia, Victoza, Invokana also in the past. Victoza in particular was stopped 2/2 injection site reaction. Pt not able to confirm this history, and his wife does not recall any of these medications*    Discharge Planning: TBD following procedures.           Diabetes History:   Type of Diabetes: Type 2 Diabetes Mellitus  Lab Results   Component Value Date    A1C 11.8 03/16/2022    A1C 11.8 10/11/2021    A1C 13.2 09/28/2020    A1C 12.3 09/23/2019    A1C 10.4 03/12/2018    A1C 10.0 09/29/2017    A1C 9.5 03/24/2017              Review of Systems:     The Review of Systems is negative other than noted in the Interval History.           Medications:     Current Facility-Administered Medications   Medication     aspirin (ASA) tablet 325 mg     glucose gel 15-30 g    Or     dextrose 50 % injection 25-50 mL    Or     glucagon injection 1 mg     escitalopram (LEXAPRO) tablet 20 mg     heparin infusion 25,000 units in D5W 250 mL ANTICOAGULANT     labetalol (NORMODYNE/TRANDATE) injection 10-20 mg    Or     hydrALAZINE (APRESOLINE) injection 10-20 mg     insulin 1 unit/mL in saline (NovoLIN, HumuLIN Regular) drip - ADULT IV Infusion     insulin aspart (NovoLOG) injection (RAPID ACTING)     insulin aspart (NovoLOG) injection (RAPID ACTING)     lidocaine (LMX4) cream     lidocaine 1  % 0.1-1 mL     medication instruction - No oral meds if patient didn't pass dysphagia screen     Medication Instructions - Avoid dextrose in IV solutions.     rosuvastatin (CRESTOR) tablet 40 mg     senna-docusate (SENOKOT-S/PERICOLACE) 8.6-50 MG per tablet 1 tablet     sodium chloride (PF) 0.9% PF flush 3 mL     sodium chloride (PF) 0.9% PF flush 3 mL            Physical Exam:    BP (!) 162/96 (BP Location: Right arm)   Pulse 71   Temp 98.6  F (37  C) (Oral)   Resp 16   Wt 103.7 kg (228 lb 9.6 oz)   SpO2 96%   BMI 42.91 kg/m    General: resting in bed. Occasionally opens his eyes but does not focus on or contribute to conversation today.   HEENT: normocephalic, atraumatic. Oral mucous membranes moist.   Lungs: unlabored respiration, no cough  ABD: rounded, nondistended  Skin: warm and dry, no obvious lesions  MSK:  moves all extremities  Lymp:  no LE edema   Mental status:  alert, oriented to self.   Psych: calm. Did not participate in conversation or interact with provider today          Data:     Recent Labs   Lab 04/13/22  1210 04/13/22  1120 04/13/22  1035 04/13/22  0914 04/13/22  0807 04/13/22  0759   * 191* 167* 94 111* 94     Lab Results   Component Value Date    WBC 11.0 04/13/2022    WBC 8.2 04/12/2022    WBC 7.7 04/11/2022    HGB 14.5 04/13/2022    HGB 14.1 04/12/2022    HGB 13.9 04/11/2022    HCT 43.9 04/13/2022    HCT 41.8 04/12/2022    HCT 41.4 04/11/2022    MCV 85 04/13/2022    MCV 85 04/12/2022    MCV 85 04/11/2022     (H) 04/13/2022     04/12/2022     04/11/2022     Lab Results   Component Value Date     04/13/2022     04/12/2022     04/11/2022    POTASSIUM 3.4 04/13/2022    POTASSIUM 3.8 04/12/2022    POTASSIUM 3.8 04/11/2022    CHLORIDE 104 04/13/2022    CHLORIDE 103 04/12/2022    CHLORIDE 102 04/11/2022    CO2 25 04/13/2022    CO2 24 04/12/2022    CO2 25 04/11/2022     (H) 04/13/2022     (H) 04/13/2022     (H) 04/13/2022      Lab Results   Component Value Date    BUN 13 04/13/2022    BUN 14 04/12/2022    BUN 11 04/11/2022     Lab Results   Component Value Date    TSH 1.94 12/05/2016    TSH 2.23 04/21/2016    TSH 2.65 08/20/2015     Lab Results   Component Value Date    AST 10 03/24/2022    AST 11 03/16/2022    AST 15 12/05/2016    ALT 17 03/24/2022    ALT 17 03/16/2022    ALT 36 09/29/2017    ALKPHOS 99 03/24/2022    ALKPHOS 70 03/16/2022    ALKPHOS 81 12/05/2016       35 minutes spent on the date of the encounter doing chart review, history and exam, documentation and further activities per the note      Over 50% of my time on the unit was spent counseling the patient and/or coordinating care regarding acute hyperglycemia management.  See note for details.    To contact Endocrine Diabetes service:   From 8AM-4PM: page inpatient diabetes provider that is following the patient  For questions or updates from 4PM-8AM: page the diabetes job code for on call fellow: [phone]    Yenny Blanchard PA-C  Inpatient Diabetes Management Service  Pager [phone]

## 2022-04-13 NOTE — PLAN OF CARE
Status: Admitted for ischemic stroke  Vitals: HTN, within parameters. On RA.  Neuros: A&Ox4 w/ choices. Global aphasia. Slight R facial droop. Stengths 5/5 t/o.  IV: 2 PIV - 1 infusing hep gtt 1600units/hr. Other PIV infusing insulin gtt @ alg. 4  Labs/Electrolytes: Next anti xa lab redraw tomorrow AM.   Resp/trach: CPAP at bedside. Pt refuses to wear CPAP HS.  Diet: Con carb diet, carb coverage.  Bowel status: LBM 4/11  : Voiding  Skin: intact  Pain: Denies  Activity: SBA w/ GB  Plan: Continue to monitor

## 2022-04-13 NOTE — PLAN OF CARE
Status: admitted for ischemic stroke  Vitals: VSS  Neuros: global aphasia and slight R facial droop persist. Writer noted 5/5 strength in all extremities  IV: PIV x 2 for hep gtt and insulin gtt. Hep unchanged this AM at 1600 units/hr, next AM draw scheduled already.  Labs/Electrolytes: elevated glucose levels continue, will start insulin gtt at alg. #4  Resp/trach: WNL  Diet: tolerated regular diet without difficulty, carb coverage in place and will remain alongside insulin gtt  Bowel status: [facility] 4/11  : voiding without difficulty, continent  Skin: intact  Pain: denies  Activity: up with SBA, GB  Social: wife at bedside most of day, supportive and helpful  Plan: monitor BG levels

## 2022-04-13 NOTE — PROGRESS NOTES
Brief Neuro IR Progress note    Briefly this is a 66 year old man with uncontrolled vascular risk factors (DM A1c of 11.8 % , HTN, HLD, NA on CPAP, obesity), depression/anxiety and cocaine use who has had 3 strokes in the last 4 weeks in the left MCA territory.   He had subsequently been discharged on ASA/plavix, DM II control and HTN management and lipitor, however he returned twice in the subsequent weeks with repeated left MCA strokes, [ 2 strokes in the left temporal lobe], and one left MCA watershed region stroke despite maximal medical management. Last stroke was on 4/5/22. Our service did perform a diagnostic angiogram during last admission and found that the distal left M1 stenosis was just at the transition point from M1 to both M2 divisions. Strokes have predominantly been in the left temporal lobe. On the angiogram, the left MI did fill faster than left MCA, however clinically patients BP has varied without significant clinical change. Unclear if etiology of multiple left MCA strokes is hemodynamic or embolic or combination of both.  Each time he has been admitted his glucose has reached in > 300-400 range and given his multitude of risk factors alex his A1c he is at high risk of intimal hyperplasia if we do lay down a stent and this has been discussed extensively with stroke team and I will discuss extensitvely with wife. Clinically he has some mild right pronator drift, however he has significant receptive aphasia.     After our angiogram, we discussed with NSGY service the possibility of EC-IC bypass. We have initial concerns that angioplasty of either M2 division may push the clot to the other division and we will likely need to lay down a stent give his long standing atherosclerosis. We discussed his case thoroughly in our neuroIR conference this morning, and although there is high risk in treating this intracranial stenosis endovascularly the published data [EC/IC  Bypass Trial ] does not indicate  that there will be any significant reduction in ipsilateral stroke risk in patients with MCA stenosis and bypass surgery.  Given this we will plan to proceed with angioplasty with possible left M1 stent placement in the coming days [ this will depend on anesthesia schedule hopefully Friday afternoon as of now].    Discussed with Dr Conrad and stroke service. I also spoke to wife extensively tonight    [] Transition to DAPT with  and plavix 75 mg qday, obtain P2Y12 prior to our scheduled case with anesthesia. I will update stroke team regarding timing with anesthesia  [] Cont aggressive management of his risk factors in particular his sugars.  [] I called wife multiple times this afternoon with no response, and Left message. I will try again either later tonight or tomorrow morning. Again likely Friday afternoon anesthesia case as per anesthesia schedule.     Rosenda Recinos MD  Neuroendovascular Fellow

## 2022-04-13 NOTE — PLAN OF CARE
Status: Admitted 4/8 for recurrent strokes and near-total occlusion of distal/Left M1 and proximal M2.   Vitals: VSS  Neuros: Alert, Oriented x3-4, LUE 5/5, RUE 4/5,  L>R.   IV: PIV w/ Heparin gtt @ 1600 units/hour, R PIV w/ insulin gtt (trend below)    Labs/Electrolytes: Next hep Xa this AM, checking blood glucose q1hr  Resp/trach: RA throughout the night, normally wears CPAP overnight  Diet: Consistent carb diet  Bowel status: LB 4/11  : Voiding spontaneously in bathroom   Skin: No new deficits   Pain: Denies  Activity: Ax1 w/ GB  Plan: Continue with insulin and heparin gtts, plan for angioplasty with Neuro IR sometime later this week       ---INSULIN INFUSION---     - 168 @ 2329, insulin @ 6 units/hour, Algorithm 4   - 183 @ 0040, insulin @ 8 units/hour, Algorithm 4   - 216 @ 0136, insulin @ 11 units/hour, Algorithm 4+   - 199 @ 0240, insulin @ 9 units/hour, Algorithm 4+   - 195 @ 0336, insulin @ 9 units/hour, Algorithm 4+   - 182 @ 0432, insulin @ 9 units/hour, Algorithm 4+   - 122 @ 0539, insulin @ 3 units/hour, Algorithm 4+   - 110 @ 0624, insulin @ 3 units/hour, Algorithm 4+

## 2022-04-14 LAB
ANION GAP SERPL CALCULATED.3IONS-SCNC: 10 MMOL/L (ref 3–14)
BUN SERPL-MCNC: 10 MG/DL (ref 7–30)
CALCIUM SERPL-MCNC: 9.2 MG/DL (ref 8.5–10.1)
CHLORIDE BLD-SCNC: 104 MMOL/L (ref 94–109)
CO2 SERPL-SCNC: 23 MMOL/L (ref 20–32)
CREAT SERPL-MCNC: 1.1 MG/DL (ref 0.66–1.25)
ERYTHROCYTE [DISTWIDTH] IN BLOOD BY AUTOMATED COUNT: 12.1 % (ref 10–15)
GFR SERPL CREATININE-BSD FRML MDRD: 74 ML/MIN/1.73M2
GLUCOSE BLD-MCNC: 128 MG/DL (ref 70–99)
GLUCOSE BLDC GLUCOMTR-MCNC: 103 MG/DL (ref 70–99)
GLUCOSE BLDC GLUCOMTR-MCNC: 105 MG/DL (ref 70–99)
GLUCOSE BLDC GLUCOMTR-MCNC: 106 MG/DL (ref 70–99)
GLUCOSE BLDC GLUCOMTR-MCNC: 107 MG/DL (ref 70–99)
GLUCOSE BLDC GLUCOMTR-MCNC: 109 MG/DL (ref 70–99)
GLUCOSE BLDC GLUCOMTR-MCNC: 110 MG/DL (ref 70–99)
GLUCOSE BLDC GLUCOMTR-MCNC: 114 MG/DL (ref 70–99)
GLUCOSE BLDC GLUCOMTR-MCNC: 114 MG/DL (ref 70–99)
GLUCOSE BLDC GLUCOMTR-MCNC: 119 MG/DL (ref 70–99)
GLUCOSE BLDC GLUCOMTR-MCNC: 119 MG/DL (ref 70–99)
GLUCOSE BLDC GLUCOMTR-MCNC: 121 MG/DL (ref 70–99)
GLUCOSE BLDC GLUCOMTR-MCNC: 126 MG/DL (ref 70–99)
GLUCOSE BLDC GLUCOMTR-MCNC: 131 MG/DL (ref 70–99)
GLUCOSE BLDC GLUCOMTR-MCNC: 132 MG/DL (ref 70–99)
GLUCOSE BLDC GLUCOMTR-MCNC: 140 MG/DL (ref 70–99)
GLUCOSE BLDC GLUCOMTR-MCNC: 145 MG/DL (ref 70–99)
GLUCOSE BLDC GLUCOMTR-MCNC: 150 MG/DL (ref 70–99)
GLUCOSE BLDC GLUCOMTR-MCNC: 151 MG/DL (ref 70–99)
GLUCOSE BLDC GLUCOMTR-MCNC: 182 MG/DL (ref 70–99)
GLUCOSE BLDC GLUCOMTR-MCNC: 72 MG/DL (ref 70–99)
GLUCOSE BLDC GLUCOMTR-MCNC: 81 MG/DL (ref 70–99)
GLUCOSE BLDC GLUCOMTR-MCNC: 93 MG/DL (ref 70–99)
GLUCOSE BLDC GLUCOMTR-MCNC: 95 MG/DL (ref 70–99)
GLUCOSE BLDC GLUCOMTR-MCNC: 97 MG/DL (ref 70–99)
HCT VFR BLD AUTO: 42.7 % (ref 40–53)
HGB BLD-MCNC: 14 G/DL (ref 13.3–17.7)
MCH RBC QN AUTO: 28.1 PG (ref 26.5–33)
MCHC RBC AUTO-ENTMCNC: 32.8 G/DL (ref 31.5–36.5)
MCV RBC AUTO: 86 FL (ref 78–100)
PLATELET # BLD AUTO: 457 10E3/UL (ref 150–450)
POTASSIUM BLD-SCNC: 3.6 MMOL/L (ref 3.4–5.3)
RBC # BLD AUTO: 4.99 10E6/UL (ref 4.4–5.9)
SODIUM SERPL-SCNC: 137 MMOL/L (ref 133–144)
UFH PPP CHRO-ACNC: <0.1 IU/ML
WBC # BLD AUTO: 9.7 10E3/UL (ref 4–11)

## 2022-04-14 PROCEDURE — 250N000013 HC RX MED GY IP 250 OP 250 PS 637: Performed by: STUDENT IN AN ORGANIZED HEALTH CARE EDUCATION/TRAINING PROGRAM

## 2022-04-14 PROCEDURE — 250N000009 HC RX 250: Performed by: PHYSICIAN ASSISTANT

## 2022-04-14 PROCEDURE — 36415 COLL VENOUS BLD VENIPUNCTURE: CPT | Performed by: PSYCHIATRY & NEUROLOGY

## 2022-04-14 PROCEDURE — 120N000002 HC R&B MED SURG/OB UMMC

## 2022-04-14 PROCEDURE — 250N000013 HC RX MED GY IP 250 OP 250 PS 637

## 2022-04-14 PROCEDURE — 82310 ASSAY OF CALCIUM: CPT | Performed by: STUDENT IN AN ORGANIZED HEALTH CARE EDUCATION/TRAINING PROGRAM

## 2022-04-14 PROCEDURE — 99231 SBSQ HOSP IP/OBS SF/LOW 25: CPT | Mod: GC | Performed by: PSYCHIATRY & NEUROLOGY

## 2022-04-14 PROCEDURE — 85027 COMPLETE CBC AUTOMATED: CPT | Performed by: STUDENT IN AN ORGANIZED HEALTH CARE EDUCATION/TRAINING PROGRAM

## 2022-04-14 PROCEDURE — 99233 SBSQ HOSP IP/OBS HIGH 50: CPT | Performed by: PHYSICIAN ASSISTANT

## 2022-04-14 PROCEDURE — 85520 HEPARIN ASSAY: CPT | Performed by: PSYCHIATRY & NEUROLOGY

## 2022-04-14 RX ORDER — LIDOCAINE 40 MG/G
CREAM TOPICAL
Status: DISCONTINUED | OUTPATIENT
Start: 2022-04-14 | End: 2022-04-18 | Stop reason: HOSPADM

## 2022-04-14 RX ORDER — SODIUM CHLORIDE 9 MG/ML
INJECTION, SOLUTION INTRAVENOUS CONTINUOUS
Status: DISCONTINUED | OUTPATIENT
Start: 2022-04-14 | End: 2022-04-16

## 2022-04-14 RX ADMIN — HUMAN INSULIN 7 UNITS/HR: 100 INJECTION, SOLUTION SUBCUTANEOUS at 09:12

## 2022-04-14 RX ADMIN — ESCITALOPRAM OXALATE 20 MG: 10 TABLET ORAL at 08:27

## 2022-04-14 RX ADMIN — ROSUVASTATIN CALCIUM 40 MG: 10 TABLET, FILM COATED ORAL at 08:28

## 2022-04-14 RX ADMIN — HUMAN INSULIN 5 UNITS/HR: 100 INJECTION, SOLUTION SUBCUTANEOUS at 12:15

## 2022-04-14 RX ADMIN — HUMAN INSULIN 3 UNITS/HR: 100 INJECTION, SOLUTION SUBCUTANEOUS at 13:08

## 2022-04-14 RX ADMIN — HUMAN INSULIN 5 UNITS/HR: 100 INJECTION, SOLUTION SUBCUTANEOUS at 10:08

## 2022-04-14 RX ADMIN — HUMAN INSULIN 9 UNITS/HR: 100 INJECTION, SOLUTION SUBCUTANEOUS at 11:16

## 2022-04-14 RX ADMIN — ASPIRIN 325 MG ORAL TABLET 325 MG: 325 PILL ORAL at 08:27

## 2022-04-14 RX ADMIN — CLOPIDOGREL BISULFATE 75 MG: 75 TABLET ORAL at 08:27

## 2022-04-14 RX ADMIN — HUMAN INSULIN 7 UNITS/HR: 100 INJECTION, SOLUTION SUBCUTANEOUS at 19:41

## 2022-04-14 ASSESSMENT — ACTIVITIES OF DAILY LIVING (ADL)
ADLS_ACUITY_SCORE: 10

## 2022-04-14 NOTE — PROGRESS NOTES
"IP Diabetes Management  Daily Note           Assessment and Plan:   HPI: Huseyin is a 66 year old male with TIIDM, admitted with multiple recent strokes, and found to have severe L M1 stenosis, transferred to [facility] for consideration of NSG intervention/ EC-IC bypass.     Assessment:   1) Uncontrolled TIIDM (A1c 11.8%)    Plan:    -continue IV insulin today- transition is pending surgical/interventional plan (attempting to wait to transition until after procedure completed).    -Novolog 1:4g CHO coverage with meals and snacks/supplements   -BG monitoring q1-2 hours per IV insulin protocol.   -test claim: Victoza, Ozempic, Trulicity covered $0 copay. See below for \"allergy\" history.   -test claim: concentrated basal insulin: Tuojeo (glargine U300) and Tresiba (degludec U200) are also both covered with $0 copay   -hypoglycemia protocol   -carb counting protocol   -diabetes education needs will be assessed closer to discharge- anticipate his wife will need to play more active management role going forwards, and she would appreciate/benefit from education session, pending dispo.    Outpatient follow up: pt prefers [facility]-this location has both diabetes education and endocrinology. Placed appt request to discharge navigator 4/13.      Plan discussed with patient, wife, bedside RN, and primary team.        Interval History and Assessment: interval glucose trend reviewed:  BG reasonably controlled, especially with adjustment to Novolog with meals yesterday. Still with high needs; averaging 3.3 units per hour overnight= estimated basal requirement ~80 units daily. Pt's wife is frustrated with continued postprandial spikes; explained that these are typical, and we are catching them because of the extremely frequent BG checks. The spikes are much lower and last shorter duration now, with higher carb coverage insulin.     Surgical planning: ? Friday pending scheduling ability. IR/angioplaty, not bypass " "surgery.    Reviewed concentrated basal insulin- both are willing to try. Huseyin and his wife both report motivation to take better care of themselves, with this recent acute medical decompensation.     Historical:  Test claim for GLP1: full coverage for Victoza, Ozempic, Trulicity. Noted that patient has Victoza \"allergy\" listed. Per notes from 2014, he had an injection site reaction with the Victoza. His wife Estefania has no recollection of him ever trialing this medication. Huseyin is not able to contribute or provide to history. She was willing to allow us to try the Victoza, or alternative weekly agent again, due to the benefit of stroke reduction> risk of localized injection site reaction. Wouldn't be until after procedures, and likely closer to discharge to next level of care. No hx pancreatitis or known hx thyroid cancer in family. Would want to reinforce avoidance of moderate or greater EtOH use.    Current nutritional intake and type: Orders Placed This Encounter      Consistent Carbohydrate Diet Moderate Consistent Carb (60 g CHO per Meal) Diet    PTA Diabetes Regimen:   Frequency of checks: 3-4 times daily  Lantus 60 units daily HS  Novolog 12 units TID with meals- but would often skip/miss lunchtime injection  BG on this regimen variable, 130-300's, most above 200    *additional chart review: has been on Januvia, Victoza, Invokana also in the past. Victoza in particular was stopped 2/2 injection site reaction. Pt not able to confirm this history, and his wife does not recall any of these medications*    Discharge Planning: TBD following procedures.           Diabetes History:   Type of Diabetes: Type 2 Diabetes Mellitus  Lab Results   Component Value Date    A1C 11.8 03/16/2022    A1C 11.8 10/11/2021    A1C 13.2 09/28/2020    A1C 12.3 09/23/2019    A1C 10.4 03/12/2018    A1C 10.0 09/29/2017    A1C 9.5 03/24/2017              Review of Systems:     The Review of Systems is negative other than noted in the " Interval History.           Medications:     Current Facility-Administered Medications   Medication     aspirin (ASA) tablet 325 mg     clopidogrel (PLAVIX) tablet 75 mg     glucose gel 15-30 g    Or     dextrose 50 % injection 25-50 mL    Or     glucagon injection 1 mg     escitalopram (LEXAPRO) tablet 20 mg     labetalol (NORMODYNE/TRANDATE) injection 10-20 mg    Or     hydrALAZINE (APRESOLINE) injection 10-20 mg     insulin 1 unit/mL in saline (NovoLIN, HumuLIN Regular) drip - ADULT IV Infusion     insulin aspart (NovoLOG) injection (RAPID ACTING)     insulin aspart (NovoLOG) injection (RAPID ACTING)     lidocaine (LMX4) cream     lidocaine 1 % 0.1-1 mL     medication instruction - No oral meds if patient didn't pass dysphagia screen     Medication Instructions - Avoid dextrose in IV solutions.     rosuvastatin (CRESTOR) tablet 40 mg     senna-docusate (SENOKOT-S/PERICOLACE) 8.6-50 MG per tablet 1 tablet     sodium chloride (PF) 0.9% PF flush 3 mL     sodium chloride (PF) 0.9% PF flush 3 mL            Physical Exam:    /73 (BP Location: Left arm)   Pulse 75   Temp 98.8  F (37.1  C) (Oral)   Resp 16   Wt 103.7 kg (228 lb 9.6 oz)   SpO2 98%   BMI 42.91 kg/m    General: resting in bed, more awake today.  HEENT: normocephalic, atraumatic. Oral mucous membranes moist.   Lungs: unlabored respiration, no cough  ABD: rounded, nondistended  Skin: warm and dry, no obvious lesions  MSK:  moves all extremities  Lymp:  no LE edema   Mental status:  alert, oriented to self.   Psych: calm. Responded to social questions, but was distracted when other care providers were in room.           Data:     Recent Labs   Lab 04/14/22  1058 04/14/22  1007 04/14/22  0910 04/14/22  0903 04/14/22  0805 04/14/22  0706   * 145* 150* 128* 97 109*     Lab Results   Component Value Date    WBC 9.7 04/14/2022    WBC 11.0 04/13/2022    WBC 8.2 04/12/2022    HGB 14.0 04/14/2022    HGB 14.5 04/13/2022    HGB 14.1 04/12/2022    HCT  42.7 04/14/2022    HCT 43.9 04/13/2022    HCT 41.8 04/12/2022    MCV 86 04/14/2022    MCV 85 04/13/2022    MCV 85 04/12/2022     (H) 04/14/2022     (H) 04/13/2022     04/12/2022     Lab Results   Component Value Date     04/14/2022     04/13/2022     04/12/2022    POTASSIUM 3.6 04/14/2022    POTASSIUM 3.4 04/13/2022    POTASSIUM 3.8 04/12/2022    CHLORIDE 104 04/14/2022    CHLORIDE 104 04/13/2022    CHLORIDE 103 04/12/2022    CO2 23 04/14/2022    CO2 25 04/13/2022    CO2 24 04/12/2022     (H) 04/14/2022     (H) 04/14/2022     (H) 04/14/2022     Lab Results   Component Value Date    BUN 13 04/13/2022    BUN 14 04/12/2022    BUN 11 04/11/2022     Lab Results   Component Value Date    TSH 1.94 12/05/2016    TSH 2.23 04/21/2016    TSH 2.65 08/20/2015     Lab Results   Component Value Date    AST 10 03/24/2022    AST 11 03/16/2022    AST 15 12/05/2016    ALT 17 03/24/2022    ALT 17 03/16/2022    ALT 36 09/29/2017    ALKPHOS 99 03/24/2022    ALKPHOS 70 03/16/2022    ALKPHOS 81 12/05/2016       35 minutes spent on the date of the encounter doing chart review, history and exam, documentation and further activities per the note      Over 50% of my time on the unit was spent counseling the patient and/or coordinating care regarding acute hyperglycemia management.  See note for details.    To contact Endocrine Diabetes service:   From 8AM-4PM: page inpatient diabetes provider that is following the patient  For questions or updates from 4PM-8AM: page the diabetes job code for on call fellow: [phone]    Yenny Blanchard PA-C  Inpatient Diabetes Management Service  Pager [phone]

## 2022-04-14 NOTE — PROGRESS NOTES
Brief Neuro IR Progress note    Briefly this is a 66 year old man with uncontrolled vascular risk factors (DM A1c of 11.8 % , HTN, HLD, NA on CPAP, obesity), depression/anxiety and cocaine use who has had 3 strokes in the last 4 weeks in the left MCA territory.   He had subsequently been discharged on ASA/plavix, DM II control and HTN management and lipitor, however he returned twice in the subsequent weeks with repeated left MCA strokes, [ 2 strokes in the left temporal lobe], and one left MCA watershed region stroke despite maximal medical management. Last stroke was on 4/5/22. Our service did perform a diagnostic angiogram during last admission and found that the distal left M1 stenosis was just at the transition point from M1 to both M2 divisions. Strokes have predominantly been in the left temporal lobe. On the angiogram, the left MI did fill faster than left MCA, however clinically patients BP has varied without significant clinical change. Unclear if etiology of multiple left MCA strokes is hemodynamic or embolic or combination of both.  Each time he has been admitted his glucose has reached in > 300-400 range and given his multitude of risk factors alex his A1c he is at high risk of intimal hyperplasia if we do lay down a stent and this has been discussed extensively with stroke team and I will discuss extensitvely with wife. Clinically he has some mild right pronator drift, however he has significant receptive aphasia.     After our angiogram, we discussed with NSGY service the possibility of EC-IC bypass. We have initial concerns that angioplasty of either M2 division may push the clot to the other division and we will likely need to lay down a stent give his long standing atherosclerosis. We discussed his case thoroughly in our neuroIR conference this morning, and although there is high risk in treating this intracranial stenosis endovascularly the published data [EC/IC  Bypass Trial ] does not indicate  that there will be any significant reduction in ipsilateral stroke risk in patients with MCA stenosis and bypass surgery.  Given this we will plan to proceed with angioplasty with possible left M1 stent placement in the coming days [ this will depend on anesthesia schedule hopefully Friday afternoon as of now].    I discussed extensively the need for pt to remain medication compliant. I discussed with patient and wife the risks and benefits of procedure.  We discussed performing a cerebral angiogram with angioplasty and possible stent placement under general anesthesia, we discussed risks of groin hematoma, retroperitoneal bleed, pain at groin site, infection, risk of dissection of vessels and stroke. Patient has receptive aphasia thus consent was obtained from wife who is agreeable to procedure and risks.     Plan:      [] Cont plavix 75 mg qday and  mg qday  [] Obtain P2Y12 tomorrow  [] NPO after midnight for possible case with anesthesia tomorrow afternoon      Rosenda Recinos MD  Neuroendovascular Fellow

## 2022-04-14 NOTE — PROVIDER NOTIFICATION
Patient refused neuro check at 0400, re-attempted at 0500 and still refusing, VS stable, provider notified. MD, stated to attempt around 7AM again. Continue to monitor.

## 2022-04-14 NOTE — PLAN OF CARE
BP (!) 144/79 (BP Location: Right arm)   Pulse 86   Temp 99  F (37.2  C) (Oral)   Resp 20   Wt 103.7 kg (228 lb 9.6 oz)   SpO2 95%   BMI 42.91 kg/m       Time: 2300--0730     Reason for admission: Ischemic Stroke.   Activity: SBA with gait belt. .   Pain: Denied pain or discomfort.   Neuro: Orientation waxes and wanes, oriented to self and hospital, droop on the R side, refused neuro check at 0400, UE 5/5, LE 5/5.  Cardiac: WDL X elevated BP within 220 parameter.     Resp: Denied SOB, lung sounds  clear bilaterally.   GI/: Regular diet, last  BM was 04/11, bowel sounds present x four quadrants.    Lines: PIV,S saline locked and insulin drip infusing via algorism 4+.   Skin: Intact.   Labs/Imaging: BG check every one hour.     New changes to shift: No change    Plan: Starting plavix 75 mg daily. angoplasty with possible left M1 stent placement later this week.

## 2022-04-14 NOTE — PLAN OF CARE
Status: admitted on 4/8 for recurrent L MCA strokes.  Vitals: VSS on RA.  Neuros: AO x4 with choices. Moderate aphasia. R droop. Strengths 5/5 throughout, R side slightly weaker.   IV: PIV infusing insulin gtt on Alg 4+  Labs/Electrolytes: BG checks q1hr  Resp/trach: WNL  Diet: consistent carb diet, good intake.   Bowel status: [facility] 4/11  : voiding spontaneously  Skin: no deficits noted  Pain: denies  Activity: SBA/GB  Plan: heparin gtt stopped at 1700. 450mg loading dose of plavix given, will start 75mg plavix daily. Angioplasty with possible left M1 stent placement to happen later this week, see neurology note.

## 2022-04-14 NOTE — PROGRESS NOTES
"[facility]    Stroke Progress Note    Interval EventsNo acute events overnight.     - Sugars have been well controlled.   - Neuro IR to plan for stenting Tomorrow.    - 75 mg Plavix daily stared today.     HPI Summary  Huseyin St is a 66 year old male with HTN, HLD, DMII (A1c 11.8), NA, cocaine abuse and recurrent L MCA infarcts who presents as a transfer from [facility] for possible STA-MCA bypass. Patient was admitted to [facility] 4/5/22 for worsening R sided weakness and aphasia. Patient was admitted twice in March for L MCA territory strokes (likely d/t L M1 stenosis). The first admission was 3/16-3/18 and the patient was started on DAPT. He was admitted again 3/24-3/27 due to expressive aphasia and was found to have 2 new cortical white matter strokes. Given the recurrent strokes on DAPT neuro-IR was consulted for possible stenting but did not recommend stenting due to poorly controlled diabetes.      The patient returned to the hospital 4/5/22 due to \"talking gibberish\" for 2 days where the patient was making repeated significant paraphasic errors and was having increased R sided weakness. During that admission the patient was seen by the stroke team several times and underwent a DSA which showed critical stenosis of L M1 artery extending into b/l M2 divisions which made stenting difficult. Given this, the decision was made to transfer the patient to [facility] for a possible ST- bypass which could occur as early as next week.      On arrival, the patient is able to only provide a cursory history as he is making frequent paraphasic errors. He reports frustration with being awoken, but notes that his symptoms are relatively stable. Voices no new complaints.    Stroke Evaluation Summarized    MRI/Head CT CT head :  Expected evolution of multiple acute/subacute ischemic  infarcts of varying ages in the left cerebral hemisphere, as  described. Unchanged mild left " temporal lobe swelling. No acute  intracranial hemorrhage identified on CT. No midline shift/herniation.    MRI OF THE BRAIN WITHOUT AND WITH CONTRAST  MRA OF THE HEAD WITHOUT CONTRAST  MRA OF THE NECK WITHOUT AND WITH CONTRAST :   1.  No change.  2.  Severe distal left M1 stenosis with decreased signal in the left  M2 and M3 branches.  3.  Moderate left and mild right cavernous carotid stenoses.  4.  Mild stenosis in the left P2/P3 junction.     NECK MRA:  1.  Normal neck MRA for age.  2.  No significant stenosis as per NASCET criteria.     MRI OF THE BRAIN WITHOUT AND WITH CONTRAST :  1. Evolving subacute ischemic infarct in the left temporal lobe again  noted without change.  2. 2 new tiny recent white matter infarcts in the periatrial white  matter of the left cerebral hemisphere.  3. No other new infarcts.  4. Otherwise, normal brain MRI.   Intracranial + cervical Vasculature 1. Unchanged severe/critical focal stenosis of the left M1-M2 middle  cerebral artery bifurcation.  2. Unchanged moderate/moderate to severe tandem stenoses involving the  P2 segment of the left posterior cerebral artery.  3. Unchanged atherosclerotic disease involving the bilateral carotid  siphons resulting in up to moderate left and mild right paraclinoid  segment internal carotid artery stenosis.  4. No definite new high-grade central arterial stenosis/large vessel  occlusion identified.  5. Unchanged mild atherosclerosis of the bilateral carotid  bifurcations without flow limiting stenosis.  6. Unchanged moderate atherosclerotic stenosis at the origin of the  left vertebral artery.      Echocardiogram Technically challenging study due to patient body habitus, even with the use  of contrast imaging.     Left ventricular size, global systolic function are normal, estimated LVEF 60-  65%.  Endocardial borders are not optimally visualized, however no clear wall motion  abnormalities are seen.  Right ventricular global function is  normal.  No significant valvular abnormalities.  There is no color Doppler evidence of an atrial shunt.     There are no prior studies available for comparison.   Other Testing Not Applicable     LDL  4/6/2022: 53 mg/dL   A1C  3/16/2022: 11.8 %   Troponin No lab value available in past 48 hrs       Impression   66 year old male with HTN, HLD, DMII (A1c 11.8), NA, cocaine abuse and recurrent L MCA infarcts who presents as a transfer from [facility] for possible STA-MCA bypass. Exam stable from [facility], most notable for receptive>expressive aphasia. The patient has been having recurrent strokes from L M1-M2 stenosis and has undergone a DSA which showed that the stenosis is too distal to stent. Given this the patient was transferred to [facility] for evaluation for a possible ST-MC bypass procedure. For now will continue the patient on DAPT and monitor for worsening symptoms while he awaits the procedure     Plan  #Recurrent L MCA territory strokes  #L M1 to M2 stenosis  - Neuro IR to plan for stenting late this week/ over the weekend.   - Continue  mg  - Stopped Heparin drip  - S/P loading dose of 450 mg of Plavix  - ordered 75 mg Plavix daily.   - Continue rosuvastatin 40 mg daily   - SBP goal normotension, 100-180  - Regular diet with thins (cleared by speech at [facility])   - Consistent Carbohydrate diet  - Avoid hypotonic IV fluids  - PT/OT/SLP  - Depression Screen  - Apnea Screen  - Euthermia, Euglycemia     #Essential HTN  - Holding PTA lisinopril      #HLD  - Continue rosuvastatin      #Poorly controlled T2DM (A1c 11.8)  - Endocrine consulted, appreciate recs  - Continuing management subcutaneous insulin  - Insulin gtt can be started if surgery needs to be completed before control is achieved w/ SQ     #NA  - CPAP as able     #Generalized anxiety disorder  - Continue Lexapro      Prophylaxis            DVT: SCDs for tonight given unclear timeline for procedure     For Acid Suppression:  - GI prophylaxis is  not indicated    Patient Follow-up        We will continue to follow.     The patient was discussed with Stroke Staff is Dr. Thakur.    Francisco Lovell MD  Neurology Resident, PGY1  ASCOM: [id number]  ______________________________________________________    Clinically Significant Risk Factors Present on Admission                  Medications   Scheduled Meds    aspirin  325 mg Oral or Feeding Tube Daily     clopidogrel  75 mg Oral Daily     escitalopram  20 mg Oral or Feeding Tube Daily     insulin aspart   Subcutaneous TID AC     rosuvastatin  40 mg Oral or Feeding Tube Daily     senna-docusate  1 tablet Oral At Bedtime     sodium chloride (PF)  3 mL Intracatheter Q8H       Infusion Meds    insulin regular 3 Units/hr (04/14/22 3499)     - MEDICATION INSTRUCTIONS -       - MEDICATION INSTRUCTIONS -         PRN Meds  glucose **OR** dextrose **OR** glucagon, labetalol **OR** hydrALAZINE, insulin aspart, lidocaine 4%, lidocaine (buffered or not buffered), - MEDICATION INSTRUCTIONS -, - MEDICATION INSTRUCTIONS -, sodium chloride (PF)       PHYSICAL EXAMINATION  Temp:  [98.3  F (36.8  C)-99  F (37.2  C)] 99  F (37.2  C)  Pulse:  [68-86] 82  Resp:  [16-20] 16  BP: (112-147)/(65-86) 112/65  SpO2:  [95 %-99 %] 95 %      General: Asleep, awakens to voice in NAD   HEENT: Normocephalic, atraumatic, no epistaxis, no oral lesions  Resp: Breathing comfortably on room air  Extremities: Warm and well perfused, peripheral pulses present, no edema  Skin: Not jaundiced, no rash, no ecchymoses  Psych: Normal mood and affect     Neuro:  Mental status: Alert and oriented to person, place, and time. Follows commands intermittently  Speech: Fluent,; No dysarthria, frequent paraphasic errors. Fluent receptive aphasia  Cranial nerves: Visual fields intact, Eyes conjugate, EOMI w/ normal and smooth pursuit, L lower facial droop, hearing intact to conversation, shoulder shrug strong, palate rise symmetric, tongue/uvula midline.  Motor: Tone normal.  LUE is 5/5, RUE is 4-/5, LLE is 5/5, RLE is 4+/5. No abnormal movements noted. Pronator drift absent.  Reflexes: Toes down-going bilaterally.  Sensory: Intact to light touch in all 4 extremities  Coordination: FNF intact bilaterally with no dysmetria; Normal heel-shin test bilaterally with no dysmetria noted  Gait: Deferred    Stroke Scales    NIHSS  1a. Level of Consciousness 0-->Alert, keenly responsive   1b. LOC Questions 1-->Answers one question correctly   1c. LOC Commands 1-->Performs one task correctly   2.   Best Gaze 0-->Normal   3.   Visual 0-->No visual loss   4.   Facial Palsy 1-->Minor paralysis (flattened nasolabial fold, asymmetry on smiling)   5a. Motor Arm, Left 0-->No drift, limb holds 90 (or 45) degrees for full 10 secs   5b. Motor Arm, Right 0-->No drift, limb holds 90 (or 45) degrees for full 10 secs   6a. Motor Leg, Left 0-->No drift, leg holds 30 degree position for full 5 secs   6b. Motor Leg, right 0-->No drift, leg holds 30 degree position for full 5 secs   7.   Limb Ataxia 0-->Absent   8.   Sensory 0-->Normal, no sensory loss   9.   Best Language 1-->Mild-to-moderate aphasia, some obvious loss of fluency or facility of comprehension, without significant limitation on ideas expressed or form of expression. Reduction of speech and/or comprehension, however, makes conversation. . . (see row details)   10. Dysarthria 1-->Mild-to-moderate dysarthria, patient slurs at least some words and, at worst, can be understood with some difficulty   11. Extinction and Inattention  0-->No abnormality   Total 5 (04/09/22 0150)       Imaging  I personally reviewed all imaging; relevant findings per HPI.     Lab Results Data   CBC  Recent Labs   Lab 04/14/22  0903 04/13/22  0759 04/12/22  0657   WBC 9.7 11.0 8.2   RBC 4.99 5.17 4.93   HGB 14.0 14.5 14.1   HCT 42.7 43.9 41.8   * 514* 447     Basic Metabolic Panel    Recent Labs   Lab 04/14/22  1359 04/14/22  1307 04/14/22  1215 04/14/22  0910  04/14/22  0903 04/13/22  0807 04/13/22  0759 04/12/22  1155 04/12/22  0657   NA  --   --   --   --  137  --  138  --  135   POTASSIUM  --   --   --   --  3.6  --  3.4  --  3.8   CHLORIDE  --   --   --   --  104  --  104  --  103   CO2  --   --   --   --  23  --  25  --  24   BUN  --   --   --   --  10  --  13  --  14   CR  --   --   --   --  1.10  --  1.11  --  1.12   * 121* 140*   < > 128*   < > 94   < > 301*   EVER  --   --   --   --  9.2  --  9.4  --  9.4    < > = values in this interval not displayed.     Liver Panel  No results for input(s): PROTTOTAL, ALBUMIN, BILITOTAL, ALKPHOS, AST, ALT, BILIDIRECT in the last 168 hours.  INR    Recent Labs   Lab Test 03/16/22  1704   INR 1.02      Lipid Profile    Recent Labs   Lab Test 04/06/22  0725 03/16/22  1704 10/11/21  0942 04/21/16  0906 08/20/15  0830 01/14/15  0847 04/21/14  0815   CHOL 109 224* 228*   < > 136 316* 155   HDL 34* 33* 37*   < > 42 41 37*   LDL 53 136* 116*   < > 59 219* 80   TRIG 111 275* 377*   < > 176* 282* 189*   CHOLHDLRATIO  --   --   --   --  3.2 7.7* 4.2    < > = values in this interval not displayed.     A1C    Recent Labs   Lab Test 03/16/22  1704 10/11/21  0943 09/28/20  0925   A1C 11.8* 11.8* 13.2*     Troponin I    Recent Labs   Lab 04/09/22  0037   TROPONINIS 4

## 2022-04-14 NOTE — CONSULTS
Discharge Pharmacy Test Claim    Patient's commercial insurance, [employer], covers both toujeo 300u and tresiba 200u insulin with $0 copay.    Test Claim Copay   toujeo solostar 300units/ml  0.00   tresiba flextouch 200units/ml 0.00       Angie Boyle  [facility] Liaison  Ph: [phone] Pager: [phone]

## 2022-04-14 NOTE — PLAN OF CARE
Status: admitted for ischemic stroke  Vitals: VSS  Neuros: global aphasia persist, R facial droop not noted by writer this shift. Writer noted 5/5 strength in all extremities  IV: PIV for insulin gtt, remaining PIV SL'd.   Labs/Electrolytes: elevated glucose levels continue, will start insulin gtt at alg. #4+  Resp/trach: WNL  Diet: tolerated regular diet without difficulty, carb coverage in place and will remain alongside insulin gtt  Bowel status: [facility] 4/13  : voiding without difficulty, continent  Skin: intact  Pain: denies  Activity: up with SBA, GB  Social: wife at bedside most of day, supportive and helpful  Plan: monitor BG levels. Plan for Angioplasty with Anaesthesia tomorrow afternoon 4/15

## 2022-04-14 NOTE — PROVIDER NOTIFICATION
Notified Stroke resident to update Code status accordingly to meet consistency with patient's latest HCD. Provider verbalized agreement but stated need to reverse code status for upcoming surgery.

## 2022-04-15 ENCOUNTER — APPOINTMENT (OUTPATIENT)
Dept: OCCUPATIONAL THERAPY | Facility: CLINIC | Age: 67
DRG: 023 | End: 2022-04-15
Attending: PSYCHIATRY & NEUROLOGY
Payer: COMMERCIAL

## 2022-04-15 LAB
ANION GAP SERPL CALCULATED.3IONS-SCNC: 8 MMOL/L (ref 3–14)
BUN SERPL-MCNC: 9 MG/DL (ref 7–30)
CALCIUM SERPL-MCNC: 8.9 MG/DL (ref 8.5–10.1)
CHLORIDE BLD-SCNC: 106 MMOL/L (ref 94–109)
CO2 SERPL-SCNC: 24 MMOL/L (ref 20–32)
CREAT SERPL-MCNC: 1.08 MG/DL (ref 0.66–1.25)
ERYTHROCYTE [DISTWIDTH] IN BLOOD BY AUTOMATED COUNT: 12.2 % (ref 10–15)
GFR SERPL CREATININE-BSD FRML MDRD: 75 ML/MIN/1.73M2
GLUCOSE BLD-MCNC: 109 MG/DL (ref 70–99)
GLUCOSE BLDC GLUCOMTR-MCNC: 104 MG/DL (ref 70–99)
GLUCOSE BLDC GLUCOMTR-MCNC: 105 MG/DL (ref 70–99)
GLUCOSE BLDC GLUCOMTR-MCNC: 106 MG/DL (ref 70–99)
GLUCOSE BLDC GLUCOMTR-MCNC: 109 MG/DL (ref 70–99)
GLUCOSE BLDC GLUCOMTR-MCNC: 112 MG/DL (ref 70–99)
GLUCOSE BLDC GLUCOMTR-MCNC: 112 MG/DL (ref 70–99)
GLUCOSE BLDC GLUCOMTR-MCNC: 118 MG/DL (ref 70–99)
GLUCOSE BLDC GLUCOMTR-MCNC: 118 MG/DL (ref 70–99)
GLUCOSE BLDC GLUCOMTR-MCNC: 119 MG/DL (ref 70–99)
GLUCOSE BLDC GLUCOMTR-MCNC: 120 MG/DL (ref 70–99)
GLUCOSE BLDC GLUCOMTR-MCNC: 122 MG/DL (ref 70–99)
GLUCOSE BLDC GLUCOMTR-MCNC: 124 MG/DL (ref 70–99)
GLUCOSE BLDC GLUCOMTR-MCNC: 127 MG/DL (ref 70–99)
GLUCOSE BLDC GLUCOMTR-MCNC: 128 MG/DL (ref 70–99)
GLUCOSE BLDC GLUCOMTR-MCNC: 134 MG/DL (ref 70–99)
GLUCOSE BLDC GLUCOMTR-MCNC: 146 MG/DL (ref 70–99)
GLUCOSE BLDC GLUCOMTR-MCNC: 147 MG/DL (ref 70–99)
GLUCOSE BLDC GLUCOMTR-MCNC: 147 MG/DL (ref 70–99)
GLUCOSE BLDC GLUCOMTR-MCNC: 152 MG/DL (ref 70–99)
GLUCOSE BLDC GLUCOMTR-MCNC: 155 MG/DL (ref 70–99)
GLUCOSE BLDC GLUCOMTR-MCNC: 93 MG/DL (ref 70–99)
GLUCOSE BLDC GLUCOMTR-MCNC: 93 MG/DL (ref 70–99)
GLUCOSE BLDC GLUCOMTR-MCNC: 94 MG/DL (ref 70–99)
HCT VFR BLD AUTO: 40 % (ref 40–53)
HGB BLD-MCNC: 13.3 G/DL (ref 13.3–17.7)
MCH RBC QN AUTO: 28.2 PG (ref 26.5–33)
MCHC RBC AUTO-ENTMCNC: 33.3 G/DL (ref 31.5–36.5)
MCV RBC AUTO: 85 FL (ref 78–100)
PA ADP BLD-ACNC: 103 PRU
PLATELET # BLD AUTO: 451 10E3/UL (ref 150–450)
POTASSIUM BLD-SCNC: 3.4 MMOL/L (ref 3.4–5.3)
RBC # BLD AUTO: 4.71 10E6/UL (ref 4.4–5.9)
SODIUM SERPL-SCNC: 138 MMOL/L (ref 133–144)
WBC # BLD AUTO: 8.3 10E3/UL (ref 4–11)

## 2022-04-15 PROCEDURE — 250N000013 HC RX MED GY IP 250 OP 250 PS 637: Performed by: STUDENT IN AN ORGANIZED HEALTH CARE EDUCATION/TRAINING PROGRAM

## 2022-04-15 PROCEDURE — 36415 COLL VENOUS BLD VENIPUNCTURE: CPT | Performed by: STUDENT IN AN ORGANIZED HEALTH CARE EDUCATION/TRAINING PROGRAM

## 2022-04-15 PROCEDURE — 258N000003 HC RX IP 258 OP 636

## 2022-04-15 PROCEDURE — 97535 SELF CARE MNGMENT TRAINING: CPT | Mod: GO | Performed by: OCCUPATIONAL THERAPIST

## 2022-04-15 PROCEDURE — 250N000009 HC RX 250: Performed by: PHYSICIAN ASSISTANT

## 2022-04-15 PROCEDURE — 82310 ASSAY OF CALCIUM: CPT | Performed by: STUDENT IN AN ORGANIZED HEALTH CARE EDUCATION/TRAINING PROGRAM

## 2022-04-15 PROCEDURE — 85027 COMPLETE CBC AUTOMATED: CPT | Performed by: STUDENT IN AN ORGANIZED HEALTH CARE EDUCATION/TRAINING PROGRAM

## 2022-04-15 PROCEDURE — 99231 SBSQ HOSP IP/OBS SF/LOW 25: CPT | Mod: GC | Performed by: PSYCHIATRY & NEUROLOGY

## 2022-04-15 PROCEDURE — 999N000128 HC STATISTIC PERIPHERAL IV START W/O US GUIDANCE

## 2022-04-15 PROCEDURE — 250N000013 HC RX MED GY IP 250 OP 250 PS 637

## 2022-04-15 PROCEDURE — 120N000002 HC R&B MED SURG/OB UMMC

## 2022-04-15 PROCEDURE — 85576 BLOOD PLATELET AGGREGATION: CPT

## 2022-04-15 RX ORDER — HEPARIN SODIUM 200 [USP'U]/100ML
1 INJECTION, SOLUTION INTRAVENOUS CONTINUOUS PRN
Status: DISCONTINUED | OUTPATIENT
Start: 2022-04-15 | End: 2022-04-16

## 2022-04-15 RX ORDER — LIDOCAINE 40 MG/G
CREAM TOPICAL
Status: DISCONTINUED | OUTPATIENT
Start: 2022-04-15 | End: 2022-04-16

## 2022-04-15 RX ORDER — NALOXONE HYDROCHLORIDE 0.4 MG/ML
0.4 INJECTION, SOLUTION INTRAMUSCULAR; INTRAVENOUS; SUBCUTANEOUS
Status: DISCONTINUED | OUTPATIENT
Start: 2022-04-15 | End: 2022-04-18 | Stop reason: HOSPADM

## 2022-04-15 RX ORDER — NALOXONE HYDROCHLORIDE 0.4 MG/ML
0.2 INJECTION, SOLUTION INTRAMUSCULAR; INTRAVENOUS; SUBCUTANEOUS
Status: DISCONTINUED | OUTPATIENT
Start: 2022-04-15 | End: 2022-04-18 | Stop reason: HOSPADM

## 2022-04-15 RX ORDER — SODIUM CHLORIDE 9 MG/ML
INJECTION, SOLUTION INTRAVENOUS CONTINUOUS
Status: DISCONTINUED | OUTPATIENT
Start: 2022-04-15 | End: 2022-04-16

## 2022-04-15 RX ORDER — SODIUM CHLORIDE 9 MG/ML
INJECTION, SOLUTION INTRAVENOUS
Status: DISCONTINUED
Start: 2022-04-15 | End: 2022-04-16 | Stop reason: HOSPADM

## 2022-04-15 RX ORDER — FENTANYL CITRATE 50 UG/ML
25-50 INJECTION, SOLUTION INTRAMUSCULAR; INTRAVENOUS EVERY 5 MIN PRN
Status: DISCONTINUED | OUTPATIENT
Start: 2022-04-15 | End: 2022-04-16

## 2022-04-15 RX ORDER — FLUMAZENIL 0.1 MG/ML
0.2 INJECTION, SOLUTION INTRAVENOUS
Status: DISCONTINUED | OUTPATIENT
Start: 2022-04-15 | End: 2022-04-16

## 2022-04-15 RX ADMIN — HUMAN INSULIN 4 UNITS/HR: 100 INJECTION, SOLUTION SUBCUTANEOUS at 20:04

## 2022-04-15 RX ADMIN — SODIUM CHLORIDE: 9 INJECTION, SOLUTION INTRAVENOUS at 01:31

## 2022-04-15 RX ADMIN — HUMAN INSULIN 4 UNITS/HR: 100 INJECTION, SOLUTION SUBCUTANEOUS at 19:05

## 2022-04-15 RX ADMIN — SODIUM CHLORIDE: 9 INJECTION, SOLUTION INTRAVENOUS at 00:51

## 2022-04-15 RX ADMIN — ROSUVASTATIN CALCIUM 40 MG: 10 TABLET, FILM COATED ORAL at 08:04

## 2022-04-15 RX ADMIN — SENNOSIDES AND DOCUSATE SODIUM 1 TABLET: 8.6; 5 TABLET ORAL at 22:02

## 2022-04-15 RX ADMIN — HUMAN INSULIN 2 UNITS/HR: 100 INJECTION, SOLUTION SUBCUTANEOUS at 16:16

## 2022-04-15 RX ADMIN — HUMAN INSULIN 4 UNITS/HR: 100 INJECTION, SOLUTION SUBCUTANEOUS at 17:59

## 2022-04-15 RX ADMIN — HUMAN INSULIN 2 UNITS/HR: 100 INJECTION, SOLUTION SUBCUTANEOUS at 17:00

## 2022-04-15 RX ADMIN — HUMAN INSULIN 4 UNITS/HR: 100 INJECTION, SOLUTION SUBCUTANEOUS at 23:04

## 2022-04-15 RX ADMIN — HUMAN INSULIN 6 UNITS/HR: 100 INJECTION, SOLUTION SUBCUTANEOUS at 22:02

## 2022-04-15 RX ADMIN — ESCITALOPRAM OXALATE 20 MG: 10 TABLET ORAL at 08:04

## 2022-04-15 RX ADMIN — SODIUM CHLORIDE: 9 INJECTION, SOLUTION INTRAVENOUS at 20:07

## 2022-04-15 RX ADMIN — ASPIRIN 325 MG ORAL TABLET 325 MG: 325 PILL ORAL at 08:04

## 2022-04-15 RX ADMIN — CLOPIDOGREL BISULFATE 75 MG: 75 TABLET ORAL at 08:04

## 2022-04-15 RX ADMIN — SODIUM CHLORIDE: 9 INJECTION, SOLUTION INTRAVENOUS at 15:06

## 2022-04-15 RX ADMIN — HUMAN INSULIN 6 UNITS/HR: 100 INJECTION, SOLUTION SUBCUTANEOUS at 21:43

## 2022-04-15 ASSESSMENT — ACTIVITIES OF DAILY LIVING (ADL)
ADLS_ACUITY_SCORE: 10
ADLS_ACUITY_SCORE: 8
ADLS_ACUITY_SCORE: 8
ADLS_ACUITY_SCORE: 10
ADLS_ACUITY_SCORE: 8
ADLS_ACUITY_SCORE: 10
ADLS_ACUITY_SCORE: 8
ADLS_ACUITY_SCORE: 10
ADLS_ACUITY_SCORE: 8
ADLS_ACUITY_SCORE: 10
ADLS_ACUITY_SCORE: 8
ADLS_ACUITY_SCORE: 10
ADLS_ACUITY_SCORE: 8
ADLS_ACUITY_SCORE: 10
ADLS_ACUITY_SCORE: 10
ADLS_ACUITY_SCORE: 8

## 2022-04-15 NOTE — PROGRESS NOTES
"IP Diabetes Management  Daily Note           Assessment and Plan:   HPI: Huseyin is a 66 year old male with TIIDM, admitted with multiple recent strokes, and found to have severe L M1 stenosis, transferred to [facility] for consideration of NSG intervention/ EC-IC bypass.     Assessment:   1) Uncontrolled TIIDM (A1c 11.8%)    Plan:    -continue IV insulin through procedure. May be able to transition this evening utilizing Tuojeo (glargine U300)   -Novolog 1:4g CHO coverage with meals and snacks/supplements   -BG monitoring q1-2 hours per IV insulin protocol.   -test claim: Victoza, Ozempic, Trulicity covered $0 copay. See below for \"allergy\" history.   -test claim: concentrated basal insulin: Tuojeo (glargine U300) and Tresiba (degludec U200) are also both covered with $0 copay   -hypoglycemia protocol   -carb counting protocol   -diabetes education needs will be assessed closer to discharge- anticipate his wife will need to play more active management role going forwards, and she would appreciate/benefit from education session, pending dispo.    Outpatient follow up: pt prefers [facility]-this location has both diabetes education and endocrinology. Placed appt request to discharge navigator 4/13.      Interval History and Assessment: interval glucose trend reviewed:  BG remaining well controlled on IV insulin.   Overnight/basal estimate needs are improving, averaged 1.8 units overnight (though he is NPO at mn).    Procedure possible today. If stable post procedure, we will transition off IV insulin tonight, as needs have been  stable and overall improving. If not, then will plan for transition Saturday.      Historical:  -Test claim for GLP1: full coverage for Victoza, Ozempic, Trulicity. Noted that patient has Victoza \"allergy\" listed. Per notes from 2014, he had an injection site reaction with the Victoza. His wife Estefania has no recollection of him ever trialing this medication. Huseyin is not able to " contribute or provide to history. She was willing to allow us to try the Victoza, or alternative weekly agent again, due to the benefit of stroke reduction> risk of localized injection site reaction. Wouldn't be until after procedures, and likely closer to discharge to next level of care. No hx pancreatitis or known hx thyroid cancer in family. Would want to reinforce avoidance of moderate or greater EtOH use.    -Reviewed concentrated basal insulin- both are willing to try. Huseyin and his wife both report motivation to take better care of themselves, with this recent acute medical decompensation.     Current nutritional intake and type: Orders Placed This Encounter      NPO per Anesthesia Guidelines for Procedure/Surgery Except for: Meds    PTA Diabetes Regimen:   Frequency of checks: 3-4 times daily  Lantus 60 units daily HS  Novolog 12 units TID with meals- but would often skip/miss lunchtime injection  BG on this regimen variable, 130-300's, most above 200    *additional chart review: has been on Januvia, Victoza, Invokana also in the past. Victoza in particular was stopped 2/2 injection site reaction. Pt not able to confirm this history, and his wife does not recall any of these medications*    Discharge Planning: TBD following procedures.           Diabetes History:   Type of Diabetes: Type 2 Diabetes Mellitus  Lab Results   Component Value Date    A1C 11.8 03/16/2022    A1C 11.8 10/11/2021    A1C 13.2 09/28/2020    A1C 12.3 09/23/2019    A1C 10.4 03/12/2018    A1C 10.0 09/29/2017    A1C 9.5 03/24/2017              Review of Systems:     The Review of Systems is negative other than noted in the Interval History.           Medications:     Current Facility-Administered Medications   Medication     aspirin (ASA) tablet 325 mg     clopidogrel (PLAVIX) tablet 75 mg     glucose gel 15-30 g    Or     dextrose 50 % injection 25-50 mL    Or     glucagon injection 1 mg     escitalopram (LEXAPRO) tablet 20 mg     fentaNYL  (PF) (SUBLIMAZE) injection 25-50 mcg     flumazenil (ROMAZICON) injection 0.2 mg     heparin (PRESSURE BAG) 2 Units/mL 0.9% NaCl (1000 mL)     labetalol (NORMODYNE/TRANDATE) injection 10-20 mg    Or     hydrALAZINE (APRESOLINE) injection 10-20 mg     insulin 1 unit/mL in saline (NovoLIN, HumuLIN Regular) drip - ADULT IV Infusion     insulin aspart (NovoLOG) injection (RAPID ACTING)     insulin aspart (NovoLOG) injection (RAPID ACTING)     lidocaine (LMX4) cream     lidocaine (LMX4) cream     lidocaine 1 % 0.1-1 mL     lidocaine 1 % 0.1-1 mL     lidocaine 1 % 1-30 mL     medication instruction - No oral meds if patient didn't pass dysphagia screen     medication instruction     Medication Instructions - Avoid dextrose in IV solutions.     midazolam (VERSED) injection 0.5-2 mg     naloxone (NARCAN) injection 0.2 mg    Or     naloxone (NARCAN) injection 0.4 mg    Or     naloxone (NARCAN) injection 0.2 mg    Or     naloxone (NARCAN) injection 0.4 mg     rosuvastatin (CRESTOR) tablet 40 mg     senna-docusate (SENOKOT-S/PERICOLACE) 8.6-50 MG per tablet 1 tablet     sodium chloride (PF) 0.9% PF flush 3 mL     sodium chloride (PF) 0.9% PF flush 3 mL     sodium chloride (PF) 0.9% PF flush 3 mL     sodium chloride (PF) 0.9% PF flush 3 mL     sodium chloride 0.9% infusion            Physical Exam:    BP (!) 143/86 (BP Location: Right arm)   Pulse 67   Temp 98.9  F (37.2  C) (Oral)   Resp 16   Wt 103.7 kg (228 lb 9.6 oz)   SpO2 97%   BMI 42.91 kg/m    General: seen briefly getting ready for shower in bathroom. Wife was not present on eval today.  HEENT: normocephalic, atraumatic. Oral mucous membranes moist.   Lungs: unlabored respiration, no cough  ABD: rounded, nondistended  Skin: warm and dry, no obvious lesions  MSK:  moves all extremities  Lymp:  no LE edema   Mental status:  alert, oriented to self.   Psych: calm interaction with providers.           Data:     Recent Labs   Lab 04/15/22  0959 04/15/22  0901  04/15/22  0800 04/15/22  0707 04/15/22  0610 04/15/22  0559   GLC 93 109* 118* 106* 112* 109*     Lab Results   Component Value Date    WBC 8.3 04/15/2022    WBC 9.7 04/14/2022    WBC 11.0 04/13/2022    HGB 13.3 04/15/2022    HGB 14.0 04/14/2022    HGB 14.5 04/13/2022    HCT 40.0 04/15/2022    HCT 42.7 04/14/2022    HCT 43.9 04/13/2022    MCV 85 04/15/2022    MCV 86 04/14/2022    MCV 85 04/13/2022     (H) 04/15/2022     (H) 04/14/2022     (H) 04/13/2022     Lab Results   Component Value Date     04/15/2022     04/14/2022     04/13/2022    POTASSIUM 3.4 04/15/2022    POTASSIUM 3.6 04/14/2022    POTASSIUM 3.4 04/13/2022    CHLORIDE 106 04/15/2022    CHLORIDE 104 04/14/2022    CHLORIDE 104 04/13/2022    CO2 24 04/15/2022    CO2 23 04/14/2022    CO2 25 04/13/2022    GLC 93 04/15/2022     (H) 04/15/2022     (H) 04/15/2022     Lab Results   Component Value Date    BUN 9 04/15/2022    BUN 10 04/14/2022    BUN 13 04/13/2022     Lab Results   Component Value Date    TSH 1.94 12/05/2016    TSH 2.23 04/21/2016    TSH 2.65 08/20/2015     Lab Results   Component Value Date    AST 10 03/24/2022    AST 11 03/16/2022    AST 15 12/05/2016    ALT 17 03/24/2022    ALT 17 03/16/2022    ALT 36 09/29/2017    ALKPHOS 99 03/24/2022    ALKPHOS 70 03/16/2022    ALKPHOS 81 12/05/2016       35 minutes spent on the date of the encounter doing chart review, history and exam, documentation and further activities per the note      Over 50% of my time on the unit was spent counseling the patient and/or coordinating care regarding acute hyperglycemia management.  See note for details.    To contact Endocrine Diabetes service:   From 8AM-4PM: page inpatient diabetes provider that is following the patient  For questions or updates from 4PM-8AM: page the diabetes job code for on call fellow: [phone]    Yenny Blanchard PA-C  Inpatient Diabetes Management Service  Pager [phone]

## 2022-04-15 NOTE — PROGRESS NOTES
"[facility]    Stroke Progress Note    Interval EventsNo acute events overnight. Neuro IR Stenting scheduled for this morning.  Patient has been NPO overnight. P2Y12 was 103  This morning.     HPI Summary  Huseyin St is a 66 year old male with HTN, HLD, DMII (A1c 11.8), NA, cocaine abuse and recurrent L MCA infarcts who presents as a transfer from [facility] for possible STA-MCA bypass. Patient was admitted to [facility] 4/5/22 for worsening R sided weakness and aphasia. Patient was admitted twice in March for L MCA territory strokes (likely d/t L M1 stenosis). The first admission was 3/16-3/18 and the patient was started on DAPT. He was admitted again 3/24-3/27 due to expressive aphasia and was found to have 2 new cortical white matter strokes. Given the recurrent strokes on DAPT neuro-IR was consulted for possible stenting but did not recommend stenting due to poorly controlled diabetes.      The patient returned to the hospital 4/5/22 due to \"talking gibberish\" for 2 days where the patient was making repeated significant paraphasic errors and was having increased R sided weakness. During that admission the patient was seen by the stroke team several times and underwent a DSA which showed critical stenosis of L M1 artery extending into b/l M2 divisions which made stenting difficult. Given this, the decision was made to transfer the patient to [facility] for a possible ST-MC bypass which could occur as early as next week.      On arrival, the patient is able to only provide a cursory history as he is making frequent paraphasic errors. He reports frustration with being awoken, but notes that his symptoms are relatively stable. Voices no new complaints.    Stroke Evaluation Summarized    MRI/Head CT CT head :  Expected evolution of multiple acute/subacute ischemic  infarcts of varying ages in the left cerebral hemisphere, as  described. Unchanged mild left temporal lobe " swelling. No acute  intracranial hemorrhage identified on CT. No midline shift/herniation.    MRI OF THE BRAIN WITHOUT AND WITH CONTRAST  MRA OF THE HEAD WITHOUT CONTRAST  MRA OF THE NECK WITHOUT AND WITH CONTRAST :   1.  No change.  2.  Severe distal left M1 stenosis with decreased signal in the left  M2 and M3 branches.  3.  Moderate left and mild right cavernous carotid stenoses.  4.  Mild stenosis in the left P2/P3 junction.     NECK MRA:  1.  Normal neck MRA for age.  2.  No significant stenosis as per NASCET criteria.     MRI OF THE BRAIN WITHOUT AND WITH CONTRAST :  1. Evolving subacute ischemic infarct in the left temporal lobe again  noted without change.  2. 2 new tiny recent white matter infarcts in the periatrial white  matter of the left cerebral hemisphere.  3. No other new infarcts.  4. Otherwise, normal brain MRI.   Intracranial + cervical Vasculature 1. Unchanged severe/critical focal stenosis of the left M1-M2 middle  cerebral artery bifurcation.  2. Unchanged moderate/moderate to severe tandem stenoses involving the  P2 segment of the left posterior cerebral artery.  3. Unchanged atherosclerotic disease involving the bilateral carotid  siphons resulting in up to moderate left and mild right paraclinoid  segment internal carotid artery stenosis.  4. No definite new high-grade central arterial stenosis/large vessel  occlusion identified.  5. Unchanged mild atherosclerosis of the bilateral carotid  bifurcations without flow limiting stenosis.  6. Unchanged moderate atherosclerotic stenosis at the origin of the  left vertebral artery.      Echocardiogram Technically challenging study due to patient body habitus, even with the use  of contrast imaging.     Left ventricular size, global systolic function are normal, estimated LVEF 60-  65%.  Endocardial borders are not optimally visualized, however no clear wall motion  abnormalities are seen.  Right ventricular global function is normal.  No  significant valvular abnormalities.  There is no color Doppler evidence of an atrial shunt.     There are no prior studies available for comparison.   Other Testing Not Applicable     LDL  4/6/2022: 53 mg/dL   A1C  3/16/2022: 11.8 %   Troponin No lab value available in past 48 hrs       Impression   66 year old male with HTN, HLD, DMII (A1c 11.8), NA, cocaine abuse and recurrent L MCA infarcts who presents as a transfer from [facility] for possible STA-MCA bypass. Exam stable from [facility], most notable for receptive>expressive aphasia. The patient has been having recurrent strokes from L M1-M2 stenosis and has undergone a DSA which showed that the stenosis is too distal to stent. Given this the patient was transferred to [facility] for evaluation for a possible ST-MC bypass procedure. For now will continue the patient on DAPT. Decision was take to not do a bypass and go ahead with stenting. Hence the procedure is planned for 04/15/2022.      Plan  #Recurrent L MCA territory strokes  #L M1 to M2 stenosis  - Neuro IR to plan for stenting This morning   - Continue  mg  - Stopped Heparin drip  - S/P loading dose of 450 mg of Plavix  - ordered 75 mg Plavix daily.   - Continue rosuvastatin 40 mg daily   - SBP goal normotension, 100-180  - Regular diet with thins (cleared by speech at [facility])   - Consistent Carbohydrate diet  - Avoid hypotonic IV fluids  - PT/OT/SLP  - Depression Screen  - Apnea Screen  - Euthermia, Euglycemia     #Essential HTN  - Holding PTA lisinopril      #HLD  - Continue rosuvastatin      #Poorly controlled T2DM (A1c 11.8)  - Endocrine consulted, appreciate recs  - Continuing management subcutaneous insulin  - Insulin gtt can be started if surgery needs to be completed before control is achieved w/ SQ     #NA  - CPAP as able     #Generalized anxiety disorder  - Continue Lexapro      Prophylaxis            DVT: SCDs for tonight given unclear timeline for procedure     For Acid Suppression:  - GI  prophylaxis is not indicated    Patient Follow-up        We will continue to follow.     The patient was discussed with Stroke Staff is Dr. Thakur.    Francisco Lovell MD  Neurology Resident, PGY1  ASCOM: [id number]  ______________________________________________________    Clinically Significant Risk Factors Present on Admission                  Medications   Scheduled Meds    aspirin  325 mg Oral or Feeding Tube Daily     clopidogrel  75 mg Oral Daily     escitalopram  20 mg Oral or Feeding Tube Daily     insulin aspart   Subcutaneous TID AC     rosuvastatin  40 mg Oral or Feeding Tube Daily     senna-docusate  1 tablet Oral At Bedtime     sodium chloride (PF)  3 mL Intracatheter Q8H     sodium chloride (PF)  3 mL Intracatheter Q8H       Infusion Meds    heparin 2 units/mL in 0.9% NaCl TABLE SOLN       insulin regular Stopped (04/15/22 1003)     - MEDICATION INSTRUCTIONS -       - MEDICATION INSTRUCTIONS -       - MEDICATION INSTRUCTIONS -       sodium chloride 100 mL/hr at 04/15/22 0131       PRN Meds  glucose **OR** dextrose **OR** glucagon, fentaNYL, flumazenil, heparin 2 units/mL in 0.9% NaCl TABLE SOLN, labetalol **OR** hydrALAZINE, insulin aspart, lidocaine 4%, lidocaine 4%, lidocaine (buffered or not buffered), lidocaine (buffered or not buffered), lidocaine (buffered or not buffered), - MEDICATION INSTRUCTIONS -, - MEDICATION INSTRUCTIONS -, - MEDICATION INSTRUCTIONS -, midazolam, naloxone **OR** naloxone **OR** naloxone **OR** naloxone, sodium chloride (PF), sodium chloride (PF)       PHYSICAL EXAMINATION  Temp:  [97.9  F (36.6  C)-99  F (37.2  C)] 98.9  F (37.2  C)  Pulse:  [65-83] 67  Resp:  [16-17] 16  BP: (112-143)/(65-86) 143/86  SpO2:  [95 %-99 %] 97 %      General: Asleep, awakens to voice in NAD   HEENT: Normocephalic, atraumatic, no epistaxis, no oral lesions  Resp: Breathing comfortably on room air  Extremities: Warm and well perfused, peripheral pulses present, no edema  Skin: Not jaundiced, no rash,  no ecchymoses  Psych: Normal mood and affect     Neuro:  Mental status: Alert and oriented to person, place, and time. Follows commands intermittently  Speech: Fluent,; No dysarthria, occasional paraphasic errors. Fluent receptive aphasia  Cranial nerves: Visual fields intact, Eyes conjugate, EOMI w/ normal and smooth pursuit, L lower facial droop, hearing intact to conversation, shoulder shrug strong, palate rise symmetric, tongue/uvula midline.  Motor: Tone normal. LUE is 5/5, RUE is 4-/5, LLE is 5/5, RLE is 4+/5. No abnormal movements noted. Pronator drift absent.  Reflexes: Toes down-going bilaterally.  Sensory: Intact to light touch in all 4 extremities  Coordination: FNF intact bilaterally with no dysmetria; Normal heel-shin test bilaterally with no dysmetria noted  Gait: Deferred    Stroke Scales    NIHSS     National Institutes of Health Stroke Scale  Exam Interval: 7 - 10 days   Score    Level of consciousness: (0)   Alert, keenly responsive    LOC questions: (0)   Answers both questions correctly    LOC commands: (0)   Performs both tasks correctly    Best gaze: (0)   Normal    Visual: (0)   No visual loss    Facial palsy: (0)   Normal symmetrical movements    Motor arm (left): (0)   No drift    Motor arm (right): (0)   No drift    Motor leg (left): (0)   No drift    Motor leg (right): (0)   No drift    Limb ataxia: (0)   Absent    Sensory: (0)   Normal- no sensory loss    Best language: (0)   Normal- no aphasia    Dysarthria: (0)   Normal    Extinction and inattention: (0)   No abnormality        Total Score:  0       Imaging  I personally reviewed all imaging; relevant findings per HPI.     Lab Results Data   CBC  Recent Labs   Lab 04/15/22  0559 04/14/22  0903 04/13/22  0759   WBC 8.3 9.7 11.0   RBC 4.71 4.99 5.17   HGB 13.3 14.0 14.5   HCT 40.0 42.7 43.9   * 457* 514*     Basic Metabolic Panel    Recent Labs   Lab 04/15/22  0959 04/15/22  0901 04/15/22  0800 04/15/22  0610 04/15/22  0559  04/14/22  0910 04/14/22  0903 04/13/22  0807 04/13/22  0759   NA  --   --   --   --  138  --  137  --  138   POTASSIUM  --   --   --   --  3.4  --  3.6  --  3.4   CHLORIDE  --   --   --   --  106  --  104  --  104   CO2  --   --   --   --  24  --  23  --  25   BUN  --   --   --   --  9  --  10  --  13   CR  --   --   --   --  1.08  --  1.10  --  1.11   GLC 93 109* 118*   < > 109*   < > 128*   < > 94   EVER  --   --   --   --  8.9  --  9.2  --  9.4    < > = values in this interval not displayed.     Liver Panel  No results for input(s): PROTTOTAL, ALBUMIN, BILITOTAL, ALKPHOS, AST, ALT, BILIDIRECT in the last 168 hours.  INR    Recent Labs   Lab Test 03/16/22  1704   INR 1.02      Lipid Profile    Recent Labs   Lab Test 04/06/22  0725 03/16/22  1704 10/11/21  0942 04/21/16  0906 08/20/15  0830 01/14/15  0847 04/21/14  0815   CHOL 109 224* 228*   < > 136 316* 155   HDL 34* 33* 37*   < > 42 41 37*   LDL 53 136* 116*   < > 59 219* 80   TRIG 111 275* 377*   < > 176* 282* 189*   CHOLHDLRATIO  --   --   --   --  3.2 7.7* 4.2    < > = values in this interval not displayed.     A1C    Recent Labs   Lab Test 03/16/22  1704 10/11/21  0943 09/28/20  0925   A1C 11.8* 11.8* 13.2*     Troponin I    Recent Labs   Lab 04/09/22  0037   TROPONINIS 4

## 2022-04-15 NOTE — PLAN OF CARE
Status: admitted on 4/8 for recurrent L MCA strokes.  Vitals: VSS on RA.  Neuros: AO x4 with choices. Moderate aphasia. Strengths 5/5 throughout, R side slightly weaker.   IV: PIV infusing insulin gtt on Alg 4+  Labs/Electrolytes: BG checks q1hr  Resp/trach: WNL  Diet: consistent carb diet, good intake.   Bowel status: LB 4/11  : voiding spontaneously  Skin: no deficits noted  Pain: denies  Activity: SBA/GB  Plan: NPO at midnight for angioplasty with possible stent. Continue POC.

## 2022-04-15 NOTE — PROVIDER NOTIFICATION
Patient refused neuro check every four hours this shift, stated that he just want to sleep. Provider notified, stated just try him later today, and if continues to refuse,  he might change neuro check order NOC.

## 2022-04-15 NOTE — PLAN OF CARE
/84 (BP Location: Right arm)   Pulse 65   Temp 97.9  F (36.6  C) (Oral)   Resp 16   Wt 103.7 kg (228 lb 9.6 oz)   SpO2 99%   BMI 42.91 kg/m       Christiano: [id number]    Reason for admission: Ischemic stroke.   Activity: SBA  Pain: No c/o pain or discomfort.   Neuro: Alert and oriented, difficulty finding words, UE 5/5, LE 5/5, non complaint with cares intermittently.   Cardiac: WDL  Resp: Denied SOB, lung sounds clear bilaterally.   GI/: NPO after midnight, voids without difficulty, bowel sounds present x four quadrants.     Lines: L and R PIV infusing NS 100ml/hr and insulin drip algorism 4.   Skin: WDL  Labs/Imaging: BG check every one hour due to patient is on insulin drip algorism 4.      New changes to shift: No change.     Plan: Angioplasty with possible stent today unscheduled.

## 2022-04-15 NOTE — PLAN OF CARE
Status: Pt admitted for aphasia and increased R-sided weakness. Found to have stenosis of L M1 artery. Hx of recurrent L MCA infarcts.  Vitals: VSS on RA.   Neuros: A&O x4. Denies N/T. Strengths 5/5 ex slightly weaker on R. Word-finding difficulty.   IV: One PIV infusing NS @ 100 ml/hr. Other PIV infusing insulin gtt at 2 units/hr on algorithm 4.  Labs/Electrolytes: Q1h glucose checks.  Resp/trach: LS clear. Denies SOB.   Diet: NPO for possible procedure later today.  Bowel status: BS+. LBM 4/11.   : Voids spontaneously without difficulty.   Skin: Intact.   Pain: Denies.   Activity: SBA, GB.   Social: Wife, Estefania, at bedside. Helpful and supportive of pt.   Plan: Possibly going to IR for angioplasty with stenting later today.

## 2022-04-15 NOTE — PROGRESS NOTES
Brief Neuro IR Progress note    Briefly this is a 66 year old man with uncontrolled vascular risk factors (DM A1c of 11.8 % , HTN, HLD, NA on CPAP, obesity), depression/anxiety and cocaine use who has had 3 strokes in the last 4 weeks in the left MCA territory.     I discussed extensively the need for pt to remain medication compliant. I discussed with patient and wife the risks and benefits of procedure.  We discussed performing a cerebral angiogram with angioplasty and possible stent placement under general anesthesia, we discussed risks of groin hematoma, retroperitoneal bleed, pain at groin site, infection, risk of dissection of vessels and stroke. Patient has receptive aphasia thus consent was obtained from wife who is agreeable to procedure and risks.     We will plan to do this tomorrow. I spoke to both anesthesia and wife.    Plan:      [] Cont plavix 75 mg qday and  mg qday  [] P2Y12 is 193  [] NPO after midnight for case tomorrow AM      Rosenda Recinos MD  Neuroendovascular Fellow

## 2022-04-16 ENCOUNTER — HEALTH MAINTENANCE LETTER (OUTPATIENT)
Age: 67
End: 2022-04-16

## 2022-04-16 ENCOUNTER — APPOINTMENT (OUTPATIENT)
Dept: INTERVENTIONAL RADIOLOGY/VASCULAR | Facility: CLINIC | Age: 67
DRG: 023 | End: 2022-04-16
Attending: PSYCHIATRY & NEUROLOGY
Payer: COMMERCIAL

## 2022-04-16 ENCOUNTER — ANESTHESIA EVENT (OUTPATIENT)
Dept: SURGERY | Facility: CLINIC | Age: 67
DRG: 023 | End: 2022-04-16
Payer: COMMERCIAL

## 2022-04-16 ENCOUNTER — ANESTHESIA (OUTPATIENT)
Dept: SURGERY | Facility: CLINIC | Age: 67
DRG: 023 | End: 2022-04-16
Payer: COMMERCIAL

## 2022-04-16 ENCOUNTER — APPOINTMENT (OUTPATIENT)
Dept: SPEECH THERAPY | Facility: CLINIC | Age: 67
DRG: 023 | End: 2022-04-16
Attending: PSYCHIATRY & NEUROLOGY
Payer: COMMERCIAL

## 2022-04-16 LAB
GLUCOSE BLDC GLUCOMTR-MCNC: 101 MG/DL (ref 70–99)
GLUCOSE BLDC GLUCOMTR-MCNC: 102 MG/DL (ref 70–99)
GLUCOSE BLDC GLUCOMTR-MCNC: 102 MG/DL (ref 70–99)
GLUCOSE BLDC GLUCOMTR-MCNC: 110 MG/DL (ref 70–99)
GLUCOSE BLDC GLUCOMTR-MCNC: 119 MG/DL (ref 70–99)
GLUCOSE BLDC GLUCOMTR-MCNC: 121 MG/DL (ref 70–99)
GLUCOSE BLDC GLUCOMTR-MCNC: 122 MG/DL (ref 70–99)
GLUCOSE BLDC GLUCOMTR-MCNC: 124 MG/DL (ref 70–99)
GLUCOSE BLDC GLUCOMTR-MCNC: 127 MG/DL (ref 70–99)
GLUCOSE BLDC GLUCOMTR-MCNC: 128 MG/DL (ref 70–99)
GLUCOSE BLDC GLUCOMTR-MCNC: 129 MG/DL (ref 70–99)
GLUCOSE BLDC GLUCOMTR-MCNC: 138 MG/DL (ref 70–99)
GLUCOSE BLDC GLUCOMTR-MCNC: 142 MG/DL (ref 70–99)
GLUCOSE BLDC GLUCOMTR-MCNC: 186 MG/DL (ref 70–99)
GLUCOSE BLDC GLUCOMTR-MCNC: 191 MG/DL (ref 70–99)
GLUCOSE BLDC GLUCOMTR-MCNC: 251 MG/DL (ref 70–99)
SARS-COV-2 RNA RESP QL NAA+PROBE: NEGATIVE

## 2022-04-16 PROCEDURE — 250N000009 HC RX 250: Performed by: PHYSICIAN ASSISTANT

## 2022-04-16 PROCEDURE — 037G3ZZ DILATION OF INTRACRANIAL ARTERY, PERCUTANEOUS APPROACH: ICD-10-PCS | Performed by: NEUROLOGICAL SURGERY

## 2022-04-16 PROCEDURE — 200N000002 HC R&B ICU UMMC

## 2022-04-16 PROCEDURE — 250N000013 HC RX MED GY IP 250 OP 250 PS 637: Performed by: STUDENT IN AN ORGANIZED HEALTH CARE EDUCATION/TRAINING PROGRAM

## 2022-04-16 PROCEDURE — 999N000141 HC STATISTIC PRE-PROCEDURE NURSING ASSESSMENT: Performed by: NEUROLOGICAL SURGERY

## 2022-04-16 PROCEDURE — 272N000302 HC DEVICE INFLATION CR5

## 2022-04-16 PROCEDURE — 250N000011 HC RX IP 250 OP 636: Performed by: NEUROLOGICAL SURGERY

## 2022-04-16 PROCEDURE — 272N000116 HC CATH CR1

## 2022-04-16 PROCEDURE — 250N000011 HC RX IP 250 OP 636: Performed by: NURSE ANESTHETIST, CERTIFIED REGISTERED

## 2022-04-16 PROCEDURE — 258N000003 HC RX IP 258 OP 636: Performed by: ANESTHESIOLOGY

## 2022-04-16 PROCEDURE — C1887 CATHETER, GUIDING: HCPCS

## 2022-04-16 PROCEDURE — U0005 INFEC AGEN DETEC AMPLI PROBE: HCPCS | Performed by: STUDENT IN AN ORGANIZED HEALTH CARE EDUCATION/TRAINING PROGRAM

## 2022-04-16 PROCEDURE — 250N000025 HC SEVOFLURANE, PER MIN: Performed by: NEUROLOGICAL SURGERY

## 2022-04-16 PROCEDURE — 710N000011 HC RECOVERY PHASE 1, LEVEL 3, PER MIN: Performed by: NEUROLOGICAL SURGERY

## 2022-04-16 PROCEDURE — 250N000011 HC RX IP 250 OP 636

## 2022-04-16 PROCEDURE — C1769 GUIDE WIRE: HCPCS

## 2022-04-16 PROCEDURE — 258N000003 HC RX IP 258 OP 636: Performed by: NURSE ANESTHETIST, CERTIFIED REGISTERED

## 2022-04-16 PROCEDURE — 272N000572 HC SHEATH CR9

## 2022-04-16 PROCEDURE — 999N000157 HC STATISTIC RCP TIME EA 10 MIN

## 2022-04-16 PROCEDURE — 36224 PLACE CATH CAROTD ART: CPT

## 2022-04-16 PROCEDURE — 61630 BALO ANGIOPLASTY ICR PERQ: CPT | Mod: GC | Performed by: NEUROLOGICAL SURGERY

## 2022-04-16 PROCEDURE — 999N000155 HC STATISTIC RAPCV CVP MONITORING

## 2022-04-16 PROCEDURE — 370N000017 HC ANESTHESIA TECHNICAL FEE, PER MIN: Performed by: NEUROLOGICAL SURGERY

## 2022-04-16 PROCEDURE — 92526 ORAL FUNCTION THERAPY: CPT | Mod: GN

## 2022-04-16 PROCEDURE — 255N000002 HC RX 255 OP 636: Performed by: NEUROLOGICAL SURGERY

## 2022-04-16 PROCEDURE — 92610 EVALUATE SWALLOWING FUNCTION: CPT | Mod: GN

## 2022-04-16 PROCEDURE — 250N000013 HC RX MED GY IP 250 OP 250 PS 637

## 2022-04-16 PROCEDURE — C1760 CLOSURE DEV, VASC: HCPCS

## 2022-04-16 PROCEDURE — C1725 CATH, TRANSLUMIN NON-LASER: HCPCS

## 2022-04-16 PROCEDURE — 272N000506 HC NEEDLE CR6

## 2022-04-16 PROCEDURE — 61630 BALO ANGIOPLASTY ICR PERQ: CPT

## 2022-04-16 PROCEDURE — 272N000192 HC ACCESSORY CR2

## 2022-04-16 PROCEDURE — 250N000009 HC RX 250: Performed by: NURSE ANESTHETIST, CERTIFIED REGISTERED

## 2022-04-16 RX ORDER — IBUPROFEN 200 MG
400 TABLET ORAL EVERY 6 HOURS PRN
Status: DISCONTINUED | OUTPATIENT
Start: 2022-04-16 | End: 2022-04-18 | Stop reason: HOSPADM

## 2022-04-16 RX ORDER — ONDANSETRON 2 MG/ML
INJECTION INTRAMUSCULAR; INTRAVENOUS PRN
Status: DISCONTINUED | OUTPATIENT
Start: 2022-04-16 | End: 2022-04-16

## 2022-04-16 RX ORDER — OXYCODONE HYDROCHLORIDE 5 MG/1
5 TABLET ORAL EVERY 4 HOURS PRN
Status: DISCONTINUED | OUTPATIENT
Start: 2022-04-16 | End: 2022-04-16 | Stop reason: HOSPADM

## 2022-04-16 RX ORDER — ONDANSETRON 2 MG/ML
4 INJECTION INTRAMUSCULAR; INTRAVENOUS EVERY 30 MIN PRN
Status: DISCONTINUED | OUTPATIENT
Start: 2022-04-16 | End: 2022-04-16 | Stop reason: HOSPADM

## 2022-04-16 RX ORDER — LABETALOL HYDROCHLORIDE 5 MG/ML
5 INJECTION, SOLUTION INTRAVENOUS EVERY 5 MIN PRN
Status: DISCONTINUED | OUTPATIENT
Start: 2022-04-16 | End: 2022-04-16 | Stop reason: HOSPADM

## 2022-04-16 RX ORDER — EPTIFIBATIDE 2 MG/ML
5-20 INJECTION, SOLUTION INTRAVENOUS
Status: COMPLETED | OUTPATIENT
Start: 2022-04-16 | End: 2022-04-16

## 2022-04-16 RX ORDER — ACETAMINOPHEN 325 MG/1
325 TABLET ORAL EVERY 4 HOURS PRN
Status: DISCONTINUED | OUTPATIENT
Start: 2022-04-16 | End: 2022-04-18 | Stop reason: HOSPADM

## 2022-04-16 RX ORDER — HEPARIN SODIUM 1000 [USP'U]/ML
INJECTION, SOLUTION INTRAVENOUS; SUBCUTANEOUS PRN
Status: DISCONTINUED | OUTPATIENT
Start: 2022-04-16 | End: 2022-04-16

## 2022-04-16 RX ORDER — PROPOFOL 10 MG/ML
INJECTION, EMULSION INTRAVENOUS PRN
Status: DISCONTINUED | OUTPATIENT
Start: 2022-04-16 | End: 2022-04-16

## 2022-04-16 RX ORDER — ONDANSETRON 2 MG/ML
4 INJECTION INTRAMUSCULAR; INTRAVENOUS EVERY 6 HOURS PRN
Status: DISCONTINUED | OUTPATIENT
Start: 2022-04-16 | End: 2022-04-18 | Stop reason: HOSPADM

## 2022-04-16 RX ORDER — PROCHLORPERAZINE 25 MG
12.5 SUPPOSITORY, RECTAL RECTAL EVERY 12 HOURS PRN
Status: DISCONTINUED | OUTPATIENT
Start: 2022-04-16 | End: 2022-04-18 | Stop reason: HOSPADM

## 2022-04-16 RX ORDER — SODIUM CHLORIDE, SODIUM LACTATE, POTASSIUM CHLORIDE, CALCIUM CHLORIDE 600; 310; 30; 20 MG/100ML; MG/100ML; MG/100ML; MG/100ML
INJECTION, SOLUTION INTRAVENOUS CONTINUOUS PRN
Status: DISCONTINUED | OUTPATIENT
Start: 2022-04-16 | End: 2022-04-16

## 2022-04-16 RX ORDER — LIDOCAINE HYDROCHLORIDE 20 MG/ML
INJECTION, SOLUTION INFILTRATION; PERINEURAL PRN
Status: DISCONTINUED | OUTPATIENT
Start: 2022-04-16 | End: 2022-04-16

## 2022-04-16 RX ORDER — IODIXANOL 320 MG/ML
150 INJECTION, SOLUTION INTRAVASCULAR ONCE
Status: COMPLETED | OUTPATIENT
Start: 2022-04-16 | End: 2022-04-16

## 2022-04-16 RX ORDER — FENTANYL CITRATE 50 UG/ML
50 INJECTION, SOLUTION INTRAMUSCULAR; INTRAVENOUS EVERY 5 MIN PRN
Status: DISCONTINUED | OUTPATIENT
Start: 2022-04-16 | End: 2022-04-16 | Stop reason: HOSPADM

## 2022-04-16 RX ORDER — ALBUTEROL SULFATE 0.83 MG/ML
2.5 SOLUTION RESPIRATORY (INHALATION) EVERY 4 HOURS PRN
Status: DISCONTINUED | OUTPATIENT
Start: 2022-04-16 | End: 2022-04-16 | Stop reason: HOSPADM

## 2022-04-16 RX ORDER — FENTANYL CITRATE 50 UG/ML
25 INJECTION, SOLUTION INTRAMUSCULAR; INTRAVENOUS
Status: DISCONTINUED | OUTPATIENT
Start: 2022-04-16 | End: 2022-04-16 | Stop reason: HOSPADM

## 2022-04-16 RX ORDER — ONDANSETRON 4 MG/1
4 TABLET, ORALLY DISINTEGRATING ORAL EVERY 30 MIN PRN
Status: DISCONTINUED | OUTPATIENT
Start: 2022-04-16 | End: 2022-04-16 | Stop reason: HOSPADM

## 2022-04-16 RX ORDER — SODIUM CHLORIDE, SODIUM LACTATE, POTASSIUM CHLORIDE, CALCIUM CHLORIDE 600; 310; 30; 20 MG/100ML; MG/100ML; MG/100ML; MG/100ML
INJECTION, SOLUTION INTRAVENOUS CONTINUOUS
Status: DISCONTINUED | OUTPATIENT
Start: 2022-04-16 | End: 2022-04-16 | Stop reason: HOSPADM

## 2022-04-16 RX ORDER — FENTANYL CITRATE 50 UG/ML
INJECTION, SOLUTION INTRAMUSCULAR; INTRAVENOUS PRN
Status: DISCONTINUED | OUTPATIENT
Start: 2022-04-16 | End: 2022-04-16

## 2022-04-16 RX ORDER — ONDANSETRON 4 MG/1
4 TABLET, ORALLY DISINTEGRATING ORAL EVERY 6 HOURS PRN
Status: DISCONTINUED | OUTPATIENT
Start: 2022-04-16 | End: 2022-04-18 | Stop reason: HOSPADM

## 2022-04-16 RX ORDER — MEPERIDINE HYDROCHLORIDE 25 MG/ML
12.5 INJECTION INTRAMUSCULAR; INTRAVENOUS; SUBCUTANEOUS
Status: DISCONTINUED | OUTPATIENT
Start: 2022-04-16 | End: 2022-04-16 | Stop reason: HOSPADM

## 2022-04-16 RX ORDER — PROCHLORPERAZINE MALEATE 5 MG
5 TABLET ORAL EVERY 6 HOURS PRN
Status: DISCONTINUED | OUTPATIENT
Start: 2022-04-16 | End: 2022-04-18 | Stop reason: HOSPADM

## 2022-04-16 RX ORDER — LIDOCAINE 40 MG/G
CREAM TOPICAL
Status: DISCONTINUED | OUTPATIENT
Start: 2022-04-16 | End: 2022-04-16 | Stop reason: HOSPADM

## 2022-04-16 RX ORDER — EPHEDRINE SULFATE 50 MG/ML
INJECTION, SOLUTION INTRAMUSCULAR; INTRAVENOUS; SUBCUTANEOUS PRN
Status: DISCONTINUED | OUTPATIENT
Start: 2022-04-16 | End: 2022-04-16

## 2022-04-16 RX ORDER — HYDROMORPHONE HCL IN WATER/PF 6 MG/30 ML
0.2 PATIENT CONTROLLED ANALGESIA SYRINGE INTRAVENOUS EVERY 5 MIN PRN
Status: DISCONTINUED | OUTPATIENT
Start: 2022-04-16 | End: 2022-04-16 | Stop reason: HOSPADM

## 2022-04-16 RX ORDER — IBUPROFEN 200 MG
200 TABLET ORAL EVERY 6 HOURS PRN
Status: DISCONTINUED | OUTPATIENT
Start: 2022-04-16 | End: 2022-04-18 | Stop reason: HOSPADM

## 2022-04-16 RX ORDER — SODIUM CHLORIDE 9 MG/ML
INJECTION, SOLUTION INTRAVENOUS CONTINUOUS
Status: DISCONTINUED | OUTPATIENT
Start: 2022-04-16 | End: 2022-04-16 | Stop reason: HOSPADM

## 2022-04-16 RX ADMIN — ASPIRIN 325 MG ORAL TABLET 325 MG: 325 PILL ORAL at 07:14

## 2022-04-16 RX ADMIN — IODIXANOL 50 ML: 320 INJECTION, SOLUTION INTRAVASCULAR at 11:08

## 2022-04-16 RX ADMIN — EPTIFIBATIDE 7 MG: 20 INJECTION INTRAVENOUS at 09:51

## 2022-04-16 RX ADMIN — ROCURONIUM BROMIDE 5 MG: 50 INJECTION, SOLUTION INTRAVENOUS at 08:19

## 2022-04-16 RX ADMIN — HEPARIN SODIUM 7000 UNITS: 1000 INJECTION INTRAVENOUS; SUBCUTANEOUS at 08:54

## 2022-04-16 RX ADMIN — EPTIFIBATIDE 7 MG: 20 INJECTION INTRAVENOUS at 10:02

## 2022-04-16 RX ADMIN — FENTANYL CITRATE 150 MCG: 50 INJECTION, SOLUTION INTRAMUSCULAR; INTRAVENOUS at 08:19

## 2022-04-16 RX ADMIN — PHENYLEPHRINE HYDROCHLORIDE 100 MCG: 10 INJECTION INTRAVENOUS at 09:35

## 2022-04-16 RX ADMIN — PROPOFOL 200 MG: 10 INJECTION, EMULSION INTRAVENOUS at 08:21

## 2022-04-16 RX ADMIN — ROCURONIUM BROMIDE 75 MG: 50 INJECTION, SOLUTION INTRAVENOUS at 08:21

## 2022-04-16 RX ADMIN — CLOPIDOGREL BISULFATE 75 MG: 75 TABLET ORAL at 07:14

## 2022-04-16 RX ADMIN — SODIUM CHLORIDE, POTASSIUM CHLORIDE, SODIUM LACTATE AND CALCIUM CHLORIDE: 600; 310; 30; 20 INJECTION, SOLUTION INTRAVENOUS at 08:08

## 2022-04-16 RX ADMIN — SODIUM CHLORIDE: 9 INJECTION, SOLUTION INTRAVENOUS at 12:11

## 2022-04-16 RX ADMIN — Medication 10 MG: at 08:39

## 2022-04-16 RX ADMIN — HEPARIN SODIUM 6 BAG: 200 INJECTION, SOLUTION INTRAVENOUS at 09:54

## 2022-04-16 RX ADMIN — PHENYLEPHRINE HYDROCHLORIDE 100 MCG: 10 INJECTION INTRAVENOUS at 09:06

## 2022-04-16 RX ADMIN — SUGAMMADEX 200 MG: 100 INJECTION, SOLUTION INTRAVENOUS at 10:27

## 2022-04-16 RX ADMIN — ONDANSETRON 4 MG: 2 INJECTION INTRAMUSCULAR; INTRAVENOUS at 10:06

## 2022-04-16 RX ADMIN — LIDOCAINE HYDROCHLORIDE 100 MG: 20 INJECTION, SOLUTION INFILTRATION; PERINEURAL at 08:19

## 2022-04-16 ASSESSMENT — ACTIVITIES OF DAILY LIVING (ADL)
ADLS_ACUITY_SCORE: 8
ADLS_ACUITY_SCORE: 8
ADLS_ACUITY_SCORE: 10
ADLS_ACUITY_SCORE: 8
ADLS_ACUITY_SCORE: 8
ADLS_ACUITY_SCORE: 10
ADLS_ACUITY_SCORE: 8
ADLS_ACUITY_SCORE: 8
ADLS_ACUITY_SCORE: 10
ADLS_ACUITY_SCORE: 10
ADLS_ACUITY_SCORE: 8
ADLS_ACUITY_SCORE: 10
ADLS_ACUITY_SCORE: 8
ADLS_ACUITY_SCORE: 10
ADLS_ACUITY_SCORE: 8
ADLS_ACUITY_SCORE: 8
ADLS_ACUITY_SCORE: 10
ADLS_ACUITY_SCORE: 8

## 2022-04-16 NOTE — IR NOTE
Pt. Escorted to IR rm 3 by anesthesia team. Induction/ Intubation. Positioned supine , durbin placement, prepped, time out with Dr. Conrad and cerebral angio and balloon plast of MCA. LFA accessed . AngioSeal closure device to LFA , flat bedrest x 4 hours unti 1430. To PACU.

## 2022-04-16 NOTE — PROGRESS NOTES
"[facility]    Stroke Progress Note    Interval EventsNo acute events overnight. Neuro IR Stenting scheduled for this 8am.  Patient has been NPO overnight. P2Y12 was 103. Patient did  Not endorse any complains. Patient will be transferred to neuro critical care post procedure for observation.     HPI Summary  Huseyin St is a 66 year old male with HTN, HLD, DMII (A1c 11.8), NA, cocaine abuse and recurrent L MCA infarcts who presents as a transfer from [facility] for possible STA-MCA bypass. Patient was admitted to [facility] 4/5/22 for worsening R sided weakness and aphasia. Patient was admitted twice in March for L MCA territory strokes (likely d/t L M1 stenosis). The first admission was 3/16-3/18 and the patient was started on DAPT. He was admitted again 3/24-3/27 due to expressive aphasia and was found to have 2 new cortical white matter strokes. Given the recurrent strokes on DAPT neuro-IR was consulted for possible stenting but did not recommend stenting due to poorly controlled diabetes.      The patient returned to the hospital 4/5/22 due to \"talking gibberish\" for 2 days where the patient was making repeated significant paraphasic errors and was having increased R sided weakness. During that admission the patient was seen by the stroke team several times and underwent a DSA which showed critical stenosis of L M1 artery extending into b/l M2 divisions which made stenting difficult. Given this, the decision was made to transfer the patient to [facility] for a possible ST-MC bypass which could occur as early as next week.      On arrival, the patient is able to only provide a cursory history as he is making frequent paraphasic errors. He reports frustration with being awoken, but notes that his symptoms are relatively stable. Voices no new complaints.    Stroke Evaluation Summarized    MRI/Head CT CT head :  Expected evolution of multiple acute/subacute " ischemic  infarcts of varying ages in the left cerebral hemisphere, as  described. Unchanged mild left temporal lobe swelling. No acute  intracranial hemorrhage identified on CT. No midline shift/herniation.    MRI OF THE BRAIN WITHOUT AND WITH CONTRAST  MRA OF THE HEAD WITHOUT CONTRAST  MRA OF THE NECK WITHOUT AND WITH CONTRAST :   1.  No change.  2.  Severe distal left M1 stenosis with decreased signal in the left  M2 and M3 branches.  3.  Moderate left and mild right cavernous carotid stenoses.  4.  Mild stenosis in the left P2/P3 junction.     NECK MRA:  1.  Normal neck MRA for age.  2.  No significant stenosis as per NASCET criteria.     MRI OF THE BRAIN WITHOUT AND WITH CONTRAST :  1. Evolving subacute ischemic infarct in the left temporal lobe again  noted without change.  2. 2 new tiny recent white matter infarcts in the periatrial white  matter of the left cerebral hemisphere.  3. No other new infarcts.  4. Otherwise, normal brain MRI.   Intracranial + cervical Vasculature 1. Unchanged severe/critical focal stenosis of the left M1-M2 middle  cerebral artery bifurcation.  2. Unchanged moderate/moderate to severe tandem stenoses involving the  P2 segment of the left posterior cerebral artery.  3. Unchanged atherosclerotic disease involving the bilateral carotid  siphons resulting in up to moderate left and mild right paraclinoid  segment internal carotid artery stenosis.  4. No definite new high-grade central arterial stenosis/large vessel  occlusion identified.  5. Unchanged mild atherosclerosis of the bilateral carotid  bifurcations without flow limiting stenosis.  6. Unchanged moderate atherosclerotic stenosis at the origin of the  left vertebral artery.      Echocardiogram Technically challenging study due to patient body habitus, even with the use  of contrast imaging.     Left ventricular size, global systolic function are normal, estimated LVEF 60-  65%.  Endocardial borders are not optimally  visualized, however no clear wall motion  abnormalities are seen.  Right ventricular global function is normal.  No significant valvular abnormalities.  There is no color Doppler evidence of an atrial shunt.     There are no prior studies available for comparison.   Other Testing Not Applicable     LDL  4/6/2022: 53 mg/dL   A1C  No lab value available in past 30 days   Troponin No lab value available in past 48 hrs       Impression   66 year old male with HTN, HLD, DMII (A1c 11.8), NA, cocaine abuse and recurrent L MCA infarcts who presents as a transfer from [facility] for possible STA-MCA bypass. Exam stable from [facility], most notable for receptive>expressive aphasia. The patient has been having recurrent strokes from L M1-M2 stenosis and has undergone a DSA which showed that the stenosis is too distal to stent. Given this the patient was transferred to [facility] for evaluation for a possible ST-MC bypass procedure. For now will continue the patient on DAPT. Decision was take to not do a bypass and go ahead with stenting. Hence the procedure is planned for 04/15/2022.      Plan  #Recurrent L MCA territory strokes  #L M1 to M2 stenosis  - Neuro IR to plan for stenting This morning at 8 am. Patient will be shifted to Neuro critical care after for observation.    - Continue  mg  - Stopped Heparin drip  - S/P loading dose of 450 mg of Plavix  - ordered 75 mg Plavix daily.   - Continue rosuvastatin 40 mg daily   - SBP goal normotension, 100-180  - Regular diet with thins (cleared by speech at [facility])   - Consistent Carbohydrate diet  - Avoid hypotonic IV fluids  - PT/OT/SLP  - Depression Screen  - Apnea Screen  - Euthermia, Euglycemia     #Essential HTN  - Holding PTA lisinopril      #HLD  - Continue rosuvastatin      #Poorly controlled T2DM (A1c 11.8)  - Endocrine consulted, appreciate recs  - Continuing management subcutaneous insulin  - Insulin gtt can be started if surgery needs to be completed before  control is achieved w/ SQ     #NA  - CPAP as able     #Generalized anxiety disorder  - Continue Lexapro      Prophylaxis            DVT: SCDs for tonight given unclear timeline for procedure     For Acid Suppression:  - GI prophylaxis is not indicated    Patient Follow-up        We will continue to follow.     The patient was discussed with Stroke Staff is Dr. Thakur.    Francisco Lovell MD  Neurology Resident, PGY1  ASCOM: [id number]  ______________________________________________________    Clinically Significant Risk Factors Present on Admission                  Medications   Scheduled Meds    aspirin  325 mg Oral or Feeding Tube Daily     clopidogrel  75 mg Oral Daily     escitalopram  20 mg Oral or Feeding Tube Daily     insulin aspart   Subcutaneous TID AC     iodixanol  150 mL INTRA-ARTERIAL Once     rosuvastatin  40 mg Oral or Feeding Tube Daily     senna-docusate  1 tablet Oral At Bedtime     sodium chloride (PF)  3 mL Intracatheter Q8H     sodium chloride (PF)  3 mL Intracatheter Q8H     sodium chloride (PF)  3 mL Intracatheter Q8H     sodium chloride (PF)  3 mL Intracatheter Q8H       Infusion Meds    heparin 2 units/mL in 0.9% NaCl TABLE SOLN       insulin regular 2 Units/hr (04/16/22 0812)     lactated ringers       - MEDICATION INSTRUCTIONS -       - MEDICATION INSTRUCTIONS -       - MEDICATION INSTRUCTIONS -       - MEDICATION INSTRUCTIONS -       sodium chloride       sodium chloride       sodium chloride 100 mL/hr at 04/15/22 2007       PRN Meds  albuterol, glucose **OR** dextrose **OR** glucagon, fentaNYL, fentaNYL, flumazenil, heparin 2 units/mL in 0.9% NaCl TABLE SOLN, labetalol **OR** hydrALAZINE, HYDROmorphone, insulin aspart, labetalol, lidocaine 4%, lidocaine 4%, lidocaine 4%, lidocaine 4%, lidocaine (buffered or not buffered), lidocaine (buffered or not buffered), lidocaine (buffered or not buffered), lidocaine (buffered or not buffered), lidocaine (buffered or not buffered), - MEDICATION  INSTRUCTIONS -, - MEDICATION INSTRUCTIONS -, - MEDICATION INSTRUCTIONS -, - MEDICATION INSTRUCTIONS -, meperidine, midazolam, naloxone **OR** naloxone **OR** naloxone **OR** naloxone, ondansetron **OR** ondansetron, oxyCODONE IR, sodium chloride (PF), sodium chloride (PF), sodium chloride (PF), sodium chloride (PF)       PHYSICAL EXAMINATION  Temp:  [98.5  F (36.9  C)-98.9  F (37.2  C)] 98.7  F (37.1  C)  Pulse:  [65-77] 65  Resp:  [16] 16  BP: (132-174)/(65-99) 173/98  SpO2:  [93 %-98 %] 97 %      General: Asleep, awakens to voice in NAD   HEENT: Normocephalic, atraumatic, no epistaxis, no oral lesions  Resp: Breathing comfortably on room air  Extremities: Warm and well perfused, peripheral pulses present, no edema  Skin: Not jaundiced, no rash, no ecchymoses  Psych: Normal mood and affect     Neuro:  Mental status: Alert and oriented to person, place, and time. Follows commands intermittently  Speech: Fluent,; No dysarthria, occasional paraphasic errors. Fluent receptive aphasia  Cranial nerves: Visual fields intact, Eyes conjugate, EOMI w/ normal and smooth pursuit, L lower facial droop, hearing intact to conversation, shoulder shrug strong, palate rise symmetric, tongue/uvula midline.  Motor: Tone normal. LUE is 5/5, RUE is 4-/5, LLE is 5/5, RLE is 4+/5. No abnormal movements noted. Pronator drift absent.  Reflexes: Toes down-going bilaterally.  Sensory: Intact to light touch in all 4 extremities  Coordination: FNF intact bilaterally with no dysmetria; Normal heel-shin test bilaterally with no dysmetria noted  Gait: Deferred    Stroke Scales    NIHSS     National Institutes of Health Stroke Scale  Exam Interval: 7 - 10 days   Score    Level of consciousness: (0)   Alert, keenly responsive    LOC questions: (0)   Answers both questions correctly    LOC commands: (0)   Performs both tasks correctly    Best gaze: (0)   Normal    Visual: (0)   No visual loss    Facial palsy: (0)   Normal symmetrical movements     Motor arm (left): (0)   No drift    Motor arm (right): (0)   No drift    Motor leg (left): (0)   No drift    Motor leg (right): (0)   No drift    Limb ataxia: (0)   Absent    Sensory: (0)   Normal- no sensory loss    Best language: (0)   Normal- no aphasia    Dysarthria: (0)   Normal    Extinction and inattention: (0)   No abnormality        Total Score:  0       Imaging  I personally reviewed all imaging; relevant findings per HPI.     Lab Results Data   CBC  Recent Labs   Lab 04/15/22  0559 04/14/22  0903 04/13/22  0759   WBC 8.3 9.7 11.0   RBC 4.71 4.99 5.17   HGB 13.3 14.0 14.5   HCT 40.0 42.7 43.9   * 457* 514*     Basic Metabolic Panel    Recent Labs   Lab 04/16/22  0706 04/16/22  0603 04/16/22  0502 04/15/22  0610 04/15/22  0559 04/14/22  0910 04/14/22  0903 04/13/22  0807 04/13/22  0759   NA  --   --   --   --  138  --  137  --  138   POTASSIUM  --   --   --   --  3.4  --  3.6  --  3.4   CHLORIDE  --   --   --   --  106  --  104  --  104   CO2  --   --   --   --  24  --  23  --  25   BUN  --   --   --   --  9  --  10  --  13   CR  --   --   --   --  1.08  --  1.10  --  1.11   * 129* 110*   < > 109*   < > 128*   < > 94   EVER  --   --   --   --  8.9  --  9.2  --  9.4    < > = values in this interval not displayed.     Liver Panel  No results for input(s): PROTTOTAL, ALBUMIN, BILITOTAL, ALKPHOS, AST, ALT, BILIDIRECT in the last 168 hours.  INR    Recent Labs   Lab Test 03/16/22  1704   INR 1.02      Lipid Profile    Recent Labs   Lab Test 04/06/22  0725 03/16/22  1704 10/11/21  0942 04/21/16  0906 08/20/15  0830 01/14/15  0847 04/21/14  0815   CHOL 109 224* 228*   < > 136 316* 155   HDL 34* 33* 37*   < > 42 41 37*   LDL 53 136* 116*   < > 59 219* 80   TRIG 111 275* 377*   < > 176* 282* 189*   CHOLHDLRATIO  --   --   --   --  3.2 7.7* 4.2    < > = values in this interval not displayed.     A1C    Recent Labs   Lab Test 03/16/22  1704 10/11/21  0943 09/28/20  0925   A1C 11.8* 11.8* 13.2*      Troponin I    No results for input(s): TROPONINIS, TROPONINI, GHTROP in the last 168 hours.

## 2022-04-16 NOTE — PROGRESS NOTES
Report given to PACU RN. Checked with MD about morning medications, stated to only give Plavix and ASA.

## 2022-04-16 NOTE — PLAN OF CARE
Status: Pt admitted 4/8/22 for aphasia and increased R-sided weakness. Found to have stenosis of L M2 Artery. Hx of recurrent L MCA infarcts and diabetes type 2.   Vitals: AVSS on RA  Neuros: A&Ox4. Denies n/t. Strengths 5/5 ex is slightly weaker on the right side. WFD.   IV: PIV infusing NS at 100 mL/hr and insulin drip currently on algorithm 4.   Labs/Electrolytes: Q1hr blood glucose checks - WNL  Resp/trach: WNL  Diet: NPO  Bowel status: BS+ LBM 4/16  : Voiding spontaneously in bathroom  Skin: Intact  Pain: Denies  Activity: SBA, GB  Plan: Angio with stenting today at 0800.

## 2022-04-16 NOTE — PROGRESS NOTES
IP Diabetes Management  Daily Note           Assessment and Plan:   HPI: Huseyin is a 66 year old male with TIIDM, admitted with multiple recent strokes, and found to have severe L M1 stenosis, transferred to [facility] for consideration of NSG intervention/ EC-IC bypass.     Assessment:   1) Uncontrolled TIIDM (A1c 11.8%)    Plan:    -continue IV insulin    -Novolog 1:4g CHO coverage with meals and snacks/supplement when PO intake allowed    Patient's care was discussed with MD Gela Valles MD  Endocrinology Fellow  Pager number [phone]     Interval History and Assessment: interval glucose trend reviewed:  BG remaining well controlled on IV insulin.   Undergoing stenting of the left MCA today    Discharge Planning: TBD following procedures.           Diabetes History:   Type of Diabetes: Type 2 Diabetes Mellitus  Lab Results   Component Value Date    A1C 11.8 03/16/2022    A1C 11.8 10/11/2021    A1C 13.2 09/28/2020    A1C 12.3 09/23/2019    A1C 10.4 03/12/2018    A1C 10.0 09/29/2017    A1C 9.5 03/24/2017              Review of Systems:     The Review of Systems is negative other than noted in the Interval History.           Medications:   Reviewed         Physical Exam:    BP (!) 141/81   Pulse 73   Temp 98.6  F (37  C) (Oral)   Resp 12   Wt 103.7 kg (228 lb 9.6 oz)   SpO2 95%   BMI 42.91 kg/m    Not performed          Data:     Recent Labs   Lab 04/16/22  0706 04/16/22  0603 04/16/22  0502 04/16/22  0358 04/16/22  0303 04/16/22  0203   * 129* 110* 127* 124* 142*

## 2022-04-16 NOTE — ANESTHESIA CARE TRANSFER NOTE
Patient: Huseyin St    Procedure: Procedure(s):  ANESTHESIA OUT OF OR for IR 3 case doing diagnostic angiogram, intracranial Angioplasty, stent       Diagnosis: Cerebrovascular accident (CVA), unspecified mechanism (H) [I63.9]  Diagnosis Additional Information: No value filed.    Anesthesia Type:   General     Note:    Oropharynx: oropharynx clear of all foreign objects and spontaneously breathing  Level of Consciousness: drowsy  Oxygen Supplementation: face mask  Level of Supplemental Oxygen (L/min / FiO2): 6  Independent Airway: airway patency satisfactory and stable  Dentition: dentition unchanged  Vital Signs Stable: post-procedure vital signs reviewed and stable  Report to RN Given: handoff report given  Patient transferred to: PACU    Handoff Report: Identifed the Patient, Identified the Reponsible Provider, Reviewed the pertinent medical history, Discussed the surgical course, Reviewed Intra-OP anesthesia mangement and issues during anesthesia, Set expectations for post-procedure period and Allowed opportunity for questions and acknowledgement of understanding      Vitals:  Vitals Value Taken Time   BP     Temp     Pulse     Resp     SpO2         Electronically Signed By: CHAPO Michel CRNA  April 16, 2022  11:00 AM

## 2022-04-16 NOTE — OR NURSING
Conversation took place between pt, wife and Anesthesia,Juan F regarding rescinding DNR/DNI during procedure.  No type and screen needed per Dr Rogel.

## 2022-04-16 NOTE — ANESTHESIA POSTPROCEDURE EVALUATION
Patient: Huseyin St    Procedure: Procedure(s):  ANESTHESIA OUT OF OR for IR 3 case doing diagnostic angiogram, intracranial Angioplasty, stent       Anesthesia Type:  General    Note:  Disposition: Inpatient   Postop Pain Control: Uneventful            Sign Out: Well controlled pain   PONV: No   Neuro/Psych: Uneventful            Sign Out: Acceptable/Baseline neuro status   Airway/Respiratory: Uneventful            Sign Out: Acceptable/Baseline resp. status   CV/Hemodynamics: Uneventful            Sign Out: Acceptable CV status; No obvious hypovolemia; No obvious fluid overload   Other NRE: NONE   DID A NON-ROUTINE EVENT OCCUR? No    Event details/Postop Comments:  Mental status at baseline prior to OR today.  Expressive aphasia remains and seemingly unchanged compared to preop           Last vitals:  Vitals Value Taken Time   /78 04/16/22 1200   Temp 36.7  C (98.1  F) 04/16/22 1200   Pulse 71 04/16/22 1200   Resp 17 04/16/22 1200   SpO2 97 % 04/16/22 1200       Electronically Signed By: Luis Alberto Rogel MD  April 16, 2022  12:53 PM

## 2022-04-16 NOTE — PLAN OF CARE
Goal Outcome Evaluation:        Admitted/transferred from: pacu  Reason for admission/transfer:post op recovery  2 RN skin assessment: completed by faisal ORTEGA  Result of skin assessment and interventions/actions: lucas  Height, weight, drug calc weight:   Patient belongings.at bedside  MDRO education added to care plan stroke booklet

## 2022-04-16 NOTE — PLAN OF CARE
Status: Pt admitted 4/8/22 for aphasia and increased R-sided weakness. Found to have stenosis of L M2 Artery. Hx of recurrent L MCA infarcts and diabetes type 2.   Vitals: VSS on RA  Neuros: A&Ox4. Denies n/t. Strengths 5/5 ex is slightly weaker on the right side. WFD.   IV: One PIV infusing NS @100 mL/hr. Other PIV is infusing insulin. Currently on algorithm 4.   Labs/Electrolytes: Q1hr blood glucose checks.   Resp/trach: WNL  Diet: Consistent carb, currently until midnight due to surgery.   Bowel status: BS+ LBM 4/11  : Voiding spont  Skin: Intact  Pain: Denies  Activity: SBA, GB  Social: Wife was at bedside earlier this shift.   Plan: Follow POC.   Updates this shift: Surgery planned for tomorrow morning around 8 am.

## 2022-04-16 NOTE — OR NURSING
Text paged Marco Viramontes in regards to pt's expressive aphasia and rt sided weakness noted post procedure.  It was reported off to me pre op that he presented with these symptoms but I did not notice these symptoms pre op.

## 2022-04-16 NOTE — PROGRESS NOTES
"[facility]     Endovascular Surgical Neuroradiology Pre-Procedure Note      HPI:      Briefly this is a 66 year old man with uncontrolled vascular risk factors (DM A1c of 11.8 % , HTN, HLD, NA on CPAP, obesity), depression/anxiety and cocaine use who has had 3 strokes in the last 4 weeks in the left MCA territory. After discussion in neuroIR conference we determined that intracranial angioplasty and stent placement would be best course of action. Patient started on ASA and plavix.   I discussed extensively the need for pt to remain medication compliant. I discussed with patient and wife the risks and benefits of procedure.  We discussed performing a cerebral angiogram with angioplasty and possible stent placement under general anesthesia, we discussed risks of groin hematoma, retroperitoneal bleed, pain at groin site, infection, risk of dissection of vessels and stroke. Patient has receptive aphasia thus consent was obtained from wife who is agreeable to procedure and risks.     Prior to procedure, on exam: he has receptive aphasia with mild right upper extremity pronator drift.     Medical History:  Past Medical History:   Diagnosis Date     Anxiety      Essential hypertension, benign      Hypercholesteremia      Sleep apnea      T2DM (type 2 diabetes mellitus) (H)        Surgical History:  Past Surgical History:   Procedure Laterality Date     COLONOSCOPY       COLONOSCOPY N/A 10/26/2020    Procedure: COLONOSCOPY;  Surgeon: Randy Gonzalez MD;  Location:  GI     ENT SURGERY      R) ear \"procedure\"     IR CAROTID CEREBRAL ANGIOGRAM BILATERAL  4/7/2022       Family History:  Family History   Adopted: Yes   Problem Relation Age of Onset     Diabetes Mother      Respiratory Mother         asthma     Lipids Mother      Arthritis Mother         knee replacement     Alzheimer Disease Mother      Cerebrovascular Disease Brother      C.A.D. No family hx of      " Cancer - colorectal No family hx of      Prostate Cancer No family hx of        Social History:  Social History     Socioeconomic History     Marital status:      Spouse name: Estefania Laura     Number of children: 1     Years of education: Not on file     Highest education level: Not on file   Occupational History     Not on file   Tobacco Use     Smoking status: Never Smoker     Smokeless tobacco: Never Used   Substance and Sexual Activity     Alcohol use: Yes     Alcohol/week: 0.0 standard drinks     Comment: 3-6 drinks on occ weekend night, occ drink on weekday     Drug use: Yes     Types: Cocaine     Sexual activity: Yes     Partners: Female   Other Topics Concern      Service No     Blood Transfusions No     Caffeine Concern Not Asked     Occupational Exposure Not Asked     Hobby Hazards Not Asked     Sleep Concern Not Asked     Stress Concern Not Asked     Weight Concern Not Asked     Special Diet Not Asked     Back Care Not Asked     Exercise Not Asked     Bike Helmet Not Asked     Seat Belt Not Asked     Self-Exams Not Asked     Parent/sibling w/ CABG, MI or angioplasty before 65F 55M? Not Asked   Social History Narrative     Not on file     Social Determinants of Health     Financial Resource Strain: Not on file   Food Insecurity: Not on file   Transportation Needs: Not on file   Physical Activity: Not on file   Stress: Not on file   Social Connections: Not on file   Intimate Partner Violence: Not on file   Housing Stability: Not on file       Allergies:  Allergies   Allergen Reactions     Augmentin Diarrhea     Sulfa Drugs      Unknown - reaction occurred as a child     Victoza      Skin rash       Is there a contrast allergy?  No    Medications:  Medications Prior to Admission   Medication Sig Dispense Refill Last Dose     alcohol swab prep pads Use to swab area of injection/ian as directed 100 each 3      aspirin (ASA) 325 MG EC tablet Take 1 tablet (325 mg) by mouth daily 90 tablet 0       blood glucose (NO BRAND SPECIFIED) test strip Use to test blood sugar 3 times daily or as directed. To accompany: Blood Glucose Monitor Brands: per insurance. 300 strip 1      blood glucose calibration (NO BRAND SPECIFIED) solution Use to calibrate blood glucose monitor as needed as directed. To accompany: Blood Glucose Monitor Brands: per insurance. 1 Bottle 3      clopidogrel (PLAVIX) 75 MG tablet Take 1 tablet (75 mg) by mouth daily 90 tablet 1      clotrimazole (LOTRIMIN) 1 % external cream Apply topically 2 times daily (Patient taking differently: Apply topically 2 times daily as needed ) 30 g 11      escitalopram (LEXAPRO) 20 MG tablet Take 1 tablet (20 mg) by mouth daily 90 tablet 3      insulin aspart (NOVOLOG FLEXPEN) 100 UNIT/ML pen Inject 6-8 Units Subcutaneous 3 times daily (with meals) 15 mL 3      insulin glargine (LANTUS SOLOSTAR) 100 UNIT/ML pen INJ 55 UNITS SC HS 10 mL 9      insulin pen needle (BD MARIE U/F) 32G X 4 MM miscellaneous USE THREE PEN NEEDLES DAILY AS DIRECTED 300 each 1      LORazepam (ATIVAN) 0.5 MG tablet Take 1 tablet (0.5 mg) by mouth once as needed for anxiety 3 tablet 1      ORDER FOR DME Auto-CPAP: Max 12 cm H2O Min 10 cm H2O  Continuous Lifetime need and heated humidity.    1 Device 0      rosuvastatin (CRESTOR) 40 MG tablet Take 1 tablet (40 mg) by mouth daily 90 tablet 3      thin (NO BRAND SPECIFIED) lancets Use to test blood sugar 3 times daily or as directed. To accompany: Blood Glucose Monitor Brands: per insurance. 200 each 6    .    ROS:  Review of systems not obtained due to patient factors - mental status    PHYSICAL EXAMINATION  Vital Signs:  B/P: 173/98,  T: 98.7,  P: 65,  R: 16    Mild right facial droop, mild RUE drift. Receptive aphasia    LABS  (most recent Cr, BUN, GFR, PLT, INR, PTT within the past 7 days):  Recent Labs   Lab 04/15/22  0559   CR 1.08   BUN 9   GFRESTIMATED 75   *        Platelet Function P2Y12 (PRU):  103      A/P: Proceed with  angiogram and possible stent placement        PRE-PROCEDURE SEDATION ASSESSMENT     Pre-Procedure Sedation Assessment done at 0800.    Expected Level:  Deep Sedation    Indication:  Sedation is required to allow for neurointerventional procedure.    Consent obtained from spouse after discussing the risks, benefits and alternatives.     PO Intake:  Appropriately NPO for procedure    ASA Class:  Class 2 - MILD SYSTEMIC DISEASE, NO ACUTE PROBLEMS, NO FUNCTIONAL LIMITATIONS.    Mallampati:  Grade 2:  Soft palate, base of uvula, tonsillar pillars, and portion of posterior pharyngeal wall visible    History and physical reviewed and no updates needed. I have reviewed the lab findings, diagnostic data, medications, and the plan for sedation. I have determined this patient to be an appropriate candidate for the planned sedation and procedure and have reassessed the patient IMMEDIATELY PRIOR to sedation and procedure.    Patient was discussed with the Attending, Dr. Conrad, who agrees with the plan.

## 2022-04-16 NOTE — PROGRESS NOTES
BRIEF NEUROLOGY NOTE    Post-Procedure Exam    General: Adult male, lying in bed, NAD  Neuro:  Mental Status: Asleep but easily wakes to voice. Able to answer simple questions and follow almost all commands (ie stick out tongue, wiggle toes, etc but does not raise eyebrows). Does appear to have mild aphasia (receptive > expressive) but no neglect seen  Cranial Nerves: PERRL, conjugate gaze, EOMI w/o nystagmus, blinks to threat bilaterally, facial sensation intact, no facial asymmetry noted, hearing intact to conversation, no dysarthria, tongue is midline  Motor: Normal bulk and tone. No abnormal movements. No drift with BUE raise. Strength is mildly decreased (4+/5 globally) on the R and intact on the L. Able to wiggle toes bilaterally. Did not assess further BLE strength so as not to disturb groin site  Sensory: Intact to light touch in all extremities  Coordination: FNF intact bilaterally  Gait: Deferred    Tila Hickey MD  Neurology PGY2  *[phone]

## 2022-04-16 NOTE — ANESTHESIA PROCEDURE NOTES
Airway         Procedure Start/Stop Times: 4/16/2022 8:22 AM  Staff -        CRNA: Angie Herrera APRN CRNA       Performed By: CRNAIndications and Patient Condition       Indications for airway management: joey-procedural       Induction type:intravenous       Mask difficulty assessment: 2 - vent by mask + OA or adjuvant +/- NMBA    Final Airway Details       Final airway type: endotracheal airway       Successful airway: ETT - single  Endotracheal Airway Details        ETT size (mm): 8.0       Cuffed: yes       Successful intubation technique: direct laryngoscopy       DL Blade Type: MAC 4       Grade View of Cords: 1       Adjucts: stylet       Position: Right       Measured from: lips       Secured at (cm): 22       Bite block used: None    Post intubation assessment        Placement verified by: capnometry, equal breath sounds and chest rise        Number of attempts at approach: 1       Secured with: pink tape       Ease of procedure: easy       Dentition: Intact    Medication(s) Administered   Medication Administration Time: 4/16/2022 8:22 AM

## 2022-04-16 NOTE — ANESTHESIA PREPROCEDURE EVALUATION
"Anesthesia Pre-Procedure Evaluation    Patient: [address]   MRN: [id number] : 1955        Procedure : Procedure(s):  ANESTHESIA OUT OF OR for IR 3 case doing diagnostic angiogram, intracranial Angioplasty, stent          Past Medical History:   Diagnosis Date     Anxiety      Essential hypertension, benign      Hypercholesteremia      Sleep apnea      T2DM (type 2 diabetes mellitus) (H)       Past Surgical History:   Procedure Laterality Date     COLONOSCOPY       COLONOSCOPY N/A 10/26/2020    Procedure: COLONOSCOPY;  Surgeon: Randy Gonzalez MD;  Location:  GI     ENT SURGERY      R) ear \"procedure\"     IR CAROTID CEREBRAL ANGIOGRAM BILATERAL  2022      Allergies   Allergen Reactions     Augmentin Diarrhea     Sulfa Drugs      Unknown - reaction occurred as a child     Victoza      Skin rash      Social History     Tobacco Use     Smoking status: Never Smoker     Smokeless tobacco: Never Used   Substance Use Topics     Alcohol use: Yes     Alcohol/week: 0.0 standard drinks     Comment: 3-6 drinks on occ weekend night, occ drink on weekday      Wt Readings from Last 1 Encounters:   22 103.7 kg (228 lb 9.6 oz)        Anesthesia Evaluation   Pt has not had prior anesthetic         ROS/MED HX  ENT/Pulmonary:     (+) sleep apnea, mild, uses CPAP,     Neurologic: Comment: 3 strokes in 4 weeks prior to this admission     (+) CVA, date: 22, with deficits, - aphasia, right sided weakness. TIA,     Cardiovascular:     (+) hypertension-----Taking blood thinners (On ASA and plavix due to CVA) Previous cardiac testing   Echo: Date: 3/17/22 Results:  Interpretation Summary     Technically challenging study due to patient body habitus, even with the use  of contrast imaging.   Left ventricular size, global systolic function are normal, estimated LVEF 60-65%.  Endocardial borders are not optimally visualized, however no clear wall motion abnormalities are seen.  Right ventricular global function " is normal.  No significant valvular abnormalities.  There is no color Doppler evidence of an atrial shunt.  Stress Test: Date: Results:    ECG Reviewed: Date: Results:    Cath: Date: Results:   (-) murmur   METS/Exercise Tolerance: 3 - Able to walk 1-2 blocks without stopping    Hematologic:       Musculoskeletal:       GI/Hepatic: Comment: Denies nausea - neg GI/hepatic ROS  (-) GERD   Renal/Genitourinary:     (+) renal disease, type: CRI, Pt does not require dialysis,     Endo:     (+) type II DM, Last HgA1c: 11.8, Using insulin, - not using insulin pump. Obesity,     Psychiatric/Substance Use:     (+) psychiatric history anxiety and depression     Infectious Disease:  - neg infectious disease ROS     Malignancy:  - neg malignancy ROS     Other:            Physical Exam    Airway        Mallampati: IV   TM distance: > 3 FB   Neck ROM: full   Mouth opening: > 3 cm    Respiratory Devices and Support         Dental  no notable dental history         Cardiovascular          Rhythm and rate: regular and normal (-) no murmur    Pulmonary           breath sounds clear to auscultation           OUTSIDE LABS:  CBC:   Lab Results   Component Value Date    WBC 8.3 04/15/2022    WBC 9.7 04/14/2022    HGB 13.3 04/15/2022    HGB 14.0 04/14/2022    HCT 40.0 04/15/2022    HCT 42.7 04/14/2022     (H) 04/15/2022     (H) 04/14/2022     BMP:   Lab Results   Component Value Date     04/15/2022     04/14/2022    POTASSIUM 3.4 04/15/2022    POTASSIUM 3.6 04/14/2022    CHLORIDE 106 04/15/2022    CHLORIDE 104 04/14/2022    CO2 24 04/15/2022    CO2 23 04/14/2022    BUN 9 04/15/2022    BUN 10 04/14/2022    CR 1.08 04/15/2022    CR 1.10 04/14/2022     (H) 04/16/2022     (H) 04/16/2022     COAGS:   Lab Results   Component Value Date    PTT 27 03/16/2022    INR 1.02 03/16/2022     POC:   Lab Results   Component Value Date     (H) 10/26/2020     HEPATIC:   Lab Results   Component Value Date     ALBUMIN 3.4 03/24/2022    PROTTOTAL 8.2 03/24/2022    ALT 17 03/24/2022    AST 10 03/24/2022    ALKPHOS 99 03/24/2022    BILITOTAL 0.2 03/24/2022     OTHER:   Lab Results   Component Value Date    A1C 11.8 (H) 03/16/2022    EVER 8.9 04/15/2022    PHOS 4.0 03/25/2005    MAG 1.5 (L) 03/25/2005    TSH 1.94 12/05/2016    CRP 1.22 06/25/2004       Anesthesia Plan    ASA Status:  4   NPO Status:  NPO Appropriate    Anesthesia Type: General.     - Airway: ETT   Induction: Intravenous.   Maintenance: Inhalation.   Techniques and Equipment:     - Airway: Video-Laryngoscope         Consents    Anesthesia Plan(s) and associated risks, benefits, and realistic alternatives discussed. Questions answered and patient/representative(s) expressed understanding.    - Discussed:     - Discussed with:  Patient, Spouse      - Extended Intubation/Ventilatory Support Discussed: No.      - Patient is DNR/DNI Status: Yes             Suspend during perioperative period? Yes (Agree to rescind DNR and DNI for the current hospitalization).             Agree to: chemical resuscitation, chest compression/defibrillation, Other.   Use of blood products discussed: Yes.     - Discussed with: Patient, Other (see comment) (Spouse).     - Consented: consented to blood products            Reason for refusal: other.     Postoperative Care       PONV prophylaxis: Ondansetron (or other 5HT-3)     Comments:    Other Comments: Patient seen and examined.  Risks, benefits and alternatives to GETA discussed with patient and his wife.  Questions answered and they wish to proceed            Enio Iqbal MD

## 2022-04-16 NOTE — PLAN OF CARE
Problem: Cerebral Tissue Perfusion (Stroke, Ischemic/Transient Ischemic Attack)  Goal: Optimal Cerebral Tissue Perfusion  Outcome: Ongoing, Progressing   Goal Outcome Evaluation:      D. awake alert, some word finding difficulty- oriented x 4 - moves all extremities - vss - groin puncture site checked Q 15 min no hematoma pulses + Bilateral- denies numbness / tingling.   A able to eat and drink - durbin discontinued  . Sats 99% on room air .  I cont to monitor .

## 2022-04-16 NOTE — PROGRESS NOTES
This 66 year old man has presented with recurrent strokes from left middle cerebral artery (MCA) stenosis, specifically at the distal M1 and MCA bifurcation. He has multiple stroke risk factors. We discussed the medical, endovascular, and surgical treatment options for this lesion. Recognizing the controversies involved in intracranial atherosclerosis, we have determined that endovascular treatment is the best option in this patient with recurrent strokes and severe stenosis. His wife understands all of the considerations including the risks of the procedure. These include death, stroke, intraoperative rupture, arterial injury, groin hematoma, and she agrees to proceed.    MILES Conrad MD

## 2022-04-16 NOTE — PROGRESS NOTES
04/16/22 1433   General Information   Onset of Illness/Injury or Date of Surgery 04/08/22   Referring Physician Brandon Carrion MD   Patient/Family Therapy Goal Statement (SLP) None stated   Pertinent History of Current Problem 66 year old male with HTN, HLD, DMII (A1c 11.8), NA, cocaine abuse and recurrent L MCA infarcts who presents as a transfer from [facility] for possible STA-MCA bypass. Exam stable from [facility], most notable for receptive>expressive aphasia. The patient has been having recurrent strokes from L M1-M2 stenosis and has undergone a DSA which showed that the stenosis is too distal to stent. Given this the patient was transferred to [facility] for evaluation for a possible ST-MC bypass procedure. For now will continue the patient on DAPT. Decision was take to not do a bypass and go ahead with stenting. Hence the procedure is planned for 04/15/2022. Pt with post-op aphasia. Clinical swallow eval completed at 1415 per MD orders.   Past History of Dysphagia Pt known to SLP caseload with recommendations for regular diet and thin liquids earlier this admission (4/11). Per chart review, pt with some aphasia pre-operatively, but apparently worse this date.   Type of Evaluation   Type of Evaluation Swallow Evaluation   Oral Motor   Oral Musculature generally intact   Structural Abnormalities none present   Mucosal Quality dry   Dentition (Oral Motor)   Dentition (Oral Motor) natural dentition   Facial Symmetry (Oral Motor)   Facial Symmetry (Oral Motor) right side impairment   Right Side Facial Asymmetry minimal impairment  (at rest)   Lip Function (Oral Motor)   Lip Range of Motion (Oral Motor) WNL   Tongue Function (Oral Motor)   Tongue ROM (Oral Motor) WNL   Jaw Function (Oral Motor)   Jaw Function (Oral Motor) WNL   Cough/Swallow/Gag Reflex (Oral Motor)   Soft Palate/Velum (Oral Motor) WNL   Volitional Throat Clear/Cough (Oral Motor) WNL   Vocal Quality/Secretion Management (Oral Motor)   Vocal  Quality (Oral Motor) WNL   Secretion Management (Oral Motor) WNL   General Swallowing Observations   Current Diet/Method of Nutritional Intake (General Swallowing Observations, NIS) NPO   Respiratory Support (General Swallowing Observations) none   Swallowing Evaluation Clinical swallow evaluation   Comment, General Swallowing Observations Pt with frequent hesitancies and word-finding dificulties in conversation.   Clinical Swallow Evaluation   Clinical Swallow Evaluation Textures Trialed thin liquids;pureed;solid foods   Clinical Swallow Eval: Thin Liquid Texture Trial   Mode of Presentation, Thin Liquids cup;self-fed   Volume of Liquid or Food Presented 5 oz   Oral Phase of Swallow WFL   Pharyngeal Phase of Swallow intact   Diagnostic Statement No overt s/sx of aspiration   Clinical Swallow Evaluation: Puree Solid Texture Trial   Mode of Presentation, Puree spoon;self-fed   Volume of Puree Presented 4 oz   Oral Phase, Puree WFL   Pharyngeal Phase, Puree intact   Diagnostic Statement No overt s/sx of aspiration   Clinical Swallow Evaluation: Solid Food Texture Trial   Mode of Presentation self-fed   Volume Presented 3 tbsp   Oral Phase premature pharyngeal entry   Pharyngeal Phase impaired;coughing/choking   Diagnostic Statement Regular solid textures resulted in overt s/sx of aspiration marked by coughing x1 when pt attempting to coordinate talking/swallowing. No additional overt s/sx of aspiration.   Esophageal Phase of Swallow   Patient reports or presents with symptoms of esophageal dysphagia No   Swallowing Recommendations   Diet Consistency Recommendations regular diet;thin liquids (level 0)   Supervision Level for Intake distant supervision needed   Mode of Delivery Recommendations bolus size, small;food moistened;slow rate of intake   Swallowing Maneuver Recommendations alternate food and liquid intake   Monitoring/Assistance Required (Eating/Swallowing) stop eating activities when fatigue is  present;optimize oral intake to minimize need for tube feeding;monitor for cough or change in vocal quality with intake   Recommended Feeding/Eating Techniques (Swallow Eval) maintain upright sitting position for eating;maintain upright posture during/after eating for 30 minutes;minimize distractions during oral intake;set-up and prepare tray   Medication Administration Recommendations, Swallowing (SLP) as tolerated   General Therapy Interventions   Planned Therapy Interventions Dysphagia Treatment   Dysphagia treatment Compensatory strategies for swallowing;Instruction of safe swallow strategies   Clinical Impression   Criteria for Skilled Therapeutic Interventions Met (SLP Eval) Yes, treatment indicated   SLP Diagnosis minimal oropharyngeal dysphagia   Risks & Benefits of therapy have been explained evaluation/treatment results reviewed;care plan/treatment goals reviewed;risks/benefits reviewed;current/potential barriers reviewed;participants voiced agreement with care plan;participants included;patient   Clinical Impression Comments Clinical swallow eval completed per MD orders. Pt presents with minimal oropharyngeal dysphagia. Pt with hx of recurrent L MCA infarcts, with some baseline aphasia. Pt with very minimal R-sided facial droop at rest, otherwise oral mech exam WFL. Pt tolerated thin liquids and pureed textures with no overt s/sx of aspiration. Regular solid textures resulted in overt s/sx of aspiration marked by coughing x1 when pt attempting to coordinate talking/swallowing. No additional overt s/sx of aspiration with regular solid textures. Pt with hesitancies and intermittent word-finding difficulties in conversation. Recommend pt initiate regular textures and thin liquids. Pt should be fully upright and alert for all PO, take small sips/bites, slow pacing, and alternate between consistencies. ST to continue to follow to assess diet tolerance and complete speech/languge eval as appropriate. Anticipate  pt will require ST follow-up at discharge.   SLP Discharge Planning   SLP Discharge Recommendation home with outpatient speech therapy   SLP Rationale for DC Rec pt below baseline swallowing and speech/language skills   SLP Brief overview of current status  Recommend pt initiate regular textures and thin liquids. Pt should be fully upright and alert for all PO, take small sips/bites, slow pacing, and alternate between consistencies.    Total Evaluation Time   Total Evaluation Time (Minutes) 10   SLP Goals   Therapy Frequency (SLP Eval) 5 times/wk   SLP Predicted Duration/Target Date for Goal Attainment 06/10/22   SLP Goals Swallow   SLP: Safely tolerate diet without signs/symptoms of aspiration Regular diet;Thin liquids;With use of swallow precautions;Independently

## 2022-04-16 NOTE — PROCEDURES
[facility]     Endovascular Surgical Neuroradiology Post-Procedure Note    Pre-Procedure Diagnosis:  Left M1 stenosis  Post-Procedure Diagnosis:  same    Procedure(s):   intracranial angioplasty    Findings:    [] severe left M1 stenosis s/p balloon angioplasty of bilateral left M2 branches, first the inferior temporal branch, then the superior branch with gateway 2 X 15 mm balloon   [] After plasty of inferior temporal branch, we plastied the superior M2 branch which then restenosed the inferior temporal branch. We gave 14 mg of Integrilin IA in the left MCA  [] We decided to stop with angioplasty as multiple runs showed that the left M1 remained similar in calibar size after plasty  [] if we need to, we can bring patient back in 1 month while on DAPT and re-plasty and stent at that time    Plan:    [] flat till 2:30 PM  [] DAPT for now, optimize medical management for ICAD    Primary Surgeon:  Dr. Lele Conrad    Fellow:  Grisel  Resident: Miguel A    Prior to the start of the procedure and with procedural staff participation, I verbally confirmed: the patient s identity using two indicators, relevant allergies, that the procedure was appropriate and matched the consent or emergent situation, and that the correct equipment/implants were available. Immediately prior to starting the procedure I conducted the Time Out with the procedural staff and re-confirmed the patient s name, procedure, and site/side. (The Joint Commission universal protocol was followed.)  Yes    PRU value: 103    Anesthesia:  Performed by Anesthesia  Medications:  as per anesthesia; 7000 U of heparin IV, 14 mcg of integrilin IA in left MCA  Puncture site:  Left Femoral Artery    Fluoroscopy time (minutes):  48.7  Radiation dose (mGy):  2056    Contrast amount (mL):  50     Estimated blood loss (mL):  50 cc    Closure:  Device    Disposition:  Will be followed in hospital by the Neuro Critical  Care/Stroke team.        Sedation Post-Procedure Summary    As per anesthesiology    See official report in PACS  MILES Conrad MD

## 2022-04-17 LAB
ANION GAP SERPL CALCULATED.3IONS-SCNC: 6 MMOL/L (ref 3–14)
BUN SERPL-MCNC: 10 MG/DL (ref 7–30)
CALCIUM SERPL-MCNC: 8.7 MG/DL (ref 8.5–10.1)
CHLORIDE BLD-SCNC: 104 MMOL/L (ref 94–109)
CO2 SERPL-SCNC: 25 MMOL/L (ref 20–32)
CREAT SERPL-MCNC: 1.04 MG/DL (ref 0.66–1.25)
ERYTHROCYTE [DISTWIDTH] IN BLOOD BY AUTOMATED COUNT: 12.1 % (ref 10–15)
GFR SERPL CREATININE-BSD FRML MDRD: 79 ML/MIN/1.73M2
GLUCOSE BLD-MCNC: 259 MG/DL (ref 70–99)
GLUCOSE BLDC GLUCOMTR-MCNC: 212 MG/DL (ref 70–99)
GLUCOSE BLDC GLUCOMTR-MCNC: 229 MG/DL (ref 70–99)
GLUCOSE BLDC GLUCOMTR-MCNC: 238 MG/DL (ref 70–99)
GLUCOSE BLDC GLUCOMTR-MCNC: 257 MG/DL (ref 70–99)
GLUCOSE BLDC GLUCOMTR-MCNC: 277 MG/DL (ref 70–99)
HCT VFR BLD AUTO: 41.7 % (ref 40–53)
HGB BLD-MCNC: 13.4 G/DL (ref 13.3–17.7)
MCH RBC QN AUTO: 28.2 PG (ref 26.5–33)
MCHC RBC AUTO-ENTMCNC: 32.1 G/DL (ref 31.5–36.5)
MCV RBC AUTO: 88 FL (ref 78–100)
PLATELET # BLD AUTO: 469 10E3/UL (ref 150–450)
POTASSIUM BLD-SCNC: 3.9 MMOL/L (ref 3.4–5.3)
RBC # BLD AUTO: 4.76 10E6/UL (ref 4.4–5.9)
SODIUM SERPL-SCNC: 135 MMOL/L (ref 133–144)
WBC # BLD AUTO: 8.7 10E3/UL (ref 4–11)

## 2022-04-17 PROCEDURE — 120N000002 HC R&B MED SURG/OB UMMC

## 2022-04-17 PROCEDURE — 250N000013 HC RX MED GY IP 250 OP 250 PS 637

## 2022-04-17 PROCEDURE — 36415 COLL VENOUS BLD VENIPUNCTURE: CPT | Performed by: STUDENT IN AN ORGANIZED HEALTH CARE EDUCATION/TRAINING PROGRAM

## 2022-04-17 PROCEDURE — 99232 SBSQ HOSP IP/OBS MODERATE 35: CPT | Mod: GC | Performed by: INTERNAL MEDICINE

## 2022-04-17 PROCEDURE — 250N000013 HC RX MED GY IP 250 OP 250 PS 637: Performed by: STUDENT IN AN ORGANIZED HEALTH CARE EDUCATION/TRAINING PROGRAM

## 2022-04-17 PROCEDURE — 85027 COMPLETE CBC AUTOMATED: CPT | Performed by: STUDENT IN AN ORGANIZED HEALTH CARE EDUCATION/TRAINING PROGRAM

## 2022-04-17 PROCEDURE — 80048 BASIC METABOLIC PNL TOTAL CA: CPT | Performed by: STUDENT IN AN ORGANIZED HEALTH CARE EDUCATION/TRAINING PROGRAM

## 2022-04-17 PROCEDURE — 99231 SBSQ HOSP IP/OBS SF/LOW 25: CPT | Mod: GC | Performed by: PSYCHIATRY & NEUROLOGY

## 2022-04-17 PROCEDURE — 250N000012 HC RX MED GY IP 250 OP 636 PS 637: Performed by: STUDENT IN AN ORGANIZED HEALTH CARE EDUCATION/TRAINING PROGRAM

## 2022-04-17 RX ADMIN — CLOPIDOGREL BISULFATE 75 MG: 75 TABLET ORAL at 08:05

## 2022-04-17 RX ADMIN — ESCITALOPRAM OXALATE 20 MG: 10 TABLET ORAL at 08:05

## 2022-04-17 RX ADMIN — ROSUVASTATIN CALCIUM 40 MG: 10 TABLET, FILM COATED ORAL at 08:04

## 2022-04-17 RX ADMIN — INSULIN GLARGINE 30 UNITS: 100 INJECTION, SOLUTION SUBCUTANEOUS at 14:32

## 2022-04-17 RX ADMIN — ASPIRIN 325 MG ORAL TABLET 325 MG: 325 PILL ORAL at 08:04

## 2022-04-17 RX ADMIN — SENNOSIDES AND DOCUSATE SODIUM 1 TABLET: 8.6; 5 TABLET ORAL at 22:06

## 2022-04-17 ASSESSMENT — ACTIVITIES OF DAILY LIVING (ADL)
ADLS_ACUITY_SCORE: 6

## 2022-04-17 NOTE — PROGRESS NOTES
IP Diabetes Management  Daily Note           Assessment and Plan:   HPI: Huseyin is a 66 year old male with TIIDM, admitted with multiple recent strokes, and found to have severe L M1 stenosis, transferred to [facility] for consideration of NSG intervention/ EC-IC bypass.     Assessment:   1) Uncontrolled TIIDM (A1c 11.8%)    Plan:    -Lantus 30 units daily. Will reassess the dose tomorrow morning   -Novolog 1:8g CHO coverage with meals and snacks/supplement   - Medium dose correctional scale    Discharge planning:    Lantus daily, will decide the final dose tomorrow    Stop Novolog    Start ozempic 0.25 mg weekly for 4 weeks, then increase to 0.5 mg weekly     Plan will be to start metformin once he tolerates ozempic    Follow up in [facility] in [place]    Patient was seen, examined and discussed with MD Gela Valles MD  Endocrinology Fellow  Pager number [phone]     Interval History and Assessment: interval glucose trend reviewed:  Underwent stenting of left MCA  Insulin infusion DC'ed after that  Doing well today    Does not remember being on victoza  Had been on metformin in the past. He had received augmentin and developed severe diarrhea. Both metformin and augmentin.    Discharge Planning: TBD following procedures.           Diabetes History:   Type of Diabetes: Type 2 Diabetes Mellitus  Lab Results   Component Value Date    A1C 11.8 03/16/2022    A1C 11.8 10/11/2021    A1C 13.2 09/28/2020    A1C 12.3 09/23/2019    A1C 10.4 03/12/2018    A1C 10.0 09/29/2017    A1C 9.5 03/24/2017              Review of Systems:     The Review of Systems is negative other than noted in the Interval History.           Medications:   Reviewed         Physical Exam:    BP (!) 152/86 (BP Location: Right arm)   Pulse 71   Temp 98  F (36.7  C) (Axillary)   Resp 18   Wt 103.7 kg (228 lb 9.6 oz)   SpO2 97%   BMI 42.91 kg/m    General: lying comfortably in bed during assessment  HEENT: normocephalic, atraumatic. Oral  mucous membranes moist.   Lungs: unlabored respiration, no cough  ABD: rounded, nondistended  Skin: warm and dry, no obvious lesions  MSK:  moves all extremities  Lymp:  no LE edema   Mental status:  alert, oriented to self.   Psych: calm interaction with providers.           Data:     Recent Labs   Lab 04/17/22  1304 04/17/22  1157 04/17/22  0809 04/16/22  2242 04/16/22  1705 04/16/22  1606   * 212* 229* 251* 186* 191*

## 2022-04-17 NOTE — DISCHARGE SUMMARY
[facility]    Neurology Stroke Discharge Summary    Date of Admission: 4/8/2022  Date of Discharge: 04/18/2022    Disposition: Discharged to home  Primary Care Physician: Kendal Wong      Admission Diagnosis:   #Recurrent L MCA territory strokes  #L M1 to M2 stenosis    Discharge Diagnosis:   Acute ischemic stroke of Left MCA due to Left M1 and M2 stenosis. S/P balloon angioplasty.   #Diabetes melitus  #Essential HTN  #HLD  #NA  #Generalized anxiety disorder      Problem Leading to Hospitalization (from hospitals):   66 year old male with HTN, HLD, DMII (A1c 11.8), NA, cocaine abuse and recurrent L MCA infarcts who presents as a transfer from [facility] for possible STA-MCA bypass. The patient has been having recurrent strokes from L M1-M2 stenosis and has undergone a DSA which showed that the stenosis is too distal to stent. Given this the patient was transferred to [facility] for evaluation for a possible ST-MC bypass procedure    Please see H&P dated 4/8/2022 for further details about presentation.    Brief Hospital Course:   Patient was transferred from [facility] to [facility] for evaluation for a possible ST-MC bypass procedure due to  recurrent L MCA infarcts at admission the patient had a mild expressive aphasia and mild right sided weakness. Patient was started on a heparin drip which was later transitioned to DAPT. Initially his blood glucose was elevated and was recommended by neurosurgery for bringing the levels under control, increasing the insulin dose did not provide much help, hence recommendation from endocrinology was sought. He was started on an insulin drip which brought his sugars under control. The decision was taken to not go ahead with  ST-MC bypass procedure and to perform an MCA balloon angiopalasty instead. The patient was taken up for surgery on 04/16/2022. The  Patient has been doing well and is being discharged in a stable condition.       Found  to have Acute ischemic stroke of Left MCA territoty due to Left M1 and M2 stenosis. S/P balloon angioplasty.       IV thrombolysis Not given due to:   - unclear or unfavorable risk-benefit profile for extended window thrombolysis beyond the conventional 4.5 hour time window.      Work-up as stated below under Pertinent Investigations.    Etiology is thought to be Left M1 and M2 stenosis..    Rehab evaluation: OT, PT, SLP and PM&R.     Smoking Cessation: patient is not a smoker    BP Long-term Goal: 140/90 or less    Antithrombotic/Anticoagulant Agent: aspirin 325 mg and clopidogrel (Plavix) 75 mg    Statins: Started on rosuvastatin 40 mg       Hgb A1C Goal: < 7.0    Complications: None.     Other problems addressed during this hospitalization:  #Diabetes melitus  #Essential HTN  #HLD  #NA  #Generalized anxiety disorder      PERTINENT INVESTIGATIONS    Labs  Lipid Panel: Recent Labs   Lab Test 04/06/22  0725 04/21/16  0906 08/20/15  0830   CHOL 109   < > 136   HDL 34*   < > 42   LDL 53   < > 59   TRIG 111   < > 176*   CHOLHDLRATIO  --   --  3.2    < > = values in this interval not displayed.     A1C:   Lab Results   Component Value Date    A1C 11.8 03/16/2022    A1C 13.2 09/28/2020     INR: No lab results found in last 7 days.   Coag Panel / Hypercoag Workup: Not indicated  Pending test results: None    Echo:  Left ventricular size, global systolic function are normal, estimated LVEF 60-  65%.  Endocardial borders are not optimally visualized, however no clear wall motion  abnormalities are seen.  Right ventricular global function is normal.  No significant valvular abnormalities.  There is no color Doppler evidence of an atrial shunt.      Imaging:  CT head :  Expected evolution of multiple acute/subacute ischemic  infarcts of varying ages in the left cerebral hemisphere, as  described. Unchanged mild left temporal lobe swelling. No acute  intracranial hemorrhage identified on CT. No midline  shift/herniation.     MRI OF THE BRAIN WITHOUT AND WITH CONTRAST  MRA OF THE HEAD WITHOUT CONTRAST  MRA OF THE NECK WITHOUT AND WITH CONTRAST :   1.  No change.  2.  Severe distal left M1 stenosis with decreased signal in the left  M2 and M3 branches.  3.  Moderate left and mild right cavernous carotid stenoses.  4.  Mild stenosis in the left P2/P3 junction.     NECK MRA:  1.  Normal neck MRA for age.  2.  No significant stenosis as per NASCET criteria.     MRI OF THE BRAIN WITHOUT AND WITH CONTRAST :  1. Evolving subacute ischemic infarct in the left temporal lobe again  noted without change.  2. 2 new tiny recent white matter infarcts in the periatrial white  matter of the left cerebral hemisphere.  3. No other new infarcts.  4. Otherwise, normal brain MRI.    CTA H/N  1. Unchanged severe/critical focal stenosis of the left M1-M2 middle  cerebral artery bifurcation.  2. Unchanged moderate/moderate to severe tandem stenoses involving the  P2 segment of the left posterior cerebral artery.  3. Unchanged atherosclerotic disease involving the bilateral carotid  siphons resulting in up to moderate left and mild right paraclinoid  segment internal carotid artery stenosis.  4. No definite new high-grade central arterial stenosis/large vessel  occlusion identified.  5. Unchanged mild atherosclerosis of the bilateral carotid  bifurcations without flow limiting stenosis.  6. Unchanged moderate atherosclerotic stenosis at the origin of the  left vertebral artery.     Endovascular procedure: Left MCA Baloon angioplasty     Cardiac Monitoring: Patient had > 24 hrs of cardiac monitor while in hospital.    Findings: No atrial fibrillation was found.    Sleep Apnea Screen:   Has C-PAP at home    PHQ-9 Depression Screen Score: 1    Education discussed with: patient and spouse on blood pressure management, cholesterol management, medical management, diet modification, exercise recommendation and follow-up  recommendations/plan.    During daily rounds, the plan of care was discussed and developed with patient and spouse.  Plan of care includes: surgery detals, blood sugar and blood pressure control, prognosis. .    PHYSICAL EXAMINATION  Vital Signs:  B/P: 138/74, T: 98.3, P: 60, R: 22    General:  patient lying in bed without any acute distress     HEENT:  normocephalic/atraumatic  Cardio:  RRR  Pulmonary:  no respiratory distress  Abdomen:  soft  Extremities:  no edema  Skin:  intact     Neurologic  Mental Status:  fully alert, attentive and oriented, follows commands, speech clear and fluent  Cranial Nerves:  visual fields intact, PERRL, EOMI with normal smooth pursuit, facial sensation intact and symmetric, facial movements symmetric, hearing not formally tested but intact to conversation, palate elevation symmetric and uvula midline, no dysarthria, shoulder shrug strong bilaterally, tongue protrusion midline  Motor:  no abnormal movements, normal tone throughout, normal muscle bulk, no pronator drift, strength 5/5 throughout upper and lower extremities  Reflexes:  Did not examine  Sensory:  intact/symmetric to light touch and pin prick throughout upper and lower extremities  Coordination:  FNF and HS intact without dysmetria  Station/Gait:  Deferred.     National Institutes of Health Stroke Scale (on day of discharge)  NIHSS Total Score: 0    Modified Potter Score (Discharge)  0-No deficits    Medications    Discharge Medication List as of 4/18/2022  1:40 PM      START taking these medications    Details   Insulin Glargine-yfgn (SEMGLEE, YFGN,) 100 UNIT/ML SOPN Inject 40 Units Subcutaneous At Bedtime, Disp-15 mL, R-0, Local Print         CONTINUE these medications which have CHANGED    Details   !! insulin aspart (NOVOLOG FLEXPEN) 100 UNIT/ML pen Inject 5 Units Subcutaneous Take with snacks or supplements for other (With small meals), Disp-15 mL, R-0, Local Print      !! insulin aspart (NOVOLOG PEN) 100 UNIT/ML pen  Inject 10 Units Subcutaneous as needed for other (With large meals), Disp-15 mL, R-0, Local Print       !! - Potential duplicate medications found. Please discuss with provider.      CONTINUE these medications which have NOT CHANGED    Details   alcohol swab prep pads Use to swab area of injection/ian as directedDisp-100 each, [id number]      aspirin (ASA) 325 MG EC tablet Take 1 tablet (325 mg) by mouth daily, Disp-90 tablet, R-0, E-Prescribe      blood glucose (NO BRAND SPECIFIED) test strip Use to test blood sugar 3 times daily or as directed. To accompany: Blood Glucose Monitor Brands: per insurance., Disp-300 strip, R-1, E-Prescribe      blood glucose calibration (NO BRAND SPECIFIED) solution Use to calibrate blood glucose monitor as needed as directed. To accompany: Blood Glucose Monitor Brands: per insurance., Disp-1 Bottle, R-3, E-Prescribe      clopidogrel (PLAVIX) 75 MG tablet Take 1 tablet (75 mg) by mouth daily, Disp-90 tablet, R-1, E-Prescribe      clotrimazole (LOTRIMIN) 1 % external cream Apply topically 2 times dailyDisp-30 g, [id number]      escitalopram (LEXAPRO) 20 MG tablet Take 1 tablet (20 mg) by mouth daily, Disp-90 tablet, R-3, E-Prescribe      insulin pen needle (BD MARIE U/F) 32G X 4 MM miscellaneous USE THREE PEN NEEDLES DAILY AS DIRECTEDDisp-300 each, [id number]      LORazepam (ATIVAN) 0.5 MG tablet Take 1 tablet (0.5 mg) by mouth once as needed for anxiety, Disp-3 tablet, R-1, E-Prescribe      ORDER FOR DME Auto-CPAP: Max 12 cm H2O Min 10 cm H2O  Continuous Lifetime need and heated humidity.   Disp-1 Device, R-0, Sleep Center      rosuvastatin (CRESTOR) 40 MG tablet Take 1 tablet (40 mg) by mouth daily, Disp-90 tablet, R-3, E-Prescribe      thin (NO BRAND SPECIFIED) lancets Use to test blood sugar 3 times daily or as directed. To accompany: Blood Glucose Monitor Brands: per insurance., Disp-200 each, R-6, E-Prescribe         STOP taking these medications       insulin  glargine (LANTUS SOLOSTAR) 100 UNIT/ML pen Comments:   Reason for Stopping:               Additional recommendations and follow up:       Care Coordination Referral      Follow Up ([facility])    Please schedule patient with [facility] diabetes educator and endocrinology follow up at the [place], [place] location, within 2-3 weeks of hospital discharge.     Appointments on [place] and/or [facility] (with [facility] or [facility] provider or service). Call [phone] if you haven't heard regarding these appointments within 7 days of discharge.     Reason for your hospital stay    You were admitted to the hospital due to recurrent Left MCA territory strokes due to Left M1 to M2 stenosis     Activity    Your activity upon discharge: activity as tolerated     Follow Up and recommended labs and tests    Follow up with Endocrinology, Stroke neurology and interventional neuro radiology clinic, within 4-6 weeks for hospital follow- up. No follow up labs or test are needed.     Diet    Follow this diet upon discharge: Orders Placed This Encounter      Regular Diet Adult Thin Liquids (level 0)       Patient was seen and discussed with the Attending, Dr. Carrion.    Francisco Lovell MD   Neurology PGY1  [facility]COM [id number]

## 2022-04-17 NOTE — PROGRESS NOTES
NeuroIR Note  Briefly this is a 66 year old man with uncontrolled vascular risk factors (DM A1c of 11.8 % , HTN, HLD, NA on CPAP, obesity), depression/anxiety and cocaine use who has had 3 strokes in the last 4 weeks in the left MCA territory. After discussion in neuroIR conference we determined that intracranial angioplasty and stent placement would be best course of action. Patient started on ASA and plavix.   I discussed extensively the need for pt to remain medication compliant. I discussed with patient and wife the risks and benefits of procedure.  We discussed performing a cerebral angiogram with angioplasty and possible stent placement under general anesthesia, we discussed risks of groin hematoma, retroperitoneal bleed, pain at groin site, infection, risk of dissection of vessels and stroke. Patient has receptive aphasia thus consent was obtained from wife who is agreeable to procedure and risks.      Prior to procedure, on exam: he has receptive aphasia with mild right upper extremity pronator drift.     We performed angioplasty on 4/17 we found  severe left M1 stenosis s/p balloon angioplasty of bilateral left M2 branches, first the inferior temporal branch, then the superior branch with gateway 2 X 15 mm balloon. After plasty of inferior temporal branch, we plastied the superior M2 branch which then restenosed the inferior temporal branch. We gave 14 mg of Integrilin IA in the left MCA. We decided to stop with angioplasty as multiple runs showed that the left M1 remained similar in calibar size after plasty.    Post op he has remained neurologically at baseline and remains in ICU. We would recommend continuing pt on maximal medical management including DAPT. Wife has expressed significant concerns for medication compliance, we believed he would be high risk for intimal hyperplasia of stent given this and his other risk factors. If there is further need in future we can always bring him back and replasty  and place stent at that time.      Cont ASA and plavix and maximal medical management as per stroke recommendations.

## 2022-04-17 NOTE — PLAN OF CARE
Arrived from: 4A  Belongings/meds: Phone, clothes, shoes  2 RN Skin Assessment Completed by: Siria Rader    Non-intact findings documented (yes/no/NA): N/A

## 2022-04-17 NOTE — PROGRESS NOTES
"[facility]    Stroke Progress Note    Interval EventsNo acute events overnight. Neuro IR balloon angioplasty went well. Patient does not endorse of any complaints. His speech has improved according to him and his wfe. Ho new weakness, numbness, tingling.     HPI Summary  Huseyin St is a 66 year old male with HTN, HLD, DMII (A1c 11.8), NA, cocaine abuse and recurrent L MCA infarcts who presents as a transfer from [facility] for possible STA-MCA bypass. Patient was admitted to [facility] 4/5/22 for worsening R sided weakness and aphasia. Patient was admitted twice in March for L MCA territory strokes (likely d/t L M1 stenosis). The first admission was 3/16-3/18 and the patient was started on DAPT. He was admitted again 3/24-3/27 due to expressive aphasia and was found to have 2 new cortical white matter strokes. Given the recurrent strokes on DAPT neuro-IR was consulted for possible stenting but did not recommend stenting due to poorly controlled diabetes.      The patient returned to the hospital 4/5/22 due to \"talking gibberish\" for 2 days where the patient was making repeated significant paraphasic errors and was having increased R sided weakness. During that admission the patient was seen by the stroke team several times and underwent a DSA which showed critical stenosis of L M1 artery extending into b/l M2 divisions which made stenting difficult. Given this, the decision was made to transfer the patient to [facility] for a possible ST-MC bypass which could occur as early as next week.      On arrival, the patient is able to only provide a cursory history as he is making frequent paraphasic errors. He reports frustration with being awoken, but notes that his symptoms are relatively stable. Voices no new complaints.    Stroke Evaluation Summarized    MRI/Head CT CT head :  Expected evolution of multiple acute/subacute ischemic  infarcts of varying ages in the left " cerebral hemisphere, as  described. Unchanged mild left temporal lobe swelling. No acute  intracranial hemorrhage identified on CT. No midline shift/herniation.    MRI OF THE BRAIN WITHOUT AND WITH CONTRAST  MRA OF THE HEAD WITHOUT CONTRAST  MRA OF THE NECK WITHOUT AND WITH CONTRAST :   1.  No change.  2.  Severe distal left M1 stenosis with decreased signal in the left  M2 and M3 branches.  3.  Moderate left and mild right cavernous carotid stenoses.  4.  Mild stenosis in the left P2/P3 junction.     NECK MRA:  1.  Normal neck MRA for age.  2.  No significant stenosis as per NASCET criteria.     MRI OF THE BRAIN WITHOUT AND WITH CONTRAST :  1. Evolving subacute ischemic infarct in the left temporal lobe again  noted without change.  2. 2 new tiny recent white matter infarcts in the periatrial white  matter of the left cerebral hemisphere.  3. No other new infarcts.  4. Otherwise, normal brain MRI.   Intracranial + cervical Vasculature 1. Unchanged severe/critical focal stenosis of the left M1-M2 middle  cerebral artery bifurcation.  2. Unchanged moderate/moderate to severe tandem stenoses involving the  P2 segment of the left posterior cerebral artery.  3. Unchanged atherosclerotic disease involving the bilateral carotid  siphons resulting in up to moderate left and mild right paraclinoid  segment internal carotid artery stenosis.  4. No definite new high-grade central arterial stenosis/large vessel  occlusion identified.  5. Unchanged mild atherosclerosis of the bilateral carotid  bifurcations without flow limiting stenosis.  6. Unchanged moderate atherosclerotic stenosis at the origin of the  left vertebral artery.      Echocardiogram Technically challenging study due to patient body habitus, even with the use  of contrast imaging.     Left ventricular size, global systolic function are normal, estimated LVEF 60-  65%.  Endocardial borders are not optimally visualized, however no clear wall motion  abnormalities  are seen.  Right ventricular global function is normal.  No significant valvular abnormalities.  There is no color Doppler evidence of an atrial shunt.     There are no prior studies available for comparison.   Other Testing Not Applicable     LDL  4/6/2022: 53 mg/dL   A1C  No lab value available in past 30 days   Troponin No lab value available in past 48 hrs       Impression   66 year old male with HTN, HLD, DMII (A1c 11.8), NA, cocaine abuse and recurrent L MCA infarcts who presents as a transfer from [facility] for possible STA-MCA bypass. Exam stable from [facility], most notable for receptive>expressive aphasia. The patient has been having recurrent strokes from L M1-M2 stenosis and has undergone a DSA which showed that the stenosis is too distal to stent. Given this the patient was transferred to [facility] for evaluation for a possible ST-MC bypass procedure. For now will continue the patient on DAPT. Decision was take to not do a bypass and go ahead with stenting. Hence the procedure is planned for 04/15/2022.      Plan  #Recurrent L MCA territory strokes  #L M1 to M2 stenosis  - Neuro IR Stenting completed.  - Shift back to  discontinued Telemetry.   - Continue  mg  - Stopped Heparin drip  - S/P loading dose of 450 mg of Plavix  - ordered 75 mg Plavix daily.   - Continue rosuvastatin 40 mg daily   - SBP goal normotension, 100-180  - Regular diet with thins (cleared by speech at [facility])   - Consistent Carbohydrate diet  - Avoid hypotonic IV fluids  - PT/OT/SLP  - Depression Screen  - Apnea Screen  - Euthermia, Euglycemia     #Essential HTN  - Holding PTA lisinopril      #HLD  - Continue rosuvastatin      #Poorly controlled T2DM (A1c 11.8)  - Endocrine consulted, awaiting discharge insulin recs  - Continuing management subcutaneous insulin  - Insulin gtt can be started if surgery needs to be completed before control is achieved w/ subcutaneous  - Endocrine outpatient referral placed for follow up.       #NA  - CPAP as able     #Generalized anxiety disorder  - Continue Lexapro          Prophylaxis            DVT: SCDs for tonight given unclear timeline for procedure     For Acid Suppression:  - GI prophylaxis is not indicated    Patient Follow-up        We will continue to follow.     The patient was discussed with Stroke Staff is Dr. Thakur.    Francisco Lovell MD  Neurology Resident, PGY1  ASCOM: [id number]  ______________________________________________________    Clinically Significant Risk Factors Present on Admission                  Medications   Scheduled Meds    aspirin  325 mg Oral or Feeding Tube Daily     clopidogrel  75 mg Oral Daily     escitalopram  20 mg Oral or Feeding Tube Daily     insulin aspart  1-7 Units Subcutaneous TID AC     insulin aspart  1-5 Units Subcutaneous At Bedtime     rosuvastatin  40 mg Oral or Feeding Tube Daily     senna-docusate  1 tablet Oral At Bedtime     sodium chloride (PF)  3 mL Intracatheter Q8H     sodium chloride (PF)  3 mL Intracatheter Q8H     sodium chloride (PF)  3 mL Intracatheter Q8H       Infusion Meds    - MEDICATION INSTRUCTIONS -       - MEDICATION INSTRUCTIONS -       - MEDICATION INSTRUCTIONS -         PRN Meds  acetaminophen, glucose **OR** dextrose **OR** glucagon, labetalol **OR** hydrALAZINE, ibuprofen **OR** ibuprofen, lidocaine 4%, lidocaine (buffered or not buffered), lidocaine (buffered or not buffered), - MEDICATION INSTRUCTIONS -, - MEDICATION INSTRUCTIONS -, - MEDICATION INSTRUCTIONS -, naloxone **OR** naloxone **OR** naloxone **OR** naloxone, ondansetron **OR** ondansetron, prochlorperazine **OR** prochlorperazine **OR** prochlorperazine, sodium chloride (PF), sodium chloride (PF), sodium chloride (PF)       PHYSICAL EXAMINATION  Temp:  [98  F (36.7  C)-99.1  F (37.3  C)] 98  F (36.7  C)  Pulse:  [60-75] 71  Resp:  [15-22] 18  BP: (114-160)/(71-96) 152/86  SpO2:  [95 %-100 %] 97 %      General: Asleep, awakens to voice in NAD   HEENT:  Normocephalic, atraumatic, no epistaxis, no oral lesions  Resp: Breathing comfortably on room air  Extremities: Warm and well perfused, peripheral pulses present, no edema  Skin: Not jaundiced, no rash, no ecchymoses  Psych: Normal mood and affect     Neuro:  Mental status: Alert and oriented to person, place, and time. Follows commands intermittently  Speech: Fluent,; No dysarthria, occasional paraphasic errors. Fluent receptive aphasia  Cranial nerves: Visual fields intact, Eyes conjugate, EOMI w/ normal and smooth pursuit, L lower facial droop, hearing intact to conversation, shoulder shrug strong, palate rise symmetric, tongue/uvula midline.  Motor: Tone normal. LUE is 5/5, RUE is 4+/5, LLE is 5/5, RLE is 4+/5. No abnormal movements noted. Pronator drift absent.  Reflexes: Toes down-going bilaterally.  Sensory: Intact to light touch in all 4 extremities  Coordination: FNF intact bilaterally with no dysmetria; Normal heel-shin test bilaterally with no dysmetria noted  Gait: Deferred    Stroke Scales    NIHSS     National Institutes of Health Stroke Scale  Exam Interval: 7 - 10 days   Score    Level of consciousness: (0)   Alert, keenly responsive    LOC questions: (0)   Answers both questions correctly    LOC commands: (0)   Performs both tasks correctly    Best gaze: (0)   Normal    Visual: (0)   No visual loss    Facial palsy: (0)   Normal symmetrical movements    Motor arm (left): (0)   No drift    Motor arm (right): (0)   No drift    Motor leg (left): (0)   No drift    Motor leg (right): (0)   No drift    Limb ataxia: (0)   Absent    Sensory: (0)   Normal- no sensory loss    Best language: (0)   Normal- no aphasia    Dysarthria: (0)   Normal    Extinction and inattention: (0)   No abnormality        Total Score:  0       Imaging  I personally reviewed all imaging; relevant findings per HPI.     Lab Results Data   CBC  Recent Labs   Lab 04/15/22  0559 04/14/22  0903 04/13/22  0759   WBC 8.3 9.7 11.0   RBC 4.71  4.99 5.17   HGB 13.3 14.0 14.5   HCT 40.0 42.7 43.9   * 457* 514*     Basic Metabolic Panel    Recent Labs   Lab 04/17/22  1157 04/17/22  0809 04/16/22  2242 04/15/22  0610 04/15/22  0559 04/14/22  0910 04/14/22  0903 04/13/22  0807 04/13/22  0759   NA  --   --   --   --  138  --  137  --  138   POTASSIUM  --   --   --   --  3.4  --  3.6  --  3.4   CHLORIDE  --   --   --   --  106  --  104  --  104   CO2  --   --   --   --  24  --  23  --  25   BUN  --   --   --   --  9  --  10  --  13   CR  --   --   --   --  1.08  --  1.10  --  1.11   * 229* 251*   < > 109*   < > 128*   < > 94   EVER  --   --   --   --  8.9  --  9.2  --  9.4    < > = values in this interval not displayed.     Liver Panel  No results for input(s): PROTTOTAL, ALBUMIN, BILITOTAL, ALKPHOS, AST, ALT, BILIDIRECT in the last 168 hours.  INR    Recent Labs   Lab Test 03/16/22  1704   INR 1.02      Lipid Profile    Recent Labs   Lab Test 04/06/22  0725 03/16/22  1704 10/11/21  0942 04/21/16  0906 08/20/15  0830 01/14/15  0847 04/21/14  0815   CHOL 109 224* 228*   < > 136 316* 155   HDL 34* 33* 37*   < > 42 41 37*   LDL 53 136* 116*   < > 59 219* 80   TRIG 111 275* 377*   < > 176* 282* 189*   CHOLHDLRATIO  --   --   --   --  3.2 7.7* 4.2    < > = values in this interval not displayed.     A1C    Recent Labs   Lab Test 03/16/22  1704 10/11/21  0943 09/28/20  0925   A1C 11.8* 11.8* 13.2*     Troponin I    No results for input(s): TROPONINIS, TROPONINI, GHTROP in the last 168 hours.

## 2022-04-17 NOTE — PLAN OF CARE
Goal Outcome Evaluation:        Problem: Adjustment to Illness (Stroke, Ischemic/Transient Ischemic Attack)  Goal: Optimal Coping  Outcome: Ongoing, Progressing        D. Awake alert- no deficites noted at this time- up in room gait steady uses br- able to eat /drink without difficulty- vss bp 150/86 DR aware. Hr 70 without ectopy.  A stable post procedure groin site intact no hematoma-   I transfer to [facility]er.

## 2022-04-18 ENCOUNTER — APPOINTMENT (OUTPATIENT)
Dept: OCCUPATIONAL THERAPY | Facility: CLINIC | Age: 67
DRG: 023 | End: 2022-04-18
Attending: PSYCHIATRY & NEUROLOGY
Payer: COMMERCIAL

## 2022-04-18 VITALS
SYSTOLIC BLOOD PRESSURE: 134 MMHG | WEIGHT: 228.6 LBS | HEART RATE: 68 BPM | BODY MASS INDEX: 42.91 KG/M2 | TEMPERATURE: 97.7 F | RESPIRATION RATE: 19 BRPM | OXYGEN SATURATION: 98 % | DIASTOLIC BLOOD PRESSURE: 63 MMHG

## 2022-04-18 LAB
GLUCOSE BLDC GLUCOMTR-MCNC: 206 MG/DL (ref 70–99)
GLUCOSE BLDC GLUCOMTR-MCNC: 246 MG/DL (ref 70–99)

## 2022-04-18 PROCEDURE — 99232 SBSQ HOSP IP/OBS MODERATE 35: CPT | Mod: GC | Performed by: INTERNAL MEDICINE

## 2022-04-18 PROCEDURE — 250N000013 HC RX MED GY IP 250 OP 250 PS 637

## 2022-04-18 PROCEDURE — 97535 SELF CARE MNGMENT TRAINING: CPT | Mod: GO

## 2022-04-18 PROCEDURE — 250N000013 HC RX MED GY IP 250 OP 250 PS 637: Performed by: STUDENT IN AN ORGANIZED HEALTH CARE EDUCATION/TRAINING PROGRAM

## 2022-04-18 PROCEDURE — 99238 HOSP IP/OBS DSCHRG MGMT 30/<: CPT | Mod: GC | Performed by: PSYCHIATRY & NEUROLOGY

## 2022-04-18 PROCEDURE — 999N000147 HC STATISTIC PT IP EVAL DEFER

## 2022-04-18 PROCEDURE — 97530 THERAPEUTIC ACTIVITIES: CPT | Mod: GO

## 2022-04-18 RX ORDER — SEMAGLUTIDE 1.34 MG/ML
0.5 INJECTION, SOLUTION SUBCUTANEOUS
Qty: 2 MG | Refills: 0 | Status: SHIPPED | OUTPATIENT
Start: 2022-05-16 | End: 2022-05-25

## 2022-04-18 RX ORDER — INSULIN ASPART 100 [IU]/ML
5 INJECTION, SOLUTION INTRAVENOUS; SUBCUTANEOUS
Qty: 15 ML | Refills: 0 | Status: SHIPPED | OUTPATIENT
Start: 2022-04-18 | End: 2022-08-02

## 2022-04-18 RX ORDER — SEMAGLUTIDE 1.34 MG/ML
0.25 INJECTION, SOLUTION SUBCUTANEOUS
Qty: 1 MG | Refills: 0 | Status: SHIPPED | OUTPATIENT
Start: 2022-04-18 | End: 2022-05-16

## 2022-04-18 RX ORDER — INSULIN GLARGINE-YFGN 100 [IU]/ML
40 INJECTION, SOLUTION SUBCUTANEOUS AT BEDTIME
Qty: 15 ML | Refills: 0 | Status: SHIPPED | OUTPATIENT
Start: 2022-04-18 | End: 2022-05-18

## 2022-04-18 RX ADMIN — ASPIRIN 325 MG ORAL TABLET 325 MG: 325 PILL ORAL at 08:19

## 2022-04-18 RX ADMIN — ROSUVASTATIN CALCIUM 40 MG: 10 TABLET, FILM COATED ORAL at 08:19

## 2022-04-18 RX ADMIN — INSULIN GLARGINE 30 UNITS: 100 INJECTION, SOLUTION SUBCUTANEOUS at 08:20

## 2022-04-18 RX ADMIN — ESCITALOPRAM OXALATE 20 MG: 10 TABLET ORAL at 08:19

## 2022-04-18 RX ADMIN — CLOPIDOGREL BISULFATE 75 MG: 75 TABLET ORAL at 08:19

## 2022-04-18 ASSESSMENT — ACTIVITIES OF DAILY LIVING (ADL)
ADLS_ACUITY_SCORE: 6

## 2022-04-18 ASSESSMENT — PATIENT HEALTH QUESTIONNAIRE - PHQ9: SUM OF ALL RESPONSES TO PHQ QUESTIONS 1-9: 1

## 2022-04-18 NOTE — PROGRESS NOTES
Vital signs:  Temp: 98.1  F (36.7  C) Temp src: Oral BP: 137/79 Pulse: 68   Resp: 16 SpO2: 95 % O2 Device: None (Room air)     Status: 66 y.o male admitted 04/08/22 with ischemic stroke and LM! Stenosis, was transferred to [facility] for consideration of NSG intervention/EC-IC bypass. Hx of essential hypertension, AN, obesity, CKD1. DM2    Activity: Independent, SBA  Neuros: A&O x4, refused 0400 neuro check  Cardiac:  WDL.   Respiratory:  WDL on RA. Denies SOB.  GI/:  Voiding spontaneously. +BS, passing flatus, no BM this shift  Diet: Regular  Skin: intact  Lines: 2 L PIV, SL  Incisions/Drains: None  Labs: Reviewed  Pain/nausea: Denies.  New changes this shift: none  Plan:  Possible discharge today, POC

## 2022-04-18 NOTE — PLAN OF CARE
Speech Language Therapy Discharge Summary    Reason for therapy discharge:    Discharged to home.    Progress towards therapy goal(s). See goals on Care Plan in [facility] electronic health record for goal details.  Goals partially met.  Barriers to achieving goals:   discharge from facility.    Therapy recommendation(s):    Continued therapy is recommended.  Rationale/Recommendations:  for assessment of speech-language function.   Pt on regular diet with thin liquids at time of discharge. May not require ongoing intervention for dysphagia if maintaining tolerance of PO diet.

## 2022-04-18 NOTE — PROGRESS NOTES
CLINICAL NUTRITION SERVICES    Reviewed nutrition risk factors due to LOS. Pt is tolerating diet, eating well per nursing documentation (consuming % meals per RN flowsheets). Wt loss from PTA doesn't meet criteria (see wt hx below), but no wt taken besides admit wt so unable to assess current wt trends. No nutrition issues identified at this time, no concerns from nursing standpoint, and likely discharge today per discussion with bedside RN. RD will follow via rounds at this time, unless consulted.    Wt Readings from Last 20 Encounters:   04/08/22 103.7 kg (228 lb 9.6 oz)   04/05/22 105.7 kg (233 lb)   04/01/22 106.1 kg (234 lb)   03/25/22 107.5 kg (236 lb 15.9 oz)   03/24/22 105.7 kg (233 lb)   03/16/22 104.3 kg (230 lb)   10/11/21 106.1 kg (234 lb)   12/11/20 108.5 kg (239 lb 3.2 oz)   10/26/20 104.3 kg (230 lb)   10/13/20 104.3 kg (230 lb)   09/28/20 106.1 kg (234 lb)   09/23/19 109.7 kg (241 lb 14.4 oz)   09/16/19 110.2 kg (243 lb)   07/20/18 113.4 kg (250 lb)   03/12/18 114.6 kg (252 lb 9.6 oz)   07/28/17 112.5 kg (248 lb)   04/17/17 113.9 kg (251 lb)   03/24/17 113.7 kg (250 lb 11.2 oz)   02/15/17 116.1 kg (256 lb)   12/19/16 118.6 kg (261 lb 6.4 oz)     Liana Tran RD, LD  Pager: [phone]

## 2022-04-18 NOTE — PLAN OF CARE
PT: Per OT pt does not require 2 inpatient therapies at this time, OT meeting all inpatient therapy needs, PT orders completed.

## 2022-04-18 NOTE — PROGRESS NOTES
Endovascular surgical neuroradiology progress note    66-year-old man with PMH of HTN, HLD, NA on CPAP, obesity and poorly controlled diabetes status post 3 left MCA ischemic strokes in the setting of severe left M1-M2 junction stenosis.  The patient has resolving expressive aphasia with intermittent paraphasic errors and right upper extremity pronator drift.  He underwent endovascular angioplasty on 4/17/2022.  His neurological exam remained stable post plasty.  Given intraprocedural restenosis of the temporal branch following angioplasty of the frontal branch, and concerns for medication noncompliance, MCA stenting was not performed.  The patient remains on aspirin Plavix.  Recommend maximum medical medical management per primary team.  No acute events overnight.  Neurological exam remains unchanged this morning.  Bilateral groin sites remain clean, dry and intact.  No hematoma.    ESN fellow  Pager [phone]

## 2022-04-18 NOTE — PROGRESS NOTES
IP Diabetes Management  Daily Note           Assessment and Plan:   HPI: Huseyin is a 66 year old male with TIIDM, admitted with multiple recent strokes, and found to have severe L M1 stenosis, transferred to [facility] for consideration of NSG intervention/ EC-IC bypass.     Assessment:   1) Uncontrolled TIIDM (A1c 11.8%)    Plan:    -increase Lantus to 40 units daily.    -Novolog 1:8g CHO coverage with meals and snacks/supplement   -Medium dose correctional scale    Discharge planning:    Lantus 40 units daily    Novolog 5 units with small meals, 10 units with alycia meals TID     Start ozempic 0.25 mg weekly for 4 weeks, then increase to 0.5 mg weekly     Plan will be to start metformin once he tolerates ozempic    Follow up in [facility] clinic in [place]    Patient was seen and discussed with MD Amelia Mims MD  Endocrinology fellow  Page number: [phone]       Interval History and Assessment: interval glucose trend reviewed:    Doing well today, ready to go home, wife at bedside.  Patient still has some memory issues from stroke as per wife  Discussed about the indication of Ozempic, side effects.            Diabetes History:   Type of Diabetes: Type 2 Diabetes Mellitus  Lab Results   Component Value Date    A1C 11.8 03/16/2022    A1C 11.8 10/11/2021    A1C 13.2 09/28/2020    A1C 12.3 09/23/2019    A1C 10.4 03/12/2018    A1C 10.0 09/29/2017    A1C 9.5 03/24/2017              Review of Systems:     The Review of Systems is negative other than noted in the Interval History.           Medications:   Reviewed         Physical Exam:    /63 (BP Location: Left arm)   Pulse 68   Temp 97.7  F (36.5  C) (Axillary)   Resp 19   Wt 103.7 kg (228 lb 9.6 oz)   SpO2 98%   BMI 42.91 kg/m    General: sitting comfortably in bed during assessment  HEENT: normocephalic, atraumatic. Oral mucous membranes moist.   Lungs: unlabored respiration, no cough  ABD: rounded, nondistended  Skin: warm and dry, no  obvious lesions  MSK:  moves all extremities  Mental status:  alert, oriented to self.   Psych: calm interaction with providers.           Data:     Recent Labs   Lab 04/18/22  1157 04/18/22  0826 04/17/22  2207 04/17/22  1618 04/17/22  1429 04/17/22  1304   * 246* 238* 277* 257* 259*       I, Gena Le, was present with the fellow who participated in the service and in the documentation of the note.  I have verified the history and personally performed the physical exam and medical decision making.  I agree with the assessment and plan of care as documented in the note.     Gena Le MD

## 2022-04-18 NOTE — PLAN OF CARE
Status:  VS: VSS on RA  Neuros: A&Ox4 w/choices. Mild expressive aphasia  GI: Tolerating regular diet. Carbs covered. Last BM 4/17  : Voiding w/out difficulty  IV: PIV x2 removed prior to d/c  Activity: Independent; worked w/OT  Pain: Denies  Skin: R groin site soft, CDI  Labs/Tests: Pt refused AM labs, Neuro MD Katerin aware  Social: Wife at bedside, supportive  Plan of care: Discharged to home; see below    Discharge time/date: 1400, 4/18/22  Walked or Wheelchair: Walked w/wife  PIV removed: Yes  Reviewed AVS with patient: Yes, and pts wife  Medication due times added to AVS in EPIC: Yes  Verbalized understanding of discharge with teachback: Yes  Medications retrieved from pharmacy: Yes  Supplies sent home: N/a  Belongings from security with patient: N/a    Pts wife wondering if pt would be sent home w/ozempic; Neuro MD Katerin asked, would follow up w/pt via phone.

## 2022-04-18 NOTE — PLAN OF CARE
Occupational Therapy Discharge Summary    Reason for therapy discharge:    Discharged to home with outpatient therapy.    Progress towards therapy goal(s). See goals on Care Plan in [facility] electronic health record for goal details.  Goals partially met.  Barriers to achieving goals:   discharge from facility.    Therapy recommendation(s):    Continued therapy is recommended.  Rationale/Recommendations:  to increase safety with ADL/IADL completion due to cognitive deficits.

## 2022-04-18 NOTE — PLAN OF CARE
Status: Admitted with multiple recent strokes, and found to have severe L M1 stenosis, transferred to [facility] for consideration of NSG intervention/ EC-IC bypass.   Vitals: VSS ex htn within parameters  Neuros: Expressive aphasia. AOx4. 5/5 throughout. Denies N/T. Refused 2000 neuros.  IV: L PIVs SL  Labs/Electrolytes: BG checks.  Resp/trach: on RA  Diet: Regular, carb coverage  Bowel status: LB 04/17  Skin: Intact  Pain: Denies  Activity: Independent  Social: No visitors this evening.  Plan: discharge tomorrow.

## 2022-04-19 ENCOUNTER — HOSPITAL ENCOUNTER (OUTPATIENT)
Dept: OCCUPATIONAL THERAPY | Facility: CLINIC | Age: 67
Discharge: HOME | End: 2022-04-19
Payer: COMMERCIAL

## 2022-04-19 ENCOUNTER — PATIENT OUTREACH (OUTPATIENT)
Dept: CARE COORDINATION | Facility: CLINIC | Age: 67
End: 2022-04-19
Payer: COMMERCIAL

## 2022-04-19 DIAGNOSIS — I63.9 CEREBROVASCULAR ACCIDENT (CVA), UNSPECIFIED MECHANISM (H): Primary | ICD-10-CM

## 2022-04-19 DIAGNOSIS — Z78.9 DECREASED ACTIVITIES OF DAILY LIVING (ADL): ICD-10-CM

## 2022-04-19 DIAGNOSIS — Z71.89 OTHER SPECIFIED COUNSELING: ICD-10-CM

## 2022-04-19 PROCEDURE — 97165 OT EVAL LOW COMPLEX 30 MIN: CPT | Mod: GO | Performed by: OCCUPATIONAL THERAPIST

## 2022-04-19 NOTE — PROGRESS NOTES
Clinic Care Coordination Contact  [facility]/Voicemail       Clinical Data: Care Coordinator Outreach  Outreach attempted x 1.  Left message on patient's wife's voicemail with call back information and requested return call.  Plan: Care Coordinator will try to reach patient again in 1-2 business days.        PENELOPE Michael  [phone]  [facility]

## 2022-04-20 ENCOUNTER — PATIENT OUTREACH (OUTPATIENT)
Dept: NURSING | Facility: CLINIC | Age: 67
End: 2022-04-20

## 2022-04-20 ENCOUNTER — HOSPITAL ENCOUNTER (OUTPATIENT)
Dept: SPEECH THERAPY | Facility: CLINIC | Age: 67
Discharge: HOME | End: 2022-04-20
Attending: INTERNAL MEDICINE
Payer: COMMERCIAL

## 2022-04-20 DIAGNOSIS — I63.9 CEREBROVASCULAR ACCIDENT (CVA), UNSPECIFIED MECHANISM (H): Primary | ICD-10-CM

## 2022-04-20 PROCEDURE — 92523 SPEECH SOUND LANG COMPREHEN: CPT | Mod: GN | Performed by: SPEECH-LANGUAGE PATHOLOGIST

## 2022-04-20 NOTE — PROGRESS NOTES
"Clinic Care Coordination Contact  The Community Health Worker called for a Care Coordination outreach to offer the CC program.  Spoke with patient's wife, Estefania.    Patient's wife stated she was told multiple times from the inpatient team that her  would be going into an inpatient rehab / TCU.    Patient's wife stated 25 different team members told her this.    Patient's wife stated all of a sudden her  was being discharged home and not to an inpatient rehab facility.    Patient's wife stated \"they truly did fail the patient\".  'They all failed, all 25 of them\".    Patient's wife stated her  is home, and he is moving very slowly.  Patient's wife has to give him instructions in the morning.  Patient's wife stated her  is weaker on his right side.    Patient's wife stated she is trying to work from home.  She works full-time and it is very difficult.    Patient's wife stated she cannot spend all of this time with him, try to work, and keep her job.    CHW huddle with CCRN.    CCRN will contact patient's wife.    PENELOPE Campbell  Clinic Care Coordination  [facility]: [facility] [place] and [facility]  Phone: [phone]     "

## 2022-04-20 NOTE — PROGRESS NOTES
04/19/22 1300   Quick Adds   Type of Visit Outpatient Occupational Therapy Re-Evaluation   General Information   Start Of Care Date 04/19/22   Referring Physician Natasha Juarez   Orders Evaluate and treat as indicated   Other Orders SLP    Orders Date 03/18/22   Medical Diagnosis Cerebrovascular accident (CVA), unspecified mechanism (H)   Onset of Illness/Injury or Date of Surgery 03/16/22   Precautions/Limitations No known precautions/limitations   Surgical/Medical History Reviewed Yes   Additional Occupational Profile Info/Pertinent History of Current Problem Re-evaluation of OP OT today.  Patient re-admitted to hospital on 4/5 with altered mental status and fall, found to have recurrent strokes.  Head CT showed 2 new small subacute infarcts involving left corona radiate/centrum ovale and Head MRI showed - Scattered acute infarcts in the left frontal and parietal parasagittal corona radiata consistent with border zone infarcts, Petechial hemorrhage in the left temporal infarcts. He transferred to [facility] on 4/8/2022 for possible ST-MC bypass procedure but a MCA balloon angioplasty instead.  He discharged home on 4/18 (didn t qualify for acute rehab per wife).  Initial eval intake: Huseyin St is a 66 year old male with PMHx of DM2, HTN, HL, NA, and medical non-compliance who was admitted on 3/16/2022 to [facility] for an acute CVA. Patient presented on 3/16 with receptive/expressive aphasia. His brain MRI on arrival showed acute/early subacute infarction at the anterior aspect of the left temporal lobe and his head/neck MRA showed mild to moderate narrowings throughout the left ICA, MCA, and PCA vessels. At discharge, his aphasia was resolving. Admitted from 3/16-3/18 in the hospital and discharged home with OP OT and SLP orders. Patient reporting that he started having symptoms on Tuesday (slurred speech), but patient refused to get help on Tuesday. 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   Comments/Observations Wife returning to work tomorrow.  Patient likes to joke in session.   Role/Living Environment   Current Community Support Family/friend caregiver   Patient role/Employment history Employed   Community/Avocational Activities Job:  for [employer]   Current Living Environment House   Number of Stairs to Enter Home one step    Number of Stairs Within Home 14 steps, railing descending right side   Primary Bathroom Set Up/Equipment Tub/Shower combo   Prior Level - Transfers Independent   Prior Level - Ambulation Independent   Prior Level - ADLS Independent   Prior Responsibilities - IADL Meal Preparation;Laundry;Shopping;Medication management;Finances;Driving;Work   Patient/family Goals Statement Return to driving, work, improve cognition, and improve R sided weakness.   Pain   Patient currently in pain No   Fall Risk Screen   Have you fallen 2 or more times in the past year? No   Have you fallen and had an injury in the past year? No   Fall screen comments No OP PT recommended at hospital discharge.   Cognitive Status Examination   Orientation Person;Place   Level of Consciousness Alert   Follows Commands and Answers Questions 100% of the time;Able to follow single-step instructions  (motor commands and 2 step commands.  Inconsistent with anwering therapist questions at times-likely due to receptive aphasia.  Patient see's SLP tomorrow.)   Personal Safety and Judgment At risk behaviors demonstrated;Impaired insight and awareness into his deficits.   Memory Impaired   Memory Comments Will further assess after consulting with SLP re: language deficits.   Attention Distractible during evaluation;Sustained attention impaired;Selective attention impaired, difficulty ignoring irrelevant stimuli   Cognitive Comment Receptive aphasia noted, inconsistent with answering questions.  Will continue as appropriate.   Visual Perception   Visual Perception  Comments Further assessment is indicated. Patient reports no concerns at this time.    Sensation   Sensation Comments Reports no concerns with sensation changes on affected side following stroke.    Range of Motion (ROM)   ROM Quick Adds No deficits identified   Strength   Strength Comments LUE 5/5 throughout.  Increased weakness through RUE since initial evaluation.  RUE: Shoulder flexion 4/5, shoulder abduction 4/5, elbow flexion/extension 5/5.   Hand Strength   Hand Dominance Right   Left Hand  (pounds) 84 pounds   Right Hand  (pounds) 52.6 pounds  (decreased from 76 pounds on initial eval))   Hand Strength Comments Increased R hand weakness since initial eval which falls BNLs for his age group.   Muscle Tone   Muscle Tone No deficits were identified   Coordination   Gross Motor Coordination Reports some trouble with washing hair.   Left Hand, Nine Hole Peg Test (seconds) 28   Right Hand, Nine Hole Peg Test (seconds) 35   Coordination Comments Handwriting legible; B FMC falls more than 2 SD below the mean for his age group.   Functional Mobility   Ambulation Independent without concerns observed by wife or reported by patient.    Bathing   Level of Strawn - Bathing independent   Bathing Comments Patient showered this morning, wife was in bedroom but no concerns per pt/wife.  No DME used.   Upper Body Dressing   Level of Strawn: Dress Upper Body independent   Lower Body Dressing   Level of Strawn: Dress Lower Body independent   Toileting   Level of Strawn: Toilet independent   Grooming   Level of Strawn: Grooming independent   Eating/Self-Feeding   Level of Strawn: Eating independent   Activity Tolerance   Activity Tolerance Patient reports significant fatigue.  Has only been home less than 24 hours from the hospital-will continue to address.   Instrumental Activities of Daily Living Assessment   IADL Assessment/Observations Medication management: is not using pillboxes,  wife is supervising.   Home/Financial Management Writes checks to pay bills and mails. Reports that he paid a bill since returning home and reports no concerns with completing check as instructed from bill.    Community Mobility Currently is not driving due to restrictions placed from hospital.    Planned Therapy Interventions   Planned Therapy Interventions ADL training;Cognitive skills;Community/work reintegration;Coordination training;Neuromuscular re-education;Self care/Home management;Strengthening;Therapeutic activities   Adult OT Eval Goals   OT Eval Goals (Adult) 4;5    OT Goal 1   Goal Identifier 1-Community Mobility   Goal Description Patient will demonstrate modified I with visual scanning, reaction time, divided attention, and problem solving to ensure safety for community mobility (i.e. driving, crossing the street, pathfinding, shopping, etc.)   Target Date 06/20/22    OT Goal 2   Goal Identifier 2-Money Management   Goal Description Patient will demonstrate the completion of moderate to complex money management tasks with 90% accuracy for increased attention to detail and problem-solving skills to resume finances at home and manage money in the community.   Target Date 06/20/22    OT Goal 3   Goal Identifier 3-Memory/Attention   Goal Description Patient will demonstrate improved memory skills by completing short-term memory tasks in occupational therapy with 90% accuracy using compensatory strategies PRN for increased safety and independence with IADLs (remembering to take medications, completing work tasks, communicating with a cell phone/computer, tracking appointments/meetings, etc.).   Target Date 06/20/22   OT Goal 4   Goal Identifier 4-RUE HEP   Goal Description Patient to demonstrate independence with RUE HEP for hand and shoulder to increase overall strength to return to work, complete yardwork, etc.   Target Date 06/20/22   OT Goal 5   Goal Identifier 5-Medication Management   Goal  Description Patient to demonstrate 100% accuracy on setting up 4 new medications for resume safe management of his personal medications using a pill box.   Target Date 06/20/22   Clinical Impression   Criteria for Skilled Therapeutic Interventions Met Yes, treatment indicated   OT Diagnosis Impaired IADLs   Influenced by the following impairments Decreased insight into condition, safety awareness deficits, memory/attention impairments, aphasia, difficulty following commands   Assessment of Occupational Performance 3-5 Performance Deficits   Identified Performance Deficits Driving, financial management, social participation, leisure participation   Clinical Decision Making (Complexity) Low complexity   Therapy Frequency 2x/week   Predicted Duration of Therapy Intervention (days/wks) for 8 weeks, decreasing to 1x/week x 4 weeks   Risks and Benefits of Treatment have been explained. Yes   Patient, Family & other staff in agreement with plan of care Yes   Education Assessment   Barriers To Learning Language;Cognitive   Preferred Learning Style Demonstration;Pictures/video   Total Evaluation Time   OT Anand Low Complexity Minutes (44304) 02

## 2022-04-20 NOTE — PROGRESS NOTES
Clinic Care Coordination Contact  [facility]: Post-Discharge Note  SITUATION                                                      Admission:    Admission Date: 04/08/22   Reason for Admission: recurrent Left MCA territory strokes due to Left M1 to M2 stenosis  Discharge:   Discharge Date: 04/18/22  Discharge Diagnosis: recurrent Left MCA territory strokes due to Left M1 to M2 stenosis    BACKGROUND                                                      Per hospital discharge summary and inpatient provider notes:    Huseyin St is a 66 year old male with HTN, HLD, DMII (A1c 11.8), NA, cocaine abuse and recurrent L MCA infarcts who presents as a transfer from [facility] for possible STA-MCA bypass. Patient was admitted to [facility] 4/5/22 for worsening R sided weakness and aphasia. Patient was admitted twice in March for L MCA territory strokes (likely d/t L M1 stenosis). The first admission was 3/16-3/18 and the patient was started on DAPT. He was admitted again 3/24-3/27 due to expressive aphasia and was found to have 2 new cortical white matter strokes. Given the recurrent strokes on DAPT neuro-IR was consulted for possible stenting but did not recommend stenting due to poorly controlled diabetes.     ASSESSMENT      Enrollment  Primary Care Care Coordination Status:  (Patient's wife contacted Ana (CHW) yesterday about CCC and is waiting for a response from her)    Discharge Assessment  How are you doing now that you are home?: He is doing good. Still having a little bit of aphasia.  Do you feel your condition is stable enough to be safe at home until your provider visit?: Yes  Does the patient have their discharge instructions? : Yes  Does the patient have questions regarding their discharge instructions? : No  Were you started on any new medications or were there changes to any of your previous medications? : Yes  Does the patient have all of their medications?: No (see comment) (Patient's wife is  picking up his new medication today.)  Discharge follow-up appointment scheduled within 14 calendar days? : Yes  Discharge Follow Up Appointment Date: 04/26/22  Discharge Follow Up Appointment Scheduled with?: Specialty Care Provider                  PLAN                                                      Outpatient Plan: Follow up with Endocrinology, Stroke neurology and interventional neuro radiology clinic, within 4-6 weeks for hospital  follow- up. No follow up labs or test are needed.    Future Appointments   Date Time Provider Department [place]   4/20/2022  3:45 PM Jeannine Gaming SLP [facility] [address]   4/26/2022 10:30 AM SHMRP1 SHMR2 [facility]   4/26/2022 11:20 AM SHCT1 SHCT [facility]   4/26/2022  1:00 PM Ivonne Odom MD [facility]    4/26/2022  3:45 PM Witta, Cristina, OT EDOT [address]   4/29/2022  9:40 AM Je Shah MD [facility]    5/3/2022  3:45 PM Witta, Cristina, OT [facility]OT [address]   5/4/2022 10:00 AM Tala Epstein SLP [facility]SP [address]   5/5/2022  1:00 PM  NEUROLOGY STROKE [facility]    5/6/2022  2:15 PM Witta, Cristina, OT [facility]OT [address]   5/10/2022  3:00 PM Witta, Cristina, OT EDOT [address]   5/11/2022  9:15 AM Norma Peoples RN [facility]   5/17/2022  3:00 PM Witta, Cristina, OT EDOT [address]   5/18/2022  3:45 PM Jh Deborah, OT [facility]OT [address]   5/24/2022 10:15 AM Witta, Cristina, OT EDOT [address]   5/25/2022  3:45 PM Jh, Deborah, OT EDOT [address]   5/31/2022 10:15 AM Witta, Cristina, OT EDOT [address]   6/1/2022  3:45 PM Deborah Peñaloza, OT EDOT [address]   6/7/2022 10:15 AM Witta, Cristina, OT EDOT [address]   6/7/2022 11:00 AM Lele Conrad MD [facility]   6/8/2022  2:15 PM Deborah Peñaloza, OT [facility]OT [address]   6/14/2022 10:15 AM Cristina Mares, OT [facility]OT [address]   6/15/2022  2:15 PM Deborah Peñaloza, OT [facility]OT [address]   6/21/2022 10:15 AM Cristina Mares, OT [facility]OT [address]   6/28/2022 10:15 AM Cristina Mares, OT [facility]OT [address]   8/2/2022  1:30 PM Ivonne Odom MD [facility]         For any urgent concerns, please contact our 24 hour nurse triage line: [phone] ([phone])       PENELOPE Michael  [phone]  [facility]

## 2022-04-20 NOTE — LETTER
[facility] CARE COORDINATION  [address]  [facility] [address]    April 20, 2022    Huseyin [address]  [address] [place] [address]      Dear Huseyin,    I am a clinic care coordinator who works with Kendal Wong MD at [facility] . I wanted to thank you for spending the time to talk with me.  Below is a description of clinic care coordination and how I can further assist you.      The clinic care coordination team is made up of a registered nurse,  and community health worker who understand the health care system. The goal of clinic care coordination is to help you manage your health and improve access to the health care system in the most efficient manner. The team can assist you in meeting your health care goals by providing education, coordinating services, strengthening the communication among your providers and supporting you with any resource needs.    Please feel free to contact me with any questions or concerns. We are focused on providing you with the highest-quality healthcare experience possible and that all starts with you.     Sincerely,      Araceli Freire RN, BSN, PHN  Primary Care / Care Coordinator   [facility]  E-mail [email or web address]   [phone]

## 2022-04-20 NOTE — PROGRESS NOTES
"Clinic Care Coordination Contact    Clinic Care Coordination Contact  OUTREACH with Post Discharge Assessment    Referral Information:  Referral Source: IP Report    Primary Diagnosis: Cardiovascular - other (Acute ischemic CVA, hyperlipidemia, DM type II, HTN)    Chief Complaint   Patient presents with     Clinic Care Coordination - Initial      Universal Utilization:   [facility]  Clinic Utilization:  [facility] [place]   Difficulty keeping appointments:: No  Compliance Concerns: Yes (Per wife, patient has always been noncompliant and that why he \"ended up in the hospital twice\".)  No-Show Concerns: No  No PCP office visit in Past Year: No  Utilization    Hospital Admissions  4             ED Visits  3             No Show Count (past year)  1                Current as of: 4/20/2022 10:59 AM            Clinical Concerns:  Current Medical Concerns:  Recent discharge from hospital    Current Behavioral Concerns: Anxiety      Education Provided to patient:   RNCC called and spoke with patient; introduced self, discussed role of Care Coordination and explained reason for call.    Patient's wife, Kailee, upset with [facility] system and \"failed\" her and patient care.  Per Kailee, she asked everyday about TCU placement for patient for SLP/OT everyday vs. Home Care or Outpatient services.  Kailee is hopeful that patient will get back to baseline.    Patient works from home and needs to work full-time due to patient not able to work.  Estefania states patient has no sense of urgency and takes an hour to do a 15 minute task.  She has to drive patient to his outpatient SLP/OT therapies; patient has OT twice a week, one face to face and the other virtual.  Today, he has his first SLP and Estefania is hoping to do the same to cut down on travel.    Estefania states patient is non-compliant and that's why he was hospitalized twice.  Estefania has to watch and guide patient as he sets up his " "medications and she needs to remind him to take them.  Estefania states there is no way she will be able to have the patient go to a TCU now that he is home.  Estefania feels TCU therapies would be everyday vs. twice a week outpatient and he would progress \"faster\" and get back to baseline to return to work.    Estefania checked with their insurance and the insurance will not pay for PCA services but will pay for RN nursing services, per Estefania, \"I don't need nursing services\".  Patient states she does not want strangers coming to her home and believes that would confuse patient.  She is upset the TCU didn't happen at discharge from hospital.  They cannot afford private pay for anything.    They have family and friends; however, \"they all work and have busy lives\".  They are not affiliated with any [employer] facility that may have volunteers and as stated before, Estefania doesn't want strangers in her home.  Estefania states, \"there's nothing I can do now\".  Estefania is going to speak with SLP/OT and ask if this is really going to help the patient going twice a week and that he's non-compliant, although, patient has not been given any \"homework\" for patient to work on or patient/wife to work on, \"they just ask questions and type\".      Pain  Pain (GOAL):: No  Health Maintenance Reviewed: Due/Overdue   Health Maintenance Due   Topic Date Due     ZOSTER IMMUNIZATION (2 of 3) 01/04/2016     Pneumococcal Vaccine: 65+ Years (2 of 2 - PPSV23) 10/28/2019     AORTIC ANEURYSM SCREENING (SYSTEM ASSIGNED)  Never done     DIABETIC FOOT EXAM  09/23/2020     EYE EXAM  09/23/2021     MEDICARE ANNUAL WELLNESS VISIT  09/28/2021     Clinical Pathway: None    Admission:    Admission Date: 04/09/22   Reason for Admission: Acute ischemic CVA, hyperlipidemia, DM type II, HTN  Discharge:   Discharge Date: 04/18/22  Discharge Diagnosis: Acute ischemic CVA, hyperlipidemia, DM type II, HTN    Enrollment  Primary Care Care Coordination Status: Declined    Discharge " "Assessment  How are you doing now that you are home?: \"He has no sense of urgency\"  How are your symptoms? (Red Flag symptoms escalate to triage hotline per guidelines): Improved  Do you feel your condition is stable enough to be safe at home until your provider visit?: Yes  Does the patient have their discharge instructions? : Yes  Does the patient have questions regarding their discharge instructions? : No  Were you started on any new medications or were there changes to any of your previous medications? : Yes  Does the patient have all of their medications?: Yes  Do you have questions regarding any of your medications? : No  Do you have all of your needed medical supplies or equipment (DME)?  (i.e. oxygen tank, CPAP, cane, etc.): Yes  Discharge follow-up appointment scheduled within 14 calendar days? : Yes  Discharge Follow Up Appointment Date: 04/26/22  Discharge Follow Up Appointment Scheduled with?: Specialty Care Provider    Post-op (Clinicians Only)  Did the patient have surgery or a procedure: No  Fever: No  Chills: No  Eating & Drinking: eating and drinking without complaints/concerns  PO Intake: other (Diabetic diet)  Additional Symptoms:  (Denies)  Bowel Function: normal  Date of last BM: 04/19/22  Urinary Status: voiding without complaint/concerns    Medication Management:  Medication review status: Medications reviewed and no changes reported per patient.        Current Outpatient Medications   Medication     alcohol swab prep pads     aspirin (ASA) 325 MG EC tablet     blood glucose (NO BRAND SPECIFIED) test strip     blood glucose calibration (NO BRAND SPECIFIED) solution     clopidogrel (PLAVIX) 75 MG tablet     clotrimazole (LOTRIMIN) 1 % external cream     escitalopram (LEXAPRO) 20 MG tablet     insulin aspart (NOVOLOG FLEXPEN) 100 UNIT/ML pen     insulin aspart (NOVOLOG PEN) 100 UNIT/ML pen     Insulin Glargine-yfgn (SEMGLEE, YFGN,) 100 UNIT/ML SOPN     insulin pen needle (BD MARIE U/F) 32G X 4 MM " miscellaneous     LORazepam (ATIVAN) 0.5 MG tablet     ORDER FOR DME     rosuvastatin (CRESTOR) 40 MG tablet     semaglutide (OZEMPIC, 0.25 OR 0.5 MG/DOSE,) 2 MG/1.5ML SOPN pen     [START ON 5/16/2022] semaglutide (OZEMPIC, 0.25 OR 0.5 MG/DOSE,) 2 MG/1.5ML SOPN pen     thin (NO BRAND SPECIFIED) lancets     No current facility-administered medications for this visit.     Functional Status:  Dependent ADLs:: Independent  Dependent IADLs:: Transportation, Cleaning, Cooking, Laundry, Shopping, Meal Preparation, Money Management, Medication Management (Kailee, wife helps patients set up patients medications and reminds patient to take)  Bed or wheelchair confined:: No  Mobility Status: Independent    Living Situation:  Current living arrangement:: I live in a private home with spouse  Type of residence:: Private home - [facility]    Lifestyle & Psychosocial Needs:    Social Determinants of Health     Tobacco Use: Low Risk      Smoking Tobacco Use: Never Smoker     Smokeless Tobacco Use: Never Used   Alcohol Use: Not on file   Financial Resource Strain: Not on file   Food Insecurity: Not on file   Transportation Needs: Not on file   Physical Activity: Not on file   Stress: Not on file   Social Connections: Not on file   Intimate Partner Violence: Not on file   Depression: Not at risk     PHQ-2 Score: 0   Housing Stability: Not on file     Diet:: Diabetic diet  Inadequate nutrition (GOAL):: No  Tube Feeding: No  Inadequate activity/exercise (GOAL):: No  Significant changes in sleep pattern (GOAL): No  Transportation means:: Regular car, Family (Wife provides transportation)     Adventism or spiritual beliefs that impact treatment:: No  Mental health DX:: Yes  Mental health DX how managed:: Medication  Mental health management concern (GOAL):: No  Chemical Dependency Status: No Current Concerns  Chemical Dependency Management:  (NA)  Informal Support system:: Spouse, Children (Daughter;  recent ileostomy)      Resources and  Interventions:  Current Resources:   Community Resources: Other (see comment) (Outpatient SLP/OT)  Supplies Currently Used at Home: Diabetic Supplies  Equipment Currently Used at Home: glucometer  Employment Status: disabled     Advance Care Plan/Directive  Advanced Care Plans/Directives on file:: Yes  Type Advanced Care Plans/Directives:  (Full Code)    Referrals Placed: Senior Linkage Line     Goals:     Patient/Caregiver understanding: Yes    Future Appointments              Today Jeannine Gaming SLP [facility] [place], [address]    In 6 days SHMRP1 [facility], [place]    In 6 days SHCT1 [facility], [facility] [place]    In 6 days Ivonne Odom MD [facility] [place], [place]    In 6 days Cristina Mares OT [facility] [place], [address]    In 1 week Je Shah MD [facility] [place], [place]    In 1 week Cristina Mares OT [facility] [place], [address]    In 2 weeks Tala Epstein SLP [facility] [place], [address]    In 2 weeks CS NEUROLOGY STROKE LUNA [facility] [place],     In 2 weeks Cristina Mares OT [facility] [place], [address]    In 2 weeks WitCristina brooke OT [facility] [place], [address]    In 3 weeks Norma Peoples RN [facility] [place], [facility]    In 3 weeks Cristina Mares OT [facility] [place], [address]    In 4 weeks Deborah Peñaloza OT [facility] [place], [address]    In 1 month Cristina Mares OT [facility] [place], [address]    In 1 month Deborah Peñaloza OT [facility] [place], [address]    In 1 month Cristina Mares OT [facility] [place], [address]    In 1 month Deborah Peñaloza OT [facility] [place], [address]    In 1 month Cristina Mares OT [facility] [place], [address]    In 1 month Lele Conrad MD [facility]    In 1 month Deborah Peñaloza OT [facility] [place], [address]    In 1 month Cristina Mares OT [facility] [place], [address]    In 1 month Deborah Peñaloza OT [facility] [place], [address]    In 2 months Cristina Mares OT [facility] [place], [address]    In 2 months Cristina Mares OT [facility] [place], [address]    In 3 months Ivonne Odom MD [facility] [place],         Plan:   -Patient will contact the care team with questions, concerns, support needs   -Patient will use the clinic as a resource and understands (s)he can contact the [facility] with 24/7 after hours services available  -Care Coordinator will remain available as needed  -No further care coordination outreaches at this time     Araceli Freire RN, BSN, PHN  Primary Care / Care Coordinator   [facility]  E-mail [email or web address]   [phone]

## 2022-04-21 NOTE — PROGRESS NOTES
"  Speech-Language Pathology Department  Speech-Language EVALUATION  [facility]     04/20/22 1545   General Information   Type of Evaluation Speech and Language   Type Of Visit Initial   Start Of Care Date 04/20/22   Referring Physician Saritha Ann MD   Orders Evaluate And Treat   Orders Comment aphasia, cognitive deficits   Medical Diagnosis Cerebrovascular accident (CVA), unspecified mechanism   Onset Of Illness/injury Or Date Of Surgery 04/07/22  (order date)   Precautions/Limitations  no known precautions/limitations   Surgical/Medical history reviewed Yes   Pertinent History Of Current Problem Mr. St, a 66-year-old male was referred to speech therapy d/t aphasia following recurrent strokes. Per chart: \"PMHx of DM2, HTN, HL, NA, and medical non-compliance who was admitted on 3/16/2022 to [facility] for an acute CVA. Patient presented on 3/16 with receptive/expressive aphasia. His brain MRI on arrival showed acute/early subacute infarction at the anterior aspect of the left temporal lobe and his head/neck MRA showed mild to moderate narrowings throughout the left ICA, MCA, and PCA vessels. At discharge, his aphasia was resolving. Admitted from 3/16-3/18 in the hospital and discharged home with OP OT and SLP orders.\" and \"Patient re-admitted to hospital on 4/5 with altered mental status and fall, found to have recurrent strokes.  Head CT showed 2 new small subacute infarcts involving left corona radiate/centrum ovale and Head MRI showed - Scattered acute infarcts in the left frontal and parietal parasagittal corona radiata consistent with border zone infarcts, Petechial hemorrhage in the left temporal infarcts. He transferred to [facility] on 4/8/2022 for possible ST-MC bypass procedure but a MCA balloon angioplasty instead.  He discharged home on 4/18 with outpatient OT and Speech. During most recent hospitalization pt had clinical swallow evaluation on 4/16/22 " revealing minimal oropharyngeal dysphagia with minimal right facial droop at rest and cough x1 while talking an eating, with no other overt s/s of penetration/aspiration. It was recommended he have regular textures and thin liquids with safe swallow strategies and f/u on diet tolerance during hospitalization and OP SLP for speech-language evaluation. During prior hospitalization pt had additional clinical swallow evaluation as well as a 3/17/22 speech language evaluation revealing very severe global aphasia with a bedside language score of 15. Pt reported concerns for word-finding and reading. His spouse indicated additional concern that he sometimes does not answer the question or talks without being aware that he is on a tangent. See EMR for further information re: PMH.   Prior Level Of Function Comment Prior to first stroke, no previous hx/o language deficits. Pt was effective and efficient with language and communication.   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 Pt eager and cooperative.   Patient/family Goals To improve language function to baseline.   General Information Comments Pt enjoys their [employer], golfing, [employer], friends cabin, and hunting. Pt wears reading glasses.   Fall Risk Screen   Fall screen completed by OT   Have you fallen 2 or more times in the past year? No   Have you fallen and had an injury in the past year? No   Is patient a fall risk? No   Fall screen comments No OP PT recommended at hospital discharge.   Speech   Speech Comments Not formally assessed d/t pt concern with language deficits. Further assessment may be warranted.   Language: Auditory Comprehension (understanding of spoken language)   Comments (Auditory Comprehension) Administered the Western Aphasia Battery-Revised Bedside  "form. Pt scored 9/10 on the spontaneous speech content subtest and 7/10 on the spontaneous speech fluency subtest. His repetitions score was 8/10 and object naming score was 8.5/10. He scored 10/15 on the [place] Naming test. His naming errors included phonemic nonword paraphasias, semantic paraphasias, and semantic non-word paraphasisas. He appeared to benefit from reading multiple choice cues in 2/5 opportunities. He did not benefit from semantic or phonemic cues. His connected speech is marked by relatively intact syntax with frequent missing functor words including at times pronouns, nonspecific vocabulary use (\"it\" or \"his\" without reference), occasional paraphasia (real and nonword, semantic and phonemic), occasional perseveration, hesitations with revisions, and word-finding difficulty.   Language: Verbal Expression (use of spoken language to express information)   Comments (Verbal Expression) Per the WAB-R Bedside pt scored 9/10 on the auditory verbal comprehension yes/no questions subtest and 8/10 on sequential commands. Difficulty was noted at the multiple step and more complex level. Administered the yes/no questions and simple paragraph x1 from the BDAE at moderate level and pt scored 1/4. He scored 13/15 on complex level commands from the BDAE-3 with errors during multiple-step and multiple-component directions.   Reading Comprehension (understanding of written language)   Comments (Reading Comprehension) Pt scored 6/10 on the sentence and paragraphs subtest of the BDAE (moderate and complex level). Errors were noted at the 2 sentence level however pt reported uncertainty and need for multiple re-readings were noted at the single sentence level. Pt scored 4/10 on the Reading subtest of the WAB-R Bedside form.   Written Expression (use of writing to express information)   Comments (Written Expression) Pt scored 8/10 on the writing subtest of the WAB-R bedisde. He demonstrated difficulty at the sentence " "generation level with missing functor words like \"the\" and word-finding errors and incorrect tense. Pt hesitates while writing and requires increased time.   Clinical Impression, SLP Eval   SLP Diagnosis mild expressive and receptive aphasia   Therapy Frequency 3 times;per week   Predicted Duration of Therapy Intervention (days/wks) 12 weeks / 90 days pending pt progress and tapering as warranted.   Risks and Benefits of Treatment have been explained. Yes   Patient, Family & other staff in agreement with plan of care Yes   Clinical Impression Comments Overall Mr. St presents with mild expressive and receptive aphasia likely anomic in nature following recurrent strokes. On the Western Aphasia Battery -Revised Bedside form, he scored a bedside aphasia score of 82.5 and beside language score of 76.9. His verbal expression and connected speech is marked by relatively intact syntax with frequent missing functor words including at times pronouns, nonspecific vocabulary use (\"it\" or \"his\" without reference), occasional paraphasia (real and nonword, semantic and phonemic), occasional perseveration, hesitations with revisions, and word-finding difficulty.  His auditory comprehension is marked by difficulty with moderate to complex level yes/no questions, simple paragraph and sentence comprehension. At the conversation level pt at times benefits from clarification and may ask for a repetition. Pt s reading comprehension is marked by reduced comprehension of single sentences and short paragraphs. He requires increased time, decoding errors are noted with verbal reading, and reduced comprehension is noted. Pt s written expression is marked by difficulty at the sentence generation level with missing functor words , word-finding errors, and incorrect tense. His language deficits negatively impact his ability to participate in functional communication including expression verbally or in writing, comprehension of " moderate-complex level. Recommend skilled speech intervention to target above deficits and to including self-cueing strategies.   Language/Cognition Goals   Language/Cognition Goals 1;2;3;4   Language/Cognition Goal 1   Goal Identifier Verbal Expression   Goal Description Patient will complete simple to moderate level verbal expression tasks, such as naming, defining, describing, and sentence level generation tasks with 80% accuracy and min cues for improved ability to express thoughts and ideas in conversation.   Target Date 07/19/22   Language/Cognition Goal 2   Goal Identifier Auditory Comprehension   Goal Description Patient will follow multi-step directions and listen to short paragraphs 2-3 sentences and answer questions with at least 80% accuracy and min cues for improved comprehension for verbal information needed for functional communication and vocational demands.   Target Date 07/19/22   Language/Cognition Goal 3   Goal Identifier Written Expression   Goal Description Patient will complete simple level written expression tasks such as sentence and short paragraph writing with 80% accuracy and moderate cues for improved written expression needed for daily living and vocational needs.   Target Date 07/19/22   Language/Cognition Goal 4   Goal Identifier Reading Comprehension   Goal Description Patient will complete reading comprehension at the sentence and simple paragraph level and for practical information to improve reading comprehension needed for daily living and vocational needs with at least 80% accuracy and moderate cues.   Target Date 07/19/22   Total Session Time   Sound production with lang comprehension and expression minutes (22828) 75   Total Evaluation Time 75     Thank you for referring this patient.     Jeannine Gaming MS, CCC-SLP  Speech-Language Pathologist  [facility]  [phone]

## 2022-04-26 ENCOUNTER — HOSPITAL ENCOUNTER (OUTPATIENT)
Dept: CT IMAGING | Facility: CLINIC | Age: 67
Discharge: HOME | End: 2022-04-26
Attending: PHYSICIAN ASSISTANT
Payer: COMMERCIAL

## 2022-04-26 ENCOUNTER — HOSPITAL ENCOUNTER (OUTPATIENT)
Dept: MRI IMAGING | Facility: CLINIC | Age: 67
Discharge: HOME | End: 2022-04-26
Attending: PHYSICIAN ASSISTANT
Payer: COMMERCIAL

## 2022-04-26 ENCOUNTER — HOSPITAL ENCOUNTER (OUTPATIENT)
Dept: OCCUPATIONAL THERAPY | Facility: CLINIC | Age: 67
Discharge: HOME | End: 2022-04-26
Payer: COMMERCIAL

## 2022-04-26 ENCOUNTER — VIRTUAL VISIT (OUTPATIENT)
Dept: ENDOCRINOLOGY | Facility: CLINIC | Age: 67
End: 2022-04-26
Payer: COMMERCIAL

## 2022-04-26 ENCOUNTER — MYC MEDICAL ADVICE (OUTPATIENT)
Dept: ENDOCRINOLOGY | Facility: CLINIC | Age: 67
End: 2022-04-26

## 2022-04-26 DIAGNOSIS — I63.9 CEREBROVASCULAR ACCIDENT (CVA), UNSPECIFIED MECHANISM (H): ICD-10-CM

## 2022-04-26 DIAGNOSIS — E11.21 TYPE 2 DIABETES MELLITUS WITH DIABETIC NEPHROPATHY, WITH LONG-TERM CURRENT USE OF INSULIN (H): Primary | ICD-10-CM

## 2022-04-26 DIAGNOSIS — Z78.9 DECREASED ACTIVITIES OF DAILY LIVING (ADL): ICD-10-CM

## 2022-04-26 DIAGNOSIS — I63.9 CEREBROVASCULAR ACCIDENT (CVA), UNSPECIFIED MECHANISM (H): Primary | ICD-10-CM

## 2022-04-26 DIAGNOSIS — Z79.4 TYPE 2 DIABETES MELLITUS WITH DIABETIC NEPHROPATHY, WITH LONG-TERM CURRENT USE OF INSULIN (H): Primary | ICD-10-CM

## 2022-04-26 DIAGNOSIS — I10 ESSENTIAL HYPERTENSION, BENIGN: Primary | ICD-10-CM

## 2022-04-26 DIAGNOSIS — Z78.9 IMPAIRED INSTRUMENTAL ACTIVITIES OF DAILY LIVING (IADL): ICD-10-CM

## 2022-04-26 PROCEDURE — 70553 MRI BRAIN STEM W/O & W/DYE: CPT

## 2022-04-26 PROCEDURE — 255N000002 HC RX 255 OP 636: Performed by: PHYSICIAN ASSISTANT

## 2022-04-26 PROCEDURE — 250N000011 HC RX IP 250 OP 636: Performed by: PHYSICIAN ASSISTANT

## 2022-04-26 PROCEDURE — A9585 GADOBUTROL INJECTION: HCPCS | Performed by: PHYSICIAN ASSISTANT

## 2022-04-26 PROCEDURE — 97535 SELF CARE MNGMENT TRAINING: CPT | Mod: GO | Performed by: OCCUPATIONAL THERAPIST

## 2022-04-26 PROCEDURE — 250N000009 HC RX 250: Performed by: PHYSICIAN ASSISTANT

## 2022-04-26 PROCEDURE — 70496 CT ANGIOGRAPHY HEAD: CPT

## 2022-04-26 PROCEDURE — 99214 OFFICE O/P EST MOD 30 MIN: CPT | Mod: 95 | Performed by: INTERNAL MEDICINE

## 2022-04-26 RX ORDER — GADOBUTROL 604.72 MG/ML
10 INJECTION INTRAVENOUS ONCE
Status: COMPLETED | OUTPATIENT
Start: 2022-04-26 | End: 2022-04-26

## 2022-04-26 RX ORDER — IOPAMIDOL 755 MG/ML
85 INJECTION, SOLUTION INTRAVASCULAR ONCE
Status: COMPLETED | OUTPATIENT
Start: 2022-04-26 | End: 2022-04-26

## 2022-04-26 RX ADMIN — IOPAMIDOL 85 ML: 755 INJECTION, SOLUTION INTRAVENOUS at 11:22

## 2022-04-26 RX ADMIN — SODIUM CHLORIDE 90 ML: 900 INJECTION INTRAVENOUS at 11:24

## 2022-04-26 RX ADMIN — GADOBUTROL 10 ML: 604.72 INJECTION INTRAVENOUS at 10:22

## 2022-04-26 ASSESSMENT — PAIN SCALES - GENERAL: PAINLEVEL: NO PAIN (0)

## 2022-04-26 NOTE — PROGRESS NOTES
Video-Visit Details    Type of service:  Video Visit  Video Start Time: 1:00  Video End Time: 1:22  Originating Location (pt. Location): [place], [place]  Distant Location (provider location):  Home  Platform used for Video Visit: Nemo Odom MD      Huseyin RUSSELL St is a 66 year old yo male who presents today for follow-up of diabetes mellitus via a billable video visit.  HE is accompanied by wife Estefania who provides much of history  Chief Complaint   Patient presents with     Hospital F/U     Mr St is a 67 yo M w/PMHx of NA, obesity, HLD, DM2 complicated by nephropathy, neuropathy and recently CVA s/p stenting here for hospital follow-up.    INTERVAL HISTORY:  - Counting carbs and trying to stay <60g of carbs  - Starting Ozempic Sunday 0.25, no nausea, bloating, stomach pain  - Overall BG improving    Subjective:    1) Diabetes Mellitus    Diabetes History:  Diagnosis: 10-20 years ago  Hospitalizations: not for diabetes  Previous Regimens: none  Current Regimen: Ozempic 0.25 weekly, Novolog 5/10 small vs large meal, lantus/semglee- 40 unit(s) at bedtime,     BG check- 2-3 times daily  Trends-   4/18-  258 (6a), X,  258 (dinner) 305  4/19- 189, 306 (forgot breakfast insulin), 239, X  4/20-  160, 168, 236,   4/21- 185, 252, 130  4/22- 168, x, 199  4/23- 235, 263, 186  4/24- 196, X, 174,   4/25- 198, x 248  4/26- 199    Complications: nephropathy    Last eye exam: last one year ago, no NPRD at that time (per report)  Foot Exam: self checks, wife checks every few days  ACEI/ARB: lisinopril  Statin/ASA: crestor, ASA, plavix    24hr Recall:  Breakfast- 2 overeasy eggs with english muffin and sausage (10u)  Lunch- watermelon, grapes  Dinner- [facility] chicken and small salad (10u)   Snacks- frozen kind ice-cream treat-- 16 carb (8pm)  Beverages- sugar free kip-aid    BP Readings from Last 3 Encounters:   04/18/22 134/63   04/08/22 (!) 154/94   04/01/22 132/81       Lab Results   Component Value Date    A1C 11.8  03/16/2022    A1C 11.8 10/11/2021    A1C 13.2 09/28/2020    A1C 12.3 09/23/2019    A1C 10.4 03/12/2018       Recent Labs   Lab Test 04/06/22  0725 03/16/22  1704 04/21/16  0906 08/20/15  0830 01/14/15  0847   CHOL 109 224*   < > 136 316*   HDL 34* 33*   < > 42 41   LDL 53 136*   < > 59 219*   TRIG 111 275*   < > 176* 282*   CHOLHDLRATIO  --   --   --  3.2 7.7*    < > = values in this interval not displayed.       Lab Results   Component Value Date    MICROL 265 10/11/2021    MICROL 715 09/28/2020     No results found for: MICROALBUMIN      Wt Readings from Last 3 Encounters:   04/08/22 103.7 kg (228 lb 9.6 oz)   04/05/22 105.7 kg (233 lb)   04/01/22 106.1 kg (234 lb)             Active diagnoses this visit:     Cerebrovascular accident (CVA), unspecified mechanism (H)  Type 2 diabetes mellitus with diabetic nephropathy, with long-term current use of insulin (H)     ROS: 10 point ROS neg other than the symptoms noted above in the HPI.      Medical, surgical, social, and family histories, medications and allergies reviewed and updated.    Objective:  Physical Exam (visual exam)  VS:  no vital signs taken for video visit  CONSTITUTIONAL: healthy, alert and NAD, responding appropriately  ENT: normocephalic, no visual evidence of trauma, normal nose and oral mucosa  EYES: conjunctivae and sclerae normal, no exophthalmos or proptosis  THYROID:  no visualized nodules or goiter  LUNGS: no audible wheeze, cough or visible cyanosis, no visible retractions or increased work of breathing  EXTREMITIES: no hand tremors  NEUROLOGY: cranial nerves grossly intact with no obvious deficit.  SKIN:  no visualized skin lesions or rash, no edema visualized  PSYCH: mentation appears normal, normal judgement        Lab Results   Component Value Date/Time    TSH 1.94 12/05/2016 10:58 AM       Last Comprehensive Metabolic Panel:  Sodium   Date Value Ref Range Status   04/17/2022 135 133 - 144 mmol/L Final   03/12/2018 135 133 - 144 mmol/L  Final     Potassium   Date Value Ref Range Status   04/17/2022 3.9 3.4 - 5.3 mmol/L Final   03/12/2018 4.3 3.4 - 5.3 mmol/L Final     Chloride   Date Value Ref Range Status   04/17/2022 104 94 - 109 mmol/L Final   03/12/2018 102 94 - 109 mmol/L Final     Carbon Dioxide   Date Value Ref Range Status   03/12/2018 26 20 - 32 mmol/L Final     Carbon Dioxide (CO2)   Date Value Ref Range Status   04/17/2022 25 20 - 32 mmol/L Final     Anion Gap   Date Value Ref Range Status   04/17/2022 6 3 - 14 mmol/L Final   03/12/2018 7 3 - 14 mmol/L Final     Glucose   Date Value Ref Range Status   04/17/2022 259 (H) 70 - 99 mg/dL Final   03/12/2018 220 (H) 70 - 99 mg/dL Final     GLUCOSE BY METER POCT   Date Value Ref Range Status   04/18/2022 206 (H) 70 - 99 mg/dL Final     Urea Nitrogen   Date Value Ref Range Status   04/17/2022 10 7 - 30 mg/dL Final   03/12/2018 13 7 - 30 mg/dL Final     Creatinine   Date Value Ref Range Status   04/17/2022 1.04 0.66 - 1.25 mg/dL Final   09/28/2020 1.03 0.66 - 1.25 mg/dL Final     GFR Estimate   Date Value Ref Range Status   04/17/2022 79 >60 mL/min/1.73m2 Final     Comment:     Effective December 21, 2021 eGFRcr in adults is calculated using the 2021 CKD-EPI creatinine equation which includes age and gender (Delio et al., NEJM, DOI: 10.1056/PGMVrx0746136)   09/28/2020 75 >60 mL/min/[1.73_m2] Final     Comment:     Non  GFR Calc  Starting 12/18/2018, serum creatinine based estimated GFR (eGFR) will be   calculated using the Chronic Kidney Disease Epidemiology Collaboration   (CKD-EPI) equation.       Calcium   Date Value Ref Range Status   04/17/2022 8.7 8.5 - 10.1 mg/dL Final   03/12/2018 9.6 8.5 - 10.1 mg/dL Final     Bilirubin Total   Date Value Ref Range Status   03/24/2022 0.2 0.2 - 1.3 mg/dL Final   12/05/2016 0.4 0.2 - 1.3 mg/dL Final     Alkaline Phosphatase   Date Value Ref Range Status   03/24/2022 99 40 - 150 U/L Final   12/05/2016 81 40 - 150 U/L Final     ALT   Date  Value Ref Range Status   03/24/2022 17 0 - 70 U/L Final   09/29/2017 36 0 - 70 U/L Final     AST   Date Value Ref Range Status   03/24/2022 10 0 - 45 U/L Final   12/05/2016 15 0 - 45 U/L Final                         ASSESSMENT / PLAN:  No diagnosis found.      DIABETES-  Recent A1c 11.8%  1.  Glucose Control:  NOT AT GOAL   - Ozempic 0.25 weekly until 5/22 then increase to 0.5mg weekly if no side effects.    - Novolog 5 with small meals, increase to 12u with larger meals   - lantus/semglee- increase by 15% to 46 unit(s) at bedtime, Advised blood glucose monitoring 3-4 times/day, will send RX for CGM.  They have been using a home blood glucose monitor (BGM) and performing frequent BGM testing.The patient is insulin-treated with 3 or more daily injections (MDI) of insulin.The patient's insulin treatment regimen requires frequent adjustments by the beneficiary on the basis of therapeutic CGM testing results. Within the last six (6) months prior to ordering the CGM, the beneficiary had an in-person visit with this practitioner to evaluate their diabetes control and determine that the above criteria are met.  2.  Lipids:  Meets criteria per ASCVD risk %, continue crestor 40mg daily  3.  Blood Pressure:  At goal, continue meds  4.  CV prevention:  Continue aspirin 325 mg daily, plavix  5.  Diabetic Nephropathy screening:   continue ACEI/ARB -- on lisinopril, not on med list, will confirm dosing and send via [employer]  6.  Diabetic Retinopathy screening:  continue annual dilated eye exams due now, will call to schedule  7.  Diabetic Neuropathy screening:  Up to date.  Instructed on routine foot care, follow-up with podiatry as needed.     Orders Placed This Encounter   Procedures     AMB Adult Diabetes Educator Referral        Return to clinic 6 weeks    A total of 35 minutes were spent today 04/26/22 on this visit including chart review, history and counseling, documentation and other activities as detailed above.

## 2022-04-26 NOTE — LETTER
4/26/2022         RE: Huseyin Newmans  [address] [place] [place] [address]        Dear Colleague,    Thank you for referring your patient, Huseyin St, to the [facility] [place]. Please see a copy of my visit note below.      Video-Visit Details    Type of service:  Video Visit  Video Start Time: 1:00  Video End Time: 1:22  Originating Location (pt. Location): [place], [place]  Distant Location (provider location):  Home  Platform used for Video Visit: Nemo Odom MD      Huseyin St is a 66 year old yo male who presents today for follow-up of diabetes mellitus via a billable video visit.  HE is accompanied by wife Estefania who provides much of history  Chief Complaint   Patient presents with     Hospital F/U     Mr St is a 67 yo M w/PMHx of NA, obesity, HLD, DM2 complicated by nephropathy, neuropathy and recently CVA s/p stenting here for hospital follow-up.    INTERVAL HISTORY:  - Counting carbs and trying to stay <60g of carbs  - Starting Ozempic Sunday 0.25, no nausea, bloating, stomach pain  - Overall BG improving    Subjective:    1) Diabetes Mellitus    Diabetes History:  Diagnosis: 10-20 years ago  Hospitalizations: not for diabetes  Previous Regimens: none  Current Regimen: Ozempic 0.25 weekly, Novolog 5/10 small vs large meal, lantus/semglee- 40 unit(s) at bedtime,     BG check- 2-3 times daily  Trends-   4/18-  258 (6a), X,  258 (dinner) 305  4/19- 189, 306 (forgot breakfast insulin), 239, X  4/20-  160, 168, 236,   4/21- 185, 252, 130  4/22- 168, x, 199  4/23- 235, 263, 186  4/24- 196, X, 174,   4/25- 198, x 248  4/26- 199    Complications: nephropathy    Last eye exam: last one year ago, no NPRD at that time (per report)  Foot Exam: self checks, wife checks every few days  ACEI/ARB: lisinopril  Statin/ASA: crestor, ASA, plavix    24hr Recall:  Breakfast- 2 overeasy eggs with english muffin and sausage (10u)  Lunch- watermelon, grapes  Dinner- [facility] chicken and  small salad (10u)   Snacks- frozen kind ice-cream treat-- 16 carb (8pm)  Beverages- sugar free kip-aid    BP Readings from Last 3 Encounters:   04/18/22 134/63   04/08/22 (!) 154/94   04/01/22 132/81       Lab Results   Component Value Date    A1C 11.8 03/16/2022    A1C 11.8 10/11/2021    A1C 13.2 09/28/2020    A1C 12.3 09/23/2019    A1C 10.4 03/12/2018       Recent Labs   Lab Test 04/06/22  0725 03/16/22  1704 04/21/16  0906 08/20/15  0830 01/14/15  0847   CHOL 109 224*   < > 136 316*   HDL 34* 33*   < > 42 41   LDL 53 136*   < > 59 219*   TRIG 111 275*   < > 176* 282*   CHOLHDLRATIO  --   --   --  3.2 7.7*    < > = values in this interval not displayed.       Lab Results   Component Value Date    MICROL 265 10/11/2021    MICROL 715 09/28/2020     No results found for: MICROALBUMIN      Wt Readings from Last 3 Encounters:   04/08/22 103.7 kg (228 lb 9.6 oz)   04/05/22 105.7 kg (233 lb)   04/01/22 106.1 kg (234 lb)             Active diagnoses this visit:     Cerebrovascular accident (CVA), unspecified mechanism (H)  Type 2 diabetes mellitus with diabetic nephropathy, with long-term current use of insulin (H)     ROS: 10 point ROS neg other than the symptoms noted above in the HPI.      Medical, surgical, social, and family histories, medications and allergies reviewed and updated.    Objective:  Physical Exam (visual exam)  VS:  no vital signs taken for video visit  CONSTITUTIONAL: healthy, alert and NAD, responding appropriately  ENT: normocephalic, no visual evidence of trauma, normal nose and oral mucosa  EYES: conjunctivae and sclerae normal, no exophthalmos or proptosis  THYROID:  no visualized nodules or goiter  LUNGS: no audible wheeze, cough or visible cyanosis, no visible retractions or increased work of breathing  EXTREMITIES: no hand tremors  NEUROLOGY: cranial nerves grossly intact with no obvious deficit.  SKIN:  no visualized skin lesions or rash, no edema visualized  PSYCH: mentation appears normal,  normal judgement        Lab Results   Component Value Date/Time    TSH 1.94 12/05/2016 10:58 AM       Last Comprehensive Metabolic Panel:  Sodium   Date Value Ref Range Status   04/17/2022 135 133 - 144 mmol/L Final   03/12/2018 135 133 - 144 mmol/L Final     Potassium   Date Value Ref Range Status   04/17/2022 3.9 3.4 - 5.3 mmol/L Final   03/12/2018 4.3 3.4 - 5.3 mmol/L Final     Chloride   Date Value Ref Range Status   04/17/2022 104 94 - 109 mmol/L Final   03/12/2018 102 94 - 109 mmol/L Final     Carbon Dioxide   Date Value Ref Range Status   03/12/2018 26 20 - 32 mmol/L Final     Carbon Dioxide (CO2)   Date Value Ref Range Status   04/17/2022 25 20 - 32 mmol/L Final     Anion Gap   Date Value Ref Range Status   04/17/2022 6 3 - 14 mmol/L Final   03/12/2018 7 3 - 14 mmol/L Final     Glucose   Date Value Ref Range Status   04/17/2022 259 (H) 70 - 99 mg/dL Final   03/12/2018 220 (H) 70 - 99 mg/dL Final     GLUCOSE BY METER POCT   Date Value Ref Range Status   04/18/2022 206 (H) 70 - 99 mg/dL Final     Urea Nitrogen   Date Value Ref Range Status   04/17/2022 10 7 - 30 mg/dL Final   03/12/2018 13 7 - 30 mg/dL Final     Creatinine   Date Value Ref Range Status   04/17/2022 1.04 0.66 - 1.25 mg/dL Final   09/28/2020 1.03 0.66 - 1.25 mg/dL Final     GFR Estimate   Date Value Ref Range Status   04/17/2022 79 >60 mL/min/1.73m2 Final     Comment:     Effective December 21, 2021 eGFRcr in adults is calculated using the 2021 CKD-EPI creatinine equation which includes age and gender (Delio et al., NEJM, DOI: 10.1056/FJGWuk9552395)   09/28/2020 75 >60 mL/min/[1.73_m2] Final     Comment:     Non  GFR Calc  Starting 12/18/2018, serum creatinine based estimated GFR (eGFR) will be   calculated using the Chronic Kidney Disease Epidemiology Collaboration   (CKD-EPI) equation.       Calcium   Date Value Ref Range Status   04/17/2022 8.7 8.5 - 10.1 mg/dL Final   03/12/2018 9.6 8.5 - 10.1 mg/dL Final     Bilirubin Total    Date Value Ref Range Status   03/24/2022 0.2 0.2 - 1.3 mg/dL Final   12/05/2016 0.4 0.2 - 1.3 mg/dL Final     Alkaline Phosphatase   Date Value Ref Range Status   03/24/2022 99 40 - 150 U/L Final   12/05/2016 81 40 - 150 U/L Final     ALT   Date Value Ref Range Status   03/24/2022 17 0 - 70 U/L Final   09/29/2017 36 0 - 70 U/L Final     AST   Date Value Ref Range Status   03/24/2022 10 0 - 45 U/L Final   12/05/2016 15 0 - 45 U/L Final                         ASSESSMENT / PLAN:  No diagnosis found.      DIABETES-  Recent A1c 11.8%  1.  Glucose Control:  NOT AT GOAL   - Ozempic 0.25 weekly until 5/22 then increase to 0.5mg weekly if no side effects.    - Novolog 5 with small meals, increase to 12u with larger meals   - lantus/semglee- increase by 15% to 46 unit(s) at bedtime, Advised blood glucose monitoring 3-4 times/day, will send RX for CGM.  They have been using a home blood glucose monitor (BGM) and performing frequent BGM testing.The patient is insulin-treated with 3 or more daily injections (MDI) of insulin.The patient's insulin treatment regimen requires frequent adjustments by the beneficiary on the basis of therapeutic CGM testing results. Within the last six (6) months prior to ordering the CGM, the beneficiary had an in-person visit with this practitioner to evaluate their diabetes control and determine that the above criteria are met.  2.  Lipids:  Meets criteria per ASCVD risk %, continue crestor 40mg daily  3.  Blood Pressure:  At goal, continue meds  4.  CV prevention:  Continue aspirin 325 mg daily, plavix  5.  Diabetic Nephropathy screening:   continue ACEI/ARB -- on lisinopril, not on med list, will confirm dosing and send via [employer]  6.  Diabetic Retinopathy screening:  continue annual dilated eye exams due now, will call to schedule  7.  Diabetic Neuropathy screening:  Up to date.  Instructed on routine foot care, follow-up with podiatry as needed.     Orders Placed This Encounter   Procedures      AMB Adult Diabetes Educator Referral        Return to clinic 6 weeks    A total of 35 minutes were spent today 04/26/22 on this visit including chart review, history and counseling, documentation and other activities as detailed above.       Again, thank you for allowing me to participate in the care of your patient.        Sincerely,        Ivonne Odom MD

## 2022-04-26 NOTE — NURSING NOTE
Chief Complaint   Patient presents with     Hospital F/U       There were no vitals filed for this visit.    There is no height or weight on file to calculate BMI.    Whit Ford MA

## 2022-04-27 RX ORDER — LISINOPRIL 20 MG/1
20 TABLET ORAL DAILY
COMMUNITY
Start: 2022-04-27 | End: 2022-08-03

## 2022-04-28 ENCOUNTER — TELEPHONE (OUTPATIENT)
Dept: NEUROSURGERY | Facility: CLINIC | Age: 67
End: 2022-04-28
Payer: COMMERCIAL

## 2022-04-28 NOTE — TELEPHONE ENCOUNTER
"Per discharge summary: \"Follow up with Endocrinology, Stroke neurology and interventional neuro radiology clinic, within 4-6 weeks for hospital follow- up. No follow up labs or test are needed.\"    Stroke care coordination team did not receive communication from inpatient team requesting follow up with [facility].     After further review, found that pt is scheduled with stroke [facility] on 5/5. Luann, can you please outreach to him and/or his wife to confirm they're aware of that appt?    Pt also needs Neuro IR follow up. Routing to their , please coordinate recommended follow up    Mary MACK, RN, SCRN  RN Stroke Neurology Care Coordinator  [facility] Neuroscience Service Line      "

## 2022-04-28 NOTE — TELEPHONE ENCOUNTER
Patient's wife called to schedule initial stroke clinic visit. Family was informed that someone from the stroke team will be making contact to schedule appointment.

## 2022-05-03 ENCOUNTER — HOSPITAL ENCOUNTER (OUTPATIENT)
Dept: SPEECH THERAPY | Facility: CLINIC | Age: 67
Discharge: HOME | End: 2022-05-03
Payer: COMMERCIAL

## 2022-05-03 ENCOUNTER — HOSPITAL ENCOUNTER (OUTPATIENT)
Dept: OCCUPATIONAL THERAPY | Facility: CLINIC | Age: 67
Discharge: HOME | End: 2022-05-03
Payer: COMMERCIAL

## 2022-05-03 DIAGNOSIS — I63.9 CEREBROVASCULAR ACCIDENT (CVA), UNSPECIFIED MECHANISM (H): ICD-10-CM

## 2022-05-03 DIAGNOSIS — Z78.9 DECREASED ACTIVITIES OF DAILY LIVING (ADL): ICD-10-CM

## 2022-05-03 DIAGNOSIS — R47.01 APHASIA: Primary | ICD-10-CM

## 2022-05-03 DIAGNOSIS — I63.9 CEREBROVASCULAR ACCIDENT (CVA), UNSPECIFIED MECHANISM (H): Primary | ICD-10-CM

## 2022-05-03 DIAGNOSIS — Z78.9 IMPAIRED INSTRUMENTAL ACTIVITIES OF DAILY LIVING (IADL): ICD-10-CM

## 2022-05-03 PROCEDURE — 92507 TX SP LANG VOICE COMM INDIV: CPT | Mod: GN | Performed by: STUDENT IN AN ORGANIZED HEALTH CARE EDUCATION/TRAINING PROGRAM

## 2022-05-03 PROCEDURE — 97537 COMMUNITY/WORK REINTEGRATION: CPT | Mod: GO | Performed by: OCCUPATIONAL THERAPIST

## 2022-05-03 NOTE — PROGRESS NOTES
Huseyin St is a 66 year old male who is being seen via a billable video visit.      Patient has given verbal consent for Video visit? Yes    Video Start Time: 1:16p    Telehealth Visit Details    Type of Service:  Telehealth    Video End Time (time video stopped): 1:59p    Originating Location (pt. location): Home    Additional Participants in Telehealth Visit: Estefania (spouse)    Distant Location (provider location):  [facility]     Mode of Communication (Audio Visual or Audio Only):  Audio Visual via Nemo Epstein, SLP  May 3, 2022

## 2022-05-04 ENCOUNTER — HOSPITAL ENCOUNTER (OUTPATIENT)
Dept: SPEECH THERAPY | Facility: CLINIC | Age: 67
Discharge: HOME | End: 2022-05-04
Attending: INTERNAL MEDICINE
Payer: COMMERCIAL

## 2022-05-04 DIAGNOSIS — I63.9 CEREBROVASCULAR ACCIDENT (CVA), UNSPECIFIED MECHANISM (H): ICD-10-CM

## 2022-05-04 DIAGNOSIS — R47.01 APHASIA: Primary | ICD-10-CM

## 2022-05-04 PROCEDURE — 92507 TX SP LANG VOICE COMM INDIV: CPT | Mod: GN | Performed by: STUDENT IN AN ORGANIZED HEALTH CARE EDUCATION/TRAINING PROGRAM

## 2022-05-05 ENCOUNTER — VIRTUAL VISIT (OUTPATIENT)
Dept: NEUROLOGY | Facility: CLINIC | Age: 67
End: 2022-05-05
Payer: COMMERCIAL

## 2022-05-05 DIAGNOSIS — I63.512 CEREBROVASCULAR ACCIDENT (CVA) DUE TO OCCLUSION OF LEFT MIDDLE CEREBRAL ARTERY (H): Primary | ICD-10-CM

## 2022-05-05 PROCEDURE — 99215 OFFICE O/P EST HI 40 MIN: CPT | Mod: 95 | Performed by: NURSE PRACTITIONER

## 2022-05-05 NOTE — NURSING NOTE
Medication list reviewed with Estefania Patients wife.     SEAN CONTRERAS, CMA     Telephone Encounter by Stanislaw Real RN at 08/10/17 03:12 PM     Author:  Stanislaw Real RN Service:  (none) Author Type:  Registered Nurse     Filed:  08/10/17 03:14 PM Encounter Date:  8/7/2017 Status:  Signed     :  Stanislaw Real RN (Registered Nurse)            Spoke with Patient but lost connection. Spoke with Carey who confirmed that the script can be sent by certified mail. Left detailed voicemail for patient notifying him of message from Dr. Coluter and that we will be mailing the script via certified mail and that it could take up to 7-10 days to get to him.     Alison will complete mailing the script for certified mail.[[email or web address]]       Revision History        User Key Date/Time User Provider Type Action    > JG1.1 08/10/17 03:14 PM Stanislaw Real RN Registered Nurse Sign    M - Manual

## 2022-05-05 NOTE — PROGRESS NOTES
Huseyin is a 66 year old who is being evaluated via a billable video visit.      How would you like to obtain your AVS? MyChart  If the video visit is dropped, the invitation should be resent by: Send to e-mail at: [email or web address]  Will anyone else be joining your video visit? No      Video Start Time: 1258  Video-Visit Details    Type of service:  Video Visit    Video End Time:1322    Originating Location (pt. Location): Home    Distant Location (provider location):  [facility] NEUROLOGY [facility]     Platform used for Video Visit: Enish    __________________________________________________________      [facility] [place] [facility]   [phone]  __________________________________________________________    Chief Complaint: Patient presents with:  Stroke      History of Present Illness: Huseyin St is a 66 year old male presenting for follow-up for recurrent stroke. Prior to hospitalizations he had history of HTN, uncontrolled DM2, HLD, NA and cocaine use.    He initially presented to [facility] ED on 3/16/2022 for evaluation of waxing and waning aphasia over the past day.  MRI confirmed acute left temporal infarct, with MRA revealing severe stenosis of his distal M1.  It was noted that he had a recently used significant amounts of cocaine.  He was discharged on 90 days of DAPT (aspirin 325+ Plavix) and Crestor for treatment of symptomatic intracranial stenosis.    He then returned to [facility] ED on 3/24/2022 for evaluation of worsening speech symptoms.  Repeat MRI showed 2 new small infarcts in the left MCA distribution.  Platelet assays were checked, showing he was responder to aspirin and Plavix.  At that, he was again discharged on DAPT with Plavix and aspirin 325 and Crestor with plans to complete a CardioNet.    He again returned to [facility] ED 04/05/2022 for evaluation of worsening aphasia and a new onset right extremity weakness.  Repeat brain MRI showed new  "infarcts and a left MCA watershed distribution.  Given concern that he had failed medical management he was transferred to [facility] on 04/08/2022 for consideration of STA-MC bypass.  It was ultimately decided for him to undergo angioplasty on 04/16/2022; angioplasty attempts were stopped as multiple runs showed little change in left M1 caliber.  He was not felt to be a good candidate for stenting given distal location and history of poor medication compliance.  As such, he was again discharged on DAPT with Plavix and aspirin 325 along with Crestor.  He has also been strongly counseled to discontinue cocaine use, monitor blood pressure and improve management of uncontrolled diabetes.    Most Recent Stroke Evaluation summarized:  MRI/Head CT MRI 4/26:  New new/acute changes; multiple evolving infarcts of left MCA distribution     Intracranial Vasculature CTA 4/26: no LVO or significant stenosis  DSA 4/16: 99% stenosis of left MCA at bifurcation (angioplasty performed)   Cervical Vasculature CTA 4/26: on LVO, significant stenosis or dissection     Echocardiogram EF 60-65%, no wall motion abnormalities, atria of normal size   EKG/Telemetry SR   Other Testing -30 day cardiac monitor: no atrial fibrillation identified  -Aspirin and Plavix therapeutic on plat assay  -Cocaine negative during most recent admission, was positive on initial presentation 3/16     LDL  4/6/2022: 53 mg/dL   A1C  3/16/2022: 11.8 %   Troponin No lab value available in past 48 hrs     He was started on , Plavix and Crestor for recurrent stroke prevention.     Since being discharged, he denies any new neurological symptoms. He reports ongoing fatigue but otherwise reports he is feeling \"pretty well.\" He continues working closely with therapies. He continues to have difficulty with a mixed aphasia , but he does report seeing some gradual improvement. He has some weakness, particularly in the right hand, but denies other significant residual " weakness. He does not require an assistive device for ambulation; no recent falls.    He confirms he remains on Crestor 40,  and Plavix. His wife is watching him take his medications which has helped with his compliance. He denies difficulty with abnormal bleeding or other medication side effect. Blood sugar is averaging in the mid 100s to mid 200s. They are not checking his BP regularly at this time; previously he was running in the 130s-140s. He has not used cocaine since discharging from the hospital and is committed to remaining clean.     Impression:   Problem List Items Addressed This Visit    None     Visit Diagnoses     Cerebrovascular accident (CVA) due to occlusion of left middle cerebral artery (H)    -  Primary        Huseyin is a 66 year old man presenting to follow up on multiple hospitalizations for recurrent left MCA distribution infarcts in the setting of focal left M1 stenosis. Repeat CTA post discharge shows resolution of severe left MCA stenosis; suspect vasculopathy from cocaine use vs. resolution of intraluminal thrombus vs positive effect from angioplasty.     Plan:   -Continue rehabilitation therapies   - Continue  + Plavix x90 days, followed by antiplatelet monotherapy with ASA 325mg alone  -Crestor 40mg to maintain LDL 40-70, LDL <40 is associated with increased risk of intracranial hemorrhage   -Goal BP is <130/80 with tighter control associated with improved vascular outcomes. Discussed role of home blood pressure monitoring and keeping a BP log for primary care follow up.  -Blood glucose monitoring, Hgb A1c 11.8%, (goal <7% for secondary stroke prevention), follow-up with PCP  -Avoidance of cocaine and other illicit substances  -Signs of TIA/stroke reviewed, with patient instructed to call 911 for any subsequent stroke/TIA symptoms  - Return in about 2 months (around 7/5/2022) for Follow up, using a video visit, with any stroke LUNA, 60 minute visit.    Stroke Education  "provided.  He will call us with any questions.  For any acute neurologic deficits he was advised to  go directly to the hospital rather than call the clinic.    CHAPO DANIEL [facility]  Neurology  05/05/2022 2:08 PM  To page me or covering stroke neurology team member, click here: AMCOM  Choose \"On Call\" tab at top, then search dropdown box for \"Neurology Adult\" & press Enter, look for Neuro ICU/Stroke    ___________________________________________________________________    Current Medications  Current Outpatient Medications   Medication Sig     alcohol swab prep pads Use to swab area of injection/ian as directed     aspirin (ASA) 325 MG EC tablet Take 1 tablet (325 mg) by mouth daily     blood glucose (NO BRAND SPECIFIED) test strip Use to test blood sugar 3 times daily or as directed. To accompany: Blood Glucose Monitor Brands: per insurance.     blood glucose calibration (NO BRAND SPECIFIED) solution Use to calibrate blood glucose monitor as needed as directed. To accompany: Blood Glucose Monitor Brands: per insurance.     clopidogrel (PLAVIX) 75 MG tablet Take 1 tablet (75 mg) by mouth daily     clotrimazole (LOTRIMIN) 1 % external cream Apply topically 2 times daily (Patient taking differently: Apply topically 2 times daily as needed)     Continuous Blood Gluc  (FREESTYLE MYLES 2 READER) LEANN 1 Device continuous prn (replace annually if needed) Use to read blood glucose levels per 's instructions.     Continuous Blood Gluc Sensor (FREESTYLE MYLES 2 SENSOR) MISC 1 Device continuous prn (Change every 14 days) For use with Freestyle Myles 2  for continuous monitoring of blood glucose levels.  Change sensor every 14 days.     escitalopram (LEXAPRO) 20 MG tablet Take 1 tablet (20 mg) by mouth daily     insulin aspart (NOVOLOG FLEXPEN) 100 UNIT/ML pen Inject 5 Units Subcutaneous Take with snacks or supplements for other (With small meals)     insulin aspart (NOVOLOG PEN) 100 UNIT/ML " pen Inject 10 Units Subcutaneous as needed for other (With large meals)     Insulin Glargine-yfgn (SEMGLEE, YFGN,) 100 UNIT/ML SOPN Inject 40 Units Subcutaneous At Bedtime     insulin pen needle (BD MARIE U/F) 32G X 4 MM miscellaneous USE THREE PEN NEEDLES DAILY AS DIRECTED     lisinopril (ZESTRIL) 20 MG tablet Take 1 tablet (20 mg) by mouth daily     ORDER FOR DME Auto-CPAP: Max 12 cm H2O Min 10 cm H2O  Continuous Lifetime need and heated humidity.        rosuvastatin (CRESTOR) 40 MG tablet Take 1 tablet (40 mg) by mouth daily     semaglutide (OZEMPIC, 0.25 OR 0.5 MG/DOSE,) 2 MG/1.5ML SOPN pen Inject 0.25 mg Subcutaneous every 7 days for 28 days     thin (NO BRAND SPECIFIED) lancets Use to test blood sugar 3 times daily or as directed. To accompany: Blood Glucose Monitor Brands: per insurance.     LORazepam (ATIVAN) 0.5 MG tablet Take 1 tablet (0.5 mg) by mouth once as needed for anxiety (Patient not taking: Reported on 5/5/2022)     [START ON 5/16/2022] semaglutide (OZEMPIC, 0.25 OR 0.5 MG/DOSE,) 2 MG/1.5ML SOPN pen Inject 0.5 mg Subcutaneous every 7 days     No current facility-administered medications for this visit.       Past Medical History  Past Medical History:   Diagnosis Date     Anxiety      Essential hypertension, benign      Hypercholesteremia      Sleep apnea      T2DM (type 2 diabetes mellitus) (H)        Social History  Social History     Tobacco Use     Smoking status: Never Smoker     Smokeless tobacco: Never Used   Substance Use Topics     Alcohol use: Yes     Alcohol/week: 0.0 standard drinks     Comment: 3-6 drinks on occ weekend night, occ drink on weekday     Drug use: Yes     Types: Cocaine       Family History  Family History   Adopted: Yes   Problem Relation Age of Onset     Diabetes Mother      Respiratory Mother         asthma     Lipids Mother      Arthritis Mother         knee replacement     Alzheimer Disease Mother      Cerebrovascular Disease Brother      C.A.D. No family hx of       "Cancer - colorectal No family hx of      Prostate Cancer No family hx of        Physical Exam    Vitals - Patient Reported 5/5/2022   Weight (Patient Reported) 228 lb       Estimated body mass index is 42.91 kg/m  as calculated from the following:    Height as of 4/5/22: 1.554 m (5' 1.2\").    Weight as of 4/8/22: 103.7 kg (228 lb 9.6 oz).    General:  no acute distress  HEENT:  normocephalic/atraumatic  Pulmonary:  no respiratory distress    Neurologic  Mental Status:  alert, oriented x 3, follows commands, mixed aphasia  Cranial Nerves:  EOMI with normal smooth pursuit, facial movements symmetric, hearing not formally tested but intact to conversation, no dysarthria, shoulder shrug equal bilaterally, tongue protrusion midline  Motor:  no abnormal movements, able to move all limbs antigravity spontaneously with no signs of hemiparesis observed, no pronator drift  Reflexes:  unable to test (telestroke)  Sensory:  unable to test (telestroke)  Station/Gait:  unable to test (telestroke)    Neuroimaging: as per HPI. I personally reviewed those images    Labs:    Coagulation studies:  Recent Labs   Lab Test 03/16/22  1704   INR 1.02        Lipid panel:  Recent Labs   Lab Test 04/06/22  0725 03/16/22  1704   CHOL 109 224*   HDL 34* 33*   LDL 53 136*   TRIG 111 275*       HbA1C:  Recent Labs   Lab Test 03/16/22  1704 10/11/21  0943 09/28/20  0925   A1C 11.8* 11.8* 13.2*       Troponin:  Recent Labs   Lab Test 04/09/22  0037 04/05/22  1149 03/16/22  1704   TROPONINIS 4 <3 4     No lab results found.  No lab results found.    Billing:    I spent a total of 60 minutes on the day of the visit.   Time spent doing chart review, history and exam, documentation and further activities per the note      "

## 2022-05-06 ENCOUNTER — HOSPITAL ENCOUNTER (OUTPATIENT)
Dept: OCCUPATIONAL THERAPY | Facility: CLINIC | Age: 67
Discharge: HOME | End: 2022-05-06
Payer: COMMERCIAL

## 2022-05-06 ENCOUNTER — TELEPHONE (OUTPATIENT)
Dept: ENDOCRINOLOGY | Facility: CLINIC | Age: 67
End: 2022-05-06

## 2022-05-06 DIAGNOSIS — Z78.9 DECREASED ACTIVITIES OF DAILY LIVING (ADL): ICD-10-CM

## 2022-05-06 DIAGNOSIS — I63.9 CEREBROVASCULAR ACCIDENT (CVA), UNSPECIFIED MECHANISM (H): Primary | ICD-10-CM

## 2022-05-06 PROCEDURE — 97112 NEUROMUSCULAR REEDUCATION: CPT | Mod: GO | Performed by: OCCUPATIONAL THERAPIST

## 2022-05-06 NOTE — TELEPHONE ENCOUNTER
Hello, wanting to write to inform you, we have been trying to get a hold of Mr. St since his orders have been sent to set up his order for delivery, we have reached our max attempts at contacting patient and will place CGM order on file. Wanted to inform you prior to doing so that his CGM will not be sent as of now.       Thank you,  Maira TORIBIO TEAM  [facility]

## 2022-05-11 ENCOUNTER — VIRTUAL VISIT (OUTPATIENT)
Dept: EDUCATION SERVICES | Facility: CLINIC | Age: 67
End: 2022-05-11
Attending: INTERNAL MEDICINE

## 2022-05-11 DIAGNOSIS — Z79.4 TYPE 2 DIABETES MELLITUS WITH DIABETIC NEPHROPATHY, WITH LONG-TERM CURRENT USE OF INSULIN (H): ICD-10-CM

## 2022-05-11 DIAGNOSIS — E11.21 TYPE 2 DIABETES MELLITUS WITH DIABETIC NEPHROPATHY, WITH LONG-TERM CURRENT USE OF INSULIN (H): ICD-10-CM

## 2022-05-11 PROCEDURE — G0108 DIAB MANAGE TRN  PER INDIV: HCPCS | Mod: 95 | Performed by: REGISTERED NURSE

## 2022-05-11 NOTE — PROGRESS NOTES
Video-Visit Details    Type of service:  Video Visit  Patient consented to video visit  Video Start Time:  0915  Video End Time:   0946  Originating Location (pt. Location): Home  Distant Location (provider location):  [facility] DIABETES EDUCATION [place]   Platform used for Video Visit: [id number]     Diabetes Self-Management Education & Support    Huseyin St presents today for education related to Type 2 diabetes    Patient is being treated with:  oral agents and insulin  He is accompanied by spouse    Year of diagnosis: unknown  Referring provider:  Ivonne Odom MD  Living Situation: Lives with wife, Estefania  Employment: Retired.    PATIENT CONCERNS RELATED TO DIABETES SELF MANAGEMENT:     Referred for assistance with getting his Myles 2 sensor started.   Huseyin had a series of CVA's recently and has some aphasia and cognitive deficits.   He gets a great deal of assistance from his wife, Estefania.     ASSESSMENT:    Taking Medication:     Current Diabetes Management per Patient:  Taking diabetes medications?   yes:     Diabetes Medication(s)     Insulin       insulin aspart (NOVOLOG FLEXPEN) 100 UNIT/ML pen    Inject 5 Units Subcutaneous Take with snacks or supplements for other (With small meals)     insulin aspart (NOVOLOG PEN) 100 UNIT/ML pen    Inject 10 Units Subcutaneous as needed for other (With large meals)     Insulin Glargine-yfgn (SEMGLEE, YFGN,) 100 UNIT/ML SOPN    Inject 40 Units Subcutaneous At Bedtime     insulin glargine (LANTUS SOLOSTAR) 100 UNIT/ML pen (Discontinued)    INJ 55 UNITS SC HS    Incretin Mimetic Agents (GLP-1 Receptor Agonists)       semaglutide (OZEMPIC, 0.25 OR 0.5 MG/DOSE,) 2 MG/1.5ML SOPN pen    Inject 0.25 mg Subcutaneous every 7 days for 28 days     semaglutide (OZEMPIC, 0.25 OR 0.5 MG/DOSE,) 2 MG/1.5ML SOPN pen    Inject 0.5 mg Subcutaneous every 7 days          Monitoring  Using a blood glucose meter.    Results reviewed by Dr. Odom at her last visit.     Patient's  most recent   Lab Results   Component Value Date    A1C 11.8 03/16/2022    A1C 13.2 09/28/2020     EDUCATION and INSTRUCTION PROVIDED AT THIS VISIT:      Patient was provided the following education regarding using the ECO-SAFE Flash Glucose Monitoring (FGM) system:    1.  Sensor is FDA approved for placement in the upper posterior arm, although people have successfully worn it in other areas, such as the abdomen, upper outer thighs and buttocks.  Instructed to follow the tutorial in the phone juan, or the instructions that come with the sensor.     2.  Sensor will need to be replaced every 14 days.  [place] is warranteed for 3 years.    3.  Sensor is water-proof.  You can shower with it in and swim up to 3 feet for up to 30 minutes.      4.  Each time a new sensor is started, it requires a 1 hour warm up period.  No information will be available for the 1 hour period.  After that, the sensor will update every 5 minutes.  The reader needs to be held directly over the sensor until the reader beeps to let you know it picked up the reading.  The sensor has to be read a minimum of every 8 hours in order for a continuous trend graph to be available.  In other words, if it is not read 3 times a day, there will be gaps in the trend graph or no data at all.     5.  There will be [id number] arrows with each reading that will indicate the approximate speed with which your glucose is changing and the direction that it is heading.     6.  Instructed in how to enter the Clinic Code into the juan to link our clinic with the [employer] cloud so data is available.     7.  If the sensor is unsure of it's own accuracy a symbol will appear in the reader window cueing you to check a blood glucose.  It is a good idea to check a blood glucose before correcting a high reading or treating a low reading.       Patient-stated goal written and given to Huseyin St.  Verbalized and demonstrated understanding of instructions.     PLAN:  See  patient instructions  AVS printed and given to patient    FOLLOW-UP:        Time spent with patient at today's visit was 31 minutes.      Any diabetes medication dose changes were made via the CDE Protocol and Collaborative Practice Agreement with Gurmeet and  Cruz.  A copy of this encounter was provided to patient's referring provider.

## 2022-05-12 NOTE — TELEPHONE ENCOUNTER
FUTURE VISIT INFORMATION      FUTURE VISIT INFORMATION:    Date: 6/7/2022    Time: 11am    [place] [facility]  REFERRAL INFORMATION:    Referring provider:  Dr. Avelar    Referring providers clinic:  [facility]     Reason for visit/diagnosis  ED Follow-up     RECORDS REQUESTED FROM:       Clinic name Comments Records Status Imaging Status   Internal ED Visit/Admission-4/5/2022    Dr. Odom-4/26/2022    GAYLA Mustafa-5/5/2022    CTA Head/Neck-4/26/2022, 4/8/2022, 3/24/2022, 3/17/2022    MR Brain-4/26/2022, 4/5/2022, 3/24/2022, 3/16/2022    MRA Neck/Brain-4/5/2022, 3/16/2022    CT Head-4/5/2022 [facility] PACS

## 2022-05-17 ENCOUNTER — HOSPITAL ENCOUNTER (OUTPATIENT)
Dept: OCCUPATIONAL THERAPY | Facility: CLINIC | Age: 67
Discharge: HOME | End: 2022-05-17
Payer: COMMERCIAL

## 2022-05-17 DIAGNOSIS — I63.9 CEREBROVASCULAR ACCIDENT (CVA), UNSPECIFIED MECHANISM (H): Primary | ICD-10-CM

## 2022-05-17 DIAGNOSIS — Z78.9 IMPAIRED INSTRUMENTAL ACTIVITIES OF DAILY LIVING (IADL): ICD-10-CM

## 2022-05-17 DIAGNOSIS — Z78.9 DECREASED ACTIVITIES OF DAILY LIVING (ADL): ICD-10-CM

## 2022-05-17 PROCEDURE — 97535 SELF CARE MNGMENT TRAINING: CPT | Mod: GO | Performed by: OCCUPATIONAL THERAPIST

## 2022-05-18 ENCOUNTER — HOSPITAL ENCOUNTER (OUTPATIENT)
Dept: OCCUPATIONAL THERAPY | Facility: CLINIC | Age: 67
Discharge: HOME | End: 2022-05-18
Payer: COMMERCIAL

## 2022-05-18 ENCOUNTER — HOSPITAL ENCOUNTER (OUTPATIENT)
Dept: SPEECH THERAPY | Facility: CLINIC | Age: 67
Discharge: HOME | End: 2022-05-18
Payer: COMMERCIAL

## 2022-05-18 DIAGNOSIS — R47.01 APHASIA: Primary | ICD-10-CM

## 2022-05-18 DIAGNOSIS — I63.9 CEREBROVASCULAR ACCIDENT (CVA), UNSPECIFIED MECHANISM (H): ICD-10-CM

## 2022-05-18 DIAGNOSIS — Z78.9 DECREASED ACTIVITIES OF DAILY LIVING (ADL): ICD-10-CM

## 2022-05-18 DIAGNOSIS — I63.9 CEREBROVASCULAR ACCIDENT (CVA), UNSPECIFIED MECHANISM (H): Primary | ICD-10-CM

## 2022-05-18 PROCEDURE — 97535 SELF CARE MNGMENT TRAINING: CPT | Mod: GO | Performed by: OCCUPATIONAL THERAPIST

## 2022-05-18 PROCEDURE — 92507 TX SP LANG VOICE COMM INDIV: CPT | Mod: GN | Performed by: STUDENT IN AN ORGANIZED HEALTH CARE EDUCATION/TRAINING PROGRAM

## 2022-05-18 NOTE — PROGRESS NOTES
Huseyin St is a 66 year old male who is being seen via a billable video visit.      Patient has given verbal consent for Video visit? Yes    Video Start Time: 1:49p    Telehealth Visit Details    Type of Service:  Telehealth    Video End Time (time video stopped): 2:27p    Originating Location (pt. location): Home    Additional Participants in Telehealth Visit: Estefania (spouse)    Distant Location (provider location):  [facility]     Mode of Communication (Audio Visual or Audio Only):  Audio Visual via Nemo Epstein, SLP  May 18, 2022

## 2022-05-19 NOTE — PROGRESS NOTES
Huseyin St is a 66 year old male who is being seen via a billable video visit.      Patient has given verbal consent for Video visit? Yes    Video Start Time: 3:50 p.m.    Telehealth Visit Details    Type of Service:  Telehealth    Video End Time (time video stopped): 4:30 p.m.    Originating Location (pt. location): Home    Additional Participants in Telehealth Visit: Wife    Distant Location (provider location):  [facility]     Mode of Communication (Audio Visual or Audio Only):  Audio and Visual    Deborah Peñaloza OTR/L  May 19, 2022

## 2022-05-20 ENCOUNTER — PATIENT OUTREACH (OUTPATIENT)
Dept: CARE COORDINATION | Facility: CLINIC | Age: 67
End: 2022-05-20
Payer: COMMERCIAL

## 2022-05-20 ENCOUNTER — HOSPITAL ENCOUNTER (OUTPATIENT)
Dept: SPEECH THERAPY | Facility: CLINIC | Age: 67
Discharge: HOME | End: 2022-05-20
Payer: COMMERCIAL

## 2022-05-20 DIAGNOSIS — R47.01 APHASIA: Primary | ICD-10-CM

## 2022-05-20 DIAGNOSIS — I69.322 DYSARTHRIA DUE TO RECENT STROKE: ICD-10-CM

## 2022-05-20 DIAGNOSIS — I63.9 CEREBROVASCULAR ACCIDENT (CVA), UNSPECIFIED MECHANISM (H): ICD-10-CM

## 2022-05-20 DIAGNOSIS — G47.33 OBSTRUCTIVE SLEEP APNEA (ADULT) (PEDIATRIC): Primary | ICD-10-CM

## 2022-05-20 PROCEDURE — 92507 TX SP LANG VOICE COMM INDIV: CPT | Mod: GN | Performed by: SPEECH-LANGUAGE PATHOLOGIST

## 2022-05-20 NOTE — PROGRESS NOTES
Clinic Care Coordination Contact  Community Health Worker Initial Outreach    Spoke to Patient's Wife (Estefania)     CHW Introduced intent of call regarding PCP referral    Estefania reports that they are doing okay. Further expressing that they are having trouble with navigating all the services that Patient is involved. Further expressing that they feel like they are just floating and are not sure if they are going in the right direction. CHW acknowledged.       CHW introduce Clinic Care Coordination and Estefania responded in needing some support in navigating services while Patient is attending all appointments. Further expressing that she is not sure of the support she need, but she knows that she needs support as a caregiver. Further expressing that she needs someone to check in frequently regarding Patient's health.        CHW acknowledged and scheduled Patient for an [facility] Phone Visit with CS CCC KAREN for an [facility] Assessment on 05/24/2022 at 2:00PM . Patient acknowledged.     CHW acknowledged and provided Estefania with CHW contact information for additional support needed.    CHW Initial Information Gathering:  Referral Source: PCP  Preferred Urgent Care: [facility], [phone]  Current living arrangement:: I live in a private home with spouse  Informal Support system:: Spouse  No PCP office visit in Past Year: No  CHW Additional Questions  If ED/Hospital discharge, follow-up appointment scheduled as recommended?: N/A  Medication changes made following ED/Hospital discharge?: N/A  MyChart active?: Yes  Patient sent Social Determinants of Health questionnaire?: Yes    Patient accepts CC: Yes. Patient scheduled for assessment with [facility] on 05/24/2022 at 2:00PM. Patient noted desire to discuss needing some support in navigating services while Patient is attending all appointments. Further expressing that she is not sure of the support she need, but she knows that she needs support as a caregiver.  Further expressing that she needs someone to check in frequently regarding Patient's health..     Ashlee Mancini BRANDEN  Clinic Care Coordination   [facility]   Phone: [phone]  [email or web address]

## 2022-05-23 ENCOUNTER — HOSPITAL ENCOUNTER (OUTPATIENT)
Dept: SPEECH THERAPY | Facility: CLINIC | Age: 67
Discharge: HOME | End: 2022-05-23
Payer: COMMERCIAL

## 2022-05-23 DIAGNOSIS — R47.01 APHASIA: Primary | ICD-10-CM

## 2022-05-23 DIAGNOSIS — I63.9 CEREBROVASCULAR ACCIDENT (CVA), UNSPECIFIED MECHANISM (H): ICD-10-CM

## 2022-05-23 PROCEDURE — 92507 TX SP LANG VOICE COMM INDIV: CPT | Mod: GN | Performed by: STUDENT IN AN ORGANIZED HEALTH CARE EDUCATION/TRAINING PROGRAM

## 2022-05-23 NOTE — PROGRESS NOTES
Huseyin St is a 66 year old male who is being seen via a billable video visit.      Patient has given verbal consent for Video visit? Yes    Video Start Time: 8:35a    Telehealth Visit Details    Type of Service:  Telehealth    Video End Time (time video stopped): 9:14a    Originating Location (pt. location): Home    Additional Participants in Telehealth Visit: Spouse, Estefania    Distant Location (provider location):  [facility]     Mode of Communication (Audio Visual or Audio Only):  Audio Visual via Nemo Epstein, SLP  May 23, 2022

## 2022-05-24 ENCOUNTER — HOSPITAL ENCOUNTER (OUTPATIENT)
Dept: OCCUPATIONAL THERAPY | Facility: CLINIC | Age: 67
Discharge: HOME | End: 2022-05-24

## 2022-05-24 DIAGNOSIS — Z78.9 IMPAIRED INSTRUMENTAL ACTIVITIES OF DAILY LIVING (IADL): ICD-10-CM

## 2022-05-24 DIAGNOSIS — Z78.9 DECREASED ACTIVITIES OF DAILY LIVING (ADL): ICD-10-CM

## 2022-05-24 DIAGNOSIS — E11.21 TYPE 2 DIABETES MELLITUS WITH DIABETIC NEPHROPATHY, UNSPECIFIED WHETHER LONG TERM INSULIN USE (H): ICD-10-CM

## 2022-05-24 DIAGNOSIS — I63.9 CEREBROVASCULAR ACCIDENT (CVA), UNSPECIFIED MECHANISM (H): Primary | ICD-10-CM

## 2022-05-24 PROCEDURE — 96125 COGNITIVE TEST BY HC PRO: CPT | Mod: GO | Performed by: OCCUPATIONAL THERAPIST

## 2022-05-24 NOTE — PROGRESS NOTES
"    Summary of Test:     Cognitive Performance Test (CPT):   The CPT is a standardized performance test that measures cognitive and problem solving skills related to everyday activities. The result of the test is a Cognitive Performance Test score, based on Yonatan Cognitive Levels, that is used to provide accurate and appropriate care strategies to enhance the quality of life for the individual and caregivers.     Date of Test:5/24/22     CPT Results:   5.5/6  MEDBOX: Follows directions and accurately places 3/4 pills without cues including \"as needed medication.\"  Needing initial general cue for Flomoxafen prescription with demonstration of ability to self correct without need to provide specific cues.        4.5/6  SHOP: Needs cues to locate all money in wallet, able to correctly provide dollar amount for item.  Asking extraneous questions pertaining to reasoning for difference in price (\"is one a men's medium and one a women's medium?, is there a quality difference?) as patient did not attend to amount of money in wallet prior to selection.        4.5/5  WASH: Needing additional cue to complete hand washing task (intially attempting to merely simulate what he would do).  Upon cue is able to sequence appropriately.       5/5  TOAST: Notes toaster is unplugged prior to initiation- asks for outlet and independently locates. Sequences task appropriately, utilizing appropriate utensils.       4/6  PHONE: Looks in phone book, although demonstrates difficulty in navigation and therefore needs number pointed out.  Dials number, participates in phone conversation with questions pertaining to paint (do you have paint?) but does not follow prompt question regarding gallon of white paint.       NT/5 DRESS: NT      6/6  TRAVEL: Patient utilizes topographical map to locate and indicate to this writer when he arrived at outlined location.  Does not take a wrong turn that required self-correction.         AVERAGE CPT SCORE: " 4.9/5.6      Interpretation of Test Results:  Based on the Cognitive Performance Test, this patient scored at a CPT Level 5.0     Factors affecting performance: Impulsive actions    Interpretation based on score:   CPT 5.0 Interpretation:   Performance at this level indicates mild functional decline, start of difficulty with complex tasks. Start of memory decline, difficulty tracking details, trouble with decision making, planning, and organizing is observed. Able to learn new information.                                                                                                                                                   Individual scores range along a continuum as outlined below.  In addition to cognitive status, other factors may affect safety in a home environment.  Please refer to specific recommendations for this patient.    ___5.6-5.8  Normal functioning (absence of cognitive-functional disability).  Independent in managing personal affairs, monitors and directs own behavior.  Uses complex information to carry out daily activities with safety and accuracy.    Proficient with instrumental activities of daily living (IADL) and learning new activity.  Problems are anticipated, errors are avoided, and consequences of actions are considered.      _x__5.0   Mild cognitive-functional disability; deficits in working memory and executive thought processes. Difficulty using complex information. Problems may be observed with recent memory, judgment, reasoning and planning ahead. May be impulsive or have difficulty anticipating consequences.  Safety:  May require assistance to plan ahead; or to manage complex medication schedules, appointments or finances.  Hazardous activities may need to be monitored or limited.  ADL:  Mild functional decline.  Able to complete basic self-care and routine household tasks.  May have difficulty with complex daily tasks such as reading, writing, meal preparation, shopping or  "driving.   Learns through hands on teaching. Self-centered behavior or difficulty considering the needs of others may be seen related to trouble seeing the  whole picture\". Can appear disorganized or uninhibited.    ___4.5  Mild to moderate cognitive-functional disability. Significant deficits in working memory and executive thought processes. Judgment, reasoning and planning show obvious impairment.  Distractible with inability to shift attention/actions given competing stimuli.  Difficulty with problem solving and managing details. Complex daily tasks performed with inconsistency, difficulty, or error.     Safety:  Medications should be monitored, stove use may require supervision, and driving ability may be affected.  Impaired safety awareness with inability to anticipate potential problems.  May not recognize or respond to emergent situations. Requires frequent check-in support.   ADL:  Mild difficulty with simple everyday self-care tasks. Benefits from structured, routine activity.  Will likely need reminders to complete tasks outside of the routine. Requires assistance with planning and IADL tasks like shopping and finances. Learns concrete tasks through repetition, but performance may not generalize. Tends to be impulsive with poor insight. Self centered behavior or inability to consider the needs of others is common.    ___4.0  Moderate cognitive-functional disability; abstract to concrete thought processes. Working memory and executive function impairments are obvious. Difficulty with planning and problem solving.  Behavior is goal-directed, but unable to follow multi-step directions, is easily distracted, and may not recognize mistakes.  Inability to anticipate hazards or understand precautions.  Safety:  Recommend 24-hour supervision for safety. Supervision needed for medication management and for hazardous activities. May not be able to follow a restricted diet. Can get lost in unfamiliar surroundings. " Generally, persons functioning at level 4 should not be driving.   ADL:  Some decline in quality or frequency of ADL.  [place] enhanced by use of a routine, simple concrete directions, and caregiver set-up of needed items. Complex tasks such as money or home management typically requires assistance.  Relies heavily on vision to guide behavior; will ignore objects/hazards not in plain sight and can be distracted by irrelevant objects. Often has poor insight.  Able to carry out social conversation and may verbally  cover  for deficits leading caregivers to believe they are capable of functioning independently.       ___3.5  Moderate cognitive-functional disability; increased cues needed for task completion. Aware of concrete task steps but needs prompting or cues to initiate and complete simple tasks. Attention span is limited, simple directions may need to be repeated, and re-focus to a topic or task may be required.  Safety:  24-hour supervision required for safety and for assistance with daily tasks. Assistance required with medications, and access to medication should be limited. Meals, nutrition and dietary restrictions need to be monitored.  All hazardous activities should be restricted or supervised. Should not drive. Prone to wandering and can become lost.  ADL:  Moderate functional decline. Familiar tasks usually requires set-up of supplies and directions to complete steps. May need objects handed to them for task initiation. Function best with a set schedule in familiar surroundings with familiar people. All complex tasks must be done by others. Vocabulary is diminished and speech often unfocused.       Time administering test: 35 minutes  Time for interpretation and preparation of report: 10 minutes  Total time: 45 minutes    Cristina CORONA

## 2022-05-25 ENCOUNTER — HOSPITAL ENCOUNTER (OUTPATIENT)
Dept: OCCUPATIONAL THERAPY | Facility: CLINIC | Age: 67
Discharge: HOME | End: 2022-05-25
Payer: COMMERCIAL

## 2022-05-25 DIAGNOSIS — I63.9 CEREBROVASCULAR ACCIDENT (CVA), UNSPECIFIED MECHANISM (H): Primary | ICD-10-CM

## 2022-05-25 DIAGNOSIS — R29.898 DECREASED STRENGTH OF UPPER EXTREMITY: ICD-10-CM

## 2022-05-25 PROCEDURE — 97110 THERAPEUTIC EXERCISES: CPT | Mod: GO | Performed by: OCCUPATIONAL THERAPIST

## 2022-05-25 RX ORDER — SEMAGLUTIDE 1.34 MG/ML
0.5 INJECTION, SOLUTION SUBCUTANEOUS
Qty: 2 MG | Refills: 0 | Status: SHIPPED | OUTPATIENT
Start: 2022-05-25 | End: 2022-05-31

## 2022-05-25 NOTE — TELEPHONE ENCOUNTER
Routing refill request to provider for review/approval because:  Hemoglobin A1C POCT   Date Value Ref Range Status   09/28/2020 13.2 (H) 0 - 5.6 % Final     Comment:     Normal <5.7% Prediabetes 5.7-6.4%  Diabetes 6.5% or higher - adopted from ADA   consensus guidelines.  Reviewed: OK with previous     09/23/2019 12.3 (H) 0 - 5.6 % Final     Comment:     Reviewed: OK with previous  Normal <5.7% Prediabetes 5.7-6.4%  Diabetes 6.5% or higher - adopted from ADA   consensus guidelines.     03/12/2018 10.4 (H) 4.3 - 6.0 % Final     Hemoglobin A1C   Date Value Ref Range Status   03/16/2022 11.8 (H) 0.0 - 5.6 % Final     Comment:     Normal <5.7%   Prediabetes 5.7-6.4%    Diabetes 6.5% or higher     Note: Adopted from ADA consensus guidelines.   10/11/2021 11.8 (H) 0.0 - 5.6 % Final     Comment:     Normal <5.7%   Prediabetes 5.7-6.4%    Diabetes 6.5% or higher     Note: Adopted from ADA consensus guidelines.         Jaja Haney RN

## 2022-05-31 ENCOUNTER — HOSPITAL ENCOUNTER (OUTPATIENT)
Dept: OCCUPATIONAL THERAPY | Facility: CLINIC | Age: 67
Discharge: HOME | End: 2022-05-31

## 2022-05-31 ENCOUNTER — VIRTUAL VISIT (OUTPATIENT)
Dept: ENDOCRINOLOGY | Facility: CLINIC | Age: 67
End: 2022-05-31
Payer: COMMERCIAL

## 2022-05-31 DIAGNOSIS — R29.898 DECREASED STRENGTH OF UPPER EXTREMITY: ICD-10-CM

## 2022-05-31 DIAGNOSIS — Z79.4 TYPE 2 DIABETES MELLITUS WITH DIABETIC NEPHROPATHY, WITH LONG-TERM CURRENT USE OF INSULIN (H): Primary | ICD-10-CM

## 2022-05-31 DIAGNOSIS — E11.21 TYPE 2 DIABETES MELLITUS WITH DIABETIC NEPHROPATHY, UNSPECIFIED WHETHER LONG TERM INSULIN USE (H): ICD-10-CM

## 2022-05-31 DIAGNOSIS — E11.21 TYPE 2 DIABETES MELLITUS WITH DIABETIC NEPHROPATHY, WITH LONG-TERM CURRENT USE OF INSULIN (H): Primary | ICD-10-CM

## 2022-05-31 DIAGNOSIS — Z78.9 IMPAIRED INSTRUMENTAL ACTIVITIES OF DAILY LIVING (IADL): ICD-10-CM

## 2022-05-31 DIAGNOSIS — I63.9 CEREBROVASCULAR ACCIDENT (CVA), UNSPECIFIED MECHANISM (H): Primary | ICD-10-CM

## 2022-05-31 PROCEDURE — 97535 SELF CARE MNGMENT TRAINING: CPT | Mod: GO | Performed by: OCCUPATIONAL THERAPIST

## 2022-05-31 PROCEDURE — 97110 THERAPEUTIC EXERCISES: CPT | Mod: GO | Performed by: OCCUPATIONAL THERAPIST

## 2022-05-31 PROCEDURE — 99214 OFFICE O/P EST MOD 30 MIN: CPT | Mod: 95 | Performed by: INTERNAL MEDICINE

## 2022-05-31 RX ORDER — SEMAGLUTIDE 1.34 MG/ML
0.5 INJECTION, SOLUTION SUBCUTANEOUS
Qty: 6 MG | Refills: 3 | Status: SHIPPED | OUTPATIENT
Start: 2022-05-31 | End: 2022-06-27

## 2022-05-31 ASSESSMENT — ENCOUNTER SYMPTOMS
LOSS OF CONSCIOUSNESS: 0
RECTAL PAIN: 0
INSOMNIA: 0
CHILLS: 0
NECK PAIN: 1
SPUTUM PRODUCTION: 0
DIARRHEA: 1
BLOATING: 0
WEAKNESS: 1
TINGLING: 1
SNORES LOUDLY: 1
SEIZURES: 0
ALTERED TEMPERATURE REGULATION: 0
PARALYSIS: 0
FATIGUE: 1
DEPRESSION: 0
INCREASED ENERGY: 1
WEIGHT GAIN: 0
BLOOD IN STOOL: 0
DISTURBANCES IN COORDINATION: 1
JAUNDICE: 0
NUMBNESS: 1
POSTURAL DYSPNEA: 0
MUSCLE WEAKNESS: 1
COUGH: 0
MYALGIAS: 1
COUGH DISTURBING SLEEP: 0
HEADACHES: 0
TREMORS: 0
HALLUCINATIONS: 0
DECREASED CONCENTRATION: 1
STIFFNESS: 0
WHEEZING: 0
NAUSEA: 0
DIZZINESS: 1
DECREASED APPETITE: 0
HEMOPTYSIS: 0
ABDOMINAL PAIN: 0
MUSCLE CRAMPS: 1
HEARTBURN: 0
POLYPHAGIA: 0
VOMITING: 0
SPEECH CHANGE: 1
ARTHRALGIAS: 0
JOINT SWELLING: 0
FEVER: 0
CONSTIPATION: 0
PANIC: 0
NERVOUS/ANXIOUS: 0
WEIGHT LOSS: 1
MEMORY LOSS: 1
SHORTNESS OF BREATH: 0
BOWEL INCONTINENCE: 0
NIGHT SWEATS: 0
DYSPNEA ON EXERTION: 0
BACK PAIN: 0
POLYDIPSIA: 0

## 2022-05-31 NOTE — PROGRESS NOTES
Huseyin St is a 66 year old male who is being seen via a billable video visit.      Patient has given verbal consent for Video visit? Yes    Video Start Time: 3:52 p.m.    Telehealth Visit Details    Type of Service:  Telehealth    Video End Time (time video stopped): 4:24 p.m.    Originating Location (pt. location): Home    Additional Participants in Telehealth Visit: Yes, wife    Distant Location (provider location):  [facility]     Mode of Communication (Audio Visual or Audio Only):  Audio Visual    Deborah Peñaloza OTR/L  May 25, 2022

## 2022-05-31 NOTE — LETTER
5/31/2022         RE: Huseyin Pandeyajmila  [address] [place] [address]        Dear Colleague,    Thank you for referring your patient, Huseyin St, to the [facility] [place]. Please see a copy of my visit note below.      Video-Visit Details    Type of service:  Video Visit  Video Start Time: 14:37  Video End Time: 3:51  Originating Location (pt. 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 Last seen 4/26. He is accompanied by Estefania (wife)    No chief complaint on file.        Subjective:    1) Diabetes Mellitus    Diabetes History:  Diagnosis: 10-20 years ago  Hospitalizations: April for stroke, never for diabetes  Previous Regimens:   Current Regimen: Ozempic 0.25mg daily, Novolog 12u with larger meals, 5u with small meals, Lantus 46u at bedtime    BG check- SMBG, hasn't started lauren yet as hasn't had time to set it up  Trends-   Almost all under 200s, mostly 150s-140s    5/24- 8:30- 156, 1150- 199, dinner- 243  5/25- 8:55- 140, 12:30- 116, dinner-   5/26- 09:20- 166, X 6:15- 192  5/27- 08:40- 141, X 5:28- 216  5/28-   5/29- 8:51- 159,   5/30- 9:10- 159,   5/31- 8:30- 174        Complications: neurovascular    Last eye exam: last one year ago, no NPRD at that time (per report)  Foot Exam: self checks, wife checks every few days  ACEI/ARB: lisinopril  Statin/ASA: crestor, ASA, plavix      BP Readings from Last 3 Encounters:   04/18/22 134/63   04/08/22 (!) 154/94   04/01/22 132/81       Lab Results   Component Value Date    A1C 11.8 03/16/2022    A1C 11.8 10/11/2021    A1C 13.2 09/28/2020    A1C 12.3 09/23/2019    A1C 10.4 03/12/2018       Recent Labs   Lab Test 04/06/22  0725 03/16/22  1704 04/21/16  0906  08/20/15  0830 01/14/15  0847   CHOL 109 224*   < > 136 316*   HDL 34* 33*   < > 42 41   LDL 53 136*   < > 59 219*   TRIG 111 275*   < > 176* 282*   CHOLHDLRATIO  --   --   --  3.2 7.7*    < > = values in this interval not displayed.       Lab Results   Component Value Date    MICROL 265 10/11/2021    MICROL 715 09/28/2020     No results found for: MICROALBUMIN      Wt Readings from Last 3 Encounters:   04/08/22 103.7 kg (228 lb 9.6 oz)   04/05/22 105.7 kg (233 lb)   04/01/22 106.1 kg (234 lb)             Active diagnoses this visit:  Data Unavailable     ROS: 10 point ROS neg other than the symptoms noted above in the HPI.      Medical, surgical, social, and family histories, medications and allergies reviewed and updated.    Objective:  Physical Exam (visual exam)  VS:  no vital signs taken for video visit  CONSTITUTIONAL: healthy, alert and NAD, responding appropriately  ENT: normocephalic, no visual evidence of trauma, normal nose and oral mucosa  EYES: conjunctivae and sclerae normal, no exophthalmos or proptosis  THYROID:  no visualized nodules or goiter  LUNGS: no audible wheeze, cough or visible cyanosis, no visible retractions or increased work of breathing  EXTREMITIES: no hand tremors  NEUROLOGY: cranial nerves grossly intact with no obvious deficit.  SKIN:  no visualized skin lesions or rash, no edema visualized  PSYCH: aphasia, poor recall        Lab Results   Component Value Date/Time    TSH 1.94 12/05/2016 10:58 AM       Last Comprehensive Metabolic Panel:  Sodium   Date Value Ref Range Status   04/17/2022 135 133 - 144 mmol/L Final   03/12/2018 135 133 - 144 mmol/L Final     Potassium   Date Value Ref Range Status   04/17/2022 3.9 3.4 - 5.3 mmol/L Final   03/12/2018 4.3 3.4 - 5.3 mmol/L Final     Chloride   Date Value Ref Range Status   04/17/2022 104 94 - 109 mmol/L Final   03/12/2018 102 94 - 109 mmol/L Final     Carbon Dioxide   Date Value Ref Range Status   03/12/2018 26 20 - 32 mmol/L Final      Carbon Dioxide (CO2)   Date Value Ref Range Status   04/17/2022 25 20 - 32 mmol/L Final     Anion Gap   Date Value Ref Range Status   04/17/2022 6 3 - 14 mmol/L Final   03/12/2018 7 3 - 14 mmol/L Final     Glucose   Date Value Ref Range Status   04/17/2022 259 (H) 70 - 99 mg/dL Final   03/12/2018 220 (H) 70 - 99 mg/dL Final     GLUCOSE BY METER POCT   Date Value Ref Range Status   04/18/2022 206 (H) 70 - 99 mg/dL Final     Urea Nitrogen   Date Value Ref Range Status   04/17/2022 10 7 - 30 mg/dL Final   03/12/2018 13 7 - 30 mg/dL Final     Creatinine   Date Value Ref Range Status   04/17/2022 1.04 0.66 - 1.25 mg/dL Final   09/28/2020 1.03 0.66 - 1.25 mg/dL Final     GFR Estimate   Date Value Ref Range Status   04/17/2022 79 >60 mL/min/1.73m2 Final     Comment:     Effective December 21, 2021 eGFRcr in adults is calculated using the 2021 CKD-EPI creatinine equation which includes age and gender (Delio et al., NEJM, DOI: 10.1056/SWMLfn9506860)   09/28/2020 75 >60 mL/min/[1.73_m2] Final     Comment:     Non  GFR Calc  Starting 12/18/2018, serum creatinine based estimated GFR (eGFR) will be   calculated using the Chronic Kidney Disease Epidemiology Collaboration   (CKD-EPI) equation.       Calcium   Date Value Ref Range Status   04/17/2022 8.7 8.5 - 10.1 mg/dL Final   03/12/2018 9.6 8.5 - 10.1 mg/dL Final     Bilirubin Total   Date Value Ref Range Status   03/24/2022 0.2 0.2 - 1.3 mg/dL Final   12/05/2016 0.4 0.2 - 1.3 mg/dL Final     Alkaline Phosphatase   Date Value Ref Range Status   03/24/2022 99 40 - 150 U/L Final   12/05/2016 81 40 - 150 U/L Final     ALT   Date Value Ref Range Status   03/24/2022 17 0 - 70 U/L Final   09/29/2017 36 0 - 70 U/L Final     AST   Date Value Ref Range Status   03/24/2022 10 0 - 45 U/L Final   12/05/2016 15 0 - 45 U/L Final                         ASSESSMENT / PLAN:  No diagnosis found.    ecent A1c 11.8%  1.  Glucose Control:  NOT AT GOAL but improved,              -  Ozempic increase to 0.5mg weekly              - Novolog 5 with small meals, increase to 12u with larger meals              - lantus/semglee-continue 46 unit(s) at bedtime,    - Setting up lauren now with DM education, continue BG monitoring with fingersticks as needed  They have been using a home blood glucose monitor (BGM) and performing frequent BGM testing.The patient is insulin-treated with 3 or more daily injections (MDI) of insulin.The patient's insulin treatment regimen requires frequent adjustments by the beneficiary on the basis of therapeutic CGM testing results. Within the last six (6) months prior to ordering the CGM, the beneficiary had an in-person visit with this practitioner to evaluate their diabetes control and determine that the above criteria are met.  2.  Lipids:  Meets criteria per ASCVD risk %, continue crestor 40mg daily  3.  Blood Pressure:  At goal, continue meds  4.  CV prevention:  Continue aspirin 325 mg daily, plavix  5.  Diabetic Nephropathy screening:   continue ACEI/ARB   6.  Diabetic Retinopathy screening:  continue annual dilated eye exams due now, will call to schedule  7.  Diabetic Neuropathy screening:  Up to date.  Instructed on routine foot care, follow-up with podiatry as needed.       No orders of the defined types were placed in this encounter.       Return to clinic 2 months    A total of 37 minutes were spent today 05/31/22 on this visit including chart review, history and counseling, documentation and other activities as detailed above.   Answers for HPI/ROS submitted by the patient on 5/31/2022  General Symptoms: Yes  Skin Symptoms: No  HENT Symptoms: No  EYE SYMPTOMS: No  HEART SYMPTOMS: No  LUNG SYMPTOMS: Yes  INTESTINAL SYMPTOMS: Yes  URINARY SYMPTOMS: No  REPRODUCTIVE SYMPTOMS: No  SKELETAL SYMPTOMS: Yes  BLOOD SYMPTOMS: No  NERVOUS SYSTEM SYMPTOMS: Yes  MENTAL HEALTH SYMPTOMS: Yes  Fever: No  Loss of appetite: No  Weight loss: Yes  Weight gain: No  Fatigue:  Yes  Night sweats: No  Chills: No  Increased stress: Yes  Excessive hunger: No  Excessive thirst: No  Feeling hot or cold when others believe the temperature is normal: No  Post-operative complications: No  Surgical site pain: No  Hallucinations: No  Change in or Loss of Energy: Yes  Hyperactivity: No  Confusion: Yes  Cough: No  Sputum or phlegm: No  Coughing up blood: No  Difficulty breating or shortness of breath: No  Snoring: Yes  Wheezing: No  Difficulty breathing on exertion: No  Nighttime Cough: No  Difficulty breathing when lying flat: No  Heart burn or indigestion: No  Nausea: No  Vomiting: No  Abdominal pain: No  Bloating: No  Constipation: No  Diarrhea: Yes  Blood in stool: No  Black stools: No  Rectal or Anal pain: No  Fecal incontinence: No  Yellowing of skin or eyes: No  Vomit with blood: No  Change in stools: Yes  Back pain: No  Muscle aches: Yes  Neck pain: Yes  Swollen joints: No  Joint pain: No  Bone pain: No  Muscle cramps: Yes  Muscle weakness: Yes  Joint stiffness: No  Bone fracture: No  Trouble with coordination: Yes  Dizziness or trouble with balance: Yes  Fainting or black-out spells: No  Memory loss: Yes  Headache: No  Seizures: No  Speech problems: Yes  Tingling: Yes  Tremor: No  Weakness: Yes  Difficulty walking: Yes  Paralysis: No  Numbness: Yes  Nervous or Anxious: No  Depression: No  Trouble sleeping: No  Trouble thinking or concentrating: Yes  Mood changes: Yes  Panic attacks: No          Again, thank you for allowing me to participate in the care of your patient.        Sincerely,        Ivonne Odom MD

## 2022-05-31 NOTE — PROGRESS NOTES
Video-Visit Details    Type of service:  Video Visit  Video Start Time: 14:37  Video End Time: 3:51  Originating Location (pt. Location): Home, [place]  Distant Location (provider location):  Home  Platform used for Video Visit: Nemo Odom MD      Huseyin [address]SE[address] St is a 66 year old yo male who NA, obesity, HLD, DM2 complicated by nephropathy, neuropathy and recently CVA s/p stenting here for follow-up via a billable video visit. Last seen 4/26. He is accompanied by Estefania (wife)    No chief complaint on file.        Subjective:    1) Diabetes Mellitus    Diabetes History:  Diagnosis: 10-20 years ago  Hospitalizations: April for stroke, never for diabetes  Previous Regimens:   Current Regimen: Ozempic 0.25mg daily, Novolog 12u with larger meals, 5u with small meals, Lantus 46u at bedtime    BG check- SMBG, hasn't started lauren yet as hasn't had time to set it up  Trends-   Almost all under 200s, mostly 150s-140s    5/24- 8:30- 156, 1150- 199, dinner- 243  5/25- 8:55- 140, 12:30- 116, dinner-   5/26- 09:20- 166, X 6:15- 192  5/27- 08:40- 141, X 5:28- 216  5/28-   5/29- 8:51- 159,   5/30- 9:10- 159,   5/31- 8:30- 174        Complications: neurovascular    Last eye exam: last one year ago, no NPRD at that time (per report)  Foot Exam: self checks, wife checks every few days  ACEI/ARB: lisinopril  Statin/ASA: crestor, ASA, plavix      BP Readings from Last 3 Encounters:   04/18/22 134/63   04/08/22 (!) 154/94   04/01/22 132/81       Lab Results   Component Value Date    A1C 11.8 03/16/2022    A1C 11.8 10/11/2021    A1C 13.2 09/28/2020    A1C 12.3 09/23/2019    A1C 10.4 03/12/2018       Recent Labs   Lab Test 04/06/22  0725 03/16/22  1704 04/21/16  0906 08/20/15  0830 01/14/15  0847   CHOL 109 224*   < > 136 316*   HDL 34* 33*   < > 42 41   LDL 53 136*   < > 59 219*   TRIG 111 275*   < > 176* 282*   CHOLHDLRATIO  --   --   --  3.2 7.7*    < > = values in this interval not displayed.       Lab Results   Component  Value Date    MICROL 265 10/11/2021    MICROL 715 09/28/2020     No results found for: MICROALBUMIN      Wt Readings from Last 3 Encounters:   04/08/22 103.7 kg (228 lb 9.6 oz)   04/05/22 105.7 kg (233 lb)   04/01/22 106.1 kg (234 lb)             Active diagnoses this visit:  Data Unavailable     ROS: 10 point ROS neg other than the symptoms noted above in the HPI.      Medical, surgical, social, and family histories, medications and allergies reviewed and updated.    Objective:  Physical Exam (visual exam)  VS:  no vital signs taken for video visit  CONSTITUTIONAL: healthy, alert and NAD, responding appropriately  ENT: normocephalic, no visual evidence of trauma, normal nose and oral mucosa  EYES: conjunctivae and sclerae normal, no exophthalmos or proptosis  THYROID:  no visualized nodules or goiter  LUNGS: no audible wheeze, cough or visible cyanosis, no visible retractions or increased work of breathing  EXTREMITIES: no hand tremors  NEUROLOGY: cranial nerves grossly intact with no obvious deficit.  SKIN:  no visualized skin lesions or rash, no edema visualized  PSYCH: aphasia, poor recall        Lab Results   Component Value Date/Time    TSH 1.94 12/05/2016 10:58 AM       Last Comprehensive Metabolic Panel:  Sodium   Date Value Ref Range Status   04/17/2022 135 133 - 144 mmol/L Final   03/12/2018 135 133 - 144 mmol/L Final     Potassium   Date Value Ref Range Status   04/17/2022 3.9 3.4 - 5.3 mmol/L Final   03/12/2018 4.3 3.4 - 5.3 mmol/L Final     Chloride   Date Value Ref Range Status   04/17/2022 104 94 - 109 mmol/L Final   03/12/2018 102 94 - 109 mmol/L Final     Carbon Dioxide   Date Value Ref Range Status   03/12/2018 26 20 - 32 mmol/L Final     Carbon Dioxide (CO2)   Date Value Ref Range Status   04/17/2022 25 20 - 32 mmol/L Final     Anion Gap   Date Value Ref Range Status   04/17/2022 6 3 - 14 mmol/L Final   03/12/2018 7 3 - 14 mmol/L Final     Glucose   Date Value Ref Range Status   04/17/2022 259 (H)  70 - 99 mg/dL Final   03/12/2018 220 (H) 70 - 99 mg/dL Final     GLUCOSE BY METER POCT   Date Value Ref Range Status   04/18/2022 206 (H) 70 - 99 mg/dL Final     Urea Nitrogen   Date Value Ref Range Status   04/17/2022 10 7 - 30 mg/dL Final   03/12/2018 13 7 - 30 mg/dL Final     Creatinine   Date Value Ref Range Status   04/17/2022 1.04 0.66 - 1.25 mg/dL Final   09/28/2020 1.03 0.66 - 1.25 mg/dL Final     GFR Estimate   Date Value Ref Range Status   04/17/2022 79 >60 mL/min/1.73m2 Final     Comment:     Effective December 21, 2021 eGFRcr in adults is calculated using the 2021 CKD-EPI creatinine equation which includes age and gender (Delio cespedes al., NEJ, DOI: 10.1056/NPANrh8353845)   09/28/2020 75 >60 mL/min/[1.73_m2] Final     Comment:     Non  GFR Calc  Starting 12/18/2018, serum creatinine based estimated GFR (eGFR) will be   calculated using the Chronic Kidney Disease Epidemiology Collaboration   (CKD-EPI) equation.       Calcium   Date Value Ref Range Status   04/17/2022 8.7 8.5 - 10.1 mg/dL Final   03/12/2018 9.6 8.5 - 10.1 mg/dL Final     Bilirubin Total   Date Value Ref Range Status   03/24/2022 0.2 0.2 - 1.3 mg/dL Final   12/05/2016 0.4 0.2 - 1.3 mg/dL Final     Alkaline Phosphatase   Date Value Ref Range Status   03/24/2022 99 40 - 150 U/L Final   12/05/2016 81 40 - 150 U/L Final     ALT   Date Value Ref Range Status   03/24/2022 17 0 - 70 U/L Final   09/29/2017 36 0 - 70 U/L Final     AST   Date Value Ref Range Status   03/24/2022 10 0 - 45 U/L Final   12/05/2016 15 0 - 45 U/L Final                         ASSESSMENT / PLAN:  No diagnosis found.    ecent A1c 11.8%  1.  Glucose Control:  NOT AT GOAL but improved,              - Ozempic increase to 0.5mg weekly              - Novolog 5 with small meals, increase to 12u with larger meals              - lantus/semglee-continue 46 unit(s) at bedtime,    - Setting up lauren now with DM education, continue BG monitoring with fingersticks as  needed  They have been using a home blood glucose monitor (BGM) and performing frequent BGM testing.The patient is insulin-treated with 3 or more daily injections (MDI) of insulin.The patient's insulin treatment regimen requires frequent adjustments by the beneficiary on the basis of therapeutic CGM testing results. Within the last six (6) months prior to ordering the CGM, the beneficiary had an in-person visit with this practitioner to evaluate their diabetes control and determine that the above criteria are met.  2.  Lipids:  Meets criteria per ASCVD risk %, continue crestor 40mg daily  3.  Blood Pressure:  At goal, continue meds  4.  CV prevention:  Continue aspirin 325 mg daily, plavix  5.  Diabetic Nephropathy screening:   continue ACEI/ARB   6.  Diabetic Retinopathy screening:  continue annual dilated eye exams due now, will call to schedule  7.  Diabetic Neuropathy screening:  Up to date.  Instructed on routine foot care, follow-up with podiatry as needed.       No orders of the defined types were placed in this encounter.       Return to clinic 2 months    A total of 37 minutes were spent today 05/31/22 on this visit including chart review, history and counseling, documentation and other activities as detailed above.   Answers for HPI/ROS submitted by the patient on 5/31/2022  General Symptoms: Yes  Skin Symptoms: No  HENT Symptoms: No  EYE SYMPTOMS: No  HEART SYMPTOMS: No  LUNG SYMPTOMS: Yes  INTESTINAL SYMPTOMS: Yes  URINARY SYMPTOMS: No  REPRODUCTIVE SYMPTOMS: No  SKELETAL SYMPTOMS: Yes  BLOOD SYMPTOMS: No  NERVOUS SYSTEM SYMPTOMS: Yes  MENTAL HEALTH SYMPTOMS: Yes  Fever: No  Loss of appetite: No  Weight loss: Yes  Weight gain: No  Fatigue: Yes  Night sweats: No  Chills: No  Increased stress: Yes  Excessive hunger: No  Excessive thirst: No  Feeling hot or cold when others believe the temperature is normal: No  Post-operative complications: No  Surgical site pain: No  Hallucinations: No  Change in or Loss  of Energy: Yes  Hyperactivity: No  Confusion: Yes  Cough: No  Sputum or phlegm: No  Coughing up blood: No  Difficulty breating or shortness of breath: No  Snoring: Yes  Wheezing: No  Difficulty breathing on exertion: No  Nighttime Cough: No  Difficulty breathing when lying flat: No  Heart burn or indigestion: No  Nausea: No  Vomiting: No  Abdominal pain: No  Bloating: No  Constipation: No  Diarrhea: Yes  Blood in stool: No  Black stools: No  Rectal or Anal pain: No  Fecal incontinence: No  Yellowing of skin or eyes: No  Vomit with blood: No  Change in stools: Yes  Back pain: No  Muscle aches: Yes  Neck pain: Yes  Swollen joints: No  Joint pain: No  Bone pain: No  Muscle cramps: Yes  Muscle weakness: Yes  Joint stiffness: No  Bone fracture: No  Trouble with coordination: Yes  Dizziness or trouble with balance: Yes  Fainting or black-out spells: No  Memory loss: Yes  Headache: No  Seizures: No  Speech problems: Yes  Tingling: Yes  Tremor: No  Weakness: Yes  Difficulty walking: Yes  Paralysis: No  Numbness: Yes  Nervous or Anxious: No  Depression: No  Trouble sleeping: No  Trouble thinking or concentrating: Yes  Mood changes: Yes  Panic attacks: No

## 2022-06-01 ENCOUNTER — HOSPITAL ENCOUNTER (OUTPATIENT)
Dept: SPEECH THERAPY | Facility: CLINIC | Age: 67
Discharge: HOME | End: 2022-06-01
Payer: COMMERCIAL

## 2022-06-01 ENCOUNTER — HOSPITAL ENCOUNTER (OUTPATIENT)
Dept: OCCUPATIONAL THERAPY | Facility: CLINIC | Age: 67
Discharge: HOME | End: 2022-06-01
Payer: COMMERCIAL

## 2022-06-01 DIAGNOSIS — I63.9 CEREBROVASCULAR ACCIDENT (CVA), UNSPECIFIED MECHANISM (H): ICD-10-CM

## 2022-06-01 DIAGNOSIS — R47.01 APHASIA: Primary | ICD-10-CM

## 2022-06-01 DIAGNOSIS — Z78.9 IMPAIRED INSTRUMENTAL ACTIVITIES OF DAILY LIVING (IADL): ICD-10-CM

## 2022-06-01 DIAGNOSIS — I63.9 CEREBROVASCULAR ACCIDENT (CVA), UNSPECIFIED MECHANISM (H): Primary | ICD-10-CM

## 2022-06-01 PROCEDURE — 92507 TX SP LANG VOICE COMM INDIV: CPT | Mod: GN | Performed by: STUDENT IN AN ORGANIZED HEALTH CARE EDUCATION/TRAINING PROGRAM

## 2022-06-01 PROCEDURE — 97537 COMMUNITY/WORK REINTEGRATION: CPT | Mod: GO | Performed by: OCCUPATIONAL THERAPIST

## 2022-06-01 NOTE — PROGRESS NOTES
Huseyin St is a 66 year old male who is being seen via a billable video visit.      Patient has given verbal consent for Video visit? Yes    Video Start Time: 2:30p    Telehealth Visit Details    Type of Service:  Telehealth    Video End Time (time video stopped): 3:12p    Originating Location (pt. location): Home    Additional Participants in Telehealth Visit: Spouse, Estefania, present in background    Distant Location (provider location):  [facility]     Mode of Communication (Audio Visual or Audio Only):  Audio Visual via Nemo Epstein, SLP  June 1, 2022

## 2022-06-02 ENCOUNTER — HOSPITAL ENCOUNTER (OUTPATIENT)
Dept: SPEECH THERAPY | Facility: CLINIC | Age: 67
Discharge: HOME | End: 2022-06-02
Payer: COMMERCIAL

## 2022-06-02 DIAGNOSIS — I63.9 CEREBROVASCULAR ACCIDENT (CVA), UNSPECIFIED MECHANISM (H): ICD-10-CM

## 2022-06-02 DIAGNOSIS — R47.01 APHASIA: Primary | ICD-10-CM

## 2022-06-02 PROCEDURE — 92507 TX SP LANG VOICE COMM INDIV: CPT | Mod: GN | Performed by: STUDENT IN AN ORGANIZED HEALTH CARE EDUCATION/TRAINING PROGRAM

## 2022-06-07 ENCOUNTER — VIRTUAL VISIT (OUTPATIENT)
Dept: NEUROSURGERY | Facility: CLINIC | Age: 67
End: 2022-06-07
Payer: COMMERCIAL

## 2022-06-07 ENCOUNTER — PRE VISIT (OUTPATIENT)
Dept: NEUROSURGERY | Facility: CLINIC | Age: 67
End: 2022-06-07

## 2022-06-07 ENCOUNTER — HOSPITAL ENCOUNTER (OUTPATIENT)
Dept: OCCUPATIONAL THERAPY | Facility: CLINIC | Age: 67
Discharge: HOME | End: 2022-06-07

## 2022-06-07 DIAGNOSIS — I66.9 STENOSIS OF INTRACRANIAL VESSEL: ICD-10-CM

## 2022-06-07 DIAGNOSIS — Z78.9 IMPAIRED INSTRUMENTAL ACTIVITIES OF DAILY LIVING (IADL): ICD-10-CM

## 2022-06-07 DIAGNOSIS — I63.9 CEREBROVASCULAR ACCIDENT (CVA), UNSPECIFIED MECHANISM (H): Primary | ICD-10-CM

## 2022-06-07 DIAGNOSIS — R29.898 DECREASED STRENGTH OF UPPER EXTREMITY: ICD-10-CM

## 2022-06-07 PROCEDURE — 97537 COMMUNITY/WORK REINTEGRATION: CPT | Mod: GO | Performed by: OCCUPATIONAL THERAPIST

## 2022-06-07 PROCEDURE — 99213 OFFICE O/P EST LOW 20 MIN: CPT | Mod: 95 | Performed by: NEUROLOGICAL SURGERY

## 2022-06-07 NOTE — LETTER
2022       RE: Huseyin [address]  [address] [place] [address]     Dear Colleague,    Thank you for referring your patient, Huseyin St, to the [facility] NEUROSURGERY CLINIC [place] [facility]. Please see a copy of my visit note below.    Been doing well in OT and ST. But at home, his deficits are profound. Fluent aphasia. Lexapro. BS mostly in the  150s-200s. Sedentary.   Repeat cta head/neck end of July at [facility].    See dictated note.    MILES Conrad MD    Service Date: 2022    Kendal Wong MD   [address]   [place] [address]     RE:       Huseyin St    MRN:   [id number]    :    1955    Dear Dr. Wong:      We spoke to Mr. St as part of a telephone followup in Cerebrovascular Clinic on 2022.  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  He tolerated the procedure well and has not had any symptoms suggestive of TIA or stroke since the procedure.  He remains on aspirin and Plavix.  He was accompanied by his wife on this phone call due to his receptive aphasia.  It seems like he has been participating well in his therapies.  However, once he is finished with the sessions and is back at home, his deficit seems to be worse.  He is already on Lexapro for depression.  His blood sugars have mainly been in the 150s-200.  As you know, his hemoglobin A1c was markedly elevated at 11.8 back in 2022.  This is a contributing factor to us not leaving a stent in the left MCA.  We limited our intervention to balloon angioplasty only.  He has another hemoglobin A1c pending in the next few weeks.    Over the phone, he was fluent but his speech was nonsensical at times, consistent with a receptive aphasia.    There were no new studies to review.  The post-procedural CTA on 2022 following the balloon angioplasty showed improved caliber of both M2  divisions at the left MCA bifurcation.    IMPRESSION AND PLAN:  Mr. Hogue seems to be doing well from our procedure.  We will keep him on the dual antiplatelet therapy for a total of 3 months and repeat a CTA of the head and neck towards the end of July.  This can be done at [facility] for his convenience.  We will follow up with him afterwards by phone.  We reassured his wife that many times neurological deficits are more pronounced later in the day due to fatigue or decreased attention.  Regardless, we suspect he will continue to improve, though incompletely.  We will keep you informed of his progress.  Please do not hesitate to contact us with questions.    Sincerely,     Lele Conrad MD        D: 07/10/2022   T: 07/10/2022   MT: teodora    Name:     CHRISTA HOGUE  MRN:      [id number]        Account:      [id number]   :      1955           Service Date: 2022       Document: [id number]

## 2022-06-07 NOTE — LETTER
2022      RE: [address]  [address] [place] [address]           Been doing well in OT and ST. But at home, his deficits are profound. Fluent aphasia. Lexapro. BS mostly in the  150s-200s. Sedentary.   Repeat cta head/neck end of July at [facility].    See dictated note.    MILES Conrad MD    Service Date: 2022    Kendal Wong MD   [address]   [place] [address]     RE:       Huseyin St    MRN:   [id number]    :    1955    Dear Dr. Wong:      We spoke to Mr. St as part of a telephone followup in Cerebrovascular Clinic on 2022.  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  He tolerated the procedure well and has not had any symptoms suggestive of TIA or stroke since the procedure.  He remains on aspirin and Plavix.  He was accompanied by his wife on this phone call due to his receptive aphasia.  It seems like he has been participating well in his therapies.  However, once he is finished with the sessions and is back at home, his deficit seems to be worse.  He is already on Lexapro for depression.  His blood sugars have mainly been in the 150s-200.  As you know, his hemoglobin A1c was markedly elevated at 11.8 back in 2022.  This is a contributing factor to us not leaving a stent in the left MCA.  We limited our intervention to balloon angioplasty only.  He has another hemoglobin A1c pending in the next few weeks.    Over the phone, he was fluent but his speech was nonsensical at times, consistent with a receptive aphasia.    There were no new studies to review.  The post-procedural CTA on 2022 following the balloon angioplasty showed improved caliber of both M2 divisions at the left MCA bifurcation.    IMPRESSION AND PLAN:  Mr. St seems to be doing well from our procedure.  We will keep him on the dual antiplatelet therapy for a total of 3 months and repeat a CTA of the head and neck  towards the end of July.  This can be done at [facility] for his convenience.  We will follow up with him afterwards by phone.  We reassured his wife that many times neurological deficits are more pronounced later in the day due to fatigue or decreased attention.  Regardless, we suspect he will continue to improve, though incompletely.  We will keep you informed of his progress.  Please do not hesitate to contact us with questions.    Sincerely,     Lele Conrad MD        D: 07/10/2022   T: 07/10/2022   MT: teodora    Name:     CHRISTA HOGUE  MRN:      [id number]        Account:      [id number]   :      1955           Service Date: 2022       Document: [id number]

## 2022-06-07 NOTE — PROGRESS NOTES
Huseyin St is a 66 year old male who is being seen via a billable video visit.      Patient has given verbal consent for Video visit? Yes    Video Start Time: 3:50 p.m.    Telehealth Visit Details    Type of Service:  Telehealth    Video End Time (time video stopped): 4:29 p.m.    Originating Location (pt. location): Home    Additional Participants in Telehealth Visit: No    Distant Location (provider location):  [facility]     Mode of Communication (Audio Visual or Audio Only):  Audio and Visual    Deborah Peñaloza OTR/L  June 1, 2022

## 2022-06-07 NOTE — LETTER
July 10, 2022       TO: [address]  [address] [place] [address]       DearMr.Maciel,    We are writing to inform you of your test results.    {[facility] results letter list:[id number]}    No results found from the In Basket message.    ***

## 2022-06-07 NOTE — PROGRESS NOTES
Huseyin is a 66 year old who is being evaluated via a billable telephone visit.      What phone number would you like to be contacted at? [phone]    How would you like to obtain your AVS? [place]  Phone call duration: 15 minutes    Been doing well in OT and ST. But at home, his deficits are profound. Fluent aphasia. Lexapro. BS mostly in the  150s-200s. Sedentary.   Repeat cta head/neck end of July at [facility].    See dictated note.    MILES Conrad MD

## 2022-06-07 NOTE — LETTER
6/7/2022       RE: [address]  [address] [place] [address]     Dear Colleague,    Thank you for referring your patient, Huseyin St, to the [facility] NEUROSURGERY CLINIC [facility]. Please see a copy of my visit note below.    Huseyin is a 66 year old who is being evaluated via a billable telephone visit.      What phone number would you like to be contacted at? [phone]    How would you like to obtain your AVS? [employer]  Phone call duration: 15 minutes    Been doing well in OT and ST. But at home, his deficits are profound. Fluent aphasia. Lexapro. BS mostly in the  150s-200s. Sedentary.   Repeat cta head/neck end of July at [facility].    See dictated note.    MILES Conrad MD      Again, thank you for allowing me to participate in the care of your patient.      Sincerely,    Lele Conrad MD

## 2022-06-08 ENCOUNTER — HOSPITAL ENCOUNTER (OUTPATIENT)
Dept: SPEECH THERAPY | Facility: CLINIC | Age: 67
Discharge: HOME | End: 2022-06-08
Payer: COMMERCIAL

## 2022-06-08 ENCOUNTER — HOSPITAL ENCOUNTER (OUTPATIENT)
Dept: OCCUPATIONAL THERAPY | Facility: CLINIC | Age: 67
Discharge: HOME | End: 2022-06-08
Payer: COMMERCIAL

## 2022-06-08 DIAGNOSIS — R47.01 APHASIA: Primary | ICD-10-CM

## 2022-06-08 DIAGNOSIS — Z78.9 IMPAIRED INSTRUMENTAL ACTIVITIES OF DAILY LIVING (IADL): ICD-10-CM

## 2022-06-08 DIAGNOSIS — I63.9 CEREBROVASCULAR ACCIDENT (CVA), UNSPECIFIED MECHANISM (H): ICD-10-CM

## 2022-06-08 DIAGNOSIS — I63.9 CEREBROVASCULAR ACCIDENT (CVA), UNSPECIFIED MECHANISM (H): Primary | ICD-10-CM

## 2022-06-08 PROCEDURE — 97535 SELF CARE MNGMENT TRAINING: CPT | Mod: GO | Performed by: OCCUPATIONAL THERAPIST

## 2022-06-08 PROCEDURE — 92507 TX SP LANG VOICE COMM INDIV: CPT | Mod: GN | Performed by: STUDENT IN AN ORGANIZED HEALTH CARE EDUCATION/TRAINING PROGRAM

## 2022-06-08 NOTE — PROGRESS NOTES
Huseyin St is a 66 year old male who is being seen via a billable video visit.      Patient has given verbal consent for Video visit? Yes    Video Start Time: 3:17p    Telehealth Visit Details    Type of Service:  Telehealth    Video End Time (time video stopped): 3:56p    Originating Location (pt. location): Home    Additional Participants in Telehealth Visit: Spouse, Estefania, present in background    Distant Location (provider location):  [facility]     Mode of Communication (Audio Visual or Audio Only):  Audio Visual via Nemo Epstein, SLP  June 8, 2022      15 day old female with vomiting after every feed today. WIll obtain axr, us pylorus and labs.  Leandra Weiss MD

## 2022-06-09 NOTE — PROGRESS NOTES
Huseyin St is a 66 year old male who is being seen via a billable video visit.      Patient has given verbal consent for Video visit? Yes    Video Start Time: 2:15 p.m.    Telehealth Visit Details    Type of Service:  Telehealth    Video End Time (time video stopped): 3:06 p.m.    Originating Location (pt. location): Home    Additional Participants in Telehealth Visit: Wife    Distant Location (provider location):  [facility]     Mode of Communication (Audio Visual or Audio Only):  Audio and Visual    Deborah Peñaloza OT  June 9, 2022

## 2022-06-13 ENCOUNTER — TELEPHONE (OUTPATIENT)
Dept: NEUROSURGERY | Facility: CLINIC | Age: 67
End: 2022-06-13
Payer: COMMERCIAL

## 2022-06-13 NOTE — PATIENT INSTRUCTIONS
Repeat CTA head and neck end of July at [facility].    If you have any questions please contact me at [phone], option 3.    Emily Torres RN, CNRN, SCRN  Stroke & Endovascular Care Coordinator    Thank you for choosing [facility] for your health care needs.

## 2022-06-14 ENCOUNTER — HOSPITAL ENCOUNTER (OUTPATIENT)
Dept: SPEECH THERAPY | Facility: CLINIC | Age: 67
Discharge: HOME | End: 2022-06-14

## 2022-06-14 ENCOUNTER — HOSPITAL ENCOUNTER (OUTPATIENT)
Dept: OCCUPATIONAL THERAPY | Facility: CLINIC | Age: 67
Discharge: HOME | End: 2022-06-14
Payer: COMMERCIAL

## 2022-06-14 DIAGNOSIS — I63.9 CEREBROVASCULAR ACCIDENT (CVA), UNSPECIFIED MECHANISM (H): ICD-10-CM

## 2022-06-14 DIAGNOSIS — R47.01 APHASIA: Primary | ICD-10-CM

## 2022-06-14 DIAGNOSIS — I63.9 CEREBROVASCULAR ACCIDENT (CVA), UNSPECIFIED MECHANISM (H): Primary | ICD-10-CM

## 2022-06-14 DIAGNOSIS — Z78.9 IMPAIRED INSTRUMENTAL ACTIVITIES OF DAILY LIVING (IADL): ICD-10-CM

## 2022-06-14 PROCEDURE — 97537 COMMUNITY/WORK REINTEGRATION: CPT | Mod: GO | Performed by: OCCUPATIONAL THERAPIST

## 2022-06-14 PROCEDURE — 92507 TX SP LANG VOICE COMM INDIV: CPT | Mod: GN | Performed by: STUDENT IN AN ORGANIZED HEALTH CARE EDUCATION/TRAINING PROGRAM

## 2022-06-15 ENCOUNTER — HOSPITAL ENCOUNTER (OUTPATIENT)
Dept: OCCUPATIONAL THERAPY | Facility: CLINIC | Age: 67
Discharge: HOME | End: 2022-06-15
Payer: COMMERCIAL

## 2022-06-15 ENCOUNTER — HOSPITAL ENCOUNTER (OUTPATIENT)
Dept: SPEECH THERAPY | Facility: CLINIC | Age: 67
Discharge: HOME | End: 2022-06-15
Payer: COMMERCIAL

## 2022-06-15 DIAGNOSIS — R47.01 APHASIA: Primary | ICD-10-CM

## 2022-06-15 DIAGNOSIS — Z78.9 IMPAIRED INSTRUMENTAL ACTIVITIES OF DAILY LIVING (IADL): ICD-10-CM

## 2022-06-15 DIAGNOSIS — I63.9 CEREBROVASCULAR ACCIDENT (CVA), UNSPECIFIED MECHANISM (H): Primary | ICD-10-CM

## 2022-06-15 DIAGNOSIS — I63.9 CEREBROVASCULAR ACCIDENT (CVA), UNSPECIFIED MECHANISM (H): ICD-10-CM

## 2022-06-15 PROCEDURE — 97537 COMMUNITY/WORK REINTEGRATION: CPT | Mod: GO | Performed by: OCCUPATIONAL THERAPIST

## 2022-06-15 PROCEDURE — 92507 TX SP LANG VOICE COMM INDIV: CPT | Mod: GN | Performed by: STUDENT IN AN ORGANIZED HEALTH CARE EDUCATION/TRAINING PROGRAM

## 2022-06-15 NOTE — PROGRESS NOTES
Huseyin St is a 66 year old male who is being seen via a billable video visit.      Patient has given verbal consent for Video visit? Yes    Video Start Time: 1:15p    Telehealth Visit Details    Type of Service:  Telehealth    Video End Time (time video stopped): 1:55p    Originating Location (pt. location): Home    Additional Participants in Telehealth Visit: Spouse present in background    Distant Location (provider location):  [facility]     Mode of Communication (Audio Visual or Audio Only):  Audio Visual via Nemo Epstein, SOFIE  Nata 15, 2022

## 2022-06-16 NOTE — PROGRESS NOTES
Huseyin St is a 66 year old male who is being seen via a billable video visit.      Patient has given verbal consent for Video visit? Yes    Video Start Time: 2:18 p.m.    Telehealth Visit Details    Type of Service:  Telehealth    Video End Time (time video stopped): 3:15 p.m.    Originating Location (pt. location): Home    Additional Participants in Telehealth Visit: Wife    Distant Location (provider location):  [facility]     Mode of Communication (Audio Visual or Audio Only):  Audio and Visual    Deborah Peñaloza OTR/L  June 16, 2022

## 2022-06-21 ENCOUNTER — HOSPITAL ENCOUNTER (OUTPATIENT)
Dept: OCCUPATIONAL THERAPY | Facility: CLINIC | Age: 67
Discharge: HOME | End: 2022-06-21
Payer: COMMERCIAL

## 2022-06-21 DIAGNOSIS — Z78.9 IMPAIRED INSTRUMENTAL ACTIVITIES OF DAILY LIVING (IADL): ICD-10-CM

## 2022-06-21 DIAGNOSIS — I63.9 CEREBROVASCULAR ACCIDENT (CVA), UNSPECIFIED MECHANISM (H): Primary | ICD-10-CM

## 2022-06-21 DIAGNOSIS — R29.898 DECREASED STRENGTH OF UPPER EXTREMITY: ICD-10-CM

## 2022-06-21 PROCEDURE — 97110 THERAPEUTIC EXERCISES: CPT | Mod: GO | Performed by: OCCUPATIONAL THERAPIST

## 2022-06-21 PROCEDURE — 97537 COMMUNITY/WORK REINTEGRATION: CPT | Mod: GO | Performed by: OCCUPATIONAL THERAPIST

## 2022-06-22 ENCOUNTER — HOSPITAL ENCOUNTER (OUTPATIENT)
Dept: SPEECH THERAPY | Facility: CLINIC | Age: 67
Discharge: HOME | End: 2022-06-22
Payer: COMMERCIAL

## 2022-06-22 DIAGNOSIS — R47.01 APHASIA: Primary | ICD-10-CM

## 2022-06-22 DIAGNOSIS — I63.9 CEREBROVASCULAR ACCIDENT (CVA), UNSPECIFIED MECHANISM (H): ICD-10-CM

## 2022-06-22 PROCEDURE — 92507 TX SP LANG VOICE COMM INDIV: CPT | Mod: GN | Performed by: STUDENT IN AN ORGANIZED HEALTH CARE EDUCATION/TRAINING PROGRAM

## 2022-06-22 NOTE — PROGRESS NOTES
Huseyin St is a 66 year old male who is being seen via a billable video visit.      Patient has given verbal consent for Video visit? Yes    Video Start Time: 10:00a    Telehealth Visit Details    Type of Service:  Telehealth    Video End Time (time video stopped): 10:41a    Originating Location (pt. location): Home    Additional Participants in Telehealth Visit: Spouse present in background    Distant Location (provider location):  [facility]     Mode of Communication (Audio Visual or Audio Only):  Audio Visual via Nemo Epstein, SOFIE  June 22, 2022

## 2022-06-27 ENCOUNTER — TELEPHONE (OUTPATIENT)
Dept: ENDOCRINOLOGY | Facility: CLINIC | Age: 67
End: 2022-06-27

## 2022-06-27 DIAGNOSIS — E11.21 TYPE 2 DIABETES MELLITUS WITH DIABETIC NEPHROPATHY, WITH LONG-TERM CURRENT USE OF INSULIN (H): Primary | ICD-10-CM

## 2022-06-27 DIAGNOSIS — Z79.4 TYPE 2 DIABETES MELLITUS WITH DIABETIC NEPHROPATHY, WITH LONG-TERM CURRENT USE OF INSULIN (H): Primary | ICD-10-CM

## 2022-06-27 RX ORDER — INSULIN GLARGINE-YFGN 100 [IU]/ML
46 INJECTION, SOLUTION SUBCUTANEOUS DAILY
Qty: 30 ML | Refills: 3 | Status: SHIPPED | OUTPATIENT
Start: 2022-06-27 | End: 2022-08-02

## 2022-06-27 NOTE — TELEPHONE ENCOUNTER
PA Initiation    Medication: Jardiance pa pending   Insurance Company: [employer] - Phone [phone] Fax [phone]  Pharmacy Filling the Rx:    Filling Pharmacy Phone:    Filling Pharmacy Fax:    Start Date: 6/27/2022

## 2022-06-28 ENCOUNTER — HOSPITAL ENCOUNTER (OUTPATIENT)
Dept: OCCUPATIONAL THERAPY | Facility: CLINIC | Age: 67
Discharge: HOME | End: 2022-06-28
Payer: COMMERCIAL

## 2022-06-28 ENCOUNTER — HOSPITAL ENCOUNTER (OUTPATIENT)
Dept: SPEECH THERAPY | Facility: CLINIC | Age: 67
Discharge: HOME | End: 2022-06-28
Payer: COMMERCIAL

## 2022-06-28 DIAGNOSIS — R47.01 APHASIA: Primary | ICD-10-CM

## 2022-06-28 DIAGNOSIS — I63.9 CEREBROVASCULAR ACCIDENT (CVA), UNSPECIFIED MECHANISM (H): ICD-10-CM

## 2022-06-28 DIAGNOSIS — Z78.9 IMPAIRED INSTRUMENTAL ACTIVITIES OF DAILY LIVING (IADL): ICD-10-CM

## 2022-06-28 DIAGNOSIS — R29.898 DECREASED STRENGTH OF UPPER EXTREMITY: ICD-10-CM

## 2022-06-28 DIAGNOSIS — I63.9 CEREBROVASCULAR ACCIDENT (CVA), UNSPECIFIED MECHANISM (H): Primary | ICD-10-CM

## 2022-06-28 PROCEDURE — 97110 THERAPEUTIC EXERCISES: CPT | Mod: GO | Performed by: OCCUPATIONAL THERAPIST

## 2022-06-28 PROCEDURE — 97537 COMMUNITY/WORK REINTEGRATION: CPT | Mod: GO | Performed by: OCCUPATIONAL THERAPIST

## 2022-06-28 PROCEDURE — 92507 TX SP LANG VOICE COMM INDIV: CPT | Mod: GN | Performed by: STUDENT IN AN ORGANIZED HEALTH CARE EDUCATION/TRAINING PROGRAM

## 2022-06-30 ENCOUNTER — HOSPITAL ENCOUNTER (OUTPATIENT)
Dept: SPEECH THERAPY | Facility: CLINIC | Age: 67
Discharge: HOME | End: 2022-06-30
Payer: COMMERCIAL

## 2022-06-30 DIAGNOSIS — R47.01 APHASIA: Primary | ICD-10-CM

## 2022-06-30 DIAGNOSIS — E11.21 TYPE 2 DIABETES MELLITUS WITH DIABETIC NEPHROPATHY, WITH LONG-TERM CURRENT USE OF INSULIN (H): ICD-10-CM

## 2022-06-30 DIAGNOSIS — Z79.4 TYPE 2 DIABETES MELLITUS WITH DIABETIC NEPHROPATHY, WITH LONG-TERM CURRENT USE OF INSULIN (H): ICD-10-CM

## 2022-06-30 DIAGNOSIS — I63.9 CEREBROVASCULAR ACCIDENT (CVA), UNSPECIFIED MECHANISM (H): ICD-10-CM

## 2022-06-30 PROCEDURE — 92507 TX SP LANG VOICE COMM INDIV: CPT | Mod: GN | Performed by: STUDENT IN AN ORGANIZED HEALTH CARE EDUCATION/TRAINING PROGRAM

## 2022-06-30 NOTE — PROGRESS NOTES
Huseyin St is a 66 year old male who is being seen via a billable video visit.      Patient has given verbal consent for Video visit? Yes    Video Start Time: 11:18a    Telehealth Visit Details    Type of Service:  Telehealth    Video End Time (time video stopped): 11:58a    Originating Location (pt. location): Home    Additional Participants in Telehealth Visit: Spouse present in background    Distant Location (provider location):  [facility]     Mode of Communication (Audio Visual or Audio Only):  Audio Visual via Nemo Epstein, SOFIE  June 30, 2022

## 2022-07-06 ENCOUNTER — HOSPITAL ENCOUNTER (OUTPATIENT)
Dept: OCCUPATIONAL THERAPY | Facility: CLINIC | Age: 67
Discharge: HOME | End: 2022-07-06
Payer: COMMERCIAL

## 2022-07-06 ENCOUNTER — VIRTUAL VISIT (OUTPATIENT)
Dept: NEUROLOGY | Facility: CLINIC | Age: 67
End: 2022-07-06
Payer: COMMERCIAL

## 2022-07-06 DIAGNOSIS — Z78.9 IMPAIRED INSTRUMENTAL ACTIVITIES OF DAILY LIVING (IADL): ICD-10-CM

## 2022-07-06 DIAGNOSIS — I63.9 CEREBROVASCULAR ACCIDENT (CVA), UNSPECIFIED MECHANISM (H): Primary | ICD-10-CM

## 2022-07-06 DIAGNOSIS — I63.512 CEREBROVASCULAR ACCIDENT (CVA) DUE TO OCCLUSION OF LEFT MIDDLE CEREBRAL ARTERY (H): Primary | ICD-10-CM

## 2022-07-06 PROCEDURE — 97537 COMMUNITY/WORK REINTEGRATION: CPT | Mod: GO | Performed by: OCCUPATIONAL THERAPIST

## 2022-07-06 PROCEDURE — 99215 OFFICE O/P EST HI 40 MIN: CPT | Mod: 95 | Performed by: PHYSICIAN ASSISTANT

## 2022-07-06 PROCEDURE — 97110 THERAPEUTIC EXERCISES: CPT | Mod: GO | Performed by: OCCUPATIONAL THERAPIST

## 2022-07-06 NOTE — PATIENT INSTRUCTIONS
-Goal BP is <130/80 with tighter control associated with improved vascular outcomes. Discussed role of home blood pressure monitoring and keeping a BP log for primary care follow up.  - Complete 90 day course of  + Plavix 75 mg daily then continue ASA 325mg monotherapy indefinitely, if any future providers request that you hold or stop taking this medication then please reach out to our stroke team to be included in the discussion. Contact provider immediately with any signs of bleeding or black/tarry stools  -Crestor 40mg to maintain LDL 40-70, LDL <40 is associated with increased risk of intracranial hemorrhage, f/u with PCP for titration  -Blood glucose monitoring, Hgb A1c 11.8%, need tighter control of diabetes (goal <7% for secondary stroke prevention), follow-up with PCP/endocrinology (scheduled for repeat A1c end of August)  -Sleep Apnea screen: treat NA with CPAP at night   -strongly recommend no tobacco smoking or cocaine/other illicit substance use  -Continue skilled therapies as needed  -Follow-up with general neurology in 6 months (or sooner if per their request after repeat CTA at the end of this month)  -continue long term follow-up of stroke risk factor management with PCP  -Mediterranean diet can be beneficial for overall decreased cardiovascular risk, exercise at least 30 minutes/day x 5 days/week, BMI goal <25.   - Call our stroke nurse care coordinator Mary with any questions or concerns at [phone], or send a Andel medical advice message  - Call 111 with any new stroke symptoms

## 2022-07-06 NOTE — PROGRESS NOTES
Huseyin is a 66 year old who is being evaluated via a billable video visit.      How would you like to obtain your AVS? MyChart  If the video visit is dropped, the invitation should be resent by: Send to e-mail at: [email or web address]  Will anyone else be joining your video visit? Yes, wife    Video-Visit Details    Video Start Time: 1:00 PM    Type of service:  Video Visit    Video End Time:1:26 PM    Originating Location (pt. Location): Home    Distant Location (provider location):  [facility]     Platform used for Video Visit: SETiT    __________________________________________________________      [facility] [place]   [phone]  __________________________________________________________    Chief Complaint: Stroke follow-up    History of Present Illness: Huseyin St is a 66 year old male with pertinent past medical history of HTN, uncontrolled DM2, HLD, anxiety, NA and cocaine use.    He initially presented to [facility] ED on 3/16/2022 for waxing and waning aphasia x 1 day. Found to have acute L temporal infarct with severe distal M1 stenosis. Had had significant cocaine use at home.  He was discharged on 90 days of DAPT (aspirin 325+ Plavix 75 mg daily) and Crestor for treatment of symptomatic intracranial stenosis.    He returned to [facility] ED on 3/24/2022 for worsening speech symptoms.  MRI showed 2 new small infarcts in the left MCA distribution.  Platelet assays were checked, showing he was responsive to aspirin and Plavix.  30 day heart monitor was in process and continued on dual-antiplatelet therapy course.     He again returned to [facility] ED 04/05/2022 for worsening aphasia and new right extremity weakness.  Repeat MRI showed new infarcts in left MCA watershed distribution.  Given concern that he had failed medical management he was transferred to [facility] on 04/08/2022 for consideration of STA-MC bypass.  It was ultimately decided for  him to undergo angioplasty on 04/16/2022; angioplasty attempts were stopped as multiple runs showed little change in left M1 caliber.  He was not felt to be a good candidate for stenting given distal location and history of poor medication compliance.  As such, he was again discharged on DAPT with Plavix and aspirin 325 along with Crestor.  He has also been strongly counseled to discontinue cocaine use, monitor blood pressure and improve management of uncontrolled diabetes.    On 5/19/2022 we received phone call from patient's wife Estefania reporting concerns over ongoing symptoms.  No new symptoms reported but persistent deficits from stroke.  My colleague NP Tana Mustafa sent message to PCP for referral to general neurology and social work due to Estefania's concern of ongoing memory deficits that were present for the past 2-3 years prior to her stroke. It appears neurology recommended repeat CTA at the end of this month.    Today he denies any new symptoms but has residual deficits from his stroke still mainly his aphasia.  He is still working once a week with SPT and OT, wondering if there is more that he can do.  Discussed that stroke symptoms can continue to improve for up to a year after the stroke.  Recommend discussion with therapies to see if there is any other home exercises that he can be doing.    He says he has stopped controlling his diet as well and blood sugars a week ago were 200s-250s.  He is following closely with endocrinology and we discussed the importance of tight control of diabetes.    He occasionally takes his blood pressures at home and says they have been at goal.  Recommended to monitor twice daily.    Most Recent Stroke Evaluation summarized:  MRI/Head CT MRI 4/26:  New new/acute changes; multiple evolving infarcts of left MCA distribution     Intracranial Vasculature CTA 4/26: no LVO or significant stenosis  DSA 4/16: 99% stenosis of left MCA at bifurcation (angioplasty performed)    Cervical Vasculature CTA 4/26: on LVO, significant stenosis or dissection     Echocardiogram EF 60-65%, no wall motion abnormalities, atria of normal size   EKG/Telemetry SR   Other Testing -30 day cardiac monitor: no atrial fibrillation identified, SR throughout  -Aspirin and Plavix therapeutic on plat assay  -Cocaine negative during most recent admission, was positive on initial presentation 3/16     LDL  4/6/2022: 53 mg/dL   A1C  3/16/2022: 11.8 %   Troponin No lab value available in past 48 hrs       Impression:   Problem List Items Addressed This Visit    None     Visit Diagnoses     Cerebrovascular accident (CVA) due to occlusion of left middle cerebral artery (H)    -  Primary        Huseyin is a 66 year old man presenting to follow up on multiple hospitalizations for recurrent left MCA distribution infarcts in the setting of focal left M1 stenosis. Repeat CTA post discharge shows resolution of severe left MCA stenosis; suspect vasculopathy from cocaine use vs. resolution of intraluminal thrombus vs positive effect from angioplasty.     Possible underlying neurocognitive disorder with memory concerns x 2-3 years    Plan:   -Discussed with vascular neurology attending, Dr. Thakur  -Goal BP is <130/80 with tighter control associated with improved vascular outcomes. Discussed role of home blood pressure monitoring and keeping a BP log for primary care follow up.  - Complete 90 day course of  + Plavix 75 mg daily then continue ASA 325mg monotherapy indefinitely, if any future providers request that you hold or stop taking this medication then please reach out to our stroke team to be included in the discussion. Contact provider immediately with any signs of bleeding or black/tarry stools  -Crestor 40mg to maintain LDL 40-70, LDL <40 is associated with increased risk of intracranial hemorrhage , recheck couple months  -Blood glucose monitoring, Hgb A1c 11.8%, needs tighter control of diabetes (goal <7% for  "secondary stroke prevention), follow-up with PCP/endocrinology (scheduled for repeat A1c end of August)  -Sleep Apnea screen: treat NA with CPAP at night (not always compliant so stressed importance)  -Smoking screen: never smoker, continued cocaine/other illicit substance cessation  -Continue skilled therapies as needed  -Follow-up with general neurology in 6 months (or sooner if per their request after repeat CTA at the end of this month)  -continue long term follow-up of stroke risk factor management with PCP  -Mediterranean diet can be beneficial for overall decreased cardiovascular risk, exercise at least 30 minutes/day x 5 days/week, BMI goal <25.   - Call our stroke nurse care coordinator Mary with any questions or concerns at [phone], or send a [employer] medical advice message  - Call [phone] with any new stroke symptoms        Stroke Education provided.  He will call us with any questions.  For any acute neurologic deficits he was advised to  go directly to the hospital rather than call the clinic.      Minerva Nieves PA-C  Neurology  07/06/2022 5:25 PM  To page me or covering stroke neurology team member, click here: AMCOM  Choose \"On Call\" tab at top, then search dropdown box for \"Neurology Adult\" & press Enter, look for Neuro ICU/Stroke    ___________________________________________________________________    Current Medications  Current Outpatient Medications   Medication Sig     alcohol swab prep pads Use to swab area of injection/ian as directed     aspirin (ASA) 325 MG EC tablet Take 1 tablet (325 mg) by mouth daily     blood glucose (NO BRAND SPECIFIED) test strip Use to test blood sugar 3 times daily or as directed. To accompany: Blood Glucose Monitor Brands: per insurance.     blood glucose calibration (NO BRAND SPECIFIED) solution Use to calibrate blood glucose monitor as needed as directed. To accompany: Blood Glucose Monitor Brands: per insurance.     clopidogrel (PLAVIX) 75 MG tablet " Take 1 tablet (75 mg) by mouth daily     clotrimazole (LOTRIMIN) 1 % external cream Apply topically 2 times daily (Patient taking differently: Apply topically 2 times daily as needed)     Continuous Blood Gluc  (FREESTYLE NANO 2 READER) LEANN 1 Device continuous prn (replace annually if needed) Use to read blood glucose levels per 's instructions.     Continuous Blood Gluc Sensor (FREESTYLE NANO 2 SENSOR) MISC 1 Device continuous prn (Change every 14 days) For use with Freestyle Nano 2  for continuous monitoring of blood glucose levels.  Change sensor every 14 days.     escitalopram (LEXAPRO) 20 MG tablet Take 1 tablet (20 mg) by mouth daily     Insulin Glargine-yfgn (SEMGLEE, YFGN,) 100 UNIT/ML SOPN Inject 46 Units Subcutaneous daily     insulin pen needle (BD MARIE U/F) 32G X 4 MM miscellaneous USE THREE PEN NEEDLES DAILY AS DIRECTED     lisinopril (ZESTRIL) 20 MG tablet Take 1 tablet (20 mg) by mouth daily     ORDER FOR DME Auto-CPAP: Max 12 cm H2O Min 10 cm H2O  Continuous Lifetime need and heated humidity.        rosuvastatin (CRESTOR) 40 MG tablet Take 1 tablet (40 mg) by mouth daily     thin (NO BRAND SPECIFIED) lancets Use to test blood sugar 3 times daily or as directed. To accompany: Blood Glucose Monitor Brands: per insurance.     empagliflozin (JARDIANCE) 10 MG TABS tablet Take 1 tablet (10 mg) by mouth daily (Patient not taking: Reported on 7/6/2022)     insulin aspart (NOVOLOG FLEXPEN) 100 UNIT/ML pen Inject 5 Units Subcutaneous Take with snacks or supplements for other (With small meals)     insulin aspart (NOVOLOG PEN) 100 UNIT/ML pen Inject 10 Units Subcutaneous as needed for other (With large meals)     No current facility-administered medications for this visit.       Past Medical History  Past Medical History:   Diagnosis Date     Anxiety      Essential hypertension, benign      Hypercholesteremia      Sleep apnea      T2DM (type 2 diabetes mellitus) (H)        Social  "History  Social History     Tobacco Use     Smoking status: Never Smoker     Smokeless tobacco: Never Used   Substance Use Topics     Alcohol use: Yes     Alcohol/week: 0.0 standard drinks     Comment: 3-6 drinks on occ weekend night, occ drink on weekday     Drug use: Yes     Types: Cocaine       Family History  Family History   Adopted: Yes   Problem Relation Age of Onset     Diabetes Mother      Respiratory Mother         asthma     Lipids Mother      Arthritis Mother         knee replacement     Alzheimer Disease Mother      Cerebrovascular Disease Brother      C.A.D. No family hx of      Cancer - colorectal No family hx of      Prostate Cancer No family hx of        Physical Exam    Vitals - Patient Reported 5/5/2022 6/7/2022 7/6/2022   Weight (Patient Reported) 228 lb 225 lb 228 lb       Estimated body mass index is 42.91 kg/m  as calculated from the following:    Height as of 4/5/22: 1.554 m (5' 1.2\").    Weight as of 4/8/22: 103.7 kg (228 lb 9.6 oz).    General:  no acute distress  HEENT:  normocephalic/atraumatic  Pulmonary:  no respiratory distress    Neurologic  Mental Status:  alert, oriented x 3, follows commands, mild mixed receptive and expressive aphasia  Cranial Nerves:  EOMI with normal smooth pursuit, facial movements symmetric, hearing not formally tested but intact to conversation, no dysarthria,  tongue protrusion midline  Motor:  no abnormal movements, able to move limbs antigravity spontaneously with no signs of hemiparesis observed, no pronator drift, mild preference to L with arm roll  Reflexes:  unable to test (telestroke)  Sensory:  unable to test (telestroke)  Coordination: intact finger to nose  Station/Gait:  unable to test (telestroke)    Neuroimaging: as per HPI. I personally reviewed those images    Labs:    Coagulation studies:  Recent Labs   Lab Test 03/16/22  1704   INR 1.02        Lipid panel:  Recent Labs   Lab Test 04/06/22  0725 03/16/22  1704   CHOL 109 224*   HDL 34* 33* "   LDL 53 136*   TRIG 111 275*       HbA1C:  Recent Labs   Lab Test 03/16/22  1704 10/11/21  0943 09/28/20  0925   A1C 11.8* 11.8* 13.2*       Troponin:  Recent Labs   Lab Test 04/09/22  0037 04/05/22  1149 03/16/22  1704   TROPONINIS 4 <3 4     No lab results found.  No lab results found.    Billing:    I spent 40 minutes as on the date of the encounter doing chart review, history and exam, documentation and further activities as noted above.

## 2022-07-07 NOTE — TELEPHONE ENCOUNTER
Prior Authorization Approval    Authorization Effective Date: 5/28/2022  Authorization Expiration Date: 7/5/2023  Medication: Jardiance -PA Approved   Approved Dose/Quantity: uud   Reference #: [id number]   Insurance Company: MED[employer] - Phone [phone] Fax [phone]  Expected CoPay:       CoPay Card Available:      Foundation Assistance Needed:    Which Pharmacy is filling the prescription (Not needed for infusion/clinic administered): [facility] [place] [address] [address]

## 2022-07-11 NOTE — PROGRESS NOTES
Service Date: 2022    Kendal Wong MD   [id number]   [place] [address]     RE:       Huseyin St    MRN:   [id number]    :    1955    Dear Dr. Wong:      We spoke to Mr. St as part of a telephone followup in [facility] on 2022.  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  He tolerated the procedure well and has not had any symptoms suggestive of TIA or stroke since the procedure.  He remains on aspirin and Plavix.  He was accompanied by his wife on this phone call due to his receptive aphasia.  It seems like he has been participating well in his therapies.  However, once he is finished with the sessions and is back at home, his deficit seems to be worse.  He is already on Lexapro for depression.  His blood sugars have mainly been in the 150s-200.  As you know, his hemoglobin A1c was markedly elevated at 11.8 back in 2022.  This is a contributing factor to us not leaving a stent in the left MCA.  We limited our intervention to balloon angioplasty only.  He has another hemoglobin A1c pending in the next few weeks.    Over the phone, he was fluent but his speech was nonsensical at times, consistent with a receptive aphasia.    There were no new studies to review.  The post-procedural CTA on 2022 following the balloon angioplasty showed improved caliber of both M2 divisions at the left MCA bifurcation.    IMPRESSION AND PLAN:  Mr. St seems to be doing well from our procedure.  We will keep him on the dual antiplatelet therapy for a total of 3 months and repeat a CTA of the head and neck towards the end of July.  This can be done at [facility] for his convenience.  We will follow up with him afterwards by phone.  We reassured his wife that many times neurological deficits are more pronounced later in the day due to fatigue or decreased attention.  Regardless, we suspect he will  continue to improve, though incompletely.  We will keep you informed of his progress.  Please do not hesitate to contact us with questions.    Sincerely,     Lele Conrad MD        D: 07/10/2022   T: 07/10/2022   MT: teodora    Name:     RAMONSCHRISTA  MRN:      [id number]        Account:      [id number]   :      1955           Service Date: 2022       Document: [id number]

## 2022-07-13 ENCOUNTER — HOSPITAL ENCOUNTER (OUTPATIENT)
Dept: SPEECH THERAPY | Facility: CLINIC | Age: 67
Discharge: HOME | End: 2022-07-13
Payer: COMMERCIAL

## 2022-07-13 ENCOUNTER — HOSPITAL ENCOUNTER (OUTPATIENT)
Dept: OCCUPATIONAL THERAPY | Facility: CLINIC | Age: 67
Discharge: HOME | End: 2022-07-13
Payer: COMMERCIAL

## 2022-07-13 DIAGNOSIS — R47.01 APHASIA: Primary | ICD-10-CM

## 2022-07-13 DIAGNOSIS — I63.9 CEREBROVASCULAR ACCIDENT (CVA), UNSPECIFIED MECHANISM (H): Primary | ICD-10-CM

## 2022-07-13 DIAGNOSIS — I63.9 CEREBROVASCULAR ACCIDENT (CVA), UNSPECIFIED MECHANISM (H): ICD-10-CM

## 2022-07-13 DIAGNOSIS — Z78.9 IMPAIRED INSTRUMENTAL ACTIVITIES OF DAILY LIVING (IADL): ICD-10-CM

## 2022-07-13 PROCEDURE — 97537 COMMUNITY/WORK REINTEGRATION: CPT | Mod: GO | Performed by: OCCUPATIONAL THERAPIST

## 2022-07-13 PROCEDURE — 97535 SELF CARE MNGMENT TRAINING: CPT | Mod: GO | Performed by: OCCUPATIONAL THERAPIST

## 2022-07-13 PROCEDURE — 92507 TX SP LANG VOICE COMM INDIV: CPT | Mod: GN | Performed by: STUDENT IN AN ORGANIZED HEALTH CARE EDUCATION/TRAINING PROGRAM

## 2022-07-21 ENCOUNTER — HOSPITAL ENCOUNTER (OUTPATIENT)
Dept: OCCUPATIONAL THERAPY | Facility: CLINIC | Age: 67
Discharge: HOME | End: 2022-07-21
Payer: COMMERCIAL

## 2022-07-21 ENCOUNTER — HOSPITAL ENCOUNTER (OUTPATIENT)
Dept: SPEECH THERAPY | Facility: CLINIC | Age: 67
Discharge: HOME | End: 2022-07-21

## 2022-07-21 DIAGNOSIS — I63.9 CEREBROVASCULAR ACCIDENT (CVA), UNSPECIFIED MECHANISM (H): Primary | ICD-10-CM

## 2022-07-21 DIAGNOSIS — R47.01 APHASIA: Primary | ICD-10-CM

## 2022-07-21 DIAGNOSIS — Z78.9 IMPAIRED INSTRUMENTAL ACTIVITIES OF DAILY LIVING (IADL): ICD-10-CM

## 2022-07-21 DIAGNOSIS — I63.9 CEREBROVASCULAR ACCIDENT (CVA), UNSPECIFIED MECHANISM (H): ICD-10-CM

## 2022-07-21 PROCEDURE — 97537 COMMUNITY/WORK REINTEGRATION: CPT | Mod: GO | Performed by: OCCUPATIONAL THERAPIST

## 2022-07-21 PROCEDURE — 92507 TX SP LANG VOICE COMM INDIV: CPT | Mod: GN | Performed by: STUDENT IN AN ORGANIZED HEALTH CARE EDUCATION/TRAINING PROGRAM

## 2022-07-21 NOTE — PROGRESS NOTES
[facility]          OUTPATIENT OCCUPATIONAL THERAPY  EVALUATION  PLAN OF TREATMENT FOR OUTPATIENT REHABILITATION  (COMPLETE FOR INITIAL CLAIMS ONLY)  Patient's Last Name, First Name, M.I.  YOB: 1955  Huseyin St                        Provider's Name  [facility] Medical Record No.  [id number]                               Onset Date:     03/16/22   Start of Care Date:     03/23/22   Type:     ___PT   _X_OT   ___SLP Medical Diagnosis:     Cerebrovascular accident (CVA), unspecified mechanism (H)                          OT Diagnosis:     Impaired IADLs Visits from SOC:  1   _________________________________________________________________________________  Plan of Treatment/Functional Goals:  Cognitive skills, Community/work reintegration, Neuromuscular re-education, Therapeutic activities                    Goals  Goal Identifier: IADLs  Goal Description: Patient will demonstrate improved functional independence with IADLs and community mobility by completing clinic tasks, Dynavision visuomotor program, and visual assessment tasks as measured by completion with at least 90% accuracy.   Target Date: 06/20/22     Goal Identifier: Check Writing/Bill Paying  Goal Description: Patient will demonstrate the completion of moderate to complex money management tasks with 90% accuracy for increased attention to detail and problem-solving skills to resume finances at home and manage money in the community.  Target Date: 06/20/22     Goal Identifier: Memory/Attention  Goal Description: Patient will demonstrate improved memory skills by completing short-term memory tasks in occupational therapy with 90% accuracy using compensatory strategies PRN for increased safety and independence with IADLs (remembering to take medications, completing work tasks, communicating with a cell  phone/computer, tracking appointments/meetings, etc.).  Target Date: 06/20/22              Therapy Frequency: 1x/week     Predicted Duration of Therapy Intervention (days/wks): up to 12 weeks tapering as needed  Shea Moore OT  (certification filled out by ZANA Adams/L-backdating for evaluating therapist)         I CERTIFY THE NEED FOR THESE SERVICES FURNISHED UNDER        THIS PLAN OF TREATMENT AND WHILE UNDER MY CARE     (Physician co-signature of this document indicates review and certification of the therapy plan).                   Certification date from: 03/23/22, Certification date to: 06/20/22               Referring Physician: Natasha Juarez  Primary MD: Dr. Wong     Initial Assessment        See Epic Evaluation      Start Of Care Date: 03/23/22

## 2022-07-21 NOTE — ADDENDUM NOTE
Encounter addended by: Deborah Peñaloza, OT on: 7/21/2022 3:39 PM   Actions taken: Clinical Note Signed, Document created, Document edited, Flowsheet accepted

## 2022-07-21 NOTE — ADDENDUM NOTE
Encounter addended by: Deborah Peñaloza, OT on: 7/21/2022 3:29 PM   Actions taken: Flowsheet data copied forward, Clinical Note Signed, Flowsheet accepted

## 2022-07-21 NOTE — PROGRESS NOTES
[facility]    Outpatient Occupational Therapy   Medicare 10th visit Progress Note    Patient: Huseyin St  : 1955    Beginning/End Dates of Reporting Period:  3/23/2022 to 2022    Referring Provider: Dr. Natasha Juarez    Therapy Diagnosis: CVA    Client Self Report: Wife indicates that Huseyin continues to remain fairly sedentary physically.    Objective Measurements:     Objective Measure: Dynavision    Details: Mode A: 27, 31 Mode B: 15, 12     Objective Measure: SDMT   Details: 26 (score <25 indicates impairment with visual processing)    Objective Measure:  Cognitive Performance Test (see separate note on 2022)  Details:  4.9/5.6       Goals:     Goal Identifier 1-Community Mobility   Goal Description Patient will demonstrate modified I with visual scanning, reaction time, divided attention, and problem solving to ensure safety for community mobility (i.e. driving, crossing the street, pathfinding, shopping, etc.)   Target Date 22   Date Met      Progress (detail required for progress note):  Not formally addressed this progress note.     Goal Identifier 2-Money Management   Goal Description Patient will demonstrate the completion of moderate to complex money management tasks with 90% accuracy for increased attention to detail and problem-solving skills to resume finances at home and manage money in the community.   Target Date 22   Date Met      Progress (detail required for progress note):  Ongoing.     Goal Identifier 3-Memory/Attention   Goal Description Patient will demonstrate improved memory skills by completing short-term memory tasks in occupational therapy with 90% accuracy using compensatory strategies PRN for increased safety and independence with IADLs (remembering to take medications, completing work tasks, communicating with a cell phone/computer, tracking  appointments/meetings, etc.).   Target Date 06/20/22   Date Met      Progress (detail required for progress note):    Patient has been educated on memory strategies but due to language impairment, some difficulty understanding via virtual visits.  Will continue this goal.     Goal Identifier 4-RUE HEP   Goal Description Patient to demonstrate independence with RUE HEP for hand and shoulder to increase overall strength to return to work, complete yardwork, etc.   Target Date 06/20/22   Date Met      Progress (detail required for progress note):  Ongoing.  He has been educated in hand strengthening and rotator cuff HEP, needing cues/assistance to complete at home.     Goal Identifier 5-Medication Management   Goal Description Patient to demonstrate 100% accuracy on setting up 4 new medications for resume safe management of his personal medications using a pill box.   Target Date 06/20/22   Date Met      Progress (detail required for progress note):  Not yet addressed.       Plan:  Continue therapy per current plan of care.  Patient was hospitalized for another stroke following initial evaluation on 3/23/22.  50% of visits have been virtual due to transportation difficulties.  Patient has been educated on fatigue management and pacing strategies especially when completing PT/OT/SLP homework.  He has been having difficulty initiating tasks at home and wife reports that he is pretty sedentary, hoping to get him in for in person visits consistently in July.  Patient will continue to benefit from continued OP OT services 1x/week x 12 weeks for the goals above with his goal to return to driving.    Discharge:  No

## 2022-07-21 NOTE — PROGRESS NOTES
[facility]    OUTPATIENT OCCUPATIONAL THERAPY  PLAN OF TREATMENT FOR OUTPATIENT REHABILITATION AND PROGRESS NOTE    Patient's Last Name, First Name, GALO StHuseyin Date of Birth  1955   Provider's Name  [facility] Medical Record No.  [id number]    Onset Date  3/1622 Start of Care Date  3/23/22   Type:     __PT   _X_OT   __SLP Medical Diagnosis  CVA   OT Diagnosis  Impaired IADL Plan of Treatment  Frequency/Duration: 1x/week x 12 weeks  Certification date from 6/21/22 to 9/19/2022     Goals:  Please refer to OT goals and progress note dated 6/7/2022.  Patient will continue to benefit from skilled OP OT for 1x/week x 12 weeks.           I CERTIFY THE NEED FOR THESE SERVICES FURNISHED UNDER        THIS PLAN OF TREATMENT AND WHILE UNDER MY CARE     (Physician co-signature of this document indicates review and certification of the therapy plan).                Referring Provider: Dr. Natasha Juarez  Primary MD: Dr. Kendal Peñaloza, OTR/L

## 2022-07-21 NOTE — ADDENDUM NOTE
Encounter addended by: Deborah Peñaloza, OT on: 7/21/2022 3:14 PM   Actions taken: Flowsheet accepted, Clinical Note Signed, Document created, Document edited

## 2022-07-21 NOTE — PROGRESS NOTES
[facility]    Outpatient Speech Language Pathology Progress Note  Patient: Huseyin St  : 1955    Beginning/End Dates of Reporting Period:  22 to 22; 90 day visit note    Referring Provider: Saritha Ann MD    Therapy Diagnosis: Mild expressive and receptive aphasia    Client Self Report: Pt attended session alone.  He reports that he feels his symptoms are improving and is able to function well in conversations at home.  He notes that his wife sometimes points out to him that he used the wrong word.     Objective Measurements:      From 22 session: VE: Naming meal-related vocabulary from written definitions=65% accy given no-min assist, improving to 91% accy given min-mod semantic cues and 100% accy given mod phonemic cues.  Naming descriptive words from written definitions=87% accy given no-min assist, improving to 96% accy given min semantic cues and 100% accy given min-mod phonemic cues.  RC/VE: ID inconsistencies in written sentences and verbalizing a replacement word=64% accy given no-min assist, improving to 100% accy given mod semantic cues.    From 22 session: HEP: RC and VE HEP completed with 100% accy.  VE: Naming clothing-related vocabulary from written definitions=65% accy given no-min assist, improving to 91% accy given min-mod semantic cues and 96% accy given mod phonemic cues (pt reports that the remaining word was unfamiliar to him).  Naming action words from written definitions=61% accy given no-min assist, improving to 87% accy given mod semantic cues and 100% accy given additional phonemic cues.         Goals:  Goal Identifier Verbal Expression   Goal Description Patient will complete simple to moderate level verbal expression tasks, such as naming, defining, describing, and sentence level generation tasks with 80% accuracy and min cues for improved ability to  express thoughts and ideas in conversation.   Target Date  10/19/22   Date Met      Progress (detail required for progress note): Progressing, not met.  Performance on simple to moderate level verbal expression tasks varies between 60-95% accy given no-min assist depending on the task.  Patient benefits from at least min-mod semantic and phonemic cues to consistently improve performance to 80% accy or higher.  Continue goal.     Goal Identifier Auditory Comprehension   Goal Description Patient will follow multi-step directions and listen to short paragraphs 2-3 sentences and answer questions with at least 80% accuracy and min cues for improved comprehension for verbal information needed for functional communication and vocational demands.   Target Date  10/19/22   Date Met      Progress (detail required for progress note):  Progressing, not met.  Patient is able to answer before/after questions with 60% accy given no-min assist and answer yes/no comparison questions with 79% accy given no-min assist.  Accy increases to % accy given at least min-mod assist and an additional repetition of the question.  Continue goal.     Goal Identifier Written Expression   Goal Description Patient will complete simple level written expression tasks such as sentence and short paragraph writing with 80% accuracy and moderate cues for improved written expression needed for daily living and vocational needs.   Target Date  10/19/22   Date Met      Progress (detail required for progress note):  Progressing, partially met.  Patient is able to complete short sentences with a single word with 80% accy given no-min assist.  Short paragraph level not attempted this reporting period.  Continue goal.     Goal Identifier Reading Comprehension   Goal Description Patient will complete reading comprehension at the sentence and simple paragraph level and for practical information to improve reading comprehension needed for daily living and  vocational needs with at least 80% accuracy and moderate cues.   Target Date  10/19/22   Date Met      Progress (detail required for progress note): Progressing, inconsistently met.  Patient is able to correct sentence inconsistencies with 64-80% accy given no-min assist, improving to 100% accy given at least min-mod cues.  Pt is able to complete practical reading tasks (e.g. answering comprehension questions about a menu) with >80% accy given no-min assist.  Continue goal.      Plan:  Continue therapy per current plan of care, 1x/week for 3 months.  Patient has demonstrated progress toward all goals; see goal information above for more detail.  Continued skilled speech therapy is recommended to improve patient's expressive and receptive language skills for daily conversations with family and friends.     Discharge:  No      Allison Alpers Ackmann, B.A. (music) MMARY, CCC-SLP  Speech-Language Pathologist  [facility] Certificate of Vocology  [facility]  [phone]

## 2022-07-28 ENCOUNTER — HOSPITAL ENCOUNTER (OUTPATIENT)
Dept: OCCUPATIONAL THERAPY | Facility: CLINIC | Age: 67
Discharge: HOME | End: 2022-07-28
Payer: COMMERCIAL

## 2022-07-28 ENCOUNTER — HOSPITAL ENCOUNTER (OUTPATIENT)
Dept: SPEECH THERAPY | Facility: CLINIC | Age: 67
Discharge: HOME | End: 2022-07-28
Payer: COMMERCIAL

## 2022-07-28 DIAGNOSIS — Z78.9 IMPAIRED INSTRUMENTAL ACTIVITIES OF DAILY LIVING (IADL): ICD-10-CM

## 2022-07-28 DIAGNOSIS — R47.01 APHASIA: Primary | ICD-10-CM

## 2022-07-28 DIAGNOSIS — I63.9 CEREBROVASCULAR ACCIDENT (CVA), UNSPECIFIED MECHANISM (H): Primary | ICD-10-CM

## 2022-07-28 DIAGNOSIS — I63.9 CEREBROVASCULAR ACCIDENT (CVA), UNSPECIFIED MECHANISM (H): ICD-10-CM

## 2022-07-28 PROCEDURE — 97537 COMMUNITY/WORK REINTEGRATION: CPT | Mod: GO | Performed by: OCCUPATIONAL THERAPIST

## 2022-07-28 PROCEDURE — 92507 TX SP LANG VOICE COMM INDIV: CPT | Mod: GN | Performed by: STUDENT IN AN ORGANIZED HEALTH CARE EDUCATION/TRAINING PROGRAM

## 2022-07-31 ENCOUNTER — HOSPITAL ENCOUNTER (OUTPATIENT)
Facility: CLINIC | Age: 67
Setting detail: OBSERVATION
Discharge: HOME | End: 2022-08-01
Attending: EMERGENCY MEDICINE | Admitting: INTERNAL MEDICINE
Payer: COMMERCIAL

## 2022-07-31 ENCOUNTER — APPOINTMENT (OUTPATIENT)
Dept: MRI IMAGING | Facility: CLINIC | Age: 67
End: 2022-07-31
Attending: EMERGENCY MEDICINE
Payer: COMMERCIAL

## 2022-07-31 ENCOUNTER — APPOINTMENT (OUTPATIENT)
Dept: CT IMAGING | Facility: CLINIC | Age: 67
End: 2022-07-31
Attending: EMERGENCY MEDICINE
Payer: COMMERCIAL

## 2022-07-31 DIAGNOSIS — R47.01 EXPRESSIVE APHASIA: ICD-10-CM

## 2022-07-31 DIAGNOSIS — E78.5 HYPERLIPIDEMIA LDL GOAL <100: ICD-10-CM

## 2022-07-31 DIAGNOSIS — G45.9 TIA (TRANSIENT ISCHEMIC ATTACK): Primary | ICD-10-CM

## 2022-07-31 LAB
ANION GAP SERPL CALCULATED.3IONS-SCNC: 6 MMOL/L (ref 3–14)
BASOPHILS # BLD AUTO: 0.1 10E3/UL (ref 0–0.2)
BASOPHILS NFR BLD AUTO: 1 %
BUN SERPL-MCNC: 19 MG/DL (ref 7–30)
CALCIUM SERPL-MCNC: 9.5 MG/DL (ref 8.5–10.1)
CHLORIDE BLD-SCNC: 101 MMOL/L (ref 94–109)
CHOLEST SERPL-MCNC: 112 MG/DL
CO2 SERPL-SCNC: 27 MMOL/L (ref 20–32)
CREAT SERPL-MCNC: 1.25 MG/DL (ref 0.66–1.25)
EOSINOPHIL # BLD AUTO: 0.1 10E3/UL (ref 0–0.7)
EOSINOPHIL NFR BLD AUTO: 1 %
ERYTHROCYTE [DISTWIDTH] IN BLOOD BY AUTOMATED COUNT: 13.4 % (ref 10–15)
GFR SERPL CREATININE-BSD FRML MDRD: 63 ML/MIN/1.73M2
GLUCOSE BLD-MCNC: 213 MG/DL (ref 70–99)
GLUCOSE BLDC GLUCOMTR-MCNC: 223 MG/DL (ref 70–99)
GLUCOSE BLDC GLUCOMTR-MCNC: 256 MG/DL (ref 70–99)
HBA1C MFR BLD: 8.5 % (ref 0–5.6)
HCT VFR BLD AUTO: 48.5 % (ref 40–53)
HDLC SERPL-MCNC: 36 MG/DL
HGB BLD-MCNC: 15.6 G/DL (ref 13.3–17.7)
HOLD SPECIMEN: NORMAL
IMM GRANULOCYTES # BLD: 0 10E3/UL
IMM GRANULOCYTES NFR BLD: 0 %
INR PPP: 1.02 (ref 0.85–1.15)
LDLC SERPL CALC-MCNC: 11 MG/DL
LYMPHOCYTES # BLD AUTO: 3.1 10E3/UL (ref 0.8–5.3)
LYMPHOCYTES NFR BLD AUTO: 28 %
MCH RBC QN AUTO: 27.7 PG (ref 26.5–33)
MCHC RBC AUTO-ENTMCNC: 32.2 G/DL (ref 31.5–36.5)
MCV RBC AUTO: 86 FL (ref 78–100)
MONOCYTES # BLD AUTO: 0.9 10E3/UL (ref 0–1.3)
MONOCYTES NFR BLD AUTO: 8 %
NEUTROPHILS # BLD AUTO: 6.7 10E3/UL (ref 1.6–8.3)
NEUTROPHILS NFR BLD AUTO: 62 %
NONHDLC SERPL-MCNC: 76 MG/DL
NRBC # BLD AUTO: 0 10E3/UL
NRBC BLD AUTO-RTO: 0 /100
PLATELET # BLD AUTO: 550 10E3/UL (ref 150–450)
POTASSIUM BLD-SCNC: 4.2 MMOL/L (ref 3.4–5.3)
RBC # BLD AUTO: 5.63 10E6/UL (ref 4.4–5.9)
SARS-COV-2 RNA RESP QL NAA+PROBE: NEGATIVE
SODIUM SERPL-SCNC: 134 MMOL/L (ref 133–144)
TRIGL SERPL-MCNC: 325 MG/DL
TROPONIN I SERPL HS-MCNC: 4 NG/L
WBC # BLD AUTO: 10.8 10E3/UL (ref 4–11)

## 2022-07-31 PROCEDURE — 250N000013 HC RX MED GY IP 250 OP 250 PS 637: Performed by: EMERGENCY MEDICINE

## 2022-07-31 PROCEDURE — 96372 THER/PROPH/DIAG INJ SC/IM: CPT | Performed by: INTERNAL MEDICINE

## 2022-07-31 PROCEDURE — 70498 CT ANGIOGRAPHY NECK: CPT | Mod: MG

## 2022-07-31 PROCEDURE — 99291 CRITICAL CARE FIRST HOUR: CPT | Mod: 25

## 2022-07-31 PROCEDURE — U0003 INFECTIOUS AGENT DETECTION BY NUCLEIC ACID (DNA OR RNA); SEVERE ACUTE RESPIRATORY SYNDROME CORONAVIRUS 2 (SARS-COV-2) (CORONAVIRUS DISEASE [COVID-19]), AMPLIFIED PROBE TECHNIQUE, MAKING USE OF HIGH THROUGHPUT TECHNOLOGIES AS DESCRIBED BY CMS-2020-01-R: HCPCS | Performed by: INTERNAL MEDICINE

## 2022-07-31 PROCEDURE — 85610 PROTHROMBIN TIME: CPT | Performed by: EMERGENCY MEDICINE

## 2022-07-31 PROCEDURE — 85025 COMPLETE CBC W/AUTO DIFF WBC: CPT | Performed by: EMERGENCY MEDICINE

## 2022-07-31 PROCEDURE — G1010 CDSM STANSON: HCPCS

## 2022-07-31 PROCEDURE — 84484 ASSAY OF TROPONIN QUANT: CPT | Performed by: EMERGENCY MEDICINE

## 2022-07-31 PROCEDURE — 83036 HEMOGLOBIN GLYCOSYLATED A1C: CPT | Performed by: INTERNAL MEDICINE

## 2022-07-31 PROCEDURE — G0378 HOSPITAL OBSERVATION PER HR: HCPCS

## 2022-07-31 PROCEDURE — 250N000009 HC RX 250: Performed by: EMERGENCY MEDICINE

## 2022-07-31 PROCEDURE — 255N000002 HC RX 255 OP 636: Performed by: EMERGENCY MEDICINE

## 2022-07-31 PROCEDURE — 99291 CRITICAL CARE FIRST HOUR: CPT | Mod: GC | Performed by: PSYCHIATRY & NEUROLOGY

## 2022-07-31 PROCEDURE — 80048 BASIC METABOLIC PNL TOTAL CA: CPT | Performed by: EMERGENCY MEDICINE

## 2022-07-31 PROCEDURE — 36415 COLL VENOUS BLD VENIPUNCTURE: CPT | Performed by: EMERGENCY MEDICINE

## 2022-07-31 PROCEDURE — 82962 GLUCOSE BLOOD TEST: CPT

## 2022-07-31 PROCEDURE — 99220 PR INITIAL OBSERVATION CARE,LEVEL III: CPT | Performed by: INTERNAL MEDICINE

## 2022-07-31 PROCEDURE — 93005 ELECTROCARDIOGRAM TRACING: CPT

## 2022-07-31 PROCEDURE — 250N000011 HC RX IP 250 OP 636: Performed by: EMERGENCY MEDICINE

## 2022-07-31 PROCEDURE — 80061 LIPID PANEL: CPT | Performed by: INTERNAL MEDICINE

## 2022-07-31 PROCEDURE — A9585 GADOBUTROL INJECTION: HCPCS | Performed by: EMERGENCY MEDICINE

## 2022-07-31 PROCEDURE — 250N000012 HC RX MED GY IP 250 OP 636 PS 637: Performed by: INTERNAL MEDICINE

## 2022-07-31 RX ORDER — ESCITALOPRAM OXALATE 20 MG/1
20 TABLET ORAL DAILY
Status: DISCONTINUED | OUTPATIENT
Start: 2022-08-01 | End: 2022-08-01 | Stop reason: HOSPADM

## 2022-07-31 RX ORDER — PROCHLORPERAZINE MALEATE 5 MG
5 TABLET ORAL EVERY 6 HOURS PRN
Status: DISCONTINUED | OUTPATIENT
Start: 2022-07-31 | End: 2022-08-01 | Stop reason: HOSPADM

## 2022-07-31 RX ORDER — CLOPIDOGREL 300 MG/1
300 TABLET, FILM COATED ORAL ONCE
Status: COMPLETED | OUTPATIENT
Start: 2022-07-31 | End: 2022-07-31

## 2022-07-31 RX ORDER — ROSUVASTATIN CALCIUM 20 MG/1
40 TABLET, COATED ORAL DAILY
Status: DISCONTINUED | OUTPATIENT
Start: 2022-08-01 | End: 2022-08-01 | Stop reason: HOSPADM

## 2022-07-31 RX ORDER — NICOTINE POLACRILEX 4 MG
15-30 LOZENGE BUCCAL
Status: DISCONTINUED | OUTPATIENT
Start: 2022-07-31 | End: 2022-08-01 | Stop reason: HOSPADM

## 2022-07-31 RX ORDER — GADOBUTROL 604.72 MG/ML
10 INJECTION INTRAVENOUS ONCE
Status: COMPLETED | OUTPATIENT
Start: 2022-07-31 | End: 2022-07-31

## 2022-07-31 RX ORDER — CLOPIDOGREL BISULFATE 75 MG/1
75 TABLET ORAL DAILY
Status: DISCONTINUED | OUTPATIENT
Start: 2022-08-01 | End: 2022-08-01 | Stop reason: HOSPADM

## 2022-07-31 RX ORDER — LISINOPRIL 20 MG/1
20 TABLET ORAL DAILY
Status: DISCONTINUED | OUTPATIENT
Start: 2022-08-01 | End: 2022-08-01 | Stop reason: HOSPADM

## 2022-07-31 RX ORDER — PROCHLORPERAZINE 25 MG
12.5 SUPPOSITORY, RECTAL RECTAL EVERY 12 HOURS PRN
Status: DISCONTINUED | OUTPATIENT
Start: 2022-07-31 | End: 2022-08-01 | Stop reason: HOSPADM

## 2022-07-31 RX ORDER — LIDOCAINE 40 MG/G
CREAM TOPICAL
Status: DISCONTINUED | OUTPATIENT
Start: 2022-07-31 | End: 2022-08-01 | Stop reason: HOSPADM

## 2022-07-31 RX ORDER — SODIUM CHLORIDE 9 MG/ML
INJECTION, SOLUTION INTRAVENOUS CONTINUOUS PRN
Status: ACTIVE | OUTPATIENT
Start: 2022-07-31 | End: 2022-08-01

## 2022-07-31 RX ORDER — ONDANSETRON 2 MG/ML
4 INJECTION INTRAMUSCULAR; INTRAVENOUS EVERY 6 HOURS PRN
Status: DISCONTINUED | OUTPATIENT
Start: 2022-07-31 | End: 2022-08-01 | Stop reason: HOSPADM

## 2022-07-31 RX ORDER — DEXTROSE MONOHYDRATE 25 G/50ML
25-50 INJECTION, SOLUTION INTRAVENOUS
Status: DISCONTINUED | OUTPATIENT
Start: 2022-07-31 | End: 2022-08-01 | Stop reason: HOSPADM

## 2022-07-31 RX ORDER — IOPAMIDOL 755 MG/ML
75 INJECTION, SOLUTION INTRAVASCULAR ONCE
Status: COMPLETED | OUTPATIENT
Start: 2022-07-31 | End: 2022-07-31

## 2022-07-31 RX ORDER — ONDANSETRON 4 MG/1
4 TABLET, ORALLY DISINTEGRATING ORAL EVERY 6 HOURS PRN
Status: DISCONTINUED | OUTPATIENT
Start: 2022-07-31 | End: 2022-08-01 | Stop reason: HOSPADM

## 2022-07-31 RX ORDER — ASPIRIN 81 MG/1
243 TABLET, CHEWABLE ORAL ONCE
Status: DISCONTINUED | OUTPATIENT
Start: 2022-07-31 | End: 2022-07-31

## 2022-07-31 RX ORDER — ACETAMINOPHEN 325 MG/1
650 TABLET ORAL EVERY 4 HOURS PRN
Status: DISCONTINUED | OUTPATIENT
Start: 2022-07-31 | End: 2022-08-01 | Stop reason: HOSPADM

## 2022-07-31 RX ADMIN — GADOBUTROL 10 ML: 604.72 INJECTION INTRAVENOUS at 17:25

## 2022-07-31 RX ADMIN — INSULIN ASPART 2 UNITS: 100 INJECTION, SOLUTION INTRAVENOUS; SUBCUTANEOUS at 22:33

## 2022-07-31 RX ADMIN — SODIUM CHLORIDE 100 ML: 900 INJECTION INTRAVENOUS at 15:01

## 2022-07-31 RX ADMIN — INSULIN GLARGINE 46 UNITS: 100 INJECTION, SOLUTION SUBCUTANEOUS at 23:00

## 2022-07-31 RX ADMIN — CLOPIDOGREL BISULFATE 300 MG: 300 TABLET, FILM COATED ORAL at 18:26

## 2022-07-31 RX ADMIN — IOPAMIDOL 75 ML: 755 INJECTION, SOLUTION INTRAVENOUS at 15:01

## 2022-07-31 ASSESSMENT — VISUAL ACUITY
OU: NORMAL ACUITY
OU: NORMAL ACUITY

## 2022-07-31 ASSESSMENT — ENCOUNTER SYMPTOMS
WEAKNESS: 0
SPEECH DIFFICULTY: 1
ABDOMINAL PAIN: 0
SHORTNESS OF BREATH: 0
LIGHT-HEADEDNESS: 1

## 2022-07-31 NOTE — ED NOTES
Patient returned from imaging exam. Patient placed on ECG NIBP and SaO2 monitoring. Patient remains on room air. Spouse at bedside.

## 2022-07-31 NOTE — ED PROVIDER NOTES
History   Chief Complaint:  Cerebrovascular Accident       The history is provided by the patient and the EMS personnel.      Huseyin St is a 66 year old male with history of diabetes mellitus, TIA, and ischemic stroke, formerly on Plavix, who presents for evaluation of stroke symptoms. The patient was eating at [employer] with family around 1330 today when he got up from the table and experienced vision changes, lightheadedness, and near syncope. He was able to sit and did not fall. His daughter reported expressive aphasia that lasted a couple minutes. She notes problems with aphasia since March, but felt today was more notable. The patient reports that he now feels back to baseline. He denies weakness, abdominal pain, chest pain, and shortness of breath. Of note, the patient stopped taking Plavix 2 weeks ago, he still takes an aspirin each day.     Review of Systems   Eyes: Positive for visual disturbance.   Respiratory: Negative for shortness of breath.    Cardiovascular: Negative for chest pain.   Gastrointestinal: Negative for abdominal pain.   Neurological: Positive for speech difficulty and light-headedness. Negative for weakness.   All other systems reviewed and are negative.    Allergies:  Augmentin  Sulfa Drugs  Victoza    Medications:  Aspirin  Jardiance  Lexapro  Novolog  Semglee  Lisinopril  Crestor    Past Medical History:     Hypertension  Hypertrophy of prostate  Tietze's disease  Allergic rhinitis  Hyperlipidemia  Mixed anxiety depressive disorder  Microalbuminuria   Recurrent cold sores  NA  Type 2 diabetes mellitus   LVH  TIA  CVA  Dysarthria due to stroke  Ischemic stroke   CKD stage 1    Past Surgical History:    Colonoscopy  Right ear surgery  Carotid cerebral angiogram bilateral x2     Family History:    Diabetes mellitus  Asthma  Lipids  Arthritis  Alzheimer disease  Cerebrovascular Disease    Social History:  The patient presents via EMS.  Was at lunch with family, daughter is a nurse.  "    Physical Exam     Patient Vitals for the past 24 hrs:   BP Temp Temp src Pulse Resp SpO2 Height   07/31/22 1535 131/76 -- -- 74 12 99 % --   07/31/22 1530 127/70 -- -- 73 14 99 % --   07/31/22 1515 135/68 -- -- 74 11 98 % --   07/31/22 1514 134/69 -- -- 74 9 99 % --   07/31/22 1513 -- -- -- -- -- 98 % --   07/31/22 1433 124/64 97.7  F (36.5  C) Temporal 78 18 98 % 1.778 m (5' 10\")     Physical Exam  General: Alert, appears well-developed and well-nourished. Cooperative.     In mild distress  HEENT:  Head:  Atraumatic  Ears:  External ears are normal  Mouth/Throat:  Oropharynx is without erythema or exudate and mucous membranes are moist.   Eyes:   Conjunctivae normal and EOM are normal. No scleral icterus.  CV:  Normal rate, regular rhythm, normal heart sounds and radial pulses are 2+ and symmetric.  No murmur.  Resp:  Breath sounds are clear bilaterally    Non-labored, no retractions or accessory muscle use  GI:  Abdomen is soft, no distension, no tenderness. No rebound or guarding.  No CVA tenderness bilaterally  MS:  Normal range of motion. No edema.    Normal strength in all 4 extremities.     Back atraumatic.    No midline cervical, thoracic, or lumbar tenderness  Skin:  Warm and dry.  No rash or lesions noted.  Neuro: Alert. Normal strength.  GCS: 15.  Sensation intact in 4 extremities.  There is faint word finding difficulties.  No dysarthria.  Cranial nerves 2-12 intact.  Psych:  Normal mood and affect.    Emergency Department Course   ECG  ECG taken at 1516, ECG read at 1528  Normal sinus rhythm  Minimal voltage criteria for LVH, may be normal variant  Borderline ECG    No significant change as compared to prior, dated 3/24/22.  Rate 73 bpm. NY interval 134 ms. QRS duration 112 ms. QT/QTc 398/438 ms. P-R-T axes * -29 21.     Imaging:  CTA Head Neck w Contrast   Final Result   IMPRESSION:    HEAD CT:   1.  No acute intracranial abnormality.   2.  Old infarctions in the left MCA territory and left MI-MCA " watershed territory.      HEAD CTA:    1.  Worsened, now severe stenosis of the distal M1 left middle cerebral artery.   2.  No additional high-grade stenosis or occlusion.   3.  No aneurysm or high flow vascular malformation.      NECK CTA:   1.  No significant stenosis. No dissection.      Findings discussed with Dr. Jameel Martin at 1521 hours on 07/31/2022.      CT Head w/o Contrast   Final Result   IMPRESSION:    HEAD CT:   1.  No acute intracranial abnormality.   2.  Old infarctions in the left MCA territory and left MI-MCA watershed territory.      HEAD CTA:    1.  Worsened, now severe stenosis of the distal M1 left middle cerebral artery.   2.  No additional high-grade stenosis or occlusion.   3.  No aneurysm or high flow vascular malformation.      NECK CTA:   1.  No significant stenosis. No dissection.      Findings discussed with Dr. Jameel Martin at 1521 hours on 07/31/2022.      MR Brain w/o & w Contrast    (Results Pending)   Report per radiology    Laboratory:  Labs Ordered and Resulted from Time of ED Arrival to Time of ED Departure   BASIC METABOLIC PANEL - Abnormal       Result Value    Sodium 134      Potassium 4.2      Chloride 101      Carbon Dioxide (CO2) 27      Anion Gap 6      Urea Nitrogen 19      Creatinine 1.25      Calcium 9.5      Glucose 213 (*)     GFR Estimate 63     GLUCOSE BY METER - Abnormal    GLUCOSE BY METER POCT 223 (*)    CBC WITH PLATELETS AND DIFFERENTIAL - Abnormal    WBC Count 10.8      RBC Count 5.63      Hemoglobin 15.6      Hematocrit 48.5      MCV 86      MCH 27.7      MCHC 32.2      RDW 13.4      Platelet Count 550 (*)     % Neutrophils 62      % Lymphocytes 28      % Monocytes 8      % Eosinophils 1      % Basophils 1      % Immature Granulocytes 0      NRBCs per 100 WBC 0      Absolute Neutrophils 6.7      Absolute Lymphocytes 3.1      Absolute Monocytes 0.9      Absolute Eosinophils 0.1      Absolute Basophils 0.1      Absolute Immature Granulocytes 0.0      Absolute  NRBCs 0.0     INR   TROPONIN I   COVID-19 VIRUS (CORONAVIRUS) BY PCR        Emergency Department Course:     Reviewed:  I reviewed nursing notes, vitals, past medical history and Care Everywhere    Assessments/Consults:  1454 I obtained history and examined the patient as noted above.     1454 Tier 1 code stroke activated.     1458 The stroke fellow arrived and accompanied the patient to CT.     1521 I spoke with radiology regarding the patient's imaging results.     1526 I rechecked the patient and explained findings. Family now at bedside.     1635 I rechecked and updated the patient. We discussed admission.    1652 I consulted with Dr. Farfan, hospitalist, regarding the patient's history and presentation, who accepted the patient for admission.      Interventions:  Plavix 300 mg Oral     Disposition:  The patient was admitted to the hospital under the care of Dr. Farfan.     Impression & Plan   CMS Diagnoses: The patient has stroke symptoms:         ED Stroke specific documentation           NIHSS PDF     Patient last known well time: 1330  ED Provider first to bedside at: 1454  CT Results received at: 1521    Thrombolytics:   Not given due to:   - minor/isolated/quickly resolving symptoms    If treating with thrombolytics: Ensure SBP<180 and DBP<105 prior to treatment with thrombolytics.  Administering thrombolytics after treatment with IV labetalol, hydralazine, or nicardipine is reasonable once BP control is established.    Endovascular Retrieval:  Not initiated due to absence of proximal vessel occlusion    National Institutes of Health Stroke Scale (Baseline)  Time Performed: 1454     Score    Level of consciousness: (0)   Alert, keenly responsive    LOC questions: (0)   Answers both questions correctly    LOC commands: (0)   Performs both tasks correctly    Best gaze: (0)   Normal    Visual: (0)   No visual loss    Facial palsy: (0)   Normal symmetrical movements    Motor arm (left): (0)   No drift    Motor arm  (right): (0)   No drift    Motor leg (left): (0)   No drift    Motor leg (right): (0)   No drift    Limb ataxia: (0)   Absent    Sensory: (0)   Normal- no sensory loss    Best language: (1)   Mild to moderate aphasia    Dysarthria: (0)   Normal    Extinction and inattention: (0)   No abnormality        Total Score:  1        Stroke Mimics were considered (including migraine headache, seizure disorder, hypoglycemia (or hyperglycemia), head or spinal trauma, CNS infection, Toxin ingestion and shock state (e.g. sepsis) .    Medical Decision Making:  Patient is a 66-year-old male with a history of diabetes, TIA, and ischemic stroke previously on Plavix and currently on aspirin who presents with expressive aphasia.  A tier 1 stroke code was activated on arrival.  NIHSS of 1.  Stroke evaluated the patient in the emergency department and after CT imaging of the head including angiography of the head and neck patient was de-escalated.  Concern this may represent new stroke versus TIA versus seizure disorder versus other sinister intracranial process.  Patient will require MRI of the brain including EEG.  He will be admitted for further evaluation with MRI imaging of the brain and EEG.  Additionally stroke neurology had recommended repeat Plavix load and continuation of full dose aspirin on a daily basis.  Patient already took aspirin today so additional dose not indicated at this point.  300 mg of Plavix given in the emergency department.  I spoke with Dr. Cisneros of the hospitalist service agreed to admission.    Diagnosis:    ICD-10-CM    1. Expressive aphasia  R47.01        Scribe Disclosure:  I, Shirley [place], am serving as a scribe at 2:55 PM on 7/31/2022 to document services personally performed by Jameel Martin MD based on my observations and the provider's statements to me.        Jameel Martin MD  07/31/22 [id number]

## 2022-07-31 NOTE — ED NOTES
[facility]  ED Nurse Handoff Report    ED Chief complaint: Cerebrovascular Accident      ED Diagnosis:   Final diagnoses:   Expressive aphasia       Code Status: Full Code    Allergies:   Allergies   Allergen Reactions     Augmentin Diarrhea     Sulfa Drugs      Unknown - reaction occurred as a child     Victoza      Skin rash       Patient Story:   Patient states that he was seated at lunch table. Patient states he stood up, didn't feel normal. Patient noted some vision changes took 2 steps and felt like he needed to sit down. Patient lowered self and family helped him into a chair. Patient reports no amnesia to event.     Focused Assessment:    A/O x4, no focal deficits appreciated, lungs clear, NSR on monitor    Labs Ordered and Resulted from Time of ED Arrival to Time of ED Departure   BASIC METABOLIC PANEL - Abnormal       Result Value    Sodium 134      Potassium 4.2      Chloride 101      Carbon Dioxide (CO2) 27      Anion Gap 6      Urea Nitrogen 19      Creatinine 1.25      Calcium 9.5      Glucose 213 (*)     GFR Estimate 63     GLUCOSE BY METER - Abnormal    GLUCOSE BY METER POCT 223 (*)    CBC WITH PLATELETS AND DIFFERENTIAL - Abnormal    WBC Count 10.8      RBC Count 5.63      Hemoglobin 15.6      Hematocrit 48.5      MCV 86      MCH 27.7      MCHC 32.2      RDW 13.4      Platelet Count 550 (*)     % Neutrophils 62      % Lymphocytes 28      % Monocytes 8      % Eosinophils 1      % Basophils 1      % Immature Granulocytes 0      NRBCs per 100 WBC 0      Absolute Neutrophils 6.7      Absolute Lymphocytes 3.1      Absolute Monocytes 0.9      Absolute Eosinophils 0.1      Absolute Basophils 0.1      Absolute Immature Granulocytes 0.0      Absolute NRBCs 0.0     INR   TROPONIN I        CTA Head Neck w Contrast   Final Result   IMPRESSION:    HEAD CT:   1.  No acute intracranial abnormality.   2.  Old infarctions in the left MCA territory and left MI-MCA watershed territory.      HEAD CTA:     1.  Worsened, now severe stenosis of the distal M1 left middle cerebral artery.   2.  No additional high-grade stenosis or occlusion.   3.  No aneurysm or high flow vascular malformation.      NECK CTA:   1.  No significant stenosis. No dissection.      Findings discussed with Dr. Jameel Martin at 1521 hours on 07/31/2022.      CT Head w/o Contrast   Final Result   IMPRESSION:    HEAD CT:   1.  No acute intracranial abnormality.   2.  Old infarctions in the left MCA territory and left MI-MCA watershed territory.      HEAD CTA:    1.  Worsened, now severe stenosis of the distal M1 left middle cerebral artery.   2.  No additional high-grade stenosis or occlusion.   3.  No aneurysm or high flow vascular malformation.      NECK CTA:   1.  No significant stenosis. No dissection.      Findings discussed with Dr. Jameel aMrtin at 1521 hours on 07/31/2022.      MR Brain w/o & w Contrast    (Results Pending)         Treatments and/or interventions provided:       Medications   clopidogrel (PLAVIX) tablet 300 mg (has no administration in time range)   iopamidol (ISOVUE-370) solution 75 mL (75 mLs Intravenous Given 7/31/22 1501)   sodium chloride 0.9 % bag 500mL for CT scan flush use (100 mLs Intravenous Given 7/31/22 1501)        Patient's response to treatments and/or interventions:   Resting without complaint at this time    To be done/followed up on inpatient unit:    See any in-patient orders    Does this patient have any cognitive concerns?: NA  Activity level - Baseline/Home:    Independent    Activity Level - Current:     Stand with Assist    Patient's Preferred language: English     Needed?: No    Isolation: None  Infection: Not Applicable  Patient tested for COVID 19 prior to admission: YES    Bariatric?: No    Vital Signs:   Vitals:    07/31/22 1514 07/31/22 1515 07/31/22 1530 07/31/22 1535   BP: 134/69 135/68 127/70 131/76   Pulse: 74 74 73 74   Resp: 9 11 14 12   Temp:       TempSrc:       SpO2: 99% 98%  99% 99%   Height:           Cardiac Rhythm:Cardiac Rhythm: Normal sinus rhythm    Was the PSS-3 completed:   Yes  What interventions are required if any?               Family Comments: spouse at bedside  OBS brochure/video discussed/provided to patient/family: Yes              Name of person given brochure if not patient:               Relationship to patient:     For the majority of the shift this patient's behavior was Green.   Behavioral interventions performed were.    ED NURSE PHONE NUMBER: *[id number]

## 2022-07-31 NOTE — ED NOTES
Spouse reports some possible seizure like activity at restaurant after being sat in chair by daughter. No loss of control of bowel or bladder. No injuries inside mouth. Unable to see eyes d/t lighting in restaurant.

## 2022-07-31 NOTE — H&P
Admitted: 07/31/2022    HISTORY OF PRESENT ILLNESS:  This is a 66-year-old male with history of anxiety, hypertension, hyperlipidemia, sleep apnea, diabetes mellitus type 2, BPH, history of TIA and stroke recently was in last April was on dual antiplatelets for 90 days and now currently on aspirin 325 mg daily, came to the ER with complaint of dizziness, lightheadedness and appears to have jerky movements and expressive aphasia.    According to the patient, he was doing fine this morning, he went for lunch at the [employer] restaurant and was eating and after eating, he got up and all of a sudden felt dizzy, lightheaded and was about to fall down, so he hung on to the pole, somebody put a chair on the back, so he sat down and then wife noticed brief episode of jerky movement of his upper extremity.  He remained seated there for five minutes and then able to walk to the car with the help of his wife and went to his daughter's home.  The daughter was a nurse who recommended him to go to the ED.    In the ED, the patient had a code stroke CT scan of the head, CT of the head and neck was done.  CT head was negative.  CTA head shows some left inferior MCA CVA stenosis.  Stroke Neurology was consulted and they recommended MRI of the brain.    The patient's symptoms are completely resolved at this time.  At the time of my visit, he was feeling really well.  Denies any headache, dizziness, lightheadedness.  No fever, chills or cough noted.  No speech problem.  The patient told me and his wife confirmed that he never had speech problem during this episode.  Denies any abdominal pain, back pain, dysuria, hematuria, constipation, diarrhea.  He is diabetic.  I think he told me that he did not take his Lantus last night.  No episodes of hypoglycemia that he remembers recently.  Rest of the review of system is completely negative at this time.    ASSESSMENT AND PLAN:  Episode of dizziness, lightheadedness, possible acute stroke  versus seizure.    1.  This is a 66-year-old male with history of recurrent strokes.  His last one was about in 2022.  Just off of the Plavix after 90 days of dual antiplatelet treatment.  Now presented with a possible stroke versus seizures.  At this time, will admit him, keep him on stroke protocol.  As per Neurology, recommended loading him with Plavix, which was given in the  mg.  Continue aspirin and Plavix.  Continue with Crestor.  Neuro checks. I will do echocardiogram.  Keep him on telemetry.  The patient likely will need EEG to rule out possible seizures, given his symptoms are more suggestive of seizure disorder versus hypoglycemia rather than acute stroke.  MRI of the brain was done just now and came back.  No definite acute intracranial process identified.  Expected evolution subacute or chronic infarct in the left corona radiata.  TIA is still possible.  We will continue with the treatment as above.  Neuro checks, PT, OT and speech evaluation.  We will order EEG.    2.  Anxiety, stable.  3.  History of diabetes mellitus type 2.  He is on glargine 46 units at night, NovoLog with meals 10-12 units, and we will keep him on that.  Keep him on sliding scale insulin for correction and hypoglycemia protocol.  4.  Hyperlipidemia, on Crestor 40 mg.  We will continue with that.  5.  Hypertension, very well controlled at this time.  Resume lisinopril from tomorrow.  Anxiety/depression, on Lexapro.  We will continue with that.  6.  DVT prophylaxis with SCDs.    CODE STATUS:  FULL CODE.    The case discussed with ER physician and the nursing staff taking care of the patient.    Chaim Farfan MD        D: 2022   T: 2022   MT: KELVIN    Name:     NADIR HOGUEFAREED COWAN  MRN:      [id number]        Account:     [id number]   :      1955           Admitted:    2022       Document: [id number]

## 2022-07-31 NOTE — PHARMACY-ADMISSION MEDICATION HISTORY
Pharmacy Medication History  Admission medication history interview status for the 7/31/2022  admission is complete. See EPIC admission navigator for prior to admission medications     Location of Interview: Patient room  Medication history sources: Patient, Patient's family/friend (wife), Surescripts and Care Everywhere    Significant changes made to the medication list:  -- Plavix ended 2 weeks ago after 90 day regimen.   -- Diltiazem and hydrochlorothiazide were discontinued in March d/t some issues with orthostatic hypotension. Lisinopril also was decreased from 40 mg to 20 mg daily. BP goal outpatient <140/90.    In the past week, patient estimated taking medication this percent of the time: greater than 90%    Medication reconciliation completed by provider prior to medication history? No    Time spent in this activity: 10 min    Prior to Admission medications    Medication Sig Last Dose Taking? Auth Provider Long Term End Date   aspirin (ASA) 325 MG EC tablet Take 1 tablet (325 mg) by mouth daily 7/31/2022 at Unknown time Yes Lisbet Saini MD     clotrimazole (LOTRIMIN) 1 % external cream Apply topically 2 times daily  Patient taking differently: Apply topically 2 times daily as needed Past Month at Unknown time Yes Kendal Wong MD     empagliflozin (JARDIANCE) 10 MG TABS tablet Take 1 tablet (10 mg) by mouth daily 7/31/2022 at Unknown time Yes Ivonne Odom MD     escitalopram (LEXAPRO) 20 MG tablet Take 1 tablet (20 mg) by mouth daily 7/31/2022 at Unknown time Yes Kendal Wong MD Yes    insulin aspart (NOVOLOG FLEXPEN) 100 UNIT/ML pen Inject 5 Units Subcutaneous Take with snacks or supplements for other (With small meals) Past Week at Unknown time Yes Francisco Lovell MD Yes 7/31/22   insulin aspart (NOVOLOG PEN) 100 UNIT/ML pen Inject 10 Units Subcutaneous as needed for other (With large meals)  Patient taking differently: Inject 5-12 Units Subcutaneous 3 times daily (with meals) 7/31/2022  at Unknown time Yes Francisco Lovell MD Yes 7/31/22   Insulin Glargine-yfgn (SEMGLEE, YFGN,) 100 UNIT/ML SOPN Inject 46 Units Subcutaneous daily  Patient taking differently: Inject 46 Units Subcutaneous every evening 7/30/2022 at Unknown time Yes Ivonne Odom MD     insulin pen needle (BD MARIE U/F) 32G X 4 MM miscellaneous USE THREE PEN NEEDLES DAILY AS DIRECTED  Yes Kendal Wong MD     lisinopril (ZESTRIL) 20 MG tablet Take 1 tablet (20 mg) by mouth daily 7/31/2022 at Unknown time Yes Ivonne Odom MD Yes    rosuvastatin (CRESTOR) 40 MG tablet Take 1 tablet (40 mg) by mouth daily 7/31/2022 at Unknown time Yes Kendal Wong MD Yes    alcohol swab prep pads Use to swab area of injection/ian as directed   Kendal Wong MD     blood glucose (NO BRAND SPECIFIED) test strip Use to test blood sugar 3 times daily or as directed. To accompany: Blood Glucose Monitor Brands: per insurance.   Kendal Wong MD     blood glucose calibration (NO BRAND SPECIFIED) solution Use to calibrate blood glucose monitor as needed as directed. To accompany: Blood Glucose Monitor Brands: per insurance.   Kendal Wong MD     Continuous Blood Gluc  (FREESTYLE MYLES 2 READER) LEANN 1 Device continuous prn (replace annually if needed) Use to read blood glucose levels per 's instructions.   Ivonne Odom MD     Continuous Blood Gluc Sensor (FREESTYLE MYLES 2 SENSOR) MISC 1 Device continuous prn (Change every 14 days) For use with Freestyle Myles 2  for continuous monitoring of blood glucose levels.  Change sensor every 14 days.   Ivonne Odom MD     ORDER FOR DME Auto-CPAP: Max 12 cm H2O Min 10 cm H2O  Continuous Lifetime need and heated humidity.      Goltz, Bennett Ezra, PA-C     thin (NO BRAND SPECIFIED) lancets Use to test blood sugar 3 times daily or as directed. To accompany: Blood Glucose Monitor Brands: per insurance.   Kendal Wong MD     insulin glargine  (LANTUS SOLOSTAR) 100 UNIT/ML pen INJ 55 UNITS SC HS   Kendal Wong MD Yes 4/18/22       The information provided in this note is only as accurate as the sources available at the time of update(s)

## 2022-07-31 NOTE — ED TRIAGE NOTES
Patient states that he was seated at lunch table. Patient states he stood up, didn't feel normal. Patient noted some vision changes took 2 steps and felt like he needed to sit down. Patient lowered self and family helped him into a chair. Patient reports no amnesia to event.     Patient's daughter is a nurse and was concerned for a stroke.     Alert and Oriented to person, place, time and situation.    Airway patent.    Respirations are regular and unlabored.  Patient talking in full sentences.  Patient denies cough or shortness of breath.    Pulses are strong and regular with palpation.  Skin is normal color, warm and dry.   Cap refill is less than 3 seconds.  Patient denies chest pain/pressure.    Patient denies any vision changes at present.     Patient pupils are equal round and reactive to light. Patient face is symmetrical. Patient moves all four extremities.     Patient has history of TIAs and needed angioplasty.

## 2022-07-31 NOTE — H&P
[facility]    History and Physical  Hospitalist       Date of Admission:  7/31/2022    Assessment & Plan       This is a 66-year-old male with history of anxiety, hypertension, hyperlipidemia, sleep apnea, diabetes mellitus type 2, BPH, history of TIA and stroke recently was in last April was on dual antiplatelets for 90 days and now currently on aspirin 325 mg daily, came to the ER with complaint of dizziness, lightheadedness and appears to have jerky movements and expressive aphasia.    ASSESSMENT AND PLAN:  Episode of dizziness, lightheadedness, possible acute stroke versus seizure.    1.  This is a 66-year-old male with history of recurrent strokes.  His last one was about in 04/2022.  Just off of the Plavix after 90 days of dual antiplatelet treatment.  Now presented with a possible stroke versus seizures.  At this time, will admit him, keep him on stroke protocol.  As per Neurology, recommended loading him with Plavix, which was given in the  mg.  Continue aspirin and Plavix.  Continue with Crestor.  Neuro checks. I will do echocardiogram.  Keep him on telemetry.  The patient likely will need EEG to rule out possible seizures, given his symptoms are more suggestive of seizure disorder versus hypoglycemia rather than acute stroke.  MRI of the brain was done just now and came back.  No definite acute intracranial process identified.  Expected evolution subacute or chronic infarct in the left corona radiata.  TIA is still possible.  We will continue with the treatment as above.  Neuro checks, PT, OT and speech evaluation.  We will order EEG.    2.  Anxiety, stable.  3.  History of diabetes mellitus type 2.  He is on glargine 46 units at night, NovoLog with meals 10-12 units, and we will keep him on that.  Keep him on sliding scale insulin for correction and hypoglycemia protocol.  4.  Hyperlipidemia, on Crestor 40 mg.  We will continue with that.  5.  Hypertension, very well  controlled at this time.  Resume lisinopril from tomorrow.  Anxiety/depression, on Lexapro.  We will continue with that.  6.  DVT prophylaxis with SCDs.    CODE STATUS:  FULL CODE.    The case discussed with ER physician and the nursing staff taking care of the patient.    Chaim Farfan MD  DVT Prophylaxis: Pneumatic Compression Devices  Code Status: Full Code    Disposition: Expected discharge in 1-2 days once stable    Chaim Farfan MD, MD    Primary Care Physician   Kendal Wong    Chief Complaint   Speech problem     History is obtained from the patient    History of Present Illness   This is a 66-year-old male with history of anxiety, hypertension, hyperlipidemia, sleep apnea, diabetes mellitus type 2, BPH, history of TIA and stroke recently was in last April was on dual antiplatelets for 90 days and now currently on aspirin 325 mg daily, came to the ER with complaint of dizziness, lightheadedness and appears to have jerky movements and expressive aphasia.    According to the patient, he was doing fine this morning, he went for lunch at the [employer] restaurant and was eating and after eating, he got up and all of a sudden felt dizzy, lightheaded and was about to fall down, so he hung on to the pole, somebody put a chair on the back, so he sat down and then wife noticed brief episode of jerky movement of his upper extremity.  He remained seated there for five minutes and then able to walk to the car with the help of his wife and went to his daughter's home.  The daughter was a nurse who recommended him to go to the ED.    In the ED, the patient had a code stroke CT scan of the head, CT of the head and neck was done.  CT head was negative.  CTA head shows some left inferior MCA CVA stenosis.  Stroke Neurology was consulted and they recommended MRI of the brain.    The patient's symptoms are completely resolved at this time.  At the time of my visit, he was feeling really well.  Denies any headache, dizziness,  "lightheadedness.  No fever, chills or cough noted.  No speech problem.  The patient told me and his wife confirmed that he never had speech problem during this episode.  Denies any abdominal pain, back pain, dysuria, hematuria, constipation, diarrhea.  He is diabetic.  I think he told me that he did not take his Lantus last night.  No episodes of hypoglycemia that he remembers recently.  Rest of the review of system is completely negative at this time.    Past Medical History    I have reviewed this patient's medical history and updated it with pertinent information if needed.   Past Medical History:   Diagnosis Date     Anxiety      Essential hypertension, benign      Hypercholesteremia      Sleep apnea      T2DM (type 2 diabetes mellitus) (H)        Past Surgical History   I have reviewed this patient's surgical history and updated it with pertinent information if needed.  Past Surgical History:   Procedure Laterality Date     COLONOSCOPY       COLONOSCOPY N/A 10/26/2020    Procedure: COLONOSCOPY;  Surgeon: Randy Gonzalez MD;  Location:  GI     ENT SURGERY      R) ear \"procedure\"     IR CAROTID CEREBRAL ANGIOGRAM BILATERAL  2022     IR CAROTID CEREBRAL ANGIOGRAM BILATERAL  2022       Prior to Admission Medications   Prior to Admission Medications   Prescriptions Last Dose Informant Patient Reported? Taking?   Continuous Blood Gluc  (FREESTYLE MYLES 2 READER) LEANN   No No   Si Device continuous prn (replace annually if needed) Use to read blood glucose levels per 's instructions.   Continuous Blood Gluc Sensor (FREESTYLE MYLES 2 SENSOR) MISC   No No   Si Device continuous prn (Change every 14 days) For use with Freestyle Myles 2  for continuous monitoring of blood glucose levels.  Change sensor every 14 days.   Insulin Glargine-yfgn (SEMGLEE, YFHALLE,) 100 UNIT/ML SOPN 2022 at Unknown time  No Yes   Sig: Inject 46 Units Subcutaneous daily   Patient taking " differently: Inject 46 Units Subcutaneous every evening   ORDER FOR DME   No No   Sig: Auto-CPAP: Max 12 cm H2O Min 10 cm H2O  Continuous Lifetime need and heated humidity.      alcohol swab prep pads   No No   Sig: Use to swab area of injection/ian as directed   aspirin (ASA) 325 MG EC tablet 7/31/2022 at Unknown time  No Yes   Sig: Take 1 tablet (325 mg) by mouth daily   blood glucose (NO BRAND SPECIFIED) test strip   No No   Sig: Use to test blood sugar 3 times daily or as directed. To accompany: Blood Glucose Monitor Brands: per insurance.   blood glucose calibration (NO BRAND SPECIFIED) solution   No No   Sig: Use to calibrate blood glucose monitor as needed as directed. To accompany: Blood Glucose Monitor Brands: per insurance.   clotrimazole (LOTRIMIN) 1 % external cream Past Month at Unknown time  No Yes   Sig: Apply topically 2 times daily   Patient taking differently: Apply topically 2 times daily as needed   empagliflozin (JARDIANCE) 10 MG TABS tablet 7/31/2022 at Unknown time  No Yes   Sig: Take 1 tablet (10 mg) by mouth daily   escitalopram (LEXAPRO) 20 MG tablet 7/31/2022 at Unknown time Spouse/Significant Other No Yes   Sig: Take 1 tablet (20 mg) by mouth daily   insulin aspart (NOVOLOG FLEXPEN) 100 UNIT/ML pen Past Week at Unknown time  No Yes   Sig: Inject 5 Units Subcutaneous Take with snacks or supplements for other (With small meals)   insulin aspart (NOVOLOG PEN) 100 UNIT/ML pen 7/31/2022 at Unknown time  No Yes   Sig: Inject 10 Units Subcutaneous as needed for other (With large meals)   Patient taking differently: Inject 5-12 Units Subcutaneous 3 times daily (with meals)   insulin pen needle (BD MARIE U/F) 32G X 4 MM miscellaneous  Spouse/Significant Other No Yes   Sig: USE THREE PEN NEEDLES DAILY AS DIRECTED   lisinopril (ZESTRIL) 20 MG tablet 7/31/2022 at Unknown time  Yes Yes   Sig: Take 1 tablet (20 mg) by mouth daily   rosuvastatin (CRESTOR) 40 MG tablet 7/31/2022 at Unknown time  Spouse/Significant Other No Yes   Sig: Take 1 tablet (40 mg) by mouth daily   thin (NO BRAND SPECIFIED) lancets   No No   Sig: Use to test blood sugar 3 times daily or as directed. To accompany: Blood Glucose Monitor Brands: per insurance.      Facility-Administered Medications: None     Allergies   Allergies   Allergen Reactions     Augmentin Diarrhea     Sulfa Drugs      Unknown - reaction occurred as a child     Victoza      Skin rash       Social History   I have reviewed this patient's social history and updated it with pertinent information if needed. [address]  reports that he has never smoked. He has never used smokeless tobacco. He reports current alcohol use. He reports current drug use. Drug: Cocaine.    Family History   I have reviewed this patient's family history and updated it with pertinent information if needed.   Family History   Adopted: Yes   Problem Relation Age of Onset     Diabetes Mother      Respiratory Mother         asthma     Lipids Mother      Arthritis Mother         knee replacement     Alzheimer Disease Mother      Cerebrovascular Disease Brother      C.A.D. No family hx of      Cancer - colorectal No family hx of      Prostate Cancer No family hx of        Review of Systems   CONSTITUTIONAL:  negative  EYES:  negative  HEENT:  negative  RESPIRATORY:  negative  CARDIOVASCULAR:  negative  GASTROINTESTINAL:  negative  GENITOURINARY:  negative  INTEGUMENT/BREAST:  negative  HEMATOLOGIC/LYMPHATIC:  negative  ALLERGIC/IMMUNOLOGIC:  negative  ENDOCRINE:  negative  MUSCULOSKELETAL:  negative  NEUROLOGICAL:  negative  BEHAVIOR/PSYCH:  negative    Physical Exam   Temp: 97.7  F (36.5  C) Temp src: Temporal BP: 131/76 Pulse: 74   Resp: 12 SpO2: 99 % O2 Device: None (Room air)    Vital Signs with Ranges  Temp:  [97.7  F (36.5  C)] 97.7  F (36.5  C)  Pulse:  [73-78] 74  Resp:  [9-18] 12  BP: (124-135)/(64-76) 131/76  SpO2:  [98 %-99 %] 99 %  0 lbs 0 oz    Constitutional: Awake, alert,  cooperative, no apparent distress.  Eyes: Conjunctiva and pupils examined and normal.  HEENT: Moist mucous membranes, normal dentition.  Respiratory: Clear to auscultation bilaterally, no crackles or wheezing.  Cardiovascular: Regular rate and rhythm, normal S1 and S2, and no murmur noted.  GI: Soft, non-distended, non-tender, normal bowel sounds.  Lymph/Hematologic: No anterior cervical or supraclavicular adenopathy.  Skin: No rashes, no cyanosis, no edema.  Musculoskeletal: No joint swelling, erythema or tenderness.  Neurologic: Cranial nerves 2-12 intact, normal strength and sensation.  Psychiatric: Alert, oriented to person, place and time, no obvious anxiety or depression.    Data   Data reviewed today:  I personally reviewed CT head, CTA head and Neck and agree with the report below  .  Recent Labs   Lab 07/31/22  1502 07/31/22  1458   WBC  --  10.8   HGB  --  15.6   MCV  --  86   PLT  --  550*   NA  --  134   POTASSIUM  --  4.2   CHLORIDE  --  101   CO2  --  27   BUN  --  19   CR  --  1.25   ANIONGAP  --  6   EVER  --  9.5   * 213*       Recent Results (from the past 24 hour(s))   CT Head w/o Contrast    Narrative    EXAM: CT HEAD W/O CONTRAST, CTA HEAD NECK W CONTRAST  LOCATION: [facility]  DATE/TIME: 7/31/2022 3:07 PM    INDICATION: Dizziness, facial droop, aphasia.  COMPARISON: 04/26/2022  CONTRAST: 76mL Isovue 370  TECHNIQUE: Head and neck CT angiogram with IV contrast. Noncontrast head CT followed by axial helical CT images of the head and neck vessels obtained during the arterial phase of intravenous contrast administration. Axial 2D reconstructed images and   multiplanar 3D MIP reconstructed images of the head and neck vessels were performed by the technologist. Dose reduction techniques were used. All stenosis measurements made according to NASCET criteria unless otherwise specified.    FINDINGS:   NONCONTRAST HEAD CT:   INTRACRANIAL CONTENTS: Old infarctions in the  anterior left MCA territory and in the before meals-MCA watershed territory. No CT evidence of acute infarct. No acute intracranial hemorrhage or abnormal extra-axial fluid collection. Mild presumed chronic   small vessel ischemic changes. Normal ventricles and sulci.     VISUALIZED ORBITS/SINUSES/MASTOIDS: No intraorbital abnormality. No paranasal sinus mucosal disease. No middle ear or mastoid effusion.    BONES/SOFT TISSUES: No acute abnormality.    HEAD CTA:  ANTERIOR CIRCULATION: Severe stenosis of the distal M1 left middle cerebral artery, worse compared to 04/26/2022. No additional high-grade spinal canal or neural foraminal stenosis. Standard [place] anatomy.    POSTERIOR CIRCULATION: No stenosis/occlusion, aneurysm, or high flow vascular malformation. Dominant left and smaller right vertebral artery contribute to a normal basilar artery.     DURAL VENOUS SINUSES: Not well evaluated on a technical basis.    NECK CTA:  RIGHT CAROTID: No measurable stenosis or dissection.    LEFT CAROTID: No measurable stenosis or dissection.    VERTEBRAL ARTERIES: No focal stenosis or dissection. Dominant left and smaller right vertebral arteries.    AORTIC ARCH: Classic aortic arch anatomy with no significant stenosis at the origin of the great vessels.    NONVASCULAR STRUCTURES: No acute or aggressive abnormality.      Impression    IMPRESSION:   HEAD CT:  1.  No acute intracranial abnormality.  2.  Old infarctions in the left MCA territory and left MI-MCA watershed territory.    HEAD CTA:   1.  Worsened, now severe stenosis of the distal M1 left middle cerebral artery.  2.  No additional high-grade stenosis or occlusion.  3.  No aneurysm or high flow vascular malformation.    NECK CTA:  1.  No significant stenosis. No dissection.    Findings discussed with Dr. Jameel Martin at 1521 hours on 07/31/2022.   CTA Head Neck w Contrast    Narrative    EXAM: CT HEAD W/O CONTRAST, CTA HEAD NECK W CONTRAST  LOCATION: [facility]  DATE/TIME: 7/31/2022 3:07 PM    INDICATION: Dizziness, facial droop, aphasia.  COMPARISON: 04/26/2022  CONTRAST: 76mL Isovue 370  TECHNIQUE: Head and neck CT angiogram with IV contrast. Noncontrast head CT followed by axial helical CT images of the head and neck vessels obtained during the arterial phase of intravenous contrast administration. Axial 2D reconstructed images and   multiplanar 3D MIP reconstructed images of the head and neck vessels were performed by the technologist. Dose reduction techniques were used. All stenosis measurements made according to NASCET criteria unless otherwise specified.    FINDINGS:   NONCONTRAST HEAD CT:   INTRACRANIAL CONTENTS: Old infarctions in the anterior left MCA territory and in the before meals-MCA watershed territory. No CT evidence of acute infarct. No acute intracranial hemorrhage or abnormal extra-axial fluid collection. Mild presumed chronic   small vessel ischemic changes. Normal ventricles and sulci.     VISUALIZED ORBITS/SINUSES/MASTOIDS: No intraorbital abnormality. No paranasal sinus mucosal disease. No middle ear or mastoid effusion.    BONES/SOFT TISSUES: No acute abnormality.    HEAD CTA:  ANTERIOR CIRCULATION: Severe stenosis of the distal M1 left middle cerebral artery, worse compared to 04/26/2022. No additional high-grade spinal canal or neural foraminal stenosis. Standard [place] anatomy.    POSTERIOR CIRCULATION: No stenosis/occlusion, aneurysm, or high flow vascular malformation. Dominant left and smaller right vertebral artery contribute to a normal basilar artery.     DURAL VENOUS SINUSES: Not well evaluated on a technical basis.    NECK CTA:  RIGHT CAROTID: No measurable stenosis or dissection.    LEFT CAROTID: No measurable stenosis or dissection.    VERTEBRAL ARTERIES: No focal stenosis or dissection. Dominant left and smaller right vertebral arteries.    AORTIC ARCH: Classic aortic arch anatomy with no  significant stenosis at the origin of the great vessels.    NONVASCULAR STRUCTURES: No acute or aggressive abnormality.      Impression    IMPRESSION:   HEAD CT:  1.  No acute intracranial abnormality.  2.  Old infarctions in the left MCA territory and left MI-MCA watershed territory.    HEAD CTA:   1.  Worsened, now severe stenosis of the distal M1 left middle cerebral artery.  2.  No additional high-grade stenosis or occlusion.  3.  No aneurysm or high flow vascular malformation.    NECK CTA:  1.  No significant stenosis. No dissection.    Findings discussed with Dr. Jameel Martin at 1521 hours on 07/31/2022.

## 2022-07-31 NOTE — CONSULTS
[facility]    Stroke Consult Note    Reason for Consult: Stroke Code     Chief Complaint: Cerebrovascular Accident  Sudden onset expressive aphasia for 5 minutes    HPI  Huseyin St is a 66 year old male with vascular risk factors of HTN, HLD and DM. Known history of cocaine use. Patient had a stroke in April this year involving L MCA territory where he was found to have a L inferior MCA severe stenosis and underwent angioplasty. Stenting was not done due to medication compliance issues. He only had clumsiness of his right hand as a residual deficit from his stroke and mild dysarthria which he is undergoing speech and PT OT till today for. He was on DAPT for 90 days then a few days ago stopped Plavix and continues aspirin.  Today at 1315, he was with daughter when she suddenly noticed that he is confused, not able to get words out for 5 minutes then it resolved. With questionable loss of consciousness On arrival to the hospital he did not remember the episode. At the time of my assessment, symptoms had completely resolved and NIHSS was zero.    Imaging Findings    HEAD CT:  1.  No acute intracranial abnormality.  2.  Old infarctions in the left MCA territory and left MI-MCA watershed territory.     HEAD CTA:   1.  Worsened, now severe stenosis of the distal M1 left middle cerebral artery.  2.  No additional high-grade stenosis or occlusion.  3.  No aneurysm or high flow vascular malformation.     NECK CTA:  1.  No significant stenosis. No dissection.     Intravenous Thrombolysis  Not given due to:   - minor/isolated/quickly resolving symptoms    Endovascular Treatment  Not initiated due to absence of proximal vessel occlusion    Impression   Transient episode of expressive aphasia followed by possible body jerks and decreased awareness Concerning for seizure vs new ischemic stroke.    According to wife, patient has been having multiple episodes of decreased awareness and  "generalized body jerking with zoning out for 1 to 2 minutes.   Regarding previous stroke, he underwent angioplasty of L MCA however, comparing CTA today looks like the vessel is not having adequate blood flow.    Recommendations  Please load with plavix 300 mg and continue 75 mg once daily. Please continue asa 25  Please obtain brain mri  Please obtain continuous EEG monitoring   Please complete stroke workup TTE, A1c and LDL  No need to elevate BP at this point as patient is asymptomatic with BP of 120/80    Patient Follow-up    - final recommendation pending work-up    Thank you for this consult. We will continue to follow.     The Stroke Staff is Dr. Babb.    Nataliia Rader MD  Vascular Neurology Fellow  To page me or covering stroke neurology team member, click here: [facility]   Choose \"On Call\" tab at top, then search dropdown box for \"Neurology Adult\", select location, press Enter, then look for stroke/neuro ICU/telestroke.    ______________________________________________________       Past Medical History   Past Medical History:   Diagnosis Date     Anxiety      Essential hypertension, benign      Hypercholesteremia      Sleep apnea      T2DM (type 2 diabetes mellitus) (H)      Past Surgical History   Past Surgical History:   Procedure Laterality Date     COLONOSCOPY       COLONOSCOPY N/A 10/26/2020    Procedure: COLONOSCOPY;  Surgeon: Randy Gonzalez MD;  Location:  GI     ENT SURGERY      R) ear \"procedure\"     IR CAROTID CEREBRAL ANGIOGRAM BILATERAL  4/7/2022     IR CAROTID CEREBRAL ANGIOGRAM BILATERAL  4/16/2022     Medications   Home Meds  Prior to Admission medications    Medication Sig Start Date End Date Taking? Authorizing Provider   aspirin (ASA) 325 MG EC tablet Take 1 tablet (325 mg) by mouth daily 3/27/22  Yes Lisbet Saini MD   clotrimazole (LOTRIMIN) 1 % external cream Apply topically 2 times daily  Patient taking differently: Apply topically 2 times daily as needed 12/11/20  Yes Marvin, " Kendal Guerra MD   empagliflozin (JARDIANCE) 10 MG TABS tablet Take 1 tablet (10 mg) by mouth daily 6/30/22  Yes Ivonne Odom MD   escitalopram (LEXAPRO) 20 MG tablet Take 1 tablet (20 mg) by mouth daily 10/12/21  Yes Kendal Wong MD   insulin aspart (NOVOLOG FLEXPEN) 100 UNIT/ML pen Inject 5 Units Subcutaneous Take with snacks or supplements for other (With small meals) 4/18/22 7/31/22 Yes Francisco Lovell MD   insulin aspart (NOVOLOG PEN) 100 UNIT/ML pen Inject 10 Units Subcutaneous as needed for other (With large meals)  Patient taking differently: Inject 5-12 Units Subcutaneous 3 times daily (with meals) 4/18/22 7/31/22 Yes Francisco Lovell MD   Insulin Glargine-yfgn (SEMGLEE, YFGN,) 100 UNIT/ML SOPN Inject 46 Units Subcutaneous daily  Patient taking differently: Inject 46 Units Subcutaneous every evening 6/27/22  Yes Ivonne Odom MD   insulin pen needle (BD MARIE U/F) 32G X 4 MM miscellaneous USE THREE PEN NEEDLES DAILY AS DIRECTED 10/12/21  Yes Kendal Wong MD   lisinopril (ZESTRIL) 20 MG tablet Take 1 tablet (20 mg) by mouth daily 4/27/22  Yes Ivonne Odom MD   rosuvastatin (CRESTOR) 40 MG tablet Take 1 tablet (40 mg) by mouth daily 10/12/21  Yes Kendal Wong MD   alcohol swab prep pads Use to swab area of injection/ian as directed 1/6/20   Kendal Wong MD   blood glucose (NO BRAND SPECIFIED) test strip Use to test blood sugar 3 times daily or as directed. To accompany: Blood Glucose Monitor Brands: per insurance. 10/12/21   Kendal Wong MD   blood glucose calibration (NO BRAND SPECIFIED) solution Use to calibrate blood glucose monitor as needed as directed. To accompany: Blood Glucose Monitor Brands: per insurance. 1/6/20   Kendal Wong MD   Continuous Blood Gluc  (FREESTYLE NANO 2 READER) LEANN 1 Device continuous prn (replace annually if needed) Use to read blood glucose levels per 's instructions. 4/26/22   Ivonne Odom MD    Continuous Blood Gluc Sensor (FREESTYLE MYLES 2 SENSOR) MISC 1 Device continuous prn (Change every 14 days) For use with Freestyle Myles 2  for continuous monitoring of blood glucose levels.  Change sensor every 14 days. 4/26/22   Ivonne Odom MD   ORDER FOR DME Auto-CPAP: Max 12 cm H2O Min 10 cm H2O  Continuous Lifetime need and heated humidity.    7/5/12   Goltz, Bennett Ezra, PA-C   thin (NO BRAND SPECIFIED) lancets Use to test blood sugar 3 times daily or as directed. To accompany: Blood Glucose Monitor Brands: per insurance. 1/6/20   Kendal Wong MD   insulin glargine (LANTUS SOLOSTAR) 100 UNIT/ML pen INJ 55 UNITS SC HS 10/12/21 4/18/22  Kendal Wong MD       Scheduled Meds    clopidogrel  300 mg Oral Once       Infusion Meds      PRN Meds      Allergies   Allergies   Allergen Reactions     Augmentin Diarrhea     Sulfa Drugs      Unknown - reaction occurred as a child     Victoza      Skin rash     Family History   Family History   Adopted: Yes   Problem Relation Age of Onset     Diabetes Mother      Respiratory Mother         asthma     Lipids Mother      Arthritis Mother         knee replacement     Alzheimer Disease Mother      Cerebrovascular Disease Brother      C.A.D. No family hx of      Cancer - colorectal No family hx of      Prostate Cancer No family hx of      Social History   Social History     Tobacco Use     Smoking status: Never Smoker     Smokeless tobacco: Never Used   Substance Use Topics     Alcohol use: Yes     Alcohol/week: 0.0 standard drinks     Comment: 3-6 drinks on occ weekend night, occ drink on weekday     Drug use: Yes     Types: Cocaine       Review of Systems   The 10 point Review of Systems is negative other than noted in the HPI or here.        PHYSICAL EXAMINATION  Temp:  [97.7  F (36.5  C)] 97.7  F (36.5  C)  Pulse:  [73-78] 74  Resp:  [9-18] 12  BP: (124-135)/(64-76) 131/76  SpO2:  [98 %-99 %] 99 %     Neurologic  Mental Status:  alert, oriented x  3, follows commands, speech clear and fluent, naming and repetition normal  Cranial Nerves:  visual fields intact, PERRL, EOMI with normal smooth pursuit, facial sensation intact and symmetric, facial movements symmetric, hearing not formally tested but intact to conversation, palate elevation symmetric and uvula midline, no dysarthria, shoulder shrug strong bilaterally, tongue protrusion midline  Motor:  normal muscle tone and bulk, no abnormal movements, able to move all limbs spontaneously, strength 5/5 throughout upper and lower extremities, no pronator drift  Reflexes:  toes down-going  Sensory:  light touch sensation intact and symmetric throughout upper and lower extremities, no extinction on double simultaneous stimulation   Coordination:  normal finger-to-nose and heel-to-shin bilaterally without dysmetria, rapid alternating movements symmetric  Station/Gait:  deferred    Dysphagia Screen  Per Nursing    Stroke Scales    NIHSS  1a. Level of Consciousness 0-->Alert, keenly responsive   1b. LOC Questions 0-->Answers both questions correctly   1c. LOC Commands 0-->Performs both tasks correctly   2.   Best Gaze 0-->Normal   3.   Visual 0-->No visual loss   4.   Facial Palsy 0-->Normal symmetrical movements   5a. Motor Arm, Left 0-->No drift, limb holds 90 (or 45) degrees for full 10 secs   5b. Motor Arm, Right 0-->No drift, limb holds 90 (or 45) degrees for full 10 secs   6a. Motor Leg, Left 0-->No drift, leg holds 30 degree position for full 5 secs   6b. Motor Leg, right 0-->No drift, leg holds 30 degree position for full 5 secs   7.   Limb Ataxia 0-->Absent   8.   Sensory 0-->Normal, no sensory loss   9.   Best Language 0-->No aphasia, normal   10. Dysarthria 0-->Normal   11. Extinction and Inattention  0-->No abnormality   Total 0 (07/31/22 8951)           Imaging  I personally reviewed all imaging; relevant findings per HPI.     Lab Results Data   CBC  No results for input(s): WBC, RBC, HGB, HCT, PLT in the  last 168 hours.  Basic Metabolic Panel    Recent Labs   Lab 07/31/22  1502 07/31/22  1458   NA  --  134   POTASSIUM  --  4.2   CHLORIDE  --  101   CO2  --  27   BUN  --  19   CR  --  1.25   * 213*   EVER  --  9.5     Liver Panel  No results for input(s): PROTTOTAL, ALBUMIN, BILITOTAL, ALKPHOS, AST, ALT, BILIDIRECT in the last 168 hours.  INR    Recent Labs   Lab Test 03/16/22  1704   INR 1.02      Lipid Profile    Recent Labs   Lab Test 04/06/22  0725 03/16/22  1704 10/11/21  0942 04/21/16  0906 08/20/15  0830 01/14/15  0847   CHOL 109 224* 228*   < > 136 316*   HDL 34* 33* 37*   < > 42 41   LDL 53 136* 116*   < > 59 219*   TRIG 111 275* 377*   < > 176* 282*   CHOLHDLRATIO  --   --   --   --  3.2 7.7*    < > = values in this interval not displayed.     A1C    Recent Labs   Lab Test 03/16/22  1704 10/11/21  0943 09/28/20  0925   A1C 11.8* 11.8* 13.2*     Troponin I  No results for input(s): TROPONINIS, TROPONINI, GHTROP in the last 168 hours.       Stroke Code Data Data   Stroke Code Data  (for stroke code without tele)  Stroke code activated 07/31/22   1457   First stroke provider response 07/31/22   1459   Last known normal 07/31/22   1315   Time of discovery   (or onset of symptoms) 07/31/22   1315   Head CT read by Stroke Neuro Dr/Provider 07/31/22   1508   Was stroke code de-escalated? Yes 07/31/22 2507

## 2022-08-01 ENCOUNTER — APPOINTMENT (OUTPATIENT)
Dept: CARDIOLOGY | Facility: CLINIC | Age: 67
End: 2022-08-01
Attending: INTERNAL MEDICINE
Payer: COMMERCIAL

## 2022-08-01 ENCOUNTER — APPOINTMENT (OUTPATIENT)
Dept: PHYSICAL THERAPY | Facility: CLINIC | Age: 67
End: 2022-08-01
Attending: INTERNAL MEDICINE
Payer: COMMERCIAL

## 2022-08-01 VITALS
TEMPERATURE: 97.8 F | HEART RATE: 70 BPM | OXYGEN SATURATION: 97 % | SYSTOLIC BLOOD PRESSURE: 123 MMHG | HEIGHT: 70 IN | BODY MASS INDEX: 31.64 KG/M2 | RESPIRATION RATE: 20 BRPM | WEIGHT: 221 LBS | DIASTOLIC BLOOD PRESSURE: 75 MMHG

## 2022-08-01 LAB
ANION GAP SERPL CALCULATED.3IONS-SCNC: 6 MMOL/L (ref 3–14)
BUN SERPL-MCNC: 21 MG/DL (ref 7–30)
CALCIUM SERPL-MCNC: 9.5 MG/DL (ref 8.5–10.1)
CHLORIDE BLD-SCNC: 105 MMOL/L (ref 94–109)
CO2 SERPL-SCNC: 23 MMOL/L (ref 20–32)
CREAT SERPL-MCNC: 1.16 MG/DL (ref 0.66–1.25)
ERYTHROCYTE [DISTWIDTH] IN BLOOD BY AUTOMATED COUNT: 13.2 % (ref 10–15)
GFR SERPL CREATININE-BSD FRML MDRD: 69 ML/MIN/1.73M2
GLUCOSE BLD-MCNC: 196 MG/DL (ref 70–99)
GLUCOSE BLDC GLUCOMTR-MCNC: 177 MG/DL (ref 70–99)
GLUCOSE BLDC GLUCOMTR-MCNC: 211 MG/DL (ref 70–99)
GLUCOSE BLDC GLUCOMTR-MCNC: 233 MG/DL (ref 70–99)
HCT VFR BLD AUTO: 43.3 % (ref 40–53)
HGB BLD-MCNC: 14.3 G/DL (ref 13.3–17.7)
LVEF ECHO: NORMAL
MAGNESIUM SERPL-MCNC: 2.3 MG/DL (ref 1.6–2.3)
MCH RBC QN AUTO: 27.2 PG (ref 26.5–33)
MCHC RBC AUTO-ENTMCNC: 33 G/DL (ref 31.5–36.5)
MCV RBC AUTO: 82 FL (ref 78–100)
PHOSPHATE SERPL-MCNC: 4.1 MG/DL (ref 2.5–4.5)
PLATELET # BLD AUTO: 423 10E3/UL (ref 150–450)
POTASSIUM BLD-SCNC: 4.4 MMOL/L (ref 3.4–5.3)
RBC # BLD AUTO: 5.26 10E6/UL (ref 4.4–5.9)
SODIUM SERPL-SCNC: 134 MMOL/L (ref 133–144)
WBC # BLD AUTO: 7.8 10E3/UL (ref 4–11)

## 2022-08-01 PROCEDURE — 80048 BASIC METABOLIC PNL TOTAL CA: CPT | Performed by: INTERNAL MEDICINE

## 2022-08-01 PROCEDURE — 82962 GLUCOSE BLOOD TEST: CPT

## 2022-08-01 PROCEDURE — 258N000003 HC RX IP 258 OP 636: Performed by: PHYSICIAN ASSISTANT

## 2022-08-01 PROCEDURE — 97116 GAIT TRAINING THERAPY: CPT | Mod: GP

## 2022-08-01 PROCEDURE — 84100 ASSAY OF PHOSPHORUS: CPT | Performed by: INTERNAL MEDICINE

## 2022-08-01 PROCEDURE — 97161 PT EVAL LOW COMPLEX 20 MIN: CPT | Mod: GP

## 2022-08-01 PROCEDURE — 250N000013 HC RX MED GY IP 250 OP 250 PS 637: Performed by: INTERNAL MEDICINE

## 2022-08-01 PROCEDURE — 99214 OFFICE O/P EST MOD 30 MIN: CPT | Mod: GC | Performed by: STUDENT IN AN ORGANIZED HEALTH CARE EDUCATION/TRAINING PROGRAM

## 2022-08-01 PROCEDURE — 96372 THER/PROPH/DIAG INJ SC/IM: CPT | Mod: 59

## 2022-08-01 PROCEDURE — G0378 HOSPITAL OBSERVATION PER HR: HCPCS

## 2022-08-01 PROCEDURE — 36415 COLL VENOUS BLD VENIPUNCTURE: CPT | Performed by: INTERNAL MEDICINE

## 2022-08-01 PROCEDURE — 999N000208 ECHOCARDIOGRAM COMPLETE

## 2022-08-01 PROCEDURE — 97530 THERAPEUTIC ACTIVITIES: CPT | Mod: GP

## 2022-08-01 PROCEDURE — 99226 PR SUBSEQUENT OBSERVATION CARE,LEVEL III: CPT | Performed by: PHYSICIAN ASSISTANT

## 2022-08-01 PROCEDURE — 93306 TTE W/DOPPLER COMPLETE: CPT | Mod: 26 | Performed by: INTERNAL MEDICINE

## 2022-08-01 PROCEDURE — 999N000226 HC STATISTIC SLP IP EVAL DEFER: Performed by: SPEECH-LANGUAGE PATHOLOGIST

## 2022-08-01 PROCEDURE — 85027 COMPLETE CBC AUTOMATED: CPT | Performed by: INTERNAL MEDICINE

## 2022-08-01 PROCEDURE — 255N000002 HC RX 255 OP 636: Performed by: INTERNAL MEDICINE

## 2022-08-01 PROCEDURE — 83735 ASSAY OF MAGNESIUM: CPT | Performed by: INTERNAL MEDICINE

## 2022-08-01 RX ORDER — ROSUVASTATIN CALCIUM 40 MG/1
20 TABLET, COATED ORAL DAILY
Qty: 90 TABLET | Refills: 3 | COMMUNITY
Start: 2022-08-01 | End: 2024-09-25

## 2022-08-01 RX ORDER — CLOPIDOGREL BISULFATE 75 MG/1
75 TABLET ORAL DAILY
Qty: 30 TABLET | Refills: 0 | Status: SHIPPED | OUTPATIENT
Start: 2022-08-02 | End: 2023-01-26

## 2022-08-01 RX ADMIN — LISINOPRIL 20 MG: 20 TABLET ORAL at 09:21

## 2022-08-01 RX ADMIN — ASPIRIN 325 MG: 325 TABLET, COATED ORAL at 09:21

## 2022-08-01 RX ADMIN — ROSUVASTATIN CALCIUM 40 MG: 20 TABLET, FILM COATED ORAL at 09:20

## 2022-08-01 RX ADMIN — HUMAN ALBUMIN MICROSPHERES AND PERFLUTREN 9 ML: 10; .22 INJECTION, SOLUTION INTRAVENOUS at 14:19

## 2022-08-01 RX ADMIN — SODIUM CHLORIDE 1000 ML: 9 INJECTION, SOLUTION INTRAVENOUS at 13:36

## 2022-08-01 RX ADMIN — CLOPIDOGREL BISULFATE 75 MG: 75 TABLET ORAL at 09:21

## 2022-08-01 RX ADMIN — ESCITALOPRAM OXALATE 20 MG: 20 TABLET ORAL at 09:21

## 2022-08-01 NOTE — PROGRESS NOTES
Paged regarding orthostatic vitals with drop  135 systolic lying> 99 sitting > 76 standing.   He was asymptomatic with this.     Orthostatic hypotension observed during admission 3/2022 for CVA at which time prior hydrochlorothiazide was stopped an lisinopril reduced.    Plan: will give 1L fluib bolus (received 1L already overnight) to assess response. Hold lisinopril. Will also order compression stockings if fluids do not improve vitals.     Likely causing perfusion issues with concurrent M2 stenosis.     Jennifer Sharma PA-C

## 2022-08-01 NOTE — PROGRESS NOTES
08/01/22 1300   Quick Adds   Quick Adds Certification   Type of Visit Initial PT Evaluation       Present no   Living Environment   People in Home spouse   Current Living Arrangements house   Home Accessibility stairs to enter home;stairs within home   Number of Stairs, Main Entrance 1   Number of Stairs, Within Home, Primary greater than 10 stairs  (6 + 6)   Transportation Anticipated family or friend will provide   Living Environment Comments Patient lives with his wife, Estefania, in a house.  There is a ramp to enter the home from the garage, 1 step from the front door.  Patient's bedroom and bathroom are located on the main floor but laundry is in the basement.   Self-Care   Usual Activity Tolerance good   Current Activity Tolerance good   Equipment Currently Used at Home none   Fall history within last six months yes   Number of times patient has fallen within last six months 3   Activity/Exercise/Self-Care Comment Patient reports baseline independence with dressing, bathing, and toileting tasks.  He was ambulating household and community distances without assistive device.  Patient has had 3 syncopal-like episodes resulting in falls.  Patient is currently participating in OP SLP and OT once per week.   General Information   Onset of Illness/Injury or Date of Surgery 07/31/22   Referring Physician Chaim Farfan MD   Patient/Family Therapy Goals Statement (PT) Patient would like to discharge home with wife, awaiting neuro consult.   Pertinent History of Current Problem (include personal factors and/or comorbidities that impact the POC) 66 year old male with a history of HTN, HLD, NA, T2DM, BPH, hx recurrent CVA, hx cocaine abuse who was admitted on 7/31/2022 following an episode of dizziness, expressive aphasia, and jerky movements.   Existing Precautions/Restrictions fall   Cognition   Affect/Mental Status (Cognition) WFL  (Baseline expressive aphasia)   Orientation Status  (Cognition) oriented x 4   Follows Commands (Cognition) WFL   Pain Assessment   Patient Currently in Pain No   Integumentary/Edema   Integumentary/Edema Comments Age-related skin changes   Posture    Posture Forward head position;Protracted shoulders   Range of Motion (ROM)   Range of Motion ROM is WFL   Strength (Manual Muscle Testing)   Strength (Manual Muscle Testing) Able to perform R SLR;Able to perform L SLR   Bed Mobility   Comment, (Bed Mobility) Patient completes supine <> sit independently.   Transfers   Comment, (Transfers) Patient performs sit <> stand transfer with SBA.   Gait/Stairs (Locomotion)   Distance in Feet (Required for LE Total Joints) 10' eval + 350' treatment   Comment, (Gait/Stairs) Patient ambulates 10' in room with CGA and no assistive device, progresses to SBA during treatment session.  Patient demonstrates step-through gait pattern with equal step length and slowed gait speed.   Balance   Balance Comments Mild dynamic balance impairment, demonstrates appropriate protective response   Sensory Examination   Sensory Perception patient reports no sensory changes   Clinical Impression   Criteria for Skilled Therapeutic Intervention Yes, treatment indicated   PT Diagnosis (PT) Impaired functional mobility   Influenced by the following impairments Syncopal episodes resulting in falls, mild dynamic balance impairment (baseline), expressive aphasia (baseline)   Functional limitations due to impairments Impaired independence with transfers, gait, and stair negotiation   Clinical Presentation (PT Evaluation Complexity) Stable/Uncomplicated   Clinical Presentation Rationale Clinical judgement, PMH, social support   Clinical Decision Making (Complexity) low complexity   Planned Therapy Interventions (PT) balance training;bed mobility training;gait training;home exercise program;neuromuscular re-education;patient/family education;postural re-education;stair training;strengthening;transfer  training;progressive activity/exercise   Risk & Benefits of therapy have been explained evaluation/treatment results reviewed;care plan/treatment goals reviewed;risks/benefits reviewed;participants voiced agreement with care plan;participants included;patient;spouse/significant other   PT Discharge Planning   PT Discharge Recommendation (DC Rec) home with assist   PT Rationale for DC Rec Patient and wife report his mobility in near his IND/mod I baseline, currently supervision/SBA for transfers, gait, and stair negotiation with single rail.  Patient with no overt loss of balance without assistive device.  Anticipate patient will be appropriate to discharge home with family assistance as needed.  Patient will continue to participate in OP SLP and OT at discharge.   Therapy Certification   Start of care date 08/01/22   Certification date from 08/01/22   Certification date to 08/08/22   Medical Diagnosis SYNCOPE/TIA   Total Evaluation Time   Total Evaluation Time (Minutes) 13   Physical Therapy Goals   PT Frequency One time eval and treatment only   PT Predicted Duration/Target Date for Goal Attainment 08/02/22   PT Goals Bed Mobility;Transfers;Gait;Stairs   PT: Bed Mobility Independent;Rolling;Supine to/from sit   PT: Transfers Supervision/stand-by assist;Sit to/from stand;Bed to/from chair   PT: Gait Supervision/stand-by assist;100 feet   PT: Stairs Supervision/stand-by assist;6 stairs;Rail on left

## 2022-08-01 NOTE — PROGRESS NOTES
[facility]    Hospitalist Progress Note      Assessment & Plan   Huseyin [address]SE[address] is a 66 year old male with a history of HTN, HLD, NA, T2DM, BPH, hx recurrent CVA, hx cocaine abuse who was admitted on 7/31/2022 following an episode of dizziness, expressive aphasia, and jerky movements.     Transient expressive aphasia followed by body jerks/decreased awareness- seizure vs symptomatic M2 stenosis   Hx recurrent CVA  Pt with hx recurrent L MCA infarcts with L M1 and MS stenosis. He was transferred to [facility] 4/2022 after acute CVA for consideration of ST- bypass procedure but ultimately underwent MCA balloon angioplasty. Discharged 4/18/22 on DAPT x 90 days which was recently completed.   Presents with expressive aphasia, dizziness, possible LOC.   CTA on admit shows worsened, now severe stenosis distal M1 L MCA.   MRI shows interval expected evolution of subacute/chronic infarcts L corona radiata/cntrum semiovale, anterolateral temporal lobe with minimal chronic petechial hemorrhage infarct bed.   Reports back to baseline.   --Neurology following, appreciate assistance  --loaded with plavix in the ED, continue daily 75mg  --currently on asa 325mg daily  --EEG due to concern for potential seizure activity   --ECHO  --tele  --LDL 11- will likely need dose reduction of statin. Appreciate neurology input   --PT/OT/SLP    T2DM  A1C 8.5. improved from 11.8 4/2022.   PTA: lantus 46u qpm, aspart 5-12 units tid with meals and 5 with snacks, jardiance 10mg daily  --continue PTA lantus 46u qpm  -- sliding scale insulin + 1/10g carb with meals   --resume jardiance at discharge     HTN  Hold PTA lisinopril to allow permissive HTN     PERLA  Continue Lexapro 20mg daily     HLD  LDL is 11. Will likely need dose reduction of statin.     Hx cocaine abuse  Denies use in several months     DVT Prophylaxis: Pneumatic Compression Devices  Code Status: Full Code    Disposition: Expected discharge pending neurology  eval/plan.     Patient was discussed with Dr. Juarez who agrees with the above plan     Jennifer Sharma, CTAIE, CATIE    Interval History   Pt reports he is doing well today. Back to baseline. Ambulating well around his room. No difficulty with weakness, speech, dizziness. Denies any abnormal movements.     -Data reviewed today: I reviewed all new labs and imaging results over the last 24 hours. I personally reviewed no images or EKG's today.    Physical Exam   Temp: 98.2  F (36.8  C) Temp src: Oral BP: (!) 142/82 Pulse: 78   Resp: 20 SpO2: 94 % O2 Device: None (Room air)    Vitals:    07/31/22 2049 08/01/22 0626   Weight: 98.6 kg (217 lb 6.4 oz) 100.2 kg (221 lb)     Vital Signs with Ranges  Temp:  [97.7  F (36.5  C)-98.2  F (36.8  C)] 98.2  F (36.8  C)  Pulse:  [73-86] 78  Resp:  [9-20] 20  BP: (121-153)/() 142/82  SpO2:  [94 %-99 %] 94 %  No intake/output data recorded.    Constitutional: Alert and oriented, sitting up in bed.  Appears comfortable and is appropriately conversant   ENT:  moist mucous membranes  Eyes:  Sclera anicteric, EOMI  Respiratory: Lungs clear to auscultation bilaterally, no increased work of breathing  Cardiovascular: Regular rate and rhythm  GI:  active bowel sounds, abdomen soft, non-tender  MSK:  Moves all four extremities. strength 5/5 and equal   Neuro:  Speech is clear. Face symmetric. CN 2-12 grossly intact. No focal deficits. Sensation intact     Medications     - MEDICATION INSTRUCTIONS -         aspirin  325 mg Oral Daily     clopidogrel  75 mg Oral or NG Tube Daily     escitalopram  20 mg Oral Daily     insulin aspart  1-7 Units Subcutaneous TID AC     insulin aspart  1-5 Units Subcutaneous At Bedtime     insulin glargine  46 Units Subcutaneous QPM     lisinopril  20 mg Oral Daily     rosuvastatin  40 mg Oral Daily     sodium chloride (PF)  3 mL Intracatheter Q8H       Data   Recent Labs   Lab 08/01/22  0636 07/31/22  2232 07/31/22  1502 07/31/22  1458   WBC 7.8  --    --  10.8   HGB 14.3  --   --  15.6   MCV 82  --   --  86     --   --  550*   INR  --   --   --  1.02     --   --  134   POTASSIUM 4.4  --   --  4.2   CHLORIDE 105  --   --  101   CO2 23  --   --  27   BUN 21  --   --  19   CR 1.16  --   --  1.25   ANIONGAP 6  --   --  6   EVER 9.5  --   --  9.5   * 256* 223* 213*       Recent Results (from the past 24 hour(s))   CT Head w/o Contrast    Narrative    EXAM: CT HEAD W/O CONTRAST, CTA HEAD NECK W CONTRAST  LOCATION: [facility]  DATE/TIME: 7/31/2022 3:07 PM    INDICATION: Dizziness, facial droop, aphasia.  COMPARISON: 04/26/2022  CONTRAST: 76mL Isovue 370  TECHNIQUE: Head and neck CT angiogram with IV contrast. Noncontrast head CT followed by axial helical CT images of the head and neck vessels obtained during the arterial phase of intravenous contrast administration. Axial 2D reconstructed images and   multiplanar 3D MIP reconstructed images of the head and neck vessels were performed by the technologist. Dose reduction techniques were used. All stenosis measurements made according to NASCET criteria unless otherwise specified.    FINDINGS:   NONCONTRAST HEAD CT:   INTRACRANIAL CONTENTS: Old infarctions in the anterior left MCA territory and in the before meals-MCA watershed territory. No CT evidence of acute infarct. No acute intracranial hemorrhage or abnormal extra-axial fluid collection. Mild presumed chronic   small vessel ischemic changes. Normal ventricles and sulci.     VISUALIZED ORBITS/SINUSES/MASTOIDS: No intraorbital abnormality. No paranasal sinus mucosal disease. No middle ear or mastoid effusion.    BONES/SOFT TISSUES: No acute abnormality.    HEAD CTA:  ANTERIOR CIRCULATION: Severe stenosis of the distal M1 left middle cerebral artery, worse compared to 04/26/2022. No additional high-grade spinal canal or neural foraminal stenosis. Standard [place] anatomy.    POSTERIOR CIRCULATION: No  stenosis/occlusion, aneurysm, or high flow vascular malformation. Dominant left and smaller right vertebral artery contribute to a normal basilar artery.     DURAL VENOUS SINUSES: Not well evaluated on a technical basis.    NECK CTA:  RIGHT CAROTID: No measurable stenosis or dissection.    LEFT CAROTID: No measurable stenosis or dissection.    VERTEBRAL ARTERIES: No focal stenosis or dissection. Dominant left and smaller right vertebral arteries.    AORTIC ARCH: Classic aortic arch anatomy with no significant stenosis at the origin of the great vessels.    NONVASCULAR STRUCTURES: No acute or aggressive abnormality.      Impression    IMPRESSION:   HEAD CT:  1.  No acute intracranial abnormality.  2.  Old infarctions in the left MCA territory and left MI-MCA watershed territory.    HEAD CTA:   1.  Worsened, now severe stenosis of the distal M1 left middle cerebral artery.  2.  No additional high-grade stenosis or occlusion.  3.  No aneurysm or high flow vascular malformation.    NECK CTA:  1.  No significant stenosis. No dissection.    Findings discussed with Dr. Jameel Martin at 1521 hours on 07/31/2022.   CTA Head Neck w Contrast    Narrative    EXAM: CT HEAD W/O CONTRAST, CTA HEAD NECK W CONTRAST  LOCATION: [facility] [place]  DATE/TIME: 7/31/2022 3:07 PM    INDICATION: Dizziness, facial droop, aphasia.  COMPARISON: 04/26/2022  CONTRAST: 76mL Isovue 370  TECHNIQUE: Head and neck CT angiogram with IV contrast. Noncontrast head CT followed by axial helical CT images of the head and neck vessels obtained during the arterial phase of intravenous contrast administration. Axial 2D reconstructed images and   multiplanar 3D MIP reconstructed images of the head and neck vessels were performed by the technologist. Dose reduction techniques were used. All stenosis measurements made according to NASCET criteria unless otherwise specified.    FINDINGS:   NONCONTRAST HEAD CT:   INTRACRANIAL CONTENTS: Old  infarctions in the anterior left MCA territory and in the before meals-MCA watershed territory. No CT evidence of acute infarct. No acute intracranial hemorrhage or abnormal extra-axial fluid collection. Mild presumed chronic   small vessel ischemic changes. Normal ventricles and sulci.     VISUALIZED ORBITS/SINUSES/MASTOIDS: No intraorbital abnormality. No paranasal sinus mucosal disease. No middle ear or mastoid effusion.    BONES/SOFT TISSUES: No acute abnormality.    HEAD CTA:  ANTERIOR CIRCULATION: Severe stenosis of the distal M1 left middle cerebral artery, worse compared to 04/26/2022. No additional high-grade spinal canal or neural foraminal stenosis. Standard [place] of [place] anatomy.    POSTERIOR CIRCULATION: No stenosis/occlusion, aneurysm, or high flow vascular malformation. Dominant left and smaller right vertebral artery contribute to a normal basilar artery.     DURAL VENOUS SINUSES: Not well evaluated on a technical basis.    NECK CTA:  RIGHT CAROTID: No measurable stenosis or dissection.    LEFT CAROTID: No measurable stenosis or dissection.    VERTEBRAL ARTERIES: No focal stenosis or dissection. Dominant left and smaller right vertebral arteries.    AORTIC ARCH: Classic aortic arch anatomy with no significant stenosis at the origin of the great vessels.    NONVASCULAR STRUCTURES: No acute or aggressive abnormality.      Impression    IMPRESSION:   HEAD CT:  1.  No acute intracranial abnormality.  2.  Old infarctions in the left MCA territory and left MI-MCA watershed territory.    HEAD CTA:   1.  Worsened, now severe stenosis of the distal M1 left middle cerebral artery.  2.  No additional high-grade stenosis or occlusion.  3.  No aneurysm or high flow vascular malformation.    NECK CTA:  1.  No significant stenosis. No dissection.    Findings discussed with Dr. Jameel Martin at 1521 hours on 07/31/2022.   MR Brain w/o & w Contrast    Narrative    EXAM: MR BRAIN W/O and W CONTRAST  LOCATION: [facility]  DATE/TIME: 7/31/2022 4:59 PM    INDICATION: Expressive aphasia.  Concern for new CVA  COMPARISON: CTA head and neck of same date. MRI of the brain 02/16/2022.  CONTRAST: 10mL Gadavist  TECHNIQUE: Routine multiplanar multisequence head MRI without and with intravenous contrast.    FINDINGS:  INTRACRANIAL CONTENTS: No definite new acute/early subacute infarcts are identified. There has been expected interval evolution of multiple small previously demonstrated infarcts in the left corona radiata/centrum semiovale and periventricular white   matter compared to most recent study. These now demonstrate a late subacute/early chronic appearance. There has also been continued expected evolution of a now late subacute/chronic infarct of the left anterolateral temporal lobe, with decreased, minimal   residual patchy cortical/subcortical enhancement in the left temporal region.     The ventricles are normal in size and configuration. No acute intracranial hemorrhage identified. Minimal patchy susceptibility-related signal loss in the left anterolateral temporal lobe infarct bed, which may represent old petechial hemorrhage. Mild   generalized brain parenchymal volume loss. Mild patchy nonspecific T2 FLAIR hyperintense signal in cerebral white matter, likely sequela of chronic small vessel ischemic disease. No intra-axial fluid collection or mass effect/herniation.    SELLA: No abnormality accounting for technique.    OSSEOUS STRUCTURES/SOFT TISSUES: Normal marrow signal. The major intracranial vascular flow voids are maintained.     ORBITS: No abnormality accounting for technique.     SINUSES/MASTOIDS: Mild mucosal thickening primarily in the ethmoid sinuses. No middle ear or mastoid effusion.       Impression    IMPRESSION:  1.  No definite acute intracranial process identified.  2.  Interval expected evolution of late subacute/chronic infarcts in the left corona radiata/centrum  semiovale.  3.  Interval expected evolution of late subacute/chronic infarct in the left anterolateral temporal lobe, with minimal chronic petechial hemorrhage in the infarct bed. No significant mass effect.  4.  Brain atrophy and presumed chronic small vessel ischemic changes, as described.

## 2022-08-01 NOTE — PROGRESS NOTES
PRIMARY DIAGNOSIS: SYNCOPE/TIA  OUTPATIENT/OBSERVATION GOALS TO BE MET BEFORE DISCHARGE:  1. Orthostatic performed: N/A     2. Diagnostic testing complete & at baseline neurologic testing: No, echo needed still     3. Cleared by consultants (if involved): No     4. Interpretation of cardiac rhythm per telemetry tech: NSR     5. Tolerating adequate PO diet and medications: Yes     6. Return to near baseline physical activity or neurologic status: Yes        Discharge Planner Nurse      Safe discharge environment identified: No  Barriers to discharge: Yes       Entered by: Connie Mayers RN 08/01/2022 [id number]        Please review provider order for any additional goals.   Nurse to notify provider when observation goals have been met and patient is ready for discharge.

## 2022-08-01 NOTE — CONSULTS
[facility]    Stroke Progress Note    Reason for Consult:  LOC, transient expressive aphasia    Chief Complaint: Cerebrovascular Accident       HPI  Huseyin St is a 66 year old male vascular risk factors of HTN, HLD and DM. Known history of cocaine use. Patient had multiple episodes of a stroke beginning in 3/16 in April this year involving L MCA territory where he was found to have a L inferior MCA severe stenosis and underwent angioplasty. He reports that he was getting up and felt lightheaded like he was going to pass out and then both of he knees buckled, family was able to get a chair under him and he felt better. He denied any palpitations, diaphoresis or chest pain. He was able to stand up and walk to the truck, he denied feeling and weakness, numbness or aphasia symptoms.     Further history obtained by the wife: Wife notes that he had RUE.RLE jerking for 1-1 seconds after he had slumped down into the chair. She denies any aphasia episode or changes in his speech during this episode.   TIA Evaluation Summarized    MRI and/or Head CT                                                                  IMPRESSION:  1.  No definite acute intracranial process identified.  2.  Interval expected evolution of late subacute/chronic infarcts in the left corona radiata/centrum semiovale.  3.  Interval expected evolution of late subacute/chronic infarct in the left anterolateral temporal lobe, with minimal chronic petechial hemorrhage in the infarct bed. No significant mass effect.  4.  Brain atrophy and presumed chronic small vessel ischemic changes, as described.   Intracranial Vasculature    HEAD CTA:   1.  Worsened, now severe stenosis of the distal M1 left middle cerebral artery.  2.  No additional high-grade stenosis or occlusion.  3.  No aneurysm or high flow vascular malformation.   Cervical Vasculature NECK CTA:  1.  No significant stenosis. No dissection.     Echocardiogram  "Ordered   EKG/Telemetry    Other Testing Not Applicable     LDL 7/31/2022: 11 mg/dL   A1C 7/31/2022: 8.5 %           Impression  Near syncope- had an episode of speech difficulty that could be consistent with expressive aphasia from a symptomatic L M1 stenosis. Had prior angioplasty without stenting after prior stroke in April. Both patient and wife deny any speech changes or symptoms consistent with a TIA, he had lightheadedness after standing with improvement after sitting down more consistent with a near syncope event. Wife notes some jerking of his right side that lasted 1-2 seconds without any loss of bowel or bladder or tongue biting. He has never had a prior episode. This episode is unclear whether or not it is seizures vs convulsive syncope episode. He has risk factors for seizure given his prior L MCA territory stroke.   The wife does note episodes of speech changes with aphasia with paraphasic errors but no repeat episodes with garbled speech.           Recommendations  - Neurochecks and Vital Signs [id number]   - Daily aspirin 325 mg for secondary stroke prevention  - Plavix (clopidogrel) 75 mg PO Daily  - Statin: Reduce to Rosuvastatin 20mg   - 24-hour Telemetry  - Bedside Glucose Monitoring  - A1c, Lipid Panel  - PT/OT/SLP  - Euthermia, Euglycemia  - rEEG  -Orthostatics  -DAPT duration for 1 more month now,  Further duration will depend on recommendations from [facility], will discuss case with Dr. Conrad.   -Ok to dispo from a neuro standpoint    Patient Follow-up    - final recommendation pending work-up    Thank you for this consult. No further stroke evaluation is recommended, so we will sign off. Please contact us with any additional questions.    The Stroke Staff is Dr. Morel.    Jeanne Zambrano MD  Vascular Neurology Fellow  To page me or covering stroke neurology team member, click here: AMCOM   Choose \"On Call\" tab at top, then search dropdown box for \"Neurology Adult\", select location, press Enter, then " "look for stroke/neuro ICU/telestroke.    _____________________________________________________    Clinically Significant Risk Factors Present on Admission      Past Medical History   Past Medical History:   Diagnosis Date     Anxiety      Essential hypertension, benign      Hypercholesteremia      Sleep apnea      T2DM (type 2 diabetes mellitus) (H)      Past Surgical History   Past Surgical History:   Procedure Laterality Date     COLONOSCOPY       COLONOSCOPY N/A 10/26/2020    Procedure: COLONOSCOPY;  Surgeon: Randy Gonzalez MD;  Location:  GI     ENT SURGERY      R) ear \"procedure\"     IR CAROTID CEREBRAL ANGIOGRAM BILATERAL  4/7/2022     IR CAROTID CEREBRAL ANGIOGRAM BILATERAL  4/16/2022     Medications   Home Meds  Prior to Admission medications    Medication Sig Start Date End Date Taking? Authorizing Provider   aspirin (ASA) 325 MG EC tablet Take 1 tablet (325 mg) by mouth daily 3/27/22  Yes Lisbet Saini MD   clotrimazole (LOTRIMIN) 1 % external cream Apply topically 2 times daily  Patient taking differently: Apply topically 2 times daily as needed 12/11/20  Yes Kendal Wong MD   empagliflozin (JARDIANCE) 10 MG TABS tablet Take 1 tablet (10 mg) by mouth daily 6/30/22  Yes Ivonne Odom MD   escitalopram (LEXAPRO) 20 MG tablet Take 1 tablet (20 mg) by mouth daily 10/12/21  Yes Kendal Wong MD   insulin aspart (NOVOLOG FLEXPEN) 100 UNIT/ML pen Inject 5 Units Subcutaneous Take with snacks or supplements for other (With small meals) 4/18/22 7/31/22 Yes Francisco Lovell MD   insulin aspart (NOVOLOG PEN) 100 UNIT/ML pen Inject 10 Units Subcutaneous as needed for other (With large meals)  Patient taking differently: Inject 5-12 Units Subcutaneous 3 times daily (with meals) 4/18/22 7/31/22 Yes Francisco Lovell MD   Insulin Glargine-yfgn (SEMGLEE, YFGN,) 100 UNIT/ML SOPN Inject 46 Units Subcutaneous daily  Patient taking differently: Inject 46 Units Subcutaneous every evening 6/27/22  Yes " Ivonne Odom MD   insulin pen needle (BD MARIE U/F) 32G X 4 MM miscellaneous USE THREE PEN NEEDLES DAILY AS DIRECTED 10/12/21  Yes Kendal Wong MD   lisinopril (ZESTRIL) 20 MG tablet Take 1 tablet (20 mg) by mouth daily 4/27/22  Yes Ivonne Odom MD   rosuvastatin (CRESTOR) 40 MG tablet Take 1 tablet (40 mg) by mouth daily 10/12/21  Yes Kendal Wong MD   alcohol swab prep pads Use to swab area of injection/ian as directed 1/6/20   Kendal Wong MD   blood glucose (NO BRAND SPECIFIED) test strip Use to test blood sugar 3 times daily or as directed. To accompany: Blood Glucose Monitor Brands: per insurance. 10/12/21   Kendal Wong MD   blood glucose calibration (NO BRAND SPECIFIED) solution Use to calibrate blood glucose monitor as needed as directed. To accompany: Blood Glucose Monitor Brands: per insurance. 1/6/20   Kendal Wong MD   Continuous Blood Gluc  (FREESTYLE MYLES 2 READER) LEANN 1 Device continuous prn (replace annually if needed) Use to read blood glucose levels per 's instructions. 4/26/22   Ivonne Odom MD   Continuous Blood Gluc Sensor (FREESTYLE MYLES 2 SENSOR) MISC 1 Device continuous prn (Change every 14 days) For use with Freestyle Myles 2  for continuous monitoring of blood glucose levels.  Change sensor every 14 days. 4/26/22   Ivonne Odom MD   ORDER FOR DME Auto-CPAP: Max 12 cm H2O Min 10 cm H2O  Continuous Lifetime need and heated humidity.    7/5/12   Goltz, Bennett Ezra, PA-C   thin (NO BRAND SPECIFIED) lancets Use to test blood sugar 3 times daily or as directed. To accompany: Blood Glucose Monitor Brands: per insurance. 1/6/20   Kendal Wong MD   insulin glargine (LANTUS SOLOSTAR) 100 UNIT/ML pen INJ 55 UNITS SC HS 10/12/21 4/18/22  Kendal Wong MD       Scheduled Meds    aspirin  325 mg Oral Daily     clopidogrel  75 mg Oral or NG Tube Daily     escitalopram  20 mg Oral Daily     insulin aspart  1-7  Units Subcutaneous TID AC     insulin aspart  1-5 Units Subcutaneous At Bedtime     insulin glargine  46 Units Subcutaneous QPM     lisinopril  20 mg Oral Daily     rosuvastatin  40 mg Oral Daily     sodium chloride (PF)  3 mL Intracatheter Q8H       Infusion Meds    - MEDICATION INSTRUCTIONS -         PRN Meds  acetaminophen, glucose **OR** dextrose **OR** glucagon, lidocaine 4%, lidocaine (buffered or not buffered), - MEDICATION INSTRUCTIONS -, ondansetron **OR** ondansetron, prochlorperazine **OR** prochlorperazine **OR** prochlorperazine, sodium chloride (PF)    Allergies   Allergies   Allergen Reactions     Augmentin Diarrhea     Sulfa Drugs      Unknown - reaction occurred as a child     Victoza      Skin rash     Family History   Family History   Adopted: Yes   Problem Relation Age of Onset     Diabetes Mother      Respiratory Mother         asthma     Lipids Mother      Arthritis Mother         knee replacement     Alzheimer Disease Mother      Cerebrovascular Disease Brother      C.A.D. No family hx of      Cancer - colorectal No family hx of      Prostate Cancer No family hx of      Social History   Social History     Tobacco Use     Smoking status: Never Smoker     Smokeless tobacco: Never Used   Substance Use Topics     Alcohol use: Yes     Alcohol/week: 0.0 standard drinks     Comment: 3-6 drinks on occ weekend night, occ drink on weekday     Drug use: Yes     Types: Cocaine       Review of Systems   The 5 point Review of Systems is negative other than noted in the HPI or here.        PHYSICAL EXAMINATION   Temp:  [97.7  F (36.5  C)-98.2  F (36.8  C)] 98.2  F (36.8  C)  Pulse:  [73-86] 78  Resp:  [9-20] 20  BP: (121-153)/() 142/82  SpO2:  [94 %-99 %] 94 %    Neurologic  Mental Status:  alert, oriented x 3, follows commands, speech clear and fluent, naming and repetition normal  Cranial Nerves:  visual fields intact, EOMI with normal smooth pursuit, facial sensation intact and symmetric, facial  movements symmetric, hearing not formally tested but intact to conversation, palate elevation symmetric and uvula midline, no dysarthria, shoulder shrug strong bilaterally, tongue protrusion midline  Motor:  normal muscle tone and bulk, no abnormal movements, able to move all limbs spontaneously, strength 5/5 throughout upper and lower extremities, no pronator drift  Reflexes:  toes down-going  Sensory:  light touch sensation intact and symmetric throughout upper and lower extremities, no extinction on double simultaneous stimulation   Coordination:  normal finger-to-nose and heel-to-shin bilaterally without dysmetria, rapid alternating movements symmetric  Station/Gait:  deferred    Dysphagia Screen  Per Nursing    Stroke Scales    NIHSS  1a. Level of Consciousness 0-->Alert, keenly responsive   1b. LOC Questions 0-->Answers both questions correctly   1c. LOC Commands 0-->Performs both tasks correctly   2.   Best Gaze 0-->Normal   3.   Visual 0-->No visual loss   4.   Facial Palsy 0-->Normal symmetrical movements   5a. Motor Arm, Left 0-->No drift, limb holds 90 (or 45) degrees for full 10 secs   5b. Motor Arm, Right 0-->No drift, limb holds 90 (or 45) degrees for full 10 secs   6a. Motor Leg, Left 0-->No drift, leg holds 30 degree position for full 5 secs   6b. Motor Leg, right 0-->No drift, leg holds 30 degree position for full 5 secs   7.   Limb Ataxia 0-->Absent   8.   Sensory 0-->Normal, no sensory loss   9.   Best Language 0-->No aphasia, normal   10. Dysarthria 0-->Normal   11. Extinction and Inattention  0-->No abnormality   Total 0 (07/31/22 2100)       Modified Berthold Score (Pre-morbid)    -      Imaging  I personally reviewed all imaging; relevant findings per HPI.    Labs Data   CBC  Recent Labs   Lab 07/31/22  1458   WBC 10.8   RBC 5.63   HGB 15.6   HCT 48.5   *     Basic Metabolic Panel   Recent Labs   Lab 07/31/22  2232 07/31/22  1502 07/31/22  1458   NA  --   --  134   POTASSIUM  --   --  4.2    CHLORIDE  --   --  101   CO2  --   --  27   BUN  --   --  19   CR  --   --  1.25   * 223* 213*   EVER  --   --  9.5     Liver Panel  No results for input(s): PROTTOTAL, ALBUMIN, BILITOTAL, ALKPHOS, AST, ALT, BILIDIRECT in the last 168 hours.  INR    Recent Labs   Lab Test 07/31/22  1458 03/16/22  1704   INR 1.02 1.02      Lipid Profile    Recent Labs   Lab Test 07/31/22  1458 04/06/22  0725 03/16/22  1704 04/21/16  0906 08/20/15  0830 01/14/15  0847   CHOL 112 109 224*   < > 136 316*   HDL 36* 34* 33*   < > 42 41   LDL 11 53 136*   < > 59 219*   TRIG 325* 111 275*   < > 176* 282*   CHOLHDLRATIO  --   --   --   --  3.2 7.7*    < > = values in this interval not displayed.     A1C    Recent Labs   Lab Test 07/31/22  1458 03/16/22  1704 10/11/21  0943   A1C 8.5* 11.8* 11.8*     Troponin I    Recent Labs   Lab 07/31/22  1458   TROPONINIS 4

## 2022-08-01 NOTE — PROVIDER NOTIFICATION
MD Notification    Notified Person: MD    Notified Person Name: Dr. Nolan    Notification Date/Time: 8/1/22 8242    Notification Interaction: text page    Purpose of Notification: FYI- pt refusing vitals, lab draw, blood sugar & neuro assessments.     Orders Received:    Comments:

## 2022-08-01 NOTE — PHARMACY-CONSULT NOTE
Pharmacy Consult to evaluate for medication related stroke core measures    Huseyin St, 66 year old male admitted for Transient expressive aphasia followed by body jerks/decreased awareness- seizure vs symptomatic M2 stenosis  on 7/31/2022.    Thrombolytic given? : No, resolved symptoms     VTE Prophylaxis SCDs /PCDs placed on 7/31, as appropriate prior to end of hospital day 2.    Antithrombotic: aspirin and clopidogrel started on 7/31  , as appropriate by end of hospital day 2. Continue antithrombotic therapy on discharge to meet quality measures, unless contraindicated.    Anticoagulation if history of A-fib/flutter: Patient does not have history of A-fib/flutter - anticoagulation not required for medication related stroke core measures.     LDL Cholesterol Calculated   Date Value Ref Range Status   07/31/2022 11 <=100 mg/dL Final   09/28/2020 96 <100 mg/dL Final     Comment:     Desirable:       <100 mg/dl       Patient's home statin, Crestor (rosuvastatin) restarted; continue statin on discharge to meet quality measures, unless contraindicated.     Recommendations: LDL = 11 - suggest reducing Rosuvastatin dose     Thank you for the consult.    Patsy Lebron RPH 8/1/2022 10:45 AM

## 2022-08-01 NOTE — PLAN OF CARE
Physical Therapy Discharge Summary    Reason for therapy discharge:    All goals and outcomes met, no further needs identified.    Progress towards therapy goal(s). See goals on Care Plan in Epic electronic health record for goal details.  Goals met    Therapy recommendation(s):    Patient and wife report his mobility in near his IND/mod I baseline, currently supervision/SBA for transfers, gait, and stair negotiation with single rail.  Patient with no overt loss of balance without assistive device.  Anticipate patient will be appropriate to discharge home with family assistance as needed.  Patient will continue to participate in OP SLP and OT at discharge.

## 2022-08-01 NOTE — CONSULTS
Patient has had stroke education class previously and declined need for another class.    Alice Jennings RN  [facility]  [phone]

## 2022-08-01 NOTE — PROGRESS NOTES
Discharge instructions reviewed with patient and wife. Sent pt home with compression stockings, refused to put on before leaving but writer did explain importance going further. No further questions.

## 2022-08-01 NOTE — PROVIDER NOTIFICATION
MD Notification    Notified Person: Hospitalist    Notified Person Name: Jennifer MonSISI whitley    Notification Date/Time: 8/1/22 1328    Notification Interaction: panteraera paged    Purpose of Notification: positive orthostatic BPs.    Orders Received: 1,000ml NS bolus over 4 hrs

## 2022-08-01 NOTE — PROGRESS NOTES
[facility]      OUTPATIENT PHYSICAL THERAPY EVALUATION  PLAN OF TREATMENT FOR OUTPATIENT REHABILITATION  (COMPLETE FOR INITIAL CLAIMS ONLY)  Patient's Last Name, First Name, M.I.  YOB: 1955  H[address]                        Provider's Name  [facility] Medical Record No.  [id number]                               Onset Date:  07/31/22   Start of Care Date:  08/01/22      Type:     _X_PT   ___OT   ___SLP Medical Diagnosis:  SYNCOPE/TIA                        PT Diagnosis:  Impaired functional mobility   Visits from SOC:  1   _________________________________________________________________________________  Plan of Treatment/Functional Goals    Planned Interventions: balance training, bed mobility training, gait training, home exercise program, neuromuscular re-education, patient/family education, postural re-education, stair training, strengthening, transfer training, progressive activity/exercise     Goals: See Physical Therapy Goals on Care Plan in [facility] electronic health record.    Therapy Frequency: One time eval and treatment only  Predicted Duration of Therapy Intervention: 08/02/22  _________________________________________________________________________________    I CERTIFY THE NEED FOR THESE SERVICES FURNISHED UNDER        THIS PLAN OF TREATMENT AND WHILE UNDER MY CARE     (Physician co-signature of this document indicates review and certification of the therapy plan).              Certification date from: 08/01/22, Certification date to: 08/08/22    Referring Physician: Chaim Farfan MD            Initial Assessment        See Physical Therapy evaluation dated 08/01/22 in Epic electronic health record.

## 2022-08-01 NOTE — PROGRESS NOTES
PRIMARY DIAGNOSIS: SYNCOPE/TIA  OUTPATIENT/OBSERVATION GOALS TO BE MET BEFORE DISCHARGE:  1. Orthostatic performed: N/A    2. Diagnostic testing complete & at baseline neurologic testing: No    3. Cleared by consultants (if involved): No    4. Interpretation of cardiac rhythm per telemetry tech: not read yet    5. Tolerating adequate PO diet and medications: Yes    6. Return to near baseline physical activity or neurologic status: Yes    Discharge Planner Nurse   Safe discharge environment identified: No  Barriers to discharge: Yes       Entered by: Connie Mayers RN 08/01/2022 10:42 AM     Please review provider order for any additional goals.   Nurse to notify provider when observation goals have been met and patient is ready for discharge.

## 2022-08-01 NOTE — PLAN OF CARE
Goal Outcome Evaluation:    Orientation: A&Ox4, uncooperative & agitated at times. Refused vitals & neuro assessment overnight. Pt was educated on importance of assessments.   Observation Goals (met & not met): Not met  Activity Level: Up SBA w/ GB  Fall Risk: Yes  Behavior & Aggression Tool Color: Yellow  Pain Management: Denies pain  ABNL VS/O2: VSS on RA. Refused vital sign recheck.  ABNL Lab/BG: . Refused recheck at 0200.  Diet: Regular  Bowel/Bladder: Continent. BM on 7/31.  Drains/Devices: L PIV  Tests/Procedures for next shift: EEG, Echo  Anticipated DC date: 1-2 days   Other Important Info: Pt refused cares overnight. Mg & Phos reordered for morning labs after refusal. On K+, Mg and phos protocol.

## 2022-08-01 NOTE — CONSULTS
Care Management Initial Consult    General Information  Assessment completed with: Spouse or significant other (Estefania),         Primary Care Provider verified and updated as needed:     Readmission within the last 30 days: no previous admission in last 30 days      Reason for Consult: discharge planning  Advance Care Planning:            Communication Assessment  Patient's communication style: spoken language (English or Bilingual)    Hearing Difficulty or Deaf: no        Cognitive  Cognitive/Neuro/Behavioral: WDL  Level of Consciousness: alert     Orientation: oriented x 4  Mood/Behavior: cooperative, calm  Best Language: 0 - No aphasia  Speech: clear    Living Environment:   People in home: spouse     Current living Arrangements: house      Able to return to prior arrangements:  (PT/OT to assess)       Family/Social Support:  Care provided by: self  Provides care for: no one  Marital Status:   Children, Wife          Description of Support System: Supportive, Involved    Support Assessment: Adequate family and caregiver support, Adequate social supports    Current Resources:   Patient receiving home care services:       Community Resources:    Equipment currently used at home:    Supplies currently used at home:      Employment/Financial:  Employment Status:          Financial Concerns:             Lifestyle & Psychosocial Needs:  Social Determinants of Health     Tobacco Use: Low Risk      Smoking Tobacco Use: Never Smoker     Smokeless Tobacco Use: Never Used   Alcohol Use: Not on file   Financial Resource Strain: Low Risk      Difficulty of Paying Living Expenses: Not hard at all   Food Insecurity: No Food Insecurity     Worried About Running Out of Food in the Last Year: Never true     Ran Out of Food in the Last Year: Never true   Transportation Needs: No Transportation Needs     Lack of Transportation (Medical): No     Lack of Transportation (Non-Medical): No   Physical Activity: Not on file   Stress:  Not on file   Social Connections: Unknown     Frequency of Communication with Friends and Family: Not on file     Frequency of Social Gatherings with Friends and Family: Not on file     Attends Taoist Services: Not on file     Active Member of Clubs or Organizations: Not on file     Attends Club or Organization Meetings: Not on file     Marital Status:    Intimate Partner Violence: Not on file   Depression: Not at risk     PHQ-2 Score: 0   Housing Stability: Low Risk      Unable to Pay for Housing in the Last Year: No     Number of Places Lived in the Last Year: 1     Unstable Housing in the Last Year: No       Functional Status:  Prior to admission patient needed assistance:              Mental Health Status:          Chemical Dependency Status:                Values/Beliefs:  Spiritual, Cultural Beliefs, Taoist Practices, Values that affect care:  (Congregation)               Additional Information:    Pt was admitted on 7/31/22 as observation for an episode of dizziness, lightheadedness, and possible acute stroke versus seizure.  PT/OT ordered and to assess pt.  Sw consult ordered for discharge planning.    Sw connected with spouse, Estefania, to begin initial discharge planning assessment.  Spouse stated that pt was at [facility] in March/April 20[facility] and discharged home with out patient services.  Pt continued to see OP SLP and OP OT up to this most recent admission.  Spouse stated that she had asked/hoped for HC services at previous discharge but it was determined pt did not qualify.  Spouse confirmed that she and pt reside in a one level rambler home with everything on main level (bedroom, bathroom, kitchen).  Sw explained that once PT/OT make recommendations that Sw/CC will reach back out to review discharge plans and needs.  Sw to continue to follow for discharge planning needs.    Ellen Hansen, [facility]

## 2022-08-01 NOTE — UTILIZATION REVIEW
Concurrent stay review; Secondary Review Determination      Under the authority of the Utilization Management Committee, the utilization review process indicated a secondary review on the above patient. The review outcome is based on review of the medical records, discussions with staff, and applying clinical experience noted on the date of the review.      (x) Observation Status Appropriate - Concurrent stay review      RATIONALE FOR DETERMINATION:   66 year old male with a history of HTN, HLD, NA, T2DM, BPH, hx recurrent CVA, hx cocaine abuse who was admitted on 7/31/2022 following an episode of dizziness, expressive aphasia, and jerky movements.      Vital signs unremarkable    Brain MRI shows no acute findings.  Late subacute/chronic infarcts noted    CTA of the head and neck shows severe stenosis of the distal M1 left middle cerebral artery    His symptoms have resolved.    Neurology has seen the patient.  EEG has been performed and is pending    Continued observation status is appropriate.       Patient is clinically improving and there is no clear indication to change patient's status to inpatient. The severity of illness, intensity of service provided, expected LOS and risk for adverse outcome make the care appropriate for observation.      The information on this document is developed by the utilization review team in order for the business office to ensure compliance. This only denotes the appropriateness of proper admission status and does not reflect the quality of care rendered.   The definitions of Inpatient Status and Observation Status used in making the determination above are those provided in the CMS Coverage Manual, Chapter 1 and Chapter 6, section 70.4.      Sincerely,      Jameel Machado MD  Utilization Review   Physician Advisor   [facility].

## 2022-08-01 NOTE — PROVIDER NOTIFICATION
MD Notification    Notified Person: MD- neurology    Notified Person Name: Jeanne Zambrano MD    Notification Date/Time: 8/1/22 7688    Notification Interaction: paged provider    Purpose of Notification: Will you be rounding again tonight? Pt's wife is here and hoping to not miss you and your recommendations. Hospitalist would like to know your recommendations regarding discharge or not. Thanks. -Connie COLEMAN    Orders Received:    Comments: Update from pt's family- Dr. Morel is okay with patient discharging tonight and being put back on plavix.

## 2022-08-01 NOTE — PLAN OF CARE
SLP: Orders received and charged reviewed. Patient admitted under observation. He currently is tolerating a regular diet and thin liquids. Patient has been followed by OP speech since April. He goes 2 time per/wk per his wife. No IP skilled needs at this time. Recommend: 1. Return to OP speech therapy at time of discharge. Will complete the IP orders.

## 2022-08-01 NOTE — PLAN OF CARE
Orientation/Cognitive: A&Ox4, forgetful  Observation Goals (Met/ Not Met): not met  Mobility Level/Assist Equipment: SBA, GB walker  Fall Risk (Y/N): yes  Behavior Concerns: none  Pain Management: denies pain  Tele/VS/O2: positive orthostatic BPs, IV fluid bolus infused, still positive but improved. Tele: FOZIA ARELLANO   ABNL Lab/BG: , 211, 177  Diet: regular- tolerated  Bowel/Bladder: continent  Skin Concerns: none  Drains/Devices: PIV SL- removed.   Tests/Procedures for next shift: none  Anticipated DC date & active delays: today  Patient Stated Goal for Today: go home   Echo and EEG done today, okay for discharge per neurology. Plan to discharge this evening with wife.

## 2022-08-01 NOTE — PROGRESS NOTES
PRIMARY DIAGNOSIS: SYNCOPE/TIA  OUTPATIENT/OBSERVATION GOALS TO BE MET BEFORE DISCHARGE:  1. Orthostatic performed: N/A     2. Diagnostic testing complete & at baseline neurologic testing: No, results pending     3. Cleared by consultants (if involved): No     4. Interpretation of cardiac rhythm per telemetry tech: NSR     5. Tolerating adequate PO diet and medications: Yes     6. Return to near baseline physical activity or neurologic status: Yes        Discharge Planner Nurse      Safe discharge environment identified: yes  Barriers to discharge: no       Entered by: Connie Mayers RN 08/01/2022 1600        Please review provider order for any additional goals.   Nurse to notify provider when observation goals have been met and patient is ready for discharge.

## 2022-08-01 NOTE — PLAN OF CARE
RECEIVING UNIT ED HANDOFF REVIEW    ED Nurse Handoff Report was reviewed by: Soledad Canales RN on July 31, 2022 at 8:30 PM

## 2022-08-02 ENCOUNTER — PATIENT OUTREACH (OUTPATIENT)
Dept: CARE COORDINATION | Facility: CLINIC | Age: 67
End: 2022-08-02

## 2022-08-02 ENCOUNTER — VIRTUAL VISIT (OUTPATIENT)
Dept: ENDOCRINOLOGY | Facility: CLINIC | Age: 67
End: 2022-08-02
Payer: COMMERCIAL

## 2022-08-02 DIAGNOSIS — Z71.89 OTHER SPECIFIED COUNSELING: ICD-10-CM

## 2022-08-02 DIAGNOSIS — Z79.4 TYPE 2 DIABETES MELLITUS WITH DIABETIC NEPHROPATHY, WITH LONG-TERM CURRENT USE OF INSULIN (H): ICD-10-CM

## 2022-08-02 DIAGNOSIS — E11.21 TYPE 2 DIABETES MELLITUS WITH DIABETIC NEPHROPATHY, WITH LONG-TERM CURRENT USE OF INSULIN (H): ICD-10-CM

## 2022-08-02 DIAGNOSIS — E11.00 TYPE 2 DIABETES MELLITUS WITH HYPEROSMOLARITY WITHOUT COMA, WITHOUT LONG-TERM CURRENT USE OF INSULIN (H): ICD-10-CM

## 2022-08-02 PROCEDURE — 99215 OFFICE O/P EST HI 40 MIN: CPT | Mod: 95 | Performed by: INTERNAL MEDICINE

## 2022-08-02 RX ORDER — PEN NEEDLE, DIABETIC 32GX 5/32"
NEEDLE, DISPOSABLE MISCELLANEOUS 4 TIMES DAILY
Qty: 300 EACH | Refills: 3 | Status: SHIPPED | OUTPATIENT
Start: 2022-08-02 | End: 2023-01-18

## 2022-08-02 RX ORDER — INSULIN GLARGINE-YFGN 100 [IU]/ML
46 INJECTION, SOLUTION SUBCUTANEOUS AT BEDTIME
Qty: 45 ML | Refills: 3 | Status: SHIPPED | OUTPATIENT
Start: 2022-08-02 | End: 2023-08-23

## 2022-08-02 NOTE — PATIENT INSTRUCTIONS
Semglee 46u at bedtime    Novolog 15 with meals, 7 with smaller meal    Novolog correction scale  Blood sugar target- 140    BG  Insulin  <140-   0  140-160 1  160-180 2  180-200 3  200-220 4  220-240 5  240-260 6  260-280 7  280-300 8  >300  9

## 2022-08-02 NOTE — LETTER
8/2/2022         RE: Huseyin Pandeyjamila  [address] [place] [place] [address]        Dear Colleague,    Thank you for referring your patient, Huseyin St, to the [facility] [place]. Please see a copy of my visit note below.      Video-Visit Details    Type of service:  Video Visit  Video Start Time: 13:34  Video End Time: 14:09  Originating Location (pt. Location): [place], [place]  Distant Location (provider location):  Home  Platform used for Video Visit: [employer], unable to connect via video    Ivonne Odom MD      Huseyin St is a 66 year old yo male who NA, obesity, HLD, DM2 complicated by nephropathy, neuropathy and recently CVA s/p stenting here for follow-up via a billable video visit. Last seen 5/31. He is accompanied by Estefania (wife).    Chief Complaint   Patient presents with     Type 2 diabetes mellitus with diabetic nephropathy, with lo       INTERVAL HISTORY:  - Recently rehospitalized with presyncopal episode concerning for additional TIA  - Has been libre2 sensor, does not have cord to connect   - Stopped Ozempic in June given diarrhea  - Novolog usually taking about 15/21 doses usually 2-3 times per day (usually lunch dose is one that's missed)  - Overall doing dramatically better, minimal or none in 300s    Subjective:    1) Diabetes Mellitus    Diabetes History:  Diagnosis: 10-20 years ago  Hospitalizations: April for stroke, never for diabetes  Previous Regimens: metformin diarrhea, Ozempic diarrhea, victoza diarrhea  Current Regimen: Jardiance 10mg,  Novolog 12u with larger meals, 5u with small meals, Semglee 46u at bedtime    BG check- NANO 2  30 day ave- 176  Trends-   7/25- 10p- 256, 7p- 251, 247p- 277   7/26- 1op- 256, 607p- 122,   7/27- 545p- 179, 1230p- 254, 850a- 132  7/29- 11p- 252, 730p- 253, 3p- 251,   7/30- 5p- 256, 4p- 257, 8:30a- 135      Complications: neurovascular    Last eye exam: last one year ago, no NPRD at that time (per report)  Foot Exam:  self checks, wife checks every few days  ACEI/ARB: lisinopril  Statin/ASA: crestor, ASA, plavix      BP Readings from Last 3 Encounters:   08/01/22 123/75   04/18/22 134/63   04/08/22 (!) 154/94       Lab Results   Component Value Date    A1C 11.8 03/16/2022    A1C 11.8 10/11/2021    A1C 13.2 09/28/2020    A1C 12.3 09/23/2019    A1C 10.4 03/12/2018       Recent Labs   Lab Test 04/06/22  0725 03/16/22  1704 04/21/16  0906 08/20/15  0830 01/14/15  0847   CHOL 109 224*   < > 136 316*   HDL 34* 33*   < > 42 41   LDL 53 136*   < > 59 219*   TRIG 111 275*   < > 176* 282*   CHOLHDLRATIO  --   --   --  3.2 7.7*    < > = values in this interval not displayed.       Lab Results   Component Value Date    MICROL 265 10/11/2021    MICROL 715 09/28/2020     No results found for: MICROALBUMIN      Wt Readings from Last 3 Encounters:   08/01/22 100.2 kg (221 lb)   04/08/22 103.7 kg (228 lb 9.6 oz)   04/05/22 105.7 kg (233 lb)             Active diagnoses this visit:     Type 2 diabetes mellitus with hyperosmolarity without coma, without long-term current use of insulin (H)  Type 2 diabetes mellitus with diabetic nephropathy, with long-term current use of insulin (H)     ROS: 10 point ROS neg other than the symptoms noted above in the HPI.      Medical, surgical, social, and family histories, medications and allergies reviewed and updated.    Objective:  Physical Exam (visual exam)  VS:  no vital signs taken for video visit  CONSTITUTIONAL: healthy, alert and NAD, responding appropriately  ENT: normocephalic, no visual evidence of trauma, normal nose and oral mucosa  EYES: conjunctivae and sclerae normal, no exophthalmos or proptosis  THYROID:  no visualized nodules or goiter  LUNGS: no audible wheeze, cough or visible cyanosis, no visible retractions or increased work of breathing  EXTREMITIES: no hand tremors  NEUROLOGY: cranial nerves grossly intact with no obvious deficit.  SKIN:  no visualized skin lesions or rash, no edema  visualized  PSYCH: aphasia, poor recall        Lab Results   Component Value Date/Time    TSH 1.94 12/05/2016 10:58 AM       Last Comprehensive Metabolic Panel:  Sodium   Date Value Ref Range Status   08/01/2022 134 133 - 144 mmol/L Final   03/12/2018 135 133 - 144 mmol/L Final     Potassium   Date Value Ref Range Status   08/01/2022 4.4 3.4 - 5.3 mmol/L Final   03/12/2018 4.3 3.4 - 5.3 mmol/L Final     Chloride   Date Value Ref Range Status   08/01/2022 105 94 - 109 mmol/L Final   03/12/2018 102 94 - 109 mmol/L Final     Carbon Dioxide   Date Value Ref Range Status   03/12/2018 26 20 - 32 mmol/L Final     Carbon Dioxide (CO2)   Date Value Ref Range Status   08/01/2022 23 20 - 32 mmol/L Final     Anion Gap   Date Value Ref Range Status   08/01/2022 6 3 - 14 mmol/L Final   03/12/2018 7 3 - 14 mmol/L Final     Glucose   Date Value Ref Range Status   08/01/2022 196 (H) 70 - 99 mg/dL Final   03/12/2018 220 (H) 70 - 99 mg/dL Final     GLUCOSE BY METER POCT   Date Value Ref Range Status   08/01/2022 233 (H) 70 - 99 mg/dL Final     Urea Nitrogen   Date Value Ref Range Status   08/01/2022 21 7 - 30 mg/dL Final   03/12/2018 13 7 - 30 mg/dL Final     Creatinine   Date Value Ref Range Status   08/01/2022 1.16 0.66 - 1.25 mg/dL Final   09/28/2020 1.03 0.66 - 1.25 mg/dL Final     GFR Estimate   Date Value Ref Range Status   08/01/2022 69 >60 mL/min/1.73m2 Final     Comment:     Effective December 21, 2021 eGFRcr in adults is calculated using the 2021 CKD-EPI creatinine equation which includes age and gender (Delio et al., NEJM, DOI: 10.1056/CPRJtp0483926)   09/28/2020 75 >60 mL/min/[1.73_m2] Final     Comment:     Non  GFR Calc  Starting 12/18/2018, serum creatinine based estimated GFR (eGFR) will be   calculated using the Chronic Kidney Disease Epidemiology Collaboration   (CKD-EPI) equation.       Calcium   Date Value Ref Range Status   08/01/2022 9.5 8.5 - 10.1 mg/dL Final   03/12/2018 9.6 8.5 - 10.1 mg/dL  Final     Bilirubin Total   Date Value Ref Range Status   03/24/2022 0.2 0.2 - 1.3 mg/dL Final   12/05/2016 0.4 0.2 - 1.3 mg/dL Final     Alkaline Phosphatase   Date Value Ref Range Status   03/24/2022 99 40 - 150 U/L Final   12/05/2016 81 40 - 150 U/L Final     ALT   Date Value Ref Range Status   03/24/2022 17 0 - 70 U/L Final   09/29/2017 36 0 - 70 U/L Final     AST   Date Value Ref Range Status   03/24/2022 10 0 - 45 U/L Final   12/05/2016 15 0 - 45 U/L Final       ASSESSMENT / PLAN:  No diagnosis found.    Recent A1c 8.5%  1.  Glucose Control:  MUCH improved,               - Jardiance 10mg daily    - Semglee- 46u at bedtime   - Novolog 15u with meals, 7u with smaller meals (taking higher dose unless has virtually no carbs)    - Start sliding scale 1:20 >140. Can dose up to every 3-4hr  2.  Lipids:  Meets criteria per ASCVD risk %, continue crestor 40mg daily  3.  Blood Pressure:  At goal, continue meds  4.  CV prevention:  Continue aspirin 325 mg daily, plavix  5.  Diabetic Nephropathy screening:   continue ACEI/ARB   6.  Diabetic Retinopathy screening:  continue annual dilated eye exams due now, will call to schedule  7.  Diabetic Neuropathy screening:  Up to date.  Instructed on routine foot care, follow-up with podiatry as needed.       No orders of the defined types were placed in this encounter.       Return to clinic 3 months    A total of 40 minutes were spent today 08/02/22 on this visit including chart review, history and counseling, documentation and other activities as detailed above.         Again, thank you for allowing me to participate in the care of your patient.        Sincerely,        Ivonne Odom MD

## 2022-08-02 NOTE — PROGRESS NOTES
Clinic Care Coordination Contact  [facility]: Post-Discharge Note  SITUATION                                                      Admission:    Admission Date: 07/31/22   Reason for Admission: evaluation following a spell of instability,  difficulty with speech, and abnormal movement  Discharge:   Discharge Date: 08/01/22  Discharge Diagnosis: evaluation following a spell of instability,  difficulty with speech, and abnormal movement    BACKGROUND                                                      Per hospital discharge summary and inpatient provider notes:    This is a 66-year-old male with history of anxiety, hypertension, hyperlipidemia, sleep apnea, diabetes mellitus type 2, BPH, history of TIA and stroke recently was in last April was on dual antiplatelets for 90 days and now currently on aspirin 325 mg daily, came to the ER with complaint of dizziness, lightheadedness and appears to have jerky movements and expressive aphasia.     According to the patient, he was doing fine this morning, he went for lunch at the [employer] restaurant and was eating and after eating, he got up and all of a sudden felt dizzy, lightheaded and was about to fall down, so he hung on to the pole, somebody put a chair on the back, so he sat down and then wife noticed brief episode of jerky movement of his upper extremity.  He remained seated there for five minutes and then able to walk to the car with the help of his wife and went to his daughter's home.  The daughter was a nurse who recommended him to go to the ED.    ASSESSMENT      Enrollment  Primary Care Care Coordination Status: Declined    Discharge Assessment  How are you doing now that you are home?: He is doing okay. Since being home he has been taking a minute from when he is lying down to sitting up to make sure BP can catch up with his movements. Otherwise no other symptoms since being home.  How are your symptoms? (Red Flag symptoms escalate to triage hotline per  guidelines): Improved  Do you feel your condition is stable enough to be safe at home until your provider visit?: Yes  Does the patient have their discharge instructions? : Yes  Does the patient have questions regarding their discharge instructions? : No  Were you started on any new medications or were there changes to any of your previous medications? : Yes  Does the patient have all of their medications?: No (see comment) (Pharmacy said his medication was not ready today but will be ready tomorrow (8/3) and wife plans to pick it up.)  Do you have questions regarding any of your medications? : No  Discharge follow-up appointment scheduled within 14 calendar days? : Yes  Discharge Follow Up Appointment Date: 08/16/22  Discharge Follow Up Appointment Scheduled with?: Specialty Care Provider                  PLAN                                                      Outpatient Plan: Follow up with Neurology in one month as planned.  Follow up with primary care provider for ongoing diabetes  management    Future Appointments   Date Time Provider Department Center   8/4/2022  1:30 PM Deborah Peñaloza, OT [facility]OT [address]   8/4/2022  2:15 PM Tala Spring SLP [facility] [address]   8/11/2022 12:45 PM Deborah Peñaloza, OT EDOT [address]   8/11/2022  1:30 PM Tala Spring SLP [facility] [address]   8/16/2022  2:30 PM Lele Conrad MD [facility]   8/18/2022 12:45 PM Deborah Peñaloza, OT EDOT [address]   8/18/2022  1:30 PM Tala Spring SLP [facility] [address]   8/25/2022 12:45 PM Deborah Peñaloza, OT EDOT [address]   8/25/2022  1:30 PM Tala Spring, SLP EDSP [address]         For any urgent concerns, please contact our 24 hour nurse triage line: [phone] ([phone])       PENELOPE Michael  [phone]  [facility]

## 2022-08-02 NOTE — PROGRESS NOTES
Video-Visit Details    Type of service:  Video Visit  Video Start Time: 13:34  Video End Time: 14:09  Originating Location (pt. Location): [place], [place]  Distant Location (provider location):  Home  Platform used for Video Visit: [employer]-- [employer], unable to connect via video    Ivonne Odom MD      Huseyin St is a 66 year old yo male who NA, obesity, HLD, DM2 complicated by nephropathy, neuropathy and recently CVA s/p stenting here for follow-up via a billable video visit. Last seen 5/31. He is accompanied by Estefania (wife).    Chief Complaint   Patient presents with     Type 2 diabetes mellitus with diabetic nephropathy, with lo       INTERVAL HISTORY:  - Recently rehospitalized with presyncopal episode concerning for additional TIA  - Has been libre2 sensor, does not have cord to connect   - Stopped Ozempic in June given diarrhea  - Novolog usually taking about 15/21 doses usually 2-3 times per day (usually lunch dose is one that's missed)  - Overall doing dramatically better, minimal or none in 300s    Subjective:    1) Diabetes Mellitus    Diabetes History:  Diagnosis: 10-20 years ago  Hospitalizations: April for stroke, never for diabetes  Previous Regimens: metformin diarrhea, Ozempic diarrhea, victoza diarrhea  Current Regimen: Jardiance 10mg,  Novolog 12u with larger meals, 5u with small meals, Semglee 46u at bedtime    BG check- NANO 2  30 day ave- 176  Trends-   7/25- 10p- 256, 7p- 251, 247p- 277   7/26- 1op- 256, 607p- 122,   7/27- 545p- 179, 1230p- 254, 850a- 132  7/29- 11p- 252, 730p- 253, 3p- 251,   7/30- 5p- 256, 4p- 257, 8:30a- 135      Complications: neurovascular    Last eye exam: last one year ago, no NPRD at that time (per report)  Foot Exam: self checks, wife checks every few days  ACEI/ARB: lisinopril  Statin/ASA: crestor, ASA, plavix      BP Readings from Last 3 Encounters:   08/01/22 123/75   04/18/22 134/63   04/08/22 (!) 154/94       Lab Results   Component Value Date    A1C 11.8  03/16/2022    A1C 11.8 10/11/2021    A1C 13.2 09/28/2020    A1C 12.3 09/23/2019    A1C 10.4 03/12/2018       Recent Labs   Lab Test 04/06/22  0725 03/16/22  1704 04/21/16  0906 08/20/15  0830 01/14/15  0847   CHOL 109 224*   < > 136 316*   HDL 34* 33*   < > 42 41   LDL 53 136*   < > 59 219*   TRIG 111 275*   < > 176* 282*   CHOLHDLRATIO  --   --   --  3.2 7.7*    < > = values in this interval not displayed.       Lab Results   Component Value Date    MICROL 265 10/11/2021    MICROL 715 09/28/2020     No results found for: MICROALBUMIN      Wt Readings from Last 3 Encounters:   08/01/22 100.2 kg (221 lb)   04/08/22 103.7 kg (228 lb 9.6 oz)   04/05/22 105.7 kg (233 lb)             Active diagnoses this visit:     Type 2 diabetes mellitus with hyperosmolarity without coma, without long-term current use of insulin (H)  Type 2 diabetes mellitus with diabetic nephropathy, with long-term current use of insulin (H)     ROS: 10 point ROS neg other than the symptoms noted above in the HPI.      Medical, surgical, social, and family histories, medications and allergies reviewed and updated.    Objective:  Physical Exam (visual exam)  VS:  no vital signs taken for video visit  CONSTITUTIONAL: healthy, alert and NAD, responding appropriately  ENT: normocephalic, no visual evidence of trauma, normal nose and oral mucosa  EYES: conjunctivae and sclerae normal, no exophthalmos or proptosis  THYROID:  no visualized nodules or goiter  LUNGS: no audible wheeze, cough or visible cyanosis, no visible retractions or increased work of breathing  EXTREMITIES: no hand tremors  NEUROLOGY: cranial nerves grossly intact with no obvious deficit.  SKIN:  no visualized skin lesions or rash, no edema visualized  PSYCH: aphasia, poor recall        Lab Results   Component Value Date/Time    TSH 1.94 12/05/2016 10:58 AM       Last Comprehensive Metabolic Panel:  Sodium   Date Value Ref Range Status   08/01/2022 134 133 - 144 mmol/L Final   03/12/2018  135 133 - 144 mmol/L Final     Potassium   Date Value Ref Range Status   08/01/2022 4.4 3.4 - 5.3 mmol/L Final   03/12/2018 4.3 3.4 - 5.3 mmol/L Final     Chloride   Date Value Ref Range Status   08/01/2022 105 94 - 109 mmol/L Final   03/12/2018 102 94 - 109 mmol/L Final     Carbon Dioxide   Date Value Ref Range Status   03/12/2018 26 20 - 32 mmol/L Final     Carbon Dioxide (CO2)   Date Value Ref Range Status   08/01/2022 23 20 - 32 mmol/L Final     Anion Gap   Date Value Ref Range Status   08/01/2022 6 3 - 14 mmol/L Final   03/12/2018 7 3 - 14 mmol/L Final     Glucose   Date Value Ref Range Status   08/01/2022 196 (H) 70 - 99 mg/dL Final   03/12/2018 220 (H) 70 - 99 mg/dL Final     GLUCOSE BY METER POCT   Date Value Ref Range Status   08/01/2022 233 (H) 70 - 99 mg/dL Final     Urea Nitrogen   Date Value Ref Range Status   08/01/2022 21 7 - 30 mg/dL Final   03/12/2018 13 7 - 30 mg/dL Final     Creatinine   Date Value Ref Range Status   08/01/2022 1.16 0.66 - 1.25 mg/dL Final   09/28/2020 1.03 0.66 - 1.25 mg/dL Final     GFR Estimate   Date Value Ref Range Status   08/01/2022 69 >60 mL/min/1.73m2 Final     Comment:     Effective December 21, 2021 eGFRcr in adults is calculated using the 2021 CKD-EPI creatinine equation which includes age and gender (Delio et al., NEJM, DOI: 10.1056/WORXpb7501065)   09/28/2020 75 >60 mL/min/[1.73_m2] Final     Comment:     Non  GFR Calc  Starting 12/18/2018, serum creatinine based estimated GFR (eGFR) will be   calculated using the Chronic Kidney Disease Epidemiology Collaboration   (CKD-EPI) equation.       Calcium   Date Value Ref Range Status   08/01/2022 9.5 8.5 - 10.1 mg/dL Final   03/12/2018 9.6 8.5 - 10.1 mg/dL Final     Bilirubin Total   Date Value Ref Range Status   03/24/2022 0.2 0.2 - 1.3 mg/dL Final   12/05/2016 0.4 0.2 - 1.3 mg/dL Final     Alkaline Phosphatase   Date Value Ref Range Status   03/24/2022 99 40 - 150 U/L Final   12/05/2016 81 40 - 150 U/L  Final     ALT   Date Value Ref Range Status   03/24/2022 17 0 - 70 U/L Final   09/29/2017 36 0 - 70 U/L Final     AST   Date Value Ref Range Status   03/24/2022 10 0 - 45 U/L Final   12/05/2016 15 0 - 45 U/L Final       ASSESSMENT / PLAN:  No diagnosis found.    Recent A1c 8.5%  1.  Glucose Control:  MUCH improved,               - Jardiance 10mg daily    - Semglee- 46u at bedtime   - Novolog 15u with meals, 7u with smaller meals (taking higher dose unless has virtually no carbs)    - Start sliding scale 1:20 >140. Can dose up to every 3-4hr  2.  Lipids:  Meets criteria per ASCVD risk %, continue crestor 40mg daily  3.  Blood Pressure:  At goal, continue meds  4.  CV prevention:  Continue aspirin 325 mg daily, plavix  5.  Diabetic Nephropathy screening:   continue ACEI/ARB   6.  Diabetic Retinopathy screening:  continue annual dilated eye exams due now, will call to schedule  7.  Diabetic Neuropathy screening:  Up to date.  Instructed on routine foot care, follow-up with podiatry as needed.       No orders of the defined types were placed in this encounter.       Return to clinic 3 months    A total of 40 minutes were spent today 08/02/22 on this visit including chart review, history and counseling, documentation and other activities as detailed above.

## 2022-08-03 ENCOUNTER — TELEPHONE (OUTPATIENT)
Dept: ENDOCRINOLOGY | Facility: CLINIC | Age: 67
End: 2022-08-03

## 2022-08-03 ENCOUNTER — MYC REFILL (OUTPATIENT)
Dept: FAMILY MEDICINE | Facility: CLINIC | Age: 67
End: 2022-08-03

## 2022-08-03 DIAGNOSIS — I10 ESSENTIAL HYPERTENSION, BENIGN: ICD-10-CM

## 2022-08-04 ENCOUNTER — HOSPITAL ENCOUNTER (OUTPATIENT)
Dept: OCCUPATIONAL THERAPY | Facility: CLINIC | Age: 67
Discharge: HOME | End: 2022-08-04
Payer: COMMERCIAL

## 2022-08-04 ENCOUNTER — HOSPITAL ENCOUNTER (OUTPATIENT)
Dept: SPEECH THERAPY | Facility: CLINIC | Age: 67
Discharge: HOME | End: 2022-08-04

## 2022-08-04 DIAGNOSIS — I63.9 CEREBROVASCULAR ACCIDENT (CVA), UNSPECIFIED MECHANISM (H): Primary | ICD-10-CM

## 2022-08-04 DIAGNOSIS — R47.01 APHASIA: Primary | ICD-10-CM

## 2022-08-04 DIAGNOSIS — I63.9 CEREBROVASCULAR ACCIDENT (CVA), UNSPECIFIED MECHANISM (H): ICD-10-CM

## 2022-08-04 DIAGNOSIS — Z78.9 IMPAIRED INSTRUMENTAL ACTIVITIES OF DAILY LIVING (IADL): ICD-10-CM

## 2022-08-04 PROCEDURE — 97537 COMMUNITY/WORK REINTEGRATION: CPT | Mod: GO | Performed by: OCCUPATIONAL THERAPIST

## 2022-08-04 PROCEDURE — 92507 TX SP LANG VOICE COMM INDIV: CPT | Mod: GN | Performed by: STUDENT IN AN ORGANIZED HEALTH CARE EDUCATION/TRAINING PROGRAM

## 2022-08-04 RX ORDER — LISINOPRIL 20 MG/1
20 TABLET ORAL DAILY
Qty: 90 TABLET | Refills: 1 | Status: SHIPPED | OUTPATIENT
Start: 2022-08-04 | End: 2022-11-22

## 2022-08-04 NOTE — TELEPHONE ENCOUNTER
Routing refill request to provider for review/approval because:  Medication is reported/historical    lisinopril (ZESTRIL) 20 MG tablet   4/27/2022  --   Sig - Route: Take 1 tablet (20 mg) by mouth daily - Oral   Class: Historical   Order: [id number]     Last filled #15 tablet of 40mg lisinopril for 30 day supply under provider Dr Lisbet COWAN, Triage RN  [facility]

## 2022-08-08 DIAGNOSIS — E11.21 TYPE 2 DIABETES MELLITUS WITH DIABETIC NEPHROPATHY, WITH LONG-TERM CURRENT USE OF INSULIN (H): ICD-10-CM

## 2022-08-08 DIAGNOSIS — Z79.4 TYPE 2 DIABETES MELLITUS WITH DIABETIC NEPHROPATHY, WITH LONG-TERM CURRENT USE OF INSULIN (H): ICD-10-CM

## 2022-08-09 NOTE — PROGRESS NOTES
[facility]    Outpatient Occupational Therapy Progress Note  Patient: Huseyin St  : 1955    Beginning/End Dates of Reporting Period:  22 to 2022    Referring Provider: Natasha Juarez    Therapy Diagnosis: Cerebrovascular accident (CVA), unspecified mechanism (H)    Client Self Report: Patient was hospitalized  to  for lightheadedness, episode of dizziness, expressive aphasia, and jerky movements.  MRI/scans negative for any new stroke. Patient reports he is back on Plavix.    Objective Measurements:     Objective Measure: Dynavision   Details: Mode A: 60  (average is 52+)     Objective Measure: BiVaba Scancourse   Details:  and  (BNLs)     Objective Measure: Dynavision Mode B (1 second light speed)   Details: 57 (average is 42+)     Objective Measure: Dynavision Mode B Divided   Details: 44 8/10#s, 39 8/10# and 51 10/10# (average is 35+)     Objective Measure: R  strength   Details: 71.6# (improved from 56# from re-eval but it was 76# on initial eval ; L hand 78#          Goals:     Goal Identifier 1-Community Mobility   Goal Description Patient will demonstrate modified I with visual scanning, reaction time, divided attention, and problem solving to ensure safety for community mobility (i.e. driving, crossing the street, pathfinding, shopping, etc.)   Target Date 22   Date Met      Progress (detail required for progress note): Ongoing.  Patient scoring WNLs for all modes of the Dynavision but impairments noted with insight/awareness/memory, continue recommend assistance with driving.     Goal Identifier 2-Money Management   Goal Description Patient will demonstrate the completion of moderate to complex money management tasks with 90% accuracy for increased attention to detail and problem-solving skills to resume finances at home and manage money in the  "community.   Target Date 09/19/22   Date Met      Progress (detail required for progress note): Wife has been paying all the bills.  Patient demonstrating improvement with math skills but demonstrating decreased attention to details.  Will continue goal.     Goal Identifier 3-Memory/Attention   Goal Description Patient will demonstrate improved memory skills by completing short-term memory tasks in occupational therapy with 90% accuracy using compensatory strategies PRN for increased safety and independence with IADLs (remembering to take medications, completing work tasks, communicating with a cell phone/computer, tracking appointments/meetings, etc.).   Target Date 09/19/22   Date Met      Progress (detail required for progress note): Not met.  Ongoing.     Goal Identifier 4-RUE HEP   Goal Description Patient to demonstrate independence with RUE HEP for hand and shoulder to increase overall strength to return to work, complete yardwork, etc.   Target Date 09/19/22   Date Met  08/04/22   Progress (detail required for progress note): See objective measures.     Goal Identifier 5-Medication Management   Goal Description Patient to demonstrate 100% accuracy on setting up 4 new medications for resume safe management of his personal medications using a pill box.   Target Date 09/19/22   Date Met      Progress (detail required for progress note): \"I truly rely on Estefania with my medications.\" Wife is setting up his medications and reports that he is independent with taking them on his own.  Continue to recommend assistance with setting them up.       Plan:  Continue therapy per current plan of care.  Recommend continued skilled OP OT 1x/week x 8 weeks to improve independence with higher level IADLs.    Discharge:  No  "

## 2022-08-11 ENCOUNTER — HOSPITAL ENCOUNTER (OUTPATIENT)
Dept: SPEECH THERAPY | Facility: CLINIC | Age: 67
Discharge: HOME | End: 2022-08-11

## 2022-08-11 ENCOUNTER — HOSPITAL ENCOUNTER (OUTPATIENT)
Dept: OCCUPATIONAL THERAPY | Facility: CLINIC | Age: 67
Discharge: HOME | End: 2022-08-11
Payer: COMMERCIAL

## 2022-08-11 DIAGNOSIS — I63.9 CEREBROVASCULAR ACCIDENT (CVA), UNSPECIFIED MECHANISM (H): Primary | ICD-10-CM

## 2022-08-11 DIAGNOSIS — Z78.9 IMPAIRED INSTRUMENTAL ACTIVITIES OF DAILY LIVING (IADL): ICD-10-CM

## 2022-08-11 DIAGNOSIS — R47.01 APHASIA: Primary | ICD-10-CM

## 2022-08-11 DIAGNOSIS — I63.9 CEREBROVASCULAR ACCIDENT (CVA), UNSPECIFIED MECHANISM (H): ICD-10-CM

## 2022-08-11 PROCEDURE — 92507 TX SP LANG VOICE COMM INDIV: CPT | Mod: GN | Performed by: STUDENT IN AN ORGANIZED HEALTH CARE EDUCATION/TRAINING PROGRAM

## 2022-08-11 PROCEDURE — 97537 COMMUNITY/WORK REINTEGRATION: CPT | Mod: GO | Performed by: OCCUPATIONAL THERAPIST

## 2022-08-16 ENCOUNTER — VIRTUAL VISIT (OUTPATIENT)
Dept: NEUROSURGERY | Facility: CLINIC | Age: 67
End: 2022-08-16
Payer: COMMERCIAL

## 2022-08-16 DIAGNOSIS — I66.02 MIDDLE CEREBRAL ARTERY STENOSIS, LEFT: Primary | ICD-10-CM

## 2022-08-16 PROCEDURE — 99207 PR NO DOCUMENTATION ON VISIT: CPT | Performed by: NEUROLOGICAL SURGERY

## 2022-08-16 NOTE — LETTER
August 26, 2022       TO: Huseyin [address]  [address] [place] [address]       DearMr.Maciel,    We are writing to inform you of your test results.    {ump results letter list:[id number]}    No results found from the In Basket message.    ***

## 2022-08-16 NOTE — LETTER
2022      RE: Huseyin [address]  [address]  [place] [address]         Service Date: 2022    Kendal Wong MD  [address], [facility] [address]  [place]  [address]    RE:  Huseyin St  MRN:  [id number]  :  1955    Dear Dr. Wong:    We spoke to Mr. St and his wife as part of a telephone followup in Cerebrovascular Clinic on 2022 for followup of left middle cerebral artery stenosis.  As you know, he is over 4 months out from balloon angioplasty of the left middle cerebral artery, distal M1 segment.  The flow limiting stenosis extended into both M2 divisions and we determined that a balloon angioplasty alone was superior to angioplasty and stent placement.  He tolerated the procedure well.  He was readmitted to [facility] on 2022 for a presyncopal episode.  This may have been attributable to orthostatic hypotension.  We spoke to our Vascular Neurology team regarding that hospitalization and they were confident that he did not have a recurrent left hemispheric ischemic event.  His evaluation included a CTA of the head and an MRI.  The MRI did not show any new strokes.  It shows the chronic infarcts in the left frontal and temporal lobes.  The CTA showed restenosis of the distal left M1 segment.  He was discharged from the hospital on dual antiplatelet therapy, aspirin and Plavix.  He had completed the 3-month course of dual antiplatelet therapy last month.    He was accompanied by his wife on this phone call and she supplied much of the history. His wife filled in that he has had multiple presyncopal episodes over the past few months and he has fallen a few times.  His lisinopril dose has been reduced to 10 mg from 20 mg.  She is also questioning whether his diabetes treatment is creating some of these symptoms, specifically the Jardiance.  His hemoglobin A1c has come down to 8.5 from 11.8.  Over the phone, his expressive dysphasia continued. His comprehension  seemed normal.  He had mild dysarthria, which is his baseline.      REVIEW OF STUDIES:  We went over the CTA from 2022 and compared it to the previous CTA from 2022.  This shows restenosis of the M1 segment on the left side.  This stenosis measures about 70%.  It does not appear to be as severe as the original stenosis prior to the balloon angioplasty.    IMPRESSION AND PLAN:  Although he has redeveloped restenosis, it does not appear that he is symptomatic from it currently.  After discussing with our Vascular Neurology team, we all agreed that continued medical management is reasonable.  If he develops any left hemispheric symptoms, the best treatment is repeating balloon angioplasty.  His main risk factor for restenosis is the diabetes.  His wife seems to have a very good understanding of the situation and we will repeat a CTA of the head in 3 months.  He will continue the Plavix for the rest of this month as instructed.  He will remain on a daily aspirin thereafter.  Please do not hesitate to contact us with questions.    Sincerely,    Lele Conrad MD        D: 2022   T: 2022   MT: richard    Name:     CHRISTA HOGUE  MRN:      [id number]        Account:      [id number]   :      1955           Service Date: 2022       Document: [id number]

## 2022-08-16 NOTE — NURSING NOTE
Patient has been having dizzy spells. Thinks jardiance may be causing problems but blood sugars are going down since starting meds.

## 2022-08-16 NOTE — PROGRESS NOTES
Huseyin is a 66 year old who is being evaluated via a billable telephone visit.      What phone number would you like to be contacted at? [phone]  How would you like to obtain your AVS? [place]  Phone call duration: 20 minutes    Presyncopal episodes multiple times, fallen a few times. Orthostatic hypotension, lisinopril reduced to 10 mg from 20 mg. On Jardiance (side effect?)  See dictated note.  MILES Conrad MD

## 2022-08-16 NOTE — LETTER
2022      RE: Huse[address]in [address]  [address]  [place] [address]         Service Date: 2022    Kendal Wong MD  [address], [facility] [address]  [place]  [address]    RE:  Huseyin St  MRN:  [id number]  :  1955    Dear Dr. Wong:    We spoke to Mr. St and his wife as part of a telephone followup in Cerebrovascular Clinic on 2022 for followup of left middle cerebral artery stenosis.  As you know, he is over 4 months out from balloon angioplasty of the left middle cerebral artery, distal M1 segment.  The flow limiting stenosis extended into both M2 divisions and we determined that a balloon angioplasty alone was superior to angioplasty and stent placement.  He tolerated the procedure well.  He was readmitted to [facility] on 2022 for a presyncopal episode.  This may have been attributable to orthostatic hypotension.  We spoke to our Vascular Neurology team regarding that hospitalization and they were confident that he did not have a recurrent left hemispheric ischemic event.  His evaluation included a CTA of the head and an MRI.  The MRI did not show any new strokes.  It shows the chronic infarcts in the left frontal and temporal lobes.  The CTA showed restenosis of the distal left M1 segment.  He was discharged from the hospital on dual antiplatelet therapy, aspirin and Plavix.  He had completed the 3-month course of dual antiplatelet therapy last month.    He was accompanied by his wife on this phone call and she supplied much of the history. His wife filled in that he has had multiple presyncopal episodes over the past few months and he has fallen a few times.  His lisinopril dose has been reduced to 10 mg from 20 mg.  She is also questioning whether his diabetes treatment is creating some of these symptoms, specifically the Jardiance.  His hemoglobin A1c has come down to 8.5 from 11.8.  Over the phone, his expressive dysphasia continued. His comprehension  seemed normal.  He had mild dysarthria, which is his baseline.      REVIEW OF STUDIES:  We went over the CTA from 2022 and compared it to the previous CTA from 2022.  This shows restenosis of the M1 segment on the left side.  This stenosis measures about 70%.  It does not appear to be as severe as the original stenosis prior to the balloon angioplasty.    IMPRESSION AND PLAN:  Although he has redeveloped restenosis, it does not appear that he is symptomatic from it currently.  After discussing with our Vascular Neurology team, we all agreed that continued medical management is reasonable.  If he develops any left hemispheric symptoms, the best treatment is repeating balloon angioplasty.  His main risk factor for restenosis is the diabetes.  His wife seems to have a very good understanding of the situation and we will repeat a CTA of the head in 3 months.  He will continue the Plavix for the rest of this month as instructed.  He will remain on a daily aspirin thereafter.  Please do not hesitate to contact us with questions.    Sincerely,    Lele Conrad MD        D: 2022   T: 2022   MT: richard    Name:     CHRISTA HOGUE  MRN:      [id number]        Account:      [id number]   :      1955           Service Date: 2022       Document: [id number]

## 2022-08-16 NOTE — LETTER
8/16/2022       RE: [address]  [address] [address]     Dear Colleague,    Thank you for referring your patient, Huseyin St, to the [facility] [place] at [facility]. Please see a copy of my visit note below.    Huseyin is a 66 year old who is being evaluated via a billable telephone visit.      What phone number would you like to be contacted at? [phone]  How would you like to obtain your AVS? MyChart  Phone call duration: 20 minutes    Presyncopal episodes multiple times, fallen a few times. Orthostatic hypotension, lisinopril reduced to 10 mg from 20 mg. On Jardiance (side effect?)  See dictated note.  MILES Conrad MD      Again, thank you for allowing me to participate in the care of your patient.      Sincerely,    Lele Conrad MD

## 2022-08-16 NOTE — NURSING NOTE
Chief Complaint   Patient presents with     Telephone     Return telephone visit 1 year follow up

## 2022-08-18 ENCOUNTER — HOSPITAL ENCOUNTER (OUTPATIENT)
Dept: SPEECH THERAPY | Facility: CLINIC | Age: 67
Discharge: HOME | End: 2022-08-18
Payer: COMMERCIAL

## 2022-08-18 ENCOUNTER — HOSPITAL ENCOUNTER (OUTPATIENT)
Dept: OCCUPATIONAL THERAPY | Facility: CLINIC | Age: 67
Discharge: HOME | End: 2022-08-18
Payer: COMMERCIAL

## 2022-08-18 DIAGNOSIS — R47.01 APHASIA: Primary | ICD-10-CM

## 2022-08-18 DIAGNOSIS — I63.9 CEREBROVASCULAR ACCIDENT (CVA), UNSPECIFIED MECHANISM (H): ICD-10-CM

## 2022-08-18 DIAGNOSIS — I63.9 CEREBROVASCULAR ACCIDENT (CVA), UNSPECIFIED MECHANISM (H): Primary | ICD-10-CM

## 2022-08-18 DIAGNOSIS — Z78.9 IMPAIRED INSTRUMENTAL ACTIVITIES OF DAILY LIVING (IADL): ICD-10-CM

## 2022-08-18 PROCEDURE — 92507 TX SP LANG VOICE COMM INDIV: CPT | Mod: GN | Performed by: STUDENT IN AN ORGANIZED HEALTH CARE EDUCATION/TRAINING PROGRAM

## 2022-08-18 PROCEDURE — 97535 SELF CARE MNGMENT TRAINING: CPT | Mod: GO | Performed by: OCCUPATIONAL THERAPIST

## 2022-08-18 PROCEDURE — 97537 COMMUNITY/WORK REINTEGRATION: CPT | Mod: GO | Performed by: OCCUPATIONAL THERAPIST

## 2022-08-25 ENCOUNTER — HOSPITAL ENCOUNTER (OUTPATIENT)
Dept: OCCUPATIONAL THERAPY | Facility: CLINIC | Age: 67
Discharge: HOME | End: 2022-08-25
Payer: COMMERCIAL

## 2022-08-25 ENCOUNTER — HOSPITAL ENCOUNTER (OUTPATIENT)
Dept: SPEECH THERAPY | Facility: CLINIC | Age: 67
Discharge: HOME | End: 2022-08-25

## 2022-08-25 DIAGNOSIS — I63.9 CEREBROVASCULAR ACCIDENT (CVA), UNSPECIFIED MECHANISM (H): Primary | ICD-10-CM

## 2022-08-25 DIAGNOSIS — Z78.9 IMPAIRED INSTRUMENTAL ACTIVITIES OF DAILY LIVING (IADL): ICD-10-CM

## 2022-08-25 DIAGNOSIS — I63.9 CEREBROVASCULAR ACCIDENT (CVA), UNSPECIFIED MECHANISM (H): ICD-10-CM

## 2022-08-25 DIAGNOSIS — R47.01 APHASIA: Primary | ICD-10-CM

## 2022-08-25 PROCEDURE — 97535 SELF CARE MNGMENT TRAINING: CPT | Mod: GO | Performed by: OCCUPATIONAL THERAPIST

## 2022-08-25 PROCEDURE — 92507 TX SP LANG VOICE COMM INDIV: CPT | Mod: GN | Performed by: STUDENT IN AN ORGANIZED HEALTH CARE EDUCATION/TRAINING PROGRAM

## 2022-08-25 NOTE — PROGRESS NOTES
[facility]    Outpatient Speech Language Pathology Discharge Note  Patient: Huseyin St  : 1955    Beginning/End Dates of Reporting Period:  22 to 22    Referring Provider: Saritha Ann MD    Therapy Diagnosis: Mild expressive and receptive aphasia    Client Self Report: Pt attended session alone today, arriving 5 minutes late from OT.  He reports that HEP continues to go well, and he feels that he is communicating well in general.  While he still occasionally says the wrong word in conversation, he is better able to identify the incorrect word and say the word he was intending.     Objective Measurements:      HEP: VE completed with 100% accy independently.  RC completed with 96% accy independently, improving to 100% accy given min prompts from SLP in session.  WE: Sentence generation given a target ktbr=983% accy independently.  VE: Naming relationships-related vocabulary from definition=83% accy given no-min assist, improving to 91% accy given min-mod semantic cues and 100% accy given additional phonemic cues.  Naming objects from definitions=91% accy given no-min assist, improving to 100% accy given min-mod semantic/phonemic cues.  AC: Answering comprehension questions about complex level auditorally presented paragraphs=58% accy given no-min assist, improving to 83% accy given min-mod semantic/phonemic cues cues and 100% accy given one additional repetition of the paragraph.        Goals:  Goal Identifier Verbal Expression   Goal Description Patient will complete simple to moderate level verbal expression tasks, such as naming, defining, describing, and sentence level generation tasks with 80% accuracy and min cues for improved ability to express thoughts and ideas in conversation.   Target Date 10/19/22   Date Met  22   Progress (detail required for progress note): Good, goal  met.  Patient is able to complete moderate and complex verbal expression tasks with 61-91% accy given no-min assist depending on the task, and is able to improve accy to >80% given min cues.  Pt reports occasional word substitution in conversational speech, but he is more aware of errors.  Suspect verbal expression skills are close to baseline.     Goal Identifier Auditory Comprehension   Goal Description Patient will follow multi-step directions and listen to short paragraphs 2-3 sentences and answer questions with at least 80% accuracy and min cues for improved comprehension for verbal information needed for functional communication and vocational demands.   Target Date 10/19/22   Date Met  08/25/22   Progress (detail required for progress note):  Good, goal met.  Patient is able to follow multi-step directions in session with min support.  Patient is able to answer comprehension questions about complex level paragraphs with 58-67% accy given no-min assist when able to focus on the paragraph, improving to 80-83% accy given min-mod semantic/phonemic cues and 100% accy given an additional repetition of the paragraph.     Goal Identifier Written Expression   Goal Description Patient will complete simple level written expression tasks such as sentence and short paragraph writing with 80% accuracy and moderate cues for improved written expression needed for daily living and vocational needs.   Target Date 10/19/22   Date Met  08/25/22   Progress (detail required for progress note):  Good, goal met.  Patient is able to generate sentences given a target word with 100% accy for grammar, word usage, and spelling independently.     Goal Identifier Reading Comprehension   Goal Description Patient will complete reading comprehension at the sentence and simple paragraph level and for practical information to improve reading comprehension needed for daily living and vocational needs with at least 80% accuracy and moderate cues.    Target Date 10/19/22   Date Met  08/25/22   Progress (detail required for progress note):  Good, goal met.  Patient is able to answer comprehension questions about multi-paragraph length stories with 83-98% accy independently, improving to 100% accy given min assist from SLP.     Plan:  Discharge from therapy.  Patient has demonstrated good progress throughout the course of therapy, and is currently meeting all of his language goals.  Patient is aware of the occasional times when word finding errors occur in conversation, and is able to self-correct.  Suspect his expressive and receptive language skills are close to baseline.    Discharge:    Reason for Discharge: Patient has met all goals.      Discharge Plan: Patient to continue home program.      Allison Alpers Ackmann, B.A. (demetrius) MMARY, CCC-SLP  Speech-Language Pathologist  [facility] Certificate of Vocology  [facility]  [phone]

## 2022-08-26 NOTE — PROGRESS NOTES
Service Date: 2022    Kendal Wong MD  [address]  [place]  [address]    RE:  Huseyin St  MRN:  [id number]  :  1955    Dear Dr. Wong:    We spoke to Mr. St and his wife as part of a telephone followup in Cerebrovascular Clinic on 2022 for followup of left middle cerebral artery stenosis.  As you know, he is over 4 months out from balloon angioplasty of the left middle cerebral artery, distal M1 segment.  The flow limiting stenosis extended into both M2 divisions and we determined that a balloon angioplasty alone was superior to angioplasty and stent placement.  He tolerated the procedure well.  He was readmitted to [facility] on 2022 for a presyncopal episode.  This may have been attributable to orthostatic hypotension.  We spoke to our Vascular Neurology team regarding that hospitalization and they were confident that he did not have a recurrent left hemispheric ischemic event.  His evaluation included a CTA of the head and an MRI.  The MRI did not show any new strokes.  It shows the chronic infarcts in the left frontal and temporal lobes.  The CTA showed restenosis of the distal left M1 segment.  He was discharged from the hospital on dual antiplatelet therapy, aspirin and Plavix.  He had completed the 3-month course of dual antiplatelet therapy last month.    He was accompanied by his wife on this phone call and she supplied much of the history. His wife filled in that he has had multiple presyncopal episodes over the past few months and he has fallen a few times.  His lisinopril dose has been reduced to 10 mg from 20 mg.  She is also questioning whether his diabetes treatment is creating some of these symptoms, specifically the Jardiance.  His hemoglobin A1c has come down to 8.5 from 11.8.  Over the phone, his expressive dysphasia continued. His comprehension seemed normal.  He had mild dysarthria, which is his baseline.      REVIEW OF STUDIES:   We went over the CTA from 2022 and compared it to the previous CTA from 2022.  This shows restenosis of the M1 segment on the left side.  This stenosis measures about 70%.  It does not appear to be as severe as the original stenosis prior to the balloon angioplasty.    IMPRESSION AND PLAN:  Although he has redeveloped restenosis, it does not appear that he is symptomatic from it currently.  After discussing with our Vascular Neurology team, we all agreed that continued medical management is reasonable.  If he develops any left hemispheric symptoms, the best treatment is repeating balloon angioplasty.  His main risk factor for restenosis is the diabetes.  His wife seems to have a very good understanding of the situation and we will repeat a CTA of the head in 3 months.  He will continue the Plavix for the rest of this month as instructed.  He will remain on a daily aspirin thereafter.  Please do not hesitate to contact us with questions.    Sincerely,    Lele Conrad MD        D: 2022   T: 2022   MT: richard    Name:     CHRISTA HOGUE  MRN:      [id number]        Account:      [id number]   :      1955           Service Date: 2022       Document: [id number]

## 2022-08-26 NOTE — PATIENT INSTRUCTIONS
Continue Plavix for this month as instructed by the neurology team    Remain on aspirin    Repeat CTA head in October 2022 at [facility] (ordered)    Report to Emergency Department for any stroke symptoms    Follow up by phone after CTA    If you have any questions please contact me at [phone], option 3.    Emily Torres RN, CNRN, SCRN  Stroke & Endovascular Care Coordinator    Thank you for choosing [facility] for your health care needs.

## 2022-08-30 ENCOUNTER — TELEPHONE (OUTPATIENT)
Dept: ENDOCRINOLOGY | Facility: CLINIC | Age: 67
End: 2022-08-30

## 2022-09-01 ENCOUNTER — HOSPITAL ENCOUNTER (OUTPATIENT)
Dept: OCCUPATIONAL THERAPY | Facility: CLINIC | Age: 67
Discharge: HOME | End: 2022-09-01
Payer: COMMERCIAL

## 2022-09-01 ENCOUNTER — TELEPHONE (OUTPATIENT)
Dept: NEUROSURGERY | Facility: CLINIC | Age: 67
End: 2022-09-01

## 2022-09-01 DIAGNOSIS — Z78.9 IMPAIRED INSTRUMENTAL ACTIVITIES OF DAILY LIVING (IADL): ICD-10-CM

## 2022-09-01 DIAGNOSIS — I63.9 CEREBROVASCULAR ACCIDENT (CVA), UNSPECIFIED MECHANISM (H): Primary | ICD-10-CM

## 2022-09-01 PROCEDURE — 97535 SELF CARE MNGMENT TRAINING: CPT | Mod: GO | Performed by: OCCUPATIONAL THERAPIST

## 2022-09-19 ENCOUNTER — HOSPITAL ENCOUNTER (OUTPATIENT)
Dept: OCCUPATIONAL THERAPY | Facility: CLINIC | Age: 67
Discharge: HOME | End: 2022-09-19
Payer: COMMERCIAL

## 2022-09-19 DIAGNOSIS — I63.9 CEREBROVASCULAR ACCIDENT (CVA), UNSPECIFIED MECHANISM (H): Primary | ICD-10-CM

## 2022-09-19 DIAGNOSIS — Z78.9 IMPAIRED INSTRUMENTAL ACTIVITIES OF DAILY LIVING (IADL): ICD-10-CM

## 2022-09-19 DIAGNOSIS — R29.898 DECREASED STRENGTH OF UPPER EXTREMITY: ICD-10-CM

## 2022-09-19 PROCEDURE — 97535 SELF CARE MNGMENT TRAINING: CPT | Mod: GO | Performed by: OCCUPATIONAL THERAPIST

## 2022-10-22 ENCOUNTER — HEALTH MAINTENANCE LETTER (OUTPATIENT)
Age: 67
End: 2022-10-22

## 2022-10-31 ENCOUNTER — ANCILLARY PROCEDURE (OUTPATIENT)
Dept: CT IMAGING | Facility: CLINIC | Age: 67
End: 2022-10-31
Attending: NEUROLOGICAL SURGERY
Payer: COMMERCIAL

## 2022-10-31 DIAGNOSIS — I66.02 MIDDLE CEREBRAL ARTERY STENOSIS, LEFT: ICD-10-CM

## 2022-10-31 LAB
CREAT BLD-MCNC: 1 MG/DL (ref 0.7–1.3)
GFR SERPL CREATININE-BSD FRML MDRD: >60 ML/MIN/1.73M2

## 2022-10-31 PROCEDURE — 82565 ASSAY OF CREATININE: CPT

## 2022-10-31 PROCEDURE — 255N000002 HC RX 255 OP 636: Performed by: NEUROLOGICAL SURGERY

## 2022-10-31 PROCEDURE — G1010 CDSM STANSON: HCPCS

## 2022-10-31 RX ADMIN — IOHEXOL 75 ML: 350 INJECTION, SOLUTION INTRAVENOUS at 10:20

## 2022-11-08 ENCOUNTER — VIRTUAL VISIT (OUTPATIENT)
Dept: NEUROSURGERY | Facility: CLINIC | Age: 67
End: 2022-11-08
Payer: COMMERCIAL

## 2022-11-08 ENCOUNTER — TELEPHONE (OUTPATIENT)
Dept: NEUROSURGERY | Facility: CLINIC | Age: 67
End: 2022-11-08

## 2022-11-08 DIAGNOSIS — I66.02 MIDDLE CEREBRAL ARTERY STENOSIS, LEFT: Primary | ICD-10-CM

## 2022-11-08 PROCEDURE — 99214 OFFICE O/P EST MOD 30 MIN: CPT | Mod: 95 | Performed by: NEUROLOGICAL SURGERY

## 2022-11-08 NOTE — TELEPHONE ENCOUNTER
Left Voicemail (1st Attempt) for the patient to call back and schedule the following:    Appointment type: telephone    Provider: kiara  Return date: 5/9/22  Specialty phone number: [phone]  Additional appointment(s) needed: na  Additonal Notes: na

## 2022-11-08 NOTE — PROGRESS NOTES
Service Date: 2022    Kendal Wong MD   [facility]  [address], [address] [place] [address]    RE:      H[address] St  MRN:  [id number]  :   1955    Dear Dr. Wong:    We spoke to Mr. St as part of a telephone followup in Cerebrovascular Clinic today.  He was accompanied by his wife, who provided many details.  As you know, we are following him for symptomatic left middle cerebral artery (MCA) stenosis.  We treated this stenosis with balloon angioplasty earlier this year, but he has developed restenosis.  He has been asymptomatic since the procedure despite the restenosis.    He is more active and is driving and hunting.  However, he remains fatigued and sleeps for long durations.  His dysphasia is mildly improved, but he still has word-finding and word substitution errors.  He remains on aspirin and Plavix.    Over the phone, the most notable feature was that he had some speech hesitancy.  He also had some circumlocutory speech and did not answer some questions directly.  His phonation was normal.    REVIEW OF STUDIES:  We went over the CTA of the head from 10/31/2022 and compared it to the prior CTA from 2022.  The focal severe stenosis at the distal left M1 segment and extending into the MCA bifurcation is unchanged.  The distal MCA opacification is symmetrical.    IMPRESSION AND PLAN:  Given his recent clinical stability, we will continue medical management.  We will keep him on dual antiplatelet therapy for 3 additional months, and then we will discontinue the aspirin and keep him on Plavix indefinitely.  He has an upcoming appointment with Dr. Odom for his diabetes, which is one of his major risk factors.  His wife is very familiar with potential symptoms and signs of stroke and will notify us if she has any questions.  We will repeat a CTA of the head in 6 months, and this can be done at [facility] for his convenience.  Please do not hesitate  to contact us with questions.    Sincerely,    Lele Conrad MD        D: 2022   T: 2022   MT: nirmal    Name:     MYKEVIRYCHRISTA  MRN:      [id number]        Account:      [id number]   :      1955           Service Date: 2022       Document: [id number]

## 2022-11-08 NOTE — LETTER
2022       RE: [address]  [address] [place] [address]     Dear Colleague,    Thank you for referring your patient, Huseyin St, to the [facility] NEUROSURGERY CLINIC [place] at [facility]. Please see a copy of my visit note below.    Service Date: 2022    Kendal Wong MD   [facility]  [address], [address] [place] [address]    RE:      [address]  MRN:  [id number]  :   1955    Dear Dr. Wong:    We spoke to Mr. St as part of a telephone followup in Cerebrovascular Clinic today.  He was accompanied by his wife, who provided many details.  As you know, we are following him for symptomatic left middle cerebral artery (MCA) stenosis.  We treated this stenosis with balloon angioplasty earlier this year, but he has developed restenosis.  He has been asymptomatic since the procedure despite the restenosis.    He is more active and is driving and hunting.  However, he remains fatigued and sleeps for long durations.  His dysphasia is mildly improved, but he still has word-finding and word substitution errors.  He remains on aspirin and Plavix.    Over the phone, the most notable feature was that he had some speech hesitancy.  He also had some circumlocutory speech and did not answer some questions directly.  His phonation was normal.    REVIEW OF STUDIES:  We went over the CTA of the head from 10/31/2022 and compared it to the prior CTA from 2022.  The focal severe stenosis at the distal left M1 segment and extending into the MCA bifurcation is unchanged.  The distal MCA opacification is symmetrical.    IMPRESSION AND PLAN:  Given his recent clinical stability, we will continue medical management.  We will keep him on dual antiplatelet therapy for 3 additional months, and then we will discontinue the aspirin and keep him on Plavix indefinitely.  He has an upcoming appointment with   Ilene for his diabetes, which is one of his major risk factors.  His wife is very familiar with potential symptoms and signs of stroke and will notify us if she has any questions.  We will repeat a CTA of the head in 6 months, and this can be done at [facility] for his convenience.  Please do not hesitate to contact us with questions.    Sincerely,    Lele Conrad MD        D: 2022   T: 2022   MT: nirmal    Name:     LENNIE CHRISTA RUSSELLJavi  MRN:      [id number]        Account:      [id number]   :      1955           Service Date: 2022       Document: [id number]    Driving. Hunting. Still fatigued and sleeps very much. Paraphasias. On aspirin and Plavix. See dictated note.  Visit 15 minutes.  IMLES Conrad MD

## 2022-11-08 NOTE — PATIENT INSTRUCTIONS
Repeat CTA head in 6 months at [facility] (ordered)    Stay on Aspirin and Plavix for 3 additional months and then stop aspirin. Stay on Plavix indefinitely. Our team will call you to remind you.    Follow up in 6 months after CTA    Stroke & Endovascular RN Care Coordinators:     Huseyin Morelos, VIRGINIA Torres, RN, CNRN, SCRN     If you have any questions please contact the RN Care Coordinators at [phone], option 3.     After business hours call the  at [phone] and have the Neuro-Interventional Fellow paged.     Thank you for choosing [facility] for your health care needs.

## 2022-11-08 NOTE — LETTER
November 8, 2022       TO: [address]  [address] [address]       DearMr.Maciel,    We are writing to inform you of your test results.    {ump results letter list:[id number]}    No results found from the In Basket message.    ***

## 2022-11-08 NOTE — LETTER
2022       RE: [address]  [address] [place] [address]     Dear Colleague,    Thank you for referring your patient, Huseyin St, to the [facility] NEUROSURGERY CLINIC [place] at [facility]. Please see a copy of my visit note below.    Service Date: 2022    Kendal Wong MD   [facility]  [address], [address] [place] [address]    RE:      [address]  MRN:  [id number]  :   1955    Dear Dr. Wong:    We spoke to Mr. St as part of a telephone followup in Cerebrovascular Clinic today.  He was accompanied by his wife, who provided many details.  As you know, we are following him for symptomatic left middle cerebral artery (MCA) stenosis.  We treated this stenosis with balloon angioplasty earlier this year, but he has developed restenosis.  He has been asymptomatic since the procedure despite the restenosis.    He is more active and is driving and hunting.  However, he remains fatigued and sleeps for long durations.  His dysphasia is mildly improved, but he still has word-finding and word substitution errors.  He remains on aspirin and Plavix.    Over the phone, the most notable feature was that he had some speech hesitancy.  He also had some circumlocutory speech and did not answer some questions directly.  His phonation was normal.    REVIEW OF STUDIES:  We went over the CTA of the head from 10/31/2022 and compared it to the prior CTA from 2022.  The focal severe stenosis at the distal left M1 segment and extending into the MCA bifurcation is unchanged.  The distal MCA opacification is symmetrical.    IMPRESSION AND PLAN:  Given his recent clinical stability, we will continue medical management.  We will keep him on dual antiplatelet therapy for 3 additional months, and then we will discontinue the aspirin and keep him on Plavix indefinitely.  He has an upcoming appointment with   Ilene for his diabetes, which is one of his major risk factors.  His wife is very familiar with potential symptoms and signs of stroke and will notify us if she has any questions.  We will repeat a CTA of the head in 6 months, and this can be done at [facility] for his convenience.  Please do not hesitate to contact us with questions.    Sincerely,    Lele Conrad MD        D: 2022   T: 2022   MT: nirmal    Name:     LENNIE CHRISTA RUSSELLJavi  MRN:      [id number]        Account:      [id number]   :      1955           Service Date: 2022       Document: [id number]    Driving. Hunting. Still fatigued and sleeps very much. Paraphasias. On aspirin and Plavix. See dictated note.  Visit 15 minutes.  MILES Conrad MD

## 2022-11-08 NOTE — PROGRESS NOTES
Driving. Hunting. Still fatigued and sleeps very much. Paraphasias. On aspirin and Plavix. See dictated note.  Visit 15 minutes.  MILES Conrad MD

## 2022-11-10 DIAGNOSIS — E11.21 TYPE 2 DIABETES MELLITUS WITH DIABETIC NEPHROPATHY, WITH LONG-TERM CURRENT USE OF INSULIN (H): ICD-10-CM

## 2022-11-10 DIAGNOSIS — Z79.4 TYPE 2 DIABETES MELLITUS WITH DIABETIC NEPHROPATHY, WITH LONG-TERM CURRENT USE OF INSULIN (H): ICD-10-CM

## 2022-11-11 NOTE — TELEPHONE ENCOUNTER
Medication is being filled for 1 time refill only due to:  Patient needs appointment for more refills.    Future Appointments 11/11/2022 - 5/10/2023      Date Visit Type Length Department Provider     12/20/2022  2:30 PM RETURN ENDOCRINE 30 min CS ENDOCRINOLOGY Ivonne Odom MD    Location Instructions:     Your appointment is at [facility] located at [address] [place] [address] [address], [place] [address]. Please check in at the  in [address].

## 2022-11-29 ENCOUNTER — MYC MEDICAL ADVICE (OUTPATIENT)
Dept: ENDOCRINOLOGY | Facility: CLINIC | Age: 67
End: 2022-11-29

## 2022-11-29 DIAGNOSIS — E11.21 TYPE 2 DIABETES MELLITUS WITH DIABETIC NEPHROPATHY, WITH LONG-TERM CURRENT USE OF INSULIN (H): ICD-10-CM

## 2022-11-29 DIAGNOSIS — I63.9 CEREBROVASCULAR ACCIDENT (CVA), UNSPECIFIED MECHANISM (H): ICD-10-CM

## 2022-11-29 DIAGNOSIS — Z79.4 TYPE 2 DIABETES MELLITUS WITH DIABETIC NEPHROPATHY, WITH LONG-TERM CURRENT USE OF INSULIN (H): ICD-10-CM

## 2022-11-29 NOTE — TELEPHONE ENCOUNTER
Last Written Prescription Date:  22  Last Fill Quantity: 2/5 and 1/0  Last office visit: 22 with Dr. Odom  Future Office Visit:  22        Requested Prescriptions   Pending Prescriptions Disp Refills     Continuous Blood Gluc  (FREESTYLE NANO 2 READER) LEANN 1 each 0     Si Device continuous prn (replace annually if needed) Use to read blood glucose levels per 's instructions.       There is no refill protocol information for this order        Continuous Blood Gluc Sensor (FREESTYLE NANO 2 SENSOR) MISC 2 each 5     Si Device continuous prn (Change every 14 days) For use with Freestyle Nano 2  for continuous monitoring of blood glucose levels.  Change sensor every 14 days.       There is no refill protocol information for this order        Refills sent  Rolanda Ochoa RN

## 2022-12-20 ENCOUNTER — VIRTUAL VISIT (OUTPATIENT)
Dept: ENDOCRINOLOGY | Facility: CLINIC | Age: 67
End: 2022-12-20
Payer: COMMERCIAL

## 2022-12-20 DIAGNOSIS — Z79.4 TYPE 2 DIABETES MELLITUS WITH DIABETIC NEPHROPATHY, WITH LONG-TERM CURRENT USE OF INSULIN (H): Primary | ICD-10-CM

## 2022-12-20 DIAGNOSIS — E11.21 TYPE 2 DIABETES MELLITUS WITH DIABETIC NEPHROPATHY, WITH LONG-TERM CURRENT USE OF INSULIN (H): Primary | ICD-10-CM

## 2022-12-20 PROCEDURE — 99214 OFFICE O/P EST MOD 30 MIN: CPT | Mod: 95 | Performed by: INTERNAL MEDICINE

## 2022-12-20 RX ORDER — BLOOD-GLUCOSE SENSOR
1 EACH MISCELLANEOUS
Qty: 9 EACH | Refills: 3 | Status: SHIPPED | OUTPATIENT
Start: 2022-12-20 | End: 2024-01-09

## 2022-12-20 NOTE — PATIENT INSTRUCTIONS
Blood sugar  Large  Small  <80   0     20  10  120-140  21  11  141-160  22  12  161-180  23  13  181-200  24  14  201-220  25  15  221-240  26  16  241-260  27  17  261-280  28  18  281-300  29  19  301-320  30  20  >320   31  21

## 2022-12-20 NOTE — LETTER
"    12/20/2022         RE: [address]  [address] [place] [address]        Dear Colleague,    Thank you for referring your patient, Huseyin St, to the [facility] SPECIALTY CLINIC [place]. Please see a copy of my visit note below.      Video-Visit Details    Type of service:  Video Visit  Video Start Time: 2:32  Video End Time: 2:59  Originating Location (pt. 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 Last seen 5/31. He is accompanied by Estefania (wife).    Chief Complaint   Patient presents with     Diabetes     Type 2 diabetes mellitus with hyperosmolarity without coma, without long-term current use of insulin       INTERVAL HISTORY:  - Since august, feels \"general\" with everything, takes many naps,   - Lauren have fallen off a few times and has had them replaced by lauren, but not checking at that time. Would still give himself insulin without knowing what his blood sugar is.   - Rare lows, none symptomatic  - Stopped Ozempic in June given diarrhea  - less diligent with BG, some more frequent highs.    Subjective:    1) Diabetes Mellitus    Diabetes History:  Diagnosis: 10-20 years ago  Hospitalizations: April for stroke, never for diabetes  Previous Regimens: metformin diarrhea, Ozempic diarrhea, victoza diarrhea  Current Regimen: Jardiance 10mg,  Novolog 15u with larger meals, 7u with small meals, Semglee 42u at bedtime    BG check- LAUREN 2  [address]  Trends-   12/20- 1p- 269,   12/9- 10a- 167[address]  12/8 9p- 306, 2p 148, 10a- 273  12/7- 9p 301, 2p HI, 1045- 175  12/6- 731p- 150, 722p- 136,   11/23- 221p- 223      Complications: neurovascular    Last eye exam: last one year ago, no NPRD at that time (per report)  Foot Exam: " self checks, wife checks every few days  ACEI/ARB: lisinopril  Statin/ASA: crestor, ASA, plavix      BP Readings from Last 3 Encounters:   08/01/22 123/75   04/18/22 134/63   04/08/22 (!) 154/94       Lab Results   Component Value Date    A1C 11.8 03/16/2022    A1C 11.8 10/11/2021    A1C 13.2 09/28/2020    A1C 12.3 09/23/2019    A1C 10.4 03/12/2018       Recent Labs   Lab Test 04/06/22  0725 03/16/22  1704 04/21/16  0906 08/20/15  0830 01/14/15  0847   CHOL 109 224*   < > 136 316*   HDL 34* 33*   < > 42 41   LDL 53 136*   < > 59 219*   TRIG 111 275*   < > 176* 282*   CHOLHDLRATIO  --   --   --  3.2 7.7*    < > = values in this interval not displayed.       Lab Results   Component Value Date    MICROL 265 10/11/2021    MICROL 715 09/28/2020     No results found for: MICROALBUMIN      Wt Readings from Last 3 Encounters:   08/01/22 100.2 kg (221 lb)   04/08/22 103.7 kg (228 lb 9.6 oz)   04/05/22 105.7 kg (233 lb)             Active diagnoses this visit:  Type 2 diabetes mellitus with diabetic nephropathy, with long-term current use of insulin (H)     ROS: 10 point ROS neg other than the symptoms noted above in the HPI.      Medical, surgical, social, and family histories, medications and allergies reviewed and updated.    Objective:  Physical Exam (visual exam)  VS:  no vital signs taken for video visit  CONSTITUTIONAL: healthy, alert and NAD, responding appropriately  ENT: normocephalic, no visual evidence of trauma, normal nose and oral mucosa  EYES: conjunctivae and sclerae normal, no exophthalmos or proptosis  THYROID:  no visualized nodules or goiter  LUNGS: no audible wheeze, cough or visible cyanosis, no visible retractions or increased work of breathing  EXTREMITIES: no hand tremors  NEUROLOGY: cranial nerves grossly intact with no obvious deficit.  SKIN:  no visualized skin lesions or rash, no edema visualized  PSYCH: mentation appears normal, normal judgement      Lab Results   Component Value Date/Time     TSH 1.94 12/05/2016 10:58 AM       Last Comprehensive Metabolic Panel:  Sodium   Date Value Ref Range Status   08/01/2022 134 133 - 144 mmol/L Final   03/12/2018 135 133 - 144 mmol/L Final     Potassium   Date Value Ref Range Status   08/01/2022 4.4 3.4 - 5.3 mmol/L Final   03/12/2018 4.3 3.4 - 5.3 mmol/L Final     Chloride   Date Value Ref Range Status   08/01/2022 105 94 - 109 mmol/L Final   03/12/2018 102 94 - 109 mmol/L Final     Carbon Dioxide   Date Value Ref Range Status   03/12/2018 26 20 - 32 mmol/L Final     Carbon Dioxide (CO2)   Date Value Ref Range Status   08/01/2022 23 20 - 32 mmol/L Final     Anion Gap   Date Value Ref Range Status   08/01/2022 6 3 - 14 mmol/L Final   03/12/2018 7 3 - 14 mmol/L Final     Glucose   Date Value Ref Range Status   08/01/2022 196 (H) 70 - 99 mg/dL Final   03/12/2018 220 (H) 70 - 99 mg/dL Final     GLUCOSE BY METER POCT   Date Value Ref Range Status   08/01/2022 233 (H) 70 - 99 mg/dL Final     Urea Nitrogen   Date Value Ref Range Status   08/01/2022 21 7 - 30 mg/dL Final   03/12/2018 13 7 - 30 mg/dL Final     Creatinine   Date Value Ref Range Status   08/01/2022 1.16 0.66 - 1.25 mg/dL Final   09/28/2020 1.03 0.66 - 1.25 mg/dL Final     Creatinine POCT   Date Value Ref Range Status   10/31/2022 1.0 0.7 - 1.3 mg/dL Final     GFR Estimate   Date Value Ref Range Status   09/28/2020 75 >60 mL/min/[1.73_m2] Final     Comment:     Non  GFR Calc  Starting 12/18/2018, serum creatinine based estimated GFR (eGFR) will be   calculated using the Chronic Kidney Disease Epidemiology Collaboration   (CKD-EPI) equation.       GFR, ESTIMATED POCT   Date Value Ref Range Status   10/31/2022 >60 >60 mL/min/1.73m2 Final     Calcium   Date Value Ref Range Status   08/01/2022 9.5 8.5 - 10.1 mg/dL Final   03/12/2018 9.6 8.5 - 10.1 mg/dL Final     Bilirubin Total   Date Value Ref Range Status   03/24/2022 0.2 0.2 - 1.3 mg/dL Final   12/05/2016 0.4 0.2 - 1.3 mg/dL Final     Alkaline  Phosphatase   Date Value Ref Range Status   03/24/2022 99 40 - 150 U/L Final   12/05/2016 81 40 - 150 U/L Final     ALT   Date Value Ref Range Status   03/24/2022 17 0 - 70 U/L Final   09/29/2017 36 0 - 70 U/L Final     AST   Date Value Ref Range Status   03/24/2022 10 0 - 45 U/L Final   12/05/2016 15 0 - 45 U/L Final       ASSESSMENT / PLAN:  No diagnosis found.    Recent A1c 8.5% July 1.  Glucose Control:  est A1C now would be 9-10, need to improve efforts--               - Jardiance 10mg daily --> increase to 25mg daily   - Semglee- 46u at bedtime- continue now with AM fasting ~130-150   - Increase mealtime insulin Novolog 20u with meals, 10u with smaller meals (taking higher dose unless has virtually no carbs)    - Start sliding scale 1:20 >120. No doses if not eating  2.  Lipids:  Meets criteria per ASCVD risk %, continue crestor 40mg daily  3.  Blood Pressure:  At goal, continue meds  4.  CV prevention:  Continue aspirin 325 mg daily, plavix  5.  Diabetic Nephropathy screening:   continue ACEI/ARB , udpate at next visit  6.  Diabetic Retinopathy screening:  continue annual dilated eye exams due now, will call to schedule  7.  Diabetic Neuropathy screening:  Up to date.  Instructed on routine foot care, follow-up with podiatry as needed.       Orders Placed This Encounter   Procedures     Hemoglobin A1c     Albumin Random Urine Quantitative with Creat Ratio     Basic metabolic panel  (Ca, Cl, CO2, Creat, Gluc, K, Na, BUN)     Lipid panel reflex to direct LDL Fasting        Return to clinic 3 months      A total of 34 minutes were spent today 12/20/22 on this visit including chart review, history and counseling, documentation and other activities as detailed above.     Answers for HPI/ROS submitted by the patient on 12/19/2022  General Symptoms: No  Skin Symptoms: No  HENT Symptoms: No  EYE SYMPTOMS: No  HEART SYMPTOMS: No  LUNG SYMPTOMS: No  INTESTINAL SYMPTOMS: No  URINARY SYMPTOMS: No  REPRODUCTIVE SYMPTOMS:  No  SKELETAL SYMPTOMS: No  BLOOD SYMPTOMS: No  NERVOUS SYSTEM SYMPTOMS: No  MENTAL HEALTH SYMPTOMS: No          Again, thank you for allowing me to participate in the care of your patient.        Sincerely,        Ivonne Odom MD

## 2022-12-20 NOTE — PROGRESS NOTES
"  Video-Visit Details    Type of service:  Video Visit  Video Start Time: 2:32  Video End Time: 2:59  Originating Location (pt. Location): [place], [place]  Distant Location (provider location):  Home  Platform used for Video Visit: Nemo Odom MD      Huseyin RUSSE[address] St is a 66 year old yo male who NA, obesity, HLD, DM2 complicated by nephropathy, neuropathy and recently CVA s/p stenting here for follow-up via a billable video visit. Last seen 5/31. He is accompanied by Estefania (wife).    Chief Complaint   Patient presents with     Diabetes     Type 2 diabetes mellitus with hyperosmolarity without coma, without long-term current use of insulin       INTERVAL HISTORY:  - Since august, feels \"general\" with everything, takes many naps,   - Myles have fallen off a few times and has had them replaced by myles, but not checking at that time. Would still give himself insulin without knowing what his blood sugar is.   - Rare lows, none symptomatic  - Stopped Ozempic in June given diarrhea  - less diligent with BG, some more frequent highs.    Subjective:    1) Diabetes Mellitus    Diabetes History:  Diagnosis: 10-20 years ago  Hospitalizations: April for stroke, never for diabetes  Previous Regimens: metformin diarrhea, Ozempic diarrhea, victoza diarrhea  Current Regimen: Jardiance 10mg,  Novolog 15u with larger meals, 7u with small meals, Semglee 42u at bedtime    BG check- MYLES 2  [address]- 176  Trends-   12/20- 1p- 269,   12/9- 10a- [address]  12/8 9p- 306, 2p 148, 10a- 273  12/7- 9p 301, 2p HI, 1045- 175  12/6- 731p- 150, 722p- 136,   11/23- 221p- 223      Complications: neurovascular    Last eye exam: last one year ago, no NPRD at that time (per report)  Foot Exam: self checks, wife checks every few days  ACEI/ARB: lisinopril  Statin/ASA: crestor, ASA, plavix      BP Readings from Last 3 Encounters:   08/01/22 123/75   04/18/22 134/63   04/08/22 (!) 154/94       Lab Results   Component Value Date    A1C 11.8 03/16/2022 "    A1C 11.8 10/11/2021    A1C 13.2 09/28/2020    A1C 12.3 09/23/2019    A1C 10.4 03/12/2018       Recent Labs   Lab Test 04/06/22  0725 03/16/22  1704 04/21/16  0906 08/20/15  0830 01/14/15  0847   CHOL 109 224*   < > 136 316*   HDL 34* 33*   < > 42 41   LDL 53 136*   < > 59 219*   TRIG 111 275*   < > 176* 282*   CHOLHDLRATIO  --   --   --  3.2 7.7*    < > = values in this interval not displayed.       Lab Results   Component Value Date    MICROL 265 10/11/2021    MICROL 715 09/28/2020     No results found for: MICROALBUMIN      Wt Readings from Last 3 Encounters:   08/01/22 100.2 kg (221 lb)   04/08/22 103.7 kg (228 lb 9.6 oz)   04/05/22 105.7 kg (233 lb)             Active diagnoses this visit:  Type 2 diabetes mellitus with diabetic nephropathy, with long-term current use of insulin (H)     ROS: 10 point ROS neg other than the symptoms noted above in the HPI.      Medical, surgical, social, and family histories, medications and allergies reviewed and updated.    Objective:  Physical Exam (visual exam)  VS:  no vital signs taken for video visit  CONSTITUTIONAL: healthy, alert and NAD, responding appropriately  ENT: normocephalic, no visual evidence of trauma, normal nose and oral mucosa  EYES: conjunctivae and sclerae normal, no exophthalmos or proptosis  THYROID:  no visualized nodules or goiter  LUNGS: no audible wheeze, cough or visible cyanosis, no visible retractions or increased work of breathing  EXTREMITIES: no hand tremors  NEUROLOGY: cranial nerves grossly intact with no obvious deficit.  SKIN:  no visualized skin lesions or rash, no edema visualized  PSYCH: mentation appears normal, normal judgement      Lab Results   Component Value Date/Time    TSH 1.94 12/05/2016 10:58 AM       Last Comprehensive Metabolic Panel:  Sodium   Date Value Ref Range Status   08/01/2022 134 133 - 144 mmol/L Final   03/12/2018 135 133 - 144 mmol/L Final     Potassium   Date Value Ref Range Status   08/01/2022 4.4 3.4 - 5.3  mmol/L Final   03/12/2018 4.3 3.4 - 5.3 mmol/L Final     Chloride   Date Value Ref Range Status   08/01/2022 105 94 - 109 mmol/L Final   03/12/2018 102 94 - 109 mmol/L Final     Carbon Dioxide   Date Value Ref Range Status   03/12/2018 26 20 - 32 mmol/L Final     Carbon Dioxide (CO2)   Date Value Ref Range Status   08/01/2022 23 20 - 32 mmol/L Final     Anion Gap   Date Value Ref Range Status   08/01/2022 6 3 - 14 mmol/L Final   03/12/2018 7 3 - 14 mmol/L Final     Glucose   Date Value Ref Range Status   08/01/2022 196 (H) 70 - 99 mg/dL Final   03/12/2018 220 (H) 70 - 99 mg/dL Final     GLUCOSE BY METER POCT   Date Value Ref Range Status   08/01/2022 233 (H) 70 - 99 mg/dL Final     Urea Nitrogen   Date Value Ref Range Status   08/01/2022 21 7 - 30 mg/dL Final   03/12/2018 13 7 - 30 mg/dL Final     Creatinine   Date Value Ref Range Status   08/01/2022 1.16 0.66 - 1.25 mg/dL Final   09/28/2020 1.03 0.66 - 1.25 mg/dL Final     Creatinine POCT   Date Value Ref Range Status   10/31/2022 1.0 0.7 - 1.3 mg/dL Final     GFR Estimate   Date Value Ref Range Status   09/28/2020 75 >60 mL/min/[1.73_m2] Final     Comment:     Non  GFR Calc  Starting 12/18/2018, serum creatinine based estimated GFR (eGFR) will be   calculated using the Chronic Kidney Disease Epidemiology Collaboration   (CKD-EPI) equation.       GFR, ESTIMATED POCT   Date Value Ref Range Status   10/31/2022 >60 >60 mL/min/1.73m2 Final     Calcium   Date Value Ref Range Status   08/01/2022 9.5 8.5 - 10.1 mg/dL Final   03/12/2018 9.6 8.5 - 10.1 mg/dL Final     Bilirubin Total   Date Value Ref Range Status   03/24/2022 0.2 0.2 - 1.3 mg/dL Final   12/05/2016 0.4 0.2 - 1.3 mg/dL Final     Alkaline Phosphatase   Date Value Ref Range Status   03/24/2022 99 40 - 150 U/L Final   12/05/2016 81 40 - 150 U/L Final     ALT   Date Value Ref Range Status   03/24/2022 17 0 - 70 U/L Final   09/29/2017 36 0 - 70 U/L Final     AST   Date Value Ref Range Status    03/24/2022 10 0 - 45 U/L Final   12/05/2016 15 0 - 45 U/L Final       ASSESSMENT / PLAN:  No diagnosis found.    Recent A1c 8.5% July 1.  Glucose Control:  est A1C now would be 9-10, need to improve efforts--               - Jardiance 10mg daily --> increase to 25mg daily   - Semglee- 46u at bedtime- continue now with AM fasting ~130-150   - Increase mealtime insulin Novolog 20u with meals, 10u with smaller meals (taking higher dose unless has virtually no carbs)    - Start sliding scale 1:20 >120. No doses if not eating  2.  Lipids:  Meets criteria per ASCVD risk %, continue crestor 40mg daily  3.  Blood Pressure:  At goal, continue meds  4.  CV prevention:  Continue aspirin 325 mg daily, plavix  5.  Diabetic Nephropathy screening:   continue ACEI/ARB , udpate at next visit  6.  Diabetic Retinopathy screening:  continue annual dilated eye exams due now, will call to schedule  7.  Diabetic Neuropathy screening:  Up to date.  Instructed on routine foot care, follow-up with podiatry as needed.       Orders Placed This Encounter   Procedures     Hemoglobin A1c     Albumin Random Urine Quantitative with Creat Ratio     Basic metabolic panel  (Ca, Cl, CO2, Creat, Gluc, K, Na, BUN)     Lipid panel reflex to direct LDL Fasting        Return to clinic 3 months      A total of 34 minutes were spent today 12/20/22 on this visit including chart review, history and counseling, documentation and other activities as detailed above.     Answers for HPI/ROS submitted by the patient on 12/19/2022  General Symptoms: No  Skin Symptoms: No  HENT Symptoms: No  EYE SYMPTOMS: No  HEART SYMPTOMS: No  LUNG SYMPTOMS: No  INTESTINAL SYMPTOMS: No  URINARY SYMPTOMS: No  REPRODUCTIVE SYMPTOMS: No  SKELETAL SYMPTOMS: No  BLOOD SYMPTOMS: No  NERVOUS SYSTEM SYMPTOMS: No  MENTAL HEALTH SYMPTOMS: No

## 2022-12-21 ENCOUNTER — TELEPHONE (OUTPATIENT)
Dept: ENDOCRINOLOGY | Facility: CLINIC | Age: 67
End: 2022-12-21

## 2022-12-21 NOTE — TELEPHONE ENCOUNTER
Diabetes Education Scheduling Outreach #1:    Call to patient to schedule. Spouse declined to schedule.    Rosenda Lang  [facility]  Diabetes and Nutrition Scheduling

## 2022-12-21 NOTE — TELEPHONE ENCOUNTER
Called, but not able to LVM but sent a [employer] message to schedule a 3m f/u and Lab work with Dr. Odom.  Call back [phone]

## 2023-01-17 DIAGNOSIS — E11.21 TYPE 2 DIABETES MELLITUS WITH DIABETIC NEPHROPATHY, WITH LONG-TERM CURRENT USE OF INSULIN (H): ICD-10-CM

## 2023-01-17 DIAGNOSIS — Z79.4 TYPE 2 DIABETES MELLITUS WITH DIABETIC NEPHROPATHY, WITH LONG-TERM CURRENT USE OF INSULIN (H): ICD-10-CM

## 2023-01-18 RX ORDER — PEN NEEDLE, DIABETIC 32GX 5/32"
NEEDLE, DISPOSABLE MISCELLANEOUS
Qty: 300 EACH | Refills: 1 | Status: SHIPPED | OUTPATIENT
Start: 2023-01-18 | End: 2023-08-23

## 2023-01-26 DIAGNOSIS — G45.9 TIA (TRANSIENT ISCHEMIC ATTACK): ICD-10-CM

## 2023-01-26 RX ORDER — CLOPIDOGREL BISULFATE 75 MG/1
TABLET ORAL
Qty: 90 TABLET | Refills: 1 | Status: SHIPPED | OUTPATIENT
Start: 2023-01-26 | End: 2023-07-19

## 2023-02-01 ENCOUNTER — TELEPHONE (OUTPATIENT)
Dept: NEUROLOGY | Facility: CLINIC | Age: 68
End: 2023-02-01
Payer: COMMERCIAL

## 2023-02-01 NOTE — TELEPHONE ENCOUNTER
Placed call to patient's wife who handles patient case.  Left voice mail indicating that patient should discontinue ASA on Feb 8th, and should continue Plavix indefinably.    Removed ASA from patient's medication list.    Huseyin Morelos RN

## 2023-02-13 DIAGNOSIS — E11.21 TYPE 2 DIABETES MELLITUS WITH DIABETIC NEPHROPATHY, WITH LONG-TERM CURRENT USE OF INSULIN (H): ICD-10-CM

## 2023-02-13 DIAGNOSIS — Z79.4 TYPE 2 DIABETES MELLITUS WITH DIABETIC NEPHROPATHY, WITH LONG-TERM CURRENT USE OF INSULIN (H): ICD-10-CM

## 2023-03-02 ENCOUNTER — OFFICE VISIT (OUTPATIENT)
Dept: FAMILY MEDICINE | Facility: CLINIC | Age: 68
End: 2023-03-02
Payer: COMMERCIAL

## 2023-03-02 VITALS
BODY MASS INDEX: 31.78 KG/M2 | SYSTOLIC BLOOD PRESSURE: 123 MMHG | HEIGHT: 70 IN | RESPIRATION RATE: 18 BRPM | HEART RATE: 72 BPM | TEMPERATURE: 96.8 F | OXYGEN SATURATION: 97 % | WEIGHT: 222 LBS | DIASTOLIC BLOOD PRESSURE: 83 MMHG

## 2023-03-02 DIAGNOSIS — M75.101 ROTATOR CUFF SYNDROME, RIGHT: ICD-10-CM

## 2023-03-02 DIAGNOSIS — R80.9 MICROALBUMINURIA DUE TO TYPE 2 DIABETES MELLITUS (H): ICD-10-CM

## 2023-03-02 DIAGNOSIS — E11.21 TYPE 2 DIABETES MELLITUS WITH DIABETIC NEPHROPATHY, WITH LONG-TERM CURRENT USE OF INSULIN (H): ICD-10-CM

## 2023-03-02 DIAGNOSIS — E11.29 MICROALBUMINURIA DUE TO TYPE 2 DIABETES MELLITUS (H): ICD-10-CM

## 2023-03-02 DIAGNOSIS — Z00.00 ENCOUNTER FOR MEDICARE ANNUAL WELLNESS EXAM: Primary | ICD-10-CM

## 2023-03-02 DIAGNOSIS — Z79.4 TYPE 2 DIABETES MELLITUS WITH DIABETIC NEPHROPATHY, WITH LONG-TERM CURRENT USE OF INSULIN (H): ICD-10-CM

## 2023-03-02 DIAGNOSIS — Z23 NEED FOR VACCINATION: ICD-10-CM

## 2023-03-02 LAB
CHOLEST SERPL-MCNC: 153 MG/DL
CREAT UR-MCNC: 106 MG/DL
HBA1C MFR BLD: 7.9 % (ref 0–5.6)
HDLC SERPL-MCNC: 39 MG/DL
LDLC SERPL CALC-MCNC: 90 MG/DL
MICROALBUMIN UR-MCNC: 97.9 MG/L
MICROALBUMIN/CREAT UR: 92.36 MG/G CR (ref 0–17)
NONHDLC SERPL-MCNC: 114 MG/DL
TRIGL SERPL-MCNC: 119 MG/DL

## 2023-03-02 PROCEDURE — 82043 UR ALBUMIN QUANTITATIVE: CPT | Performed by: INTERNAL MEDICINE

## 2023-03-02 PROCEDURE — 90471 IMMUNIZATION ADMIN: CPT | Performed by: INTERNAL MEDICINE

## 2023-03-02 PROCEDURE — 80061 LIPID PANEL: CPT | Performed by: INTERNAL MEDICINE

## 2023-03-02 PROCEDURE — 82570 ASSAY OF URINE CREATININE: CPT | Performed by: INTERNAL MEDICINE

## 2023-03-02 PROCEDURE — 90677 PCV20 VACCINE IM: CPT | Performed by: INTERNAL MEDICINE

## 2023-03-02 PROCEDURE — 36415 COLL VENOUS BLD VENIPUNCTURE: CPT | Performed by: INTERNAL MEDICINE

## 2023-03-02 PROCEDURE — 99207 PR FOOT EXAM NO CHARGE: CPT | Mod: 25 | Performed by: INTERNAL MEDICINE

## 2023-03-02 PROCEDURE — 83036 HEMOGLOBIN GLYCOSYLATED A1C: CPT | Performed by: INTERNAL MEDICINE

## 2023-03-02 PROCEDURE — 99397 PER PM REEVAL EST PAT 65+ YR: CPT | Mod: 25 | Performed by: INTERNAL MEDICINE

## 2023-03-02 ASSESSMENT — PAIN SCALES - GENERAL: PAINLEVEL: SEVERE PAIN (7)

## 2023-03-02 NOTE — PROGRESS NOTES
Ashley Fontanez is a 67 year old presenting for the following health issues:  Follow Up (DM)      History of Present Illness       Diabetes:   He presents for follow up of diabetes.  He is checking home blood glucose with a continuous glucose monitor.  He checks blood glucose before meals and at bedtime.  Blood glucose is sometimes over 200 and never under 70. He is aware of hypoglycemia symptoms including other. He is concerned about blood sugar frequently over 200.  He is having numbness in feet and weight loss. The patient has not had a diabetic eye exam in the last 12 months.         He eats 0-1 servings of fruits and vegetables daily.He consumes 0 sweetened beverage(s) daily.He exercises with enough effort to increase his heart rate 9 or less minutes per day.  He exercises with enough effort to increase his heart rate 3 or less days per week. He is missing 1 dose(s) of medications per week.  He is not taking prescribed medications regularly due to remembering to take.       Ashley Fontanez is a 66 year old who presents for the following health issues  accompanied by his spouse.    [facility]     Post Discharge Outreach 8/2/2022   Admission Date 7/31/2022   Reason for Admission evaluation following a spell of instability,  difficulty with speech, and abnormal movement   Discharge Date 8/1/2022   Discharge Diagnosis evaluation following a spell of instability,  difficulty with speech, and abnormal movement   How are you doing now that you are home? He is doing okay. Since being home he has been taking a minute from when he is lying down to sitting up to make sure BP can catch up with his movements. Otherwise no other symptoms since being home.   How are your symptoms? (Red Flag symptoms escalate to triage hotline per guidelines) Improved   Do you feel your condition is stable enough to be safe at home until your provider visit? Yes   Does the patient have their discharge instructions?  Yes   Does the patient have  questions regarding their discharge instructions?  No   Were you started on any new medications or were there changes to any of your previous medications?  Yes   Does the patient have all of their medications? No (see comment)   Do you have questions regarding any of your medications?  No   Do you have all of your needed medical supplies or equipment (DME)?  (i.e. oxygen tank, CPAP, cane, etc.) -   Discharge follow-up appointment scheduled within 14 calendar days?  Yes   Discharge Follow Up Appointment Date 8/16/2022   Discharge Follow Up Appointment Scheduled with? Specialty Care Provider     Hospital Follow-up Visit:    Hospital: [facility]  Date of Admission: 3/24/22  Date of Discharge: 3/27/22  Reason(s) for Admission:      Recurrent stroke      Was your hospitalization related to COVID-19? No   Problems taking medications regularly:  None  Medication changes since discharge: None  Problems adhering to non-medication therapy:  None    Summary of hospitalization:  [facility] discharge summary reviewed  Diagnostic Tests/Treatments reviewed.  Follow up needed: neurology  Other Healthcare Providers Involved in Patient s Care:         Physical Therapy, speech therapy, occupational therapy  Update since discharge: stable.   Post Discharge Medication Reconciliation: discharge medications reconciled and changed, per note/orders.  Plan of care communicated with patient and family              Subjective   Huseyin is a 66 year old who presents for the following health issues  accompanied by his spouse.    HPI     Post hospital discharge follow up of recent CVA    No headaches, No chest pain, headaches, fever or chills at this time    Patient needs neurology referral to establish outpatient follow up of recent CVA    Patient needs ongoing speech therapy, occupation therapy for dysarthria symptoms since CVA      Diabetes Follow-up    How often are you checking your blood sugar? Two  times daily  Blood sugar testing frequency justification:  Uncontrolled diabetes  What time of day are you checking your blood sugars (select all that apply)?  Before meals  Have you had any blood sugars above 200?  Yes   Have you had any blood sugars below 70?  No    What symptoms do you notice when your blood sugar is low?  None    What concerns do you have today about your diabetes? Elevated BS     Do you have any of these symptoms? (Select all that apply)  Thirsty, weight loss ( 7lbs)      BP Readings from Last 2 Encounters:   03/02/23 123/83   08/01/22 123/75     Hemoglobin A1C (%)   Date Value   07/31/2022 8.5 (H)   03/16/2022 11.8 (H)   09/28/2020 13.2 (H)   09/23/2019 12.3 (H)     LDL Cholesterol Calculated (mg/dL)   Date Value   07/31/2022 11   04/06/2022 53   09/28/2020 96   09/23/2019     Cannot estimate LDL when triglyceride exceeds 400 mg/dL     LDL Cholesterol Direct (mg/dL)   Date Value   09/23/2019 127 (H)       Current Medications:     Current Outpatient Medications   Medication Sig Dispense Refill     alcohol swab prep pads Use to swab area of injection/ian as directed 100 each 3     BD PEN NEEDLE MARIE 2ND GEN 32G X 4 MM miscellaneous USE THREE PEN NEEDLES DAILY AS DIRECTED 300 each 1     blood glucose calibration (NO BRAND SPECIFIED) solution Use to calibrate blood glucose monitor as needed as directed. To accompany: Blood Glucose Monitor Brands: per insurance. 1 Bottle 3     clopidogrel (PLAVIX) 75 MG tablet TAKE 1 TABLET BY MOUTH EVERY DAY 90 tablet 1     clotrimazole (LOTRIMIN) 1 % external cream Apply topically 2 times daily (Patient taking differently: Apply topically 2 times daily as needed) 30 g 11     Continuous Blood Gluc Sensor (FREESTYLE NANO 3 SENSOR) MISC 1 each every 14 days 9 each 3     CONTOUR NEXT TEST test strip USE TO TEST BLOOD SUGAR THREE TIMES A DAY OR AS DIRECTED (NEED APPOINTMENT FOR MORE REFILLS) 300 strip 2     empagliflozin (JARDIANCE) 25 MG TABS tablet Take 1 tablet  "(25 mg) by mouth daily 90 tablet 3     escitalopram (LEXAPRO) 20 MG tablet TAKE 1 TABLET BY MOUTH EVERY DAY 90 tablet 1     insulin aspart (NOVOLOG PEN) 100 UNIT/ML pen Take 15u with meals plus sliding scale max daily dose 75u 60 mL 3     Insulin Glargine-yfgn (SEMGLEE, YFGN,) 100 UNIT/ML SOLN Inject 46 Units Subcutaneous At Bedtime 45 mL 3     lisinopril (ZESTRIL) 20 MG tablet TAKE 1 TABLET BY MOUTH EVERY DAY 90 tablet 0     ORDER FOR DME Auto-CPAP: Max 12 cm H2O Min 10 cm H2O  Continuous Lifetime need and heated humidity.    1 Device 0     rosuvastatin (CRESTOR) 40 MG tablet Take 0.5 tablets (20 mg) by mouth daily 90 tablet 3     thin (NO BRAND SPECIFIED) lancets Use to test blood sugar 3 times daily or as directed. To accompany: Blood Glucose Monitor Brands: per insurance. 200 each 6         Allergies:      Allergies   Allergen Reactions     Augmentin Diarrhea     Sulfa Drugs      Unknown - reaction occurred as a child     Victoza      Skin rash     Ozempic (0.25 Or 0.5 Mg-Dose) [Semaglutide] Diarrhea            Past Medical History:     Past Medical History:   Diagnosis Date     Anxiety      Essential hypertension, benign      Hypercholesteremia      Sleep apnea      T2DM (type 2 diabetes mellitus) (H)          Past Surgical History:     Past Surgical History:   Procedure Laterality Date     COLONOSCOPY       COLONOSCOPY N/A 10/26/2020    Procedure: COLONOSCOPY;  Surgeon: Randy Gonzalez MD;  Location:  GI     ENT SURGERY      R) ear \"procedure\"     IR CAROTID CEREBRAL ANGIOGRAM BILATERAL  4/7/2022     IR CAROTID CEREBRAL ANGIOGRAM BILATERAL  4/16/2022         Family Medical History:     Family History   Adopted: Yes   Problem Relation Age of Onset     Diabetes Mother      Respiratory Mother         asthma     Lipids Mother      Arthritis Mother         knee replacement     Alzheimer Disease Mother      Cerebrovascular Disease Brother      C.A.D. No family hx of      Cancer - colorectal No family hx of  "     Prostate Cancer No family hx of          Social History:     Social History     Socioeconomic History     Marital status:      Spouse name: Estefania Laura     Number of children: 1     Years of education: Not on file     Highest education level: Not on file   Occupational History     Not on file   Tobacco Use     Smoking status: Never     Smokeless tobacco: Never   Substance and Sexual Activity     Alcohol use: Yes     Alcohol/week: 0.0 standard drinks     Comment: 3-6 drinks on occ weekend night, occ drink on weekday     Drug use: Yes     Types: Cocaine     Sexual activity: Yes     Partners: Female   Other Topics Concern      Service No     Blood Transfusions No     Caffeine Concern Not Asked     Occupational Exposure Not Asked     Hobby Hazards Not Asked     Sleep Concern Not Asked     Stress Concern Not Asked     Weight Concern Not Asked     Special Diet Not Asked     Back Care Not Asked     Exercise Not Asked     Bike Helmet Not Asked     Seat Belt Not Asked     Self-Exams Not Asked     Parent/sibling w/ CABG, MI or angioplasty before 65F 55M? Not Asked   Social History Narrative     Not on file     Social Determinants of Health     Financial Resource Strain: Low Risk      Difficulty of Paying Living Expenses: Not hard at all   Food Insecurity: No Food Insecurity     Worried About Running Out of Food in the Last Year: Never true     Ran Out of Food in the Last Year: Never true   Transportation Needs: No Transportation Needs     Lack of Transportation (Medical): No     Lack of Transportation (Non-Medical): No   Physical Activity: Not on file   Stress: Not on file   Social Connections: Unknown     Frequency of Communication with Friends and Family: Not on file     Frequency of Social Gatherings with Friends and Family: Not on file     Attends Church Services: Not on file     Active Member of Clubs or Organizations: Not on file     Attends Club or Organization Meetings: Not on file      "Marital Status:    Intimate Partner Violence: Not on file   Housing Stability: Low Risk      Unable to Pay for Housing in the Last Year: No     Number of Places Lived in the Last Year: 1     Unstable Housing in the Last Year: No           Review of System:     Constitutional: Negative for fever or chills  Skin: Negative for rashes  Ears/Nose/Throat: Negative for nasal congestion, sore throat  Respiratory: No shortness of breath, dyspnea on exertion, cough, or hemoptysis  Cardiovascular: Negative for chest pain  Gastrointestinal: Negative for nausea, vomiting  Genitourinary: Negative for dysuria, hematuria  Musculoskeletal: Negative for myalgias  Neurologic: Negative for headaches, positive for recent CVA with dysarthria symptoms  Psychiatric: positive for anxiety  Hematologic/Lymphatic/Immunologic: Negative  Endocrine: Negative  Behavioral: Negative for tobacco use       Physical Exam:   /83 (BP Location: Right arm, Patient Position: Sitting, Cuff Size: Adult Large)   Pulse 72   Resp 18   Ht 1.778 m (5' 10\")   Wt 100.7 kg (222 lb)   BMI 31.85 kg/m      GENERAL: alert and no distress  EYES: eyes grossly normal to inspection, and conjunctivae and sclerae normal  HENT: Normocephalic atraumatic. Nose and mouth without ulcers or lesions  NECK: supple  RESP: lungs clear to auscultation   CV: regular rate and rhythm, normal S1 S2  LYMPH: no peripheral edema   ABDOMEN: nondistended  MS: no gross musculoskeletal defects noted  SKIN: no suspicious lesions or rashes  NEURO: Alert & Oriented x 3. Dysarthria symptoms noted  PSYCH: mentation appears normal, affect normal        Diagnostic Test Results:     Diagnostic Test Results:  Labs reviewed in Epic    Hemoglobin A1C   Date Value Ref Range Status   07/31/2022 8.5 (H) 0.0 - 5.6 % Final     Comment:     Normal <5.7%   Prediabetes 5.7-6.4%    Diabetes 6.5% or higher     Note: Adopted from ADA consensus guidelines.   09/28/2020 13.2 (H) 0 - 5.6 % Final     " Comment:     Normal <5.7% Prediabetes 5.7-6.4%  Diabetes 6.5% or higher - adopted from ADA   consensus guidelines.  Reviewed: OK with previous         ASSESSMENT/PLAN:     (I69.322) Dysarthria due to recent stroke  (I63.9) Cerebrovascular accident (CVA), unspecified mechanism (H)  (primary encounter diagnosis)  Comment: post hospital discharge follow up of recent CVA with dysarthria symptoms, no acute headaches at this time.   Plan: clopidogrel (PLAVIX) 75 MG tablet, Adult         Neurology  Referral, DISCONTINUED:         clopidogrel (PLAVIX) 75 MG tablet      (E66.01) Morbid obesity (H)  (E11.21,  Z79.4) Type 2 diabetes mellitus with diabetic nephropathy, with long-term current use of insulin (H) (primary encounter diagnosis)  Comment: no hypoglycemia events recently  Plan: continue current insulin therapy  Med Therapy Management Referral  Diet, exercise    (E78.5) Hyperlipidemia LDL goal <100  Comment: stable.   Plan: rosuvastatin (CRESTOR) 40 MG tablet    (N18.1) Chronic kidney disease, stage 1  (R80.9) Microalbuminuria  (I10) Essential hypertension, benign  Comment: BP is at goal  Plan: diltiazem ER COATED BEADS (CARDIZEM CD) 240 MG         24 hr capsule  lisinopril (ZESTRIL) 40 MG tablet      (F41.8) Mixed anxiety depressive disorder  Comment: stable.  Plan: escitalopram (LEXAPRO) 20 MG tablet      (F40.240) Claustrophobia  (F41.8) Mixed anxiety depressive disorder  (primary encounter diagnosis)  Comment: patient needs low dose Ativan for upcoming MRI scan   Plan: LORazepam (ATIVAN) 0.5 MG tablet           Follow Up Plan:     Patient is instructed to return to Internal Medicine clinic for follow-up visit in 1 month.        Kendal Wong MD  Internal Medicine  [facility]

## 2023-03-02 NOTE — PROGRESS NOTES
SUBJECTIVE:   Huseyin is a 67 year old who presents for Preventive Visit.  Patient has been advised of split billing requirements and indicates understanding: Yes  Are you in the first 12 months of your Medicare coverage?  No      Healthy Habits:     In general, how would you rate your overall health?  Fair    Frequency of exercise:  0 days/week    Duration of exercise:  0 minutes    Taking medications regularly:  Yes    Barriers to taking medications:  None    Medication side effects:  None    Ability to successfully perform activities of daily living:  Transportation does NOT requires assistance    Home Safety:  No safety concerns identified    Hearing Impairment:  Yes, positive for hearing concerns    In the past 6 months, have you been bothered by leaking of urine?  No    In general, how would you rate your overall mental or emotional health?  Good      PHQ-2 Total Score: 0    Additional concerns today:  No    Have you ever done Advance Care Planning? (For example, a Health Directive, POLST, or a discussion with a medical provider or your loved ones about your wishes): Yes, advance care planning is on file.    Fall risk  Fallen 2 or more times in the past year?: Yes  Any fall with injury in the past year?: No    Cognitive Screening   Results: abnormal with short term memory loss      Do you have sleep apnea, excessive snoring or daytime drowsiness?: yes  Patient has been diagnosed with NA but does not use CPAP machine    Reviewed and updated as needed this visit by clinical staff   Tobacco  Allergies  Meds              Reviewed and updated as needed this visit by Provider                 Social History     Tobacco Use     Smoking status: Never     Smokeless tobacco: Never   Substance Use Topics     Alcohol use: Yes     Alcohol/week: 0.0 standard drinks     Comment: 3-6 drinks on occ weekend night, occ drink on weekday         Current providers sharing in care for this patient include:   Patient Care Team:  Marvin  Kendal Guerra MD as PCP - General (Internal Medicine)  Kendal Wong MD as Assigned PCP  Je Shah MD as MD (Neurological Surgery)  Stefanie Self CNP as Nurse Practitioner (Psychiatry & Neurology Vascular Neurology)  Ivonne Odom MD as Assigned Endocrinology Provider  Ivonne Odom MD as MD (Endocrinology, Diabetes, and Metabolism)  Tana Mustafa APRN CNP as Nurse Practitioner (Psychiatry & Neurology Vascular Neurology)  Tala Epstein, SLP as Speech Language Pathologist (Speech Language/Path)  Jeannine Gaming, SOFIE (Inactive) as Speech Language Pathologist (Speech Language/Path)  Lele Conrad MD as Assigned Neuroscience Provider    The following health maintenance items are reviewed in Epic and correct as of today:  Health Maintenance   Topic Date Due     Pneumococcal Vaccine: 65+ Years (2 - PCV) 09/24/2013     ZOSTER IMMUNIZATION (2 of 3) 01/04/2016     AORTIC ANEURYSM SCREENING (SYSTEM ASSIGNED)  Never done     DIABETIC FOOT EXAM  09/23/2020     EYE EXAM  09/23/2021     MEDICARE ANNUAL WELLNESS VISIT  09/28/2021     COVID-19 Vaccine (4 - Booster for Pfizer series) 03/11/2022     ANNUAL REVIEW OF HM ORDERS  08/02/2022     INFLUENZA VACCINE (1) 09/01/2022     ALT  09/24/2022     MICROALBUMIN  10/11/2022     A1C  01/31/2023     LIPID  01/31/2023     DTAP/TDAP/TD IMMUNIZATION (3 - Td or Tdap) 07/17/2023     BMP  08/01/2023     FALL RISK ASSESSMENT  03/02/2024     ADVANCE CARE PLANNING  04/06/2027     COLORECTAL CANCER SCREENING  10/26/2030     HEPATITIS C SCREENING  Completed     PHQ-2 (once per calendar year)  Completed     URINALYSIS  Completed     IPV IMMUNIZATION  Aged Out     MENINGITIS IMMUNIZATION  Aged Out     Labs reviewed in EPIC        Review of Systems  Constitutional, HEENT, cardiovascular, pulmonary, GI, , musculoskeletal, neuro, skin, endocrine and psych systems are negative, except as otherwise noted.    OBJECTIVE:   /83 (BP Location: Right arm,  "Patient Position: Sitting, Cuff Size: Adult Large)   Pulse 72   Resp 18   Ht 1.778 m (5' 10\")   Wt 100.7 kg (222 lb)   BMI 31.85 kg/m   Estimated body mass index is 31.85 kg/m  as calculated from the following:    Height as of this encounter: 1.778 m (5' 10\").    Weight as of this encounter: 100.7 kg (222 lb).  Physical Exam  GENERAL: alert and no distress  EYES: Eyes grossly normal to inspection, conjunctivae and sclerae normal  HENT: ear canals and TM's normal, nose and mouth without ulcers or lesions  NECK: no adenopathy, no asymmetry  RESP: lungs clear to auscultation  CV: regular rate and rhythm  ABDOMEN: soft, nontender, bowel sounds normal  MS: right shoulder rotator cuff symptoms noted  SKIN: no suspicious lesions or rashes  NEURO:  short term memory loss symptoms present, aphasia symptoms present due to previous CVA  PSYCH: affect bright    Diagnostic Test Results:  Labs reviewed in Epic    ASSESSMENT / PLAN:   Huseyin was seen today for physical.    Diagnoses and all orders for this visit:    Encounter for Medicare annual wellness exam    Microalbuminuria due to type 2 diabetes mellitus (H)  -     Albumin Random Urine Quantitative with Creat Ratio; Future  -     Albumin Random Urine Quantitative with Creat Ratio    Need for vaccination  -     Pneumococcal 20 Valent Conjugate (PCV20)    Type 2 diabetes mellitus with diabetic nephropathy, with long-term current use of insulin (H)  -     FOOT EXAM  -     Hemoglobin A1c; Future  -     Lipid panel reflex to direct LDL Fasting; Future  -     Cancel: Creatinine; Future  -     Hemoglobin A1c  -     Lipid panel reflex to direct LDL Fasting    Rotator cuff syndrome, right  -     Orthopedic  Referral; Future    Other orders  -     REVIEW OF HEALTH MAINTENANCE PROTOCOL ORDERS        Patient has been advised of split billing requirements and indicates understanding: Yes      COUNSELING:  Reviewed preventive health counseling, as reflected in patient " "instructions  Special attention given to:       Regular exercise       Healthy diet/nutrition      BMI:   Estimated body mass index is 31.85 kg/m  as calculated from the following:    Height as of this encounter: 1.778 m (5' 10\").    Weight as of this encounter: 100.7 kg (222 lb).   Weight management plan: Discussed healthy diet and exercise guidelines      He reports that he has never smoked. He has never used smokeless tobacco.      Appropriate preventive services were discussed with this patient, including applicable screening as appropriate for cardiovascular disease, diabetes, osteopenia/osteoporosis, and glaucoma.  As appropriate for age/gender, discussed screening for colorectal cancer, prostate cancer, breast cancer, and cervical cancer. Checklist reviewing preventive services available has been given to the patient.    Reviewed patients plan of care and provided an AVS. The Basic Care Plan (routine screening as documented in Health Maintenance) for Huseyin meets the Care Plan requirement. This Care Plan has been established and reviewed with the Patient.      Kendal Wong MD  [facility]      "

## 2023-03-10 ENCOUNTER — TELEPHONE (OUTPATIENT)
Dept: NEUROSURGERY | Facility: CLINIC | Age: 68
End: 2023-03-10
Payer: COMMERCIAL

## 2023-03-13 ENCOUNTER — TELEPHONE (OUTPATIENT)
Dept: NEUROSURGERY | Facility: CLINIC | Age: 68
End: 2023-03-13
Payer: COMMERCIAL

## 2023-03-13 DIAGNOSIS — E78.5 HYPERLIPIDEMIA LDL GOAL <100: Primary | ICD-10-CM

## 2023-03-13 RX ORDER — ROSUVASTATIN CALCIUM 20 MG/1
20 TABLET, COATED ORAL DAILY
Qty: 90 TABLET | Refills: 3 | Status: SHIPPED | OUTPATIENT
Start: 2023-03-13 | End: 2023-08-24

## 2023-03-13 NOTE — PROGRESS NOTES
CHIEF COMPLAINT:  No chief complaint on file.       HISTORY OF PRESENT ILLNESS  Mr. St is a pleasant 67 year old year old male who presents to clinic today with right shoulder cowan.  Huseyin explains that he has worsening right shoulder pain in the past 1 year     Onset: gradual onset  Location: right lateral distal shoulder, non radiating   Quality:  Sharp    Duration: 1 years , worsening recently   Severity: 8/10 at worst  Timing: no pain at rest, pain with reaching above him or laying on right side   Modifying factors:  resting and non-use makes it better, movement and use makes it worse  Associated signs & symptoms:  Denies numbness/tingling   Previous similar pain: Yes - notes constant neck pain, treated in the past with with minimal physical therapy   Treatments to date: Topical Voltaren Gel (skin irritation) did physical therapy post hospital discharge last year    Additional history: as documented, notes history of 4 strokes 1 year ago     Review of Systems:    Have you recently had a a fever, chills, weight loss? No    Do you have any vision problems? No    Do you have any chest pain or edema? No    Do you have any shortness of breath or wheezing?  No    Do you have stomach problems? No    Do you have any numbness or focal weakness? No    Do you have diabetes? Yes, Type II    Do you have problems with bleeding or clotting? No    Do you have an rashes or other skin lesions? Yes, due to Voltaren Gel, improved     MEDICAL HISTORY  Patient Active Problem List   Diagnosis     Essential hypertension, benign     Hypertrophy of prostate without urinary obstruction     Tietze's disease     Rhinitis, allergic seasonal     HYPERLIPIDEMIA LDL GOAL <100     Mixed anxiety depressive disorder     Advanced directives, counseling/discussion     Microalbuminuria     Recurrent cold sores     NA (obstructive sleep apnea)     Type 2 diabetes mellitus with diabetic nephropathy (H)     Overweight BMI 35-40     Type 2 diabetes  mellitus with hyperosmolarity without coma, without long-term current use of insulin (H)     LVH (left ventricular hypertrophy)     Class 2 obesity due to excess calories with serious comorbidity and body mass index (BMI) of 35.0 to 35.9 in adult     Microalbuminuria due to type 2 diabetes mellitus (H)     Cerebrovascular accident (CVA), unspecified mechanism (H)     Chronic kidney disease, stage 1     TIA (transient ischemic attack)     Dysarthria due to recent stroke     Ischemic stroke (H)     Expressive aphasia       Current Outpatient Medications   Medication Sig Dispense Refill     alcohol swab prep pads Use to swab area of injection/ian as directed 100 each 3     BD PEN NEEDLE MARIE 2ND GEN 32G X 4 MM miscellaneous USE THREE PEN NEEDLES DAILY AS DIRECTED 300 each 1     blood glucose calibration (NO BRAND SPECIFIED) solution Use to calibrate blood glucose monitor as needed as directed. To accompany: Blood Glucose Monitor Brands: per insurance. 1 Bottle 3     clopidogrel (PLAVIX) 75 MG tablet TAKE 1 TABLET BY MOUTH EVERY DAY 90 tablet 1     clotrimazole (LOTRIMIN) 1 % external cream Apply topically 2 times daily (Patient taking differently: Apply topically 2 times daily as needed) 30 g 11     Continuous Blood Gluc Sensor (FREESTYLE NANO 3 SENSOR) MISC 1 each every 14 days 9 each 3     CONTOUR NEXT TEST test strip USE TO TEST BLOOD SUGAR THREE TIMES A DAY OR AS DIRECTED (NEED APPOINTMENT FOR MORE REFILLS) 300 strip 2     empagliflozin (JARDIANCE) 25 MG TABS tablet Take 1 tablet (25 mg) by mouth daily 90 tablet 3     escitalopram (LEXAPRO) 20 MG tablet TAKE 1 TABLET BY MOUTH EVERY DAY 90 tablet 1     insulin aspart (NOVOLOG PEN) 100 UNIT/ML pen Take 15u with meals plus sliding scale max daily dose 75u 60 mL 3     Insulin Glargine-yfgn (SEMGLEE, YFGN,) 100 UNIT/ML SOLN Inject 46 Units Subcutaneous At Bedtime 45 mL 3     lisinopril (ZESTRIL) 20 MG tablet TAKE 1 TABLET BY MOUTH EVERY DAY 90 tablet 0     ORDER FOR  DME Auto-CPAP: Max 12 cm H2O Min 10 cm H2O  Continuous Lifetime need and heated humidity.    1 Device 0     rosuvastatin (CRESTOR) 40 MG tablet Take 0.5 tablets (20 mg) by mouth daily 90 tablet 3     thin (NO BRAND SPECIFIED) lancets Use to test blood sugar 3 times daily or as directed. To accompany: Blood Glucose Monitor Brands: per insurance. 200 each 6       Allergies   Allergen Reactions     Augmentin Diarrhea     Sulfa Drugs      Unknown - reaction occurred as a child     Victoza      Skin rash     Ozempic (0.25 Or 0.5 Mg-Dose) [Semaglutide] Diarrhea       Family History   Adopted: Yes   Problem Relation Age of Onset     Diabetes Mother      Respiratory Mother         asthma     Lipids Mother      Arthritis Mother         knee replacement     Alzheimer Disease Mother      Cerebrovascular Disease Brother      C.A.D. No family hx of      Cancer - colorectal No family hx of      Prostate Cancer No family hx of        Additional medical/Social/Surgical histories reviewed in [facility] and updated as appropriate.       PHYSICAL EXAM  There were no vitals taken for this visit.    General  - normal appearance, in no obvious distress  Musculoskeletal - Right shoulder  - inspection: normal bone and joint alignment, no obvious deformity, no scapular winging, no AC step-off  - palpation: mildly tender RC insertion, normal clavicle, non-tender AC  - ROM:  painful and slow but terminally decreased flexion, abduction and IR at end range, ER full  - strength: 5/5  strength, 5/5 in all shoulder planes  - special tests:  (-) Speed's  (+) Neer  (+) Hawkin's  (+) Faith's  (-) [place]'s  (-) apprehension  (-) subscap lift-off  Neuro  - no sensory or motor deficit, grossly normal coordination, normal muscle tone  Skin  - no ecchymosis, erythema, warmth, or induration, no obvious rash  Psych  - interactive, appropriate, normal mood and affect     IMAGING : XR shoulder right 4 views. Final results and radiologist's interpretation,  available in the [facility] health record. Images were reviewed with the patient/family members in the office today. My personal interpretation of the performed imaging is hydroxyapatite deposition at RC insertion consistent with calcific tendinitis of rotator cuff.     ASSESSMENT & PLAN  Mr. St is a 67 year old year old male who presents to clinic today with chronic and painful right shoulder over the last year without trauma.  Began after suffering CVA one year ago.    Diagnosis: Calcific tendinitis of right shoulder    -Discussed treatment options including OTC analgesics, PT, injections  -Corticosteroid under US guidance  -PT referral placed today  -Gradually increase overhead motion and strength  -Consider barbotage or tenex procedure if no improvement to remove or break apart calcium deposition    Right Subacromial Bursa - Ultrasound Guided  The patient was informed of the risks and the benefits of the procedure and a written consent was signed.  The patient s right shoulder was prepped with chlorhexidine in sterile fashion.   40 mg of triamcinolone suspension was drawn up into a 5 mL syringe with 4 mL of 1% lidocaine.  Injection was performed using sterile technique.  Under ultrasound guidance a 1.5-inch 22-gauge needle was used to enter the subacromial bursa.  The needle was then used to fenestrate calcium deposit approximately 12-15 times. Able to pass through with resistance. Anterolateral approach was used with arm held in Crass position.  Needle placement was visualized and documented with ultrasound.  Ultrasound visualization was necessary to ensure placement in to the bursa and not the rotator cuff tendon which could potentially cause further tendon damage.  Injection performed long axis to the probe.  Injection solution visualized within the bursal space.  Images were permanently stored for the patient's record.  There were no complications. The patient tolerated the procedure well. There was negligible  bleeding. Handout provided detailing post-injection instructions.  DO SANDY Parker  Primary Care Sports Medicine  [facility]  Large Joint Injection/Arthocentesis: R subacromial bursa    Date/Time: 3/16/2023 11:56 AM  Performed by: Jevon Saleh DO  Authorized by: Jevon Saleh DO     Indications:  Pain  Needle Size:  25 G  Guidance: ultrasound    Approach:  Lateral  Location:  Shoulder      Site:  R subacromial bursa  Medications:  4 mL lidocaine 1 %; 40 mg triamcinolone 40 MG/ML  Outcome:  Tolerated well, no immediate complications  Procedure discussed: discussed risks, benefits, and alternatives    Consent Given by:  Patient  Prep: patient was prepped and draped in usual sterile fashion        -Follow up 6 weeks - 8 weeks    It was a pleasure seeing Huseyin today.    Jevon Saleh DO, SANDY  Primary Care Sports Medicine

## 2023-03-15 ENCOUNTER — LAB (OUTPATIENT)
Dept: LAB | Facility: CLINIC | Age: 68
End: 2023-03-15
Payer: COMMERCIAL

## 2023-03-15 DIAGNOSIS — E78.5 HYPERLIPIDEMIA LDL GOAL <100: Primary | ICD-10-CM

## 2023-03-15 DIAGNOSIS — Z79.4 TYPE 2 DIABETES MELLITUS WITH DIABETIC NEPHROPATHY, WITH LONG-TERM CURRENT USE OF INSULIN (H): ICD-10-CM

## 2023-03-15 DIAGNOSIS — E11.21 TYPE 2 DIABETES MELLITUS WITH DIABETIC NEPHROPATHY, WITH LONG-TERM CURRENT USE OF INSULIN (H): ICD-10-CM

## 2023-03-15 LAB
ALT SERPL W P-5'-P-CCNC: 14 U/L (ref 10–50)
ANION GAP SERPL CALCULATED.3IONS-SCNC: 12 MMOL/L (ref 7–15)
BUN SERPL-MCNC: 17.3 MG/DL (ref 8–23)
CALCIUM SERPL-MCNC: 9.9 MG/DL (ref 8.8–10.2)
CHLORIDE SERPL-SCNC: 102 MMOL/L (ref 98–107)
CREAT SERPL-MCNC: 1.17 MG/DL (ref 0.67–1.17)
DEPRECATED HCO3 PLAS-SCNC: 23 MMOL/L (ref 22–29)
GFR SERPL CREATININE-BSD FRML MDRD: 68 ML/MIN/1.73M2
GLUCOSE SERPL-MCNC: 123 MG/DL (ref 70–99)
POTASSIUM SERPL-SCNC: 4.4 MMOL/L (ref 3.4–5.3)
SODIUM SERPL-SCNC: 137 MMOL/L (ref 136–145)

## 2023-03-15 PROCEDURE — 36415 COLL VENOUS BLD VENIPUNCTURE: CPT

## 2023-03-15 PROCEDURE — 84460 ALANINE AMINO (ALT) (SGPT): CPT

## 2023-03-15 PROCEDURE — 80048 BASIC METABOLIC PNL TOTAL CA: CPT

## 2023-03-16 ENCOUNTER — OFFICE VISIT (OUTPATIENT)
Dept: ORTHOPEDICS | Facility: CLINIC | Age: 68
End: 2023-03-16
Attending: INTERNAL MEDICINE
Payer: COMMERCIAL

## 2023-03-16 ENCOUNTER — ANCILLARY PROCEDURE (OUTPATIENT)
Dept: GENERAL RADIOLOGY | Facility: CLINIC | Age: 68
End: 2023-03-16
Attending: FAMILY MEDICINE
Payer: COMMERCIAL

## 2023-03-16 VITALS
SYSTOLIC BLOOD PRESSURE: 123 MMHG | WEIGHT: 222 LBS | HEIGHT: 70 IN | BODY MASS INDEX: 31.78 KG/M2 | DIASTOLIC BLOOD PRESSURE: 83 MMHG

## 2023-03-16 DIAGNOSIS — M75.31 CALCIFIC TENDINITIS OF RIGHT SHOULDER: Primary | ICD-10-CM

## 2023-03-16 DIAGNOSIS — M25.511 RIGHT SHOULDER PAIN: ICD-10-CM

## 2023-03-16 PROCEDURE — 73030 X-RAY EXAM OF SHOULDER: CPT | Mod: TC | Performed by: RADIOLOGY

## 2023-03-16 PROCEDURE — 20611 DRAIN/INJ JOINT/BURSA W/US: CPT | Mod: RT | Performed by: FAMILY MEDICINE

## 2023-03-16 PROCEDURE — 99204 OFFICE O/P NEW MOD 45 MIN: CPT | Mod: 25 | Performed by: FAMILY MEDICINE

## 2023-03-16 RX ORDER — TRIAMCINOLONE ACETONIDE 40 MG/ML
40 INJECTION, SUSPENSION INTRA-ARTICULAR; INTRAMUSCULAR
Status: SHIPPED | OUTPATIENT
Start: 2023-03-16

## 2023-03-16 RX ORDER — LIDOCAINE HYDROCHLORIDE 10 MG/ML
4 INJECTION, SOLUTION INFILTRATION; PERINEURAL
Status: SHIPPED | OUTPATIENT
Start: 2023-03-16

## 2023-03-16 RX ADMIN — LIDOCAINE HYDROCHLORIDE 4 ML: 10 INJECTION, SOLUTION INFILTRATION; PERINEURAL at 11:56

## 2023-03-16 RX ADMIN — TRIAMCINOLONE ACETONIDE 40 MG: 40 INJECTION, SUSPENSION INTRA-ARTICULAR; INTRAMUSCULAR at 11:56

## 2023-03-16 NOTE — PATIENT INSTRUCTIONS
Thank you for choosing [facility]    [facility] LOCATIONS  [place] [address] [facility], [address]th [place], [address] [place] [address]   [phone]       APPOINTMENTS: [phone]    CARE QUESTIONS: [phone],    BILLING QUESTIONS: [phone]    FAX NUMBER: [phone]        Follow up: 6-8 weeks if not improving       1. Right shoulder pain    2. Rotator cuff syndrome, right          Physical Therapy orders have been placed with [facility] ([facility])  You can call [phone] to schedule at your convenience.       Steroid Injection Information:    A corticosteroid injection was administered at your visit today.  The area of injection may be sore, slightly swollen for 1-2 days afterward.  Immediately after injection, you may have an area of numbness, which is caused by the local anesthetic, lidocaine (similar to novacaine) in the shot.  This medicine will wear off in about 4 hours.  In addition to the lidocaine, a steroid medication was injected, called triamcinolone acetate.  This medication is intended to provide long-acting antiinflammatory / pain relief.  It may take 2-5 days for this medication to provide noticeable relief.      After an injection, Dr. Saleh recommends:    Protecting the area wearing a bandage or gauze pad for at least 24 hours.    Using ice; ice bag or frozen vegetables over the injection site every one to two hours when able (for 15 minutes at a time).      Avoid any strenuous activity even if the knee is already feeling better immediately afterward. Light stretching is encouraged but refrain from home exercise program and increasing activity level for about 48 hours.    Avoid soaking in a hot tub, bath, or pool for 48 hours after injection. Showering is fine!    Watch for signs of infection, including increasing  pain, redness and swelling that last more than 48 hours after injection.

## 2023-03-16 NOTE — LETTER
3/16/2023         RE: Huseyin St  [address] [place] [address]        Dear Colleague,    Thank you for referring your patient, Huseyin St, to the [facility] [place]. Please see a copy of my visit note below.    CHIEF COMPLAINT:  No chief complaint on file.       HISTORY OF PRESENT ILLNESS  Mr. St is a pleasant 67 year old year old male who presents to clinic today with right shoulder cowan.  Huseyin explains that he has worsening right shoulder pain in the past 1 year     Onset: gradual onset  Location: right lateral distal shoulder, non radiating   Quality:  Sharp    Duration: 1 years , worsening recently   Severity: 8/10 at worst  Timing: no pain at rest, pain with reaching above him or laying on right side   Modifying factors:  resting and non-use makes it better, movement and use makes it worse  Associated signs & symptoms:  Denies numbness/tingling   Previous similar pain: Yes - notes constant neck pain, treated in the past with with minimal physical therapy   Treatments to date: Topical Voltaren Gel (skin irritation) did physical therapy post hospital discharge last year    Additional history: as documented, notes history of 4 strokes 1 year ago     Review of Systems:    Have you recently had a a fever, chills, weight loss? No    Do you have any vision problems? No    Do you have any chest pain or edema? No    Do you have any shortness of breath or wheezing?  No    Do you have stomach problems? No    Do you have any numbness or focal weakness? No    Do you have diabetes? Yes, Type II    Do you have problems with bleeding or clotting? No    Do you have an rashes or other skin lesions? Yes, due to Voltaren Gel, improved     MEDICAL HISTORY  Patient Active Problem List   Diagnosis     Essential hypertension, benign     Hypertrophy of prostate without urinary obstruction     Tietze's disease     Rhinitis, allergic seasonal     HYPERLIPIDEMIA LDL GOAL <100     Mixed  anxiety depressive disorder     Advanced directives, counseling/discussion     Microalbuminuria     Recurrent cold sores     NA (obstructive sleep apnea)     Type 2 diabetes mellitus with diabetic nephropathy (H)     Overweight BMI 35-40     Type 2 diabetes mellitus with hyperosmolarity without coma, without long-term current use of insulin (H)     LVH (left ventricular hypertrophy)     Class 2 obesity due to excess calories with serious comorbidity and body mass index (BMI) of 35.0 to 35.9 in adult     Microalbuminuria due to type 2 diabetes mellitus (H)     Cerebrovascular accident (CVA), unspecified mechanism (H)     Chronic kidney disease, stage 1     TIA (transient ischemic attack)     Dysarthria due to recent stroke     Ischemic stroke (H)     Expressive aphasia       Current Outpatient Medications   Medication Sig Dispense Refill     alcohol swab prep pads Use to swab area of injection/ian as directed 100 each 3     BD PEN NEEDLE MARIE 2ND GEN 32G X 4 MM miscellaneous USE THREE PEN NEEDLES DAILY AS DIRECTED 300 each 1     blood glucose calibration (NO BRAND SPECIFIED) solution Use to calibrate blood glucose monitor as needed as directed. To accompany: Blood Glucose Monitor Brands: per insurance. 1 Bottle 3     clopidogrel (PLAVIX) 75 MG tablet TAKE 1 TABLET BY MOUTH EVERY DAY 90 tablet 1     clotrimazole (LOTRIMIN) 1 % external cream Apply topically 2 times daily (Patient taking differently: Apply topically 2 times daily as needed) 30 g 11     Continuous Blood Gluc Sensor (FREESTYLE NANO 3 SENSOR) MISC 1 each every 14 days 9 each 3     CONTOUR NEXT TEST test strip USE TO TEST BLOOD SUGAR THREE TIMES A DAY OR AS DIRECTED (NEED APPOINTMENT FOR MORE REFILLS) 300 strip 2     empagliflozin (JARDIANCE) 25 MG TABS tablet Take 1 tablet (25 mg) by mouth daily 90 tablet 3     escitalopram (LEXAPRO) 20 MG tablet TAKE 1 TABLET BY MOUTH EVERY DAY 90 tablet 1     insulin aspart (NOVOLOG PEN) 100 UNIT/ML pen Take 15u  with meals plus sliding scale max daily dose 75u 60 mL 3     Insulin Glargine-yfgn (SEMGLEE, YFGN,) 100 UNIT/ML SOLN Inject 46 Units Subcutaneous At Bedtime 45 mL 3     lisinopril (ZESTRIL) 20 MG tablet TAKE 1 TABLET BY MOUTH EVERY DAY 90 tablet 0     ORDER FOR DME Auto-CPAP: Max 12 cm H2O Min 10 cm H2O  Continuous Lifetime need and heated humidity.    1 Device 0     rosuvastatin (CRESTOR) 40 MG tablet Take 0.5 tablets (20 mg) by mouth daily 90 tablet 3     thin (NO BRAND SPECIFIED) lancets Use to test blood sugar 3 times daily or as directed. To accompany: Blood Glucose Monitor Brands: per insurance. 200 each 6       Allergies   Allergen Reactions     Augmentin Diarrhea     Sulfa Drugs      Unknown - reaction occurred as a child     Victoza      Skin rash     Ozempic (0.25 Or 0.5 Mg-Dose) [Semaglutide] Diarrhea       Family History   Adopted: Yes   Problem Relation Age of Onset     Diabetes Mother      Respiratory Mother         asthma     Lipids Mother      Arthritis Mother         knee replacement     Alzheimer Disease Mother      Cerebrovascular Disease Brother      C.A.D. No family hx of      Cancer - colorectal No family hx of      Prostate Cancer No family hx of        Additional medical/Social/Surgical histories reviewed in [employer] and updated as appropriate.       PHYSICAL EXAM  There were no vitals taken for this visit.    General  - normal appearance, in no obvious distress  Musculoskeletal - Right shoulder  - inspection: normal bone and joint alignment, no obvious deformity, no scapular winging, no AC step-off  - palpation: mildly tender RC insertion, normal clavicle, non-tender AC  - ROM:  painful and slow but terminally decreased flexion, abduction and IR at end range, ER full  - strength: 5/5  strength, 5/5 in all shoulder planes  - special tests:  (-) Speed's  (+) Neer  (+) Hawkin's  (+) Faith's  (-) [place]'s  (-) apprehension  (-) subscap lift-off  Neuro  - no sensory or motor deficit, grossly  normal coordination, normal muscle tone  Skin  - no ecchymosis, erythema, warmth, or induration, no obvious rash  Psych  - interactive, appropriate, normal mood and affect     IMAGING : XR shoulder right 4 views. Final results and radiologist's interpretation, available in the [facility] health record. Images were reviewed with the patient/family members in the office today. My personal interpretation of the performed imaging is hydroxyapatite deposition at RC insertion consistent with calcific tendinitis of rotator cuff.     ASSESSMENT & PLAN  Mr. St is a 67 year old year old male who presents to clinic today with chronic and painful right shoulder over the last year without trauma.  Began after suffering CVA one year ago.    Diagnosis: Calcific tendinitis of right shoulder    -Discussed treatment options including OTC analgesics, PT, injections  -Corticosteroid under US guidance  -PT referral placed today  -Gradually increase overhead motion and strength  -Consider barbotage or tenex procedure if no improvement to remove or break apart calcium deposition    Right Subacromial Bursa - Ultrasound Guided  The patient was informed of the risks and the benefits of the procedure and a written consent was signed.  The patient s right shoulder was prepped with chlorhexidine in sterile fashion.   40 mg of triamcinolone suspension was drawn up into a 5 mL syringe with 4 mL of 1% lidocaine.  Injection was performed using sterile technique.  Under ultrasound guidance a 1.5-inch 22-gauge needle was used to enter the subacromial bursa.  The needle was then used to fenestrate calcium deposit approximately 12-15 times. Able to pass through with resistance. Anterolateral approach was used with arm held in Crass position.  Needle placement was visualized and documented with ultrasound.  Ultrasound visualization was necessary to ensure placement in to the bursa and not the rotator cuff tendon which could potentially cause further  tendon damage.  Injection performed long axis to the probe.  Injection solution visualized within the bursal space.  Images were permanently stored for the patient's record.  There were no complications. The patient tolerated the procedure well. There was negligible bleeding. Handout provided detailing post-injection instructions.  DO RISHI [facility]  Primary Care Sports Medicine  [facility]  Large Joint Injection/Arthocentesis: R subacromial bursa    Date/Time: 3/16/2023 11:56 AM  Performed by: Jevon Saleh DO  Authorized by: Jevon Saleh DO     Indications:  Pain  Needle Size:  25 G  Guidance: ultrasound    Approach:  Lateral  Location:  Shoulder      Site:  R subacromial bursa  Medications:  4 mL lidocaine 1 %; 40 mg triamcinolone 40 MG/ML  Outcome:  Tolerated well, no immediate complications  Procedure discussed: discussed risks, benefits, and alternatives    Consent Given by:  Patient  Prep: patient was prepped and draped in usual sterile fashion        -Follow up 6 weeks - 8 weeks    It was a pleasure seeing Huseyin today.    Jevon Saleh DO, [facility]  Primary Care Sports Medicine      Again, thank you for allowing me to participate in the care of your patient.        Sincerely,        Jevon Saleh DO

## 2023-03-18 DIAGNOSIS — I10 ESSENTIAL HYPERTENSION, BENIGN: ICD-10-CM

## 2023-03-20 RX ORDER — LISINOPRIL 20 MG/1
TABLET ORAL
Qty: 90 TABLET | Refills: 3 | Status: SHIPPED | OUTPATIENT
Start: 2023-03-20 | End: 2023-08-23

## 2023-03-22 ENCOUNTER — VIRTUAL VISIT (OUTPATIENT)
Dept: ENDOCRINOLOGY | Facility: CLINIC | Age: 68
End: 2023-03-22
Payer: COMMERCIAL

## 2023-03-22 DIAGNOSIS — E11.21 TYPE 2 DIABETES MELLITUS WITH DIABETIC NEPHROPATHY, WITH LONG-TERM CURRENT USE OF INSULIN (H): Primary | ICD-10-CM

## 2023-03-22 DIAGNOSIS — Z79.4 TYPE 2 DIABETES MELLITUS WITH DIABETIC NEPHROPATHY, WITH LONG-TERM CURRENT USE OF INSULIN (H): Primary | ICD-10-CM

## 2023-03-22 PROCEDURE — 99213 OFFICE O/P EST LOW 20 MIN: CPT | Mod: VID | Performed by: INTERNAL MEDICINE

## 2023-03-22 NOTE — PROGRESS NOTES
Video-Visit Details    Type of service:  Video Visit  Video Start Time: 2:17  Video End Time: 2:36  Originating Location (pt. Location): [place], [place]  Distant Location (provider location):  Home  Platform used for Video Visit: Nemo Odom MD      Huseyin RUSSELL St is a 66 year old yo male who NA, obesity, HLD, DM2 complicated by nephropathy, neuropathy and recently CVA s/p stenting here for follow-up via a billable video visit. Last seen October 2022. He is accompanied by Estefania (wife).    Chief Complaint   Patient presents with     RECHECK     Type 2 diabetes mellitus with diabetic nephropathy, with long-term current use of insulin        INTERVAL HISTORY:  - Noting random blood glucoses that are low, 40s, 50s   - Wakes with -150, eats breakfast with   - Fall recently and fractured ribs, falling regularly.   - [place], soup, etc for lunch.   - Unable to link lauren with clinic to view bloodsugars      Subjective:    1) Diabetes Mellitus    Diabetes History:  Diagnosis: 10-20 years ago  Hospitalizations: April for stroke, never for diabetes  Previous Regimens: metformin diarrhea, Ozempic diarrhea, victoza diarrhea  Current Regimen: Jardiance 10mg,  Novolog 20u with larger meals-- almost every meal is dosed with 20u or more, 10u with small meals, Semglee 46u at bedtime    BG check- LAUREN 2  30 day ave- 145  14 day ave- 140-180  Complications: neurovascular    Last eye exam: last one year ago, no NPRD at that time (per report)  Foot Exam: self checks, wife checks every few days  ACEI/ARB: lisinopril  Statin/ASA: crestor, ASA, plavix      BP Readings from Last 3 Encounters:   03/16/23 123/83   03/02/23 123/83   08/01/22 123/75       Lab Results   Component Value Date    A1C 11.8 03/16/2022    A1C 11.8 10/11/2021    A1C 13.2 09/28/2020    A1C 12.3 09/23/2019    A1C 10.4 03/12/2018       Recent Labs   Lab Test 04/06/22  0725 03/16/22  1704 04/21/16  0906 08/20/15  0830 01/14/15  0847   CHOL 109 224*   < >  136 316*   HDL 34* 33*   < > 42 41   LDL 53 136*   < > 59 219*   TRIG 111 275*   < > 176* 282*   CHOLHDLRATIO  --   --   --  3.2 7.7*    < > = values in this interval not displayed.       Lab Results   Component Value Date    MICROL 265 10/11/2021    MICROL 715 09/28/2020     No results found for: MICROALBUMIN      Wt Readings from Last 3 Encounters:   03/16/23 100.7 kg (222 lb)   03/02/23 100.7 kg (222 lb)   08/01/22 100.2 kg (221 lb)             Active diagnoses this visit:  Data Unavailable     ROS: 10 point ROS neg other than the symptoms noted above in the HPI.      Medical, surgical, social, and family histories, medications and allergies reviewed and updated.    Objective:  Physical Exam (visual exam)  VS:  no vital signs taken for video visit  CONSTITUTIONAL: healthy, alert and NAD, responding appropriately  ENT: normocephalic, no visual evidence of trauma, normal nose and oral mucosa  EYES: conjunctivae and sclerae normal, no exophthalmos or proptosis  THYROID:  no visualized nodules or goiter  LUNGS: no audible wheeze, cough or visible cyanosis, no visible retractions or increased work of breathing  EXTREMITIES: no hand tremors  NEUROLOGY: cranial nerves grossly intact with no obvious deficit.  SKIN:  no visualized skin lesions or rash, no edema visualized  PSYCH: mentation appears normal, normal judgement      Lab Results   Component Value Date/Time    TSH 1.94 12/05/2016 10:58 AM       Last Comprehensive Metabolic Panel:  Sodium   Date Value Ref Range Status   03/15/2023 137 136 - 145 mmol/L Final   03/12/2018 135 133 - 144 mmol/L Final     Potassium   Date Value Ref Range Status   03/15/2023 4.4 3.4 - 5.3 mmol/L Final   08/01/2022 4.4 3.4 - 5.3 mmol/L Final   03/12/2018 4.3 3.4 - 5.3 mmol/L Final     Chloride   Date Value Ref Range Status   03/15/2023 102 98 - 107 mmol/L Final   08/01/2022 105 94 - 109 mmol/L Final   03/12/2018 102 94 - 109 mmol/L Final     Carbon Dioxide   Date Value Ref Range Status    03/12/2018 26 20 - 32 mmol/L Final     Carbon Dioxide (CO2)   Date Value Ref Range Status   03/15/2023 23 22 - 29 mmol/L Final   08/01/2022 23 20 - 32 mmol/L Final     Anion Gap   Date Value Ref Range Status   03/15/2023 12 7 - 15 mmol/L Final   08/01/2022 6 3 - 14 mmol/L Final   03/12/2018 7 3 - 14 mmol/L Final     Glucose   Date Value Ref Range Status   03/15/2023 123 (H) 70 - 99 mg/dL Final   08/01/2022 196 (H) 70 - 99 mg/dL Final   03/12/2018 220 (H) 70 - 99 mg/dL Final     GLUCOSE BY METER POCT   Date Value Ref Range Status   08/01/2022 233 (H) 70 - 99 mg/dL Final     Urea Nitrogen   Date Value Ref Range Status   03/15/2023 17.3 8.0 - 23.0 mg/dL Final   08/01/2022 21 7 - 30 mg/dL Final   03/12/2018 13 7 - 30 mg/dL Final     Creatinine   Date Value Ref Range Status   03/15/2023 1.17 0.67 - 1.17 mg/dL Final   09/28/2020 1.03 0.66 - 1.25 mg/dL Final     GFR Estimate   Date Value Ref Range Status   03/15/2023 68 >60 mL/min/1.73m2 Final     Comment:     eGFR calculated using 2021 CKD-EPI equation.   09/28/2020 75 >60 mL/min/[1.73_m2] Final     Comment:     Non  GFR Calc  Starting 12/18/2018, serum creatinine based estimated GFR (eGFR) will be   calculated using the Chronic Kidney Disease Epidemiology Collaboration   (CKD-EPI) equation.       GFR, ESTIMATED POCT   Date Value Ref Range Status   10/31/2022 >60 >60 mL/min/1.73m2 Final     Calcium   Date Value Ref Range Status   03/15/2023 9.9 8.8 - 10.2 mg/dL Final   03/12/2018 9.6 8.5 - 10.1 mg/dL Final     Bilirubin Total   Date Value Ref Range Status   03/24/2022 0.2 0.2 - 1.3 mg/dL Final   12/05/2016 0.4 0.2 - 1.3 mg/dL Final     Alkaline Phosphatase   Date Value Ref Range Status   03/24/2022 99 40 - 150 U/L Final   12/05/2016 81 40 - 150 U/L Final     ALT   Date Value Ref Range Status   03/15/2023 14 10 - 50 U/L Final   09/29/2017 36 0 - 70 U/L Final     AST   Date Value Ref Range Status   03/24/2022 10 0 - 45 U/L Final   12/05/2016 15 0 - 45 U/L  Final       ASSESSMENT / PLAN:  No diagnosis found.    Recent A1c 7.9  1.  Glucose Control:  est A1C now would be 9-10, need to improve efforts--               - Jardiance continue 25mg daily   - Semglee- 46u at bedtime- continue now with AM fasting ~130-150-- if not improved/eliminated low blood sugars, reduce to 42u   - Increase mealtime insulin Novolog 20u with DINNER (large meals), 15u with medium meals 10u with Breakfast/lunch (smaller meals) for now    - Start sliding scale 1:20 >120. No doses if not eating  2.  Lipids:  Meets criteria per ASCVD risk %, continue crestor 40mg daily  3.  Blood Pressure:  At goal, continue meds  4.  CV prevention:  Continue aspirin 325 mg daily, plavix  5.  Diabetic Nephropathy screening:   continue ACEI/ARB , improved  6.  Diabetic Retinopathy screening:  continue annual dilated eye exams due now  7.  Diabetic Neuropathy screening:  Up to date.  Instructed on routine foot care, follow-up with podiatry as needed.       No orders of the defined types were placed in this encounter.       Return to clinic 3 months      A total of 26 minutes were spent today 03/22/23 on this visit including chart review, history and counseling, documentation and other activities as detailed above.     Answers for HPI/ROS submitted by the patient on 3/22/2023  General Symptoms: No  Skin Symptoms: No  HENT Symptoms: No  EYE SYMPTOMS: No  HEART SYMPTOMS: No  LUNG SYMPTOMS: No  INTESTINAL SYMPTOMS: No  URINARY SYMPTOMS: No  REPRODUCTIVE SYMPTOMS: No  SKELETAL SYMPTOMS: No  BLOOD SYMPTOMS: No  NERVOUS SYSTEM SYMPTOMS: No  MENTAL HEALTH SYMPTOMS: No

## 2023-03-22 NOTE — PROGRESS NOTES
"Virtual Visit Details    Type of service:  Video Visit   Video Start Time: {video visit start/end time for provider to select:324947}  Video End Time:{video visit start/end time for provider to select:470180}    Originating Location (pt. Location): {video visit patient location:414909::\"Home\"}  {PROVIDER LOCATION On-site should be selected for visits conducted from your clinic location or adjoining [facility] hospital, academic office, or other nearby [facility] building. Off-site should be selected for all other provider locations, including home:405507}  Distant Location (provider location):  {virtual location provider:070531}  Platform used for Video Visit: {Virtual Visit Platforms:850289::\"Vacunek\"}          "

## 2023-03-22 NOTE — LETTER
3/22/2023         RE: Huseyin Newmans  [address] [place] [address]        Dear Colleague,    Thank you for referring your patient, Huseyin St, to the [facility]. Please see a copy of my visit note below.      Video-Visit Details    Type of service:  Video Visit  Video Start Time: 2:17  Video End Time: 2:36  Originating Location (pt. 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 Last seen October 2022. He is accompanied by Estefania (wife).    Chief Complaint   Patient presents with     RECHECK     Type 2 diabetes mellitus with diabetic nephropathy, with long-term current use of insulin        INTERVAL HISTORY:  - Noting random blood glucoses that are low, 40s, 50s   - Wakes with -150, eats breakfast with   - Fall recently and fractured ribs, falling regularly.   - [place], soup, etc for lunch.   - Unable to link lauren with clinic to view bloodsugars      Subjective:    1) Diabetes Mellitus    Diabetes History:  Diagnosis: 10-20 years ago  Hospitalizations: April for stroke, never for diabetes  Previous Regimens: metformin diarrhea, Ozempic diarrhea, victoza diarrhea  Current Regimen: Jardiance 10mg,  Novolog 20u with larger meals-- almost every meal is dosed with 20u or more, 10u with small meals, Semglee 46u at bedtime    BG check- LAUREN 2  30 day ave- 145  14 day ave- 140-180  Complications: neurovascular    Last eye exam: last one year ago, no NPRD at that time (per report)  Foot Exam: self checks, wife checks every few days  ACEI/ARB: lisinopril  Statin/ASA: crestor, ASA, plavix      BP Readings from Last 3 Encounters:   03/16/23 123/83   03/02/23 123/83   08/01/22 123/75       Lab Results    Component Value Date    A1C 11.8 03/16/2022    A1C 11.8 10/11/2021    A1C 13.2 09/28/2020    A1C 12.3 09/23/2019    A1C 10.4 03/12/2018       Recent Labs   Lab Test 04/06/22  0725 03/16/22  1704 04/21/16  0906 08/20/15  0830 01/14/15  0847   CHOL 109 224*   < > 136 316*   HDL 34* 33*   < > 42 41   LDL 53 136*   < > 59 219*   TRIG 111 275*   < > 176* 282*   CHOLHDLRATIO  --   --   --  3.2 7.7*    < > = values in this interval not displayed.       Lab Results   Component Value Date    MICROL 265 10/11/2021    MICROL 715 09/28/2020     No results found for: MICROALBUMIN      Wt Readings from Last 3 Encounters:   03/16/23 100.7 kg (222 lb)   03/02/23 100.7 kg (222 lb)   08/01/22 100.2 kg (221 lb)             Active diagnoses this visit:  Data Unavailable     ROS: 10 point ROS neg other than the symptoms noted above in the HPI.      Medical, surgical, social, and family histories, medications and allergies reviewed and updated.    Objective:  Physical Exam (visual exam)  VS:  no vital signs taken for video visit  CONSTITUTIONAL: healthy, alert and NAD, responding appropriately  ENT: normocephalic, no visual evidence of trauma, normal nose and oral mucosa  EYES: conjunctivae and sclerae normal, no exophthalmos or proptosis  THYROID:  no visualized nodules or goiter  LUNGS: no audible wheeze, cough or visible cyanosis, no visible retractions or increased work of breathing  EXTREMITIES: no hand tremors  NEUROLOGY: cranial nerves grossly intact with no obvious deficit.  SKIN:  no visualized skin lesions or rash, no edema visualized  PSYCH: mentation appears normal, normal judgement      Lab Results   Component Value Date/Time    TSH 1.94 12/05/2016 10:58 AM       Last Comprehensive Metabolic Panel:  Sodium   Date Value Ref Range Status   03/15/2023 137 136 - 145 mmol/L Final   03/12/2018 135 133 - 144 mmol/L Final     Potassium   Date Value Ref Range Status   03/15/2023 4.4 3.4 - 5.3 mmol/L Final   08/01/2022 4.4 3.4 - 5.3  mmol/L Final   03/12/2018 4.3 3.4 - 5.3 mmol/L Final     Chloride   Date Value Ref Range Status   03/15/2023 102 98 - 107 mmol/L Final   08/01/2022 105 94 - 109 mmol/L Final   03/12/2018 102 94 - 109 mmol/L Final     Carbon Dioxide   Date Value Ref Range Status   03/12/2018 26 20 - 32 mmol/L Final     Carbon Dioxide (CO2)   Date Value Ref Range Status   03/15/2023 23 22 - 29 mmol/L Final   08/01/2022 23 20 - 32 mmol/L Final     Anion Gap   Date Value Ref Range Status   03/15/2023 12 7 - 15 mmol/L Final   08/01/2022 6 3 - 14 mmol/L Final   03/12/2018 7 3 - 14 mmol/L Final     Glucose   Date Value Ref Range Status   03/15/2023 123 (H) 70 - 99 mg/dL Final   08/01/2022 196 (H) 70 - 99 mg/dL Final   03/12/2018 220 (H) 70 - 99 mg/dL Final     GLUCOSE BY METER POCT   Date Value Ref Range Status   08/01/2022 233 (H) 70 - 99 mg/dL Final     Urea Nitrogen   Date Value Ref Range Status   03/15/2023 17.3 8.0 - 23.0 mg/dL Final   08/01/2022 21 7 - 30 mg/dL Final   03/12/2018 13 7 - 30 mg/dL Final     Creatinine   Date Value Ref Range Status   03/15/2023 1.17 0.67 - 1.17 mg/dL Final   09/28/2020 1.03 0.66 - 1.25 mg/dL Final     GFR Estimate   Date Value Ref Range Status   03/15/2023 68 >60 mL/min/1.73m2 Final     Comment:     eGFR calculated using 2021 CKD-EPI equation.   09/28/2020 75 >60 mL/min/[1.73_m2] Final     Comment:     Non  GFR Calc  Starting 12/18/2018, serum creatinine based estimated GFR (eGFR) will be   calculated using the Chronic Kidney Disease Epidemiology Collaboration   (CKD-EPI) equation.       GFR, ESTIMATED POCT   Date Value Ref Range Status   10/31/2022 >60 >60 mL/min/1.73m2 Final     Calcium   Date Value Ref Range Status   03/15/2023 9.9 8.8 - 10.2 mg/dL Final   03/12/2018 9.6 8.5 - 10.1 mg/dL Final     Bilirubin Total   Date Value Ref Range Status   03/24/2022 0.2 0.2 - 1.3 mg/dL Final   12/05/2016 0.4 0.2 - 1.3 mg/dL Final     Alkaline Phosphatase   Date Value Ref Range Status    03/24/2022 99 40 - 150 U/L Final   12/05/2016 81 40 - 150 U/L Final     ALT   Date Value Ref Range Status   03/15/2023 14 10 - 50 U/L Final   09/29/2017 36 0 - 70 U/L Final     AST   Date Value Ref Range Status   03/24/2022 10 0 - 45 U/L Final   12/05/2016 15 0 - 45 U/L Final       ASSESSMENT / PLAN:  No diagnosis found.    Recent A1c 7.9  1.  Glucose Control:  est A1C now would be 9-10, need to improve efforts--               - Jardiance continue 25mg daily   - Semglee- 46u at bedtime- continue now with AM fasting ~130-150-- if not improved/eliminated low blood sugars, reduce to 42u   - Increase mealtime insulin Novolog 20u with DINNER (large meals), 15u with medium meals 10u with Breakfast/lunch (smaller meals) for now    - Start sliding scale 1:20 >120. No doses if not eating  2.  Lipids:  Meets criteria per ASCVD risk %, continue crestor 40mg daily  3.  Blood Pressure:  At goal, continue meds  4.  CV prevention:  Continue aspirin 325 mg daily, plavix  5.  Diabetic Nephropathy screening:   continue ACEI/ARB , improved  6.  Diabetic Retinopathy screening:  continue annual dilated eye exams due now  7.  Diabetic Neuropathy screening:  Up to date.  Instructed on routine foot care, follow-up with podiatry as needed.       No orders of the defined types were placed in this encounter.       Return to clinic 3 months      A total of 26 minutes were spent today 03/22/23 on this visit including chart review, history and counseling, documentation and other activities as detailed above.     Answers for HPI/ROS submitted by the patient on 3/22/2023  General Symptoms: No  Skin Symptoms: No  HENT Symptoms: No  EYE SYMPTOMS: No  HEART SYMPTOMS: No  LUNG SYMPTOMS: No  INTESTINAL SYMPTOMS: No  URINARY SYMPTOMS: No  REPRODUCTIVE SYMPTOMS: No  SKELETAL SYMPTOMS: No  BLOOD SYMPTOMS: No  NERVOUS SYSTEM SYMPTOMS: No  MENTAL HEALTH SYMPTOMS: No          Again, thank you for allowing me to participate in the care of your patient.         Sincerely,        Ivonne Odom MD

## 2023-03-23 ENCOUNTER — TELEPHONE (OUTPATIENT)
Dept: ENDOCRINOLOGY | Facility: CLINIC | Age: 68
End: 2023-03-23
Payer: COMMERCIAL

## 2023-04-19 DIAGNOSIS — F41.8 MIXED ANXIETY DEPRESSIVE DISORDER: ICD-10-CM

## 2023-04-19 RX ORDER — ESCITALOPRAM OXALATE 20 MG/1
TABLET ORAL
Qty: 90 TABLET | Refills: 0 | Status: SHIPPED | OUTPATIENT
Start: 2023-04-19 | End: 2023-08-07

## 2023-04-19 NOTE — TELEPHONE ENCOUNTER
Prescription approved per [facility] Refill Protocol.  Jannet Man, RN  [facility] Triage Nurse

## 2023-04-26 DIAGNOSIS — E11.21 TYPE 2 DIABETES MELLITUS WITH DIABETIC NEPHROPATHY, WITH LONG-TERM CURRENT USE OF INSULIN (H): ICD-10-CM

## 2023-04-26 DIAGNOSIS — Z79.4 TYPE 2 DIABETES MELLITUS WITH DIABETIC NEPHROPATHY, WITH LONG-TERM CURRENT USE OF INSULIN (H): ICD-10-CM

## 2023-05-10 ENCOUNTER — ANCILLARY PROCEDURE (OUTPATIENT)
Dept: CT IMAGING | Facility: CLINIC | Age: 68
End: 2023-05-10
Attending: NEUROLOGICAL SURGERY
Payer: COMMERCIAL

## 2023-05-10 DIAGNOSIS — I66.02 MIDDLE CEREBRAL ARTERY STENOSIS, LEFT: ICD-10-CM

## 2023-05-10 LAB
CREAT BLD-MCNC: 1.3 MG/DL (ref 0.7–1.3)
GFR SERPL CREATININE-BSD FRML MDRD: 60 ML/MIN/1.73M2

## 2023-05-10 PROCEDURE — 82565 ASSAY OF CREATININE: CPT

## 2023-05-10 PROCEDURE — G1010 CDSM STANSON: HCPCS

## 2023-05-10 PROCEDURE — 255N000002 HC RX 255 OP 636: Performed by: NEUROLOGICAL SURGERY

## 2023-05-10 PROCEDURE — 70496 CT ANGIOGRAPHY HEAD: CPT | Mod: MG

## 2023-05-10 RX ADMIN — IOHEXOL 75 ML: 350 INJECTION, SOLUTION INTRAVENOUS at 11:38

## 2023-05-23 ENCOUNTER — VIRTUAL VISIT (OUTPATIENT)
Dept: NEUROSURGERY | Facility: CLINIC | Age: 68
End: 2023-05-23
Payer: COMMERCIAL

## 2023-05-23 DIAGNOSIS — I66.02 MIDDLE CEREBRAL ARTERY STENOSIS, LEFT: Primary | ICD-10-CM

## 2023-05-23 PROCEDURE — 99212 OFFICE O/P EST SF 10 MIN: CPT | Performed by: NEUROLOGICAL SURGERY

## 2023-05-23 NOTE — NURSING NOTE
Is the patient currently in the state of [place]? YES    Visit mode:TELEPHONE    If the visit is dropped, the patient can be reconnected by: TELEPHONE VISIT: Phone number: [phone]    Will anyone else be joining the visit? NO- Estefania on same line      How would you like to obtain your AVS? MyChart    Are changes needed to the allergy or medication list? NO   Pt states no changes to allergies or medications since last reviewed by staff 13 days ago. Pt also self-reviewed in Mychart eCheck-in.     Reason for visit: Follow Up

## 2023-05-23 NOTE — PROGRESS NOTES
"Virtual Visit Details    Type of service:  Telephone Visit   Phone call duration: 15 minutes     Hgb A1C - 7.9  Last week, he was doing a lot of activity cleaning out a trailer. Tripped and stumbled.  Next day, he played half round of golf and was walking unsteadily. Looked \"glazed over\" for about a minute. Trouble moving both arms for a minute.  On Plavix.     MRI brain without contrast in the next week at [facility]    See dictated note.  MILES Conrad MD  "

## 2023-05-23 NOTE — LETTER
"2023      RE: Huseyin [address] St  [address]  [place] [address]       Virtual Visit Details    Type of service:  Telephone Visit   Phone call duration: 15 minutes     Hgb A1C - 7.9  Last week, he was doing a lot of activity cleaning out a trailer. Tripped and stumbled.  Next day, he played half round of golf and was walking unsteadily. Looked \"glazed over\" for about a minute. Trouble moving both arms for a minute.  On Plavix.     MRI brain without contrast in the next week at [facility]    See dictated note.  MILES Conrad MD    Service Date: 2023    Kendal Wong MD   [address], [address]  [place] [address]     RE:      Huseyin St  MRN:  [id number]  :   1955    Dear Dr. Wong:      We spoke to Mr. St as part of telephone followup in [facility]brovascular Clinic today.  He was accompanied by his wife who filled in many details.  We have followed him for a previously symptomatic left middle cerebral artery stenosis for which we treated with balloon angioplasty last year.  The site of angioplasty has recoiled and he has developed restenosis.  He remains on a daily Plavix.  He is no longer on aspirin.  His diabetes control is improving.  His most recent A1c was still high at 7.9 but much improved from last year.    Last weekend, Mr. St was engaged in an increased amount of physical activity compared to his baseline.  He and his wife were clearing out a trailer and he tripped and stumbled but did not fall.  The next day, he played half a round of golf and was walking unsteadily at times.  He then had a 1-minute episode in which he looked \"glazed over\" and had difficulty moving both upper extremities.  His speech was apparently unchanged throughout this episode and afterwards.    Over the phone, Mr. St had a paucity of speech but he was speaking coherently.  His comprehension seemed to be normal.    REVIEW OF STUDIES:  We went over a CTA of the head from " 05/10/2023.  This shows unchanged restenosis in the distal M1 segment of the left middle cerebral artery.  The official reading also described right posterior cerebral artery stenosis at the P1-P2 junction.  This is hard to visualize, as this location is the confluence of multiple arterial segments.  We do not see any obvious new infarcts ***.  He has the extensive chronic encephalomalacia in the left temporal lobe.      IMPRESSION AND PLAN:  His recent symptoms are nonspecific and are not typical for cerebrovascular ischemia.  A simple explanation of overexertion in the heat may be sufficient.  However, given his stroke risk factors and because almost a year has passed since his last brain imaging, we will obtain a new MRI of the brain without contrast.  This can be done at [facility] in the near future.  If no new ischemic changes are seen, we will all be reassured.  We will follow up with him after the MRI.  He should remain on antiplatelet monotherapy indefinitely.  He can stay on the Plavix for now.  Please do not hesitate to contact us with questions.    Sincerely,    Lele Conrad MD        D: 2023   T: 2023   MT: ermias    Name:     CHRISTA HOGUE  MRN:      [id number]        Account:      [id number]   :      1955           Service Date: 2023       Document: [id number]     Lele Conrad MD

## 2023-05-23 NOTE — PATIENT INSTRUCTIONS
Follow up with Dr. Conrad after MRI at [facility].     Stroke & Endovascular RN Care Coordinators:    Clare Juarez, RN, BSN  Emily Torres, RN, CNRN, SCRN    If you have any questions please contact the RN Care Coordinators at [phone], option 1.     After business hours call the  at [phone] and have the Neuro-Interventional Fellow paged.    Thank you for choosing [facility] for your health care needs.

## 2023-05-23 NOTE — PROGRESS NOTES
"Service Date: 2023    Kendal Wong MD   [address], [address]  [place] [address]     RE:      Huseyin St  MRN:  [id number]  :   1955    Dear Dr. Wong:      We spoke to Mr. St as part of telephone followup in Cerebrovascular Clinic today.  He was accompanied by his wife who filled in many details.  We have followed him for a previously symptomatic left middle cerebral artery stenosis for which we treated with balloon angioplasty last year.  The site of angioplasty has recoiled and he has developed restenosis.  He remains on a daily Plavix.  He is no longer on aspirin.  His diabetes control is improving.  His most recent A1c was still high at 7.9 but much improved from last year.    Last weekend, Mr. St was engaged in an increased amount of physical activity compared to his baseline.  He and his wife were clearing out a trailer and he tripped and stumbled but did not fall.  The next day, he played half a round of golf and was walking unsteadily at times.  He then had a 1-minute episode in which he looked \"glazed over\" and had difficulty moving both upper extremities.  His speech was apparently unchanged throughout this episode and afterwards.    Over the phone, Mr. St had a paucity of speech but he was speaking coherently.  His comprehension seemed to be normal.    REVIEW OF STUDIES:  We went over a CTA of the head from 05/10/2023.  This shows unchanged restenosis in the distal M1 segment of the left middle cerebral artery.  The official reading also described right posterior cerebral artery stenosis at the P1-P2 junction.  This is hard to visualize, as this location is the confluence of multiple arterial segments.  We do not see any obvious new infarcts on this study, though CTA is not sensitive for small infarcts.  He has the extensive chronic encephalomalacia in the left temporal lobe.      IMPRESSION AND PLAN:  His recent symptoms are nonspecific and " are not typical for cerebrovascular ischemia.  A simple explanation of overexertion in the heat may be sufficient.  However, given his stroke risk factors and because almost a year has passed since his last brain imaging, we will obtain a new MRI of the brain without contrast.  This can be done at [facility] in the near future.  If no new ischemic changes are seen, we will all be reassured.  We will follow up with him after the MRI.  He should remain on antiplatelet monotherapy indefinitely.  He can stay on the Plavix for now.  Please do not hesitate to contact us with questions.    Sincerely,    Lele Conrad MD        D: 2023   T: 2023   MT: ermias    Name:     CHRISTA HOGUE  MRN:      [id number]        Account:      [id number]   :      1955           Service Date: 2023       Document: [id number]

## 2023-05-23 NOTE — LETTER
"2023       RE: Huseyin RUSSELL Pandeyjamila  [address] [place] [place] [address]     Dear Colleague,    Thank you for referring your patient, Huseyin St, to the [facility]UROSURG[facility] [place] at [facility]. Please see a copy of my visit note below.      Hgb A1C - 7.9  Last week, he was doing a lot of activity cleaning out a trailer. Tripped and stumbled.  Next day, he played half round of golf and was walking unsteadily. Looked \"glazed over\" for about a minute. Trouble moving both arms for a minute.  On Plavix.     MRI brain without contrast in the next week at [facility]    See dictated note.  MILES Conrad MD    Service Date: 2023    Kendal Wong MD   [address], [address]  [place] [address]     RE:      Huseyin St  MRN:  [id number]  :   1955    Dear Dr. Wong:      We spoke to Mr. St as part of telephone followup in [facility] today.  He was accompanied by his wife who filled in many details.  We have followed him for a previously symptomatic left middle cerebral artery stenosis for which we treated with balloon angioplasty last year.  The site of angioplasty has recoiled and he has developed restenosis.  He remains on a daily Plavix.  He is no longer on aspirin.  His diabetes control is improving.  His most recent A1c was still high at 7.9 but much improved from last year.    Last weekend, Mr. St was engaged in an increased amount of physical activity compared to his baseline.  He and his wife were clearing out a trailer and he tripped and stumbled but did not fall.  The next day, he played half a round of golf and was walking unsteadily at times.  He then had a 1-minute episode in which he looked \"glazed over\" and had difficulty moving both upper extremities.  His speech was apparently unchanged throughout this episode and afterwards.    Over the phone, Mr. St had a paucity of " speech but he was speaking coherently.  His comprehension seemed to be normal.    REVIEW OF STUDIES:  We went over a CTA of the head from 05/10/2023.  This shows unchanged restenosis in the distal M1 segment of the left middle cerebral artery.  The official reading also described right posterior cerebral artery stenosis at the P1-P2 junction.  This is hard to visualize, as this location is the confluence of multiple arterial segments.  We do not see any obvious new infarcts ***.  He has the extensive chronic encephalomalacia in the left temporal lobe.      IMPRESSION AND PLAN:  His recent symptoms are nonspecific and are not typical for cerebrovascular ischemia.  A simple explanation of overexertion in the heat may be sufficient.  However, given his stroke risk factors and because almost a year has passed since his last brain imaging, we will obtain a new MRI of the brain without contrast.  This can be done at [facility] in the near future.  If no new ischemic changes are seen, we will all be reassured.  We will follow up with him after the MRI.  He should remain on antiplatelet monotherapy indefinitely.  He can stay on the Plavix for now.  Please do not hesitate to contact us with questions.      D: 2023   T: 2023   MT: ermias    Name:     LENNIE CHRISTA A.  MRN:      [id number]        Account:      [id number]   :      1955           Service Date: 2023       Document: [id number]         Again, thank you for allowing me to participate in the care of your patient.      Sincerely,    Lele Conrad MD

## 2023-06-02 ENCOUNTER — TELEPHONE (OUTPATIENT)
Dept: NEUROSURGERY | Facility: CLINIC | Age: 68
End: 2023-06-02
Payer: COMMERCIAL

## 2023-06-09 ENCOUNTER — ANCILLARY PROCEDURE (OUTPATIENT)
Dept: MRI IMAGING | Facility: CLINIC | Age: 68
End: 2023-06-09
Attending: NEUROLOGICAL SURGERY
Payer: COMMERCIAL

## 2023-06-09 DIAGNOSIS — I66.02 MIDDLE CEREBRAL ARTERY STENOSIS, LEFT: ICD-10-CM

## 2023-06-09 PROCEDURE — G1010 CDSM STANSON: HCPCS

## 2023-06-20 ENCOUNTER — TELEPHONE (OUTPATIENT)
Dept: ENDOCRINOLOGY | Facility: CLINIC | Age: 68
End: 2023-06-20
Payer: COMMERCIAL

## 2023-06-20 NOTE — TELEPHONE ENCOUNTER
Prior Authorization Not Needed per Insurance    Medication: JARDIANCE 25 MG PO TABS  Insurance Company: [employer] - Phone [phone] Fax [phone]  Expected CoPay:      Pharmacy Filling the Rx: [id number] [place] [id number]    Authorization Expiration Date: 07/05/2023

## 2023-06-24 DIAGNOSIS — E11.21 TYPE 2 DIABETES MELLITUS WITH DIABETIC NEPHROPATHY, WITH LONG-TERM CURRENT USE OF INSULIN (H): ICD-10-CM

## 2023-06-24 DIAGNOSIS — Z79.4 TYPE 2 DIABETES MELLITUS WITH DIABETIC NEPHROPATHY, WITH LONG-TERM CURRENT USE OF INSULIN (H): ICD-10-CM

## 2023-06-26 RX ORDER — INSULIN GLARGINE-YFGN 100 [IU]/ML
INJECTION, SOLUTION SUBCUTANEOUS
Qty: 30 ML | Refills: 3 | Status: SHIPPED | OUTPATIENT
Start: 2023-06-26 | End: 2024-09-25

## 2023-06-26 NOTE — TELEPHONE ENCOUNTER
Last Written Prescription Date:  8/2/22  Last Fill Quantity: 45,  # refills: 3   Last office visit: 3/22/2023 with prescribing provider:  Dr. Odom   Future Office Visit:  7/11/23        Requested Prescriptions   Pending Prescriptions Disp Refills     SEMGLEE (YFGN) 100 UNIT/ML SOPN [Pharmacy Med Name: SEMGLEE (YFGN) 100 UNIT/ML PEN]  3     Sig: INJECT 46 UNITS SUBCUTANEOUS DAILY       There is no refill protocol information for this order        Refills sent  Rolanda Ochoa RN

## 2023-06-28 ENCOUNTER — TELEPHONE (OUTPATIENT)
Dept: NEUROSURGERY | Facility: CLINIC | Age: 68
End: 2023-06-28
Payer: COMMERCIAL

## 2023-06-29 ENCOUNTER — TELEPHONE (OUTPATIENT)
Dept: NEUROSURGERY | Facility: CLINIC | Age: 68
End: 2023-06-29
Payer: COMMERCIAL

## 2023-07-11 ENCOUNTER — VIRTUAL VISIT (OUTPATIENT)
Dept: ENDOCRINOLOGY | Facility: CLINIC | Age: 68
End: 2023-07-11
Payer: COMMERCIAL

## 2023-07-11 DIAGNOSIS — E11.21 TYPE 2 DIABETES MELLITUS WITH DIABETIC NEPHROPATHY, WITH LONG-TERM CURRENT USE OF INSULIN (H): Primary | ICD-10-CM

## 2023-07-11 DIAGNOSIS — Z79.4 TYPE 2 DIABETES MELLITUS WITH DIABETIC NEPHROPATHY, WITH LONG-TERM CURRENT USE OF INSULIN (H): Primary | ICD-10-CM

## 2023-07-11 PROCEDURE — 99214 OFFICE O/P EST MOD 30 MIN: CPT | Mod: VID | Performed by: INTERNAL MEDICINE

## 2023-07-11 ASSESSMENT — ENCOUNTER SYMPTOMS
DISTURBANCES IN COORDINATION: 0
SNORES LOUDLY: 1
CHILLS: 0
NIGHT SWEATS: 0
MUSCLE CRAMPS: 0
DYSPNEA ON EXERTION: 0
BACK PAIN: 0
INCREASED ENERGY: 1
JOINT SWELLING: 0
NECK PAIN: 1
HEADACHES: 1
ARTHRALGIAS: 0
FEVER: 0
COUGH DISTURBING SLEEP: 0
TREMORS: 0
MEMORY LOSS: 1
POLYPHAGIA: 0
ALTERED TEMPERATURE REGULATION: 0
STIFFNESS: 0
DECREASED APPETITE: 0
LOSS OF CONSCIOUSNESS: 1
HALLUCINATIONS: 0
WHEEZING: 0
MUSCLE WEAKNESS: 0
NUMBNESS: 0
HEMOPTYSIS: 0
SHORTNESS OF BREATH: 0
TINGLING: 0
FATIGUE: 1
POSTURAL DYSPNEA: 0
COUGH: 0
MYALGIAS: 0
SPUTUM PRODUCTION: 0
DIZZINESS: 1
PARALYSIS: 0
WEIGHT GAIN: 0
WEIGHT LOSS: 0
WEAKNESS: 1
SPEECH CHANGE: 0
SEIZURES: 0
POLYDIPSIA: 0

## 2023-07-11 NOTE — PROGRESS NOTES
Video-Visit Details    Type of service:  Video Visit  Video Start Time: 2:35  Video End Time: 2:57  Originating Location (pt. Location): [place], [place]  Distant Location (provider location):  Home  Platform used for Video Visit: Nemo Odom MD      Huseyin RUSSELL St is a 67 year old yo male who NA, obesity, HLD, DM2 complicated by nephropathy, neuropathy and recently CVA s/p stenting here for follow-up via a billable video visit. Last seen March 2023. He is accompanied by Estefania (wife).    Chief Complaint   Patient presents with     RECHECK     Type 2 diabetes mellitus with diabetic nephropathy, with long-term current use of insulin          INTERVAL HISTORY:  - Doing better with Myles 3, some trouble with adhesives. Will not check bg if it falls off  - Minimal lows, much improved from previous  - Dosing insulin more regularly/reliably      Subjective:    1) Diabetes Mellitus    Diabetes History:  Diagnosis: 10-20 years ago  Hospitalizations: April for stroke, never for diabetes  Previous Regimens: metformin diarrhea, Ozempic diarrhea, victoza diarrhea  Current Regimen: Jardiance 10mg,  Novolog 20u with larger meals, 15u with medium meals, 10u with small meals (rarely takes),  Semglee 44u at bedtime    BG check- MYLES 3            24hr recall-  Lunch- doses as med meal  Dinner- doses as large meal  Snack- usually ice cream before bed    Last eye exam: Sept 2020, no NPRD at that time (per report)  Foot Exam: self checks, wife checks every few days  ACEI/ARB: lisinopril  Statin/ASA: crestor, ASA, plavix      BP Readings from Last 3 Encounters:   03/16/23 123/83   03/02/23 123/83   08/01/22 123/75       Lab Results   Component Value Date    A1C 11.8 03/16/2022    A1C 11.8 10/11/2021    A1C 13.2 09/28/2020    A1C 12.3 09/23/2019    A1C 10.4 03/12/2018       Recent Labs   Lab Test 04/06/22  0725 03/16/22  1704 04/21/16  0906 08/20/15  0830 01/14/15  0847   CHOL 109 224*   < > 136 316*   HDL 34* 33*   < > 42 41   LDL  53 136*   < > 59 219*   TRIG 111 275*   < > 176* 282*   CHOLHDLRATIO  --   --   --  3.2 7.7*    < > = values in this interval not displayed.       Lab Results   Component Value Date    MICROL 265 10/11/2021    MICROL 715 09/28/2020     No results found for: MICROALBUMIN      Wt Readings from Last 3 Encounters:   03/16/23 100.7 kg (222 lb)   03/02/23 100.7 kg (222 lb)   08/01/22 100.2 kg (221 lb)             Active diagnoses this visit:  Type 2 diabetes mellitus with diabetic nephropathy, with long-term current use of insulin (H)     ROS: 10 point ROS neg other than the symptoms noted above in the HPI.      Medical, surgical, social, and family histories, medications and allergies reviewed and updated.    Objective:  Physical Exam (visual exam)  VS:  no vital signs taken for video visit  CONSTITUTIONAL: healthy, alert and NAD, responding appropriately  ENT: normocephalic, no visual evidence of trauma, normal nose and oral mucosa  EYES: conjunctivae and sclerae normal, no exophthalmos or proptosis  THYROID:  no visualized nodules or goiter  LUNGS: no audible wheeze, cough or visible cyanosis, no visible retractions or increased work of breathing  EXTREMITIES: no hand tremors  NEUROLOGY: cranial nerves grossly intact with no obvious deficit.  SKIN:  no visualized skin lesions or rash, no edema visualized  PSYCH: mentation appears normal, normal judgement      Lab Results   Component Value Date/Time    TSH 1.94 12/05/2016 10:58 AM       Last Comprehensive Metabolic Panel:  Sodium   Date Value Ref Range Status   03/15/2023 137 136 - 145 mmol/L Final   03/12/2018 135 133 - 144 mmol/L Final     Potassium   Date Value Ref Range Status   03/15/2023 4.4 3.4 - 5.3 mmol/L Final   08/01/2022 4.4 3.4 - 5.3 mmol/L Final   03/12/2018 4.3 3.4 - 5.3 mmol/L Final     Chloride   Date Value Ref Range Status   03/15/2023 102 98 - 107 mmol/L Final   08/01/2022 105 94 - 109 mmol/L Final   03/12/2018 102 94 - 109 mmol/L Final     Carbon  Dioxide   Date Value Ref Range Status   03/12/2018 26 20 - 32 mmol/L Final     Carbon Dioxide (CO2)   Date Value Ref Range Status   03/15/2023 23 22 - 29 mmol/L Final   08/01/2022 23 20 - 32 mmol/L Final     Anion Gap   Date Value Ref Range Status   03/15/2023 12 7 - 15 mmol/L Final   08/01/2022 6 3 - 14 mmol/L Final   03/12/2018 7 3 - 14 mmol/L Final     Glucose   Date Value Ref Range Status   03/15/2023 123 (H) 70 - 99 mg/dL Final   08/01/2022 196 (H) 70 - 99 mg/dL Final   03/12/2018 220 (H) 70 - 99 mg/dL Final     GLUCOSE BY METER POCT   Date Value Ref Range Status   08/01/2022 233 (H) 70 - 99 mg/dL Final     Urea Nitrogen   Date Value Ref Range Status   03/15/2023 17.3 8.0 - 23.0 mg/dL Final   08/01/2022 21 7 - 30 mg/dL Final   03/12/2018 13 7 - 30 mg/dL Final     Creatinine   Date Value Ref Range Status   03/15/2023 1.17 0.67 - 1.17 mg/dL Final   09/28/2020 1.03 0.66 - 1.25 mg/dL Final     Creatinine POCT   Date Value Ref Range Status   05/10/2023 1.3 0.7 - 1.3 mg/dL Final     GFR Estimate   Date Value Ref Range Status   09/28/2020 75 >60 mL/min/[1.73_m2] Final     Comment:     Non  GFR Calc  Starting 12/18/2018, serum creatinine based estimated GFR (eGFR) will be   calculated using the Chronic Kidney Disease Epidemiology Collaboration   (CKD-EPI) equation.       GFR, ESTIMATED POCT   Date Value Ref Range Status   05/10/2023 60 (L) >60 mL/min/1.73m2 Final     Calcium   Date Value Ref Range Status   03/15/2023 9.9 8.8 - 10.2 mg/dL Final   03/12/2018 9.6 8.5 - 10.1 mg/dL Final     Bilirubin Total   Date Value Ref Range Status   03/24/2022 0.2 0.2 - 1.3 mg/dL Final   12/05/2016 0.4 0.2 - 1.3 mg/dL Final     Alkaline Phosphatase   Date Value Ref Range Status   03/24/2022 99 40 - 150 U/L Final   12/05/2016 81 40 - 150 U/L Final     ALT   Date Value Ref Range Status   03/15/2023 14 10 - 50 U/L Final   09/29/2017 36 0 - 70 U/L Final     AST   Date Value Ref Range Status   03/24/2022 10 0 - 45 U/L Final    12/05/2016 15 0 - 45 U/L Final       ASSESSMENT / PLAN:  No diagnosis found.    Diabetes Mellitus- Recent A1c 7.9 March 1.  Glucose Control:  est A1C now would be approx 8, overall doing ok              - Jardiance continue 25mg daily   - Semglee- 44u at bedtime- continue    - Continue mealtime insulin Novolog 20u with DINNER (large meals), 15u with medium meals 10u with snacks/smaller meal    - Continue sliding scale 1:20 >120. No doses if not eating  2.  Lipids:  Meets criteria per ASCVD risk %, continue crestor 40mg daily  3.  Blood Pressure:  At goal, continue meds  4.  CV prevention:  Continue aspirin 325 mg daily, plavix  5.  Diabetic Nephropathy screening:   continue ACEI/ARB , improved  6.  Diabetic Retinopathy screening:  continue annual dilated eye exams due now-- he will call and schedule  7.  Diabetic Neuropathy screening:  Up to date.  Instructed on routine foot care, follow-up with podiatry as needed.       Orders Placed This Encounter   Procedures     Hemoglobin A1c        Return to clinic 6 months    A total of 30 minutes were spent today 07/11/23 on this visit including chart review, history and counseling, documentation and other activities as detailed above.     Answers for HPI/ROS submitted by the patient on 7/11/2023  General Symptoms: Yes  Skin Symptoms: No  HENT Symptoms: No  EYE SYMPTOMS: No  HEART SYMPTOMS: No  LUNG SYMPTOMS: Yes  INTESTINAL SYMPTOMS: No  URINARY SYMPTOMS: No  REPRODUCTIVE SYMPTOMS: No  SKELETAL SYMPTOMS: Yes  BLOOD SYMPTOMS: No  NERVOUS SYSTEM SYMPTOMS: Yes  MENTAL HEALTH SYMPTOMS: No  Fever: No  Loss of appetite: No  Weight loss: No  Weight gain: No  Fatigue: Yes  Night sweats: No  Chills: No  Increased stress: No  Excessive hunger: No  Excessive thirst: No  Feeling hot or cold when others believe the temperature is normal: No  Loss of height: No  Post-operative complications: No  Surgical site pain: No  Hallucinations: No  Change in or Loss of Energy:  Yes  Hyperactivity: No  Confusion: Yes  Cough: No  Sputum or phlegm: No  Coughing up blood: No  Difficulty breating or shortness of breath: No  Snoring: Yes  Wheezing: No  Difficulty breathing on exertion: No  Nighttime Cough: No  Difficulty breathing when lying flat: No  Back pain: No  Muscle aches: No  Neck pain: Yes  Swollen joints: No  Joint pain: No  Bone pain: No  Muscle cramps: No  Muscle weakness: No  Joint stiffness: No  Bone fracture: No  Trouble with coordination: No  Dizziness or trouble with balance: Yes  Fainting or black-out spells: Yes  Memory loss: Yes  Headache: Yes  Seizures: No  Speech problems: No  Tingling: No  Tremor: No  Weakness: Yes  Difficulty walking: No  Paralysis: No  Numbness: No

## 2023-07-11 NOTE — PATIENT INSTRUCTIONS
Tips for Wearing Your Myles Sensor:     Clothing: Scan right through your clothes. Be careful when dressing to avoid disturbing the sensor.     Showering, bathing, and swimming: Sensor is water-resistant. Do not submerge more than 3 feet (1 meter) or keep under water longer than 30 minutes at a time. Gently pat dry after getting it wet.     Exercising: Use skin adhesive if sweating loosens sensor. Try an over-bandage if playing contact sports.     Traveling: System is safe to use while on an aircraft. Do not expose the sensor to airport full-body scanners. Request another type of screening to avoid removing your sensor.     Medical procedures: Notify your healthcare provider and remove your sensor when necessary. Exposing the sensor to MRI, CT scan, diathermy, or X-ray may cause damage and incorrect readings.     Tips to Help Keep Your Sensor in Place:  Some people use the following products for extra stickiness and skin protection:     1. Torbot Skin Tac   Hypoallergenic and latex-free  tacky  skin barrier  2. Skin-Prep  Protective Barrier Wipe  Protective liquid dressing that allows skin to breathe so tapes and films adhere better  3. Mastisol  Liquid Adhesive:  Clear, non-irritating, nonwater-soluble liquid adhesive  4. Tegaderm I.V.   A transparent film that provides adhesive strength  5.Over-bandage  Be sure to use only medical-grade adhesive, bandage, or tape.  Apply and remove at the same time you apply or remove your sensor. Leave the opening/hole over the center of the sensor uncovered so it can breathe.     Products that can be helpful for removal but are optional:   Baby Oil: Soft moisturizer   Remove  Adhesive: Removes adhesive residue on the skin   UNI-SOLVE  Adhesive Remover: Formulated to reduce adhesive trauma to the skin by thoroughly dissolving dressing

## 2023-07-11 NOTE — LETTER
7/11/2023         RE: Huseyin Newmans  [address] [place] [address]        Dear Colleague,    Thank you for referring your patient, Huseyin St, to the [facility] CLINIC [place]. Please see a copy of my visit note below.      Video-Visit Details    Type of service:  Video Visit  Video Start Time: 2:35  Video End Time: 2:57  Originating Location (pt. Location): [place], [place]  Distant Location (provider location):  Home  Platform used for Video Visit: Nemo Odom MD      Huseyin St is a 67 year old yo male who NA, obesity, HLD, DM2 complicated by nephropathy, neuropathy and recently CVA s/p stenting here for follow-up via a billable video visit. Last seen March 2023. He is accompanied by Estefania (wife).    Chief Complaint   Patient presents with     RECHECK     Type 2 diabetes mellitus with diabetic nephropathy, with long-term current use of insulin          INTERVAL HISTORY:  - Doing better with Myles 3, some trouble with adhesives. Will not check bg if it falls off  - Minimal lows, much improved from previous  - Dosing insulin more regularly/reliably      Subjective:    1) Diabetes Mellitus    Diabetes History:  Diagnosis: 10-20 years ago  Hospitalizations: April for stroke, never for diabetes  Previous Regimens: metformin diarrhea, Ozempic diarrhea, victoza diarrhea  Current Regimen: Jardiance 10mg,  Novolog 20u with larger meals, 15u with medium meals, 10u with small meals (rarely takes),  Semglee 44u at bedtime    BG check- MYLES 3            24hr recall-  Lunch- doses as med meal  Dinner- doses as large meal  Snack- usually ice cream before bed    Last eye exam: Sept 2020, no NPRD at that time (per report)  Foot Exam: self checks, wife checks every few days  ACEI/ARB: lisinopril  Statin/ASA: crestor, ASA, plavix      BP Readings from Last 3 Encounters:   03/16/23 123/83   03/02/23 123/83   08/01/22 123/75       Lab Results   Component Value Date    A1C 11.8 03/16/2022     A1C 11.8 10/11/2021    A1C 13.2 09/28/2020    A1C 12.3 09/23/2019    A1C 10.4 03/12/2018       Recent Labs   Lab Test 04/06/22  0725 03/16/22  1704 04/21/16  0906 08/20/15  0830 01/14/15  0847   CHOL 109 224*   < > 136 316*   HDL 34* 33*   < > 42 41   LDL 53 136*   < > 59 219*   TRIG 111 275*   < > 176* 282*   CHOLHDLRATIO  --   --   --  3.2 7.7*    < > = values in this interval not displayed.       Lab Results   Component Value Date    MICROL 265 10/11/2021    MICROL 715 09/28/2020     No results found for: MICROALBUMIN      Wt Readings from Last 3 Encounters:   03/16/23 100.7 kg (222 lb)   03/02/23 100.7 kg (222 lb)   08/01/22 100.2 kg (221 lb)             Active diagnoses this visit:  Type 2 diabetes mellitus with diabetic nephropathy, with long-term current use of insulin (H)     ROS: 10 point ROS neg other than the symptoms noted above in the HPI.      Medical, surgical, social, and family histories, medications and allergies reviewed and updated.    Objective:  Physical Exam (visual exam)  VS:  no vital signs taken for video visit  CONSTITUTIONAL: healthy, alert and NAD, responding appropriately  ENT: normocephalic, no visual evidence of trauma, normal nose and oral mucosa  EYES: conjunctivae and sclerae normal, no exophthalmos or proptosis  THYROID:  no visualized nodules or goiter  LUNGS: no audible wheeze, cough or visible cyanosis, no visible retractions or increased work of breathing  EXTREMITIES: no hand tremors  NEUROLOGY: cranial nerves grossly intact with no obvious deficit.  SKIN:  no visualized skin lesions or rash, no edema visualized  PSYCH: mentation appears normal, normal judgement      Lab Results   Component Value Date/Time    TSH 1.94 12/05/2016 10:58 AM       Last Comprehensive Metabolic Panel:  Sodium   Date Value Ref Range Status   03/15/2023 137 136 - 145 mmol/L Final   03/12/2018 135 133 - 144 mmol/L Final     Potassium   Date Value Ref Range Status   03/15/2023 4.4 3.4 - 5.3 mmol/L  Final   08/01/2022 4.4 3.4 - 5.3 mmol/L Final   03/12/2018 4.3 3.4 - 5.3 mmol/L Final     Chloride   Date Value Ref Range Status   03/15/2023 102 98 - 107 mmol/L Final   08/01/2022 105 94 - 109 mmol/L Final   03/12/2018 102 94 - 109 mmol/L Final     Carbon Dioxide   Date Value Ref Range Status   03/12/2018 26 20 - 32 mmol/L Final     Carbon Dioxide (CO2)   Date Value Ref Range Status   03/15/2023 23 22 - 29 mmol/L Final   08/01/2022 23 20 - 32 mmol/L Final     Anion Gap   Date Value Ref Range Status   03/15/2023 12 7 - 15 mmol/L Final   08/01/2022 6 3 - 14 mmol/L Final   03/12/2018 7 3 - 14 mmol/L Final     Glucose   Date Value Ref Range Status   03/15/2023 123 (H) 70 - 99 mg/dL Final   08/01/2022 196 (H) 70 - 99 mg/dL Final   03/12/2018 220 (H) 70 - 99 mg/dL Final     GLUCOSE BY METER POCT   Date Value Ref Range Status   08/01/2022 233 (H) 70 - 99 mg/dL Final     Urea Nitrogen   Date Value Ref Range Status   03/15/2023 17.3 8.0 - 23.0 mg/dL Final   08/01/2022 21 7 - 30 mg/dL Final   03/12/2018 13 7 - 30 mg/dL Final     Creatinine   Date Value Ref Range Status   03/15/2023 1.17 0.67 - 1.17 mg/dL Final   09/28/2020 1.03 0.66 - 1.25 mg/dL Final     Creatinine POCT   Date Value Ref Range Status   05/10/2023 1.3 0.7 - 1.3 mg/dL Final     GFR Estimate   Date Value Ref Range Status   09/28/2020 75 >60 mL/min/[1.73_m2] Final     Comment:     Non  GFR Calc  Starting 12/18/2018, serum creatinine based estimated GFR (eGFR) will be   calculated using the Chronic Kidney Disease Epidemiology Collaboration   (CKD-EPI) equation.       GFR, ESTIMATED POCT   Date Value Ref Range Status   05/10/2023 60 (L) >60 mL/min/1.73m2 Final     Calcium   Date Value Ref Range Status   03/15/2023 9.9 8.8 - 10.2 mg/dL Final   03/12/2018 9.6 8.5 - 10.1 mg/dL Final     Bilirubin Total   Date Value Ref Range Status   03/24/2022 0.2 0.2 - 1.3 mg/dL Final   12/05/2016 0.4 0.2 - 1.3 mg/dL Final     Alkaline Phosphatase   Date Value Ref  Range Status   03/24/2022 99 40 - 150 U/L Final   12/05/2016 81 40 - 150 U/L Final     ALT   Date Value Ref Range Status   03/15/2023 14 10 - 50 U/L Final   09/29/2017 36 0 - 70 U/L Final     AST   Date Value Ref Range Status   03/24/2022 10 0 - 45 U/L Final   12/05/2016 15 0 - 45 U/L Final       ASSESSMENT / PLAN:  No diagnosis found.    Diabetes Mellitus- Recent A1c 7.9 March 1.  Glucose Control:  est A1C now would be approx 8, overall doing ok              - Jardiance continue 25mg daily   - Semglee- 44u at bedtime- continue    - Continue mealtime insulin Novolog 20u with DINNER (large meals), 15u with medium meals 10u with snacks/smaller meal    - Continue sliding scale 1:20 >120. No doses if not eating  2.  Lipids:  Meets criteria per ASCVD risk %, continue crestor 40mg daily  3.  Blood Pressure:  At goal, continue meds  4.  CV prevention:  Continue aspirin 325 mg daily, plavix  5.  Diabetic Nephropathy screening:   continue ACEI/ARB , improved  6.  Diabetic Retinopathy screening:  continue annual dilated eye exams due now-- he will call and schedule  7.  Diabetic Neuropathy screening:  Up to date.  Instructed on routine foot care, follow-up with podiatry as needed.       Orders Placed This Encounter   Procedures     Hemoglobin A1c        Return to clinic 6 months    A total of 30 minutes were spent today 07/11/23 on this visit including chart review, history and counseling, documentation and other activities as detailed above.     Answers for HPI/ROS submitted by the patient on 7/11/2023  General Symptoms: Yes  Skin Symptoms: No  HENT Symptoms: No  EYE SYMPTOMS: No  HEART SYMPTOMS: No  LUNG SYMPTOMS: Yes  INTESTINAL SYMPTOMS: No  URINARY SYMPTOMS: No  REPRODUCTIVE SYMPTOMS: No  SKELETAL SYMPTOMS: Yes  BLOOD SYMPTOMS: No  NERVOUS SYSTEM SYMPTOMS: Yes  MENTAL HEALTH SYMPTOMS: No  Fever: No  Loss of appetite: No  Weight loss: No  Weight gain: No  Fatigue: Yes  Night sweats: No  Chills: No  Increased stress:  No  Excessive hunger: No  Excessive thirst: No  Feeling hot or cold when others believe the temperature is normal: No  Loss of height: No  Post-operative complications: No  Surgical site pain: No  Hallucinations: No  Change in or Loss of Energy: Yes  Hyperactivity: No  Confusion: Yes  Cough: No  Sputum or phlegm: No  Coughing up blood: No  Difficulty breating or shortness of breath: No  Snoring: Yes  Wheezing: No  Difficulty breathing on exertion: No  Nighttime Cough: No  Difficulty breathing when lying flat: No  Back pain: No  Muscle aches: No  Neck pain: Yes  Swollen joints: No  Joint pain: No  Bone pain: No  Muscle cramps: No  Muscle weakness: No  Joint stiffness: No  Bone fracture: No  Trouble with coordination: No  Dizziness or trouble with balance: Yes  Fainting or black-out spells: Yes  Memory loss: Yes  Headache: Yes  Seizures: No  Speech problems: No  Tingling: No  Tremor: No  Weakness: Yes  Difficulty walking: No  Paralysis: No  Numbness: No          Again, thank you for allowing me to participate in the care of your patient.        Sincerely,        Ivonne Odom MD

## 2023-07-12 ENCOUNTER — TELEPHONE (OUTPATIENT)
Dept: ENDOCRINOLOGY | Facility: CLINIC | Age: 68
End: 2023-07-12

## 2023-07-12 NOTE — TELEPHONE ENCOUNTER
LVM for PT to call [phone] to schedule 6 month f/u up appt with a endo provider helping out with Dr. Odom being out for maternity leave.

## 2023-07-18 ENCOUNTER — OFFICE VISIT (OUTPATIENT)
Dept: FAMILY MEDICINE | Facility: CLINIC | Age: 68
End: 2023-07-18
Payer: COMMERCIAL

## 2023-07-18 VITALS
SYSTOLIC BLOOD PRESSURE: 100 MMHG | WEIGHT: 228 LBS | BODY MASS INDEX: 32.64 KG/M2 | OXYGEN SATURATION: 98 % | DIASTOLIC BLOOD PRESSURE: 60 MMHG | TEMPERATURE: 97 F | HEIGHT: 70 IN | HEART RATE: 75 BPM

## 2023-07-18 DIAGNOSIS — I95.9 HYPOTENSION, UNSPECIFIED HYPOTENSION TYPE: ICD-10-CM

## 2023-07-18 DIAGNOSIS — G45.9 TIA (TRANSIENT ISCHEMIC ATTACK): ICD-10-CM

## 2023-07-18 DIAGNOSIS — N48.1 BALANITIS: Primary | ICD-10-CM

## 2023-07-18 PROCEDURE — 99214 OFFICE O/P EST MOD 30 MIN: CPT | Performed by: PHYSICIAN ASSISTANT

## 2023-07-18 RX ORDER — CLOTRIMAZOLE 1 %
CREAM (GRAM) TOPICAL 2 TIMES DAILY
Qty: 14 G | Refills: 1 | Status: SHIPPED | OUTPATIENT
Start: 2023-07-18

## 2023-07-18 ASSESSMENT — PATIENT HEALTH QUESTIONNAIRE - PHQ9
10. IF YOU CHECKED OFF ANY PROBLEMS, HOW DIFFICULT HAVE THESE PROBLEMS MADE IT FOR YOU TO DO YOUR WORK, TAKE CARE OF THINGS AT HOME, OR GET ALONG WITH OTHER PEOPLE: NOT DIFFICULT AT ALL
SUM OF ALL RESPONSES TO PHQ QUESTIONS 1-9: 0
SUM OF ALL RESPONSES TO PHQ QUESTIONS 1-9: 0

## 2023-07-18 ASSESSMENT — PAIN SCALES - GENERAL: PAINLEVEL: NO PAIN (0)

## 2023-07-18 NOTE — PROGRESS NOTES
Assessment & Plan     Balanitis  Suspect balanitis.  Recommend clotrimazole cream twice daily for at least 2 weeks or until resolution.  Follow-up closely if no improvement or worsening of symptoms.  No signs of phimosis/paraphimosis today.  No concern for STIs or secondary infection at this point.  Discussed the importance of self hygiene including keeping the area very clean and dry while we treat this infection.  Comfortable with plan.  - clotrimazole (LOTRIMIN) 1 % external cream  Dispense: 14 g; Refill: 1    Hypotension, unspecified hypotension type  100/60 on repeat.  Recommended staying well-hydrated.  Close monitoring of blood pressures at home.  If continues to have low blood pressure would decrease amount of lisinopril.  Comfortable with plan.  Keep office posted if persistently low readings.      30 minutes spent by me on the date of the encounter doing chart review, review of test results, interpretation of tests, patient visit and documentation          No follow-ups on file.    The likelihood of other entities in the differential is insufficient to justify any further testing for them at this time. This was explained to the patient. The patient was advised that persistent or worsening symptoms would require further evaluation. Patient advised to call the office and if unable to reach to go to the emergency room if they develop any new or worsening symptoms. Expressed understanding and agreement with above stated plan.     CATIE Peterson [facility] [place]    Ashley St is a very pleasant 67 year old male with a past medical history significant for type 2 diabetes, hyperlipidemia, obesity, NA, and neuropathy presenting for the following health issues:  Patient presents with:  Personal  Hypotension: 91/62, 83/57 automatic heart rate 75    Here today for evaluation of a 2-week history of penile head redness.  Irritation.  Denies fever, chills, night sweats.  Denies  "discharge from his penis or any urinary issues.  Denies pain in his scrotum/testicles.  Has had 2-3 similar episodes of this in the past which have self resolved.  Otherwise feels well.  Blood pressure 91/62 today in office repeat 100/60.  Does take lisinopril 20 mg daily.  Notes he only ate once this morning and has had minimal fluid intake.  Denies dizziness, lightheadedness.       Review of Systems   Constitutional, HEENT, cardiovascular, pulmonary, GI, , musculoskeletal, neuro, skin, endocrine and psych systems are negative, except as otherwise noted.      Objective    /60   Pulse 75   Temp 97  F (36.1  C) (Oral)   Ht 1.778 m (5' 10\")   Wt 103.4 kg (228 lb)   SpO2 98%   BMI 32.71 kg/m    5' 10\"  228 lbs 0 oz    Allergies   Allergen Reactions     Amoxicillin-Pot Clavulanate Diarrhea     Liraglutide      Skin rash     Sulfa Antibiotics      Unknown - reaction occurred as a child     Ozempic (0.25 Or 0.5 Mg-Dose) [Semaglutide(0.25 Or 0.5mg-Dos)] Diarrhea     Current Outpatient Medications   Medication Sig Dispense Refill     alcohol swab prep pads Use to swab area of injection/ian as directed 100 each 3     BD PEN NEEDLE MARIE 2ND GEN 32G X 4 MM miscellaneous USE THREE PEN NEEDLES DAILY AS DIRECTED 300 each 1     blood glucose calibration (NO BRAND SPECIFIED) solution Use to calibrate blood glucose monitor as needed as directed. To accompany: Blood Glucose Monitor Brands: per insurance. 1 Bottle 3     clopidogrel (PLAVIX) 75 MG tablet TAKE 1 TABLET BY MOUTH EVERY DAY 90 tablet 1     clotrimazole (LOTRIMIN) 1 % external cream Apply topically 2 times daily 14 g 1     clotrimazole (LOTRIMIN) 1 % external cream Apply topically 2 times daily (Patient taking differently: Apply topically 2 times daily as needed) 30 g 11     Continuous Blood Gluc Sensor (FREESTYLE NANO 3 SENSOR) MISC 1 each every 14 days 9 each 3     CONTOUR NEXT TEST test strip USE TO TEST BLOOD SUGAR THREE TIMES A DAY OR AS DIRECTED (NEED " "APPOINTMENT FOR MORE REFILLS) 300 strip 2     empagliflozin (JARDIANCE) 25 MG TABS tablet Take 1 tablet (25 mg) by mouth daily 90 tablet 2     escitalopram (LEXAPRO) 20 MG tablet TAKE 1 TABLET BY MOUTH EVERY DAY 90 tablet 0     insulin aspart (NOVOLOG PEN) 100 UNIT/ML pen Take 15u with meals plus sliding scale max daily dose 75u 60 mL 3     Insulin Glargine-yfgn (SEMGLEE, YFGN,) 100 UNIT/ML SOLN Inject 46 Units Subcutaneous At Bedtime (Patient taking differently: Inject 44 Units Subcutaneous At Bedtime) 45 mL 3     lisinopril (ZESTRIL) 20 MG tablet TAKE 1 TABLET BY MOUTH EVERY DAY 90 tablet 3     ORDER FOR DME Auto-CPAP: Max 12 cm H2O Min 10 cm H2O  Continuous Lifetime need and heated humidity.    1 Device 0     rosuvastatin (CRESTOR) 20 MG tablet Take 1 tablet (20 mg) by mouth daily 90 tablet 3     rosuvastatin (CRESTOR) 40 MG tablet Take 20 mg by mouth daily 90 tablet 3     SEMGLEE, YFGN, 100 UNIT/ML SOPN INJECT 46 UNITS SUBCUTANEOUS DAILY 30 mL 3     thin (NO BRAND SPECIFIED) lancets Use to test blood sugar 3 times daily or as directed. To accompany: Blood Glucose Monitor Brands: per insurance. 200 each 6     Past Medical History:   Diagnosis Date     Anxiety      Essential hypertension, benign      Hypercholesteremia      Sleep apnea      T2DM (type 2 diabetes mellitus) (H)      Past Surgical History:   Procedure Laterality Date     COLONOSCOPY       COLONOSCOPY N/A 10/26/2020    Procedure: COLONOSCOPY;  Surgeon: Randy Gonzalez MD;  Location:  GI     ENT SURGERY      R) ear \"procedure\"     IR CAROTID CEREBRAL ANGIOGRAM BILATERAL  4/7/2022     IR CAROTID CEREBRAL ANGIOGRAM BILATERAL  4/16/2022       Physical Exam   GENERAL: healthy, alert and no distress  EYES: Eyes grossly normal to inspection, PERRL and conjunctivae and sclerae normal  NECK: no asymmetry, masses, or scars  RESP: lungs clear to auscultation - no rales, rhonchi or wheezes  CV: regular rate and rhythm, normal S1 S2, no S3 or S4, no " murmur, click or rub, no peripheral edema and peripheral pulses strong  ABDOMEN: soft, nontender, no hepatosplenomegaly, no masses  : Chaperone present.  Penile shaft/penile head with redness and irritation.  White discharge present between the folds.  No discharge from the penis.  No concerning lesions.  No testicular/scrotal pain with palpation.  Foreskin easily retractable.  No signs of phimosis/paraphimosis.  MS: no gross musculoskeletal defects noted, no edema  SKIN: no suspicious lesions or rashes  NEURO: Normal strength and tone, mentation intact and speech normal  PSYCH: mentation appears normal, affect normal/bright        Answers for HPI/ROS submitted by the patient on 7/18/2023  If you checked off any problems, how difficult have these problems made it for you to do your work, take care of things at home, or get along with other people?: Not difficult at all  PHQ9 TOTAL SCORE: 0  How many servings of fruits and vegetables do you eat daily?: 2-3  On average, how many sweetened beverages do you drink each day (Examples: soda, juice, sweet tea, etc.  Do NOT count diet or artificially sweetened beverages)?: 1  How many minutes a day do you exercise enough to make your heart beat faster?: 9 or less  How many days a week do you exercise enough to make your heart beat faster?: 4  How many days per week do you miss taking your medication?: 0  What is the reason for your visit today?: penis  What are your symptoms?: penis infected  How would you describe these symptoms?: Moderate  Are your symptoms:: Worsening  Have you had these symptoms before?: Yes  Have you tried or received treatment for these symptoms before?: No

## 2023-07-19 RX ORDER — CLOPIDOGREL BISULFATE 75 MG/1
TABLET ORAL
Qty: 90 TABLET | Refills: 1 | Status: SHIPPED | OUTPATIENT
Start: 2023-07-19 | End: 2023-12-26

## 2023-07-19 NOTE — TELEPHONE ENCOUNTER
CC: Patient calling to follow up on this refill request.    Notified patient that refill was received and should be processed within 24 hours.    Patient expressed understanding and is agreeable.    Joshua Galvez RN  [facility]

## 2023-07-21 NOTE — TELEPHONE ENCOUNTER
sent pt [employer] message to call in to schedule f/u appt with an endo provider that is filling in for Dr. Odom.

## 2023-07-25 ENCOUNTER — VIRTUAL VISIT (OUTPATIENT)
Dept: NEUROSURGERY | Facility: CLINIC | Age: 68
End: 2023-07-25
Payer: COMMERCIAL

## 2023-07-25 DIAGNOSIS — I66.02 MIDDLE CEREBRAL ARTERY STENOSIS, LEFT: Primary | ICD-10-CM

## 2023-07-25 PROCEDURE — 99441 PR PHYSICIAN TELEPHONE EVALUATION 5-10 MIN: CPT | Performed by: NEUROLOGICAL SURGERY

## 2023-07-25 ASSESSMENT — PAIN SCALES - GENERAL: PAINLEVEL: NO PAIN (0)

## 2023-07-25 NOTE — LETTER
7/25/2023       RE: [address]  [address] [place] [address]     Dear Colleague,    Thank you for referring your patient, Huseyin St, to the [facility]. Please see a copy of my visit note below.      Has some trouble in the heat. Blood pressure had been in the 90s two weeks ago.     Check with primary clinic about adjusting anti-HTN medications, lowering lisinopril potentially    CTA head in December 2023 at [facility]    Stay on Plavix      Again, thank you for allowing me to participate in the care of your patient.      Sincerely,    Lele Conrad MD

## 2023-07-25 NOTE — PATIENT INSTRUCTIONS
Please follow up in December 2023 with a CTA of the Head prior. Please continue taking Plavix.    Stroke & Endovascular RN Care Coordinators:    Clare Juarez, RN, BSN  Emily Torres, RN, CNRN, SCRN    If you have any questions please contact the RN Care Coordinators at [phone], option 1.     After business hours call the  at [phone] and have the Neuro-Interventional Fellow paged.    Thank you for choosing [facility] for your health care needs.

## 2023-07-25 NOTE — NURSING NOTE
Is the patient currently in the state of [place]? YES    Visit mode:TELEPHONE    If the visit is dropped, the patient can be reconnected by: TELEPHONE VISIT: Phone number: [phone]    Will anyone else be joining the visit? YES: How would they like to receive their invitation? Text to cell phone: [phone]      How would you like to obtain your AVS? MyChart    Are changes needed to the allergy or medication list? NO  NO changes in past 7 days when last in clinic  Reason for visit: Follow Up

## 2023-08-07 DIAGNOSIS — F41.8 MIXED ANXIETY DEPRESSIVE DISORDER: ICD-10-CM

## 2023-08-07 RX ORDER — ESCITALOPRAM OXALATE 20 MG/1
TABLET ORAL
Qty: 90 TABLET | Refills: 0 | Status: SHIPPED | OUTPATIENT
Start: 2023-08-07 | End: 2023-10-05

## 2023-08-09 NOTE — TELEPHONE ENCOUNTER
RODRIGO (2nd) for PT to call [phone] to schedule f/u appt with a provider that is filling in for Dr. Odom for Jan.

## 2023-08-14 ENCOUNTER — TELEPHONE (OUTPATIENT)
Dept: FAMILY MEDICINE | Facility: CLINIC | Age: 68
End: 2023-08-14
Payer: COMMERCIAL

## 2023-08-23 ENCOUNTER — OFFICE VISIT (OUTPATIENT)
Dept: FAMILY MEDICINE | Facility: CLINIC | Age: 68
End: 2023-08-23
Payer: COMMERCIAL

## 2023-08-23 ENCOUNTER — TRANSFERRED RECORDS (OUTPATIENT)
Dept: MULTI SPECIALTY CLINIC | Facility: CLINIC | Age: 68
End: 2023-08-23

## 2023-08-23 VITALS
OXYGEN SATURATION: 98 % | TEMPERATURE: 97.7 F | DIASTOLIC BLOOD PRESSURE: 77 MMHG | HEIGHT: 70 IN | BODY MASS INDEX: 32.64 KG/M2 | HEART RATE: 85 BPM | WEIGHT: 228 LBS | SYSTOLIC BLOOD PRESSURE: 113 MMHG | RESPIRATION RATE: 16 BRPM

## 2023-08-23 DIAGNOSIS — E11.00 TYPE 2 DIABETES MELLITUS WITH HYPEROSMOLARITY WITHOUT COMA, WITHOUT LONG-TERM CURRENT USE OF INSULIN (H): ICD-10-CM

## 2023-08-23 DIAGNOSIS — E78.5 HYPERLIPIDEMIA LDL GOAL <100: ICD-10-CM

## 2023-08-23 DIAGNOSIS — I10 ESSENTIAL HYPERTENSION, BENIGN: ICD-10-CM

## 2023-08-23 DIAGNOSIS — Z79.4 TYPE 2 DIABETES MELLITUS WITH DIABETIC NEPHROPATHY, WITH LONG-TERM CURRENT USE OF INSULIN (H): ICD-10-CM

## 2023-08-23 DIAGNOSIS — Z76.0 ENCOUNTER FOR MEDICATION REFILL: Primary | ICD-10-CM

## 2023-08-23 DIAGNOSIS — E11.21 TYPE 2 DIABETES MELLITUS WITH DIABETIC NEPHROPATHY, WITH LONG-TERM CURRENT USE OF INSULIN (H): ICD-10-CM

## 2023-08-23 LAB — RETINOPATHY: NORMAL

## 2023-08-23 PROCEDURE — 99214 OFFICE O/P EST MOD 30 MIN: CPT | Performed by: INTERNAL MEDICINE

## 2023-08-23 ASSESSMENT — PAIN SCALES - GENERAL: PAINLEVEL: NO PAIN (0)

## 2023-08-23 NOTE — PROGRESS NOTES
Subjective   Huseyin is a 67 year old, presenting for the following health issues:  Follow Up and Health Maintenance (Eye Exam scheduled for today 08/23/2023  at [facility] (previously [facility]))      HPI         Diabetes Follow-up    How often are you checking your blood sugar? 3 times daily  Blood sugar testing frequency justification:  Uncontrolled diabetes  What time of day are you checking your blood sugars (select all that apply)?  Before meals  Have you had any blood sugars above 200?  Yes   Have you had any blood sugars below 70?  No  What symptoms do you notice when your blood sugar is low?  None  What concerns do you have today about your diabetes? Elevated BS   Do you have any of these symptoms? (Select all that apply)  Thirsty, weight loss ( 7lbs)      BP Readings from Last 2 Encounters:   04/01/22 132/81   03/27/22 119/76     Hemoglobin A1C POCT (%)   Date Value   09/28/2020 13.2 (H)   09/23/2019 12.3 (H)     Hemoglobin A1C (%)   Date Value   03/16/2022 11.8 (H)   10/11/2021 11.8 (H)     LDL Cholesterol Calculated (mg/dL)   Date Value   03/16/2022 136 (H)   10/11/2021 116 (H)   09/28/2020 96   09/23/2019     Cannot estimate LDL when triglyceride exceeds 400 mg/dL     LDL Cholesterol Direct (mg/dL)   Date Value   09/23/2019 127 (H)       Current Medications:     Current Outpatient Medications   Medication Sig Dispense Refill    LORazepam (ATIVAN) 0.5 MG tablet Take 1 tablet (0.5 mg) by mouth once as needed for anxiety 3 tablet 1    alcohol swab prep pads Use to swab area of injection/ian as directed 100 each 3    aspirin (ASA) 325 MG EC tablet Take 1 tablet (325 mg) by mouth daily 90 tablet 0    blood glucose (NO BRAND SPECIFIED) test strip Use to test blood sugar 3 times daily or as directed. To accompany: Blood Glucose Monitor Brands: per insurance. 300 strip 1    blood glucose calibration (NO BRAND SPECIFIED) solution Use to calibrate blood glucose monitor as  "needed as directed. To accompany: Blood Glucose Monitor Brands: per insurance. 1 Bottle 3    clopidogrel (PLAVIX) 75 MG tablet Take 1 tablet (75 mg) by mouth daily 90 tablet 1    clotrimazole (LOTRIMIN) 1 % external cream Apply topically 2 times daily (Patient taking differently: Apply topically 2 times daily as needed ) 30 g 11    escitalopram (LEXAPRO) 20 MG tablet Take 1 tablet (20 mg) by mouth daily 90 tablet 3    insulin aspart (NOVOLOG FLEXPEN) 100 UNIT/ML pen Inject 12 Units Subcutaneous 3 times daily (with meals) 15 mL 3    insulin glargine (LANTUS SOLOSTAR) 100 UNIT/ML pen INJ 55 UNITS SC HS 10 mL 9    insulin pen needle (BD MARIE U/F) 32G X 4 MM miscellaneous USE THREE PEN NEEDLES DAILY AS DIRECTED 300 each 1    lisinopril (ZESTRIL) 40 MG tablet Take 0.5 tablets (20 mg) by mouth daily 15 tablet 1    ORDER FOR DME Auto-CPAP: Max 12 cm H2O Min 10 cm H2O  Continuous Lifetime need and heated humidity.    1 Device 0    rosuvastatin (CRESTOR) 40 MG tablet Take 1 tablet (40 mg) by mouth daily 90 tablet 3    sildenafil (REVATIO) 20 MG tablet Take 1 tablet (20 mg) by mouth 3 times daily (Patient not taking: Reported on 4/1/2022) 30 tablet 11    thin (NO BRAND SPECIFIED) lancets Use to test blood sugar 3 times daily or as directed. To accompany: Blood Glucose Monitor Brands: per insurance. 200 each 6         Allergies:      Allergies   Allergen Reactions    Augmentin Diarrhea    Sulfa Drugs      Unknown - reaction occurred as a child    Victoza      Skin rash            Past Medical History:     Past Medical History:   Diagnosis Date    Anxiety     Essential hypertension, benign     Hypercholesteremia     Sleep apnea     T2DM (type 2 diabetes mellitus) (H)          Past Surgical History:     Past Surgical History:   Procedure Laterality Date    COLONOSCOPY      COLONOSCOPY N/A 10/26/2020    Procedure: COLONOSCOPY;  Surgeon: Randy Gonzalez MD;  Location:  GI    ENT SURGERY      R) ear \"procedure\" "         Family Medical History:     Family History   Adopted: Yes   Problem Relation Age of Onset    Diabetes Mother     Respiratory Mother         asthma    Lipids Mother     Arthritis Mother         knee replacement    Alzheimer Disease Mother     Cerebrovascular Disease Brother     C.A.D. No family hx of     Cancer - colorectal No family hx of     Prostate Cancer No family hx of          Social History:     Social History     Socioeconomic History    Marital status:      Spouse name: Estefania Laura    Number of children: 1    Years of education: Not on file    Highest education level: Not on file   Occupational History    Not on file   Tobacco Use    Smoking status: Never Smoker    Smokeless tobacco: Never Used   Substance and Sexual Activity    Alcohol use: Yes     Alcohol/week: 0.0 standard drinks     Comment: 3-6 drinks on occ weekend night, occ drink on weekday    Drug use: Yes     Types: Cocaine    Sexual activity: Yes     Partners: Female   Other Topics Concern     Service No    Blood Transfusions No    Caffeine Concern Not Asked    Occupational Exposure Not Asked    Hobby Hazards Not Asked    Sleep Concern Not Asked    Stress Concern Not Asked    Weight Concern Not Asked    Special Diet Not Asked    Back Care Not Asked    Exercise Not Asked    Bike Helmet Not Asked    Seat Belt Not Asked    Self-Exams Not Asked    Parent/sibling w/ CABG, MI or angioplasty before 65F 55M? Not Asked   Social History Narrative    Not on file     Social Determinants of Health     Financial Resource Strain: Not on file   Food Insecurity: Not on file   Transportation Needs: Not on file   Physical Activity: Not on file   Stress: Not on file   Social Connections: Not on file   Intimate Partner Violence: Not on file   Housing Stability: Not on file           Review of System:     Constitutional: Negative for fever or chills  Skin: Negative for rashes  Ears/Nose/Throat: Negative for nasal congestion, sore  "throat  Respiratory: No shortness of breath, dyspnea on exertion, cough, or hemoptysis  Cardiovascular: Negative for chest pain  Gastrointestinal: Negative for nausea, vomiting  Genitourinary: Negative for dysuria, hematuria  Musculoskeletal: Negative for myalgias  Neurologic: Negative for headaches, positive for previous CVA with dysarthria symptoms  Psychiatric: positive for anxiety  Hematologic/Lymphatic/Immunologic: Negative  Endocrine: Negative  Behavioral: Negative for tobacco use       Physical Exam:   /81 (BP Location: Right arm, Patient Position: Sitting, Cuff Size: Adult Large)   Pulse 69   Temp 97.6  F (36.4  C) (Temporal)   Resp 16   Ht 1.778 m (5' 10\")   Wt 106.1 kg (234 lb)   SpO2 98%   BMI 33.58 kg/m      GENERAL: alert and no distress  EYES: eyes grossly normal to inspection, and conjunctivae and sclerae normal  HENT: Normocephalic atraumatic. Nose and mouth without ulcers or lesions  NECK: supple  RESP: lungs clear to auscultation   CV: regular rate and rhythm, normal S1 S2  LYMPH: no peripheral edema   ABDOMEN: nondistended  MS: no gross musculoskeletal defects noted  SKIN: no suspicious lesions or rashes  NEURO: Alert & Oriented x 3. Mild dysarthria symptoms noted  PSYCH: mentation appears normal, affect normal        Diagnostic Test Results:     Diagnostic Test Results:  Labs reviewed in Epic    Hemoglobin A1C POCT   Date Value Ref Range Status   09/28/2020 13.2 (H) 0 - 5.6 % Final     Comment:     Normal <5.7% Prediabetes 5.7-6.4%  Diabetes 6.5% or higher - adopted from ADA   consensus guidelines.  Reviewed: OK with previous       Hemoglobin A1C   Date Value Ref Range Status   03/16/2022 11.8 (H) 0.0 - 5.6 % Final     Comment:     Normal <5.7%   Prediabetes 5.7-6.4%    Diabetes 6.5% or higher     Note: Adopted from ADA consensus guidelines.       ASSESSMENT/PLAN:       Huseyin was seen today for follow up and health maintenance.    Diagnoses and all orders for this visit:    Encounter " for medication refill    Hyperlipidemia LDL goal <100  -     rosuvastatin (CRESTOR) 20 MG tablet; Take 1 tablet (20 mg) by mouth daily    Essential hypertension, benign  -     lisinopril (ZESTRIL) 20 MG tablet; Take 1 tablet (20 mg) by mouth daily    Type 2 diabetes mellitus with diabetic nephropathy, with long-term current use of insulin (H)  -     insulin pen needle (BD PEN NEEDLE MARIE 2ND GEN) 32G X 4 MM miscellaneous; USE THREE PEN NEEDLES DAILY AS DIRECTED  -     Insulin Glargine-yfgn (SEMGLEE, YFGN,) 100 UNIT/ML SOLN; Inject 44 Units Subcutaneous At Bedtime    Type 2 diabetes mellitus with hyperosmolarity without coma, without long-term current use of insulin (H)  -     insulin aspart (NOVOLOG PEN) 100 UNIT/ML pen; Take 15u with meals plus sliding scale max daily dose 75u        Follow Up Plan:     Patient is instructed to return to Internal Medicine clinic for follow-up visit in 3 to 6 months.        Kendal Wong MD  Internal Medicine  [facility]

## 2023-08-23 NOTE — Clinical Note
Please abstract the following data from this visit with this patient into the appropriate field in Epic:  Tests that can be patient reported without a hard copy:  Eye exam with ophthalmology on this date: 08/23/2023  at [facility] ([facility]viously [facility])

## 2023-08-24 RX ORDER — ROSUVASTATIN CALCIUM 20 MG/1
20 TABLET, COATED ORAL DAILY
Qty: 90 TABLET | Refills: 3 | Status: SHIPPED | OUTPATIENT
Start: 2023-08-24 | End: 2024-09-27

## 2023-08-24 RX ORDER — PEN NEEDLE, DIABETIC 32GX 5/32"
NEEDLE, DISPOSABLE MISCELLANEOUS
Qty: 300 EACH | Refills: 3 | Status: SHIPPED | OUTPATIENT
Start: 2023-08-24 | End: 2024-08-16

## 2023-08-24 RX ORDER — LISINOPRIL 20 MG/1
20 TABLET ORAL DAILY
Qty: 90 TABLET | Refills: 3 | Status: SHIPPED | OUTPATIENT
Start: 2023-08-24 | End: 2024-08-26

## 2023-08-24 RX ORDER — INSULIN GLARGINE-YFGN 100 [IU]/ML
44 INJECTION, SOLUTION SUBCUTANEOUS AT BEDTIME
Qty: 30 ML | Refills: 3 | Status: SHIPPED | OUTPATIENT
Start: 2023-08-24 | End: 2023-12-13

## 2023-10-05 DIAGNOSIS — F41.8 MIXED ANXIETY DEPRESSIVE DISORDER: ICD-10-CM

## 2023-10-05 RX ORDER — ESCITALOPRAM OXALATE 20 MG/1
TABLET ORAL
Qty: 90 TABLET | Refills: 0 | Status: SHIPPED | OUTPATIENT
Start: 2023-10-05 | End: 2023-12-19

## 2023-10-13 NOTE — PROGRESS NOTES
Virtual Visit Details    Type of service:  Telephone Visit   Phone call duration: 10 minutes     Has some trouble in the heat. Blood pressure had been in the 90s two weeks ago.     Check with primary clinic about adjusting anti-HTN medications, lowering lisinopril potentially    CTA head in December 2023 at [facility]    Stay on Plavix     No

## 2023-10-26 ENCOUNTER — TELEPHONE (OUTPATIENT)
Dept: NEUROSURGERY | Facility: CLINIC | Age: 68
End: 2023-10-26
Payer: COMMERCIAL

## 2023-10-27 ENCOUNTER — TELEPHONE (OUTPATIENT)
Dept: NEUROSURGERY | Facility: CLINIC | Age: 68
End: 2023-10-27
Payer: COMMERCIAL

## 2023-10-30 ENCOUNTER — TELEPHONE (OUTPATIENT)
Dept: NEUROSURGERY | Facility: CLINIC | Age: 68
End: 2023-10-30
Payer: COMMERCIAL

## 2023-11-01 NOTE — TELEPHONE ENCOUNTER
RANULFOM for PT to call [phone] to schedule 6 month f/u up appt with a endo provider helping out with Dr. Odom being out for maternity leave around Jan.

## 2023-11-05 ENCOUNTER — HEALTH MAINTENANCE LETTER (OUTPATIENT)
Age: 68
End: 2023-11-05

## 2023-11-15 DIAGNOSIS — F41.8 MIXED ANXIETY DEPRESSIVE DISORDER: ICD-10-CM

## 2023-11-15 DIAGNOSIS — G45.9 TIA (TRANSIENT ISCHEMIC ATTACK): ICD-10-CM

## 2023-11-15 DIAGNOSIS — E11.21 TYPE 2 DIABETES MELLITUS WITH DIABETIC NEPHROPATHY, WITH LONG-TERM CURRENT USE OF INSULIN (H): ICD-10-CM

## 2023-11-15 DIAGNOSIS — Z79.4 TYPE 2 DIABETES MELLITUS WITH DIABETIC NEPHROPATHY, WITH LONG-TERM CURRENT USE OF INSULIN (H): ICD-10-CM

## 2023-11-15 RX ORDER — EMPAGLIFLOZIN 25 MG/1
25 TABLET, FILM COATED ORAL DAILY
Qty: 90 TABLET | Refills: 0 | Status: SHIPPED | OUTPATIENT
Start: 2023-11-15 | End: 2024-04-04

## 2023-11-15 RX ORDER — CLOPIDOGREL BISULFATE 75 MG/1
TABLET ORAL
Qty: 90 TABLET | Refills: 1 | OUTPATIENT
Start: 2023-11-15

## 2023-11-15 RX ORDER — ESCITALOPRAM OXALATE 20 MG/1
TABLET ORAL
Qty: 90 TABLET | Refills: 0 | OUTPATIENT
Start: 2023-11-15

## 2023-11-15 NOTE — TELEPHONE ENCOUNTER
"Last Written Prescription Date:  4/26/23  Last Fill Quantity: 90,  # refills: 2   Last office visit: 7/11/2023 with prescribing provider:  Dr. Odom   Future Office Visit:  Due back 1/24        Requested Prescriptions   Pending Prescriptions Disp Refills    JARDIANCE 25 MG TABS tablet [Pharmacy Med Name: JARDIANCE 25 MG TABLET] 90 tablet 2     Sig: TAKE 1 TABLET BY MOUTH EVERY DAY       Sodium Glucose Co-Transport Inhibitor Agents Failed - 11/15/2023  9:41 AM        Failed - Patient has documented A1c within the specified period of time.     If HgbA1C is 8 or greater, it needs to be on file within the past 3 months.  If less than 8, must be on file within the past 6 months.     Recent Labs   Lab Test 03/02/23  0956   A1C 7.9*             Passed - No creatinine >1.4 or GFR <45 within the past 12 mos     Recent Labs   Lab Test 05/10/23  1139 10/11/21  0942 09/28/20  0925   GFRESTIMATED 60*   < > 75   GFRESTBLACK  --   --  87    < > = values in this interval not displayed.       Recent Labs   Lab Test 05/10/23  1139   CR 1.3             Passed - Medication is active on med list        Passed - Patient is age 18 or older        Passed - Patient has documented normal Potassium within the last 12 mos.     Recent Labs   Lab Test 03/15/23  0901   POTASSIUM 4.4             Passed - Recent (6 mo) or future (30 days) visit within the authorizing provider's specialty     Patient had office visit in the last 6 months or has a visit in the next 30 days with authorizing provider or within the authorizing provider's specialty.  See \"Patient Info\" tab in inbasket, or \"Choose Columns\" in Meds & Orders section of the refill encounter.               Refill sent with note to pharmacy that pt needs to schedule appt for add'l refills. Rolanda Ochoa RN      "

## 2023-11-30 NOTE — TELEPHONE ENCOUNTER
RANULFOM (final) for PT to call [phone] to schedule f/u appt with a provider that is filling in for Dr. Odom for Jan.

## 2023-12-05 ENCOUNTER — ANCILLARY PROCEDURE (OUTPATIENT)
Dept: CT IMAGING | Facility: CLINIC | Age: 68
End: 2023-12-05
Attending: NEUROLOGICAL SURGERY
Payer: COMMERCIAL

## 2023-12-05 DIAGNOSIS — I66.02 MIDDLE CEREBRAL ARTERY STENOSIS, LEFT: ICD-10-CM

## 2023-12-05 LAB
CREAT BLD-MCNC: 1.3 MG/DL (ref 0.7–1.3)
EGFRCR SERPLBLD CKD-EPI 2021: 59 ML/MIN/1.73M2

## 2023-12-05 PROCEDURE — 70496 CT ANGIOGRAPHY HEAD: CPT | Mod: MG

## 2023-12-05 PROCEDURE — 250N000009 HC RX 250: Performed by: NEUROLOGICAL SURGERY

## 2023-12-05 PROCEDURE — 250N000011 HC RX IP 250 OP 636: Performed by: NEUROLOGICAL SURGERY

## 2023-12-05 PROCEDURE — G1010 CDSM STANSON: HCPCS

## 2023-12-05 PROCEDURE — 82565 ASSAY OF CREATININE: CPT

## 2023-12-05 RX ORDER — IOPAMIDOL 755 MG/ML
67 INJECTION, SOLUTION INTRAVASCULAR ONCE
Status: COMPLETED | OUTPATIENT
Start: 2023-12-05 | End: 2023-12-05

## 2023-12-05 RX ADMIN — SODIUM CHLORIDE 70 ML: 9 INJECTION, SOLUTION INTRAVENOUS at 09:02

## 2023-12-05 RX ADMIN — IOPAMIDOL 67 ML: 755 INJECTION, SOLUTION INTRAVENOUS at 09:02

## 2023-12-11 ENCOUNTER — NURSE TRIAGE (OUTPATIENT)
Dept: FAMILY MEDICINE | Facility: CLINIC | Age: 68
End: 2023-12-11
Payer: COMMERCIAL

## 2023-12-11 NOTE — TELEPHONE ENCOUNTER
Patient requesting ENT referral. Patient states that he has spoken recently to PCP concerning ENT concerns in the past, but not about this most recent episode of decreased hearing.    Onset: last couple weeks  Symptom: decreased hearing in both ears but particularly in right ear  Severity: mild  Pattern: constant  Pain: denies  Cause: unsure, possibly earwax  Other sx: denies dizziness, ringing in ears   Patient states that he had ear tubes placed years ago but cannot remember why, as he has had strokes since tubes were placed years ago    Disposition  See in Office Within 2 Weeks. Patient was agreeable to be seen in PCP's clinic within protocol's recommended timeframe. Writer made appt:  12/13/2023 10:00 AM (Arrive by 9:40 AM) Valerie Yu PA-C [facility]     Zena Jackson RN  -[facility]      Reason for Disposition   Decreased hearing (gradual) associated with aging    Additional Information   Negative: Followed an ear injury   Negative: Decreased hearing with nasal allergies   Negative: Part of a cold   Negative: Follows air travel or mountain driving   Negative: Earwax, questions about   Negative: Dizziness is main symptom   Negative: Tinnitus (e.g., ringing, hissing, beating) is main symptom   Negative: Patient sounds very sick or weak to the triager   Negative: Ringing in the ears (tinnitus) and taking aspirin and dosage sounds high (i.e., > 1500 mg/day)   Negative: Hearing loss in one or both ears of sudden onset and present now   Negative: Earache   Negative: Decreased hearing followed sudden, extremely loud noise (e.g., explosion, not just loud concert)   Negative: Decreased hearing and taking medication that can damage hearing (i.e., gentamycin, tobramycin, furosemide, ethacrynic acid, cisplatin, quinidine)   Negative: Patient wants to be seen   Negative: Decreased hearing in 1 ear and gradual onset   Negative: Recurrent episodes of hearing  loss, dizziness, and ringing in the ear   Negative: Tinnitus (ringing, hissing, beating) and only or mainly in 1 ear   Negative: Tinnitus (ringing, hissing, beating) and interferes with work, school, or sleep   Negative: Tinnitus (ringing, hissing, beating) and in both ears   Negative: Decreased hearing following an ear infection   Negative: Decreased hearing followed exposure to prolonged loud noise (e.g., concerts, headphones, work environment)    Protocols used: Hearing Loss or Change-A-OH

## 2023-12-13 ENCOUNTER — OFFICE VISIT (OUTPATIENT)
Dept: FAMILY MEDICINE | Facility: CLINIC | Age: 68
End: 2023-12-13
Payer: COMMERCIAL

## 2023-12-13 VITALS
SYSTOLIC BLOOD PRESSURE: 126 MMHG | BODY MASS INDEX: 33.84 KG/M2 | DIASTOLIC BLOOD PRESSURE: 82 MMHG | WEIGHT: 236.4 LBS | TEMPERATURE: 96.8 F | HEART RATE: 82 BPM | OXYGEN SATURATION: 98 % | HEIGHT: 70 IN | RESPIRATION RATE: 18 BRPM

## 2023-12-13 DIAGNOSIS — H90.6 MIXED CONDUCTIVE AND SENSORINEURAL HEARING LOSS OF BOTH EARS: Primary | ICD-10-CM

## 2023-12-13 PROCEDURE — 99213 OFFICE O/P EST LOW 20 MIN: CPT | Mod: 25 | Performed by: PHYSICIAN ASSISTANT

## 2023-12-13 PROCEDURE — 90471 IMMUNIZATION ADMIN: CPT | Performed by: PHYSICIAN ASSISTANT

## 2023-12-13 PROCEDURE — 90662 IIV NO PRSV INCREASED AG IM: CPT | Performed by: PHYSICIAN ASSISTANT

## 2023-12-13 RX ORDER — RESPIRATORY SYNCYTIAL VIRUS VACCINE 120MCG/0.5
0.5 KIT INTRAMUSCULAR ONCE
Qty: 1 EACH | Refills: 0 | Status: CANCELLED | OUTPATIENT
Start: 2023-12-13 | End: 2023-12-13

## 2023-12-13 ASSESSMENT — PAIN SCALES - GENERAL: PAINLEVEL: NO PAIN (0)

## 2023-12-13 NOTE — NURSING NOTE
Patient identified using two patient identifiers.  Ear exam showing wax occlusion completed by provider.  Solution: warm water was placed in the bilateral ear(s) via irrigation tool: elephant yared Fletcher CMA on 12/13/2023 at 5:07 PM

## 2023-12-13 NOTE — PROGRESS NOTES
"Assessment and Plan:     (H90.6) Mixed conductive and sensorineural hearing loss of both ears  (primary encounter diagnosis)  Comment: irrigated moderate amount of cerumen today which is likely contributing  Plan: Adult Audiology  Referral          Flu shot given today       Valerie Hurst PA-C      Subjective   Huseyin is a 68 year old, presenting for the following health issues:  Hearing Problem      History of Present Illness       Reason for visit:  Hearing    He eats 0-1 servings of fruits and vegetables daily.He consumes 0 sweetened beverage(s) daily.He exercises with enough effort to increase his heart rate 9 or less minutes per day.  He exercises with enough effort to increase his heart rate 3 or less days per week.   He is taking medications regularly.     Huseyin has noticed decreased hearing in right ear over the last few months  Describes it as muffled, he can still hear out of the ear but it is not as sharp  He is unsure if left ear   He denies pain, drainage         Review of Systems   See above      Objective      /82 (BP Location: Left arm, Patient Position: Sitting, Cuff Size: Adult Large)   Pulse 82   Temp 96.8  F (36  C) (Temporal)   Resp 18   Ht 1.778 m (5' 10\")   Wt 107.2 kg (236 lb 6.4 oz)   SpO2 98%   BMI 33.92 kg/m        Physical Exam     GENERAL: healthy, alert and no distress  ENT: OP clear, moderate amount of cerumen bilaterally, visible TM normal  RESP: lungs clear to auscultation - no rales, no rhonchi, no wheezes  CV: regular rates and rhythm, normal S1 S2, no S3 or S4 and no murmur, no click or rub             "

## 2023-12-19 DIAGNOSIS — F41.8 MIXED ANXIETY DEPRESSIVE DISORDER: ICD-10-CM

## 2023-12-19 RX ORDER — ESCITALOPRAM OXALATE 20 MG/1
TABLET ORAL
Qty: 90 TABLET | Refills: 0 | Status: SHIPPED | OUTPATIENT
Start: 2023-12-19 | End: 2024-05-13

## 2023-12-19 NOTE — TELEPHONE ENCOUNTER
Prescription approved per [facility] Refill Protocol.  Jannet Man, RN  [facility] Triage Nurse

## 2023-12-26 DIAGNOSIS — G45.9 TIA (TRANSIENT ISCHEMIC ATTACK): ICD-10-CM

## 2023-12-26 RX ORDER — CLOPIDOGREL BISULFATE 75 MG/1
TABLET ORAL
Qty: 90 TABLET | Refills: 1 | Status: SHIPPED | OUTPATIENT
Start: 2023-12-26 | End: 2024-07-03

## 2024-01-09 DIAGNOSIS — Z79.4 TYPE 2 DIABETES MELLITUS WITH DIABETIC NEPHROPATHY, WITH LONG-TERM CURRENT USE OF INSULIN (H): ICD-10-CM

## 2024-01-09 DIAGNOSIS — E11.21 TYPE 2 DIABETES MELLITUS WITH DIABETIC NEPHROPATHY, WITH LONG-TERM CURRENT USE OF INSULIN (H): ICD-10-CM

## 2024-01-09 RX ORDER — BLOOD-GLUCOSE SENSOR
1 EACH MISCELLANEOUS
Qty: 6 EACH | Refills: 1 | Status: SHIPPED | OUTPATIENT
Start: 2024-01-09 | End: 2024-06-18

## 2024-01-09 NOTE — TELEPHONE ENCOUNTER
Last Written Prescription Date:  22  Last Fill Quantity: 9,  # refills: 3   Last office visit: 2023 with prescribing provider:  Dr. Odom   Future Office Visit:  Was due back         Requested Prescriptions   Pending Prescriptions Disp Refills    Continuous Blood Gluc Sensor (FREESTYLE NANO 3 SENSOR) MISC 9 each 3     Si each every 14 days       There is no refill protocol information for this order        Refills sent  Note to pharm to make appt for add'l refills  Rolanda Ochoa RN

## 2024-01-15 ENCOUNTER — CARE COORDINATION (OUTPATIENT)
Dept: SLEEP MEDICINE | Facility: CLINIC | Age: 69
End: 2024-01-15
Payer: COMMERCIAL

## 2024-01-15 NOTE — PROGRESS NOTES
Left detailed message. I called 11/3/23 to cancel Bennet Goltz appointment due to provider being out of the office. Left scheduling number.

## 2024-01-22 ENCOUNTER — OFFICE VISIT (OUTPATIENT)
Dept: AUDIOLOGY | Facility: CLINIC | Age: 69
End: 2024-01-22
Attending: PHYSICIAN ASSISTANT
Payer: COMMERCIAL

## 2024-01-22 DIAGNOSIS — H90.6 MIXED CONDUCTIVE AND SENSORINEURAL HEARING LOSS OF BOTH EARS: ICD-10-CM

## 2024-01-22 DIAGNOSIS — H90.3 SENSORINEURAL HEARING LOSS (SNHL) OF BOTH EARS: Primary | ICD-10-CM

## 2024-01-22 PROCEDURE — 92567 TYMPANOMETRY: CPT | Performed by: AUDIOLOGIST

## 2024-01-22 PROCEDURE — 92557 COMPREHENSIVE HEARING TEST: CPT | Performed by: AUDIOLOGIST

## 2024-01-22 NOTE — PROGRESS NOTES
AUDIOLOGY REPORT    SUBJECTIVE:  Huseyin St is a 68 year old male who was seen in the Audiology Clinic at the [facility] for audiologic evaluation, referred by Valerie Hurst PA-C. The patient suspects a gradual hearing loss bilaterally, more so in the right. The patient denies otalgia, aural fullness, otorrhea, tinnitus, and dizziness.  The patient notes difficulty with communication in a variety of listening situations.     OBJECTIVE:  Abuse Screening:  Do you feel unsafe at home or work/school? No  Do you feel threatened by someone? No  Does anyone try to keep you from having contact with others, or doing things outside of your home? No  Physical signs of abuse present? No     Fall Risk Screen:  1. Have you fallen two or more times in the past year? No  2. Have you fallen and had an injury in the past year? No    Otoscopic exam indicates ears are clear of cerumen bilaterally     Pure Tone Thresholds assessed using conventional audiometry with good  reliability from 250-8000 Hz bilaterally using insert earphones and circumaural headphones     RIGHT:  normal sloping to mild sensorineural hearing loss    LEFT:    normal sloping to mild sensorineural hearing loss    Tympanogram:    RIGHT: negative pressure     LEFT:   normal eardrum mobility    Reflexes (reported by stimulus ear):  Could not seal    Speech Reception Threshold:    RIGHT: 25 dB HL    LEFT:   20 dB HL  Word Recognition Score:     RIGHT: 100% at 60 dB HL using NU-6 recorded word list.    LEFT:   100% at 60 dB HL using NU-6 recorded word list.      ASSESSMENT:     ICD-10-CM    1. Sensorineural hearing loss (SNHL) of both ears  [facility] (70114)     Tympanometry (impedance - testing) (60491)      2. Mixed conductive and sensorineural hearing loss of both ears  H90.6 Adult Audiology  Referral          Today s results were discussed with the patient in detail.      PLAN:  Patient was counseled regarding hearing loss and impact on communication. It is recommended that the patient be retested in approximately 1 year or sooner if new concerns arise.  Please call this clinic with questions regarding these results or recommendations.        Mary Paris.  Doctor of Audiology  [place] License # [id number]

## 2024-02-01 ENCOUNTER — PATIENT OUTREACH (OUTPATIENT)
Dept: CARE COORDINATION | Facility: CLINIC | Age: 69
End: 2024-02-01
Payer: COMMERCIAL

## 2024-02-15 ENCOUNTER — PATIENT OUTREACH (OUTPATIENT)
Dept: CARE COORDINATION | Facility: CLINIC | Age: 69
End: 2024-02-15
Payer: COMMERCIAL

## 2024-03-07 ENCOUNTER — OFFICE VISIT (OUTPATIENT)
Dept: ORTHOPEDICS | Facility: CLINIC | Age: 69
End: 2024-03-07
Payer: COMMERCIAL

## 2024-03-07 DIAGNOSIS — M75.31 CALCIFIC TENDINITIS OF RIGHT SHOULDER: Primary | ICD-10-CM

## 2024-03-07 PROCEDURE — 99213 OFFICE O/P EST LOW 20 MIN: CPT | Performed by: FAMILY MEDICINE

## 2024-03-07 NOTE — PATIENT INSTRUCTIONS
Thank you for choosing [facility]    [facility] LOCATIONS  [place] [address] [address] [facility], 4th [place], [address] [place] [address]   [phone]       APPOINTMENTS: [phone]    CARE QUESTIONS: [phone],    BILLING QUESTIONS: [phone]    FAX NUMBER: [phone]        Follow up: Virtual follow up after MRI      1. Calcific tendinitis of right shoulder        An order for an MRI was placed today. You may call directly to schedule at [phone] at your convenience.

## 2024-03-07 NOTE — LETTER
3/7/2024         RE: Huseyin Newmans  [address] [place] [address]        Dear Colleague,    Thank you for referring your patient, Huseyin St, to the [facility] [place]. Please see a copy of my visit note below.    ESTABLISHED PATIENT FOLLOW-UP:  No chief complaint on file.       HISTORY OF PRESENT ILLNESS  Mr. St is a pleasant 68 year old year old male who presents to clinic today for follow-up of right shoulder pain.    Date of injury/onset: Over 2 years  Date last seen: 3/16/2023 for a right subacromial corticosteroid injection which helped for about 6 months.  Following Therapeutic Plan: Yes  Pain: Denies pain but notes loss of ROM and a discomfort  Function: Decreased ROM  Interval History: Achieved approximately 3 months of benefit from [employer].  Physical therapy order was placed that time but he did not pursue this.  He states that he was able to golf in the early summer but discontinued by mid summer due to the pain.  Pain is continued throughout the the fall and winter.  He states that it is not a significant pain but is more of a discomfort.  Anytime he picks his arm in the overhead plane he experiences this.  His goal would be return to playing golf.    Review of Systems:  Do you have fever, chills, weight loss? No  Do you have any vision problems? No  Do you have any chest pain or edema? No  Do you have any shortness of breath or wheezing?  No  Do you have stomach problems? No  Do you have any numbness or focal weakness? No  Do you have diabetes? Yes  Do you have problems with bleeding or clotting? No  Do you have an rashes or other skin lesions? No    MEDICAL HISTORY  Patient Active Problem List   Diagnosis     Essential hypertension, benign     Hypertrophy of prostate without urinary obstruction     Tietze's disease     Rhinitis, allergic seasonal     HYPERLIPIDEMIA LDL GOAL <100     Mixed anxiety depressive disorder     Advanced directives,  counseling/discussion     Microalbuminuria     Recurrent cold sores     NA (obstructive sleep apnea)     Type 2 diabetes mellitus with diabetic nephropathy (H)     Overweight BMI 35-40     Type 2 diabetes mellitus with hyperosmolarity without coma, without long-term current use of insulin (H)     LVH (left ventricular hypertrophy)     Class 2 obesity due to excess calories with serious comorbidity and body mass index (BMI) of 35.0 to 35.9 in adult     Microalbuminuria due to type 2 diabetes mellitus (H)     Cerebrovascular accident (CVA), unspecified mechanism (H)     Chronic kidney disease, stage 1     TIA (transient ischemic attack)     Dysarthria due to recent stroke     Ischemic stroke (H)     Expressive aphasia       Current Outpatient Medications   Medication Sig Dispense Refill     alcohol swab prep pads Use to swab area of injection/ian as directed 100 each 3     blood glucose calibration (NO BRAND SPECIFIED) solution Use to calibrate blood glucose monitor as needed as directed. To accompany: Blood Glucose Monitor Brands: per insurance. 1 Bottle 3     clopidogrel (PLAVIX) 75 MG tablet TAKE 1 TABLET BY MOUTH EVERY DAY 90 tablet 1     clotrimazole (LOTRIMIN) 1 % external cream Apply topically 2 times daily 14 g 1     clotrimazole (LOTRIMIN) 1 % external cream Apply topically 2 times daily (Patient taking differently: Apply topically 2 times daily as needed) 30 g 11     Continuous Blood Gluc Sensor (FREESTYLE NANO 3 SENSOR) MISC 1 each every 14 days 6 each 1     CONTOUR NEXT TEST test strip USE TO TEST BLOOD SUGAR THREE TIMES A DAY OR AS DIRECTED (NEED APPOINTMENT FOR MORE REFILLS) 300 strip 2     escitalopram (LEXAPRO) 20 MG tablet TAKE 1 TABLET BY MOUTH EVERY DAY 90 tablet 0     insulin aspart (NOVOLOG PEN) 100 UNIT/ML pen Take 15u with meals plus sliding scale max daily dose 75u 60 mL 3     insulin pen needle (BD PEN NEEDLE MARIE 2ND GEN) 32G X 4 MM miscellaneous USE THREE PEN NEEDLES DAILY AS DIRECTED  300 each 3     JARDIANCE 25 MG TABS tablet TAKE 1 TABLET BY MOUTH EVERY DAY 90 tablet 0     lisinopril (ZESTRIL) 20 MG tablet Take 1 tablet (20 mg) by mouth daily 90 tablet 3     ORDER FOR DME Auto-CPAP: Max 12 cm H2O Min 10 cm H2O  Continuous Lifetime need and heated humidity.    1 Device 0     rosuvastatin (CRESTOR) 20 MG tablet Take 1 tablet (20 mg) by mouth daily 90 tablet 3     rosuvastatin (CRESTOR) 40 MG tablet Take 20 mg by mouth daily 90 tablet 3     SEMGLEE, YFGN, 100 UNIT/ML SOPN INJECT 46 UNITS SUBCUTANEOUS DAILY 30 mL 3     thin (NO BRAND SPECIFIED) lancets Use to test blood sugar 3 times daily or as directed. To accompany: Blood Glucose Monitor Brands: per insurance. 200 each 6       Allergies   Allergen Reactions     Amoxicillin-Pot Clavulanate Diarrhea     Liraglutide      Skin rash     Sulfa Antibiotics      Unknown - reaction occurred as a child     Ozempic (0.25 Or 0.5 Mg-Dose) [Semaglutide(0.25 Or 0.5mg-Dos)] Diarrhea       Family History   Adopted: Yes   Problem Relation Age of Onset     Diabetes Mother      Respiratory Mother         asthma     Lipids Mother      Arthritis Mother         knee replacement     Alzheimer Disease Mother      Cerebrovascular Disease Brother      C.A.D. No family hx of      Cancer - colorectal No family hx of      Prostate Cancer No family hx of        Additional medical/Social/Surgical histories reviewed in [facility] and updated as appropriate.       PHYSICAL EXAM  There were no vitals taken for this visit.    General  - normal appearance, in no obvious distress  Musculoskeletal - Right shoulder  - inspection: normal bone and joint alignment, no obvious deformity, no scapular winging, no AC step-off  - palpation: mildly tender RC insertion, normal clavicle, non-tender AC  - ROM:  painful and slow but terminally decreased flexion, abduction and IR at end range, ER full  - strength: 5/5  strength, 5/5 in all shoulder planes  - special tests:  (-) Speed's  (+) Neer  (+)  Hawkin's  (+) Faith's  (-) [place]'s  (-) apprehension  (-) subscap lift-off  Neuro  - no sensory or motor deficit, grossly normal coordination, normal muscle tone  Skin  - no ecchymosis, erythema, warmth, or induration, no obvious rash  Psych  - interactive, appropriate, normal mood and affect     IMAGING : XR shoulder right 4 views. Final results and radiologist's interpretation, available in the [facility] health record. Images were reviewed with the patient/family members in the office today. My personal interpretation of the performed imaging is hydroxyapatite deposition at RC insertion consistent with calcific tendinitis of rotator cuff.     ASSESSMENT & PLAN  Mr. St is a 68 year old year old male who presents to clinic today with ongoing pain of right shoulder.     Her last visit roughly 1 year ago on 3/16/2023, was determined that he has multiple calcifications within the rotator cuff tendon, diagnosed with calcific tendinopathy.    Injection was only minimally beneficial and he is here today to discuss additional options.    Diagnosis: Calcific tendinopathy right shoulder    We had a detailed discussion about possibility of percutaneous tenotomy, Tenex to eradicate calcific depositions of the rotator cuff.  An MRI would be required to determine whether there is additional rotator cuff tearing present or other con commitment pathology.  We discussed physical therapy and a did recommend this today, however he would like to pursue possible procedure and consider PT after this time.    I will see him back via virtual visit to discuss results and next steps.    It was a pleasure seeing Huseyin.    Jevon Saleh DO, [facility]  Primary Care Sports Medicine        Again, thank you for allowing me to participate in the care of your patient.        Sincerely,        Jevon Saleh DO

## 2024-03-07 NOTE — PROGRESS NOTES
ESTABLISHED PATIENT FOLLOW-UP:  No chief complaint on file.       HISTORY OF PRESENT ILLNESS  Mr. St is a pleasant 68 year old year old male who presents to clinic today for follow-up of right shoulder pain.    Date of injury/onset: Over 2 years  Date last seen: 3/16/2023 for a right subacromial corticosteroid injection which helped for about 6 months.  Following Therapeutic Plan: Yes  Pain: Denies pain but notes loss of ROM and a discomfort  Function: Decreased ROM  Interval History: Achieved approximately 3 months of benefit from [employer].  Physical therapy order was placed that time but he did not pursue this.  He states that he was able to golf in the early summer but discontinued by mid summer due to the pain.  Pain is continued throughout the the fall and winter.  He states that it is not a significant pain but is more of a discomfort.  Anytime he picks his arm in the overhead plane he experiences this.  His goal would be return to playing golf.    Review of Systems:  Do you have fever, chills, weight loss? No  Do you have any vision problems? No  Do you have any chest pain or edema? No  Do you have any shortness of breath or wheezing?  No  Do you have stomach problems? No  Do you have any numbness or focal weakness? No  Do you have diabetes? Yes  Do you have problems with bleeding or clotting? No  Do you have an rashes or other skin lesions? No    MEDICAL HISTORY  Patient Active Problem List   Diagnosis    Essential hypertension, benign    Hypertrophy of prostate without urinary obstruction    Tietze's disease    Rhinitis, allergic seasonal    HYPERLIPIDEMIA LDL GOAL <100    Mixed anxiety depressive disorder    Advanced directives, counseling/discussion    Microalbuminuria    Recurrent cold sores    NA (obstructive sleep apnea)    Type 2 diabetes mellitus with diabetic nephropathy (H)    Overweight BMI 35-40    Type 2 diabetes mellitus with hyperosmolarity without coma, without long-term current use of  insulin (H)    LVH (left ventricular hypertrophy)    Class 2 obesity due to excess calories with serious comorbidity and body mass index (BMI) of 35.0 to 35.9 in adult    Microalbuminuria due to type 2 diabetes mellitus (H)    Cerebrovascular accident (CVA), unspecified mechanism (H)    Chronic kidney disease, stage 1    TIA (transient ischemic attack)    Dysarthria due to recent stroke    Ischemic stroke (H)    Expressive aphasia       Current Outpatient Medications   Medication Sig Dispense Refill    alcohol swab prep pads Use to swab area of injection/ian as directed 100 each 3    blood glucose calibration (NO BRAND SPECIFIED) solution Use to calibrate blood glucose monitor as needed as directed. To accompany: Blood Glucose Monitor Brands: per insurance. 1 Bottle 3    clopidogrel (PLAVIX) 75 MG tablet TAKE 1 TABLET BY MOUTH EVERY DAY 90 tablet 1    clotrimazole (LOTRIMIN) 1 % external cream Apply topically 2 times daily 14 g 1    clotrimazole (LOTRIMIN) 1 % external cream Apply topically 2 times daily (Patient taking differently: Apply topically 2 times daily as needed) 30 g 11    Continuous Blood Gluc Sensor (FREESTYLE NANO 3 SENSOR) MISC 1 each every 14 days 6 each 1    CONTOUR NEXT TEST test strip USE TO TEST BLOOD SUGAR THREE TIMES A DAY OR AS DIRECTED (NEED APPOINTMENT FOR MORE REFILLS) 300 strip 2    escitalopram (LEXAPRO) 20 MG tablet TAKE 1 TABLET BY MOUTH EVERY DAY 90 tablet 0    insulin aspart (NOVOLOG PEN) 100 UNIT/ML pen Take 15u with meals plus sliding scale max daily dose 75u 60 mL 3    insulin pen needle (BD PEN NEEDLE MARIE 2ND GEN) 32G X 4 MM miscellaneous USE THREE PEN NEEDLES DAILY AS DIRECTED 300 each 3    JARDIANCE 25 MG TABS tablet TAKE 1 TABLET BY MOUTH EVERY DAY 90 tablet 0    lisinopril (ZESTRIL) 20 MG tablet Take 1 tablet (20 mg) by mouth daily 90 tablet 3    ORDER FOR DME Auto-CPAP: Max 12 cm H2O Min 10 cm H2O  Continuous Lifetime need and heated humidity.    1 Device 0     rosuvastatin (CRESTOR) 20 MG tablet Take 1 tablet (20 mg) by mouth daily 90 tablet 3    rosuvastatin (CRESTOR) 40 MG tablet Take 20 mg by mouth daily 90 tablet 3    SEMGLEE, YFGN, 100 UNIT/ML SOPN INJECT 46 UNITS SUBCUTANEOUS DAILY 30 mL 3    thin (NO BRAND SPECIFIED) lancets Use to test blood sugar 3 times daily or as directed. To accompany: Blood Glucose Monitor Brands: per insurance. 200 each 6       Allergies   Allergen Reactions    Amoxicillin-Pot Clavulanate Diarrhea    Liraglutide      Skin rash    Sulfa Antibiotics      Unknown - reaction occurred as a child    Ozempic (0.25 Or 0.5 Mg-Dose) [Semaglutide(0.25 Or 0.5mg-Dos)] Diarrhea       Family History   Adopted: Yes   Problem Relation Age of Onset    Diabetes Mother     Respiratory Mother         asthma    Lipids Mother     Arthritis Mother         knee replacement    Alzheimer Disease Mother     Cerebrovascular Disease Brother     C.A.D. No family hx of     Cancer - colorectal No family hx of     Prostate Cancer No family hx of        Additional medical/Social/Surgical histories reviewed in [facility] and updated as appropriate.       PHYSICAL EXAM  There were no vitals taken for this visit.    General  - normal appearance, in no obvious distress  Musculoskeletal - Right shoulder  - inspection: normal bone and joint alignment, no obvious deformity, no scapular winging, no AC step-off  - palpation: mildly tender RC insertion, normal clavicle, non-tender AC  - ROM:  painful and slow but terminally decreased flexion, abduction and IR at end range, ER full  - strength: 5/5  strength, 5/5 in all shoulder planes  - special tests:  (-) Speed's  (+) Neer  (+) Hawkin's  (+) Faith's  (-) [place]'s  (-) apprehension  (-) subscap lift-off  Neuro  - no sensory or motor deficit, grossly normal coordination, normal muscle tone  Skin  - no ecchymosis, erythema, warmth, or induration, no obvious rash  Psych  - interactive, appropriate, normal mood and affect     IMAGING : XR  shoulder right 4 views. Final results and radiologist's interpretation, available in the [facility] health record. Images were reviewed with the patient/family members in the office today. My personal interpretation of the performed imaging is hydroxyapatite deposition at RC insertion consistent with calcific tendinitis of rotator cuff.     ASSESSMENT & PLAN  Mr. St is a 68 year old year old male who presents to clinic today with ongoing pain of right shoulder.     Her last visit roughly 1 year ago on 3/16/2023, was determined that he has multiple calcifications within the rotator cuff tendon, diagnosed with calcific tendinopathy.    Injection was only minimally beneficial and he is here today to discuss additional options.    Diagnosis: Calcific tendinopathy right shoulder    We had a detailed discussion about possibility of percutaneous tenotomy, Tenex to eradicate calcific depositions of the rotator cuff.  An MRI would be required to determine whether there is additional rotator cuff tearing present or other con commitment pathology.  We discussed physical therapy and a did recommend this today, however he would like to pursue possible procedure and consider PT after this time.    I will see him back via virtual visit to discuss results and next steps.    It was a pleasure seeing Huseyin.    Jevon Saleh DO, [facility]  Primary Care Sports Medicine

## 2024-04-04 ENCOUNTER — TELEPHONE (OUTPATIENT)
Dept: ENDOCRINOLOGY | Facility: CLINIC | Age: 69
End: 2024-04-04
Payer: COMMERCIAL

## 2024-04-04 DIAGNOSIS — Z79.4 TYPE 2 DIABETES MELLITUS WITH DIABETIC NEPHROPATHY, WITH LONG-TERM CURRENT USE OF INSULIN (H): ICD-10-CM

## 2024-04-04 DIAGNOSIS — E11.21 TYPE 2 DIABETES MELLITUS WITH DIABETIC NEPHROPATHY, WITH LONG-TERM CURRENT USE OF INSULIN (H): ICD-10-CM

## 2024-04-04 RX ORDER — EMPAGLIFLOZIN 25 MG/1
25 TABLET, FILM COATED ORAL DAILY
Qty: 90 TABLET | Refills: 0 | Status: SHIPPED | OUTPATIENT
Start: 2024-04-04 | End: 2024-07-05

## 2024-04-04 NOTE — TELEPHONE ENCOUNTER
LVM for PT to call [phone] to schedule an appt with Dr Odom. There are holds on 7/23 ok to offer per Angie. or on 7/24 in clinic, these are on hold, offer 8:00 or 4:00. ok per Angie

## 2024-04-04 NOTE — TELEPHONE ENCOUNTER
----- Message from Rolanda Ochoa RN sent at 4/4/2024  2:35 PM CDT -----  No rush- but when you have time, can you call him to help schedule follow up with Dr. LACEY.  His last appt was 7/2023 and he was due back 1/2024.  I sent refills for his Jardiance.  Thanks!!

## 2024-04-04 NOTE — TELEPHONE ENCOUNTER
Last Written Prescription Date:  11/15/23  Last Fill Quantity: 90,  # refills: 0   Last office visit: 7/11/2023 with prescribing provider: Dr. Odom   Future Office Visit:  Was due back 1/2024        Requested Prescriptions   Pending Prescriptions Disp Refills    JARDIANCE 25 MG TABS tablet [Pharmacy Med Name: JARDIANCE 25 MG TABLET] 90 tablet 0     Sig: TAKE 1 TABLET BY MOUTH EVERY DAY       Sodium Glucose Co-Transport Inhibitor Agents Failed - 4/4/2024  1:21 AM        Failed - Patient has documented A1c within the specified period of time.     If HgbA1C is 8 or greater, it needs to be on file within the past 3 months.  If less than 8, must be on file within the past 6 months.     Recent Labs   Lab Test 03/02/23  0956   A1C 7.9*             Failed - Has GFR on file in past 12 months and most recent value is normal        Failed - Recent (6 mo) or future (90 days) visit within the authorizing provider's specialty     The patient must have completed an in-person or virtual visit within the past 6 months or has a future visit scheduled within the next 90 days with the authorizing provider s specialty.  Urgent care and e-visits do not quality as an office visit for this protocol.          Failed - Patient has documented normal Potassium within the last 12 mos.     Recent Labs   Lab Test 03/15/23  0901   POTASSIUM 4.4             Passed - Medication is active on med list        Passed - Medication indicated for associated diagnosis     Medication is associated with one or more of the following diagnoses:     Diabetic nephropathy, With Albuminuria - Type 2 diabetes mellitus     Disorder of cardiovascular system; Prophylaxis - Type 2 diabetes mellitus     Type 2 diabetes mellitus    Disorder of cardiovascular system; Prophylaxis - Heart failure   Chronic kidney disease, (At risk of progression) to reduce the risk of sustained   estimated GFR decline, end-stage kidney disease, cardiovascular death,   and hospitalization  for heart failure     Heart failure, (NYHA class II to IV, reduced ejection fraction) to reduce risk of  cardiovascular death and hospitalization           Passed - Patient is age 18 or older           Refill sent with note to pharmacy that pt needs appt.  Also sent msg to FD to try to reach pt to help schedule follow up with Dr. Parag Ochoa, RN

## 2024-04-10 ENCOUNTER — ANCILLARY PROCEDURE (OUTPATIENT)
Dept: MRI IMAGING | Facility: CLINIC | Age: 69
End: 2024-04-10
Attending: FAMILY MEDICINE
Payer: COMMERCIAL

## 2024-04-10 DIAGNOSIS — M75.31 CALCIFIC TENDINITIS OF RIGHT SHOULDER: ICD-10-CM

## 2024-04-10 PROCEDURE — G1010 CDSM STANSON: HCPCS | Performed by: RADIOLOGY

## 2024-04-10 PROCEDURE — 73221 MRI JOINT UPR EXTREM W/O DYE: CPT | Mod: 26 | Performed by: RADIOLOGY

## 2024-04-10 PROCEDURE — 73221 MRI JOINT UPR EXTREM W/O DYE: CPT | Mod: RT,ME

## 2024-04-12 NOTE — TELEPHONE ENCOUNTER
RANULFOM (2nd) for PT to call [phone] to schedule an appt with Dr Odom. There are holds on 7/23 ok to offer per Angie. or on 7/24 in clinic, these are on hold, offer 8:00 or 4:00. ok per Angie

## 2024-04-16 ENCOUNTER — TELEPHONE (OUTPATIENT)
Dept: ORTHOPEDICS | Facility: CLINIC | Age: 69
End: 2024-04-16
Payer: COMMERCIAL

## 2024-04-16 NOTE — TELEPHONE ENCOUNTER
Per last visit note, patient would follow up virtually to go over results. Patient agreed and would like a call. Patient scheduled. For Thursday 4/18.    Rufino Goodman ATC

## 2024-04-16 NOTE — TELEPHONE ENCOUNTER
[facility] Call Center    Phone Message    May a detailed message be left on voicemail: yes     Reason for Call: Other: Pt MRI on 4/10/24 and would like to get results. Please call him back on cell phone     Action Taken: Other: [id number]    Travel Screening: Not Applicable

## 2024-04-18 ENCOUNTER — VIRTUAL VISIT (OUTPATIENT)
Dept: ORTHOPEDICS | Facility: CLINIC | Age: 69
End: 2024-04-18
Payer: COMMERCIAL

## 2024-04-18 DIAGNOSIS — M75.31 CALCIFIC TENDONITIS OF RIGHT SHOULDER: ICD-10-CM

## 2024-04-18 DIAGNOSIS — M75.31 CALCIFIC TENDINITIS OF RIGHT SHOULDER: Primary | ICD-10-CM

## 2024-04-18 PROCEDURE — 99443 PR PHYSICIAN TELEPHONE EVALUATION 21-30 MIN: CPT | Mod: 93 | Performed by: FAMILY MEDICINE

## 2024-04-18 NOTE — LETTER
4/18/2024         RE: Huse[address]in [address]  [address] [place] [address]        Dear Colleague,    Thank you for referring your patient, Huseyin St, to the [facility] [place]. Please see a copy of my visit note below.    ESTABLISHED PATIENT FOLLOW-UP VIRTUAL:  RECHECK (MRI review)       This encounter was conducted via telephone in lieu of face-to-face encounter per patient preference.    What phone number would you like to be contacted at? [phone]  How would you like to obtain your AVS? MyChart      HISTORY OF PRESENT ILLNESS  Mr. St is a pleasant 68 year old year old male who presents via telephone today for follow-up of right shoulder MRI results.     Date of injury: 2 years   Date last seen: 3/7/24  Following Therapeutic Plan: Yes  Pain: Unchanged  Function: Unchanged  Interval History: No changes since last visit.      Additional medical/Social/Surgical histories reviewed in [facility] and updated as appropriate.    REVIEW OF SYSTEMS (4/18/2024)  CONSTITUTIONAL: Denies fever and weight loss  GASTROINTESTINAL: Denies abdominal pain, nausea, vomiting  MUSCULOSKELETAL: See HPI  SKIN: Denies any recent rash or lesion  NEUROLOGICAL: Denies numbness or focal weakness    IMAGING :   EXAM: MR right shoulder without  contrast 4/10/2024 1:36 PM     TECHNIQUE: Multiplanar, multisequence imaging of the right shoulder  were obtained without administration of intravenous or intra-articular  gadolinium contrast using routine protocol.     History: Calcific tendinitis of right shoulder     Comparison: Radiographs dated 3/16/2023     Findings:     ROTATOR CUFF and ASSOCIATED STRUCTURES  Rotator cuff: Superimposed on distal tendinopathy of the supraspinatus  tendon with a focal area of calcific tendinitis (series 5 and 6, image  12) there is low-grade bursal sided and intrasubstance tearing of the  far anterior fibers at the footprint no full-thickness tear the  remainder of the  supraspinatus tendon remains largely intact. There is  mild distal tendinopathy of the infraspinatus tendon and the teres  minor tendon is intact. Superimposed on moderate grade tendinopathy  there is a low grade intrasubstance tear of the far superior fibers of  the subscapularis tendon.     Bursa: No subacromial or subdeltoid bursal fluid.     Musculature: Muscle bulk of rotator cuff is preserved.  Deltoid muscle  bulk is also preserved.  No muscle edema.     Acromioclavicular joint  There are mild to moderate degenerative changes of the  acromioclavicular joint. Acromion is type 2 in sagittal morphology.   Coracoacromial ligament is not thickened.     OSSEOUS STRUCTURES  No fracture, marrow contusion or marrow infiltration.     LONG BICIPITAL TENDON  The long head of the biceps tendon is normally situated within the  bicipital groove. No complete or partial biceps tendon tear is  present. Intra-articular biceps tendinopathy.     GLENOHUMERAL JOINT  Joint fluid: Physiologic amount of joint fluid is  present.     Cartilage and subarticular bone:  No focal hyaline cartilage defects  are noted. No Hill-Sachs, reverse Hill-Sachs, or bony Bankart lesions  are seen.     Labrum: Limited assessment on this study with relative lack of joint  distention shows a suspected labral tear. The posterior superior  labrum just distal to the bicipital labral anchor.                                                                      Impression:  1. Superimposed on mild distal tendinopathy of the supraspinatus  tendon with a focal area of calcific tendinitis, low-grade bursal  sided and intrasubstance tearing of the far anterior fibers at the  footprint, no full-thickness tear. The remainder of the supraspinatus  tendon and infraspinatus tendon are intact with minimal tendinopathy.  2. Superimposed on moderate grade tendinopathy, low grade  intrasubstance tear of the far superior fibers of the subscapularis  tendon.  3. Mild  intra-articular biceps tendinopathy.  4. Degenerative labral tearing of the posterior superior labrum just  posterior to the bicipital labral anchor.     JUTTA ELLERMANN, MD     ASSESSMENT & PLAN  Mr. St is a 68 year old year old male who presents via telephone today with RECHECK (MRI review)    MRI reaffirms calcific tendinopathy present in addition, shows focal low-grade bursal and intrasubstance tearing of the supraspinatus as well as subscapularis.      Additionally there are areas of tendinopathy of the supraspinatus, infraspinatus, as well as subscapularis.    Diagnosis: Calcific tendinopathy of right shoulder    Treatment options reviewed and at this time there are no surgical full-thickness tears to address his pain.  I do suspect ongoing calcific/hydroxyapatite deposition as the source for his pain.  Additionally tendinopathy is likely causing components for his pain and debility.    Treatment options including physical therapy, percutaneous tenotomy, Tenex procedure discussed in detail.  He would like to pursue the Tenex first given the sizable nature of this hydroxyapatite deposition.      Details of the procedure were discussed. After this he will follow the protocol for tendon rehab with our physical therapy team.    I will speak with my team and reach out to him regarding locations for ultrasound[place] [facility] [place].  He is interested in pursuing this as soon as he is able.    It was a pleasure seeing Huseyin.    Total Telephone Time: 28 min  Jevon Saleh DO, CAQSM  Primary Care Sports Medicine  Department of Orthopedic Surgery  [facility]      Again, thank you for allowing me to participate in the care of your patient.        Sincerely,        Jevon Saleh DO

## 2024-04-18 NOTE — PROGRESS NOTES
ESTABLISHED PATIENT FOLLOW-UP VIRTUAL:  RECHECK (MRI review)       This encounter was conducted via telephone in lieu of face-to-face encounter per patient preference.    What phone number would you like to be contacted at? [phone]  How would you like to obtain your AVS? MyChart      HISTORY OF PRESENT ILLNESS  Mr. St is a pleasant 68 year old year old male who presents via telephone today for follow-up of right shoulder MRI results.     Date of injury: 2 years   Date last seen: 3/7/24  Following Therapeutic Plan: Yes  Pain: Unchanged  Function: Unchanged  Interval History: No changes since last visit.      Additional medical/Social/Surgical histories reviewed in [facility] and updated as appropriate.    REVIEW OF SYSTEMS (4/18/2024)  CONSTITUTIONAL: Denies fever and weight loss  GASTROINTESTINAL: Denies abdominal pain, nausea, vomiting  MUSCULOSKELETAL: See HPI  SKIN: Denies any recent rash or lesion  NEUROLOGICAL: Denies numbness or focal weakness    IMAGING :   EXAM: MR right shoulder without  contrast 4/10/2024 1:36 PM     TECHNIQUE: Multiplanar, multisequence imaging of the right shoulder  were obtained without administration of intravenous or intra-articular  gadolinium contrast using routine protocol.     History: Calcific tendinitis of right shoulder     Comparison: Radiographs dated 3/16/2023     Findings:     ROTATOR CUFF and ASSOCIATED STRUCTURES  Rotator cuff: Superimposed on distal tendinopathy of the supraspinatus  tendon with a focal area of calcific tendinitis (series 5 and 6, image  12) there is low-grade bursal sided and intrasubstance tearing of the  far anterior fibers at the footprint no full-thickness tear the  remainder of the supraspinatus tendon remains largely intact. There is  mild distal tendinopathy of the infraspinatus tendon and the teres  minor tendon is intact. Superimposed on moderate grade tendinopathy  there is a low grade intrasubstance tear of the far superior fibers of  the  subscapularis tendon.     Bursa: No subacromial or subdeltoid bursal fluid.     Musculature: Muscle bulk of rotator cuff is preserved.  Deltoid muscle  bulk is also preserved.  No muscle edema.     Acromioclavicular joint  There are mild to moderate degenerative changes of the  acromioclavicular joint. Acromion is type 2 in sagittal morphology.   Coracoacromial ligament is not thickened.     OSSEOUS STRUCTURES  No fracture, marrow contusion or marrow infiltration.     LONG BICIPITAL TENDON  The long head of the biceps tendon is normally situated within the  bicipital groove. No complete or partial biceps tendon tear is  present. Intra-articular biceps tendinopathy.     GLENOHUMERAL JOINT  Joint fluid: Physiologic amount of joint fluid is  present.     Cartilage and subarticular bone:  No focal hyaline cartilage defects  are noted. No Hill-Sachs, reverse Hill-Sachs, or bony Bankart lesions  are seen.     Labrum: Limited assessment on this study with relative lack of joint  distention shows a suspected labral tear. The posterior superior  labrum just distal to the bicipital labral anchor.                                                                      Impression:  1. Superimposed on mild distal tendinopathy of the supraspinatus  tendon with a focal area of calcific tendinitis, low-grade bursal  sided and intrasubstance tearing of the far anterior fibers at the  footprint, no full-thickness tear. The remainder of the supraspinatus  tendon and infraspinatus tendon are intact with minimal tendinopathy.  2. Superimposed on moderate grade tendinopathy, low grade  intrasubstance tear of the far superior fibers of the subscapularis  tendon.  3. Mild intra-articular biceps tendinopathy.  4. Degenerative labral tearing of the posterior superior labrum just  posterior to the bicipital labral anchor.     JUTTA ELLERMANN, MD     ASSESSMENT & PLAN  Mr. St is a 68 year old year old male who presents via telephone today  with RECHECK (MRI review)    MRI reaffirms calcific tendinopathy present in addition, shows focal low-grade bursal and intrasubstance tearing of the supraspinatus as well as subscapularis.      Additionally there are areas of tendinopathy of the supraspinatus, infraspinatus, as well as subscapularis.    Diagnosis: Calcific tendinopathy of right shoulder    Treatment options reviewed and at this time there are no surgical full-thickness tears to address his pain.  I do suspect ongoing calcific/hydroxyapatite deposition as the source for his pain.  Additionally tendinopathy is likely causing components for his pain and debility.    Treatment options including physical therapy, percutaneous tenotomy, Tenex procedure discussed in detail.  He would like to pursue the Tenex first given the sizable nature of this hydroxyapatite deposition.      Details of the procedure were discussed. After this he will follow the protocol for tendon rehab with our physical therapy team.    I will speak with my team and reach out to him regarding locations for ultrasound-guided procedure in the ASC at [facility] or [place].  He is interested in pursuing this as soon as he is able.    It was a pleasure seeing Huseyin.    Total Telephone Time: 28 min  Jevon Saleh DO, CAQSM  Primary Care Sports Medicine  Department of Orthopedic Surgery  [facility]

## 2024-04-22 ENCOUNTER — TELEPHONE (OUTPATIENT)
Dept: ORTHOPEDICS | Facility: CLINIC | Age: 69
End: 2024-04-22
Payer: COMMERCIAL

## 2024-04-22 NOTE — TELEPHONE ENCOUNTER
Phoned patient to offer 5/7/24 as a date for his TENEX procedure with Dr Saleh. I left him my direct number to call and back let me know if this date will work for him. [phone]

## 2024-04-22 NOTE — TELEPHONE ENCOUNTER
Received call back from patient stating May 7th works for him but he does not want to come to [place] to have it done. Patient is requesting to be scheduled at the [place] location. Patient was told that we will have to get back to him about this.

## 2024-04-23 ASSESSMENT — SLEEP AND FATIGUE QUESTIONNAIRES
HOW LIKELY ARE YOU TO NOD OFF OR FALL ASLEEP WHILE LYING DOWN TO REST IN THE AFTERNOON WHEN CIRCUMSTANCES PERMIT: HIGH CHANCE OF DOZING
HOW LIKELY ARE YOU TO NOD OFF OR FALL ASLEEP WHILE SITTING AND READING: SLIGHT CHANCE OF DOZING
HOW LIKELY ARE YOU TO NOD OFF OR FALL ASLEEP WHILE WATCHING TV: SLIGHT CHANCE OF DOZING
HOW LIKELY ARE YOU TO NOD OFF OR FALL ASLEEP WHEN YOU ARE A PASSENGER IN A CAR FOR AN HOUR WITHOUT A BREAK: WOULD NEVER DOZE
HOW LIKELY ARE YOU TO NOD OFF OR FALL ASLEEP WHILE SITTING INACTIVE IN A PUBLIC PLACE: SLIGHT CHANCE OF DOZING
HOW LIKELY ARE YOU TO NOD OFF OR FALL ASLEEP IN A CAR, WHILE STOPPED FOR A FEW MINUTES IN TRAFFIC: WOULD NEVER DOZE
HOW LIKELY ARE YOU TO NOD OFF OR FALL ASLEEP WHILE SITTING QUIETLY AFTER LUNCH WITHOUT ALCOHOL: WOULD NEVER DOZE
HOW LIKELY ARE YOU TO NOD OFF OR FALL ASLEEP WHILE SITTING AND TALKING TO SOMEONE: WOULD NEVER DOZE

## 2024-04-23 NOTE — TELEPHONE ENCOUNTER
LVM (final) for PT to call [phone] to schedule an appt with Dr Odom. There are holds on 7/23 ok to offer per Angie. or on 7/24 in clinic, these are on hold.

## 2024-04-24 NOTE — TELEPHONE ENCOUNTER
Called patient's wife Estefania and she said that her and Huseyin do not want to have the procedure at the [address] [place] [address] location due to parking and driving downtown. I informed them that Dr. Saleh does not do TENEX at the [place] location. Rossy Reid might be a possibility but he is not normally at that location. I told huseyin and family that I would reach out to Dr. Saleh to see if [place] is an option.

## 2024-04-24 NOTE — TELEPHONE ENCOUNTER
Other: Patient's spouse is calling back in to see if they can have procedure at the [place] Location. Please call as soon as able with update.      Could we send this information to you in [employer] or would you prefer to receive a phone call?:   Patient would prefer a phone call   Okay to leave a detailed message?: Yes at Home number on file [phone] (home)

## 2024-04-25 ENCOUNTER — OFFICE VISIT (OUTPATIENT)
Dept: SLEEP MEDICINE | Facility: CLINIC | Age: 69
End: 2024-04-25
Payer: COMMERCIAL

## 2024-04-25 VITALS
OXYGEN SATURATION: 96 % | WEIGHT: 234.8 LBS | BODY MASS INDEX: 33.61 KG/M2 | DIASTOLIC BLOOD PRESSURE: 73 MMHG | HEIGHT: 70 IN | SYSTOLIC BLOOD PRESSURE: 111 MMHG | HEART RATE: 93 BPM

## 2024-04-25 DIAGNOSIS — G47.33 OSA (OBSTRUCTIVE SLEEP APNEA): Primary | ICD-10-CM

## 2024-04-25 DIAGNOSIS — R47.01 EXPRESSIVE APHASIA: ICD-10-CM

## 2024-04-25 DIAGNOSIS — I10 ESSENTIAL HYPERTENSION, BENIGN: ICD-10-CM

## 2024-04-25 DIAGNOSIS — F41.8 MIXED ANXIETY DEPRESSIVE DISORDER: ICD-10-CM

## 2024-04-25 DIAGNOSIS — I63.9 ISCHEMIC STROKE (H): ICD-10-CM

## 2024-04-25 PROCEDURE — 99204 OFFICE O/P NEW MOD 45 MIN: CPT | Performed by: PHYSICIAN ASSISTANT

## 2024-04-25 RX ORDER — CLINDAMYCIN HCL 150 MG
150 CAPSULE ORAL 2 TIMES DAILY
COMMUNITY
Start: 2024-04-17 | End: 2024-05-31

## 2024-04-25 NOTE — PROGRESS NOTES
Outpatient Sleep Medicine Consultation:    Name: Huseyin St MRN# [id number]   Age: 68 year old YOB: 1955     Date of Consultation: April 25, 2024  Consultation is requested by: No referring provider defined for this encounter. No ref. provider found  Primary care provider: Kendal Wong       Reason for Sleep Consult:     Patient s Reason for visit  Huseyin St main reason for visit: to get CPAP machine regulated again, haven't used in 2 years  Patient states problem(s) started: 2 years ago  Huseyin St's goals for this visit: get started on the Cpap machine again         Assessment and Plan:     Summary Sleep Diagnoses:  1. NA (obstructive sleep apnea)  Comorbid Diagnoses:  2. Ischemic stroke (H)  3. Expressive aphasia  4. Essential hypertension, benign  5. Mixed anxiety depressive disorder    Patient presents to clinic today to re-establish care of their obstructive sleep apnea. Historically diagnosed with severe NA via PSG completed in June 2012 with AHI 33.7, RDI 30.6.  Historically treated with CPAP but has not used regularly in the past couple of years, he is very open to restarting but wonders if he needs the machine.  He denies snoring, gasping/choking arousals, witnessed apneic events but does sleep alone so no one to witness.  Denies excessive daytime sleepiness, ESS normal at 6, but he does nap on a daily basis, unclear if because he is truly sleepy or because he is retired and has the freedom to nap so does. His history of recurrent strokes increase risk of central sleep apnea.  After discussion of options today we have agreed to start with updating his sleep study given age of old testing, weight loss since old testing, change in symptoms since last testing, and his strokes to reevaluate sleep disordered breathing severity prior to reinitiation of CPAP therapy.  Order placed today for split-night PSG and will titrate PAP accordingly if testing is still positive.  Will plan  "to see him back shortly after sleep study is completed to discuss results.  - Comprehensive Sleep Study; Future         History of Present Illness:     Huseyin St is a 68 year old male with obesity, CVA, HTN, BPH, allergic rhinitis, anxiety, depression, T2DM, NA currently untreated who presents to clinic today to re-establish care of his NA and discuss re-starting CPAP therapy. Previously seen in our sleep office by my colleague Bennett Goltz PA-C with last visit 10/2020.     Reviewed prior PSG completed 6/20/2012 (245#, BMI 35) that showed AHI 33.7, RDI 38.6, O2 adela 84%. He spent about 9.5% of the baseline below 90% SpO2. His apnea was much worse while supine. He spent about 1/3 of the night supine and about 2/3 of his apneas occurred during that time. PLM index 0. CPAP titrated to [id number] was optimal.     Returns today interested in getting back on CPAP.  Last day of use on his Dreamstation 2 machine was 7/14/2022. Machine set to [id number]. patient reports reason that he stop CPAP in the past is because \"they told me I had 4 strokes\".  On chart review appears strokes in spring 2022 and regular use of CPAP stopped after that point.  Does not recall having any difficulties tolerating the mask or pressures from his machine, but just out of the habit of wearing during workup for stroke.      SLEEP-WAKE SCHEDULE:     Work/School Days: Patient goes to school/work: No   Usually gets into bed at 9:00pm  Takes patient about 1/2 hour to fall asleep  Has trouble falling asleep 5 nights per week  Wakes up in the middle of the night once to go to bathroom   Wakes up due to Use the bathroom  He has trouble falling back asleep none times a week.   It usually takes right away or up to 30 minutes to get back to sleep  Patient is usually up at 8:30am  Uses alarm: No    Weekends/Non-work Days/All Other Days:  Usually gets into bed at 9:00pm  Takes patient about 1/2 hour to fall asleep  Patient is usually up at 8:30 am  Uses " alarm: No    Sleep Need  Patient estimates he gets 10 hours sleep on average   Patient thinks he needs about 10 hours sleep    Huseyin WELLS Danniejamila prefers to sleep in this position(s): Side       Naps  Patient takes a purposeful nap 7 times a week and naps are usually couple hours in duration - will either nap from 1030-11:00 AM until 12-12:30 PM or if he skips a morning nap will nap from 330-4:00 PM to 5:30 PM.  More common for him to nap in the morning then afternoon. He rarely will nap during both of these times.  He is retired and has freedom to nap when he pleases.  He feels better after a nap: Yes  He dozes off unintentionally 0 days per week  Patient has had a driving accident or near-miss due to sleepiness/drowsiness: No      SLEEP DISRUPTIONS:    Breathing/Snoring  Patient snores:No  Other people complain about his snoring: No  Patient has been told he stops breathing in his sleep:No  Gasping/choking arousal: No  Denies morning headache, morning nasal congestion, morning dry mouth, nocturnal GERD, nocturia >1    Movement:  Patient gets pain, discomfort, with an urge to move:  No  It happens when he is resting:  No  It happens more at night:   No  Patient has been told he kicks his legs at night:  No     Behaviors in Sleep:  Denies sleepwalking, sleep talking, sleep eating, bruxism, dream enactment, recurring nightmares, night terrors, sleep paralysis, hypnagogic/hypnopompic hallucinations, cataplexy    Is there anything else you would like your sleep provider to know: just need to regulate my cpap so I start using it again      CAFFEINE AND OTHER SUBSTANCES:    Patient consumes caffeinated beverages per day:  rarely drink caffine  List of any prescribed or over the counter stimulants that patient takes:  none  List of any prescribed or over the counter sleep medication patient takes: tylenol PM sometimes  List of previous sleep medications that patient has tried: none  Patient drinks alcohol to help them sleep:  No  Patient drinks alcohol near bedtime: No    Family History:  Patient has a family member been diagnosed with a sleep disorder: No    SCALES:    EPWORTH SLEEPINESS SCALE         4/23/2024     4:52 PM    [place] Sleepiness Scale ( JAELYN Catalan  [id number]<br>ESS - [place]/English - Final version - 21 Nov 07 - [facility] [place].)   Sitting and reading Slight chance of dozing   Watching TV Slight chance of dozing   Sitting, inactive in a public place (e.g. a theatre or a meeting) Slight chance of dozing   As a passenger in a car for an hour without a break Would never doze   Lying down to rest in the afternoon when circumstances permit High chance of dozing   Sitting and talking to someone Would never doze   Sitting quietly after a lunch without alcohol Would never doze   In a car, while stopped for a few minutes in traffic Would never doze   [place] Score (MC) 6   [place] Score (Sleep) 6     INSOMNIA SEVERITY INDEX (KIRTI)          4/23/2024     4:38 PM   Insomnia Severity Index (KIRTI)   Difficulty falling asleep 2   Difficulty staying asleep 1   Problems waking up too early 1   How SATISFIED/DISSATISFIED are you with your CURRENT sleep pattern? 3   How NOTICEABLE to others do you think your sleep problem is in terms of impairing the quality of your life? 0   How WORRIED/DISTRESSED are you about your current sleep problem? 2   To what extent do you consider your sleep problem to INTERFERE with your daily functioning (e.g. daytime fatigue, mood, ability to function at work/daily chores, concentration, memory, mood, etc.) CURRENTLY? 1   KIRTI Total Score 10       Guidelines for Scoring/Interpretation:  Total score categories:  0-7 = No clinically significant insomnia   8-14 = Subthreshold insomnia   15-21 = Clinical insomnia (moderate severity)  22-28 = Clinical insomnia (severe)  Used via courtesy of www.Demandforceth.va.gov with permission from Jesus Granados PhD., [facility]  PATIENT HEALTH QUESTIONNAIRE-9 (PHQ -  9)        7/18/2023     4:08 PM   PHQ-9 ([employer])   1.  Little interest or pleasure in doing things 0   2.  Feeling down, depressed, or hopeless 0   3.  Trouble falling or staying asleep, or sleeping too much 0   4.  Feeling tired or having little energy 0   5.  Poor appetite or overeating 0   6.  Feeling bad about yourself - or that you are a failure or have let yourself or your family down 0   7.  Trouble concentrating on things, such as reading the newspaper or watching television 0   8.  Moving or speaking so slowly that other people could have noticed. Or the opposite - being so fidgety or restless that you have been moving around a lot more than usual 0   9.  Thoughts that you would be better off dead, or of hurting yourself in some way 0   PHQ-9 Total Score 0   6.  Feeling bad about yourself 0   7.  Trouble concentrating 0   8.  Moving slowly or restless 0   9.  Suicidal or self-harm thoughts 0   1.  Little interest or pleasure in doing things Not at all   2.  Feeling down, depressed, or hopeless Not at all   3.  Trouble falling or staying asleep, or sleeping too much Not at all   4.  Feeling tired or having little energy Not at all   5.  Poor appetite or overeating Not at all   6.  Feeling bad about yourself Not at all   7.  Trouble concentrating Not at all   8.  Moving slowly or restless Not at all   9.  Suicidal or self-harm thoughts Not at all   PHQ-9 via [id number] TOTAL SCORE-----> 0   Difficulty at work, home, or with people Not difficult at all       Developed by Antonio Vasquez, Jacqueline Juarez, Keith Hudson and colleagues, with an educational amanda from [employer] No permission required to reproduce, translate, display or distribute.        Allergies:    Allergies   Allergen Reactions    Amoxicillin-Pot Clavulanate Diarrhea    Liraglutide      Skin rash    Sulfa Antibiotics      Unknown - reaction occurred as a child    Ozempic (0.25 Or 0.5 Mg-Dose) [Semaglutide(0.25 Or 0.5mg-Dos)] Diarrhea        Medications:    Current Outpatient Medications   Medication Sig Dispense Refill    alcohol swab prep pads Use to swab area of injection/ian as directed 100 each 3    blood glucose calibration (NO BRAND SPECIFIED) solution Use to calibrate blood glucose monitor as needed as directed. To accompany: Blood Glucose Monitor Brands: per insurance. 1 Bottle 3    clopidogrel (PLAVIX) 75 MG tablet TAKE 1 TABLET BY MOUTH EVERY DAY 90 tablet 1    clotrimazole (LOTRIMIN) 1 % external cream Apply topically 2 times daily 14 g 1    clotrimazole (LOTRIMIN) 1 % external cream Apply topically 2 times daily (Patient taking differently: Apply topically 2 times daily as needed) 30 g 11    Continuous Blood Gluc Sensor (FREESTYLE NANO 3 SENSOR) MISC 1 each every 14 days 6 each 1    CONTOUR NEXT TEST test strip USE TO TEST BLOOD SUGAR THREE TIMES A DAY OR AS DIRECTED (NEED APPOINTMENT FOR MORE REFILLS) 300 strip 2    escitalopram (LEXAPRO) 20 MG tablet TAKE 1 TABLET BY MOUTH EVERY DAY 90 tablet 0    insulin aspart (NOVOLOG PEN) 100 UNIT/ML pen Take 15u with meals plus sliding scale max daily dose 75u 60 mL 3    insulin pen needle (BD PEN NEEDLE MARIE 2ND GEN) 32G X 4 MM miscellaneous USE THREE PEN NEEDLES DAILY AS DIRECTED 300 each 3    JARDIANCE 25 MG TABS tablet TAKE 1 TABLET BY MOUTH EVERY DAY 90 tablet 0    lisinopril (ZESTRIL) 20 MG tablet Take 1 tablet (20 mg) by mouth daily 90 tablet 3    ORDER FOR DME Auto-CPAP: Max 12 cm H2O Min 10 cm H2O  Continuous Lifetime need and heated humidity.    1 Device 0    rosuvastatin (CRESTOR) 20 MG tablet Take 1 tablet (20 mg) by mouth daily 90 tablet 3    rosuvastatin (CRESTOR) 40 MG tablet Take 20 mg by mouth daily 90 tablet 3    SEMGLEE, YFGN, 100 UNIT/ML SOPN INJECT 46 UNITS SUBCUTANEOUS DAILY 30 mL 3    thin (NO BRAND SPECIFIED) lancets Use to test blood sugar 3 times daily or as directed. To accompany: Blood Glucose Monitor Brands: per insurance. 200 each 6       Problem List:  Patient  Active Problem List    Diagnosis Date Noted    Calcific tendonitis of right shoulder 04/18/2024     Priority: Medium    Expressive aphasia 07/31/2022     Priority: Medium    Ischemic stroke (H) 04/09/2022     Priority: Medium    Dysarthria due to recent stroke 03/25/2022     Priority: Medium    Chronic kidney disease, stage 1 03/24/2022     Priority: Medium    TIA (transient ischemic attack) 03/24/2022     Priority: Medium    Cerebrovascular accident (CVA), unspecified mechanism (H) 03/16/2022     Priority: Medium    Class 2 obesity due to excess calories with serious comorbidity and body mass index (BMI) of 35.0 to 35.9 in adult 07/21/2018     Priority: Medium    Microalbuminuria due to type 2 diabetes mellitus (H) 07/21/2018     Priority: Medium    Advanced directives, counseling/discussion 03/12/2018     Priority: Medium     Discussed advance care planning with patient; information given to patient to review. 8/7/2012   Christy Ray CMA          LVH (left ventricular hypertrophy) 04/17/2017     Priority: Medium     ekg 4/2017      Type 2 diabetes mellitus with hyperosmolarity without coma, without long-term current use of insulin (H) 12/05/2016     Priority: Medium    Overweight BMI 35-40 10/12/2015     Priority: Medium    Type 2 diabetes mellitus with diabetic nephropathy (H) 08/31/2015     Priority: Medium    NA (obstructive sleep apnea) 07/17/2013     Priority: Medium     On cpap      Recurrent cold sores 01/16/2013     Priority: Medium    Microalbuminuria 09/26/2012     Priority: Medium    Mixed anxiety depressive disorder 04/26/2012     Priority: Medium     (Problem list name updated by automated process. Provider to review and confirm.)      HYPERLIPIDEMIA LDL GOAL <100 10/31/2010     Priority: Medium    Rhinitis, allergic seasonal 06/19/2008     Priority: Medium    Hypertrophy of prostate without urinary obstruction 04/30/2006     Priority: Medium     Problem list name updated by automated process.  "Provider to review      Tietze's disease 04/30/2006     Priority: Medium    Essential hypertension, benign 04/26/2003     Priority: Medium        Past Medical/Surgical History:  Past Medical History:   Diagnosis Date    Anxiety     Essential hypertension, benign     Hypercholesteremia     Sleep apnea     T2DM (type 2 diabetes mellitus) (H)      Past Surgical History:   Procedure Laterality Date    COLONOSCOPY      COLONOSCOPY N/A 10/26/2020    Procedure: COLONOSCOPY;  Surgeon: Randy Gonzalez MD;  Location:  GI    ENT SURGERY      R) ear \"procedure\"    IR CAROTID CEREBRAL ANGIOGRAM BILATERAL  4/7/2022    IR CAROTID CEREBRAL ANGIOGRAM BILATERAL  4/16/2022       Social History:  Social History     Socioeconomic History    Marital status:      Spouse name: Estefania Laura    Number of children: 1    Years of education: Not on file    Highest education level: Not on file   Occupational History    Not on file   Tobacco Use    Smoking status: Never    Smokeless tobacco: Never   Substance and Sexual Activity    Alcohol use: Yes     Alcohol/week: 0.0 standard drinks of alcohol     Comment: 3-6 drinks on occ weekend night, occ drink on weekday    Drug use: Yes     Types: Cocaine    Sexual activity: Yes     Partners: Female   Other Topics Concern     Service No    Blood Transfusions No    Caffeine Concern Not Asked    Occupational Exposure Not Asked    Hobby Hazards Not Asked    Sleep Concern Not Asked    Stress Concern Not Asked    Weight Concern Not Asked    Special Diet Not Asked    Back Care Not Asked    Exercise Not Asked    Bike Helmet Not Asked    Seat Belt Not Asked    Self-Exams Not Asked    Parent/sibling w/ CABG, MI or angioplasty before 65F 55M? Not Asked   Social History Narrative    Not on file     Social Determinants of Health     Financial Resource Strain: Low Risk  (2/4/2024)    [facility]    Overall Financial Resource Strain (CARDIA)     " Difficulty of Paying Living Expenses: Not very hard   Food Insecurity: No Food Insecurity (2/4/2024)    Received from [facility]    Hunger Vital Sign     Worried About Running Out of Food in the Last Year: Never true     Ran Out of Food in the Last Year: Never true   Transportation Needs: No Transportation Needs (2/4/2024)    Received from [facility]    PRAPARE - Transportation     Lack of Transportation (Medical): No     Lack of Transportation (Non-Medical): No   Physical Activity: Not on file   Stress: Not on file   Social Connections: Unknown (5/24/2022)    Social Connection and Isolation Panel [NHANES]     Frequency of Communication with Friends and Family: Not on file     Frequency of Social Gatherings with Friends and Family: Not on file     Attends Judaism Services: Not on file     Active Member of Clubs or Organizations: Not on file     Attends Club or Organization Meetings: Not on file     Marital Status:    Interpersonal Safety: Not At Risk (2/4/2024)    Received from [facility]    Humiliation, Afraid, Rape, and Kick questionnaire     Fear of Current or Ex-Partner: No     Emotionally Abused: No     Physically Abused: No     Sexually Abused: No   Housing Stability: Low Risk  (2/4/2024)    Received from [facility]    Housing Stability     What is your housing situation today?: 3 - I have housing       Family History:  Family History   Adopted: Yes   Problem Relation Age of Onset    Diabetes Mother     Respiratory Mother         asthma    Lipids Mother     Arthritis Mother         knee replacement    Alzheimer Disease Mother     Cerebrovascular Disease Brother     C.A.D. No family hx of     Cancer - colorectal No family hx of     Prostate Cancer No family hx of        Review of Systems:  In the last TWO WEEKS have you experienced any of the following symptoms?  Fevers: No  Night  "Sweats: No  Weight Gain: No  Pain at Night: No  Double Vision: No  Changes in Vision: No  Difficulty Breathing through Nose: No  Sore Throat in Morning: No  Dry Mouth in the Morning: No  Shortness of Breath Lying Flat: No  Shortness of Breath With Activity: No  Awakening with Shortness of Breath: No  Increased Cough: No  Heart Racing at Night: No  Swelling in Feet or Legs: No  Diarrhea at Night: No  Heartburn at Night: Yes  Urinating More than Once at Night: No  Losing Control of Urine at Night: No  Joint Pains at Night: No  Headaches in Morning: No  Weakness in Arms or Legs: No  Depressed Mood: No  Anxiety: No     Physical Examination:  Vitals: /73   Pulse 93   Ht 1.778 m (5' 10\")   Wt 106.5 kg (234 lb 12.8 oz)   SpO2 96%   BMI 33.69 kg/m    BMI= Body mass index is 33.69 kg/m .  General appearance: Awake, alert, cooperative. Well groomed. Sitting comfortably in chair. In no apparent distress.  HEENT: Head: Normocephalic, atraumatic. Eyes: PERRL. Conjunctiva clear. Sclera normal. Nose: External appearance normal.   Neck: Neck Cir (cm): 49 cm (4/25/2024  2:00 PM)  Skin:  No rashes or significant lesions.   Neurologic: Alert, oriented x3. No focal neurological deficit.   Psychiatric: Mood euthymic. Affect congruent with full range and intensity.         Data: All pertinent previous laboratory data reviewed     Recent Labs   Lab Test 12/05/23  0903 05/10/23  1139 03/15/23  0901 10/31/22  1021 08/01/22  1755 08/01/22  0754 08/01/22  0636   NA  --   --  137  --   --   --  134   POTASSIUM  --   --  4.4  --   --   --  4.4   CHLORIDE  --   --  102  --   --   --  105   CO2  --   --  23  --   --   --  23   ANIONGAP  --   --  12  --   --   --  6   GLC  --   --  123*  --  233*   < > 196*   BUN  --   --  17.3  --   --   --  21   CR 1.3 1.3 1.17   < >  --   --  1.16   EVER  --   --  9.9  --   --   --  9.5    < > = values in this interval not displayed.       Recent Labs   Lab Test 08/01/22  0636   WBC 7.8   RBC 5.26 " "  HGB 14.3   HCT 43.3   MCV 82   MCH 27.2   MCHC 33.0   RDW 13.2          Recent Labs   Lab Test 03/15/23  0901 03/24/22  1438   PROTTOTAL  --  8.2   ALBUMIN  --  3.4   BILITOTAL  --  0.2   ALKPHOS  --  99   AST  --  10   ALT 14 17       TSH (mU/L)   Date Value   12/05/2016 1.94   04/21/2016 2.23       Amphetamines Urine (no units)   Date Value   04/06/2022 Screen Negative     Barbiturates Urine (no units)   Date Value   04/06/2022 Screen Negative     Cannabinoids Urine (no units)   Date Value   04/06/2022 Screen Negative     Cocaine Urine (no units)   Date Value   04/06/2022 Screen Negative     Opiates Urine (no units)   Date Value   04/06/2022 Screen Negative     PCP Urine (no units)   Date Value   04/06/2022 Screen Negative       No results found for: \"IRONSAT\", \"[id number]\", \"KRSITAL\"    No results found for: \"PH\", \"PHARTERIAL\", \"PO2\", \"RS1HAXNHCLQ\", \"SAT\", \"PCO2\", \"HCO3\", \"BASEEXCESS\", \"KIMBERLY\", \"BEB\"    @LABRCNTIPR(phv:4,pco2v:4,po2v:4,hco3v:4,torrey:4,o2per:4)@    Echocardiology: No results found for this or any previous visit (from the past 4320 hour(s)).    Chest x-ray: No results found for this or any previous visit from the past 365 days.      Chest CT: No results found for this or any previous visit from the past 365 days.      PFT: Most Recent [facility] Pulmonary Function Testing    No results found for: \"20001\"  No results found for: \"20002\"  No results found for: \"20003\"  No results found for: \"20015\"  No results found for: \"20016\"  No results found for: \"20027\"  No results found for: \"20028\"  No results found for: \"20029\"  No results found for: \"20079\"  No results found for: \"20080\"  No results found for: \"20081\"  No results found for: \"20335\"  No results found for: \"20105\"  No results found for: \"20053\"  No results found for: \"20054\"  No results found for: \"20055\"      Kathy Mcelroy PA-C 4/25/2024     [facility]  [address] [facility] [address] " [facility]  [address] [place] [address], [place] [address]    Chart documentation was completed, in part, with [employer] voice-recognition software. Even though reviewed, some grammatical, spelling, and word errors may remain.    50 minutes spent on day of encounter reviewing medical records, history and physical examination, review of previous testing and interpretation, documentation and further activities as noted above

## 2024-04-25 NOTE — NURSING NOTE
"Chief Complaint   Patient presents with    Sleep Problem     Establish care, wants to restart CPAP       Initial /73   Pulse 93   Ht 1.778 m (5' 10\")   Wt 106.5 kg (234 lb 12.8 oz)   SpO2 96%   BMI 33.69 kg/m   Estimated body mass index is 33.69 kg/m  as calculated from the following:    Height as of this encounter: 1.778 m (5' 10\").    Weight as of this encounter: 106.5 kg (234 lb 12.8 oz).    Medication Reconciliation: complete  ESS 6  Neck circumference: 49 centimeters.  West Juarez MA       "

## 2024-04-30 ENCOUNTER — TELEPHONE (OUTPATIENT)
Dept: ORTHOPEDICS | Facility: CLINIC | Age: 69
End: 2024-04-30
Payer: COMMERCIAL

## 2024-04-30 NOTE — TELEPHONE ENCOUNTER
Called and let Pt's spouse know that Dr. Saleh only does Tenex at [facility].

## 2024-04-30 NOTE — TELEPHONE ENCOUNTER
Other: spouse called wondering if Dr. Saleh does TENNEX procedure in [facility]? Pt doesn't want to go to [facility]. Can care team reach out to pt?      Could we send this information to you in [facility] or would you prefer to receive a phone call?:   Patient would prefer a phone call   Okay to leave a detailed message?: Yes at Home number on file [phone] (home)

## 2024-05-03 NOTE — TELEPHONE ENCOUNTER
Returned call to patient's wife to discuss scheduling options for his TENEX procedure. Confirmed that previously scheduled procedure on 5/724 has been canceled due to change in Dr Saleh's availability. Patient is willing to go to any location with any physician, whichever can offer soonest availability. Patient would like a call back as soon as possible with an update.

## 2024-05-06 ENCOUNTER — PREP FOR PROCEDURE (OUTPATIENT)
Dept: ORTHOPEDICS | Facility: CLINIC | Age: 69
End: 2024-05-06
Payer: COMMERCIAL

## 2024-05-06 NOTE — TELEPHONE ENCOUNTER
Returned call to patient's wife to offer next available time slot of 5/22/24 at 1:00pm with Dr Jevon Londono. Patient's wife agreeable to the date and time, will plan to arrive by 12:00pm for the procedure.

## 2024-05-06 NOTE — TELEPHONE ENCOUNTER
Other: Hello, Wife is calling again because she has not heard back anyone about getting scheduled please call her today.     Could we send this information to you in [place] or would you prefer to receive a phone call?:   Patient would prefer a phone call   Okay to leave a detailed message?: Yes at Cell number on file:    Telephone Information:   Mobile [phone]

## 2024-05-11 DIAGNOSIS — F41.8 MIXED ANXIETY DEPRESSIVE DISORDER: ICD-10-CM

## 2024-05-13 RX ORDER — ESCITALOPRAM OXALATE 20 MG/1
TABLET ORAL
Qty: 90 TABLET | Refills: 0 | Status: SHIPPED | OUTPATIENT
Start: 2024-05-13 | End: 2024-08-20

## 2024-05-16 NOTE — TELEPHONE ENCOUNTER
Phoned patient's wife to inform her that we unfortunately will need to reschedule Huseyin's procedure again due to a mix up with the scheduling. Patient can be rescheduled with Dr Saleh for 5/31/24. Patient's wife is agreeable to this, will wait for pre-op phone call with further instructions.     Patient's wife stated that they plan to go out of town after the procedure, and would like to know if Huseyin will have any restrictions or if he will need to be on pain medication immediately following. She would like a call back with some information on what they can expect regarding this.

## 2024-05-31 ENCOUNTER — ANCILLARY PROCEDURE (OUTPATIENT)
Dept: RADIOLOGY | Facility: AMBULATORY SURGERY CENTER | Age: 69
End: 2024-05-31
Attending: FAMILY MEDICINE
Payer: COMMERCIAL

## 2024-05-31 ENCOUNTER — HOSPITAL ENCOUNTER (OUTPATIENT)
Facility: AMBULATORY SURGERY CENTER | Age: 69
Discharge: HOME | End: 2024-05-31
Attending: FAMILY MEDICINE | Admitting: FAMILY MEDICINE
Payer: COMMERCIAL

## 2024-05-31 VITALS
HEART RATE: 75 BPM | RESPIRATION RATE: 18 BRPM | WEIGHT: 234 LBS | HEIGHT: 70 IN | TEMPERATURE: 96.7 F | BODY MASS INDEX: 33.5 KG/M2 | OXYGEN SATURATION: 99 % | SYSTOLIC BLOOD PRESSURE: 145 MMHG | DIASTOLIC BLOOD PRESSURE: 86 MMHG

## 2024-05-31 DIAGNOSIS — M75.31 CALCIFIC TENDONITIS OF RIGHT SHOULDER: Primary | ICD-10-CM

## 2024-05-31 DIAGNOSIS — M75.31 CALCIFIC TENDONITIS OF RIGHT SHOULDER: ICD-10-CM

## 2024-05-31 PROBLEM — R55 SYNCOPE, UNSPECIFIED SYNCOPE TYPE: Status: ACTIVE | Noted: 2024-02-04

## 2024-05-31 PROCEDURE — 23405 INCISION OF TENDON & MUSCLE: CPT | Mod: LT

## 2024-05-31 RX ORDER — HYDROCODONE BITARTRATE AND ACETAMINOPHEN 5; 325 MG/1; MG/1
1 TABLET ORAL EVERY 6 HOURS PRN
Qty: 12 TABLET | Refills: 0 | Status: SHIPPED | OUTPATIENT
Start: 2024-05-31 | End: 2024-06-03

## 2024-05-31 RX ORDER — LIDOCAINE HYDROCHLORIDE AND EPINEPHRINE 10; 10 MG/ML; UG/ML
INJECTION, SOLUTION INFILTRATION; PERINEURAL DAILY PRN
Status: DISCONTINUED | OUTPATIENT
Start: 2024-05-31 | End: 2024-05-31 | Stop reason: HOSPADM

## 2024-05-31 RX ORDER — LIDOCAINE HYDROCHLORIDE 10 MG/ML
INJECTION, SOLUTION EPIDURAL; INFILTRATION; INTRACAUDAL; PERINEURAL DAILY PRN
Status: DISCONTINUED | OUTPATIENT
Start: 2024-05-31 | End: 2024-05-31 | Stop reason: HOSPADM

## 2024-05-31 NOTE — PROCEDURES
Pre-operative diagnosis: Calcific tendinitis right shoulder, Rotator cuff tendinopathy right shoulder     Post-operative diagnosis Calcific tendinitis right shoulder, Rotator cuff tendinopathy right shoulder   Procedure: Procedure(s):  EXCISION, SOFT TISSUE, USING ULTRASONIC ASPIRATOR SYSTEM, Right Shoulder   Surgeon(s): Surgeons and Role:     * Jevon Saleh DO - Primary   Estimated blood loss: 1cc          Patient presented with chronic calcific tendinitis at the origin of the right shoulder.  He's here for a percutaneous tenotomy of the right rotator cuff using ultrasound guidance to cut and remove the pathologic tissue.     Written consent obtained by patient.  Time out performed in the room confirming patient name, procedure, laterality and allergies.     The anatomy was identified and the diseased tissue was visualized and confirmed at the rotator cuff insertion of supraspinatus where hydroxyapatite mass identifed.  The area was prepped with chlorhexidine scrub.  Local anesthesia was provided with a local injection of 5mL of 1% lidocaine with epinephrine to anesthetize skin and subcutaneous tissue, followed by 7cc of 1% lidocaine without epinephrine to anesthetize deltoid muscle and rotator cuff tendon using a 23 gauge needle.  A #11 blade was used to puncture the skin to the site of the pathologic tissue.  A sterile sleeve was placed over the ultrasound transducer.     To section the tendon the surgical instrument is introduced through the puncture site advancing to the diseased tendon.  Once the tip of the instrument is confirmed to be on the pathologic tissue, the foot pedal was depressed and the tendon is incised along its length, cutting and removing the diseased tendon tissue.  Procedure was performed with a total of 4:48 seconds of cutting time.     Following the procedure, steri-strips were placed, followed by Tegaderm.      Patient scheduled to follow up in 2 weeks.     Jevon Saleh DO  CAQSM  Primary Care Sports Medicine  [facility]

## 2024-05-31 NOTE — DISCHARGE INSTRUCTIONS
PERCUTANEOUS TENOTOMY (TENEX) OF THE RIGHT SHOULDER ROTATOR CUFF    After the procedure you will have your arm placed in a sling and a dressing placed over the small incision. The sling stays on for 3 days, as does the dressing.    Upon getting home, please start taking anti-inflammatory medication. I recommend using Motrin 600-800mg three times a day (or something equivalent to Motrin, taken as directed), with extra-strength Tylenol every 6 hours.    Please use norco 5/325 one tab every 6 hours as needed for breakthrough pain.     Showering on the day of surgery or the first 2 days after requires the surgical dressing to be covered by a plastic bag.    Post-operative activities  Keep your arm in a sling for 3 days. No lifting, carrying, or pulling.  On the 3rd day after your surgery, you may remove the sling and wear your wrist brace for 3 weeks. You may remove the brace for showering, washing hands, or a routine break, but no significant wrist activity or exercises.    After day 3 you may use shoulder with 10# restriction.      When to notify your doctor  Though rare, infections can occur. Symptoms begin 3-5 days after surgery, and include redness about the incision, drainage, fevers, or chills. Some mild swelling and bruising around the surgery can be normal.  If you are concerned, please call the office

## 2024-06-02 ENCOUNTER — HEALTH MAINTENANCE LETTER (OUTPATIENT)
Age: 69
End: 2024-06-02

## 2024-06-04 ENCOUNTER — TELEPHONE (OUTPATIENT)
Dept: ORTHOPEDICS | Facility: CLINIC | Age: 69
End: 2024-06-04
Payer: COMMERCIAL

## 2024-06-04 NOTE — TELEPHONE ENCOUNTER
LVM to see how Huseyin is feeling after Tennex procedure. He is instructed to follow up in 2 weeks virtually.

## 2024-06-04 NOTE — TELEPHONE ENCOUNTER
----- Message from Jevon Saleh DO sent at 6/4/2024 10:51 AM CDT -----  Ochoa Kitchen,    Can you call Huseyin to see how he is doing today.  He can wean sling    1 lb lifting restriction for the next week.  Begin use of shoulder for activities of  daily living (like using it to groom, dress, eat and drive short distances). No  overhead lifting.      Progress to AROM of shoulder when tolerated    Describe how to perform table slides, wall slides,     Help him follow up in 2 weeks virtually OK.      Thanks

## 2024-06-17 PROBLEM — Z71.89 ADVANCED DIRECTIVES, COUNSELING/DISCUSSION: Status: RESOLVED | Noted: 2018-03-12 | Resolved: 2024-06-17

## 2024-06-18 ENCOUNTER — MYC REFILL (OUTPATIENT)
Dept: ENDOCRINOLOGY | Facility: CLINIC | Age: 69
End: 2024-06-18
Payer: COMMERCIAL

## 2024-06-18 DIAGNOSIS — E11.21 TYPE 2 DIABETES MELLITUS WITH DIABETIC NEPHROPATHY, WITH LONG-TERM CURRENT USE OF INSULIN (H): ICD-10-CM

## 2024-06-18 DIAGNOSIS — Z79.4 TYPE 2 DIABETES MELLITUS WITH DIABETIC NEPHROPATHY, WITH LONG-TERM CURRENT USE OF INSULIN (H): ICD-10-CM

## 2024-06-18 RX ORDER — BLOOD-GLUCOSE SENSOR
1 EACH MISCELLANEOUS
Qty: 6 EACH | Refills: 0 | Status: SHIPPED | OUTPATIENT
Start: 2024-06-18 | End: 2024-08-16

## 2024-06-18 NOTE — TELEPHONE ENCOUNTER
Last Written Prescription Date:  24  Last Fill Quantity: 6,  # refills: 1   Last office visit: 2023 with prescribing provider:  Dr. Odom   Future Office Visit: 24        Requested Prescriptions   Pending Prescriptions Disp Refills    Continuous Glucose Sensor (FREESTYLE NANO 3 SENSOR) MISC 6 each 1     Si each every 14 days       There is no refill protocol information for this order

## 2024-07-03 DIAGNOSIS — G45.9 TIA (TRANSIENT ISCHEMIC ATTACK): ICD-10-CM

## 2024-07-03 RX ORDER — CLOPIDOGREL BISULFATE 75 MG/1
TABLET ORAL
Qty: 90 TABLET | Refills: 1 | Status: SHIPPED | OUTPATIENT
Start: 2024-07-03

## 2024-07-04 DIAGNOSIS — E11.21 TYPE 2 DIABETES MELLITUS WITH DIABETIC NEPHROPATHY, WITH LONG-TERM CURRENT USE OF INSULIN (H): Primary | ICD-10-CM

## 2024-07-04 DIAGNOSIS — Z79.4 TYPE 2 DIABETES MELLITUS WITH DIABETIC NEPHROPATHY, WITH LONG-TERM CURRENT USE OF INSULIN (H): Primary | ICD-10-CM

## 2024-07-05 RX ORDER — EMPAGLIFLOZIN 25 MG/1
25 TABLET, FILM COATED ORAL DAILY
Qty: 90 TABLET | Refills: 0 | Status: SHIPPED | OUTPATIENT
Start: 2024-07-05 | End: 2024-09-25

## 2024-07-05 NOTE — TELEPHONE ENCOUNTER
Requested Prescriptions   Pending Prescriptions Disp Refills    JARDIANCE 25 MG TABS tablet [Pharmacy Med Name: JARDIANCE 25 MG TABLET] 90 tablet 0     Sig: TAKE 1 TABLET BY MOUTH EVERY DAY       Sodium Glucose Co-Transport Inhibitor Agents Failed - 7/4/2024 12:47 AM        Failed - Patient has documented A1c within the specified period of time.     If HgbA1C is 8 or greater, it needs to be on file within the past 3 months.  If less than 8, must be on file within the past 6 months.     Recent Labs   Lab Test 03/02/23  0956   A1C 7.9*             Failed - Has GFR on file in past 12 months and most recent value is normal        Failed - Patient has documented normal Potassium within the last 12 mos.     Recent Labs   Lab Test 03/15/23  0901   POTASSIUM 4.4             Passed - Medication is active on med list        Passed - Recent (6 mo) or future (90 days) visit within the authorizing provider's specialty     The patient must have completed an in-person or virtual visit within the past 6 months or has a future visit scheduled within the next 90 days with the authorizing provider s specialty.  Urgent care and e-visits do not quality as an office visit for this protocol.          Passed - Medication indicated for associated diagnosis     Medication is associated with one or more of the following diagnoses:     Diabetic nephropathy, With Albuminuria - Type 2 diabetes mellitus     Disorder of cardiovascular system; Prophylaxis - Type 2 diabetes mellitus     Type 2 diabetes mellitus    Disorder of cardiovascular system; Prophylaxis - Heart failure   Chronic kidney disease, (At risk of progression) to reduce the risk of sustained   estimated GFR decline, end-stage kidney disease, cardiovascular death,   and hospitalization for heart failure     Heart failure, (NYHA class II to IV, reduced ejection fraction) to reduce risk of  cardiovascular death and hospitalization           Passed - Patient is age 18 or older            Last Written Prescription Date:  4/4/2024  Last Fill Quantity: 90 tab,  # refills: 0   Last office visit: Visit date not found ; last virtual visit: 7/11/2023 with prescribing provider:  Dr. Odom   Future Office Visit:  9/24/2024  Last A1C was 7/11/23, last BMP was 3/15/23.   [place] [place] reported Potassium 3.8 and eGFR 64.  Will refill per protocol however will order A1C to be done prior to upcoming appt with Dr. Odom in September.   Routing message to Dr. Odom to see if she wants any other labs.   Crystal Brown RN on 7/5/2024 at 9:20 AM

## 2024-07-09 DIAGNOSIS — I10 ESSENTIAL HYPERTENSION, BENIGN: ICD-10-CM

## 2024-07-09 RX ORDER — LISINOPRIL 20 MG/1
20 TABLET ORAL DAILY
Qty: 90 TABLET | Refills: 3 | OUTPATIENT
Start: 2024-07-09

## 2024-08-15 DIAGNOSIS — E11.21 TYPE 2 DIABETES MELLITUS WITH DIABETIC NEPHROPATHY, WITH LONG-TERM CURRENT USE OF INSULIN (H): ICD-10-CM

## 2024-08-15 DIAGNOSIS — F41.8 MIXED ANXIETY DEPRESSIVE DISORDER: ICD-10-CM

## 2024-08-15 DIAGNOSIS — Z79.4 TYPE 2 DIABETES MELLITUS WITH DIABETIC NEPHROPATHY, WITH LONG-TERM CURRENT USE OF INSULIN (H): ICD-10-CM

## 2024-08-16 RX ORDER — BLOOD-GLUCOSE SENSOR
EACH MISCELLANEOUS
Qty: 6 EACH | Refills: 0 | Status: SHIPPED | OUTPATIENT
Start: 2024-08-16 | End: 2024-09-27

## 2024-08-16 RX ORDER — PEN NEEDLE, DIABETIC 32GX 5/32"
NEEDLE, DISPOSABLE MISCELLANEOUS
Qty: 300 EACH | Refills: 0 | Status: SHIPPED | OUTPATIENT
Start: 2024-08-16 | End: 2024-09-25

## 2024-08-16 NOTE — TELEPHONE ENCOUNTER
Last Written Prescription Date:  6/18/24  Last Fill Quantity: 6,  # refills: 0   Last office visit: Visit date not found ; last virtual visit: 7/11/2023 with prescribing provider:  Dr. Odom   Future Office Visit:  9/24/24    Requested Prescriptions   Pending Prescriptions Disp Refills    Continuous Glucose Sensor (FREESTYLE NANO 3 SENSOR) MISC [Pharmacy Med Name: FREESTYLE NANO 3 SENSOR] 6 each 0     Sig: USE 1 EACH EVERY 14 DAYS       There is no refill protocol information for this order        Refill sent  Rolanda Ochoa RN

## 2024-08-20 RX ORDER — ESCITALOPRAM OXALATE 20 MG/1
TABLET ORAL
Qty: 90 TABLET | Refills: 0 | Status: SHIPPED | OUTPATIENT
Start: 2024-08-20

## 2024-08-22 DIAGNOSIS — F41.8 MIXED ANXIETY DEPRESSIVE DISORDER: ICD-10-CM

## 2024-08-22 RX ORDER — ESCITALOPRAM OXALATE 20 MG/1
TABLET ORAL
Qty: 90 TABLET | Refills: 0 | OUTPATIENT
Start: 2024-08-22

## 2024-08-24 DIAGNOSIS — I10 ESSENTIAL HYPERTENSION, BENIGN: ICD-10-CM

## 2024-08-26 RX ORDER — LISINOPRIL 20 MG/1
20 TABLET ORAL DAILY
Qty: 90 TABLET | Refills: 0 | Status: SHIPPED | OUTPATIENT
Start: 2024-08-26

## 2024-08-29 ENCOUNTER — PATIENT OUTREACH (OUTPATIENT)
Dept: CARE COORDINATION | Facility: CLINIC | Age: 69
End: 2024-08-29
Payer: COMMERCIAL

## 2024-09-25 ENCOUNTER — VIRTUAL VISIT (OUTPATIENT)
Dept: ENDOCRINOLOGY | Facility: CLINIC | Age: 69
End: 2024-09-25
Payer: COMMERCIAL

## 2024-09-25 VITALS — HEART RATE: 76 BPM | SYSTOLIC BLOOD PRESSURE: 103 MMHG | DIASTOLIC BLOOD PRESSURE: 68 MMHG

## 2024-09-25 DIAGNOSIS — E11.00 TYPE 2 DIABETES MELLITUS WITH HYPEROSMOLARITY WITHOUT COMA, WITHOUT LONG-TERM CURRENT USE OF INSULIN (H): ICD-10-CM

## 2024-09-25 DIAGNOSIS — Z79.4 TYPE 2 DIABETES MELLITUS WITH DIABETIC NEPHROPATHY, WITH LONG-TERM CURRENT USE OF INSULIN (H): Primary | ICD-10-CM

## 2024-09-25 DIAGNOSIS — E11.21 TYPE 2 DIABETES MELLITUS WITH DIABETIC NEPHROPATHY, WITH LONG-TERM CURRENT USE OF INSULIN (H): Primary | ICD-10-CM

## 2024-09-25 PROCEDURE — 99214 OFFICE O/P EST MOD 30 MIN: CPT | Mod: 95 | Performed by: INTERNAL MEDICINE

## 2024-09-25 RX ORDER — BLOOD-GLUCOSE SENSOR
EACH MISCELLANEOUS
Qty: 6 EACH | Refills: 3 | Status: SHIPPED | OUTPATIENT
Start: 2024-09-25

## 2024-09-25 RX ORDER — INSULIN GLARGINE-YFGN 100 [IU]/ML
46 INJECTION, SOLUTION SUBCUTANEOUS DAILY
Qty: 45 ML | Refills: 3 | Status: SHIPPED | OUTPATIENT
Start: 2024-09-25

## 2024-09-25 RX ORDER — PEN NEEDLE, DIABETIC 32GX 5/32"
NEEDLE, DISPOSABLE MISCELLANEOUS
Qty: 300 EACH | Refills: 5 | Status: SHIPPED | OUTPATIENT
Start: 2024-09-25

## 2024-09-25 NOTE — Clinical Note
Hey-- his BG is at goal, I haven't seen him in a year. Are you ok to manage his blood sugars moving forward? He's much more stable/optimized now. Thanks!

## 2024-09-25 NOTE — LETTER
9/25/2024      [address]  [address] [place] [place] [address]      Dear Colleague,    Thank you for referring your patient, Huseyin St, to the [facility]. Please see a copy of my visit note below.                Video-Visit Details    Type of service:  Video Visit  Video Start Time: 11:32  Video End Time:11:47  Originating Location (pt. Location): On site- [place] [facility]  Distant Location (provider location):  Home  Platform used for Video Visit: Nemo St is a 68 year old yo male who NA, obesity, HLD, DM2 complicated by nephropathy, neuropathy and recently CVA s/p stenting here for follow-up via a billable video visit. Last seen July 2023.     Chief Complaint   Patient presents with     RECHECK     Type 2 diabetes mellitus with diabetic nephropathy, with long-term current use of insulin (H)       INTERVAL HISTORY:  - Myles 3 on shortage, has not had since Monday  - Eats snack every night before bed  - Feels has improved but not completely recovered. At night, will get dizzy for about 15 sec max, will feel the same when rolls over in bed and gets up to go to the bathroom.   - minimal lows  - No recent eye check, has place he goes regularly  - F/U with PCP on Friday      Subjective:    1) Diabetes Mellitus    Diabetes History:  Diagnosis: 10-20 years ago  Hospitalizations: April for stroke, never for diabetes  Previous Regimens: metformin diarrhea, Ozempic diarrhea, victoza diarrhea  Current Regimen: Jardiance 25mg,  Novolog 18-24u with each meal-- varies depending on what eating. Usually 20 plus sliding scale-- 20u with larger meals, 15u with medium meals, 10u with small meals (rarely takes),  Semglee 44-46u at bedtime    BG check- MYLES 3 (see below)      24hr recall-  Lunch- doses as med meal  Dinner- doses as large meal  Snack- usually ice cream before bed    Last eye exam: Sept 2020, no NPRD at that time (per report)  Foot Exam: self checks, wife  checks every few days  ACEI/ARB: lisinopril  Statin/ASA: crestor, ASA, plavix      BP Readings from Last 3 Encounters:   09/25/24 103/68   05/31/24 (!) 145/86   04/25/24 111/73       Lab Results   Component Value Date    A1C 11.8 03/16/2022    A1C 11.8 10/11/2021    A1C 13.2 09/28/2020    A1C 12.3 09/23/2019    A1C 10.4 03/12/2018       Recent Labs   Lab Test 04/06/22  0725 03/16/22  1704 04/21/16  0906 08/20/15  0830 01/14/15  0847   CHOL 109 224*   < > 136 316*   HDL 34* 33*   < > 42 41   LDL 53 136*   < > 59 219*   TRIG 111 275*   < > 176* 282*   CHOLHDLRATIO  --   --   --  3.2 7.7*    < > = values in this interval not displayed.       Lab Results   Component Value Date    MICROL 265 10/11/2021    MICROL 715 09/28/2020     No results found for: MICROALBUMIN      Wt Readings from Last 3 Encounters:   05/31/24 106.1 kg (234 lb)   04/25/24 106.5 kg (234 lb 12.8 oz)   12/13/23 107.2 kg (236 lb 6.4 oz)             Active diagnoses this visit:     Type 2 diabetes mellitus with diabetic nephropathy, with long-term current use of insulin  Type 2 diabetes mellitus with hyperosmolarity without coma, without long-term current use of insulin     ROS: 10 point ROS neg other than the symptoms noted above in the HPI.      Medical, surgical, social, and family histories, medications and allergies reviewed and updated.    Objective:  Physical Exam (visual exam)  VS:  no vital signs taken for video visit  CONSTITUTIONAL: healthy, alert and NAD, responding appropriately  ENT: normocephalic, no visual evidence of trauma, normal nose and oral mucosa  EYES: conjunctivae and sclerae normal, no exophthalmos or proptosis  THYROID:  no visualized nodules or goiter  LUNGS: no audible wheeze, cough or visible cyanosis, no visible retractions or increased work of breathing  EXTREMITIES: no hand tremors  NEUROLOGY: cranial nerves grossly intact with no obvious deficit.  SKIN:  no visualized skin lesions or rash, no edema visualized  PSYCH:  mentation appears normal, normal judgement      Lab Results   Component Value Date/Time    TSH 1.94 12/05/2016 10:58 AM       Last Comprehensive Metabolic Panel:  Sodium   Date Value Ref Range Status   03/15/2023 137 136 - 145 mmol/L Final   03/12/2018 135 133 - 144 mmol/L Final     Potassium   Date Value Ref Range Status   03/15/2023 4.4 3.4 - 5.3 mmol/L Final   08/01/2022 4.4 3.4 - 5.3 mmol/L Final   03/12/2018 4.3 3.4 - 5.3 mmol/L Final     Chloride   Date Value Ref Range Status   03/15/2023 102 98 - 107 mmol/L Final   08/01/2022 105 94 - 109 mmol/L Final   03/12/2018 102 94 - 109 mmol/L Final     Carbon Dioxide   Date Value Ref Range Status   03/12/2018 26 20 - 32 mmol/L Final     Carbon Dioxide (CO2)   Date Value Ref Range Status   03/15/2023 23 22 - 29 mmol/L Final   08/01/2022 23 20 - 32 mmol/L Final     Anion Gap   Date Value Ref Range Status   03/15/2023 12 7 - 15 mmol/L Final   08/01/2022 6 3 - 14 mmol/L Final   03/12/2018 7 3 - 14 mmol/L Final     Glucose   Date Value Ref Range Status   03/15/2023 123 (H) 70 - 99 mg/dL Final   08/01/2022 196 (H) 70 - 99 mg/dL Final   03/12/2018 220 (H) 70 - 99 mg/dL Final     GLUCOSE BY METER POCT   Date Value Ref Range Status   08/01/2022 233 (H) 70 - 99 mg/dL Final     Urea Nitrogen   Date Value Ref Range Status   03/15/2023 17.3 8.0 - 23.0 mg/dL Final   08/01/2022 21 7 - 30 mg/dL Final   03/12/2018 13 7 - 30 mg/dL Final     Creatinine   Date Value Ref Range Status   03/15/2023 1.17 0.67 - 1.17 mg/dL Final   09/28/2020 1.03 0.66 - 1.25 mg/dL Final     Creatinine POCT   Date Value Ref Range Status   12/05/2023 1.3 0.7 - 1.3 mg/dL Final     GFR Estimate   Date Value Ref Range Status   09/28/2020 75 >60 mL/min/[1.73_m2] Final     Comment:     Non  GFR Calc  Starting 12/18/2018, serum creatinine based estimated GFR (eGFR) will be   calculated using the Chronic Kidney Disease Epidemiology Collaboration   (CKD-EPI) equation.       GFR, ESTIMATED POCT   Date  Value Ref Range Status   12/05/2023 59 (L) >60 mL/min/1.73m2 Final     Calcium   Date Value Ref Range Status   03/15/2023 9.9 8.8 - 10.2 mg/dL Final   03/12/2018 9.6 8.5 - 10.1 mg/dL Final     Bilirubin Total   Date Value Ref Range Status   03/24/2022 0.2 0.2 - 1.3 mg/dL Final   12/05/2016 0.4 0.2 - 1.3 mg/dL Final     Alkaline Phosphatase   Date Value Ref Range Status   03/24/2022 99 40 - 150 U/L Final   12/05/2016 81 40 - 150 U/L Final     ALT   Date Value Ref Range Status   03/15/2023 14 10 - 50 U/L Final   09/29/2017 36 0 - 70 U/L Final     AST   Date Value Ref Range Status   03/24/2022 10 0 - 45 U/L Final   12/05/2016 15 0 - 45 U/L Final       ASSESSMENT / PLAN:  No diagnosis found.    Diabetes Mellitus- Recent A1c 7.9 March 2023-- no recent check> GMI on AGP was 7.0%, Ave Glucose 154. TIR 77%  1.  Glucose Control:  at goal based on CGM without hypoglycemia              - Jardiance continue 25mg daily   - Semglee- 46u at bedtime- take this dose every night   - Continue mealtime insulin Novolog 20u with most meals plus sliding scale-- doses 20 (large meals), 15u with medium meals 10u with snacks/smaller meal    - Continue sliding scale 1:20 >120. No doses if not eating  2.  Lipids:  Meets criteria per ASCVD risk %, continue crestor 40mg daily  3.  Blood Pressure:  At goal, continue meds  4.  CV prevention:  Continue aspirin 325 mg daily, plavix  5.  Diabetic Nephropathy screening:   continue ACEI/ARB , improved  6.  Diabetic Retinopathy screening:  continue annual dilated eye exams due now-- he will call and schedule  7.  Diabetic Neuropathy screening:  Up to date.  Instructed on routine foot care, follow-up with podiatry as needed.       Orders Placed This Encounter   Procedures     Albumin Random Urine Quantitative with Creat Ratio     Comprehensive metabolic panel     Hemoglobin A1c     Lipid panel reflex to direct LDL Fasting     TSH with free T4 reflex        Return to clinic PRN-- he can continue care with  PCP as BG readings are at goal    A total of 24 minutes were spent today 09/25/24 on this visit including chart review, history and counseling, documentation and other activities as detailed above.         Again, thank you for allowing me to participate in the care of your patient.        Sincerely,        Ivonne Odom MD

## 2024-09-25 NOTE — PROGRESS NOTES
Video-Visit Details    Type of service:  Video Visit  Video Start Time: 11:32  Video End Time:11:47  Originating Location (pt. Location): On site- [facility]  Distant Location (provider location):  Home  Platform used for Video Visit: Nemo St is a 68 year old yo male who NA, obesity, HLD, DM2 complicated by nephropathy, neuropathy and recently CVA s/p stenting here for follow-up via a billable video visit. Last seen July 2023.     Chief Complaint   Patient presents with    RECHECK     Type 2 diabetes mellitus with diabetic nephropathy, with long-term current use of insulin (H)       INTERVAL HISTORY:  - Myles 3 on shortage, has not had since Monday  - Eats snack every night before bed  - Feels has improved but not completely recovered. At night, will get dizzy for about 15 sec max, will feel the same when rolls over in bed and gets up to go to the bathroom.   - minimal lows  - No recent eye check, has place he goes regularly  - F/U with PCP on Friday      Subjective:    1) Diabetes Mellitus    Diabetes History:  Diagnosis: 10-20 years ago  Hospitalizations: April for stroke, never for diabetes  Previous Regimens: metformin diarrhea, Ozempic diarrhea, victoza diarrhea  Current Regimen: Jardiance 25mg,  Novolog 18-24u with each meal-- varies depending on what eating. Usually 20 plus sliding scale-- 20u with larger meals, 15u with medium meals, 10u with small meals (rarely takes),  Semglee 44-46u at bedtime    BG check- MYLES 3 (see below)      24hr recall-  Lunch- doses as med meal  Dinner- doses as large meal  Snack- usually ice cream before bed    Last eye exam: Sept 2020, no NPRD at that time (per report)  Foot Exam: self checks, wife checks every few days  ACEI/ARB: lisinopril  Statin/ASA: crestor, ASA, plavix      BP Readings from Last 3 Encounters:   09/25/24 103/68   05/31/24 (!) 145/86   04/25/24 111/73       Lab Results   Component Value Date    A1C 11.8 03/16/2022    A1C 11.8  10/11/2021    A1C 13.2 09/28/2020    A1C 12.3 09/23/2019    A1C 10.4 03/12/2018       Recent Labs   Lab Test 04/06/22  0725 03/16/22  1704 04/21/16  0906 08/20/15  0830 01/14/15  0847   CHOL 109 224*   < > 136 316*   HDL 34* 33*   < > 42 41   LDL 53 136*   < > 59 219*   TRIG 111 275*   < > 176* 282*   CHOLHDLRATIO  --   --   --  3.2 7.7*    < > = values in this interval not displayed.       Lab Results   Component Value Date    MICROL 265 10/11/2021    MICROL 715 09/28/2020     No results found for: MICROALBUMIN      Wt Readings from Last 3 Encounters:   05/31/24 106.1 kg (234 lb)   04/25/24 106.5 kg (234 lb 12.8 oz)   12/13/23 107.2 kg (236 lb 6.4 oz)             Active diagnoses this visit:     Type 2 diabetes mellitus with diabetic nephropathy, with long-term current use of insulin  Type 2 diabetes mellitus with hyperosmolarity without coma, without long-term current use of insulin     ROS: 10 point ROS neg other than the symptoms noted above in the HPI.      Medical, surgical, social, and family histories, medications and allergies reviewed and updated.    Objective:  Physical Exam (visual exam)  VS:  no vital signs taken for video visit  CONSTITUTIONAL: healthy, alert and NAD, responding appropriately  ENT: normocephalic, no visual evidence of trauma, normal nose and oral mucosa  EYES: conjunctivae and sclerae normal, no exophthalmos or proptosis  THYROID:  no visualized nodules or goiter  LUNGS: no audible wheeze, cough or visible cyanosis, no visible retractions or increased work of breathing  EXTREMITIES: no hand tremors  NEUROLOGY: cranial nerves grossly intact with no obvious deficit.  SKIN:  no visualized skin lesions or rash, no edema visualized  PSYCH: mentation appears normal, normal judgement      Lab Results   Component Value Date/Time    TSH 1.94 12/05/2016 10:58 AM       Last Comprehensive Metabolic Panel:  Sodium   Date Value Ref Range Status   03/15/2023 137 136 - 145 mmol/L Final   03/12/2018  135 133 - 144 mmol/L Final     Potassium   Date Value Ref Range Status   03/15/2023 4.4 3.4 - 5.3 mmol/L Final   08/01/2022 4.4 3.4 - 5.3 mmol/L Final   03/12/2018 4.3 3.4 - 5.3 mmol/L Final     Chloride   Date Value Ref Range Status   03/15/2023 102 98 - 107 mmol/L Final   08/01/2022 105 94 - 109 mmol/L Final   03/12/2018 102 94 - 109 mmol/L Final     Carbon Dioxide   Date Value Ref Range Status   03/12/2018 26 20 - 32 mmol/L Final     Carbon Dioxide (CO2)   Date Value Ref Range Status   03/15/2023 23 22 - 29 mmol/L Final   08/01/2022 23 20 - 32 mmol/L Final     Anion Gap   Date Value Ref Range Status   03/15/2023 12 7 - 15 mmol/L Final   08/01/2022 6 3 - 14 mmol/L Final   03/12/2018 7 3 - 14 mmol/L Final     Glucose   Date Value Ref Range Status   03/15/2023 123 (H) 70 - 99 mg/dL Final   08/01/2022 196 (H) 70 - 99 mg/dL Final   03/12/2018 220 (H) 70 - 99 mg/dL Final     GLUCOSE BY METER POCT   Date Value Ref Range Status   08/01/2022 233 (H) 70 - 99 mg/dL Final     Urea Nitrogen   Date Value Ref Range Status   03/15/2023 17.3 8.0 - 23.0 mg/dL Final   08/01/2022 21 7 - 30 mg/dL Final   03/12/2018 13 7 - 30 mg/dL Final     Creatinine   Date Value Ref Range Status   03/15/2023 1.17 0.67 - 1.17 mg/dL Final   09/28/2020 1.03 0.66 - 1.25 mg/dL Final     Creatinine POCT   Date Value Ref Range Status   12/05/2023 1.3 0.7 - 1.3 mg/dL Final     GFR Estimate   Date Value Ref Range Status   09/28/2020 75 >60 mL/min/[1.73_m2] Final     Comment:     Non  GFR Calc  Starting 12/18/2018, serum creatinine based estimated GFR (eGFR) will be   calculated using the Chronic Kidney Disease Epidemiology Collaboration   (CKD-EPI) equation.       GFR, ESTIMATED POCT   Date Value Ref Range Status   12/05/2023 59 (L) >60 mL/min/1.73m2 Final     Calcium   Date Value Ref Range Status   03/15/2023 9.9 8.8 - 10.2 mg/dL Final   03/12/2018 9.6 8.5 - 10.1 mg/dL Final     Bilirubin Total   Date Value Ref Range Status   03/24/2022 0.2  0.2 - 1.3 mg/dL Final   12/05/2016 0.4 0.2 - 1.3 mg/dL Final     Alkaline Phosphatase   Date Value Ref Range Status   03/24/2022 99 40 - 150 U/L Final   12/05/2016 81 40 - 150 U/L Final     ALT   Date Value Ref Range Status   03/15/2023 14 10 - 50 U/L Final   09/29/2017 36 0 - 70 U/L Final     AST   Date Value Ref Range Status   03/24/2022 10 0 - 45 U/L Final   12/05/2016 15 0 - 45 U/L Final       ASSESSMENT / PLAN:  No diagnosis found.    Diabetes Mellitus- Recent A1c 7.9 March 2023-- no recent check> GMI on AGP was 7.0%, Ave Glucose 154. TIR 77%  1.  Glucose Control:  at goal based on CGM without hypoglycemia              - Jardiance continue 25mg daily   - Semglee- 46u at bedtime- take this dose every night   - Continue mealtime insulin Novolog 20u with most meals plus sliding scale-- doses 20 (large meals), 15u with medium meals 10u with snacks/smaller meal    - Continue sliding scale 1:20 >120. No doses if not eating  2.  Lipids:  Meets criteria per ASCVD risk %, continue crestor 40mg daily  3.  Blood Pressure:  At goal, continue meds  4.  CV prevention:  Continue aspirin 325 mg daily, plavix  5.  Diabetic Nephropathy screening:   continue ACEI/ARB , improved  6.  Diabetic Retinopathy screening:  continue annual dilated eye exams due now-- he will call and schedule  7.  Diabetic Neuropathy screening:  Up to date.  Instructed on routine foot care, follow-up with podiatry as needed.       Orders Placed This Encounter   Procedures    Albumin Random Urine Quantitative with Creat Ratio    Comprehensive metabolic panel    Hemoglobin A1c    Lipid panel reflex to direct LDL Fasting    TSH with free T4 reflex        Return to clinic PRN-- he can continue care with PCP as BG readings are at goal    A total of 24 minutes were spent today 09/25/24 on this visit including chart review, history and counseling, documentation and other activities as detailed above.

## 2024-09-27 ENCOUNTER — OFFICE VISIT (OUTPATIENT)
Dept: FAMILY MEDICINE | Facility: CLINIC | Age: 69
End: 2024-09-27
Payer: COMMERCIAL

## 2024-09-27 ENCOUNTER — LAB (OUTPATIENT)
Dept: LAB | Facility: CLINIC | Age: 69
End: 2024-09-27
Payer: COMMERCIAL

## 2024-09-27 VITALS
BODY MASS INDEX: 33.79 KG/M2 | TEMPERATURE: 96.9 F | SYSTOLIC BLOOD PRESSURE: 111 MMHG | RESPIRATION RATE: 16 BRPM | HEART RATE: 76 BPM | HEIGHT: 70 IN | DIASTOLIC BLOOD PRESSURE: 75 MMHG | OXYGEN SATURATION: 98 % | WEIGHT: 236 LBS

## 2024-09-27 DIAGNOSIS — Z00.00 ENCOUNTER FOR SUBSEQUENT ANNUAL WELLNESS VISIT (AWV) IN MEDICARE PATIENT: Primary | ICD-10-CM

## 2024-09-27 DIAGNOSIS — Z79.4 TYPE 2 DIABETES MELLITUS WITH DIABETIC NEPHROPATHY, WITH LONG-TERM CURRENT USE OF INSULIN (H): ICD-10-CM

## 2024-09-27 DIAGNOSIS — E78.5 HYPERLIPIDEMIA LDL GOAL <100: ICD-10-CM

## 2024-09-27 DIAGNOSIS — R80.9 MICROALBUMINURIA DUE TO TYPE 2 DIABETES MELLITUS (H): ICD-10-CM

## 2024-09-27 DIAGNOSIS — E11.21 TYPE 2 DIABETES MELLITUS WITH DIABETIC NEPHROPATHY, WITH LONG-TERM CURRENT USE OF INSULIN (H): ICD-10-CM

## 2024-09-27 DIAGNOSIS — E66.01 MORBID OBESITY (H): ICD-10-CM

## 2024-09-27 DIAGNOSIS — E11.29 MICROALBUMINURIA DUE TO TYPE 2 DIABETES MELLITUS (H): ICD-10-CM

## 2024-09-27 LAB
ALBUMIN SERPL BCG-MCNC: 4.2 G/DL (ref 3.5–5.2)
ALP SERPL-CCNC: 71 U/L (ref 40–150)
ALT SERPL W P-5'-P-CCNC: 20 U/L (ref 0–70)
ANION GAP SERPL CALCULATED.3IONS-SCNC: 12 MMOL/L (ref 7–15)
AST SERPL W P-5'-P-CCNC: 26 U/L (ref 0–45)
BILIRUB SERPL-MCNC: 0.4 MG/DL
BUN SERPL-MCNC: 16.2 MG/DL (ref 8–23)
CALCIUM SERPL-MCNC: 9.6 MG/DL (ref 8.8–10.4)
CHLORIDE SERPL-SCNC: 103 MMOL/L (ref 98–107)
CHOLEST SERPL-MCNC: 135 MG/DL
CREAT SERPL-MCNC: 1.11 MG/DL (ref 0.67–1.17)
CREAT UR-MCNC: 96.6 MG/DL
EGFRCR SERPLBLD CKD-EPI 2021: 72 ML/MIN/1.73M2
EST. AVERAGE GLUCOSE BLD GHB EST-MCNC: 183 MG/DL
FASTING STATUS PATIENT QL REPORTED: YES
FASTING STATUS PATIENT QL REPORTED: YES
GLUCOSE SERPL-MCNC: 128 MG/DL (ref 70–99)
HBA1C MFR BLD: 8 % (ref 0–5.6)
HCO3 SERPL-SCNC: 22 MMOL/L (ref 22–29)
HDLC SERPL-MCNC: 37 MG/DL
LDLC SERPL CALC-MCNC: 62 MG/DL
MICROALBUMIN UR-MCNC: 103 MG/L
MICROALBUMIN/CREAT UR: 106.63 MG/G CR (ref 0–17)
NONHDLC SERPL-MCNC: 98 MG/DL
POTASSIUM SERPL-SCNC: 4.7 MMOL/L (ref 3.4–5.3)
PROT SERPL-MCNC: 7.6 G/DL (ref 6.4–8.3)
SODIUM SERPL-SCNC: 137 MMOL/L (ref 135–145)
TRIGL SERPL-MCNC: 181 MG/DL
TSH SERPL DL<=0.005 MIU/L-ACNC: 3.07 UIU/ML (ref 0.3–4.2)

## 2024-09-27 PROCEDURE — 84443 ASSAY THYROID STIM HORMONE: CPT

## 2024-09-27 PROCEDURE — 82043 UR ALBUMIN QUANTITATIVE: CPT

## 2024-09-27 PROCEDURE — 36415 COLL VENOUS BLD VENIPUNCTURE: CPT

## 2024-09-27 PROCEDURE — 82570 ASSAY OF URINE CREATININE: CPT

## 2024-09-27 PROCEDURE — 80053 COMPREHEN METABOLIC PANEL: CPT

## 2024-09-27 PROCEDURE — 80061 LIPID PANEL: CPT

## 2024-09-27 PROCEDURE — 83036 HEMOGLOBIN GLYCOSYLATED A1C: CPT

## 2024-09-27 PROCEDURE — 99397 PER PM REEVAL EST PAT 65+ YR: CPT | Performed by: INTERNAL MEDICINE

## 2024-09-27 RX ORDER — BLOOD-GLUCOSE SENSOR
EACH MISCELLANEOUS
Qty: 6 EACH | Refills: 0 | Status: SHIPPED | OUTPATIENT
Start: 2024-09-27

## 2024-09-27 RX ORDER — ROSUVASTATIN CALCIUM 20 MG/1
20 TABLET, COATED ORAL DAILY
Qty: 90 TABLET | Refills: 3 | Status: SHIPPED | OUTPATIENT
Start: 2024-09-27

## 2024-09-27 ASSESSMENT — PAIN SCALES - GENERAL: PAINLEVEL: NO PAIN (0)

## 2024-09-27 NOTE — PROGRESS NOTES
"Preventive Care Visit  [facility]  Kendal Wong MD, Internal Medicine  Sep 27, 2024      Assessment & Plan     Hyperlipidemia LDL goal <100  - REVIEW OF HEALTH MAINTENANCE PROTOCOL ORDERS  - rosuvastatin (CRESTOR) 20 MG tablet  Dispense: 90 tablet; Refill: 3    Microalbuminuria due to type 2 diabetes mellitus (H)  - OPTOMETRY REFERRAL    Type 2 diabetes mellitus with diabetic nephropathy, with long-term current use of insulin (H)  - Continuous Glucose Sensor (FREESTYLE NANO 3 SENSOR) MISC  Dispense: 6 each; Refill: 0    Encounter for subsequent annual wellness visit (AWV) in Medicare patient    Morbid obesity  - diet, exercise      Patient has been advised of split billing requirements and indicates understanding: Yes        BMI  Estimated body mass index is 33.86 kg/m  as calculated from the following:    Height as of this encounter: 1.778 m (5' 10\").    Weight as of this encounter: 107 kg (236 lb).   Weight management plan: Discussed healthy diet and exercise guidelines    Counseling  Appropriate preventive services were addressed with this patient via screening, questionnaire, or discussion as appropriate for fall prevention, nutrition, physical activity, Tobacco-use cessation, social engagement, weight loss and cognition.  Checklist reviewing preventive services available has been given to the patient.  Reviewed patient's diet, addressing concerns and/or questions.   The patient was instructed to see the dentist every 6 months.   He is at risk for psychosocial distress and has been provided with information to reduce risk.   The patient was provided with written information regarding signs of hearing loss.       FUTURE APPOINTMENTS:       - Follow-up visit in 1 year    Ashley Fontanez is a 68 year old, presenting for the following:  Physical (Fasting) and Health Maintenance (Last Eye Exam done on 2023 at [facility] (previously [facility]))      Health Care Directive  Patient has a Health Care Directive on file  Discussed advance care planning with patient.    HPI        9/24/2024   General Health   How would you rate your overall physical health? Good   Feel stress (tense, anxious, or unable to sleep) Only a little      (!) STRESS CONCERN      9/24/2024   Nutrition   Diet: Regular (no restrictions)            9/24/2024   Exercise   Days per week of moderate/strenous exercise 0 days   Average minutes spent exercising at this level 0 min      (!) EXERCISE CONCERN      9/24/2024   Social Factors   Frequency of gathering with friends or relatives Patient declined   Worry food won't last until get money to buy more No   Food not last or not have enough money for food? No   Do you have housing? (Housing is defined as stable permanent housing and does not include staying ouside in a car, in a tent, in an abandoned building, in an overnight shelter, or couch-surfing.) Yes   Are you worried about losing your housing? No   Lack of transportation? No   Unable to get utilities (heat,electricity)? Yes   Want help with housing or utility concern? No      (!) FINANCIAL RESOURCE STRAIN CONCERN      9/27/2024   Fall Risk   Gait Speed Test (Document in seconds) 3.86               9/24/2024   Activities of Daily Living- Home Safety   Needs help with the following daily activites None of the above   Safety concerns in the home None of the above            9/24/2024   Dental   Dentist two times every year? (!) NO            9/24/2024   Hearing Screening   Hearing concerns? (!) IT'S HARD TO FOLLOW A CONVERSATION IN A NOISY RESTAURANT OR CROWDED ROOM.            9/24/2024   Driving Risk Screening   Patient/family members have concerns about driving No            9/24/2024   General Alertness/Fatigue Screening   Have you been more tired than usual lately? No            9/24/2024   Urinary Incontinence Screening   Bothered by leaking urine in past 6 months No             9/24/2024   TB Screening   Were you born outside of the [place]? No              Today's PHQ-2 Score:       9/27/2024    10:53 AM   PHQ-2 ( 1999 [employer])   Q1: Little interest or pleasure in doing things 0   Q2: Feeling down, depressed or hopeless 0   PHQ-2 Score 0   Q1: Little interest or pleasure in doing things Not at all   Q2: Feeling down, depressed or hopeless Not at all   PHQ-2 Score 0         9/24/2024   Substance Use   Alcohol more than 3/day or more than 7/wk No   Do you have a current opioid prescription? No   How severe/bad is pain from 1 to 10? 7/10   Do you use any other substances recreationally? (!) DECLINE        Social History     Tobacco Use    Smoking status: Never    Smokeless tobacco: Never   Vaping Use    Vaping status: Never Used   Substance Use Topics    Alcohol use: Yes     Comment: 3-6 drinks on occ weekend night, occ drink on weekday    Drug use: Yes     Types: Cocaine     Comment: Haven't had cocaine since 2022 9/24/2024   AAA Screening   Family history of Abdominal Aortic Aneurysm (AAA)? No      Last PSA:   PSA   Date Value Ref Range Status   09/28/2020 2.04 0 - 4 ug/L Final     Comment:     Assay Method:  Chemiluminescence using Siemens Vista analyzer     ASCVD Risk   The ASCVD Risk score (Gema DK, et al., 2019) failed to calculate for the following reasons:    The patient has a prior MI or stroke diagnosis    Reviewed and updated as needed this visit by Provider                    Past Medical History:   Diagnosis Date    Anxiety     Cerebral infarction (H) 2022    Essential hypertension, benign     Hypercholesteremia     Sleep apnea     T2DM (type 2 diabetes mellitus) (H)      Current providers sharing in care for this patient include:  Patient Care Team:  Kendal Wong MD as PCP - General (Internal Medicine)  Kendal Wong MD as Assigned PCP  Je Shah MD as MD (Neurological Surgery)  Stefanie Self CNP as Nurse Practitioner  (Psychiatry & Neurology Vascular Neurology)  Ivonne Odom MD as Assigned Endocrinology Provider  Ivonne Odom MD as MD (Endocrinology, Diabetes, and Metabolism)  Tana Mustafa APRN CNP as Nurse Practitioner (Psychiatry & Neurology Vascular Neurology)  Tala Epstein, SLP as Speech Language Pathologist (Speech Language/Path)  Jeannine Gaming, SOFIE (Inactive) as Speech Language Pathologist (Speech Language/Path)  Lele Conrad MD as Assigned Neuroscience Provider  Jevon Saleh DO as Assigned Musculoskeletal Provider  Shiv Willis AuD (Audiology)  Kathy Mcelroy PA-C as Assigned Sleep Provider    The following health maintenance items are reviewed in Epic and correct as of today:  Health Maintenance   Topic Date Due    RSV VACCINE (1 - Risk 60-74 years 1-dose series) Never done    ZOSTER IMMUNIZATION (2 of 3) 01/04/2016    DTAP/TDAP/TD IMMUNIZATION (2 - Td or Tdap) 07/17/2023    LIPID  09/02/2023    ALT  09/15/2023    MEDICARE ANNUAL WELLNESS VISIT  03/02/2024    MICROALBUMIN  03/02/2024    DIABETIC FOOT EXAM  03/02/2024    ANNUAL REVIEW OF HM ORDERS  03/02/2024    BMP  03/15/2024    EYE EXAM  08/23/2024    INFLUENZA VACCINE (1) 09/01/2024    COVID-19 Vaccine (4 - 2024-25 season) 09/01/2024    A1C  03/27/2025    FALL RISK ASSESSMENT  09/27/2025    COLORECTAL CANCER SCREENING  10/26/2025    ADVANCE CARE PLANNING  03/13/2028    HEPATITIS C SCREENING  Completed    PHQ-2 (once per calendar year)  Completed    Pneumococcal Vaccine: 65+ Years  Completed    URINALYSIS  Completed    HPV IMMUNIZATION  Aged Out    MENINGITIS IMMUNIZATION  Aged Out    RSV MONOCLONAL ANTIBODY  Aged Out         Review of Systems  Constitutional, HEENT, cardiovascular, pulmonary, GI, , musculoskeletal, neuro, skin, endocrine and psych systems are negative, except as otherwise noted.     Objective    Exam  /75 (BP Location: Right arm, Patient Position: Sitting, Cuff Size: Adult Large)   Pulse 76    "Temp 96.9  F (36.1  C) (Temporal)   Resp 16   Ht 1.778 m (5' 10\")   Wt 107 kg (236 lb)   SpO2 98%   BMI 33.86 kg/m     Estimated body mass index is 33.58 kg/m  as calculated from the following:    Height as of 5/31/24: 1.778 m (5' 10\").    Weight as of 5/31/24: 106.1 kg (234 lb).    Physical Exam  GENERAL: alert and no distress  EYES: Eyes grossly normal to inspection, conjunctivae and sclerae normal  HENT: normocephalic atraumatic  NECK: no adenopathy, no asymmetry, masses, or scars  RESP: lungs clear to auscultation - no rales, rhonchi or wheezes  CV: regular rate and rhythm, normal S1 S2  ABDOMEN: soft, nontender, bowel sounds normal  MS: no gross musculoskeletal defects noted, no edema  SKIN: no suspicious lesions or rashes  NEURO: memory loss present due to previous CVA  PSYCH:  affect normal         9/27/2024   Mini Cog   Clock Draw Score 2 Normal   3 Item Recall 3 objects recalled   Mini Cog Total Score 5                 Signed Electronically by: Kendal Wong MD    "

## 2024-10-01 NOTE — RESULT ENCOUNTER NOTE
Hello -    Here are my comments about the recent results. Overall looks ok    Please let us know if you have any questions or concerns.    Regards,  Ivonne Odom MD

## 2024-10-15 NOTE — PROGRESS NOTES
ESTABLISHED PATIENT FOLLOW-UP:  Follow Up and Pain of the Right Shoulder     HISTORY OF PRESENT ILLNESS  Mr. St is a pleasant 68 year old year old male who presents to clinic today for follow-up of right shoulder pain.     Date of injury: Chronic  Date last seen: 4/18/2024 Virtual, 5/31 Procedure  Following Therapeutic Plan: EXCISION, SOFT TISSUE, USING ULTRASONIC ASPIRATOR SYSTEM  Pain: No improvements  Function: Continued limitations  Interval History: Patient went to [facility] [place] to address calcific tendonitis. Patient initially saw improvements but has returned to original state if not worse.  Physical therapy order was provided to follow after his procedure, however he notes that he did not pursue this.  He notes that he does not feel physical therapy would be of any benefit at this time.    Pain regimen including acetaminophen, generic sleeping pills.  And provides little relief    Additional medical/Social/Surgical histories reviewed in EPIC and updated as appropriate.    REVIEW OF SYSTEMS (10/15/2024)  CONSTITUTIONAL: Denies fever and weight loss  GASTROINTESTINAL: Denies abdominal pain, nausea, vomiting  MUSCULOSKELETAL: See HPI  SKIN: Denies any recent rash or lesion  NEUROLOGICAL: Denies numbness or focal weakness     PHYSICAL EXAM  There were no vitals taken for this visit.    General  - normal appearance, in no obvious distress  Musculoskeletal - Right shoulder  - inspection: normal bone and joint alignment, no obvious deformity, no scapular winging, no AC step-off  - palpation: mildly tender RC insertion, normal clavicle, non-tender AC  - ROM:  painful and limited flexion and ER at end range, limited IR  - strength: 5/5  strength, 5/5 in all shoulder planes  - special tests:  (-) Speed's  (+) Neer  (+) Hawkin's  (+) Faith's  (-) Ranson's  (-) apprehension  (-) subscap lift-off  Neuro  - no sensory or motor deficit, grossly normal coordination, normal muscle tone  Skin  - no ecchymosis,  erythema, warmth, or induration, no obvious rash  Psych  - interactive, appropriate, normal mood and affect     IMAGING :   MRI shoulder without contrast on 4/10/2024    Impression:  1. Superimposed on mild distal tendinopathy of the supraspinatus  tendon with a focal area of calcific tendinitis, low-grade bursal  sided and intrasubstance tearing of the far anterior fibers at the  footprint, no full-thickness tear. The remainder of the supraspinatus  tendon and infraspinatus tendon are intact with minimal tendinopathy.  2. Superimposed on moderate grade tendinopathy, low grade  intrasubstance tear of the far superior fibers of the subscapularis  tendon.  3. Mild intra-articular biceps tendinopathy.  4. Degenerative labral tearing of the posterior superior labrum just  posterior to the bicipital labral anchor.     JUTTA ELLERMANN, MD      ASSESSMENT & PLAN  Mr. tS is a 68 year old year old male who presents to clinic today with Follow Up and Pain of the Right Shoulder    Working diagnosis is remained small partial-thickness rotator cuff tears, calcific tendinitis as well as degenerative labrum tearing.  He notes Tenex procedure provided relief until around July when pain gradually returned.    Chronic right shoulder pain despite multiple interventions including subacromial injection,Ongoing relative rest, ultrasound-guided percutaneous tenotomy procedure, OTC analgesics.  He is yet to perform physical therapy, but notes that he is not interested at this time.    Diagnosis:  1.  Chronic pain of right shoulder  2.  Rotator cuff calcific tendinopathy of right shoulder    Treatment options discussed and I believe that I have exhausted the options in my toolbox for improving his right shoulder.  Discussed that it certainly would have helped if he would have pursued physical therapy after our percutaneous tenotomy procedure.  I would like to obtain recommendations from one of our shoulder surgical specialists on next  steps.  If deemed a nonsurgical candidate, certainly we can repeat subacromial versus trial of intra-articular injection.  Certainly based on exam today it does seem pain remains at the rotator cuff region.    It was a pleasure seeing Huseyin.    Jevon Saleh DO, CAQSM  Primary Care Sports Medicine

## 2024-10-30 ENCOUNTER — OFFICE VISIT (OUTPATIENT)
Dept: ORTHOPEDICS | Facility: CLINIC | Age: 69
End: 2024-10-30
Payer: COMMERCIAL

## 2024-10-30 DIAGNOSIS — M75.111 PARTIAL NONTRAUMATIC TEAR OF ROTATOR CUFF, RIGHT: ICD-10-CM

## 2024-10-30 DIAGNOSIS — M75.31 CALCIFIC TENDONITIS OF RIGHT SHOULDER: Primary | ICD-10-CM

## 2024-10-30 PROCEDURE — 99213 OFFICE O/P EST LOW 20 MIN: CPT | Performed by: FAMILY MEDICINE

## 2024-10-30 NOTE — LETTER
10/30/2024      Huseyin St  [address] [place] [address]      Dear Colleague,    Thank you for referring your patient, Huseyin St, to the [facility] [place]. Please see a copy of my visit note below.    ESTABLISHED PATIENT FOLLOW-UP:  Follow Up and Pain of the Right Shoulder     HISTORY OF PRESENT ILLNESS  Mr. St is a pleasant 68 year old year old male who presents to clinic today for follow-up of right shoulder pain.     Date of injury: Chronic  Date last seen: 4/18/2024 Virtual, 5/31 Procedure  Following Therapeutic Plan: EXCISION, SOFT TISSUE, USING ULTRASONIC ASPIRATOR SYSTEM  Pain: No improvements  Function: Continued limitations  Interval History: Patient went to [facility] for procedure to address calcific tendonitis. Patient initially saw improvements but has returned to original state if not worse.  Physical therapy order was provided to follow after his procedure, however he notes that he did not pursue this.  He notes that he does not feel physical therapy would be of any benefit at this time.    Pain regimen including acetaminophen, generic sleeping pills.  And provides little relief    Additional medical/Social/Surgical histories reviewed in [facility] and updated as appropriate.    REVIEW OF SYSTEMS (10/15/2024)  CONSTITUTIONAL: Denies fever and weight loss  GASTROINTESTINAL: Denies abdominal pain, nausea, vomiting  MUSCULOSKELETAL: See HPI  SKIN: Denies any recent rash or lesion  NEUROLOGICAL: Denies numbness or focal weakness     PHYSICAL EXAM  There were no vitals taken for this visit.    General  - normal appearance, in no obvious distress  Musculoskeletal - Right shoulder  - inspection: normal bone and joint alignment, no obvious deformity, no scapular winging, no AC step-off  - palpation: mildly tender RC insertion, normal clavicle, non-tender AC  - ROM:  painful and limited flexion and ER at end range, limited IR  - strength: 5/5  strength, 5/5 in all  shoulder planes  - special tests:  (-) Speed's  (+) Neer  (+) Hawkin's  (+) Faith's  (-) Henderson's  (-) apprehension  (-) subscap lift-off  Neuro  - no sensory or motor deficit, grossly normal coordination, normal muscle tone  Skin  - no ecchymosis, erythema, warmth, or induration, no obvious rash  Psych  - interactive, appropriate, normal mood and affect     IMAGING :   MRI shoulder without contrast on 4/10/2024    Impression:  1. Superimposed on mild distal tendinopathy of the supraspinatus  tendon with a focal area of calcific tendinitis, low-grade bursal  sided and intrasubstance tearing of the far anterior fibers at the  footprint, no full-thickness tear. The remainder of the supraspinatus  tendon and infraspinatus tendon are intact with minimal tendinopathy.  2. Superimposed on moderate grade tendinopathy, low grade  intrasubstance tear of the far superior fibers of the subscapularis  tendon.  3. Mild intra-articular biceps tendinopathy.  4. Degenerative labral tearing of the posterior superior labrum just  posterior to the bicipital labral anchor.     JUTTA ELLERMANN, MD      ASSESSMENT & PLAN  Mr. St is a 68 year old year old male who presents to clinic today with Follow Up and Pain of the Right Shoulder    Working diagnosis is remained small partial-thickness rotator cuff tears, calcific tendinitis as well as degenerative labrum tearing.  He notes Tenex procedure provided relief until around July when pain gradually returned.    Chronic right shoulder pain despite multiple interventions including subacromial injection,Ongoing relative rest, ultrasound-guided percutaneous tenotomy procedure, OTC analgesics.  He is yet to perform physical therapy, but notes that he is not interested at this time.    Diagnosis:  1.  Chronic pain of right shoulder  2.  Rotator cuff calcific tendinopathy of right shoulder    Treatment options discussed and I believe that I have exhausted the options in my toolbox for  improving his right shoulder.  Discussed that it certainly would have helped if he would have pursued physical therapy after our percutaneous tenotomy procedure.  I would like to obtain recommendations from one of our shoulder surgical specialists on next steps.  If deemed a nonsurgical candidate, certainly we can repeat subacromial versus trial of intra-articular injection.  Certainly based on exam today it does seem pain remains at the rotator cuff region.    It was a pleasure seeing Huseyin.    Jevon Saleh DO, [facility]  Primary Care Sports Medicine      Again, thank you for allowing me to participate in the care of your patient.        Sincerely,        Jevon Saleh DO

## 2024-11-01 NOTE — PROGRESS NOTES
"CHIEF COMPLAINT: Right shoulder pain    DIAGNOSIS: Right shoulder calcific tendinitis, rotator cuff tendinopathy    OCCUPATION/SPORT: [employer]    HPI:   Huseyin St is a very pleasant 69 year old, right-hand dominant male who presents for evaluation of right shoulder pain.  Symptoms started in 2022. There was not a precipitating event. The pain is located to the right anterior shoulder. Worst pain is rated a 10 of 10, and current pain is rated at 5 of 10. Symptoms are worsened by shoulder movement. Symptoms are improved with rest. Patient has tried a Tennex procedure with Dr. Saleh on 5/31/24  with minimal relief. Patient also had a right subacromial steroid injection on 3/16/23.  Associated symptoms include pain and limited ROM. Patient has no radiating pain down the arm, no numbness. Notably, the patient has had a Tenex procedure and subacromial steroid injection . No other concerns or complaints at this time.  SANE score R - 50 L - 100    PAST MEDICAL HISTORY:  Past Medical History:   Diagnosis Date    Anxiety     Cerebral infarction (H) 2022    Essential hypertension, benign     Hypercholesteremia     Sleep apnea     T2DM (type 2 diabetes mellitus) (H)        PAST SURGICAL HISTORY:  Past Surgical History:   Procedure Laterality Date    COLONOSCOPY      COLONOSCOPY N/A 10/26/2020    Procedure: COLONOSCOPY;  Surgeon: Randy Gonzalez MD;  Location:  GI    ENT SURGERY      R) ear \"procedure\"    IR CAROTID CEREBRAL ANGIOGRAM BILATERAL  4/7/2022    IR CAROTID CEREBRAL ANGIOGRAM BILATERAL  4/16/2022    ULTRASONIC REMOVAL SOFT TISSUE (LOCATION) Right 5/31/2024    Procedure: EXCISION, SOFT TISSUE, USING ULTRASONIC ASPIRATOR SYSTEM, Right Shoulder;  Surgeon: Jevon Saleh DO;  Location: [facility] [place]       CURRENT MEDICATIONS:  Current Outpatient Medications   Medication Sig Dispense Refill    alcohol swab prep pads Use to swab area of injection/ian as directed 100 each 3    blood glucose calibration (NO BRAND " SPECIFIED) solution Use to calibrate blood glucose monitor as needed as directed. To accompany: Blood Glucose Monitor Brands: per insurance. 1 Bottle 3    clopidogrel (PLAVIX) 75 MG tablet TAKE 1 TABLET BY MOUTH EVERY DAY 90 tablet 1    clotrimazole (LOTRIMIN) 1 % external cream Apply topically 2 times daily 14 g 1    clotrimazole (LOTRIMIN) 1 % external cream Apply topically 2 times daily (Patient taking differently: Apply topically 2 times daily as needed.) 30 g 11    Continuous Glucose Sensor (FREESTYLE NNAO 3 PLUS SENSOR) MISC Change every 15 days. 6 each 3    Continuous Glucose Sensor (FREESTYLE NANO 3 SENSOR) MISC Change every 14 days. 6 each 0    CONTOUR NEXT TEST test strip USE TO TEST BLOOD SUGAR THREE TIMES A DAY OR AS DIRECTED (NEED APPOINTMENT FOR MORE REFILLS) 300 strip 2    empagliflozin (JARDIANCE) 25 MG TABS tablet Take 1 tablet (25 mg) by mouth daily. 90 tablet 0    escitalopram (LEXAPRO) 20 MG tablet TAKE 1 TABLET BY MOUTH EVERY DAY 90 tablet 0    insulin aspart (NOVOLOG PEN) 100 UNIT/ML pen Take 20u with meals plus sliding scale max daily dose 75u 60 mL 3    Insulin Glargine-yfgn (SEMGLEE, YFGN,) 100 UNIT/ML SOPN Inject 46 Units subcutaneously daily. 45 mL 3    insulin pen needle (BD PEN NEEDLE MARIE 2ND GEN) 32G X 4 MM miscellaneous USE FOUR PEN NEEDLES DAILY AS DIRECTED 300 each 5    lisinopril (ZESTRIL) 20 MG tablet TAKE 1 TABLET BY MOUTH EVERY DAY 90 tablet 0    ORDER FOR DME Auto-CPAP: Max 12 cm H2O Min 10 cm H2O  Continuous Lifetime need and heated humidity.    1 Device 0    rosuvastatin (CRESTOR) 20 MG tablet Take 1 tablet (20 mg) by mouth daily. 90 tablet 3    thin (NO BRAND SPECIFIED) lancets Use to test blood sugar 3 times daily or as directed. To accompany: Blood Glucose Monitor Brands: per insurance. 200 each 6    tiZANidine (ZANAFLEX) 4 MG tablet Take 1 tablet (4 mg) by mouth at bedtime. 20 tablet 0       ALLERGIES:      Allergies   Allergen Reactions    Amoxicillin-Pot Clavulanate  Diarrhea    Liraglutide      Skin rash    Sulfa Antibiotics      Unknown - reaction occurred as a child    Ozempic (0.25 Or 0.5 Mg-Dose) [Semaglutide(0.25 Or 0.5mg-Dos)] Diarrhea         FAMILY HISTORY: No pertinent family history, reviewed in EMR.    SOCIAL HISTORY:   Social History     Socioeconomic History    Marital status:      Spouse name: Estefania Laura    Number of children: 1    Years of education: Not on file    Highest education level: Not on file   Occupational History    Not on file   Tobacco Use    Smoking status: Never    Smokeless tobacco: Never   Vaping Use    Vaping status: Never Used   Substance and Sexual Activity    Alcohol use: Yes     Comment: 3-6 drinks on occ weekend night, occ drink on weekday    Drug use: Yes     Types: Cocaine     Comment: Haven't had cocaine since 2022    Sexual activity: Not Currently     Partners: Female     Birth control/protection: None   Other Topics Concern     Service No    Blood Transfusions No    Caffeine Concern Not Asked    Occupational Exposure Not Asked    Hobby Hazards Not Asked    Sleep Concern Not Asked    Stress Concern Not Asked    Weight Concern Not Asked    Special Diet Not Asked    Back Care Not Asked    Exercise Not Asked    Bike Helmet Not Asked    Seat Belt Not Asked    Self-Exams Not Asked    Parent/sibling w/ CABG, MI or angioplasty before 65F 55M? No   Social History Narrative    Not on file     Social Drivers of Health     Financial Resource Strain: High Risk (9/24/2024)    Financial Resource Strain     Within the past 12 months, have you or your family members you live with been unable to get utilities (heat, electricity) when it was really needed?: Yes   Food Insecurity: Low Risk  (9/24/2024)    Food Insecurity     Within the past 12 months, did you worry that your food would run out before you got money to buy more?: No     Within the past 12 months, did the food you bought just not last and you didn t have money to get  more?: No   Transportation Needs: Low Risk  (9/24/2024)    Transportation Needs     Within the past 12 months, has lack of transportation kept you from medical appointments, getting your medicines, non-medical meetings or appointments, work, or from getting things that you need?: No   Physical Activity: Inactive (9/24/2024)    Exercise Vital Sign     Days of Exercise per Week: 0 days     Minutes of Exercise per Session: 0 min   Stress: No Stress Concern Present (9/24/2024)    [employer] - Occupational Stress Questionnaire     Feeling of Stress : Only a little   Social Connections: Unknown (9/24/2024)    Social Connection and Isolation Panel [NHANES]     Frequency of Communication with Friends and Family: Not on file     Frequency of Social Gatherings with Friends and Family: Patient declined     Attends Jainism Services: Not on file     Active Member of Clubs or Organizations: Not on file     Attends Club or Organization Meetings: Not on file     Marital Status: Not on file   Interpersonal Safety: Not At Risk (2/4/2024)    Received from [facility], [facility]    Humiliation, Afraid, Rape, and Kick questionnaire     Fear of Current or Ex-Partner: No     Emotionally Abused: No     Physically Abused: No     Sexually Abused: No   Housing Stability: Low Risk  (9/24/2024)    Housing Stability     Do you have housing? : Yes     Are you worried about losing your housing?: No       REVIEW OF SYSTEMS: Positive for that noted in past medical history and history of present illness and otherwise reviewed in EMR    PHYSICAL EXAM:  Patient is Data Unavailable and weighs 0 lbs 0 oz There were no vitals taken for this visit.  There is no height or weight on file to calculate BMI.   Constitutional: Well-developed, well-nourished, healthy appearing male.  Skin: Warm, dry   HEENT: Normal  Cardiac: Well perfused extremities, strong 2+ peripheral pulses. No edema.   Pulmonary: Breathing  room air    Musculoskeletal:   RIGHT Shoulder:  AROM Right shoulder: 140/80/30/backpocket   AROM Left shoulder: 170/90/40/lumbar spine   PROM Right shoulder: 170/90/40/backpocket   5/5 supraspinatus, 5/5 infraspinatus, 5/5 subscapularis. E  No AC joint pain, Neg cross body adduction  Positive Neer and Prakash impingement signs  Negative belly-press/lift-off  Positive Speed's test  No atrophy  No ER lag  Neurovascular exam and cervical spine exam are normal.    X-RAYS:   AP, lateral, zanca, and axillary radiographs of the right shoulder were ordered and reviewed by me personally showing mild right shoulder OA with calcification of cuff insertion site.     MRI of right shoulder obtained on 4/10/24 was reviewed. This demonstrates tendinopathy of the rotator cuff without full-thickness tear, no atrophy. There is evidence of tendinopathy and calcification of supraspinatous.  No evidence of biceps subluxation.       IMPRESSION: 69 year old-year-old right hand dominant male, with calcific tendinitis of right rotator cuff.     PLAN:     I discussed with the patient the etiology of their condition. We discussed at length the options including conservative and operative management. Emphasized that Huseyin has not participated in formal PT at this time. Discussed with him that PT is going to be the building foundation to help his symptoms. Injections are meant to help him go through PT. Discussed that surgical management at this time would be futile. He would also need to work on his A1C prior to surgery to bring it below 7.5. In the meantime, encouraged him to participate in PT. Will re-evaluate in 3-4 months.     At the conclusion of the office visit, Huseyin verbally acknowledged that I answered all of his questions satisfactorily.    Inocente Fernandez MD  Orthopaedic Surgery, PGY-4    I saw the patient with the resident and agree with the findings and the plan of care as documented in the note.    All exam and discussion from  beginning to end were done in the presence of my resident, Inocente Fernandez, PGY4.     Tala Urbina MD  Orthopedic Surgery Sports Medicine and Shoulder Surgery

## 2024-11-01 NOTE — TELEPHONE ENCOUNTER
Action November 1, 2024 2:09 PM MT   Action Taken [place] [facility]       DIAGNOSIS: RIGHT SHOULDER   APPOINTMENT DATE: 11/04/2024   NOTES STATUS DETAILS   OFFICE NOTE from referring provider Internal Jevon Saleh DO  Sports Medicine   DISCHARGE REPORT FROM ED Care Everywhere 02/03/2024 - [facility]   OPERATIVE REPORT Internal 05/31/2024 - EXCISION, SOFT TISSUE, USING ULTRASONIC ASPIRATOR SYSTEM, Right Shoulder   MRI PACS Internal:  04/10/2024 - RT Shoulder   CT SCAN PACS [facility]:  02/04/2024 - C Spine   XRAYS (IMAGES & REPORTS) PACS [facility]:  02/04/2024 - Chest    Internal:  03/16/2023 - RT Shoulder

## 2024-11-04 ENCOUNTER — ANCILLARY PROCEDURE (OUTPATIENT)
Dept: GENERAL RADIOLOGY | Facility: CLINIC | Age: 69
End: 2024-11-04
Attending: ORTHOPAEDIC SURGERY
Payer: COMMERCIAL

## 2024-11-04 ENCOUNTER — PRE VISIT (OUTPATIENT)
Dept: ORTHOPEDICS | Facility: CLINIC | Age: 69
End: 2024-11-04

## 2024-11-04 ENCOUNTER — OFFICE VISIT (OUTPATIENT)
Dept: ORTHOPEDICS | Facility: CLINIC | Age: 69
End: 2024-11-04
Attending: FAMILY MEDICINE
Payer: COMMERCIAL

## 2024-11-04 DIAGNOSIS — M75.31 CALCIFIC TENDONITIS OF RIGHT SHOULDER: ICD-10-CM

## 2024-11-04 DIAGNOSIS — M25.511 RIGHT SHOULDER PAIN, UNSPECIFIED CHRONICITY: Primary | ICD-10-CM

## 2024-11-04 DIAGNOSIS — M75.111 PARTIAL NONTRAUMATIC TEAR OF ROTATOR CUFF, RIGHT: ICD-10-CM

## 2024-11-04 DIAGNOSIS — M25.511 RIGHT SHOULDER PAIN, UNSPECIFIED CHRONICITY: ICD-10-CM

## 2024-11-04 PROCEDURE — 99204 OFFICE O/P NEW MOD 45 MIN: CPT | Mod: GC | Performed by: ORTHOPAEDIC SURGERY

## 2024-11-04 PROCEDURE — 73030 X-RAY EXAM OF SHOULDER: CPT | Mod: RT | Performed by: RADIOLOGY

## 2024-11-04 NOTE — LETTER
"11/4/2024      [address]in [address]  [address] [place] [address]      Dear Colleague,    Thank you for referring your patient, Huseyin St, to the [facility]. Please see a copy of my visit note below.    CHIEF COMPLAINT: Right shoulder pain    DIAGNOSIS: Right shoulder calcific tendinitis, rotator cuff tendinopathy    OCCUPATION/SPORT: [employer]    HPI:   Huseyin St is a very pleasant 69 year old, right-hand dominant male who presents for evaluation of right shoulder pain.  Symptoms started in 2022. There was not a precipitating event. The pain is located to the right anterior shoulder. Worst pain is rated a 10 of 10, and current pain is rated at 5 of 10. Symptoms are worsened by shoulder movement. Symptoms are improved with rest. Patient has tried a Tennex procedure with Dr. Saleh on 5/31/24  with minimal relief. Patient also had a right subacromial steroid injection on 3/16/23.  Associated symptoms include pain and limited ROM. Patient has no radiating pain down the arm, no numbness. Notably, the patient has had a Tenex procedure and subacromial steroid injection . No other concerns or complaints at this time.  SANE score R - 50 L - 100    PAST MEDICAL HISTORY:  Past Medical History:   Diagnosis Date     Anxiety      Cerebral infarction (H) 2022     Essential hypertension, benign      Hypercholesteremia      Sleep apnea      T2DM (type 2 diabetes mellitus) (H)        PAST SURGICAL HISTORY:  Past Surgical History:   Procedure Laterality Date     COLONOSCOPY       COLONOSCOPY N/A 10/26/2020    Procedure: COLONOSCOPY;  Surgeon: Randy Gonzalez MD;  Location:  GI     ENT SURGERY      R) ear \"procedure\"     IR CAROTID CEREBRAL ANGIOGRAM BILATERAL  4/7/2022     IR CAROTID CEREBRAL ANGIOGRAM BILATERAL  4/16/2022     ULTRASONIC REMOVAL SOFT TISSUE (LOCATION) Right 5/31/2024    Procedure: EXCISION, SOFT TISSUE, USING ULTRASONIC ASPIRATOR SYSTEM, Right Shoulder;  " Surgeon: Jevon Saleh DO;  Location: [facility] OR       CURRENT MEDICATIONS:  Current Outpatient Medications   Medication Sig Dispense Refill     alcohol swab prep pads Use to swab area of injection/ian as directed 100 each 3     blood glucose calibration (NO BRAND SPECIFIED) solution Use to calibrate blood glucose monitor as needed as directed. To accompany: Blood Glucose Monitor Brands: per insurance. 1 Bottle 3     clopidogrel (PLAVIX) 75 MG tablet TAKE 1 TABLET BY MOUTH EVERY DAY 90 tablet 1     clotrimazole (LOTRIMIN) 1 % external cream Apply topically 2 times daily 14 g 1     clotrimazole (LOTRIMIN) 1 % external cream Apply topically 2 times daily (Patient taking differently: Apply topically 2 times daily as needed.) 30 g 11     Continuous Glucose Sensor (FREESTYLE NANO 3 PLUS SENSOR) MISC Change every 15 days. 6 each 3     Continuous Glucose Sensor (FREESTYLE NANO 3 SENSOR) MISC Change every 14 days. 6 each 0     CONTOUR NEXT TEST test strip USE TO TEST BLOOD SUGAR THREE TIMES A DAY OR AS DIRECTED (NEED APPOINTMENT FOR MORE REFILLS) 300 strip 2     empagliflozin (JARDIANCE) 25 MG TABS tablet Take 1 tablet (25 mg) by mouth daily. 90 tablet 0     escitalopram (LEXAPRO) 20 MG tablet TAKE 1 TABLET BY MOUTH EVERY DAY 90 tablet 0     insulin aspart (NOVOLOG PEN) 100 UNIT/ML pen Take 20u with meals plus sliding scale max daily dose 75u 60 mL 3     Insulin Glargine-yfgn (SEMGLEE, YFGN,) 100 UNIT/ML SOPN Inject 46 Units subcutaneously daily. 45 mL 3     insulin pen needle (BD PEN NEEDLE MARIE 2ND GEN) 32G X 4 MM miscellaneous USE FOUR PEN NEEDLES DAILY AS DIRECTED 300 each 5     lisinopril (ZESTRIL) 20 MG tablet TAKE 1 TABLET BY MOUTH EVERY DAY 90 tablet 0     ORDER FOR DME Auto-CPAP: Max 12 cm H2O Min 10 cm H2O  Continuous Lifetime need and heated humidity.    1 Device 0     rosuvastatin (CRESTOR) 20 MG tablet Take 1 tablet (20 mg) by mouth daily. 90 tablet 3     thin (NO BRAND SPECIFIED) lancets Use to test blood  sugar 3 times daily or as directed. To accompany: Blood Glucose Monitor Brands: per insurance. 200 each 6     tiZANidine (ZANAFLEX) 4 MG tablet Take 1 tablet (4 mg) by mouth at bedtime. 20 tablet 0       ALLERGIES:      Allergies   Allergen Reactions     Amoxicillin-Pot Clavulanate Diarrhea     Liraglutide      Skin rash     Sulfa Antibiotics      Unknown - reaction occurred as a child     Ozempic (0.25 Or 0.5 Mg-Dose) [Semaglutide(0.25 Or 0.5mg-Dos)] Diarrhea         FAMILY HISTORY: No pertinent family history, reviewed in EMR.    SOCIAL HISTORY:   Social History     Socioeconomic History     Marital status:      Spouse name: Estefania Laura     Number of children: 1     Years of education: Not on file     Highest education level: Not on file   Occupational History     Not on file   Tobacco Use     Smoking status: Never     Smokeless tobacco: Never   Vaping Use     Vaping status: Never Used   Substance and Sexual Activity     Alcohol use: Yes     Comment: 3-6 drinks on occ weekend night, occ drink on weekday     Drug use: Yes     Types: Cocaine     Comment: Haven't had cocaine since 2022     Sexual activity: Not Currently     Partners: Female     Birth control/protection: None   Other Topics Concern      Service No     Blood Transfusions No     Caffeine Concern Not Asked     Occupational Exposure Not Asked     Hobby Hazards Not Asked     Sleep Concern Not Asked     Stress Concern Not Asked     Weight Concern Not Asked     Special Diet Not Asked     Back Care Not Asked     Exercise Not Asked     Bike Helmet Not Asked     Seat Belt Not Asked     Self-Exams Not Asked     Parent/sibling w/ CABG, MI or angioplasty before 65F 55M? No   Social History Narrative     Not on file     Social Drivers of Health     Financial Resource Strain: High Risk (9/24/2024)    Financial Resource Strain      Within the past 12 months, have you or your family members you live with been unable to get utilities (heat,  electricity) when it was really needed?: Yes   Food Insecurity: Low Risk  (9/24/2024)    Food Insecurity      Within the past 12 months, did you worry that your food would run out before you got money to buy more?: No      Within the past 12 months, did the food you bought just not last and you didn t have money to get more?: No   Transportation Needs: Low Risk  (9/24/2024)    Transportation Needs      Within the past 12 months, has lack of transportation kept you from medical appointments, getting your medicines, non-medical meetings or appointments, work, or from getting things that you need?: No   Physical Activity: Inactive (9/24/2024)    Exercise Vital Sign      Days of Exercise per Week: 0 days      Minutes of Exercise per Session: 0 min   Stress: No Stress Concern Present (9/24/2024)    [employer] [place] [employer] - Occupational Stress Questionnaire      Feeling of Stress : Only a little   Social Connections: Unknown (9/24/2024)    Social Connection and Isolation Panel [NHANES]      Frequency of Communication with Friends and Family: Not on file      Frequency of Social Gatherings with Friends and Family: Patient declined      Attends [employer] Services: Not on file      Active Member of Clubs or Organizations: Not on file      Attends Club or Organization Meetings: Not on file      Marital Status: Not on file   Interpersonal Safety: Not At Risk (2/4/2024)    Received from [facility] [place]note 2], [facility] [place] [facility]    Humiliation, Afraid, Rape, and Kick questionnaire      Fear of Current or Ex-Partner: No      Emotionally Abused: No      Physically Abused: No      Sexually Abused: No   Housing Stability: Low Risk  (9/24/2024)    Housing Stability      Do you have housing? : Yes      Are you worried about losing your housing?: No       REVIEW OF SYSTEMS: Positive for that noted in past medical history and history of present illness and otherwise reviewed in EMR    PHYSICAL EXAM:  Patient is Data  Unavailable and weighs 0 lbs 0 oz There were no vitals taken for this visit.  There is no height or weight on file to calculate BMI.   Constitutional: Well-developed, well-nourished, healthy appearing male.  Skin: Warm, dry   HEENT: Normal  Cardiac: Well perfused extremities, strong 2+ peripheral pulses. No edema.   Pulmonary: Breathing room air    Musculoskeletal:   RIGHT Shoulder:  AROM Right shoulder: 140/80/30/backpocket   AROM Left shoulder: 170/90/40/lumbar spine   PROM Right shoulder: 170/90/40/backpocket   5/5 supraspinatus, 5/5 infraspinatus, 5/5 subscapularis. E  No AC joint pain, Neg cross body adduction  Positive Neer and Prakash impingement signs  Negative belly-press/lift-off  Positive Speed's test  No atrophy  No ER lag  Neurovascular exam and cervical spine exam are normal.    X-RAYS:   AP, lateral, zanca, and axillary radiographs of the right shoulder were ordered and reviewed by me personally showing mild right shoulder OA with calcification of cuff insertion site.     MRI of right shoulder obtained on 4/10/24 was reviewed. This demonstrates tendinopathy of the rotator cuff without full-thickness tear, no atrophy. There is evidence of tendinopathy and calcification of supraspinatous.  No evidence of biceps subluxation.       IMPRESSION: 69 year old-year-old right hand dominant male, with calcific tendinitis of right rotator cuff.     PLAN:     I discussed with the patient the etiology of their condition. We discussed at length the options including conservative and operative management. Emphasized that Huseyin has not participated in formal PT at this time. Discussed with him that PT is going to be the building foundation to help his symptoms. Injections are meant to help him go through PT. Discussed that surgical management at this time would be futile. He would also need to work on his A1C prior to surgery to bring it below 7.5. In the meantime, encouraged him to participate in PT. Will re-evaluate  in 3-4 months.     At the conclusion of the office visit, Huseyin verbally acknowledged that I answered all of his questions satisfactorily.    Inocente Fernandez MD  Orthopaedic Surgery, PGY-4    I saw the patient with the resident and agree with the findings and the plan of care as documented in the note.    All exam and discussion from beginning to end were done in the presence of my resident, Inocente Fernandez, PGY4.     Tala Hernandez MD  Orthopedic Surgery Sports Medicine and Shoulder Surgery      Again, thank you for allowing me to participate in the care of your patient.        Sincerely,        TALA HERNANDEZ MD

## 2024-11-07 ENCOUNTER — TRANSFERRED RECORDS (OUTPATIENT)
Dept: HEALTH INFORMATION MANAGEMENT | Facility: CLINIC | Age: 69
End: 2024-11-07
Payer: COMMERCIAL

## 2024-11-07 ENCOUNTER — MYC REFILL (OUTPATIENT)
Dept: FAMILY MEDICINE | Facility: CLINIC | Age: 69
End: 2024-11-07
Payer: COMMERCIAL

## 2024-11-07 DIAGNOSIS — I10 ESSENTIAL HYPERTENSION, BENIGN: ICD-10-CM

## 2024-11-07 LAB — RETINOPATHY: NEGATIVE

## 2024-11-08 RX ORDER — LISINOPRIL 20 MG/1
20 TABLET ORAL DAILY
Qty: 90 TABLET | Refills: 1 | Status: SHIPPED | OUTPATIENT
Start: 2024-11-08

## 2024-11-18 DIAGNOSIS — F41.8 MIXED ANXIETY DEPRESSIVE DISORDER: ICD-10-CM

## 2024-11-18 RX ORDER — ESCITALOPRAM OXALATE 20 MG/1
TABLET ORAL
Qty: 90 TABLET | Refills: 0 | Status: SHIPPED | OUTPATIENT
Start: 2024-11-18

## 2024-12-02 ASSESSMENT — ACTIVITIES OF DAILY LIVING (ADL): PLEASE_INDICATE_YOR_PRIMARY_REASON_FOR_REFERRAL_TO_THERAPY:: SHOULDER

## 2024-12-03 ENCOUNTER — THERAPY VISIT (OUTPATIENT)
Dept: PHYSICAL THERAPY | Facility: CLINIC | Age: 69
End: 2024-12-03
Attending: ORTHOPAEDIC SURGERY
Payer: COMMERCIAL

## 2024-12-03 DIAGNOSIS — M75.111 PARTIAL NONTRAUMATIC TEAR OF ROTATOR CUFF, RIGHT: ICD-10-CM

## 2024-12-03 DIAGNOSIS — G89.29 CHRONIC RIGHT SHOULDER PAIN: Primary | ICD-10-CM

## 2024-12-03 DIAGNOSIS — M75.31 CALCIFIC TENDONITIS OF RIGHT SHOULDER: ICD-10-CM

## 2024-12-03 DIAGNOSIS — M25.511 CHRONIC RIGHT SHOULDER PAIN: Primary | ICD-10-CM

## 2024-12-03 PROCEDURE — 97530 THERAPEUTIC ACTIVITIES: CPT | Mod: GP | Performed by: PHYSICAL THERAPIST

## 2024-12-03 PROCEDURE — 97161 PT EVAL LOW COMPLEX 20 MIN: CPT | Mod: GP | Performed by: PHYSICAL THERAPIST

## 2024-12-03 PROCEDURE — 97110 THERAPEUTIC EXERCISES: CPT | Mod: GP | Performed by: PHYSICAL THERAPIST

## 2024-12-05 PROBLEM — M25.511 CHRONIC RIGHT SHOULDER PAIN: Status: ACTIVE | Noted: 2024-12-05

## 2024-12-05 PROBLEM — G89.29 CHRONIC RIGHT SHOULDER PAIN: Status: ACTIVE | Noted: 2024-12-05

## 2024-12-05 NOTE — PROGRESS NOTES
"PHYSICAL THERAPY EVALUATION  Type of Visit: Evaluation     Fall Risk Screen:  Fall screen completed by: PT  Have you fallen 2 or more times in the past year?: No  Have you fallen and had an injury in the past year?: No  Is patient a fall risk?: No    Subjective         Presenting condition or subjective complaint: (Patient-Rptd) Right Shoulder. Patient has had ongoing R shoulder pain for at least 1 year without incident. An MRI shows calcific tendonitis of the supraspinatus. Earlier this year patient tried a tenex procedure and a steroid injection without much change in pain. He continues to have pain with reaching and lifting overhead and behind back.  Date of onset: 11/04/24 (referral date)    Relevant medical history:    Past Medical History:   Diagnosis Date    Anxiety     Cerebral infarction (H) 2022    Essential hypertension, benign     Hypercholesteremia     Sleep apnea     T2DM (type 2 diabetes mellitus) (H)         Dates & types of surgery: (Patient-Rptd) Huseyin will have to explain when he is there in person   Past Surgical History:   Procedure Laterality Date    COLONOSCOPY      COLONOSCOPY N/A 10/26/2020    Procedure: COLONOSCOPY;  Surgeon: Randy Gonzalez MD;  Location:  GI    ENT SURGERY      R) ear \"procedure\"    IR CAROTID CEREBRAL ANGIOGRAM BILATERAL  4/7/2022    IR CAROTID CEREBRAL ANGIOGRAM BILATERAL  4/16/2022    ULTRASONIC REMOVAL SOFT TISSUE (LOCATION) Right 5/31/2024    Procedure: EXCISION, SOFT TISSUE, USING ULTRASONIC ASPIRATOR SYSTEM, Right Shoulder;  Surgeon: Jevon Saleh DO;  Location: [facility] OR         Prior diagnostic imaging/testing results: (Patient-Rptd) MRI; X-ray     Prior therapy history for the same diagnosis, illness or injury: (Patient-Rptd) No        Living Environment  Social support: (Patient-Rptd) With a significant other or spouse   Type of home: (Patient-Rptd) House; Multi-level   Stairs to enter the home: (Patient-Rptd) Yes (Patient-Rptd) 14 Is there a railing: " (Patient-Rptd) Yes     Ramp: (Patient-Rptd) Yes   Stairs inside the home: (Patient-Rptd) Yes (Patient-Rptd) 14 Is there a railing: (Patient-Rptd) Yes     Help at home: (Patient-Rptd) None  Equipment owned:       Employment: (Patient-Rptd) No    Hobbies/Interests:      Patient goals for therapy: (Patient-Rptd) Huseyin will have to tell you himself    Pain assessment: Location: R GH joint/Ratin/10     Objective   SHOULDER EVALUATION  PAIN: Pain Level at Rest: 1/10  Pain Level with Use: 7/10  Pain is Exacerbated By: raising arm up in any direction overhead, reaching behind back, sleeping on R side  INTEGUMENTARY (edema, incisions):  no edema noted  POSTURE: Sitting Posture: Rounded shoulders  ROM:  AROM R sh flex 89; abd 91; IR 60; ER 21. L sh flex 161; abd 165; IR 74; ER 70. PROM R sh flex 120; abd 112  STRENGTH:  R sh flex 4/5; abd 4-/5; IR 4/5; ER 4-/5  FLEXIBILITY:  tightness in B pectorals  SPECIAL TESTS:  n/t due to pain  PALPATION:  tender at R supraspinatus  JOINT MOBILITY:  hypomobility with R GH joint post and inferior glides  CERVICAL SCREEN: WNL    Assessment & Plan   CLINICAL IMPRESSIONS  Medical Diagnosis: R shoulder pain    Treatment Diagnosis: R shoulder pain   Impression/Assessment: Patient is a 69 year old male with R shoulder complaints.  The following significant findings have been identified: Pain, Decreased ROM/flexibility, Decreased strength, Impaired muscle performance, and Decreased activity tolerance. These impairments interfere with their ability to perform self care tasks, recreational activities, and household chores as compared to previous level of function.     Clinical Decision Making (Complexity):  Clinical Presentation: Stable/Uncomplicated  Clinical Presentation Rationale: based on medical and personal factors listed in PT evaluation  Clinical Decision Making (Complexity): Low complexity    PLAN OF CARE  Treatment Interventions:  Interventions: Manual Therapy, Neuromuscular  Re-education, Therapeutic Activity, Therapeutic Exercise, Self-Care/Home Management    Long Term Goals     PT Goal 1  Goal Identifier: reaching  Goal Description: patient will be able to reach fully overhead without pain  Rationale: to maximize safety and independence with performance of ADLs and functional tasks;to maximize safety and independence with self cares  Target Date: 01/31/25      Frequency of Treatment: 2 x month  Duration of Treatment: 2 months  Education Assessment:   Learner/Method: Patient;Listening;Demonstration    Risks and benefits of evaluation/treatment have been explained.   Patient/Family/caregiver agrees with Plan of Care.     Evaluation Time:     PT Eval, Low Complexity Minutes (89811): 16       Signing Clinician: Ariella Landeros PT        [facility]                                                                                   OUTPATIENT PHYSICAL THERAPY      PLAN OF TREATMENT FOR OUTPATIENT REHABILITATION   Patient's Last Name, First Name, Huseyin Mejia YOB: 1955   Provider's Name   [facility]   Medical Record No.  [id number]     Onset Date: 11/04/24 (referral date)  Start of Care Date: 12/03/24     Medical Diagnosis:  R shoulder pain      PT Treatment Diagnosis:  R shoulder pain Plan of Treatment  Frequency/Duration: 2 x month/ 2 months    Certification date from 12/03/24 to 01/31/25         See note for plan of treatment details and functional goals     Ariella Landeros, PT                         I CERTIFY THE NEED FOR THESE SERVICES FURNISHED UNDER        THIS PLAN OF TREATMENT AND WHILE UNDER MY CARE     (Physician attestation of this document indicates review and certification of the therapy plan).              Referring Provider:  Tala Urbina    Initial Assessment  See Epic Evaluation- Start of Care Date: 12/03/24

## 2024-12-16 DIAGNOSIS — G45.9 TIA (TRANSIENT ISCHEMIC ATTACK): ICD-10-CM

## 2024-12-16 DIAGNOSIS — M75.111 PARTIAL NONTRAUMATIC TEAR OF ROTATOR CUFF, RIGHT: ICD-10-CM

## 2024-12-16 DIAGNOSIS — E11.21 TYPE 2 DIABETES MELLITUS WITH DIABETIC NEPHROPATHY, WITH LONG-TERM CURRENT USE OF INSULIN (H): ICD-10-CM

## 2024-12-16 DIAGNOSIS — Z79.4 TYPE 2 DIABETES MELLITUS WITH DIABETIC NEPHROPATHY, WITH LONG-TERM CURRENT USE OF INSULIN (H): ICD-10-CM

## 2024-12-16 DIAGNOSIS — M75.31 CALCIFIC TENDONITIS OF RIGHT SHOULDER: ICD-10-CM

## 2024-12-16 DIAGNOSIS — F41.8 MIXED ANXIETY DEPRESSIVE DISORDER: ICD-10-CM

## 2024-12-16 DIAGNOSIS — E11.00 TYPE 2 DIABETES MELLITUS WITH HYPEROSMOLARITY WITHOUT COMA, WITHOUT LONG-TERM CURRENT USE OF INSULIN (H): ICD-10-CM

## 2024-12-16 RX ORDER — CLOPIDOGREL BISULFATE 75 MG/1
TABLET ORAL
Qty: 90 TABLET | Refills: 1 | Status: SHIPPED | OUTPATIENT
Start: 2024-12-16

## 2024-12-16 RX ORDER — ESCITALOPRAM OXALATE 20 MG/1
TABLET ORAL
Qty: 90 TABLET | Refills: 0 | Status: SHIPPED | OUTPATIENT
Start: 2024-12-16

## 2024-12-17 ENCOUNTER — THERAPY VISIT (OUTPATIENT)
Dept: PHYSICAL THERAPY | Facility: CLINIC | Age: 69
End: 2024-12-17
Payer: COMMERCIAL

## 2024-12-17 DIAGNOSIS — G89.29 CHRONIC RIGHT SHOULDER PAIN: Primary | ICD-10-CM

## 2024-12-17 DIAGNOSIS — M25.511 CHRONIC RIGHT SHOULDER PAIN: Primary | ICD-10-CM

## 2024-12-17 PROCEDURE — 97530 THERAPEUTIC ACTIVITIES: CPT | Mod: GP | Performed by: PHYSICAL THERAPIST

## 2024-12-17 PROCEDURE — 97110 THERAPEUTIC EXERCISES: CPT | Mod: GP | Performed by: PHYSICAL THERAPIST

## 2024-12-17 RX ORDER — EMPAGLIFLOZIN 25 MG/1
25 TABLET, FILM COATED ORAL DAILY
Qty: 90 TABLET | Refills: 0 | Status: SHIPPED | OUTPATIENT
Start: 2024-12-17

## 2024-12-17 NOTE — TELEPHONE ENCOUNTER
Last Written Prescription Date:  9/25/24  Last Fill Quantity: 90,  # refills: 0   Last office visit: 9/25/2024 with prescribing provider:  Dr. Odom   Future Office Visit: 2/5/25     Requested Prescriptions   Pending Prescriptions Disp Refills    JARDIANCE 25 MG TABS tablet [Pharmacy Med Name: JARDIANCE 25 MG TABLET] 90 tablet 0     Sig: TAKE 1 TABLET BY MOUTH EVERY DAY       Sodium Glucose Co-Transport Inhibitor Agents Passed - 12/17/2024  3:22 PM        Passed - Patient has documented A1c within the specified period of time.     If HgbA1C is 8 or greater, it needs to be on file within the past 3 months.  If less than 8, must be on file within the past 6 months.     Recent Labs   Lab Test 09/27/24  1046   A1C 8.0*             Passed - Medication is active on med list        Passed - Recent (6 mo) or future (90 days) visit within the authorizing provider's specialty     The patient must have completed an in-person or virtual visit within the past 6 months or has a future visit scheduled within the next 90 days with the authorizing provider s specialty.  Urgent care and e-visits do not quality as an office visit for this protocol.          Passed - Medication indicated for associated diagnosis     Medication is associated with one or more of the following diagnoses:     Diabetic nephropathy, With Albuminuria - Type 2 diabetes mellitus     Disorder of cardiovascular system; Prophylaxis - Type 2 diabetes mellitus     Type 2 diabetes mellitus    Disorder of cardiovascular system; Prophylaxis - Heart failure   Chronic kidney disease, (At risk of progression) to reduce the risk of sustained   estimated GFR decline, end-stage kidney disease, cardiovascular death,   and hospitalization for heart failure     Heart failure, (NYHA class II to IV, reduced ejection fraction) to reduce risk of  cardiovascular death and hospitalization           Passed - Has GFR on file in past 12 months and most recent value is >30         Passed - Patient is age 18 or older        Passed - Patient has documented normal Potassium within the last 12 mos.     Recent Labs   Lab Test 09/27/24  1046   POTASSIUM 4.7

## 2024-12-20 NOTE — TELEPHONE ENCOUNTER
Medication Request for: Tizanidine 4mg             Prescription last written on 10/30/24 by Dr. Saleh   Sig: take 1 tab at bedtime   Last Fill Quantity: 20 with # refills: 0     Last Office Visit by provider: 10/30/24    Please advise regarding refill request.     MADI Boss RN

## 2024-12-20 NOTE — TELEPHONE ENCOUNTER
tiZANidine (ZANAFLEX) 4 MG tablet 20 tablet 0 10/30/2024       Last Office Visit: 10/30/24  Future Office visit:   none      Routing refill request to provider for review/approval because:  Drug not on the [facility], [facility] or [facility] refill protocol or controlled substance    Karli Pacheco RN  [facility]/Red Flag Triage & Med Refill Team

## 2024-12-21 DIAGNOSIS — E78.5 HYPERLIPIDEMIA LDL GOAL <100: ICD-10-CM

## 2024-12-23 RX ORDER — ROSUVASTATIN CALCIUM 20 MG/1
20 TABLET, COATED ORAL DAILY
Qty: 90 TABLET | Refills: 3 | OUTPATIENT
Start: 2024-12-23

## 2025-01-07 ENCOUNTER — THERAPY VISIT (OUTPATIENT)
Dept: PHYSICAL THERAPY | Facility: CLINIC | Age: 70
End: 2025-01-07
Payer: MEDICARE

## 2025-01-07 DIAGNOSIS — G89.29 CHRONIC RIGHT SHOULDER PAIN: Primary | ICD-10-CM

## 2025-01-07 DIAGNOSIS — M25.511 CHRONIC RIGHT SHOULDER PAIN: Primary | ICD-10-CM

## 2025-01-07 PROCEDURE — 97530 THERAPEUTIC ACTIVITIES: CPT | Mod: GP | Performed by: PHYSICAL THERAPIST

## 2025-01-07 PROCEDURE — 97110 THERAPEUTIC EXERCISES: CPT | Mod: GP | Performed by: PHYSICAL THERAPIST

## 2025-01-11 NOTE — PATIENT INSTRUCTIONS
Blood sugar                 Large       Medium        Small  <80                              0                           20             15         10  120-140                       21             16        11  141-160                       22              17        12  161-180                       23              18        13  181-200                       24              19        14  201-220                       25               20      15  221-240                       26               21      16  241-260                       27               22      17  261-280                       28               23      18  281-300                       29               24      19  301-320                       30                25     20  >320                            31                26     21    Plan for now that BREAKFAST/LUNCH are SMALL meals  Plan for now that DINNER are LARGE meals    If BG after [place]/lunch are going high, ok to use medium size meals    If BG continues to have lows, reduce semglee to 42. Leave at 46 for now if improved with above novolog changes   [facility]

## 2025-01-28 ENCOUNTER — THERAPY VISIT (OUTPATIENT)
Dept: PHYSICAL THERAPY | Facility: CLINIC | Age: 70
End: 2025-01-28
Payer: COMMERCIAL

## 2025-01-28 DIAGNOSIS — M25.511 CHRONIC RIGHT SHOULDER PAIN: Primary | ICD-10-CM

## 2025-01-28 DIAGNOSIS — G89.29 CHRONIC RIGHT SHOULDER PAIN: Primary | ICD-10-CM

## 2025-01-28 PROCEDURE — 97110 THERAPEUTIC EXERCISES: CPT | Mod: GP | Performed by: PHYSICAL THERAPIST

## 2025-01-28 PROCEDURE — 97530 THERAPEUTIC ACTIVITIES: CPT | Mod: GP | Performed by: PHYSICAL THERAPIST

## 2025-01-28 NOTE — PROGRESS NOTES
01/28/25 0500   Appointment Info   Signing clinician's name / credentials Arielladev Landeros PT   Total/Authorized Visits 4 per therapist   Visits Used 4   Medical Diagnosis R shoulder pain   PT Tx Diagnosis R shoulder pain   Progress Note/Certification   Start of Care Date 12/03/24   Onset of illness/injury or Date of Surgery 11/04/24  (referral date)   Therapy Frequency 2 x month   Predicted Duration 2 months   Certification date from 02/01/25  (recert date)   Certification date to 04/01/25  (recert date)   PT Goal 1   Goal Identifier reaching   Goal Description patient will be able to reach fully overhead without pain   Rationale to maximize safety and independence with performance of ADLs and functional tasks;to maximize safety and independence with self cares   Goal Progress pt. can reach 3/4 overhead   Target Date 04/01/25   Subjective Report   Subjective Report Pt. has made moderate progress with R shoulder pain and increased ROM thus far. He still presents with pain and weakness with ER and abduction. His ADL's such as dressing are getting easier with the improved ROM. WE will recheck in 4 weeks as patient continues his HEP.   Objective Measures   Objective Measures Objective Measure 1;Objective Measure 2   Objective Measure 1   Objective Measure ROM   Details AROM R sh flex 125; abd 140; IR 65; ER 33   Objective Measure 2   Objective Measure strength   Details R sh flex 4/5; abd 4-/5; IR 4/5; ER 4-/5   Treatment Interventions (PT)   Interventions Therapeutic Procedure/Exercise;Therapeutic Activity   Therapeutic Procedure/Exercise   Therapeutic Procedures: strength, endurance, ROM, flexibility minutes (01203) 20   Therapeutic Procedures Ther Proc 2;Ther Proc 3;Ther Proc 4;Ther Proc 5;Ther Proc 6;Ther Proc 7;Ther Proc 8;Ther Proc 9   Ther Proc 1 wand flex   Ther Proc 1 - Details 10 reps   Ther Proc 2 wand abd   Ther Proc 2 - Details 10 reps   Ther Proc 3 wand IR   Ther Proc 3 - Details 10 reps   Ther Proc 4  wand ER   Ther Proc 4 - Details 10 reps standing   Ther Proc 5 row   Ther Proc 5 - Details Gr TB x 10   Ther Proc 6 IR   Ther Proc 6 - Details Gr TB x 10   Ther Proc 7 ER   Ther Proc 7 - Details Gr TB  10   Ther Proc 8 flex   Ther Proc 8 - Details 10 reps AG   Ther Proc 9 scap   Ther Proc 9 - Details 10 reps AG   Skilled Intervention ther ex to increase ROM - cueing for proper form/technique   Patient Response/Progress tolerated ex's well   Therapeutic Activity   Therapeutic Activities: dynamic activities to improve functional performance minutes (19060) 10   Ther Act 1 Cont education in activity modification to reduce stress on injury. Instructed in parameters of frequency and duration of activities to progress activity safely.   Patient Response/Progress good understanding   Education   Learner/Method Patient;Listening;Demonstration   Plan   Plan for next session recheck in 4 weeks   Total Session Time   Timed Code Treatment Minutes 30   Total Treatment Time (sum of timed and untimed services) 30         [facility]                                                                                   OUTPATIENT PHYSICAL THERAPY    PLAN OF TREATMENT FOR OUTPATIENT REHABILITATION   Patient's Last Name, First Name, Huseyin Mejia YOB: 1955   Provider's Name   [facility]   Medical Record No.  [id number]     Onset Date: 11/04/24 (referral date)  Start of Care Date: 12/03/24     Medical Diagnosis:  R shoulder pain      PT Treatment Diagnosis:  R shoulder pain Plan of Treatment  Frequency/Duration: 2 x month/ 2 months    Certification date from 02/01/25 (recert date) to 04/01/25 (recert date)         See note for plan of treatment details and functional goals     Ariella Landeros, PT                         I CERTIFY THE NEED FOR THESE SERVICES FURNISHED UNDER        THIS PLAN OF TREATMENT AND WHILE UNDER MY CARE     (Physician attestation  of this document indicates review and certification of the therapy plan).              Referring Provider:  Tala Urbina    Initial Assessment  See Epic Evaluation- Start of Care Date: 12/03/24

## 2025-01-31 ENCOUNTER — TELEPHONE (OUTPATIENT)
Dept: ENDOCRINOLOGY | Facility: CLINIC | Age: 70
End: 2025-01-31
Payer: COMMERCIAL

## 2025-01-31 NOTE — TELEPHONE ENCOUNTER
1st attempt: Unable to get a hold of spouse (Estefania Laura) phone is constantly busy. [phone] constantly ringing and ringing doesn't go to . Will send a [employer] Message.   If patient call to make an appointment, please help patient schedule a follow up for return Endocrine with Dr. Odom. Schedule next availability.   Thank you  - Gena MEDINA

## 2025-02-27 ENCOUNTER — TELEPHONE (OUTPATIENT)
Dept: ENDOCRINOLOGY | Facility: CLINIC | Age: 70
End: 2025-02-27
Payer: COMMERCIAL

## 2025-02-27 NOTE — TELEPHONE ENCOUNTER
1st attempt: LVM for Huseyin to reschedule his appointment due to provider will not be in clinic. please help patient reschedule his appointment. [phone].  Thank you   Gena

## 2025-03-17 DIAGNOSIS — E11.21 TYPE 2 DIABETES MELLITUS WITH DIABETIC NEPHROPATHY, WITH LONG-TERM CURRENT USE OF INSULIN (H): ICD-10-CM

## 2025-03-17 DIAGNOSIS — F41.8 MIXED ANXIETY DEPRESSIVE DISORDER: ICD-10-CM

## 2025-03-17 DIAGNOSIS — E11.00 TYPE 2 DIABETES MELLITUS WITH HYPEROSMOLARITY WITHOUT COMA, WITHOUT LONG-TERM CURRENT USE OF INSULIN (H): ICD-10-CM

## 2025-03-17 DIAGNOSIS — I10 ESSENTIAL HYPERTENSION, BENIGN: ICD-10-CM

## 2025-03-17 DIAGNOSIS — Z79.4 TYPE 2 DIABETES MELLITUS WITH DIABETIC NEPHROPATHY, WITH LONG-TERM CURRENT USE OF INSULIN (H): ICD-10-CM

## 2025-03-17 RX ORDER — ESCITALOPRAM OXALATE 20 MG/1
TABLET ORAL
Qty: 90 TABLET | Refills: 0 | Status: SHIPPED | OUTPATIENT
Start: 2025-03-17

## 2025-03-17 RX ORDER — EMPAGLIFLOZIN 25 MG/1
25 TABLET, FILM COATED ORAL DAILY
Qty: 90 TABLET | Refills: 0 | Status: SHIPPED | OUTPATIENT
Start: 2025-03-17

## 2025-03-17 RX ORDER — LISINOPRIL 20 MG/1
20 TABLET ORAL DAILY
Qty: 90 TABLET | Refills: 1 | Status: SHIPPED | OUTPATIENT
Start: 2025-03-17

## 2025-03-17 NOTE — TELEPHONE ENCOUNTER
Last Written Prescription Date:  12/17/24  Last Fill Quantity: 90,  # refills: 0   Last office visit: Visit date not found ; last virtual visit: 9/25/2024 with prescribing provider:  Dr. Odom  Future Office Visit: 6/3/25     Requested Prescriptions   Pending Prescriptions Disp Refills    JARDIANCE 25 MG TABS tablet [Pharmacy Med Name: JARDIANCE 25 MG TABLET] 90 tablet 0     Sig: TAKE 1 TABLET BY MOUTH EVERY DAY       Sodium Glucose Co-Transport Inhibitor Agents Failed - 3/17/2025  2:32 PM        Failed - Patient has documented A1c within the specified period of time.     If HgbA1C is 8 or greater, it needs to be on file within the past 3 months.  If less than 8, must be on file within the past 6 months.     Recent Labs   Lab Test 09/27/24  1046   A1C 8.0*             Passed - Medication is active on med list and the sig matches. RN to manually verify dose and sig if red X/fail.     If the protocol passes (green check), you do not need to verify med dose and sig.    A prescription matches if they are the same clinical intention.    For Example: once daily and every morning are the same.    The protocol can not identify upper and lower case letters as matching and will fail.     For Example: Take 1 tablet (50 mg) by mouth daily     TAKE 1 TABLET (50 MG) BY MOUTH DAILY    For all fails (red x), verify dose and sig.    If the refill does match what is on file, the RN can still proceed to approve the refill request.       If they do not match, route to the appropriate provider.             Passed - Recent (6 mo) or future (90 days) visit within the authorizing provider's specialty     The patient must have completed an in-person or virtual visit within the past 6 months or has a future visit scheduled within the next 90 days with the authorizing provider s specialty.  Urgent care and e-visits do not quality as an office visit for this protocol.          Passed - Medication indicated for associated diagnosis      Medication is associated with one or more of the following diagnoses:     Diabetic nephropathy, With Albuminuria - Type 2 diabetes mellitus     Disorder of cardiovascular system; Prophylaxis - Type 2 diabetes mellitus     Type 2 diabetes mellitus    Disorder of cardiovascular system; Prophylaxis - Heart failure   Chronic kidney disease, (At risk of progression) to reduce the risk of sustained   estimated GFR decline, end-stage kidney disease, cardiovascular death,   and hospitalization for heart failure     Heart failure, (NYHA class II to IV, reduced ejection fraction) to reduce risk of  cardiovascular death and hospitalization           Passed - Has GFR on file in past 12 months and most recent value is >30        Passed - Patient is age 18 or older        Passed - Patient has documented normal Potassium within the last 12 mos.     Recent Labs   Lab Test 09/27/24  1046   POTASSIUM 4.7

## 2025-04-12 ENCOUNTER — HEALTH MAINTENANCE LETTER (OUTPATIENT)
Age: 70
End: 2025-04-12

## 2025-04-29 ENCOUNTER — THERAPY VISIT (OUTPATIENT)
Dept: PHYSICAL THERAPY | Facility: CLINIC | Age: 70
End: 2025-04-29
Payer: COMMERCIAL

## 2025-04-29 DIAGNOSIS — G89.29 CHRONIC RIGHT SHOULDER PAIN: Primary | ICD-10-CM

## 2025-04-29 DIAGNOSIS — M25.511 CHRONIC RIGHT SHOULDER PAIN: Primary | ICD-10-CM

## 2025-04-29 PROCEDURE — 97530 THERAPEUTIC ACTIVITIES: CPT | Mod: GP | Performed by: PHYSICAL THERAPIST

## 2025-04-29 PROCEDURE — 97110 THERAPEUTIC EXERCISES: CPT | Mod: GP | Performed by: PHYSICAL THERAPIST

## 2025-04-29 NOTE — PROGRESS NOTES
04/29/25 0500   Appointment Info   Signing clinician's name / credentials Ariella Landeros PT   Total/Authorized Visits 4 per therapist   Visits Used 5   Medical Diagnosis R shoulder pain   PT Tx Diagnosis R shoulder pain   Progress Note/Certification   Start of Care Date 12/03/24   Onset of illness/injury or Date of Surgery 11/04/24  (referral date)   Therapy Frequency 2 x month   Predicted Duration 2 months   Certification date from 04/02/25  (recert date)   Certification date to 06/03/25  (recert date)   PT Goal 1   Goal Identifier reaching   Goal Description patient will be able to reach fully overhead without pain   Rationale to maximize safety and independence with performance of ADLs and functional tasks;to maximize safety and independence with self cares   Goal Progress pt. can reach 3/4 overhead - he returns today for the first time in 3 months   Target Date 06/03/25   Subjective Report   Subjective Report Pt. returns to PT today for the first time in 3 months. He has gradually stopped doing his home PT exercises. He has made moderate improvement overall in increasing ROM and strength , however, he reports the shoulder pain to be mainly the same. He plans to return to Dr. Urbina to pursue other options. He is encouraged today to resume his HEP for shoulder ROM and RC strengthening.   Objective Measures   Objective Measures Objective Measure 1;Objective Measure 2   Objective Measure 1   Objective Measure ROM   Details AROM R sh flex 125; abd 120; IR 65; ER 25   Objective Measure 2   Objective Measure strength   Details R sh flex 4/5; abd 4-/5; IR 4/5; ER 4-/5   Treatment Interventions (PT)   Interventions Therapeutic Procedure/Exercise;Therapeutic Activity   Therapeutic Procedure/Exercise   Therapeutic Procedures: strength, endurance, ROM, flexibility minutes (67572) 10   Therapeutic Procedures Ther Proc 2;Ther Proc 3;Ther Proc 4;Ther Proc 5;Ther Proc 6;Ther Proc 7;Ther Proc 8;Ther Proc 9   Ther Proc 1 rigo  flex   Ther Proc 1 - Details 10 reps   Ther Proc 2 wand abd   Ther Proc 2 - Details 10 reps   Ther Proc 3 wand IR   Ther Proc 3 - Details 10 reps   Ther Proc 4 wand ER   Ther Proc 4 - Details 10 reps standing   Ther Proc 5 row   Ther Proc 5 - Details Gr TB x 10   Ther Proc 6 IR   Ther Proc 6 - Details Gr TB x 10   Ther Proc 7 ER   Ther Proc 7 - Details Gr TB  10   Ther Proc 8 flex   Ther Proc 8 - Details 10 reps AG   Ther Proc 9 scap   Ther Proc 9 - Details 10 reps AG   Skilled Intervention ther ex to increase ROM - cueing for proper form/technique   Patient Response/Progress tolerated ex's well   Therapeutic Activity   Therapeutic Activities: dynamic activities to improve functional performance minutes (06736) 15   Ther Act 1 Cont education in activity modification to reduce stress on injury. Instructed in parameters of frequency and duration of activities to progress activity safely.   Patient Response/Progress good understanding   Ther Act 2 discussed modified return to golf strategies   Ther Act 2 - Details also instructed in modifications for yard work   Therapeutic Activities Ther Act 2;Ther Act 3   Education   Learner/Method Patient;Listening;Demonstration   Plan   Plan for next session pt, plans to return to Dr. Urbina   Total Session Time   Timed Code Treatment Minutes 25   Total Treatment Time (sum of timed and untimed services) 25         [facility]                                                                                   OUTPATIENT PHYSICAL THERAPY    PLAN OF TREATMENT FOR OUTPATIENT REHABILITATION   Patient's Last Name, First Name, Huseyin Mejia YOB: 1955   Provider's Name   [facility]   Medical Record No.  [id number]     Onset Date: 11/04/24 (referral date)  Start of Care Date: 12/03/24     Medical Diagnosis:  R shoulder pain      PT Treatment Diagnosis:  R shoulder pain Plan of Treatment  Frequency/Duration: 2 x  month/ 2 months    Certification date from 04/02/25 (recert date) to 06/03/25 (recert date)         See note for plan of treatment details and functional goals     Ariella Landeros, PT                         I CERTIFY THE NEED FOR THESE SERVICES FURNISHED UNDER        THIS PLAN OF TREATMENT AND WHILE UNDER MY CARE     (Physician attestation of this document indicates review and certification of the therapy plan).              Referring Provider:  Tala Urbina    Initial Assessment  See Epic Evaluation- Start of Care Date: 12/03/24

## 2025-06-03 ENCOUNTER — VIRTUAL VISIT (OUTPATIENT)
Dept: ENDOCRINOLOGY | Facility: CLINIC | Age: 70
End: 2025-06-03
Payer: COMMERCIAL

## 2025-06-03 DIAGNOSIS — Z79.4 TYPE 2 DIABETES MELLITUS WITH DIABETIC NEPHROPATHY, WITH LONG-TERM CURRENT USE OF INSULIN (H): Primary | ICD-10-CM

## 2025-06-03 DIAGNOSIS — E11.21 TYPE 2 DIABETES MELLITUS WITH DIABETIC NEPHROPATHY, WITH LONG-TERM CURRENT USE OF INSULIN (H): Primary | ICD-10-CM

## 2025-06-03 NOTE — LETTER
"6/3/2025      [address]  [address] [place] [address]      Dear Colleague,    Thank you for referring your patient, Huseyin St, to the [facility] [place]. Please see a copy of my visit note below.      Video-Visit Details    Type of service:  Video Visit  Video Start Time: 3:43  Video End Time: 4:18  Originating Location (pt. Location): Home  Distant Location (provider location):  Home  Platform used for Video Visit: Nemo St is a 69year old yo male who NA, obesity, HLD, DM2 complicated by nephropathy, neuropathy and recently CVA s/p stenting here for follow-up via a billable video visit. Last seen Sept 2024     Chief Complaint   Patient presents with     RECHECK     Type 2 diabetes mellitus with diabetic nephropathy, with long-term current use of insulin (H)       INTERVAL HISTORY:  - Getting short dizzy spells-- orthostasis?-- usually with positional changes-- will check BP x7 days and send to me  - Drinks \"gallons\" of water a day,   - Myles 3 on shortage, has not had since Monday  - Eats snack every night before bed  - Feels has improved but not completely recovered. At night, will get dizzy for about 15 sec max, will feel the same when rolls over in bed and gets up to go to the bathroom.   - minimal lows  - No recent eye check, has place he goes regularly  - F/U with PCP on Friday      Subjective:    1) Diabetes Mellitus    Diabetes History:  Diagnosis: 10-20 years ago  Hospitalizations: April for stroke, never for diabetes  Previous Regimens: metformin diarrhea, Ozempic diarrhea, victoza diarrhea  Current Regimen: Jardiance 25mg,  Novolog 18-24u with each meal-- varies depending on what eating. Usually 20 plus sliding scale-- 20u with larger meals, 15u with medium meals, 10u with small meals (rarely takes),  Semglee 44-46u at bedtime    BG check- MYLES 3 (see below)          Trends:   54% TIR  Noting sharp rise in evening (after 8pm) when has " snack/treat  Slowly falls overnight  Some rise with lunch and dinner as well  Does not dose insulin with snack/treat          24hr recall-  Lunch- doses as med meal  Dinner- doses as large meal  Snack- usually ice cream before bed    Last eye exam: Sept 2020, no NPRD at that time (per report)  Foot Exam: self checks, wife checks every few days  ACEI/ARB: lisinopril  Statin/ASA: crestor, ASA, plavix      BP Readings from Last 3 Encounters:   09/27/24 111/75   09/25/24 103/68   05/31/24 (!) 145/86       Lab Results   Component Value Date    A1C 11.8 03/16/2022    A1C 11.8 10/11/2021    A1C 13.2 09/28/2020    A1C 12.3 09/23/2019    A1C 10.4 03/12/2018       Recent Labs   Lab Test 04/06/22  0725 03/16/22  1704 04/21/16  0906 08/20/15  0830 01/14/15  0847   CHOL 109 224*   < > 136 316*   HDL 34* 33*   < > 42 41   LDL 53 136*   < > 59 219*   TRIG 111 275*   < > 176* 282*   CHOLHDLRATIO  --   --   --  3.2 7.7*    < > = values in this interval not displayed.       Lab Results   Component Value Date    MICROL 265 10/11/2021    MICROL 715 09/28/2020     No results found for: MICROALBUMIN      Wt Readings from Last 3 Encounters:   09/27/24 107 kg (236 lb)   05/31/24 106.1 kg (234 lb)   04/25/24 106.5 kg (234 lb 12.8 oz)             Active diagnoses this visit:  Type 2 diabetes mellitus with diabetic nephropathy, with long-term current use of insulin (H)     ROS: 10 point ROS neg other than the symptoms noted above in the HPI.      Medical, surgical, social, and family histories, medications and allergies reviewed and updated.    Objective:  Physical Exam (visual exam)  VS:  no vital signs taken for video visit  CONSTITUTIONAL: healthy, alert and NAD, responding appropriately  ENT: normocephalic, no visual evidence of trauma, normal nose and oral mucosa  EYES: conjunctivae and sclerae normal, no exophthalmos or proptosis  THYROID:  no visualized nodules or goiter  LUNGS: no audible wheeze, cough or visible cyanosis, no visible  retractions or increased work of breathing  EXTREMITIES: no hand tremors  NEUROLOGY: cranial nerves grossly intact with no obvious deficit.  SKIN:  no visualized skin lesions or rash, no edema visualized  PSYCH: mentation appears normal, normal judgement      Lab Results   Component Value Date/Time    TSH 3.07 09/27/2024 10:46 AM    TSH 1.94 12/05/2016 10:58 AM       Last Comprehensive Metabolic Panel:  Sodium   Date Value Ref Range Status   09/27/2024 137 135 - 145 mmol/L Final   03/12/2018 135 133 - 144 mmol/L Final     Potassium   Date Value Ref Range Status   09/27/2024 4.7 3.4 - 5.3 mmol/L Final   08/01/2022 4.4 3.4 - 5.3 mmol/L Final   03/12/2018 4.3 3.4 - 5.3 mmol/L Final     Chloride   Date Value Ref Range Status   09/27/2024 103 98 - 107 mmol/L Final   08/01/2022 105 94 - 109 mmol/L Final   03/12/2018 102 94 - 109 mmol/L Final     Carbon Dioxide   Date Value Ref Range Status   03/12/2018 26 20 - 32 mmol/L Final     Carbon Dioxide (CO2)   Date Value Ref Range Status   09/27/2024 22 22 - 29 mmol/L Final   08/01/2022 23 20 - 32 mmol/L Final     Anion Gap   Date Value Ref Range Status   09/27/2024 12 7 - 15 mmol/L Final   08/01/2022 6 3 - 14 mmol/L Final   03/12/2018 7 3 - 14 mmol/L Final     Glucose   Date Value Ref Range Status   09/27/2024 128 (H) 70 - 99 mg/dL Final   08/01/2022 196 (H) 70 - 99 mg/dL Final   03/12/2018 220 (H) 70 - 99 mg/dL Final     GLUCOSE BY METER POCT   Date Value Ref Range Status   08/01/2022 233 (H) 70 - 99 mg/dL Final     Urea Nitrogen   Date Value Ref Range Status   09/27/2024 16.2 8.0 - 23.0 mg/dL Final   08/01/2022 21 7 - 30 mg/dL Final   03/12/2018 13 7 - 30 mg/dL Final     Creatinine   Date Value Ref Range Status   09/27/2024 1.11 0.67 - 1.17 mg/dL Final   09/28/2020 1.03 0.66 - 1.25 mg/dL Final     GFR Estimate   Date Value Ref Range Status   09/27/2024 72 >60 mL/min/1.73m2 Final     Comment:     eGFR calculated using 2021 CKD-EPI equation.   09/28/2020 75 >60 mL/min/[1.73_m2]  Final     Comment:     Non  GFR Calc  Starting 12/18/2018, serum creatinine based estimated GFR (eGFR) will be   calculated using the Chronic Kidney Disease Epidemiology Collaboration   (CKD-EPI) equation.       GFR, ESTIMATED POCT   Date Value Ref Range Status   12/05/2023 59 (L) >60 mL/min/1.73m2 Final     Calcium   Date Value Ref Range Status   09/27/2024 9.6 8.8 - 10.4 mg/dL Final     Comment:     Reference intervals for this test were updated on 7/16/2024 to reflect our healthy population more accurately. There may be differences in the flagging of prior results with similar values performed with this method. Those prior results can be interpreted in the context of the updated reference intervals.   03/12/2018 9.6 8.5 - 10.1 mg/dL Final     Bilirubin Total   Date Value Ref Range Status   09/27/2024 0.4 <=1.2 mg/dL Final   12/05/2016 0.4 0.2 - 1.3 mg/dL Final     Alkaline Phosphatase   Date Value Ref Range Status   09/27/2024 71 40 - 150 U/L Final   12/05/2016 81 40 - 150 U/L Final     ALT   Date Value Ref Range Status   09/27/2024 20 0 - 70 U/L Final   09/29/2017 36 0 - 70 U/L Final     AST   Date Value Ref Range Status   09/27/2024 26 0 - 45 U/L Final   12/05/2016 15 0 - 45 U/L Final       ASSESSMENT / PLAN:  No diagnosis found.    Diabetes Mellitus- Recent A1c 8.0 Sept 2024              - Jardiance continue 25mg daily   - Semglee- 46u at bedtime- take this dose every night   - Continue mealtime insulin Novolog 20u with most meals plus sliding scale-- doses 20 (large meals), 15u with medium meals 10u with snacks/smaller meal -- add in snack dose with his nightly treat   - Continue sliding scale 1:20 >120. No doses if not eating  2.  Lipids:  Meets criteria per ASCVD risk %, continue crestor 40mg daily  3.  Blood Pressure:  At goal, continue meds  4.  CV prevention:  Continue aspirin 325 mg daily, plavix  5.  Diabetic Nephropathy screening:   continue ACEI/ARB , improved  6.  Diabetic  Retinopathy screening:  continue annual dilated eye exams due now-- he will call and schedule  7.  Diabetic Neuropathy screening:  Up to date.  Instructed on routine foot care, follow-up with podiatry as needed.       Orders Placed This Encounter   Procedures     GLUCOSE MONITOR, 72 HOUR, PHYS INTERP     Albumin Random Urine Quantitative with Creat Ratio     Basic metabolic panel     Hemoglobin A1c     Lipid panel reflex to direct LDL Fasting     Vitamin D Deficiency     TSH with free T4 reflex        Return to clinic PRN-- he can continue care with PCP as BG readings are at goal  A total of 34 minutes were spent today 06/03/25 on this visit including chart review, history and counseling, documentation and other activities as detailed above excluding time spent reviewing CGM.          Again, thank you for allowing me to participate in the care of your patient.        Sincerely,        Ivonne Odom MD    Electronically signed

## 2025-06-03 NOTE — PATIENT INSTRUCTIONS
sugar Large Medium Small   <80 0 0 0    20 15 10   120-140 21 16 11   141-160 22 17 12   161-180 23 18 13   181-200 24 19 14   201-220 25 20 15   221-240 26 21 16   241-260 27 22 17   261-280 28 23 18   281-300 29 24 19   301-320 30 25 20   >320 31 26 21          Plan for now that BREAKFAST/LUNCH/DINNER are LARGE meals   Plan for now that snacks/treats are SMALL meals          If BG continues to have lows, reduce semglee to 42. Leave at 46 for now if improved with above novolog changes

## 2025-06-03 NOTE — PROGRESS NOTES
"  Video-Visit Details    Type of service:  Video Visit  Video Start Time: 3:43  Video End Time: 4:18  Originating Location (pt. Location): Home  Distant Location (provider location):  Home  Platform used for Video Visit: Nemo [address] is a 69year old yo male who NA, obesity, HLD, DM2 complicated by nephropathy, neuropathy and recently CVA s/p stenting here for follow-up via a billable video visit. Last seen Sept 2024     Chief Complaint   Patient presents with    RECHECK     Type 2 diabetes mellitus with diabetic nephropathy, with long-term current use of insulin (H)       INTERVAL HISTORY:  - Getting short dizzy spells-- orthostasis?-- usually with positional changes-- will check BP x7 days and send to me  - Drinks \"gallons\" of water a day,   - Myles 3 on shortage, has not had since Monday  - Eats snack every night before bed  - Feels has improved but not completely recovered. At night, will get dizzy for about 15 sec max, will feel the same when rolls over in bed and gets up to go to the bathroom.   - minimal lows  - No recent eye check, has place he goes regularly  - F/U with PCP on Friday      Subjective:    1) Diabetes Mellitus    Diabetes History:  Diagnosis: 10-20 years ago  Hospitalizations: April for stroke, never for diabetes  Previous Regimens: metformin diarrhea, Ozempic diarrhea, victoza diarrhea  Current Regimen: Jardiance 25mg,  Novolog 18-24u with each meal-- varies depending on what eating. Usually 20 plus sliding scale-- 20u with larger meals, 15u with medium meals, 10u with small meals (rarely takes),  Semglee 44-46u at bedtime    BG check- MYLES 3 (see below)          Trends:   54% TIR  Noting sharp rise in evening (after 8pm) when has snack/treat  Slowly falls overnight  Some rise with lunch and dinner as well  Does not dose insulin with snack/treat          24hr recall-  Lunch- doses as med meal  Dinner- doses as large meal  Snack- usually ice cream before bed    Last eye exam: Sept " 2020, no NPRD at that time (per report)  Foot Exam: self checks, wife checks every few days  ACEI/ARB: lisinopril  Statin/ASA: crestor, ASA, plavix      BP Readings from Last 3 Encounters:   09/27/24 111/75   09/25/24 103/68   05/31/24 (!) 145/86       Lab Results   Component Value Date    A1C 11.8 03/16/2022    A1C 11.8 10/11/2021    A1C 13.2 09/28/2020    A1C 12.3 09/23/2019    A1C 10.4 03/12/2018       Recent Labs   Lab Test 04/06/22  0725 03/16/22  1704 04/21/16  0906 08/20/15  0830 01/14/15  0847   CHOL 109 224*   < > 136 316*   HDL 34* 33*   < > 42 41   LDL 53 136*   < > 59 219*   TRIG 111 275*   < > 176* 282*   CHOLHDLRATIO  --   --   --  3.2 7.7*    < > = values in this interval not displayed.       Lab Results   Component Value Date    MICROL 265 10/11/2021    MICROL 715 09/28/2020     No results found for: MICROALBUMIN      Wt Readings from Last 3 Encounters:   09/27/24 107 kg (236 lb)   05/31/24 106.1 kg (234 lb)   04/25/24 106.5 kg (234 lb 12.8 oz)             Active diagnoses this visit:  Type 2 diabetes mellitus with diabetic nephropathy, with long-term current use of insulin (H)     ROS: 10 point ROS neg other than the symptoms noted above in the HPI.      Medical, surgical, social, and family histories, medications and allergies reviewed and updated.    Objective:  Physical Exam (visual exam)  VS:  no vital signs taken for video visit  CONSTITUTIONAL: healthy, alert and NAD, responding appropriately  ENT: normocephalic, no visual evidence of trauma, normal nose and oral mucosa  EYES: conjunctivae and sclerae normal, no exophthalmos or proptosis  THYROID:  no visualized nodules or goiter  LUNGS: no audible wheeze, cough or visible cyanosis, no visible retractions or increased work of breathing  EXTREMITIES: no hand tremors  NEUROLOGY: cranial nerves grossly intact with no obvious deficit.  SKIN:  no visualized skin lesions or rash, no edema visualized  PSYCH: mentation appears normal, normal  judgement      Lab Results   Component Value Date/Time    TSH 3.07 09/27/2024 10:46 AM    TSH 1.94 12/05/2016 10:58 AM       Last Comprehensive Metabolic Panel:  Sodium   Date Value Ref Range Status   09/27/2024 137 135 - 145 mmol/L Final   03/12/2018 135 133 - 144 mmol/L Final     Potassium   Date Value Ref Range Status   09/27/2024 4.7 3.4 - 5.3 mmol/L Final   08/01/2022 4.4 3.4 - 5.3 mmol/L Final   03/12/2018 4.3 3.4 - 5.3 mmol/L Final     Chloride   Date Value Ref Range Status   09/27/2024 103 98 - 107 mmol/L Final   08/01/2022 105 94 - 109 mmol/L Final   03/12/2018 102 94 - 109 mmol/L Final     Carbon Dioxide   Date Value Ref Range Status   03/12/2018 26 20 - 32 mmol/L Final     Carbon Dioxide (CO2)   Date Value Ref Range Status   09/27/2024 22 22 - 29 mmol/L Final   08/01/2022 23 20 - 32 mmol/L Final     Anion Gap   Date Value Ref Range Status   09/27/2024 12 7 - 15 mmol/L Final   08/01/2022 6 3 - 14 mmol/L Final   03/12/2018 7 3 - 14 mmol/L Final     Glucose   Date Value Ref Range Status   09/27/2024 128 (H) 70 - 99 mg/dL Final   08/01/2022 196 (H) 70 - 99 mg/dL Final   03/12/2018 220 (H) 70 - 99 mg/dL Final     GLUCOSE BY METER POCT   Date Value Ref Range Status   08/01/2022 233 (H) 70 - 99 mg/dL Final     Urea Nitrogen   Date Value Ref Range Status   09/27/2024 16.2 8.0 - 23.0 mg/dL Final   08/01/2022 21 7 - 30 mg/dL Final   03/12/2018 13 7 - 30 mg/dL Final     Creatinine   Date Value Ref Range Status   09/27/2024 1.11 0.67 - 1.17 mg/dL Final   09/28/2020 1.03 0.66 - 1.25 mg/dL Final     GFR Estimate   Date Value Ref Range Status   09/27/2024 72 >60 mL/min/1.73m2 Final     Comment:     eGFR calculated using 2021 CKD-EPI equation.   09/28/2020 75 >60 mL/min/[1.73_m2] Final     Comment:     Non  GFR Calc  Starting 12/18/2018, serum creatinine based estimated GFR (eGFR) will be   calculated using the Chronic Kidney Disease Epidemiology Collaboration   (CKD-EPI) equation.       GFR, ESTIMATED  POCT   Date Value Ref Range Status   12/05/2023 59 (L) >60 mL/min/1.73m2 Final     Calcium   Date Value Ref Range Status   09/27/2024 9.6 8.8 - 10.4 mg/dL Final     Comment:     Reference intervals for this test were updated on 7/16/2024 to reflect our healthy population more accurately. There may be differences in the flagging of prior results with similar values performed with this method. Those prior results can be interpreted in the context of the updated reference intervals.   03/12/2018 9.6 8.5 - 10.1 mg/dL Final     Bilirubin Total   Date Value Ref Range Status   09/27/2024 0.4 <=1.2 mg/dL Final   12/05/2016 0.4 0.2 - 1.3 mg/dL Final     Alkaline Phosphatase   Date Value Ref Range Status   09/27/2024 71 40 - 150 U/L Final   12/05/2016 81 40 - 150 U/L Final     ALT   Date Value Ref Range Status   09/27/2024 20 0 - 70 U/L Final   09/29/2017 36 0 - 70 U/L Final     AST   Date Value Ref Range Status   09/27/2024 26 0 - 45 U/L Final   12/05/2016 15 0 - 45 U/L Final       ASSESSMENT / PLAN:  No diagnosis found.    Diabetes Mellitus- Recent A1c 8.0 Sept 2024              - Jardiance continue 25mg daily   - Semglee- 46u at bedtime- take this dose every night   - Continue mealtime insulin Novolog 20u with most meals plus sliding scale-- doses 20 (large meals), 15u with medium meals 10u with snacks/smaller meal -- add in snack dose with his nightly treat   - Continue sliding scale 1:20 >120. No doses if not eating  2.  Lipids:  Meets criteria per ASCVD risk %, continue crestor 40mg daily  3.  Blood Pressure:  At goal, continue meds  4.  CV prevention:  Continue aspirin 325 mg daily, plavix  5.  Diabetic Nephropathy screening:   continue ACEI/ARB , improved  6.  Diabetic Retinopathy screening:  continue annual dilated eye exams due now-- he will call and schedule  7.  Diabetic Neuropathy screening:  Up to date.  Instructed on routine foot care, follow-up with podiatry as needed.       Orders Placed This Encounter    Procedures    GLUCOSE MONITOR, 72 HOUR, PHYS INTERP    Albumin Random Urine Quantitative with Creat Ratio    Basic metabolic panel    Hemoglobin A1c    Lipid panel reflex to direct LDL Fasting    Vitamin D Deficiency    TSH with free T4 reflex        Return to clinic PRN-- he can continue care with PCP as BG readings are at goal  A total of 34 minutes were spent today 06/03/25 on this visit including chart review, history and counseling, documentation and other activities as detailed above excluding time spent reviewing CGM.

## 2025-06-19 PROBLEM — M25.511 CHRONIC RIGHT SHOULDER PAIN: Status: RESOLVED | Noted: 2024-12-05 | Resolved: 2025-06-19

## 2025-06-19 PROBLEM — G89.29 CHRONIC RIGHT SHOULDER PAIN: Status: RESOLVED | Noted: 2024-12-05 | Resolved: 2025-06-19

## 2025-06-24 DIAGNOSIS — G45.9 TIA (TRANSIENT ISCHEMIC ATTACK): ICD-10-CM

## 2025-06-24 DIAGNOSIS — Z79.4 TYPE 2 DIABETES MELLITUS WITH DIABETIC NEPHROPATHY, WITH LONG-TERM CURRENT USE OF INSULIN (H): ICD-10-CM

## 2025-06-24 DIAGNOSIS — E11.00 TYPE 2 DIABETES MELLITUS WITH HYPEROSMOLARITY WITHOUT COMA, WITHOUT LONG-TERM CURRENT USE OF INSULIN (H): ICD-10-CM

## 2025-06-24 DIAGNOSIS — E11.21 TYPE 2 DIABETES MELLITUS WITH DIABETIC NEPHROPATHY, WITH LONG-TERM CURRENT USE OF INSULIN (H): ICD-10-CM

## 2025-06-24 RX ORDER — PEN NEEDLE, DIABETIC 32GX 5/32"
NEEDLE, DISPOSABLE MISCELLANEOUS
Qty: 300 EACH | Refills: 3 | Status: SHIPPED | OUTPATIENT
Start: 2025-06-24

## 2025-06-26 DIAGNOSIS — Z79.4 TYPE 2 DIABETES MELLITUS WITH DIABETIC NEPHROPATHY, WITH LONG-TERM CURRENT USE OF INSULIN (H): ICD-10-CM

## 2025-06-26 DIAGNOSIS — E11.00 TYPE 2 DIABETES MELLITUS WITH HYPEROSMOLARITY WITHOUT COMA, WITHOUT LONG-TERM CURRENT USE OF INSULIN (H): ICD-10-CM

## 2025-06-26 DIAGNOSIS — E11.21 TYPE 2 DIABETES MELLITUS WITH DIABETIC NEPHROPATHY, WITH LONG-TERM CURRENT USE OF INSULIN (H): ICD-10-CM

## 2025-06-26 RX ORDER — EMPAGLIFLOZIN 25 MG/1
25 TABLET, FILM COATED ORAL DAILY
Qty: 90 TABLET | Refills: 1 | Status: SHIPPED | OUTPATIENT
Start: 2025-06-26

## 2025-06-26 NOTE — TELEPHONE ENCOUNTER
Last Written Prescription Date:  3/17/25  Last Fill Quantity: 90,  # refills: 0   Last office visit: Visit date not found ; last virtual visit: 6/3/2025 with prescribing provider:  Dr. Odom   Future Office Visit:      Requested Prescriptions   Pending Prescriptions Disp Refills    JARDIANCE 25 MG TABS tablet [Pharmacy Med Name: JARDIANCE 25 MG TABLET] 90 tablet 0     Sig: TAKE 1 TABLET BY MOUTH EVERY DAY       Sodium Glucose Co-Transport Inhibitor Agents Failed - 6/26/2025 11:04 AM        Failed - Patient has documented A1c within the specified period of time.     If HgbA1C is 8 or greater, it needs to be on file within the past 3 months.  If less than 8, must be on file within the past 6 months.     Recent Labs   Lab Test 09/27/24  1046   A1C 8.0*             Passed - Medication is active on med list and the sig matches. RN to manually verify dose and sig if red X/fail.     If the protocol passes (green check), you do not need to verify med dose and sig.    A prescription matches if they are the same clinical intention.    For Example: once daily and every morning are the same.    The protocol can not identify upper and lower case letters as matching and will fail.     For Example: Take 1 tablet (50 mg) by mouth daily     TAKE 1 TABLET (50 MG) BY MOUTH DAILY    For all fails (red x), verify dose and sig.    If the refill does match what is on file, the RN can still proceed to approve the refill request.       If they do not match, route to the appropriate provider.             Passed - Recent (6 month) or future (90 days) visit with the authorizing provider's specialty (provided they have been seen in the past 9 months)     The patient must have completed an in-person or virtual visit within the past 6 months or has a future visit scheduled within the next 90 days with the authorizing provider s specialty.  Urgent care and e-visits do not quality as an office visit for this protocol.          Passed - Medication  indicated for associated diagnosis     Medication is associated with one or more of the following diagnoses:     Diabetic nephropathy, With Albuminuria - Type 2 diabetes mellitus     Disorder of cardiovascular system; Prophylaxis - Type 2 diabetes mellitus     Type 2 diabetes mellitus    Disorder of cardiovascular system; Prophylaxis - Heart failure   Chronic kidney disease, (At risk of progression) to reduce the risk of sustained   estimated GFR decline, end-stage kidney disease, cardiovascular death,   and hospitalization for heart failure     Heart failure, (NYHA class II to IV, reduced ejection fraction) to reduce risk of  cardiovascular death and hospitalization           Passed - Has GFR on file in past 12 months and most recent value is >30        Passed - Patient is age 18 or older        Passed - Patient has documented normal Potassium within the last 12 mos.     Recent Labs   Lab Test 09/27/24  1046   POTASSIUM 4.7                Refills sent  Rolanda Ochoa RN

## 2025-07-17 RX ORDER — CLOPIDOGREL BISULFATE 75 MG/1
75 TABLET ORAL DAILY
Qty: 90 TABLET | Refills: 0 | Status: SHIPPED | OUTPATIENT
Start: 2025-07-17

## 2025-07-17 RX ORDER — CLOPIDOGREL BISULFATE 75 MG/1
75 TABLET ORAL DAILY
OUTPATIENT
Start: 2025-07-17

## 2025-07-17 NOTE — TELEPHONE ENCOUNTER
Spoke to patient's wife on C2C.  [facility] but requested to have refill approved.  Patient only has 2 tablets left of Plavix.  Please advise if raquel refill can be approved.    Refused Prescriptions     Name from pharmacy: CLOPIDOGREL 75 MG TABLET         Will file in chart as: clopidogrel (PLAVIX) 75 MG tablet    Sig: Take 1 tablet (75 mg) by mouth daily.    Original sig: TAKE 1 TABLET BY MOUTH EVERY DAY    Disp: Not specified    Refills: Not specified (Pharmacy requested: 1)    Start: 7/17/2025    Class: E-Prescribe    Refused by: Kendal Wong MD    Refusal reason: Patient needs appointment

## 2025-08-04 ENCOUNTER — TELEPHONE (OUTPATIENT)
Dept: ENDOCRINOLOGY | Facility: CLINIC | Age: 70
End: 2025-08-04
Payer: COMMERCIAL

## 2025-08-05 DIAGNOSIS — Z79.4 TYPE 2 DIABETES MELLITUS WITH DIABETIC NEPHROPATHY, WITH LONG-TERM CURRENT USE OF INSULIN (H): ICD-10-CM

## 2025-08-05 DIAGNOSIS — E11.21 TYPE 2 DIABETES MELLITUS WITH DIABETIC NEPHROPATHY, WITH LONG-TERM CURRENT USE OF INSULIN (H): ICD-10-CM

## 2025-08-05 DIAGNOSIS — E11.00 TYPE 2 DIABETES MELLITUS WITH HYPEROSMOLARITY WITHOUT COMA, WITHOUT LONG-TERM CURRENT USE OF INSULIN (H): ICD-10-CM

## 2025-08-05 RX ORDER — INSULIN DEGLUDEC 100 U/ML
45 INJECTION, SOLUTION SUBCUTANEOUS DAILY
Qty: 15 ML | Refills: 1 | Status: SHIPPED | OUTPATIENT
Start: 2025-08-05

## 2025-08-07 ENCOUNTER — VIRTUAL VISIT (OUTPATIENT)
Dept: FAMILY MEDICINE | Facility: CLINIC | Age: 70
End: 2025-08-07
Payer: COMMERCIAL

## 2025-08-07 DIAGNOSIS — F41.8 MIXED ANXIETY DEPRESSIVE DISORDER: Primary | ICD-10-CM

## 2025-08-07 DIAGNOSIS — E11.00 TYPE 2 DIABETES MELLITUS WITH HYPEROSMOLARITY WITHOUT COMA, WITHOUT LONG-TERM CURRENT USE OF INSULIN (H): ICD-10-CM

## 2025-08-07 DIAGNOSIS — E66.09 CLASS 1 OBESITY DUE TO EXCESS CALORIES WITH SERIOUS COMORBIDITY AND BODY MASS INDEX (BMI) OF 33.0 TO 33.9 IN ADULT: ICD-10-CM

## 2025-08-07 DIAGNOSIS — F41.8 MIXED ANXIETY DEPRESSIVE DISORDER: ICD-10-CM

## 2025-08-07 DIAGNOSIS — E66.811 CLASS 1 OBESITY DUE TO EXCESS CALORIES WITH SERIOUS COMORBIDITY AND BODY MASS INDEX (BMI) OF 33.0 TO 33.9 IN ADULT: ICD-10-CM

## 2025-08-07 PROCEDURE — 1126F AMNT PAIN NOTED NONE PRSNT: CPT | Mod: 95 | Performed by: INTERNAL MEDICINE

## 2025-08-07 PROCEDURE — 98005 SYNCH AUDIO-VIDEO EST LOW 20: CPT | Performed by: INTERNAL MEDICINE

## 2025-08-07 RX ORDER — ESCITALOPRAM OXALATE 20 MG/1
20 TABLET ORAL DAILY
Qty: 90 TABLET | Refills: 3 | Status: SHIPPED | OUTPATIENT
Start: 2025-08-07

## 2025-09-02 DIAGNOSIS — E78.5 HYPERLIPIDEMIA LDL GOAL <100: ICD-10-CM

## 2025-09-02 DIAGNOSIS — G45.9 TIA (TRANSIENT ISCHEMIC ATTACK): ICD-10-CM

## 2025-09-02 DIAGNOSIS — I10 ESSENTIAL HYPERTENSION, BENIGN: ICD-10-CM

## 2025-09-03 RX ORDER — CLOPIDOGREL BISULFATE 75 MG/1
75 TABLET ORAL DAILY
Qty: 90 TABLET | Refills: 0 | Status: SHIPPED | OUTPATIENT
Start: 2025-09-03

## 2025-09-03 RX ORDER — LISINOPRIL 20 MG/1
20 TABLET ORAL DAILY
Qty: 90 TABLET | Refills: 0 | Status: SHIPPED | OUTPATIENT
Start: 2025-09-03

## 2025-09-03 RX ORDER — ROSUVASTATIN CALCIUM 20 MG/1
20 TABLET, COATED ORAL DAILY
Qty: 90 TABLET | Refills: 0 | Status: SHIPPED | OUTPATIENT
Start: 2025-09-03

## (undated) DEVICE — GEL ULTRASOUND AQUASONIC 20GM 01-01

## (undated) DEVICE — GLOVE BIOGEL PI MICRO INDICATOR UNDERGLOVE SZ 7.5 48975

## (undated) DEVICE — CAST STOCKINETTE 4" COTTON 30-7004

## (undated) DEVICE — COVER SHOE HI-TOP FLUID RESISTANT

## (undated) DEVICE — PREP CHLORAPREP 26ML TINTED HI-LITE ORANGE 930815

## (undated) DEVICE — PAD CHUX UNDERPAD 30X36" P3036C

## (undated) DEVICE — DRSG GAUZE 4X4" TRAY 6939

## (undated) DEVICE — LINEN TOWEL PACK X5 5464

## (undated) DEVICE — SOL NACL 0.9% INJ 500ML BAG 2B1323Q

## (undated) DEVICE — DRAPE SHEET REV FOLD 3/4 9349

## (undated) DEVICE — PEN MARKING SKIN W/PAPER RULER 31145785

## (undated) DEVICE — SOL ADH LIQUID BENZOIN SWAB 0.6ML C1544

## (undated) DEVICE — GLOVE PROTEXIS BLUE W/NEU-THERA 8.0  2D73EB80

## (undated) DEVICE — TRAY PAIN INJECTION 97A 640

## (undated) RX ORDER — ROCURONIUM BROMIDE 50 MG/5 ML
SYRINGE (ML) INTRAVENOUS
Status: DISPENSED
Start: 2022-04-16

## (undated) RX ORDER — FENTANYL CITRATE 50 UG/ML
INJECTION, SOLUTION INTRAMUSCULAR; INTRAVENOUS
Status: DISPENSED
Start: 2022-04-16

## (undated) RX ORDER — EPHEDRINE SULFATE 50 MG/ML
INJECTION, SOLUTION INTRAMUSCULAR; INTRAVENOUS; SUBCUTANEOUS
Status: DISPENSED
Start: 2022-04-16

## (undated) RX ORDER — FENTANYL CITRATE-0.9 % NACL/PF 10 MCG/ML
PLASTIC BAG, INJECTION (ML) INTRAVENOUS
Status: DISPENSED
Start: 2022-04-16

## (undated) RX ORDER — FENTANYL CITRATE 50 UG/ML
INJECTION, SOLUTION INTRAMUSCULAR; INTRAVENOUS
Status: DISPENSED
Start: 2020-10-26

## (undated) RX ORDER — EPTIFIBATIDE 2 MG/ML
INJECTION, SOLUTION INTRAVENOUS
Status: DISPENSED
Start: 2022-04-16

## (undated) RX ORDER — HEPARIN SODIUM 200 [USP'U]/100ML
INJECTION, SOLUTION INTRAVENOUS
Status: DISPENSED
Start: 2022-04-07

## (undated) RX ORDER — FENTANYL CITRATE 50 UG/ML
INJECTION, SOLUTION INTRAMUSCULAR; INTRAVENOUS
Status: DISPENSED
Start: 2022-04-07

## (undated) RX ORDER — LIDOCAINE HYDROCHLORIDE 10 MG/ML
INJECTION, SOLUTION EPIDURAL; INFILTRATION; INTRACAUDAL; PERINEURAL
Status: DISPENSED
Start: 2022-04-16

## (undated) RX ORDER — LIDOCAINE HYDROCHLORIDE 10 MG/ML
INJECTION, SOLUTION EPIDURAL; INFILTRATION; INTRACAUDAL; PERINEURAL
Status: DISPENSED
Start: 2024-05-31

## (undated) RX ORDER — PROPOFOL 10 MG/ML
INJECTION, EMULSION INTRAVENOUS
Status: DISPENSED
Start: 2022-04-16

## (undated) RX ORDER — LIDOCAINE HYDROCHLORIDE AND EPINEPHRINE 10; 10 MG/ML; UG/ML
INJECTION, SOLUTION INFILTRATION; PERINEURAL
Status: DISPENSED
Start: 2024-05-31

## (undated) RX ORDER — LIDOCAINE HYDROCHLORIDE 10 MG/ML
INJECTION, SOLUTION INFILTRATION; PERINEURAL
Status: DISPENSED
Start: 2022-04-07

## (undated) RX ORDER — HEPARIN SODIUM 1000 [USP'U]/ML
INJECTION, SOLUTION INTRAVENOUS; SUBCUTANEOUS
Status: DISPENSED
Start: 2022-04-16

## (undated) RX ORDER — HEPARIN SODIUM 200 [USP'U]/100ML
INJECTION, SOLUTION INTRAVENOUS
Status: DISPENSED
Start: 2022-04-16

## (undated) RX ORDER — ONDANSETRON 2 MG/ML
INJECTION INTRAMUSCULAR; INTRAVENOUS
Status: DISPENSED
Start: 2022-04-16

## (undated) RX ORDER — LIDOCAINE HYDROCHLORIDE 20 MG/ML
INJECTION, SOLUTION EPIDURAL; INFILTRATION; INTRACAUDAL; PERINEURAL
Status: DISPENSED
Start: 2022-04-16